# Patient Record
Sex: MALE | Race: WHITE | NOT HISPANIC OR LATINO | Employment: OTHER | URBAN - METROPOLITAN AREA
[De-identification: names, ages, dates, MRNs, and addresses within clinical notes are randomized per-mention and may not be internally consistent; named-entity substitution may affect disease eponyms.]

---

## 2017-01-06 ENCOUNTER — HOSPITAL ENCOUNTER (INPATIENT)
Facility: HOSPITAL | Age: 82
LOS: 4 days | Discharge: HOME | DRG: 280 | End: 2017-01-11
Attending: EMERGENCY MEDICINE | Admitting: ANESTHESIOLOGY
Payer: MEDICARE

## 2017-01-06 ENCOUNTER — APPOINTMENT (EMERGENCY)
Dept: RADIOLOGY | Facility: HOSPITAL | Age: 82
DRG: 280 | End: 2017-01-06
Payer: MEDICARE

## 2017-01-06 DIAGNOSIS — I25.10 CAD (CORONARY ARTERY DISEASE): ICD-10-CM

## 2017-01-06 DIAGNOSIS — Z86.718 HISTORY OF DVT (DEEP VEIN THROMBOSIS): Chronic | ICD-10-CM

## 2017-01-06 DIAGNOSIS — I50.9 ACUTE EXACERBATION OF CONGESTIVE HEART FAILURE (HCC): Primary | ICD-10-CM

## 2017-01-06 DIAGNOSIS — J96.01 ACUTE RESPIRATORY FAILURE WITH HYPOXIA (HCC): ICD-10-CM

## 2017-01-06 DIAGNOSIS — I51.7 CARDIOMEGALY: ICD-10-CM

## 2017-01-06 LAB
APTT PPP: 26 SECONDS (ref 24–36)
BASOPHILS # BLD AUTO: 0 THOUSANDS/ΜL (ref 0–0.1)
BASOPHILS NFR BLD AUTO: 0 % (ref 0–1)
EOSINOPHIL # BLD AUTO: 0.2 THOUSAND/ΜL (ref 0–0.61)
EOSINOPHIL NFR BLD AUTO: 3 % (ref 0–6)
ERYTHROCYTE [DISTWIDTH] IN BLOOD BY AUTOMATED COUNT: 17 % (ref 11.6–15.1)
HCT VFR BLD AUTO: 39.2 % (ref 42–52)
HGB BLD-MCNC: 12.9 G/DL (ref 14–18)
INR PPP: 0.97 (ref 0.86–1.16)
LYMPHOCYTES # BLD AUTO: 1.2 THOUSANDS/ΜL (ref 0.6–4.47)
LYMPHOCYTES NFR BLD AUTO: 21 % (ref 14–44)
MCH RBC QN AUTO: 27.8 PG (ref 27–31)
MCHC RBC AUTO-ENTMCNC: 32.8 G/DL (ref 31.4–37.4)
MCV RBC AUTO: 85 FL (ref 82–98)
MONOCYTES # BLD AUTO: 0.5 THOUSAND/ΜL (ref 0.17–1.22)
MONOCYTES NFR BLD AUTO: 10 % (ref 4–12)
NEUTROPHILS # BLD AUTO: 3.7 THOUSANDS/ΜL (ref 1.85–7.62)
NEUTS SEG NFR BLD AUTO: 66 % (ref 43–75)
NRBC BLD AUTO-RTO: 0 /100 WBCS
PLATELET # BLD AUTO: 175 THOUSANDS/UL (ref 130–400)
PMV BLD AUTO: 8.6 FL (ref 8.9–12.7)
PROTHROMBIN TIME: 10.2 SECONDS (ref 9.4–11.7)
RBC # BLD AUTO: 4.63 MILLION/UL (ref 4.7–6.1)
TROPONIN I SERPL-MCNC: 0.02 NG/ML
WBC # BLD AUTO: 5.7 THOUSAND/UL (ref 4.8–10.8)

## 2017-01-06 PROCEDURE — 83880 ASSAY OF NATRIURETIC PEPTIDE: CPT | Performed by: EMERGENCY MEDICINE

## 2017-01-06 PROCEDURE — 80053 COMPREHEN METABOLIC PANEL: CPT | Performed by: EMERGENCY MEDICINE

## 2017-01-06 PROCEDURE — 84484 ASSAY OF TROPONIN QUANT: CPT | Performed by: EMERGENCY MEDICINE

## 2017-01-06 PROCEDURE — 85730 THROMBOPLASTIN TIME PARTIAL: CPT | Performed by: EMERGENCY MEDICINE

## 2017-01-06 PROCEDURE — 71010 HB CHEST X-RAY 1 VIEW FRONTAL (PORTABLE): CPT

## 2017-01-06 PROCEDURE — 94660 CPAP INITIATION&MGMT: CPT

## 2017-01-06 PROCEDURE — 85610 PROTHROMBIN TIME: CPT | Performed by: EMERGENCY MEDICINE

## 2017-01-06 PROCEDURE — 93005 ELECTROCARDIOGRAM TRACING: CPT | Performed by: EMERGENCY MEDICINE

## 2017-01-06 PROCEDURE — 85025 COMPLETE CBC W/AUTO DIFF WBC: CPT | Performed by: EMERGENCY MEDICINE

## 2017-01-06 PROCEDURE — 36415 COLL VENOUS BLD VENIPUNCTURE: CPT | Performed by: EMERGENCY MEDICINE

## 2017-01-06 PROCEDURE — 96374 THER/PROPH/DIAG INJ IV PUSH: CPT

## 2017-01-06 PROCEDURE — 94760 N-INVAS EAR/PLS OXIMETRY 1: CPT

## 2017-01-06 RX ORDER — METOPROLOL SUCCINATE 50 MG/1
50 TABLET, EXTENDED RELEASE ORAL DAILY
COMMUNITY
End: 2018-04-17

## 2017-01-06 RX ORDER — NITROGLYCERIN 20 MG/100ML
5-200 INJECTION INTRAVENOUS
Status: DISCONTINUED | OUTPATIENT
Start: 2017-01-06 | End: 2017-01-07

## 2017-01-06 RX ORDER — NITROGLYCERIN 20 MG/100ML
5-200 INJECTION INTRAVENOUS
Status: DISCONTINUED | OUTPATIENT
Start: 2017-01-06 | End: 2017-01-06

## 2017-01-06 RX ORDER — NITROGLYCERIN 0.4 MG/1
0.4 TABLET SUBLINGUAL
COMMUNITY
End: 2019-04-22 | Stop reason: SDUPTHER

## 2017-01-06 RX ORDER — FUROSEMIDE 10 MG/ML
60 INJECTION INTRAMUSCULAR; INTRAVENOUS ONCE
Status: COMPLETED | OUTPATIENT
Start: 2017-01-06 | End: 2017-01-06

## 2017-01-06 RX ORDER — IPRATROPIUM BROMIDE AND ALBUTEROL SULFATE 2.5; .5 MG/3ML; MG/3ML
SOLUTION RESPIRATORY (INHALATION)
Status: COMPLETED
Start: 2017-01-06 | End: 2017-01-07

## 2017-01-06 RX ORDER — ASPIRIN 325 MG
325 TABLET ORAL ONCE
Status: COMPLETED | OUTPATIENT
Start: 2017-01-06 | End: 2017-01-06

## 2017-01-06 RX ORDER — ISOSORBIDE DINITRATE 30 MG/1
30 TABLET ORAL DAILY
COMMUNITY
End: 2018-03-12 | Stop reason: SDUPTHER

## 2017-01-06 RX ORDER — IPRATROPIUM BROMIDE AND ALBUTEROL SULFATE 2.5; .5 MG/3ML; MG/3ML
3 SOLUTION RESPIRATORY (INHALATION)
Status: DISCONTINUED | OUTPATIENT
Start: 2017-01-07 | End: 2017-01-07

## 2017-01-06 RX ADMIN — NITROGLYCERIN 1 INCH: 20 OINTMENT TOPICAL at 23:28

## 2017-01-06 RX ADMIN — FUROSEMIDE 60 MG: 10 INJECTION, SOLUTION INTRAMUSCULAR; INTRAVENOUS at 23:28

## 2017-01-06 RX ADMIN — ASPIRIN 325 MG: 325 TABLET ORAL at 23:28

## 2017-01-07 ENCOUNTER — APPOINTMENT (INPATIENT)
Dept: RADIOLOGY | Facility: HOSPITAL | Age: 82
DRG: 280 | End: 2017-01-07
Payer: MEDICARE

## 2017-01-07 PROBLEM — I51.7 CARDIOMEGALY: Status: ACTIVE | Noted: 2017-01-07

## 2017-01-07 PROBLEM — E78.5 HYPERLIPIDEMIA: Status: ACTIVE | Noted: 2017-01-07

## 2017-01-07 PROBLEM — I50.43 ACUTE ON CHRONIC COMBINED SYSTOLIC AND DIASTOLIC HEART FAILURE (HCC): Status: ACTIVE | Noted: 2017-01-07

## 2017-01-07 PROBLEM — I25.10 CAD (CORONARY ARTERY DISEASE): Chronic | Status: ACTIVE | Noted: 2017-01-07

## 2017-01-07 PROBLEM — I10 HYPERTENSION: Chronic | Status: ACTIVE | Noted: 2017-01-07

## 2017-01-07 PROBLEM — Z86.718 HISTORY OF DVT (DEEP VEIN THROMBOSIS): Status: ACTIVE | Noted: 2017-01-07

## 2017-01-07 PROBLEM — I51.7 CARDIOMEGALY: Status: RESOLVED | Noted: 2017-01-07 | Resolved: 2017-01-07

## 2017-01-07 PROBLEM — I20.8 STABLE ANGINA (HCC): Chronic | Status: ACTIVE | Noted: 2017-01-07

## 2017-01-07 PROBLEM — J96.01 ACUTE RESPIRATORY FAILURE WITH HYPOXIA (HCC): Status: ACTIVE | Noted: 2017-01-07

## 2017-01-07 PROBLEM — K27.9 PUD (PEPTIC ULCER DISEASE): Status: ACTIVE | Noted: 2017-01-07

## 2017-01-07 PROBLEM — Z86.718 HISTORY OF DVT (DEEP VEIN THROMBOSIS): Chronic | Status: ACTIVE | Noted: 2017-01-07

## 2017-01-07 PROBLEM — K27.9 PUD (PEPTIC ULCER DISEASE): Chronic | Status: ACTIVE | Noted: 2017-01-07

## 2017-01-07 PROBLEM — R06.89 ACUTE RESPIRATORY INSUFFICIENCY: Status: ACTIVE | Noted: 2017-01-07

## 2017-01-07 PROBLEM — I50.23 ACUTE ON CHRONIC SYSTOLIC HEART FAILURE (HCC): Status: ACTIVE | Noted: 2017-01-07

## 2017-01-07 PROBLEM — E78.5 HYPERLIPIDEMIA: Chronic | Status: ACTIVE | Noted: 2017-01-07

## 2017-01-07 PROBLEM — I25.10 CAD (CORONARY ARTERY DISEASE): Status: ACTIVE | Noted: 2017-01-07

## 2017-01-07 LAB
ALBUMIN SERPL BCP-MCNC: 3.3 G/DL (ref 3.5–5)
ALP SERPL-CCNC: 92 U/L (ref 46–116)
ALT SERPL W P-5'-P-CCNC: 22 U/L (ref 12–78)
ANION GAP SERPL CALCULATED.3IONS-SCNC: 10 MMOL/L (ref 4–13)
ANION GAP SERPL CALCULATED.3IONS-SCNC: 11 MMOL/L (ref 4–13)
ARTERIAL PATENCY WRIST A: NO
AST SERPL W P-5'-P-CCNC: 22 U/L (ref 5–45)
BASE EXCESS BLDA CALC-SCNC: -1.5 MMOL/L
BASOPHILS # BLD AUTO: 0 THOUSANDS/ΜL (ref 0–0.1)
BASOPHILS NFR BLD AUTO: 0 % (ref 0–1)
BILIRUB SERPL-MCNC: 0.5 MG/DL (ref 0.2–1)
BODY TEMPERATURE: 97.2 DEGREES FEHRENHEIT
BUN SERPL-MCNC: 21 MG/DL (ref 5–25)
BUN SERPL-MCNC: 23 MG/DL (ref 5–25)
CALCIUM SERPL-MCNC: 8.7 MG/DL (ref 8.3–10.1)
CALCIUM SERPL-MCNC: 8.8 MG/DL (ref 8.3–10.1)
CHLORIDE SERPL-SCNC: 102 MMOL/L (ref 100–108)
CHLORIDE SERPL-SCNC: 102 MMOL/L (ref 100–108)
CO2 SERPL-SCNC: 27 MMOL/L (ref 21–32)
CO2 SERPL-SCNC: 28 MMOL/L (ref 21–32)
CREAT SERPL-MCNC: 0.84 MG/DL (ref 0.6–1.3)
CREAT SERPL-MCNC: 0.92 MG/DL (ref 0.6–1.3)
EOSINOPHIL # BLD AUTO: 0 THOUSAND/ΜL (ref 0–0.61)
EOSINOPHIL NFR BLD AUTO: 1 % (ref 0–6)
ERYTHROCYTE [DISTWIDTH] IN BLOOD BY AUTOMATED COUNT: 16.6 % (ref 11.6–15.1)
GFR SERPL CREATININE-BSD FRML MDRD: >60 ML/MIN/1.73SQ M
GFR SERPL CREATININE-BSD FRML MDRD: >60 ML/MIN/1.73SQ M
GLUCOSE SERPL-MCNC: 118 MG/DL (ref 65–140)
GLUCOSE SERPL-MCNC: 131 MG/DL (ref 65–140)
GLUCOSE SERPL-MCNC: 131 MG/DL (ref 65–140)
HCO3 BLDA-SCNC: 23.1 MMOL/L (ref 22–28)
HCT VFR BLD AUTO: 38.4 % (ref 42–52)
HGB BLD-MCNC: 12.5 G/DL (ref 14–18)
IPAP: 12
LYMPHOCYTES # BLD AUTO: 0.9 THOUSANDS/ΜL (ref 0.6–4.47)
LYMPHOCYTES NFR BLD AUTO: 14 % (ref 14–44)
MAGNESIUM SERPL-MCNC: 2.1 MG/DL (ref 1.6–2.6)
MCH RBC QN AUTO: 27.5 PG (ref 27–31)
MCHC RBC AUTO-ENTMCNC: 32.5 G/DL (ref 31.4–37.4)
MCV RBC AUTO: 85 FL (ref 82–98)
MONOCYTES # BLD AUTO: 0.5 THOUSAND/ΜL (ref 0.17–1.22)
MONOCYTES NFR BLD AUTO: 8 % (ref 4–12)
NEUTROPHILS # BLD AUTO: 5.1 THOUSANDS/ΜL (ref 1.85–7.62)
NEUTS SEG NFR BLD AUTO: 77 % (ref 43–75)
NON VENT- BIPAP: NORMAL
NRBC BLD AUTO-RTO: 0 /100 WBCS
NT-PROBNP SERPL-MCNC: 1830 PG/ML
O2 CT BLDA-SCNC: 18.3 ML/DL (ref 16–23)
OXYHGB MFR BLDA: 95.1 % (ref 94–97)
PCO2 BLDA: 39 MM HG (ref 36–44)
PCO2 TEMP ADJ BLDA: 37.7 MM HG (ref 36–44)
PEEP MAX SETTING VENT: 6 CM[H2O]
PH BLD: 7.4 [PH] (ref 7.35–7.45)
PH BLDA: 7.39 [PH] (ref 7.35–7.45)
PHOSPHATE SERPL-MCNC: 4.8 MG/DL (ref 2.3–4.1)
PLATELET # BLD AUTO: 170 THOUSANDS/UL (ref 130–400)
PMV BLD AUTO: 8.1 FL (ref 8.9–12.7)
PO2 BLD: 86.5 MM HG (ref 75–129)
PO2 BLDA: 91 MM HG (ref 75–129)
POTASSIUM SERPL-SCNC: 4.2 MMOL/L (ref 3.5–5.3)
POTASSIUM SERPL-SCNC: 5.6 MMOL/L (ref 3.5–5.3)
PROT SERPL-MCNC: 7.7 G/DL (ref 6.4–8.2)
RBC # BLD AUTO: 4.53 MILLION/UL (ref 4.7–6.1)
SODIUM SERPL-SCNC: 140 MMOL/L (ref 136–145)
SODIUM SERPL-SCNC: 140 MMOL/L (ref 136–145)
SPECIMEN SOURCE: NORMAL
TROPONIN I SERPL-MCNC: 0.12 NG/ML
TROPONIN I SERPL-MCNC: 0.13 NG/ML
TROPONIN I SERPL-MCNC: 0.16 NG/ML
VENT BIPAP FIO2: 40 %
WBC # BLD AUTO: 6.6 THOUSAND/UL (ref 4.8–10.8)

## 2017-01-07 PROCEDURE — 84100 ASSAY OF PHOSPHORUS: CPT | Performed by: FAMILY MEDICINE

## 2017-01-07 PROCEDURE — 82805 BLOOD GASES W/O2 SATURATION: CPT | Performed by: EMERGENCY MEDICINE

## 2017-01-07 PROCEDURE — 94760 N-INVAS EAR/PLS OXIMETRY 1: CPT

## 2017-01-07 PROCEDURE — 99285 EMERGENCY DEPT VISIT HI MDM: CPT

## 2017-01-07 PROCEDURE — 71010 HB CHEST X-RAY 1 VIEW FRONTAL (PORTABLE): CPT

## 2017-01-07 PROCEDURE — 71275 CT ANGIOGRAPHY CHEST: CPT

## 2017-01-07 PROCEDURE — 94640 AIRWAY INHALATION TREATMENT: CPT

## 2017-01-07 PROCEDURE — 84484 ASSAY OF TROPONIN QUANT: CPT | Performed by: PHYSICIAN ASSISTANT

## 2017-01-07 PROCEDURE — 84484 ASSAY OF TROPONIN QUANT: CPT | Performed by: ANESTHESIOLOGY

## 2017-01-07 PROCEDURE — 80048 BASIC METABOLIC PNL TOTAL CA: CPT | Performed by: FAMILY MEDICINE

## 2017-01-07 PROCEDURE — 93005 ELECTROCARDIOGRAM TRACING: CPT | Performed by: PHYSICIAN ASSISTANT

## 2017-01-07 PROCEDURE — 82948 REAGENT STRIP/BLOOD GLUCOSE: CPT

## 2017-01-07 PROCEDURE — 87081 CULTURE SCREEN ONLY: CPT | Performed by: ANESTHESIOLOGY

## 2017-01-07 PROCEDURE — 83735 ASSAY OF MAGNESIUM: CPT | Performed by: FAMILY MEDICINE

## 2017-01-07 PROCEDURE — 36600 WITHDRAWAL OF ARTERIAL BLOOD: CPT

## 2017-01-07 PROCEDURE — 85025 COMPLETE CBC W/AUTO DIFF WBC: CPT | Performed by: FAMILY MEDICINE

## 2017-01-07 RX ORDER — CLOPIDOGREL BISULFATE 75 MG/1
75 TABLET ORAL DAILY
Status: DISCONTINUED | OUTPATIENT
Start: 2017-01-07 | End: 2017-01-11 | Stop reason: HOSPADM

## 2017-01-07 RX ORDER — ALPRAZOLAM 0.25 MG/1
0.25 TABLET ORAL
Status: DISCONTINUED | OUTPATIENT
Start: 2017-01-07 | End: 2017-01-08

## 2017-01-07 RX ORDER — IPRATROPIUM BROMIDE AND ALBUTEROL SULFATE 2.5; .5 MG/3ML; MG/3ML
3 SOLUTION RESPIRATORY (INHALATION)
Status: DISCONTINUED | OUTPATIENT
Start: 2017-01-07 | End: 2017-01-11 | Stop reason: HOSPADM

## 2017-01-07 RX ORDER — FUROSEMIDE 10 MG/ML
20 INJECTION INTRAMUSCULAR; INTRAVENOUS
Status: DISCONTINUED | OUTPATIENT
Start: 2017-01-08 | End: 2017-01-08

## 2017-01-07 RX ORDER — TIMOLOL MALEATE 5 MG/ML
1 SOLUTION/ DROPS OPHTHALMIC 2 TIMES DAILY
Status: DISCONTINUED | OUTPATIENT
Start: 2017-01-07 | End: 2017-01-11 | Stop reason: HOSPADM

## 2017-01-07 RX ORDER — FUROSEMIDE 10 MG/ML
20 INJECTION INTRAMUSCULAR; INTRAVENOUS ONCE
Status: COMPLETED | OUTPATIENT
Start: 2017-01-07 | End: 2017-01-07

## 2017-01-07 RX ORDER — ACETAMINOPHEN 325 MG/1
650 TABLET ORAL EVERY 6 HOURS PRN
Status: DISCONTINUED | OUTPATIENT
Start: 2017-01-07 | End: 2017-01-11 | Stop reason: HOSPADM

## 2017-01-07 RX ORDER — ATORVASTATIN CALCIUM 40 MG/1
40 TABLET, FILM COATED ORAL
Status: DISCONTINUED | OUTPATIENT
Start: 2017-01-07 | End: 2017-01-11 | Stop reason: HOSPADM

## 2017-01-07 RX ORDER — ONDANSETRON 2 MG/ML
4 INJECTION INTRAMUSCULAR; INTRAVENOUS EVERY 6 HOURS PRN
Status: DISCONTINUED | OUTPATIENT
Start: 2017-01-07 | End: 2017-01-11 | Stop reason: HOSPADM

## 2017-01-07 RX ORDER — SIMETHICONE 80 MG
80 TABLET,CHEWABLE ORAL EVERY 6 HOURS PRN
Status: DISCONTINUED | OUTPATIENT
Start: 2017-01-07 | End: 2017-01-11 | Stop reason: HOSPADM

## 2017-01-07 RX ORDER — SODIUM CHLORIDE, SODIUM LACTATE, POTASSIUM CHLORIDE, CALCIUM CHLORIDE 600; 310; 30; 20 MG/100ML; MG/100ML; MG/100ML; MG/100ML
30 INJECTION, SOLUTION INTRAVENOUS CONTINUOUS
Status: DISCONTINUED | OUTPATIENT
Start: 2017-01-07 | End: 2017-01-08

## 2017-01-07 RX ORDER — METOPROLOL SUCCINATE 50 MG/1
50 TABLET, EXTENDED RELEASE ORAL DAILY
Status: DISCONTINUED | OUTPATIENT
Start: 2017-01-07 | End: 2017-01-11 | Stop reason: HOSPADM

## 2017-01-07 RX ORDER — ISOSORBIDE DINITRATE 10 MG/1
30 TABLET ORAL DAILY
Status: DISCONTINUED | OUTPATIENT
Start: 2017-01-07 | End: 2017-01-11 | Stop reason: HOSPADM

## 2017-01-07 RX ORDER — MAGNESIUM SULFATE HEPTAHYDRATE 40 MG/ML
2 INJECTION, SOLUTION INTRAVENOUS ONCE
Status: COMPLETED | OUTPATIENT
Start: 2017-01-07 | End: 2017-01-07

## 2017-01-07 RX ORDER — FUROSEMIDE 10 MG/ML
40 INJECTION INTRAMUSCULAR; INTRAVENOUS ONCE
Status: DISCONTINUED | OUTPATIENT
Start: 2017-01-07 | End: 2017-01-07

## 2017-01-07 RX ORDER — ONDANSETRON 2 MG/ML
INJECTION INTRAMUSCULAR; INTRAVENOUS
Status: COMPLETED
Start: 2017-01-07 | End: 2017-01-07

## 2017-01-07 RX ORDER — TAMSULOSIN HYDROCHLORIDE 0.4 MG/1
0.4 CAPSULE ORAL
Status: DISCONTINUED | OUTPATIENT
Start: 2017-01-07 | End: 2017-01-11 | Stop reason: HOSPADM

## 2017-01-07 RX ORDER — ASPIRIN 81 MG/1
81 TABLET, CHEWABLE ORAL DAILY
Status: DISCONTINUED | OUTPATIENT
Start: 2017-01-07 | End: 2017-01-11 | Stop reason: HOSPADM

## 2017-01-07 RX ORDER — LISINOPRIL 2.5 MG/1
2.5 TABLET ORAL DAILY
Status: DISCONTINUED | OUTPATIENT
Start: 2017-01-07 | End: 2017-01-08

## 2017-01-07 RX ADMIN — CLOPIDOGREL BISULFATE 75 MG: 75 TABLET ORAL at 09:39

## 2017-01-07 RX ADMIN — MAGNESIUM SULFATE HEPTAHYDRATE 2 G: 40 INJECTION, SOLUTION INTRAVENOUS at 09:53

## 2017-01-07 RX ADMIN — IOHEXOL 85 ML: 350 INJECTION, SOLUTION INTRAVENOUS at 12:53

## 2017-01-07 RX ADMIN — IPRATROPIUM BROMIDE AND ALBUTEROL SULFATE 3 ML: .5; 3 SOLUTION RESPIRATORY (INHALATION) at 12:14

## 2017-01-07 RX ADMIN — ONDANSETRON 4 MG: 2 INJECTION INTRAMUSCULAR; INTRAVENOUS at 10:12

## 2017-01-07 RX ADMIN — Medication 800 MG: at 18:03

## 2017-01-07 RX ADMIN — TIMOLOL MALEATE 1 DROP: 5 SOLUTION/ DROPS OPHTHALMIC at 18:04

## 2017-01-07 RX ADMIN — ENOXAPARIN SODIUM 40 MG: 40 INJECTION SUBCUTANEOUS at 09:39

## 2017-01-07 RX ADMIN — FUROSEMIDE 20 MG: 10 INJECTION, SOLUTION INTRAMUSCULAR; INTRAVENOUS at 16:47

## 2017-01-07 RX ADMIN — ISOSORBIDE DINITRATE 30 MG: 10 TABLET ORAL at 09:40

## 2017-01-07 RX ADMIN — IPRATROPIUM BROMIDE AND ALBUTEROL SULFATE 3 ML: .5; 3 SOLUTION RESPIRATORY (INHALATION) at 01:28

## 2017-01-07 RX ADMIN — IPRATROPIUM BROMIDE AND ALBUTEROL SULFATE 3 ML: .5; 3 SOLUTION RESPIRATORY (INHALATION) at 19:42

## 2017-01-07 RX ADMIN — TIMOLOL MALEATE 1 DROP: 5 SOLUTION/ DROPS OPHTHALMIC at 09:30

## 2017-01-07 RX ADMIN — IPRATROPIUM BROMIDE AND ALBUTEROL SULFATE 3 ML: 2.5; .5 SOLUTION RESPIRATORY (INHALATION) at 01:28

## 2017-01-07 RX ADMIN — FUROSEMIDE 20 MG: 10 INJECTION, SOLUTION INTRAMUSCULAR; INTRAVENOUS at 04:49

## 2017-01-07 RX ADMIN — IPRATROPIUM BROMIDE AND ALBUTEROL SULFATE 3 ML: .5; 3 SOLUTION RESPIRATORY (INHALATION) at 04:56

## 2017-01-07 RX ADMIN — SIMETHICONE CHEW TAB 80 MG 80 MG: 80 TABLET ORAL at 20:32

## 2017-01-07 RX ADMIN — LISINOPRIL 2.5 MG: 2.5 TABLET ORAL at 09:39

## 2017-01-07 RX ADMIN — Medication 800 MG: at 09:38

## 2017-01-07 RX ADMIN — IPRATROPIUM BROMIDE AND ALBUTEROL SULFATE 3 ML: .5; 3 SOLUTION RESPIRATORY (INHALATION) at 07:40

## 2017-01-07 RX ADMIN — IPRATROPIUM BROMIDE AND ALBUTEROL SULFATE 3 ML: .5; 3 SOLUTION RESPIRATORY (INHALATION) at 15:37

## 2017-01-07 RX ADMIN — ASPIRIN 81 MG 81 MG: 81 TABLET ORAL at 09:39

## 2017-01-07 RX ADMIN — METOPROLOL SUCCINATE 50 MG: 50 TABLET, FILM COATED, EXTENDED RELEASE ORAL at 09:39

## 2017-01-07 RX ADMIN — ATORVASTATIN CALCIUM 40 MG: 40 TABLET, FILM COATED ORAL at 18:03

## 2017-01-07 RX ADMIN — SODIUM CHLORIDE, SODIUM LACTATE, POTASSIUM CHLORIDE, AND CALCIUM CHLORIDE 75 ML/HR: .6; .31; .03; .02 INJECTION, SOLUTION INTRAVENOUS at 13:00

## 2017-01-07 RX ADMIN — TAMSULOSIN HYDROCHLORIDE 0.4 MG: 0.4 CAPSULE ORAL at 18:04

## 2017-01-07 RX ADMIN — ACETAMINOPHEN 650 MG: 325 TABLET, FILM COATED ORAL at 20:32

## 2017-01-08 ENCOUNTER — APPOINTMENT (INPATIENT)
Dept: RADIOLOGY | Facility: HOSPITAL | Age: 82
DRG: 280 | End: 2017-01-08
Payer: MEDICARE

## 2017-01-08 LAB
ANION GAP SERPL CALCULATED.3IONS-SCNC: 6 MMOL/L (ref 4–13)
ANION GAP SERPL CALCULATED.3IONS-SCNC: 9 MMOL/L (ref 4–13)
BASOPHILS # BLD AUTO: 0 THOUSANDS/ΜL (ref 0–0.1)
BASOPHILS NFR BLD AUTO: 0 % (ref 0–1)
BUN SERPL-MCNC: 31 MG/DL (ref 5–25)
BUN SERPL-MCNC: 36 MG/DL (ref 5–25)
CALCIUM SERPL-MCNC: 8.1 MG/DL (ref 8.3–10.1)
CALCIUM SERPL-MCNC: 8.3 MG/DL (ref 8.3–10.1)
CHLORIDE SERPL-SCNC: 100 MMOL/L (ref 100–108)
CHLORIDE SERPL-SCNC: 101 MMOL/L (ref 100–108)
CO2 SERPL-SCNC: 30 MMOL/L (ref 21–32)
CO2 SERPL-SCNC: 32 MMOL/L (ref 21–32)
CREAT SERPL-MCNC: 1.26 MG/DL (ref 0.6–1.3)
CREAT SERPL-MCNC: 1.43 MG/DL (ref 0.6–1.3)
EOSINOPHIL # BLD AUTO: 0 THOUSAND/ΜL (ref 0–0.61)
EOSINOPHIL NFR BLD AUTO: 1 % (ref 0–6)
ERYTHROCYTE [DISTWIDTH] IN BLOOD BY AUTOMATED COUNT: 16.9 % (ref 11.6–15.1)
GFR SERPL CREATININE-BSD FRML MDRD: 46.9 ML/MIN/1.73SQ M
GFR SERPL CREATININE-BSD FRML MDRD: 54.3 ML/MIN/1.73SQ M
GLUCOSE SERPL-MCNC: 113 MG/DL (ref 65–140)
GLUCOSE SERPL-MCNC: 123 MG/DL (ref 65–140)
HCT VFR BLD AUTO: 32.7 % (ref 42–52)
HGB BLD-MCNC: 10.6 G/DL (ref 14–18)
LYMPHOCYTES # BLD AUTO: 1 THOUSANDS/ΜL (ref 0.6–4.47)
LYMPHOCYTES NFR BLD AUTO: 11 % (ref 14–44)
MCH RBC QN AUTO: 27.5 PG (ref 27–31)
MCHC RBC AUTO-ENTMCNC: 32.3 G/DL (ref 31.4–37.4)
MCV RBC AUTO: 85 FL (ref 82–98)
MONOCYTES # BLD AUTO: 0.8 THOUSAND/ΜL (ref 0.17–1.22)
MONOCYTES NFR BLD AUTO: 9 % (ref 4–12)
MRSA NOSE QL CULT: NORMAL
NEUTROPHILS # BLD AUTO: 7.3 THOUSANDS/ΜL (ref 1.85–7.62)
NEUTS SEG NFR BLD AUTO: 80 % (ref 43–75)
NRBC BLD AUTO-RTO: 0 /100 WBCS
PLATELET # BLD AUTO: 144 THOUSANDS/UL (ref 130–400)
PMV BLD AUTO: 8.6 FL (ref 8.9–12.7)
POTASSIUM SERPL-SCNC: 4.2 MMOL/L (ref 3.5–5.3)
POTASSIUM SERPL-SCNC: 4.5 MMOL/L (ref 3.5–5.3)
RBC # BLD AUTO: 3.85 MILLION/UL (ref 4.7–6.1)
SODIUM SERPL-SCNC: 138 MMOL/L (ref 136–145)
SODIUM SERPL-SCNC: 140 MMOL/L (ref 136–145)
WBC # BLD AUTO: 9.1 THOUSAND/UL (ref 4.8–10.8)

## 2017-01-08 PROCEDURE — 94760 N-INVAS EAR/PLS OXIMETRY 1: CPT

## 2017-01-08 PROCEDURE — 80048 BASIC METABOLIC PNL TOTAL CA: CPT | Performed by: PHYSICIAN ASSISTANT

## 2017-01-08 PROCEDURE — 85025 COMPLETE CBC W/AUTO DIFF WBC: CPT | Performed by: PHYSICIAN ASSISTANT

## 2017-01-08 PROCEDURE — 71010 HB CHEST X-RAY 1 VIEW FRONTAL (PORTABLE): CPT

## 2017-01-08 PROCEDURE — 94640 AIRWAY INHALATION TREATMENT: CPT

## 2017-01-08 PROCEDURE — 80048 BASIC METABOLIC PNL TOTAL CA: CPT | Performed by: ANESTHESIOLOGY

## 2017-01-08 RX ADMIN — TIMOLOL MALEATE 1 DROP: 5 SOLUTION/ DROPS OPHTHALMIC at 18:11

## 2017-01-08 RX ADMIN — Medication 800 MG: at 18:10

## 2017-01-08 RX ADMIN — IPRATROPIUM BROMIDE AND ALBUTEROL SULFATE 3 ML: .5; 3 SOLUTION RESPIRATORY (INHALATION) at 11:52

## 2017-01-08 RX ADMIN — IPRATROPIUM BROMIDE AND ALBUTEROL SULFATE 3 ML: .5; 3 SOLUTION RESPIRATORY (INHALATION) at 07:32

## 2017-01-08 RX ADMIN — IPRATROPIUM BROMIDE AND ALBUTEROL SULFATE 3 ML: .5; 3 SOLUTION RESPIRATORY (INHALATION) at 01:37

## 2017-01-08 RX ADMIN — ISOSORBIDE DINITRATE 30 MG: 10 TABLET ORAL at 09:12

## 2017-01-08 RX ADMIN — IPRATROPIUM BROMIDE AND ALBUTEROL SULFATE 3 ML: .5; 3 SOLUTION RESPIRATORY (INHALATION) at 17:27

## 2017-01-08 RX ADMIN — TIMOLOL MALEATE 1 DROP: 5 SOLUTION/ DROPS OPHTHALMIC at 09:00

## 2017-01-08 RX ADMIN — ATORVASTATIN CALCIUM 40 MG: 40 TABLET, FILM COATED ORAL at 18:09

## 2017-01-08 RX ADMIN — METOPROLOL TARTRATE 25 MG: 25 TABLET ORAL at 22:26

## 2017-01-08 RX ADMIN — Medication 800 MG: at 09:12

## 2017-01-08 RX ADMIN — ENOXAPARIN SODIUM 40 MG: 40 INJECTION SUBCUTANEOUS at 09:11

## 2017-01-08 RX ADMIN — FUROSEMIDE 20 MG: 10 INJECTION, SOLUTION INTRAMUSCULAR; INTRAVENOUS at 08:14

## 2017-01-08 RX ADMIN — METOPROLOL SUCCINATE 50 MG: 50 TABLET, FILM COATED, EXTENDED RELEASE ORAL at 09:12

## 2017-01-08 RX ADMIN — ASPIRIN 81 MG 81 MG: 81 TABLET ORAL at 09:11

## 2017-01-08 RX ADMIN — TAMSULOSIN HYDROCHLORIDE 0.4 MG: 0.4 CAPSULE ORAL at 18:10

## 2017-01-08 RX ADMIN — CLOPIDOGREL BISULFATE 75 MG: 75 TABLET ORAL at 09:11

## 2017-01-08 RX ADMIN — IPRATROPIUM BROMIDE AND ALBUTEROL SULFATE 3 ML: .5; 3 SOLUTION RESPIRATORY (INHALATION) at 20:59

## 2017-01-09 ENCOUNTER — APPOINTMENT (INPATIENT)
Dept: NON INVASIVE DIAGNOSTICS | Facility: HOSPITAL | Age: 82
DRG: 280 | End: 2017-01-09
Payer: MEDICARE

## 2017-01-09 LAB
ANION GAP SERPL CALCULATED.3IONS-SCNC: 6 MMOL/L (ref 4–13)
ATRIAL RATE: 90 BPM
BASOPHILS # BLD AUTO: 0 THOUSANDS/ΜL (ref 0–0.1)
BASOPHILS NFR BLD AUTO: 0 % (ref 0–1)
BUN SERPL-MCNC: 33 MG/DL (ref 5–25)
CALCIUM SERPL-MCNC: 8.2 MG/DL (ref 8.3–10.1)
CHLORIDE SERPL-SCNC: 100 MMOL/L (ref 100–108)
CO2 SERPL-SCNC: 32 MMOL/L (ref 21–32)
CREAT SERPL-MCNC: 0.98 MG/DL (ref 0.6–1.3)
EOSINOPHIL # BLD AUTO: 0.1 THOUSAND/ΜL (ref 0–0.61)
EOSINOPHIL NFR BLD AUTO: 2 % (ref 0–6)
ERYTHROCYTE [DISTWIDTH] IN BLOOD BY AUTOMATED COUNT: 17.1 % (ref 11.6–15.1)
GFR SERPL CREATININE-BSD FRML MDRD: >60 ML/MIN/1.73SQ M
GLUCOSE SERPL-MCNC: 98 MG/DL (ref 65–140)
HCT VFR BLD AUTO: 31.7 % (ref 42–52)
HGB BLD-MCNC: 10.1 G/DL (ref 14–18)
LYMPHOCYTES # BLD AUTO: 0.9 THOUSANDS/ΜL (ref 0.6–4.47)
LYMPHOCYTES NFR BLD AUTO: 17 % (ref 14–44)
MCH RBC QN AUTO: 27.1 PG (ref 27–31)
MCHC RBC AUTO-ENTMCNC: 32 G/DL (ref 31.4–37.4)
MCV RBC AUTO: 85 FL (ref 82–98)
MONOCYTES # BLD AUTO: 0.7 THOUSAND/ΜL (ref 0.17–1.22)
MONOCYTES NFR BLD AUTO: 13 % (ref 4–12)
NEUTROPHILS # BLD AUTO: 3.7 THOUSANDS/ΜL (ref 1.85–7.62)
NEUTS SEG NFR BLD AUTO: 67 % (ref 43–75)
NRBC BLD AUTO-RTO: 0 /100 WBCS
P AXIS: 25 DEGREES
PLATELET # BLD AUTO: 141 THOUSANDS/UL (ref 130–400)
PMV BLD AUTO: 7.7 FL (ref 8.9–12.7)
POTASSIUM SERPL-SCNC: 4.2 MMOL/L (ref 3.5–5.3)
PR INTERVAL: 178 MS
QRS AXIS: 37 DEGREES
QRSD INTERVAL: 74 MS
QT INTERVAL: 350 MS
QTC INTERVAL: 428 MS
RBC # BLD AUTO: 3.73 MILLION/UL (ref 4.7–6.1)
SODIUM SERPL-SCNC: 138 MMOL/L (ref 136–145)
T WAVE AXIS: -42 DEGREES
VENTRICULAR RATE: 90 BPM
WBC # BLD AUTO: 5.5 THOUSAND/UL (ref 4.8–10.8)

## 2017-01-09 PROCEDURE — 85025 COMPLETE CBC W/AUTO DIFF WBC: CPT | Performed by: STUDENT IN AN ORGANIZED HEALTH CARE EDUCATION/TRAINING PROGRAM

## 2017-01-09 PROCEDURE — G8978 MOBILITY CURRENT STATUS: HCPCS

## 2017-01-09 PROCEDURE — G8979 MOBILITY GOAL STATUS: HCPCS

## 2017-01-09 PROCEDURE — 80048 BASIC METABOLIC PNL TOTAL CA: CPT | Performed by: STUDENT IN AN ORGANIZED HEALTH CARE EDUCATION/TRAINING PROGRAM

## 2017-01-09 PROCEDURE — 97162 PT EVAL MOD COMPLEX 30 MIN: CPT

## 2017-01-09 PROCEDURE — 94760 N-INVAS EAR/PLS OXIMETRY 1: CPT

## 2017-01-09 PROCEDURE — 94640 AIRWAY INHALATION TREATMENT: CPT

## 2017-01-09 PROCEDURE — 97166 OT EVAL MOD COMPLEX 45 MIN: CPT

## 2017-01-09 PROCEDURE — 93306 TTE W/DOPPLER COMPLETE: CPT

## 2017-01-09 PROCEDURE — G8987 SELF CARE CURRENT STATUS: HCPCS

## 2017-01-09 PROCEDURE — G8988 SELF CARE GOAL STATUS: HCPCS

## 2017-01-09 RX ORDER — ALPRAZOLAM 0.25 MG/1
0.25 TABLET ORAL ONCE
Status: COMPLETED | OUTPATIENT
Start: 2017-01-09 | End: 2017-01-09

## 2017-01-09 RX ORDER — FAMOTIDINE 20 MG/1
20 TABLET, FILM COATED ORAL 2 TIMES DAILY
Status: DISCONTINUED | OUTPATIENT
Start: 2017-01-09 | End: 2017-01-11 | Stop reason: HOSPADM

## 2017-01-09 RX ORDER — FUROSEMIDE 10 MG/ML
20 INJECTION INTRAMUSCULAR; INTRAVENOUS ONCE
Status: COMPLETED | OUTPATIENT
Start: 2017-01-09 | End: 2017-01-09

## 2017-01-09 RX ORDER — TORSEMIDE 10 MG/1
10 TABLET ORAL DAILY
Status: DISCONTINUED | OUTPATIENT
Start: 2017-01-10 | End: 2017-01-11

## 2017-01-09 RX ADMIN — FAMOTIDINE 20 MG: 20 TABLET ORAL at 21:54

## 2017-01-09 RX ADMIN — IPRATROPIUM BROMIDE AND ALBUTEROL SULFATE 3 ML: .5; 3 SOLUTION RESPIRATORY (INHALATION) at 15:22

## 2017-01-09 RX ADMIN — IPRATROPIUM BROMIDE AND ALBUTEROL SULFATE 3 ML: .5; 3 SOLUTION RESPIRATORY (INHALATION) at 20:57

## 2017-01-09 RX ADMIN — FUROSEMIDE 20 MG: 10 INJECTION, SOLUTION INTRAMUSCULAR; INTRAVENOUS at 09:14

## 2017-01-09 RX ADMIN — ISOSORBIDE DINITRATE 30 MG: 10 TABLET ORAL at 09:21

## 2017-01-09 RX ADMIN — TAMSULOSIN HYDROCHLORIDE 0.4 MG: 0.4 CAPSULE ORAL at 16:56

## 2017-01-09 RX ADMIN — IPRATROPIUM BROMIDE AND ALBUTEROL SULFATE 3 ML: .5; 3 SOLUTION RESPIRATORY (INHALATION) at 07:40

## 2017-01-09 RX ADMIN — METOPROLOL SUCCINATE 50 MG: 50 TABLET, FILM COATED, EXTENDED RELEASE ORAL at 09:14

## 2017-01-09 RX ADMIN — ALPRAZOLAM 0.25 MG: 0.25 TABLET ORAL at 01:28

## 2017-01-09 RX ADMIN — TIMOLOL MALEATE 1 DROP: 5 SOLUTION/ DROPS OPHTHALMIC at 17:53

## 2017-01-09 RX ADMIN — TIMOLOL MALEATE 1 DROP: 5 SOLUTION/ DROPS OPHTHALMIC at 09:20

## 2017-01-09 RX ADMIN — ATORVASTATIN CALCIUM 40 MG: 40 TABLET, FILM COATED ORAL at 16:56

## 2017-01-09 RX ADMIN — Medication 800 MG: at 09:15

## 2017-01-09 RX ADMIN — Medication 800 MG: at 17:53

## 2017-01-09 RX ADMIN — ASPIRIN 81 MG 81 MG: 81 TABLET ORAL at 09:15

## 2017-01-09 RX ADMIN — IPRATROPIUM BROMIDE AND ALBUTEROL SULFATE 3 ML: .5; 3 SOLUTION RESPIRATORY (INHALATION) at 23:09

## 2017-01-09 RX ADMIN — IPRATROPIUM BROMIDE AND ALBUTEROL SULFATE 3 ML: .5; 3 SOLUTION RESPIRATORY (INHALATION) at 11:17

## 2017-01-09 RX ADMIN — METOPROLOL TARTRATE 25 MG: 25 TABLET ORAL at 21:54

## 2017-01-09 RX ADMIN — CLOPIDOGREL BISULFATE 75 MG: 75 TABLET ORAL at 09:15

## 2017-01-09 RX ADMIN — IPRATROPIUM BROMIDE AND ALBUTEROL SULFATE 3 ML: .5; 3 SOLUTION RESPIRATORY (INHALATION) at 04:40

## 2017-01-09 RX ADMIN — ENOXAPARIN SODIUM 40 MG: 40 INJECTION SUBCUTANEOUS at 09:15

## 2017-01-10 ENCOUNTER — APPOINTMENT (INPATIENT)
Dept: RADIOLOGY | Facility: HOSPITAL | Age: 82
DRG: 280 | End: 2017-01-10
Payer: MEDICARE

## 2017-01-10 LAB
ANION GAP SERPL CALCULATED.3IONS-SCNC: 7 MMOL/L (ref 4–13)
BASOPHILS # BLD AUTO: 0 THOUSANDS/ΜL (ref 0–0.1)
BASOPHILS NFR BLD AUTO: 0 % (ref 0–1)
BUN SERPL-MCNC: 31 MG/DL (ref 5–25)
CALCIUM SERPL-MCNC: 8.4 MG/DL (ref 8.3–10.1)
CHLORIDE SERPL-SCNC: 102 MMOL/L (ref 100–108)
CO2 SERPL-SCNC: 30 MMOL/L (ref 21–32)
CREAT SERPL-MCNC: 0.94 MG/DL (ref 0.6–1.3)
EOSINOPHIL # BLD AUTO: 0.1 THOUSAND/ΜL (ref 0–0.61)
EOSINOPHIL NFR BLD AUTO: 2 % (ref 0–6)
ERYTHROCYTE [DISTWIDTH] IN BLOOD BY AUTOMATED COUNT: 16.6 % (ref 11.6–15.1)
GFR SERPL CREATININE-BSD FRML MDRD: >60 ML/MIN/1.73SQ M
GLUCOSE SERPL-MCNC: 126 MG/DL (ref 65–140)
HCT VFR BLD AUTO: 32.7 % (ref 42–52)
HGB BLD-MCNC: 10.5 G/DL (ref 14–18)
LYMPHOCYTES # BLD AUTO: 0.8 THOUSANDS/ΜL (ref 0.6–4.47)
LYMPHOCYTES NFR BLD AUTO: 14 % (ref 14–44)
MAGNESIUM SERPL-MCNC: 2.3 MG/DL (ref 1.6–2.6)
MCH RBC QN AUTO: 27.5 PG (ref 27–31)
MCHC RBC AUTO-ENTMCNC: 32.1 G/DL (ref 31.4–37.4)
MCV RBC AUTO: 86 FL (ref 82–98)
MONOCYTES # BLD AUTO: 0.7 THOUSAND/ΜL (ref 0.17–1.22)
MONOCYTES NFR BLD AUTO: 12 % (ref 4–12)
NEUTROPHILS # BLD AUTO: 4.3 THOUSANDS/ΜL (ref 1.85–7.62)
NEUTS SEG NFR BLD AUTO: 72 % (ref 43–75)
NRBC BLD AUTO-RTO: 0 /100 WBCS
PHOSPHATE SERPL-MCNC: 4 MG/DL (ref 2.3–4.1)
PLATELET # BLD AUTO: 156 THOUSANDS/UL (ref 130–400)
PMV BLD AUTO: 8.7 FL (ref 8.9–12.7)
POTASSIUM SERPL-SCNC: 4.2 MMOL/L (ref 3.5–5.3)
RBC # BLD AUTO: 3.81 MILLION/UL (ref 4.7–6.1)
SODIUM SERPL-SCNC: 139 MMOL/L (ref 136–145)
WBC # BLD AUTO: 5.9 THOUSAND/UL (ref 4.8–10.8)

## 2017-01-10 PROCEDURE — 94640 AIRWAY INHALATION TREATMENT: CPT

## 2017-01-10 PROCEDURE — 80048 BASIC METABOLIC PNL TOTAL CA: CPT | Performed by: INTERNAL MEDICINE

## 2017-01-10 PROCEDURE — 97110 THERAPEUTIC EXERCISES: CPT

## 2017-01-10 PROCEDURE — 71020 HB CHEST X-RAY 2VW FRONTAL&LATL: CPT

## 2017-01-10 PROCEDURE — 94760 N-INVAS EAR/PLS OXIMETRY 1: CPT

## 2017-01-10 PROCEDURE — 83735 ASSAY OF MAGNESIUM: CPT | Performed by: INTERNAL MEDICINE

## 2017-01-10 PROCEDURE — 84100 ASSAY OF PHOSPHORUS: CPT | Performed by: INTERNAL MEDICINE

## 2017-01-10 PROCEDURE — 85025 COMPLETE CBC W/AUTO DIFF WBC: CPT | Performed by: INTERNAL MEDICINE

## 2017-01-10 RX ADMIN — ATORVASTATIN CALCIUM 40 MG: 40 TABLET, FILM COATED ORAL at 17:08

## 2017-01-10 RX ADMIN — Medication 800 MG: at 17:08

## 2017-01-10 RX ADMIN — IPRATROPIUM BROMIDE AND ALBUTEROL SULFATE 3 ML: .5; 3 SOLUTION RESPIRATORY (INHALATION) at 16:14

## 2017-01-10 RX ADMIN — IPRATROPIUM BROMIDE AND ALBUTEROL SULFATE 3 ML: .5; 3 SOLUTION RESPIRATORY (INHALATION) at 11:43

## 2017-01-10 RX ADMIN — CLOPIDOGREL BISULFATE 75 MG: 75 TABLET ORAL at 09:29

## 2017-01-10 RX ADMIN — TIMOLOL MALEATE 1 DROP: 5 SOLUTION/ DROPS OPHTHALMIC at 09:31

## 2017-01-10 RX ADMIN — IPRATROPIUM BROMIDE AND ALBUTEROL SULFATE 3 ML: .5; 3 SOLUTION RESPIRATORY (INHALATION) at 20:44

## 2017-01-10 RX ADMIN — ENOXAPARIN SODIUM 40 MG: 40 INJECTION SUBCUTANEOUS at 09:30

## 2017-01-10 RX ADMIN — TORSEMIDE 10 MG: 10 TABLET ORAL at 09:29

## 2017-01-10 RX ADMIN — IPRATROPIUM BROMIDE AND ALBUTEROL SULFATE 3 ML: .5; 3 SOLUTION RESPIRATORY (INHALATION) at 09:08

## 2017-01-10 RX ADMIN — ASPIRIN 81 MG 81 MG: 81 TABLET ORAL at 09:29

## 2017-01-10 RX ADMIN — ONDANSETRON 4 MG: 2 INJECTION INTRAMUSCULAR; INTRAVENOUS at 06:10

## 2017-01-10 RX ADMIN — Medication 800 MG: at 09:29

## 2017-01-10 RX ADMIN — METOPROLOL SUCCINATE 50 MG: 50 TABLET, FILM COATED, EXTENDED RELEASE ORAL at 09:29

## 2017-01-10 RX ADMIN — FAMOTIDINE 20 MG: 20 TABLET ORAL at 09:29

## 2017-01-10 RX ADMIN — TIMOLOL MALEATE 1 DROP: 5 SOLUTION/ DROPS OPHTHALMIC at 17:08

## 2017-01-10 RX ADMIN — FAMOTIDINE 20 MG: 20 TABLET ORAL at 17:08

## 2017-01-10 RX ADMIN — TAMSULOSIN HYDROCHLORIDE 0.4 MG: 0.4 CAPSULE ORAL at 17:08

## 2017-01-10 RX ADMIN — ISOSORBIDE DINITRATE 30 MG: 10 TABLET ORAL at 09:30

## 2017-01-10 RX ADMIN — METOPROLOL TARTRATE 25 MG: 25 TABLET ORAL at 21:51

## 2017-01-11 VITALS
RESPIRATION RATE: 18 BRPM | OXYGEN SATURATION: 92 % | DIASTOLIC BLOOD PRESSURE: 86 MMHG | WEIGHT: 143.3 LBS | BODY MASS INDEX: 24.46 KG/M2 | TEMPERATURE: 97.7 F | HEIGHT: 64 IN | HEART RATE: 97 BPM | SYSTOLIC BLOOD PRESSURE: 156 MMHG

## 2017-01-11 PROBLEM — I50.33 ACUTE ON CHRONIC DIASTOLIC HEART FAILURE (HCC): Status: ACTIVE | Noted: 2017-01-07

## 2017-01-11 LAB
ANION GAP SERPL CALCULATED.3IONS-SCNC: 7 MMOL/L (ref 4–13)
ATRIAL RATE: 77 BPM
BUN SERPL-MCNC: 32 MG/DL (ref 5–25)
CALCIUM SERPL-MCNC: 8.3 MG/DL (ref 8.3–10.1)
CHLORIDE SERPL-SCNC: 102 MMOL/L (ref 100–108)
CO2 SERPL-SCNC: 31 MMOL/L (ref 21–32)
CREAT SERPL-MCNC: 0.97 MG/DL (ref 0.6–1.3)
GFR SERPL CREATININE-BSD FRML MDRD: >60 ML/MIN/1.73SQ M
GLUCOSE SERPL-MCNC: 116 MG/DL (ref 65–140)
MAGNESIUM SERPL-MCNC: 2.3 MG/DL (ref 1.6–2.6)
P AXIS: 20 DEGREES
PHOSPHATE SERPL-MCNC: 3.8 MG/DL (ref 2.3–4.1)
POTASSIUM SERPL-SCNC: 4.1 MMOL/L (ref 3.5–5.3)
PR INTERVAL: 164 MS
QRS AXIS: 24 DEGREES
QRSD INTERVAL: 82 MS
QT INTERVAL: 422 MS
QTC INTERVAL: 477 MS
SODIUM SERPL-SCNC: 140 MMOL/L (ref 136–145)
T WAVE AXIS: -70 DEGREES
VENTRICULAR RATE: 77 BPM

## 2017-01-11 PROCEDURE — 94640 AIRWAY INHALATION TREATMENT: CPT

## 2017-01-11 PROCEDURE — 80048 BASIC METABOLIC PNL TOTAL CA: CPT | Performed by: INTERNAL MEDICINE

## 2017-01-11 PROCEDURE — 83735 ASSAY OF MAGNESIUM: CPT | Performed by: INTERNAL MEDICINE

## 2017-01-11 PROCEDURE — 94760 N-INVAS EAR/PLS OXIMETRY 1: CPT

## 2017-01-11 PROCEDURE — G0009 ADMIN PNEUMOCOCCAL VACCINE: HCPCS | Performed by: ANESTHESIOLOGY

## 2017-01-11 PROCEDURE — 90670 PCV13 VACCINE IM: CPT | Performed by: ANESTHESIOLOGY

## 2017-01-11 PROCEDURE — 84100 ASSAY OF PHOSPHORUS: CPT | Performed by: INTERNAL MEDICINE

## 2017-01-11 PROCEDURE — 97110 THERAPEUTIC EXERCISES: CPT

## 2017-01-11 RX ORDER — FAMOTIDINE 20 MG/1
20 TABLET, FILM COATED ORAL 2 TIMES DAILY
Qty: 60 TABLET | Refills: 0 | Status: SHIPPED | OUTPATIENT
Start: 2017-01-11 | End: 2018-04-17

## 2017-01-11 RX ORDER — POTASSIUM CHLORIDE 750 MG/1
20 TABLET, EXTENDED RELEASE ORAL DAILY
Qty: 20 TABLET | Refills: 0 | Status: SHIPPED | OUTPATIENT
Start: 2017-01-11 | End: 2017-11-04 | Stop reason: HOSPADM

## 2017-01-11 RX ORDER — TORSEMIDE 20 MG/1
20 TABLET ORAL DAILY
Qty: 30 TABLET | Refills: 0 | Status: SHIPPED | OUTPATIENT
Start: 2017-01-12 | End: 2018-04-17

## 2017-01-11 RX ORDER — TORSEMIDE 20 MG/1
20 TABLET ORAL DAILY
Status: DISCONTINUED | OUTPATIENT
Start: 2017-01-12 | End: 2017-01-11 | Stop reason: HOSPADM

## 2017-01-11 RX ADMIN — ASPIRIN 81 MG 81 MG: 81 TABLET ORAL at 08:55

## 2017-01-11 RX ADMIN — IPRATROPIUM BROMIDE AND ALBUTEROL SULFATE 3 ML: .5; 3 SOLUTION RESPIRATORY (INHALATION) at 00:14

## 2017-01-11 RX ADMIN — FAMOTIDINE 20 MG: 20 TABLET ORAL at 08:55

## 2017-01-11 RX ADMIN — TORSEMIDE 10 MG: 10 TABLET ORAL at 08:55

## 2017-01-11 RX ADMIN — IPRATROPIUM BROMIDE AND ALBUTEROL SULFATE 3 ML: .5; 3 SOLUTION RESPIRATORY (INHALATION) at 03:25

## 2017-01-11 RX ADMIN — ENOXAPARIN SODIUM 40 MG: 40 INJECTION SUBCUTANEOUS at 08:55

## 2017-01-11 RX ADMIN — CLOPIDOGREL BISULFATE 75 MG: 75 TABLET ORAL at 08:55

## 2017-01-11 RX ADMIN — IPRATROPIUM BROMIDE AND ALBUTEROL SULFATE 3 ML: .5; 3 SOLUTION RESPIRATORY (INHALATION) at 07:25

## 2017-01-11 RX ADMIN — METOPROLOL SUCCINATE 50 MG: 50 TABLET, FILM COATED, EXTENDED RELEASE ORAL at 08:55

## 2017-01-11 RX ADMIN — ISOSORBIDE DINITRATE 30 MG: 10 TABLET ORAL at 08:54

## 2017-01-11 RX ADMIN — PNEUMOCOCCAL 13-VALENT CONJUGATE VACCINE 0.5 ML: 2.2; 2.2; 2.2; 2.2; 2.2; 4.4; 2.2; 2.2; 2.2; 2.2; 2.2; 2.2; 2.2 INJECTION, SUSPENSION INTRAMUSCULAR at 09:20

## 2017-01-11 RX ADMIN — TIMOLOL MALEATE 1 DROP: 5 SOLUTION/ DROPS OPHTHALMIC at 08:56

## 2017-01-11 RX ADMIN — Medication 800 MG: at 08:55

## 2017-01-24 ENCOUNTER — ALLSCRIPTS OFFICE VISIT (OUTPATIENT)
Dept: OTHER | Facility: OTHER | Age: 82
End: 2017-01-24

## 2017-01-24 DIAGNOSIS — I50.32 CHRONIC DIASTOLIC HEART FAILURE (HCC): ICD-10-CM

## 2017-01-24 DIAGNOSIS — I10 ESSENTIAL (PRIMARY) HYPERTENSION: ICD-10-CM

## 2017-01-24 DIAGNOSIS — I25.10 ATHEROSCLEROTIC HEART DISEASE OF NATIVE CORONARY ARTERY WITHOUT ANGINA PECTORIS: ICD-10-CM

## 2017-01-27 ENCOUNTER — TRANSCRIBE ORDERS (OUTPATIENT)
Dept: ADMINISTRATIVE | Facility: HOSPITAL | Age: 82
End: 2017-01-27

## 2017-01-27 ENCOUNTER — APPOINTMENT (OUTPATIENT)
Dept: LAB | Facility: HOSPITAL | Age: 82
End: 2017-01-27
Attending: INTERNAL MEDICINE
Payer: MEDICARE

## 2017-01-27 ENCOUNTER — GENERIC CONVERSION - ENCOUNTER (OUTPATIENT)
Dept: OTHER | Facility: OTHER | Age: 82
End: 2017-01-27

## 2017-01-27 DIAGNOSIS — I10 ESSENTIAL HYPERTENSION, MALIGNANT: Primary | ICD-10-CM

## 2017-01-27 DIAGNOSIS — I25.10 UNSPECIFIED CARDIOVASCULAR DISEASE: ICD-10-CM

## 2017-01-27 DIAGNOSIS — I50.32 CHRONIC DIASTOLIC HEART FAILURE (HCC): ICD-10-CM

## 2017-01-27 DIAGNOSIS — I10 ESSENTIAL (PRIMARY) HYPERTENSION: ICD-10-CM

## 2017-01-27 LAB
ANION GAP SERPL CALCULATED.3IONS-SCNC: 7 MMOL/L (ref 4–13)
BUN SERPL-MCNC: 63 MG/DL (ref 5–25)
CALCIUM SERPL-MCNC: 9.2 MG/DL (ref 8.3–10.1)
CHLORIDE SERPL-SCNC: 95 MMOL/L (ref 100–108)
CO2 SERPL-SCNC: 32 MMOL/L (ref 21–32)
CREAT SERPL-MCNC: 1.74 MG/DL (ref 0.6–1.3)
GFR SERPL CREATININE-BSD FRML MDRD: 37.4 ML/MIN/1.73SQ M
GLUCOSE SERPL-MCNC: 137 MG/DL (ref 65–140)
POTASSIUM SERPL-SCNC: 4.7 MMOL/L (ref 3.5–5.3)
SODIUM SERPL-SCNC: 134 MMOL/L (ref 136–145)

## 2017-01-27 PROCEDURE — 36415 COLL VENOUS BLD VENIPUNCTURE: CPT

## 2017-01-27 PROCEDURE — 80048 BASIC METABOLIC PNL TOTAL CA: CPT

## 2017-02-03 ENCOUNTER — APPOINTMENT (OUTPATIENT)
Dept: LAB | Facility: HOSPITAL | Age: 82
End: 2017-02-03
Attending: INTERNAL MEDICINE
Payer: MEDICARE

## 2017-02-03 ENCOUNTER — TRANSCRIBE ORDERS (OUTPATIENT)
Dept: ADMINISTRATIVE | Facility: HOSPITAL | Age: 82
End: 2017-02-03

## 2017-02-03 DIAGNOSIS — R31.0 GROSS HEMATURIA: ICD-10-CM

## 2017-02-03 DIAGNOSIS — I25.10 UNSPECIFIED CARDIOVASCULAR DISEASE: ICD-10-CM

## 2017-02-03 DIAGNOSIS — I10 ESSENTIAL HYPERTENSION, MALIGNANT: ICD-10-CM

## 2017-02-03 DIAGNOSIS — R31.0 GROSS HEMATURIA: Primary | ICD-10-CM

## 2017-02-03 LAB
ANION GAP SERPL CALCULATED.3IONS-SCNC: 5 MMOL/L (ref 4–13)
BUN SERPL-MCNC: 42 MG/DL (ref 5–25)
CALCIUM SERPL-MCNC: 8.8 MG/DL (ref 8.3–10.1)
CHLORIDE SERPL-SCNC: 99 MMOL/L (ref 100–108)
CO2 SERPL-SCNC: 32 MMOL/L (ref 21–32)
CREAT SERPL-MCNC: 1.43 MG/DL (ref 0.6–1.3)
GFR SERPL CREATININE-BSD FRML MDRD: 46.9 ML/MIN/1.73SQ M
GLUCOSE SERPL-MCNC: 121 MG/DL (ref 65–140)
POTASSIUM SERPL-SCNC: 4.3 MMOL/L (ref 3.5–5.3)
PSA SERPL-MCNC: 3.2 NG/ML (ref 0–4)
SODIUM SERPL-SCNC: 136 MMOL/L (ref 136–145)

## 2017-02-03 PROCEDURE — 84153 ASSAY OF PSA TOTAL: CPT

## 2017-02-03 PROCEDURE — 36415 COLL VENOUS BLD VENIPUNCTURE: CPT

## 2017-02-03 PROCEDURE — 80048 BASIC METABOLIC PNL TOTAL CA: CPT

## 2017-02-05 ENCOUNTER — GENERIC CONVERSION - ENCOUNTER (OUTPATIENT)
Dept: OTHER | Facility: OTHER | Age: 82
End: 2017-02-05

## 2017-02-15 ENCOUNTER — ALLSCRIPTS OFFICE VISIT (OUTPATIENT)
Dept: OTHER | Facility: OTHER | Age: 82
End: 2017-02-15

## 2017-02-15 LAB
BILIRUB UR QL STRIP: NORMAL
CLARITY UR: NORMAL
COLOR UR: YELLOW
GLUCOSE (HISTORICAL): NORMAL
HGB UR QL STRIP.AUTO: NORMAL
KETONES UR STRIP-MCNC: NORMAL MG/DL
LEUKOCYTE ESTERASE UR QL STRIP: NORMAL
NITRITE UR QL STRIP: NORMAL
PH UR STRIP.AUTO: 5 [PH]
PROT UR STRIP-MCNC: NORMAL MG/DL
SP GR UR STRIP.AUTO: 1.01

## 2017-02-28 ENCOUNTER — TRANSCRIBE ORDERS (OUTPATIENT)
Dept: ADMINISTRATIVE | Facility: HOSPITAL | Age: 82
End: 2017-02-28

## 2017-02-28 ENCOUNTER — APPOINTMENT (OUTPATIENT)
Dept: LAB | Facility: HOSPITAL | Age: 82
End: 2017-02-28
Attending: FAMILY MEDICINE
Payer: MEDICARE

## 2017-02-28 DIAGNOSIS — I10 ESSENTIAL HYPERTENSION, MALIGNANT: Primary | ICD-10-CM

## 2017-02-28 DIAGNOSIS — I10 ESSENTIAL HYPERTENSION, MALIGNANT: ICD-10-CM

## 2017-02-28 LAB
ANION GAP SERPL CALCULATED.3IONS-SCNC: 7 MMOL/L (ref 4–13)
BUN SERPL-MCNC: 21 MG/DL (ref 5–25)
CALCIUM SERPL-MCNC: 8.7 MG/DL (ref 8.3–10.1)
CHLORIDE SERPL-SCNC: 105 MMOL/L (ref 100–108)
CO2 SERPL-SCNC: 29 MMOL/L (ref 21–32)
CREAT SERPL-MCNC: 0.94 MG/DL (ref 0.6–1.3)
GFR SERPL CREATININE-BSD FRML MDRD: >60 ML/MIN/1.73SQ M
GLUCOSE SERPL-MCNC: 107 MG/DL (ref 65–140)
POTASSIUM SERPL-SCNC: 4.2 MMOL/L (ref 3.5–5.3)
SODIUM SERPL-SCNC: 141 MMOL/L (ref 136–145)

## 2017-02-28 PROCEDURE — 80048 BASIC METABOLIC PNL TOTAL CA: CPT

## 2017-02-28 PROCEDURE — 36415 COLL VENOUS BLD VENIPUNCTURE: CPT

## 2017-04-24 ENCOUNTER — ALLSCRIPTS OFFICE VISIT (OUTPATIENT)
Dept: OTHER | Facility: OTHER | Age: 82
End: 2017-04-24

## 2017-11-02 ENCOUNTER — HOSPITAL ENCOUNTER (INPATIENT)
Facility: HOSPITAL | Age: 82
LOS: 1 days | Discharge: HOME | DRG: 352 | End: 2017-11-04
Attending: EMERGENCY MEDICINE | Admitting: SURGERY
Payer: MEDICARE

## 2017-11-02 ENCOUNTER — APPOINTMENT (EMERGENCY)
Dept: RADIOLOGY | Facility: HOSPITAL | Age: 82
DRG: 352 | End: 2017-11-02
Payer: MEDICARE

## 2017-11-02 ENCOUNTER — ALLSCRIPTS OFFICE VISIT (OUTPATIENT)
Dept: OTHER | Facility: OTHER | Age: 82
End: 2017-11-02

## 2017-11-02 DIAGNOSIS — K40.90 INGUINAL HERNIA: Primary | ICD-10-CM

## 2017-11-02 PROBLEM — K40.30 INCARCERATED RIGHT INGUINAL HERNIA: Status: ACTIVE | Noted: 2017-11-02

## 2017-11-02 LAB
ANION GAP SERPL CALCULATED.3IONS-SCNC: 7 MMOL/L (ref 4–13)
APTT PPP: 25 SECONDS (ref 23–35)
BACTERIA UR QL AUTO: ABNORMAL /HPF
BASOPHILS # BLD AUTO: 0 THOUSANDS/ΜL (ref 0–0.1)
BASOPHILS NFR BLD AUTO: 0 % (ref 0–1)
BILIRUB UR QL STRIP: NEGATIVE
BUN SERPL-MCNC: 23 MG/DL (ref 5–25)
CALCIUM SERPL-MCNC: 8.9 MG/DL (ref 8.3–10.1)
CHLORIDE SERPL-SCNC: 103 MMOL/L (ref 100–108)
CLARITY UR: CLEAR
CO2 SERPL-SCNC: 31 MMOL/L (ref 21–32)
COLOR UR: ABNORMAL
CREAT SERPL-MCNC: 1.07 MG/DL (ref 0.6–1.3)
EOSINOPHIL # BLD AUTO: 0 THOUSAND/ΜL (ref 0–0.61)
EOSINOPHIL NFR BLD AUTO: 0 % (ref 0–6)
ERYTHROCYTE [DISTWIDTH] IN BLOOD BY AUTOMATED COUNT: 18.8 % (ref 11.6–15.1)
GFR SERPL CREATININE-BSD FRML MDRD: 62 ML/MIN/1.73SQ M
GLUCOSE SERPL-MCNC: 130 MG/DL (ref 65–140)
GLUCOSE UR STRIP-MCNC: NEGATIVE MG/DL
HCT VFR BLD AUTO: 30.1 % (ref 42–52)
HGB BLD-MCNC: 9.2 G/DL (ref 14–18)
HGB UR QL STRIP.AUTO: NEGATIVE
INR PPP: 1.03 (ref 0.86–1.16)
KETONES UR STRIP-MCNC: NEGATIVE MG/DL
LEUKOCYTE ESTERASE UR QL STRIP: ABNORMAL
LYMPHOCYTES # BLD AUTO: 0.6 THOUSANDS/ΜL (ref 0.6–4.47)
LYMPHOCYTES NFR BLD AUTO: 15 % (ref 14–44)
MCH RBC QN AUTO: 23 PG (ref 27–31)
MCHC RBC AUTO-ENTMCNC: 30.6 G/DL (ref 31.4–37.4)
MCV RBC AUTO: 75 FL (ref 82–98)
MONOCYTES # BLD AUTO: 0.4 THOUSAND/ΜL (ref 0.17–1.22)
MONOCYTES NFR BLD AUTO: 9 % (ref 4–12)
NEUTROPHILS # BLD AUTO: 3.2 THOUSANDS/ΜL (ref 1.85–7.62)
NEUTS SEG NFR BLD AUTO: 76 % (ref 43–75)
NITRITE UR QL STRIP: NEGATIVE
NON-SQ EPI CELLS URNS QL MICRO: ABNORMAL /HPF
NRBC BLD AUTO-RTO: 0 /100 WBCS
PH UR STRIP.AUTO: 6 [PH] (ref 5–9)
PLATELET # BLD AUTO: 201 THOUSANDS/UL (ref 130–400)
PMV BLD AUTO: 7.2 FL (ref 8.9–12.7)
POTASSIUM SERPL-SCNC: 4.4 MMOL/L (ref 3.5–5.3)
PROT UR STRIP-MCNC: NEGATIVE MG/DL
PROTHROMBIN TIME: 10.8 SECONDS (ref 9.4–11.7)
RBC # BLD AUTO: 4.01 MILLION/UL (ref 4.7–6.1)
RBC #/AREA URNS AUTO: ABNORMAL /HPF
SODIUM SERPL-SCNC: 141 MMOL/L (ref 136–145)
SP GR UR STRIP.AUTO: 1.01 (ref 1–1.03)
UROBILINOGEN UR QL STRIP.AUTO: 0.2 E.U./DL
WBC # BLD AUTO: 4.2 THOUSAND/UL (ref 4.8–10.8)
WBC #/AREA URNS AUTO: ABNORMAL /HPF

## 2017-11-02 PROCEDURE — 74176 CT ABD & PELVIS W/O CONTRAST: CPT

## 2017-11-02 PROCEDURE — 96374 THER/PROPH/DIAG INJ IV PUSH: CPT

## 2017-11-02 PROCEDURE — 85730 THROMBOPLASTIN TIME PARTIAL: CPT | Performed by: EMERGENCY MEDICINE

## 2017-11-02 PROCEDURE — 81001 URINALYSIS AUTO W/SCOPE: CPT | Performed by: EMERGENCY MEDICINE

## 2017-11-02 PROCEDURE — 80048 BASIC METABOLIC PNL TOTAL CA: CPT | Performed by: EMERGENCY MEDICINE

## 2017-11-02 PROCEDURE — 85025 COMPLETE CBC W/AUTO DIFF WBC: CPT | Performed by: EMERGENCY MEDICINE

## 2017-11-02 PROCEDURE — 96361 HYDRATE IV INFUSION ADD-ON: CPT

## 2017-11-02 PROCEDURE — 93005 ELECTROCARDIOGRAM TRACING: CPT | Performed by: EMERGENCY MEDICINE

## 2017-11-02 PROCEDURE — 87081 CULTURE SCREEN ONLY: CPT | Performed by: SURGERY

## 2017-11-02 PROCEDURE — 99285 EMERGENCY DEPT VISIT HI MDM: CPT

## 2017-11-02 PROCEDURE — 85610 PROTHROMBIN TIME: CPT | Performed by: EMERGENCY MEDICINE

## 2017-11-02 PROCEDURE — 36415 COLL VENOUS BLD VENIPUNCTURE: CPT | Performed by: EMERGENCY MEDICINE

## 2017-11-02 RX ORDER — TAMSULOSIN HYDROCHLORIDE 0.4 MG/1
0.4 CAPSULE ORAL
Status: DISCONTINUED | OUTPATIENT
Start: 2017-11-03 | End: 2017-11-04 | Stop reason: HOSPADM

## 2017-11-02 RX ORDER — SODIUM CHLORIDE 9 MG/ML
125 INJECTION, SOLUTION INTRAVENOUS CONTINUOUS
Status: DISCONTINUED | OUTPATIENT
Start: 2017-11-02 | End: 2017-11-03

## 2017-11-02 RX ORDER — NITROGLYCERIN 0.4 MG/1
0.4 TABLET SUBLINGUAL
Status: DISCONTINUED | OUTPATIENT
Start: 2017-11-02 | End: 2017-11-04 | Stop reason: HOSPADM

## 2017-11-02 RX ORDER — ISOSORBIDE DINITRATE 10 MG/1
30 TABLET ORAL DAILY
Status: DISCONTINUED | OUTPATIENT
Start: 2017-11-03 | End: 2017-11-04 | Stop reason: HOSPADM

## 2017-11-02 RX ORDER — METOPROLOL SUCCINATE 50 MG/1
50 TABLET, EXTENDED RELEASE ORAL DAILY
Status: DISCONTINUED | OUTPATIENT
Start: 2017-11-03 | End: 2017-11-04 | Stop reason: HOSPADM

## 2017-11-02 RX ORDER — MORPHINE SULFATE 2 MG/ML
2 INJECTION, SOLUTION INTRAMUSCULAR; INTRAVENOUS ONCE
Status: COMPLETED | OUTPATIENT
Start: 2017-11-02 | End: 2017-11-02

## 2017-11-02 RX ORDER — FAMOTIDINE 20 MG/1
20 TABLET, FILM COATED ORAL 2 TIMES DAILY
Status: DISCONTINUED | OUTPATIENT
Start: 2017-11-02 | End: 2017-11-04 | Stop reason: HOSPADM

## 2017-11-02 RX ORDER — LISINOPRIL 2.5 MG/1
2.5 TABLET ORAL DAILY
Status: DISCONTINUED | OUTPATIENT
Start: 2017-11-03 | End: 2017-11-04 | Stop reason: HOSPADM

## 2017-11-02 RX ADMIN — SODIUM CHLORIDE 125 ML/HR: 0.9 INJECTION, SOLUTION INTRAVENOUS at 17:37

## 2017-11-02 RX ADMIN — METOPROLOL TARTRATE 25 MG: 25 TABLET ORAL at 22:00

## 2017-11-02 RX ADMIN — MORPHINE SULFATE 2 MG: 2 INJECTION, SOLUTION INTRAMUSCULAR; INTRAVENOUS at 18:04

## 2017-11-02 NOTE — CONSULTS
Consultation - General Surgery   Christine Soriano 80 y o  male MRN: [id number]  Unit/Bed#: ED 10 Encounter: [id number]    Assessment/Plan     Assessment:  History of incarcerated right inguinal hernia, now reduced after considerable manipulations and medication in the ED  Plan:  Admit, NPO except sips with p  o  meds, plan open right inguinal hernia repair tomorrow morning  History of Present Illness     HPI:  Christine Soriano is a 80 y o  male who presents with a known small right inguinal hernia  At approximately 1300 today, there hernia popped out and became hard and very tender to touch  Pain was well localized to the right groin radiated down to the testicle in the penis  Patient denies any nausea vomiting abdominal distension  He reports passing flatus  Reports no recent changes in his baseline health  Consult to surgery general  Consult performed by: Veronique Lee ordered by: Kimmy Chavez  Assessment/Recommendations: Patient arrived with incarcerated right inguinal hernia, but was able to be reduced pain medicine and gentle pressure in Trendelenburg position  Review of Systems   Constitutional: Negative  HENT: Negative  Eyes: Negative  Respiratory: Negative  Cardiovascular:        Patient has history of MIs and CABG and stents on aspirin and Plavix  Recently saw his cardiologist with no changes in his medication  No changes in EKG   Gastrointestinal: Negative  Endocrine: Negative  Genitourinary: Negative  Musculoskeletal: Negative  Skin: Negative  Allergic/Immunologic: Negative  Neurological: Negative  Hematological: Negative  Psychiatric/Behavioral: Negative          Historical Information   Past Medical History:   Diagnosis Date    BPH (benign prostatic hyperplasia)     CAD (coronary artery disease)     Cardiac disease     Cervical spine fracture (HCC)     DVT (deep venous thrombosis) (HCC)     Fracture of lumbar spine (HCC)     Glaucoma  Hyperlipidemia     Hypertension     Myocardial infarct     Prostate cancer ([facility] [address] )     PUD (peptic ulcer disease)      Past Surgical History:   Procedure Laterality Date    CHOLECYSTECTOMY      CORONARY ARTERY BYPASS GRAFT      CORONARY STENT PLACEMENT      EYE SURGERY      STOMACH SURGERY      [place] 2 gastrectomy    TRANSURETHRAL RESECTION OF PROSTATE      VENA CAVA FILTER PLACEMENT       Social History   History   Alcohol Use No     History   Drug Use No     History   Smoking Status    Never Smoker   Smokeless Tobacco    Never Used     Family History: non-contributory    Meds/Allergies   all current active meds have been reviewed, current meds:   Current Facility-Administered Medications   Medication Dose Route Frequency    sodium chloride 0 9 % infusion  125 mL/hr Intravenous Continuous    and PTA meds:   Prior to Admission Medications   Prescriptions Last Dose Informant Patient Reported? Taking? ALPRAZolam (XANAX) 0 25 mg tablet More than a month at Unknown time  Yes No   Sig: Take 0 25 mg by mouth daily at bedtime as needed for anxiety  Calcium Carbonate-Vitamin D (CALCIUM 600+D PO) 11/2/2017 at Unknown time  Yes Yes   Sig: Take by mouth daily   aspirin 81 MG tablet 11/2/2017 at Unknown time  Yes Yes   Sig: Take 81 mg by mouth daily  clopidogrel (PLAVIX) 75 mg tablet 11/1/2017 at Unknown time  Yes Yes   Sig: Take 75 mg by mouth daily  famotidine (PEPCID) 20 mg tablet 11/2/2017 at Unknown time  No Yes   Sig: Take 1 tablet by mouth 2 (two) times a day for 30 days   isosorbide dinitrate (ISORDIL) 30 mg tablet 11/2/2017 at Unknown time  Yes Yes   Sig: Take 30 mg by mouth daily   lisinopril (ZESTRIL) 2 5 mg tablet 11/2/2017 at Unknown time  Yes Yes   Sig: Take 2 5 mg by mouth daily     magnesium oxide (MAG-OX) 400 mg   No No   Sig: Take 1 tablet by mouth 2 (two) times a day for 30 days   metoprolol succinate (TOPROL-XL) 50 mg 24 hr tablet 11/2/2017 at Unknown time  Yes Yes   Sig: Take 50 mg by mouth daily   metoprolol tartrate (LOPRESSOR) 25 mg tablet 11/2/2017 at Unknown time  Yes Yes   Sig: Take 25 mg by mouth daily at bedtime     multivitamin (THERAGRAN) TABS 11/2/2017 at Unknown time  Yes Yes   Sig: Take 1 tablet by mouth daily  nitroglycerin (NITROSTAT) 0 4 mg SL tablet 11/2/2017 at Unknown time  Yes Yes   Sig: Place 0 4 mg under the tongue every 5 (five) minutes as needed for chest pain   potassium chloride (K-DUR,KLOR-CON) 10 mEq tablet   No No   Sig: Take 2 tablets by mouth daily for 10 days   rosuvastatin (CRESTOR) 10 MG tablet 11/2/2017 at Unknown time  Yes Yes   Sig: Take 10 mg by mouth daily  On Monday, Wednesday and Friday   tamsulosin (FLOMAX) 0 4 mg 11/2/2017 at Unknown time  Yes Yes   Sig: Take 0 4 mg by mouth daily with dinner  timolol (BETIMOL) 0 5 % ophthalmic solution 11/2/2017 at Unknown time  Yes Yes   Sig: Administer 1 drop to both eyes 2 (two) times a day  torsemide (DEMADEX) 20 mg tablet   No No   Sig: Take 1 tablet by mouth daily for 30 days   zinc gluconate 50 mg tablet 11/2/2017 at Unknown time  Yes Yes   Sig: Take 25 mg by mouth daily        Facility-Administered Medications: None     Allergies   Allergen Reactions    Statins Other (See Comments)     Muscle pain       Objective   First Vitals:   Blood Pressure: 134/96 (11/02/17 1644)  Pulse: 85 (11/02/17 1644)  Temperature: 98 °F (36 7 °C) (11/02/17 1644)  Respirations: 18 (11/02/17 1644)  Weight - Scale: 66 2 kg (146 lb) (11/02/17 1644)  SpO2: 98 % (11/02/17 1644)    Current Vitals:   Blood Pressure: 134/96 (11/02/17 1644)  Pulse: 84 (11/02/17 1815)  Temperature: 98 °F (36 7 °C) (11/02/17 1644)  Respirations: 16 (11/02/17 1815)  Weight - Scale: 66 2 kg (146 lb) (11/02/17 1644)  SpO2: 98 % (11/02/17 1644)    No intake or output data in the 24 hours ending 11/02/17 3273    Invasive Devices     Peripheral Intravenous Line            Peripheral IV 11/02/17 Right Arm less than 1 day                Physical Exam Constitutional: He is oriented to person, place, and time  He appears well-developed and well-nourished  No distress  HENT:   Head: Normocephalic and atraumatic  Eyes: Conjunctivae are normal  Pupils are equal, round, and reactive to light  Neck: Normal range of motion  Cardiovascular: Normal rate and regular rhythm  No murmur heard  Pulmonary/Chest: Effort normal and breath sounds normal  No respiratory distress  Abdominal: Soft  Bowel sounds are normal  He exhibits no distension  A small right inguinal hernia was mildly tender  Reduced with gentle pressure and Trendelenburg position  Very mild residual tenderness in the inguinal canal after reduction  Abdominal exam is benign   Musculoskeletal: Normal range of motion  Neurological: He is alert and oriented to person, place, and time  Skin: Skin is warm and dry  Psychiatric: He has a normal mood and affect  His behavior is normal        Lab Results:   I have personally reviewed pertinent lab results  , CBC:   Lab Results   Component Value Date    WBC 4 20 (L) 11/02/2017    HGB 9 2 (L) 11/02/2017    HCT 30 1 (L) 11/02/2017    MCV 75 (L) 11/02/2017     11/02/2017    MCH 23 0 (L) 11/02/2017    MCHC 30 6 (L) 11/02/2017    RDW 18 8 (H) 11/02/2017    MPV 7 2 (L) 11/02/2017    NRBC 0 11/02/2017   , CMP:   Lab Results   Component Value Date     11/02/2017    K 4 4 11/02/2017     11/02/2017    CO2 31 11/02/2017    ANIONGAP 7 11/02/2017    BUN 23 11/02/2017    CREATININE 1 07 11/02/2017    GLUCOSE 130 11/02/2017    CALCIUM 8 9 11/02/2017    EGFR 62 11/02/2017   , Coagulation:   Lab Results   Component Value Date    INR 1 03 11/02/2017     Imaging: I have personally reviewed pertinent reports  and I have personally reviewed pertinent films in PACS  EKG, Pathology, and Other Studies: I have personally reviewed pertinent reports     and I have personally reviewed pertinent films in PACS    Counseling / Coordination of Care  Total floor / unit time spent today 60 minutes  Greater than 50% of total time was spent with the patient and / or family counseling and / or coordination of care

## 2017-11-02 NOTE — ED NOTES
Dr Sukhdev Samuel at bedside, hernia reduced by Dr Sukhdev Samuel  Pt tolerated well     Pt to be admitted overnight     Ally Renee RN  11/02/17 6180

## 2017-11-02 NOTE — ED PROVIDER NOTES
History  Chief Complaint   Patient presents with    Groin Pain     has hx  of right inguinal hernia  today it became severely painful  and bulging     25-year-old male presents to the ED with known right inguinal hernia  Patient states he has had this for quite some time however since 1300 hours this afternoon it got quite large and very painful and his wife has not been able to reduce it  Patient states he was moving his bowels near day normally earlier today no fevers or chills  Right groin is consistent with inguinal hernia that appears to be non reducible right now due to pain  As the patient lying flat to see if it will relax on its own  History provided by:  Patient and spouse   used: No        Prior to Admission Medications   Prescriptions Last Dose Informant Patient Reported? Taking? ALPRAZolam (XANAX) 0 25 mg tablet More than a month at Unknown time  Yes No   Sig: Take 0 25 mg by mouth daily at bedtime as needed for anxiety  Calcium Carbonate-Vitamin D (CALCIUM 600+D PO) 11/2/2017 at Unknown time  Yes Yes   Sig: Take by mouth daily   aspirin 81 MG tablet 11/2/2017 at Unknown time  Yes Yes   Sig: Take 81 mg by mouth daily  clopidogrel (PLAVIX) 75 mg tablet 11/1/2017 at Unknown time  Yes Yes   Sig: Take 75 mg by mouth daily  famotidine (PEPCID) 20 mg tablet 11/2/2017 at Unknown time  No Yes   Sig: Take 1 tablet by mouth 2 (two) times a day for 30 days   isosorbide dinitrate (ISORDIL) 30 mg tablet 11/2/2017 at Unknown time  Yes Yes   Sig: Take 30 mg by mouth daily   lisinopril (ZESTRIL) 2 5 mg tablet 11/2/2017 at Unknown time  Yes Yes   Sig: Take 2 5 mg by mouth daily     magnesium oxide (MAG-OX) 400 mg   No No   Sig: Take 1 tablet by mouth 2 (two) times a day for 30 days   metoprolol succinate (TOPROL-XL) 50 mg 24 hr tablet 11/2/2017 at Unknown time  Yes Yes   Sig: Take 50 mg by mouth daily   metoprolol tartrate (LOPRESSOR) 25 mg tablet 11/2/2017 at Unknown time  Yes Yes Sig: Take 25 mg by mouth daily at bedtime     multivitamin (THERAGRAN) TABS 11/2/2017 at Unknown time  Yes Yes   Sig: Take 1 tablet by mouth daily  nitroglycerin (NITROSTAT) 0 4 mg SL tablet 11/2/2017 at Unknown time  Yes Yes   Sig: Place 0 4 mg under the tongue every 5 (five) minutes as needed for chest pain   potassium chloride (K-DUR,KLOR-CON) 10 mEq tablet   No No   Sig: Take 2 tablets by mouth daily for 10 days   rosuvastatin (CRESTOR) 10 MG tablet 11/2/2017 at Unknown time  Yes Yes   Sig: Take 10 mg by mouth daily  On Monday, Wednesday and Friday   tamsulosin (FLOMAX) 0 4 mg 11/2/2017 at Unknown time  Yes Yes   Sig: Take 0 4 mg by mouth daily with dinner  timolol (BETIMOL) 0 5 % ophthalmic solution 11/2/2017 at Unknown time  Yes Yes   Sig: Administer 1 drop to both eyes 2 (two) times a day  torsemide (DEMADEX) 20 mg tablet   No No   Sig: Take 1 tablet by mouth daily for 30 days   zinc gluconate 50 mg tablet 11/2/2017 at Unknown time  Yes Yes   Sig: Take 25 mg by mouth daily  Facility-Administered Medications: None       Past Medical History:   Diagnosis Date    BPH (benign prostatic hyperplasia)     CAD (coronary artery disease)     Cardiac disease     Cervical spine fracture (HCC)     DVT (deep venous thrombosis) (HCC)     Fracture of lumbar spine (HCC)     Glaucoma     Hyperlipidemia     Hypertension     Myocardial infarct     Prostate cancer ([facility] [address] )     PUD (peptic ulcer disease)        Past Surgical History:   Procedure Laterality Date    CHOLECYSTECTOMY      CORONARY ARTERY BYPASS GRAFT      CORONARY STENT PLACEMENT      EYE SURGERY      STOMACH SURGERY      [place] 2 gastrectomy    TRANSURETHRAL RESECTION OF PROSTATE      VENA CAVA FILTER PLACEMENT         Family History   Problem Relation Age of Onset    Coronary artery disease Brother      I have reviewed and agree with the history as documented      Social History   Substance Use Topics    Smoking status: Never Smoker  Smokeless tobacco: Never Used    Alcohol use No        Review of Systems   Constitutional: Negative for activity change, chills, diaphoresis and fever  HENT: Negative for congestion, ear pain, nosebleeds, sore throat, trouble swallowing and voice change  Eyes: Negative for pain, discharge and redness  Respiratory: Negative for apnea, cough, choking, shortness of breath, wheezing and stridor  Cardiovascular: Negative for chest pain and palpitations  Gastrointestinal: Positive for abdominal pain  Negative for abdominal distention, constipation, diarrhea, nausea and vomiting  Endocrine: Negative for polydipsia  Genitourinary: Negative for difficulty urinating, dysuria, flank pain, frequency, hematuria and urgency  Musculoskeletal: Negative for back pain, gait problem, joint swelling, myalgias, neck pain and neck stiffness  Skin: Negative for pallor and rash  Neurological: Negative for dizziness, tremors, syncope, speech difficulty, weakness, numbness and headaches  Hematological: Negative for adenopathy  Psychiatric/Behavioral: Negative for confusion, hallucinations, self-injury and suicidal ideas  The patient is not nervous/anxious  Physical Exam  ED Triage Vitals [11/02/17 1644]   Temperature Pulse Respirations Blood Pressure SpO2   98 °F (36 7 °C) 85 18 134/96 98 %      Temp src Heart Rate Source Patient Position - Orthostatic VS BP Location FiO2 (%)   -- -- -- -- --      Pain Score       8           Orthostatic Vital Signs  Vitals:    11/02/17 1644   BP: 134/96   Pulse: 85       Physical Exam   Constitutional: He is oriented to person, place, and time  Vital signs are normal  He appears well-developed and well-nourished  HENT:   Head: Normocephalic and atraumatic  Eyes: Conjunctivae and EOM are normal  Pupils are equal, round, and reactive to light  Neck: Normal range of motion  Neck supple     Cardiovascular: Normal rate, regular rhythm, normal heart sounds and intact distal pulses  Pulmonary/Chest: Effort normal and breath sounds normal    Abdominal: Soft  Bowel sounds are normal  There is tenderness  Patient has tenderness swelling in the right inguinal region  Musculoskeletal: Normal range of motion  Neurological: He is alert and oriented to person, place, and time  Skin: Skin is warm  Psychiatric: He has a normal mood and affect  Nursing note and vitals reviewed  ED Medications  Medications   sodium chloride 0 9 % infusion (125 mL/hr Intravenous New Bag 11/2/17 1737)   morphine injection 2 mg (2 mg Intravenous Given 11/2/17 1804)       Diagnostic Studies  Results Reviewed     Procedure Component Value Units Date/Time    Protime-INR [[id number]]  (Normal) Collected:  11/02/17 1737    Lab Status:  Final result Specimen:  Blood from Arm, Right Updated:  11/02/17 1808     Protime 10 8 seconds      INR 1 03    APTT [[id number]]  (Normal) Collected:  11/02/17 1737    Lab Status:  Final result Specimen:  Blood from Arm, Right Updated:  11/02/17 1808     PTT 25 seconds     Narrative: Therapeutic Heparin Range = 60-90 seconds    Basic metabolic panel [[id number]] Collected:  11/02/17 1737    Lab Status:  Final result Specimen:  Blood from Arm, Right Updated:  11/02/17 1802     Sodium 141 mmol/L      Potassium 4 4 mmol/L      Chloride 103 mmol/L      CO2 31 mmol/L      Anion Gap 7 mmol/L      BUN 23 mg/dL      Creatinine 1 07 mg/dL      Glucose 130 mg/dL      Calcium 8 9 mg/dL      eGFR 62 ml/min/1 73sq m     Narrative:         National Kidney Disease Education Program recommendations are as follows:  GFR calculation is accurate only with a steady state creatinine  Chronic Kidney disease less than 60 ml/min/1 73 sq  meters  Kidney failure less than 15 ml/min/1 73 sq  meters      CBC and differential [[id number]]  (Abnormal) Collected:  11/02/17 1737    Lab Status:  Final result Specimen:  Blood from Arm, Right Updated:  11/02/17 1753     WBC 4 20 (L) Thousand/uL RBC 4 01 (L) Million/uL      Hemoglobin 9 2 (L) g/dL      Hematocrit 30 1 (L) %      MCV 75 (L) fL      MCH 23 0 (L) pg      MCHC 30 6 (L) g/dL      RDW 18 8 (H) %      MPV 7 2 (L) fL      Platelets 554 Thousands/uL      nRBC 0 /100 WBCs      Neutrophils Relative 76 (H) %      Lymphocytes Relative 15 %      Monocytes Relative 9 %      Eosinophils Relative 0 %      Basophils Relative 0 %      Neutrophils Absolute 3 20 Thousands/µL      Lymphocytes Absolute 0 60 Thousands/µL      Monocytes Absolute 0 40 Thousand/µL      Eosinophils Absolute 0 00 Thousand/µL      Basophils Absolute 0 00 Thousands/µL     UA w Reflex to Microscopic [[id number]]     Lab Status:  No result Specimen:  Urine                  CT abdomen pelvis wo contrast   Final Result by Chasidy Min MD (11/02 1747)      1  Small right inguinal hernia containing a small amount of fluid and probably a portion of the cecum which appears tethered to this region, incarcerated hernia is not excluded  2   New small bilateral pleural effusions  3   Stable prostatomegaly  ##cfslh   I personally discussed this result with Esteban Ortiz on 11/2/2017 5:47 PM    ##         Workstation performed: [id number]                    Procedures  Procedures       Phone Contacts  ED Phone Contact    ED Course  ED Course                                MDM  Number of Diagnoses or Management Options  Inguinal hernia:   Diagnosis management comments: After 2 mg of morphine for discomfort Dr cabrera arrived to we went back in the room and were able to reduce the hernia at this point  Dr Bayron Caceres states he was still admit the patient but she surgery can wait until tomorrow morning      CritCare Time    Disposition  Final diagnoses:   Inguinal hernia     Time reflects when diagnosis was documented in both MDM as applicable and the Disposition within this note     Time User Action Codes Description Comment    11/2/2017  6:06 PM Mart Lat Add [K40 90] Inguinal hernia       ED Disposition     ED Disposition Condition Comment    Admit  Case was discussed with Dr Nury Egan and the patient's admission status was agreed to be Admission Status: observation status to the service of Dr Pietro Valencia   Follow-up Information    None       Patient's Medications   Discharge Prescriptions    No medications on file     No discharge procedures on file      ED Provider  Electronically Signed by           Anna Hackett DO  11/02/17 4962

## 2017-11-03 ENCOUNTER — ANESTHESIA EVENT (INPATIENT)
Dept: PERIOP | Facility: HOSPITAL | Age: 82
DRG: 352 | End: 2017-11-03
Payer: MEDICARE

## 2017-11-03 PROBLEM — K40.90 INGUINAL HERNIA: Status: ACTIVE | Noted: 2017-11-02

## 2017-11-03 LAB — GLUCOSE SERPL-MCNC: 123 MG/DL (ref 65–140)

## 2017-11-03 PROCEDURE — C1776 JOINT DEVICE (IMPLANTABLE): HCPCS | Performed by: SURGERY

## 2017-11-03 PROCEDURE — 82948 REAGENT STRIP/BLOOD GLUCOSE: CPT

## 2017-11-03 PROCEDURE — 0YU50JZ SUPPLEMENT RIGHT INGUINAL REGION WITH SYNTHETIC SUBSTITUTE, OPEN APPROACH: ICD-10-PCS | Performed by: SURGERY

## 2017-11-03 DEVICE — MESH PROLENE PMII 3 X 6 C1781: Type: IMPLANTABLE DEVICE | Site: INGUINAL | Status: FUNCTIONAL

## 2017-11-03 RX ORDER — MAGNESIUM HYDROXIDE 1200 MG/15ML
LIQUID ORAL AS NEEDED
Status: DISCONTINUED | OUTPATIENT
Start: 2017-11-03 | End: 2017-11-03 | Stop reason: HOSPADM

## 2017-11-03 RX ORDER — EPHEDRINE SULFATE 50 MG/ML
INJECTION, SOLUTION INTRAVENOUS AS NEEDED
Status: DISCONTINUED | OUTPATIENT
Start: 2017-11-03 | End: 2017-11-03 | Stop reason: SURG

## 2017-11-03 RX ORDER — CLOPIDOGREL BISULFATE 75 MG/1
75 TABLET ORAL DAILY
Status: DISCONTINUED | OUTPATIENT
Start: 2017-11-04 | End: 2017-11-04 | Stop reason: HOSPADM

## 2017-11-03 RX ORDER — OXYCODONE HYDROCHLORIDE AND ACETAMINOPHEN 5; 325 MG/1; MG/1
1 TABLET ORAL EVERY 4 HOURS PRN
Status: DISCONTINUED | OUTPATIENT
Start: 2017-11-03 | End: 2017-11-04 | Stop reason: HOSPADM

## 2017-11-03 RX ORDER — TIMOLOL MALEATE 5 MG/ML
1 SOLUTION/ DROPS OPHTHALMIC 2 TIMES DAILY
Status: DISCONTINUED | OUTPATIENT
Start: 2017-11-03 | End: 2017-11-04 | Stop reason: HOSPADM

## 2017-11-03 RX ORDER — ASPIRIN 81 MG/1
81 TABLET, CHEWABLE ORAL DAILY
Status: DISCONTINUED | OUTPATIENT
Start: 2017-11-04 | End: 2017-11-04 | Stop reason: HOSPADM

## 2017-11-03 RX ORDER — PROPOFOL 10 MG/ML
INJECTION, EMULSION INTRAVENOUS AS NEEDED
Status: DISCONTINUED | OUTPATIENT
Start: 2017-11-03 | End: 2017-11-03 | Stop reason: SURG

## 2017-11-03 RX ORDER — IBUPROFEN 600 MG/1
600 TABLET ORAL EVERY 6 HOURS PRN
Status: DISCONTINUED | OUTPATIENT
Start: 2017-11-03 | End: 2017-11-04 | Stop reason: HOSPADM

## 2017-11-03 RX ORDER — ONDANSETRON 2 MG/ML
4 INJECTION INTRAMUSCULAR; INTRAVENOUS ONCE
Status: DISCONTINUED | OUTPATIENT
Start: 2017-11-03 | End: 2017-11-03 | Stop reason: HOSPADM

## 2017-11-03 RX ORDER — FENTANYL CITRATE 50 UG/ML
INJECTION, SOLUTION INTRAMUSCULAR; INTRAVENOUS AS NEEDED
Status: DISCONTINUED | OUTPATIENT
Start: 2017-11-03 | End: 2017-11-03 | Stop reason: SURG

## 2017-11-03 RX ORDER — ZINC GLUCONATE 50 MG
25 TABLET ORAL DAILY
Status: DISCONTINUED | OUTPATIENT
Start: 2017-11-04 | End: 2017-11-04 | Stop reason: HOSPADM

## 2017-11-03 RX ORDER — ONDANSETRON 2 MG/ML
INJECTION INTRAMUSCULAR; INTRAVENOUS AS NEEDED
Status: DISCONTINUED | OUTPATIENT
Start: 2017-11-03 | End: 2017-11-03 | Stop reason: SURG

## 2017-11-03 RX ORDER — FENTANYL CITRATE/PF 50 MCG/ML
25 SYRINGE (ML) INJECTION AS NEEDED
Status: DISCONTINUED | OUTPATIENT
Start: 2017-11-03 | End: 2017-11-03

## 2017-11-03 RX ORDER — LIDOCAINE HYDROCHLORIDE 10 MG/ML
INJECTION, SOLUTION INFILTRATION; PERINEURAL AS NEEDED
Status: DISCONTINUED | OUTPATIENT
Start: 2017-11-03 | End: 2017-11-03 | Stop reason: SURG

## 2017-11-03 RX ORDER — BUPIVACAINE HYDROCHLORIDE AND EPINEPHRINE 5; 5 MG/ML; UG/ML
INJECTION, SOLUTION EPIDURAL; INTRACAUDAL; PERINEURAL AS NEEDED
Status: DISCONTINUED | OUTPATIENT
Start: 2017-11-03 | End: 2017-11-03 | Stop reason: HOSPADM

## 2017-11-03 RX ORDER — SODIUM CHLORIDE, SODIUM LACTATE, POTASSIUM CHLORIDE, CALCIUM CHLORIDE 600; 310; 30; 20 MG/100ML; MG/100ML; MG/100ML; MG/100ML
INJECTION, SOLUTION INTRAVENOUS CONTINUOUS PRN
Status: DISCONTINUED | OUTPATIENT
Start: 2017-11-03 | End: 2017-11-03 | Stop reason: SURG

## 2017-11-03 RX ORDER — SODIUM CHLORIDE, SODIUM LACTATE, POTASSIUM CHLORIDE, CALCIUM CHLORIDE 600; 310; 30; 20 MG/100ML; MG/100ML; MG/100ML; MG/100ML
125 INJECTION, SOLUTION INTRAVENOUS CONTINUOUS
Status: DISCONTINUED | OUTPATIENT
Start: 2017-11-03 | End: 2017-11-03

## 2017-11-03 RX ORDER — ALPRAZOLAM 0.25 MG/1
0.25 TABLET ORAL
Status: DISCONTINUED | OUTPATIENT
Start: 2017-11-03 | End: 2017-11-04 | Stop reason: HOSPADM

## 2017-11-03 RX ORDER — POTASSIUM CHLORIDE 20 MEQ/1
20 TABLET, EXTENDED RELEASE ORAL DAILY
Status: DISCONTINUED | OUTPATIENT
Start: 2017-11-04 | End: 2017-11-04 | Stop reason: HOSPADM

## 2017-11-03 RX ADMIN — FENTANYL CITRATE 25 MCG: 50 INJECTION INTRAMUSCULAR; INTRAVENOUS at 20:10

## 2017-11-03 RX ADMIN — ONDANSETRON 4 MG: 2 INJECTION INTRAMUSCULAR; INTRAVENOUS at 19:05

## 2017-11-03 RX ADMIN — FENTANYL CITRATE 25 MCG: 50 INJECTION, SOLUTION INTRAMUSCULAR; INTRAVENOUS at 18:34

## 2017-11-03 RX ADMIN — SODIUM CHLORIDE 125 ML/HR: 0.9 INJECTION, SOLUTION INTRAVENOUS at 11:17

## 2017-11-03 RX ADMIN — EPHEDRINE SULFATE 10 MG: 50 INJECTION, SOLUTION INTRAMUSCULAR; INTRAVENOUS; SUBCUTANEOUS at 18:50

## 2017-11-03 RX ADMIN — PROPOFOL 100 MG: 10 INJECTION, EMULSION INTRAVENOUS at 18:36

## 2017-11-03 RX ADMIN — FENTANYL CITRATE 25 MCG: 50 INJECTION, SOLUTION INTRAMUSCULAR; INTRAVENOUS at 18:39

## 2017-11-03 RX ADMIN — METOPROLOL TARTRATE 25 MG: 25 TABLET ORAL at 21:05

## 2017-11-03 RX ADMIN — CEFAZOLIN SODIUM 2000 MG: 2 SOLUTION INTRAVENOUS at 18:31

## 2017-11-03 RX ADMIN — OXYCODONE HYDROCHLORIDE AND ACETAMINOPHEN 1 TABLET: 5; 325 TABLET ORAL at 21:04

## 2017-11-03 RX ADMIN — SODIUM CHLORIDE, SODIUM LACTATE, POTASSIUM CHLORIDE, AND CALCIUM CHLORIDE: .6; .31; .03; .02 INJECTION, SOLUTION INTRAVENOUS at 18:31

## 2017-11-03 RX ADMIN — ISOSORBIDE DINITRATE 30 MG: 10 TABLET ORAL at 08:50

## 2017-11-03 RX ADMIN — FENTANYL CITRATE 50 MCG: 50 INJECTION, SOLUTION INTRAMUSCULAR; INTRAVENOUS at 18:30

## 2017-11-03 RX ADMIN — LISINOPRIL 2.5 MG: 5 TABLET ORAL at 08:50

## 2017-11-03 RX ADMIN — METOPROLOL SUCCINATE 50 MG: 50 TABLET, FILM COATED, EXTENDED RELEASE ORAL at 08:50

## 2017-11-03 RX ADMIN — FAMOTIDINE 20 MG: 20 TABLET, FILM COATED ORAL at 08:52

## 2017-11-03 RX ADMIN — Medication 400 MG: at 21:05

## 2017-11-03 RX ADMIN — IBUPROFEN 600 MG: 600 TABLET, FILM COATED ORAL at 22:05

## 2017-11-03 RX ADMIN — LIDOCAINE HYDROCHLORIDE 40 MG: 10 INJECTION, SOLUTION INFILTRATION; PERINEURAL at 18:36

## 2017-11-03 NOTE — ANESTHESIA POSTPROCEDURE EVALUATION
Post-Op Assessment Note      CV Status:  Stable    Mental Status:  Alert and awake    Hydration Status:  Stable    PONV Controlled:  None    Airway Patency:  Patent    Post Op Vitals Reviewed: Yes          Staff: Anesthesiologist           BP      Temp      Pulse     Resp      SpO2

## 2017-11-03 NOTE — ANESTHESIA PREPROCEDURE EVALUATION
Cardiac disease    Hyperlipidemia    Hypertension    Myocardial infarct    Cervical spine fracture (HCC)    Fracture of lumbar spine (HCC)    CAD (coronary artery disease)    PUD (peptic ulcer disease)    Prostate cancer (HCC)    BPH (benign prostatic hyperplasia)    Glaucoma    DVT (deep venous thrombosis) (HCC)      Cardiac disease    Hyperlipidemia    Hypertension    Myocardial infarct    Cervical spine fracture (HCC)    Fracture of lumbar spine (HCC)    CAD (coronary artery disease)    PUD (peptic ulcer disease)    Prostate cancer (HCC)    BPH (benign prostatic hyperplasia)    Glaucoma    [facility]          Review of Systems/Medical History  Patient summary reviewed  Chart reviewed  No history of anesthetic complications     Cardiovascular  Hyperlipidemia, Hypertension , Past MI , CAD, , Cardiac stents  Angina Stable, CHF , PVD,   Comment: LEFT VENTRICLE:  Systolic function was at the lower limits of normal by Teichholz  Ejection  fraction was estimated to be 50 %  There was mild hypokinesis of the septal wall  There was mild concentric hypertrophy  Doppler parameters were consistent with restrictive physiology, indicative of  decreased left ventricular diastolic compliance and/or increased left atrial  pressure  Doppler parameters were consistent with elevated mean left atrial  filling pressure      RIGHT VENTRICLE:  Systolic function was moderately reduced      MITRAL VALVE:  There was moderate regurgitation      AORTIC VALVE:  The valve was trileaflet  Leaflets exhibited moderate calcification and mildly  reduced cuspal separation  There was mild regurgitation      TRICUSPID VALVE:  There was moderate regurgitation  Pulmonary artery systolic pressure was mildly increased  ,  Pulmonary       GI/Hepatic    PUD,        Prostatic disorder, history of prostate cancer       Endo/Other     GYN       Hematology   Musculoskeletal  Back pain , lumbar pain,        Neurology   Psychology Physical Exam    Airway    Mallampati score: II  TM Distance: >3 FB  Neck ROM: full     Dental   No notable dental hx     Cardiovascular  Rhythm: regular, Rate: normal, Cardiovascular exam normal    Pulmonary  Pulmonary exam normal Breath sounds clear to auscultation,     Other Findings        Anesthesia Plan  ASA Score- 4 Emergent      Anesthesia Type- general with ASA Monitors  Additional Monitors:   Airway Plan: LMA  Induction- intravenous  Informed Consent- Anesthetic plan and risks discussed with patient

## 2017-11-03 NOTE — PROGRESS NOTES
Due to incarceration and difficult reduction of his right inguinal hernia, patient requires surgery this admission  Awaiting operating room availability which will be likely later this afternoon  Postoperatively, patient will need to stay overnight to monitor abdominal pain and perform serial exams

## 2017-11-03 NOTE — PLAN OF CARE
DISCHARGE PLANNING     Discharge to home or other facility with appropriate resources Progressing        INFECTION - ADULT     Absence or prevention of progression during hospitalization Progressing     Absence of fever/infection during neutropenic period Progressing        PAIN - ADULT     Verbalizes/displays adequate comfort level or baseline comfort level Progressing        SAFETY ADULT     Patient will remain free of falls Progressing     Maintain or return to baseline ADL function Progressing     Maintain or return mobility status to optimal level Progressing

## 2017-11-03 NOTE — CASE MANAGEMENT
Initial Clinical Review    Admission: Date/Time/Statement: 11/2/2017 @ 1819  Orders Placed This Encounter   Procedures    Place in Observation (expected length of stay for this patient is less than two midnights)     Standing Status:   Standing     Number of Occurrences:   1     Order Specific Question:   Admitting Physician     Answer:   Viviana Gutierrez     Order Specific Question:   Level of Care     Answer:   Med Surg [16]     ED: Date/Time/Mode of Arrival:   ED Arrival Information     Expected Arrival Acuity Means of Arrival Escorted By Service Admission Type    - 11/2/2017 16:40 Urgent Walk-In Spouse General Medicine Urgent    Arrival Complaint    testicle pain        Chief Complaint:   Chief Complaint   Patient presents with    Groin Pain     has hx  of right inguinal hernia  today it became severely painful  and bulging     History of Illness:   27-year-old male presents to the ED with known right inguinal hernia  Patient states he has had this for quite some time however since 1300 hours this afternoon it got quite large and very painful and his wife has not been able to reduce it  Patient states he was moving his bowels near day normally earlier today  Right groin is consistent with inguinal hernia that appears to be non reducible right now due to pain  As the patient lying flat to see if it will relax on its own      ED Vital Signs:   ED Triage Vitals   Temperature Pulse Respirations Blood Pressure SpO2   11/02/17 1644 11/02/17 1644 11/02/17 1644 11/02/17 1644 11/02/17 1644   98 °F (36 7 °C) 85 18 134/96 98 %      Temp Source Heart Rate Source Patient Position - Orthostatic VS BP Location FiO2 (%)   11/02/17 2027 11/02/17 1920 11/02/17 1920 11/02/17 1920 --   Tympanic Monitor Lying Right arm       Pain Score       11/02/17 1644       8        Wt Readings from Last 1 Encounters:   11/02/17 66 2 kg (145 lb 15 8 oz)     Abnormal Labs/Diagnostic Test Results:   WBC 4 20 (L) Thousand/uL   RBC 4 01 (L) Million/uL   Hemoglobin 9 2 (L) g/dL   Hematocrit 30 1 (L) %     CT abd/pel:    1  Small right inguinal hernia containing a small amount of fluid and probably a portion of the cecum which appears tethered to this region, incarcerated hernia is not excluded        2  New small bilateral pleural effusions        3   Stable prostatomegaly            ED Treatment:   Medication Administration from 11/02/2017 1639 to 11/02/2017 2033    Date/Time Order Dose Route Action Action by Comments   11/02/2017 1737 sodium chloride 0 9 % infusion 125 mL/hr Intravenous Given Blane Beckford RN    11/02/2017 1803 morphine injection 2 mg 2 mg Intravenous Given Cristal Platt RN         Past Medical/Surgical History:    Active Ambulatory Problems     Diagnosis Date Noted    [facility] 01/07/2017    CAD (coronary artery disease) 01/07/2017    History of DVT (deep vein thrombosis) 01/07/2017    PUD (peptic ulcer disease) 01/07/2017    Hypertension 01/07/2017    Hyperlipidemia 01/07/2017    Acute on chronic diastolic heart failure ([facility] ) 01/07/2017    Stable angina ([facility] ) 01/07/2017     Resolved Ambulatory Problems     Diagnosis Date Noted    Dizziness 02/21/2016    Acute bronchitis 02/21/2016    Cardiomegaly 01/07/2017     Past Medical History:   Diagnosis Date    BPH (benign prostatic hyperplasia)     CAD (coronary artery disease)     Cardiac disease     Cervical spine fracture (HCC)     DVT (deep venous thrombosis) (HCC)     Fracture of lumbar spine (HCC)     Glaucoma     Hyperlipidemia     Hypertension     Myocardial infarct     Prostate cancer ([facility] )     PUD (peptic ulcer disease)      Admitting Diagnosis: Inguinal hernia [K40 90]  Groin pain [R10 30]    Age/Sex: 80 y o  male    Assessment/Plan:     Admission Orders:  Scheduled Meds:   enoxaparin 40 mg Subcutaneous Daily   famotidine 20 mg Oral BID   isosorbide dinitrate 30 mg Oral Daily   lisinopril 2 5 mg Oral Daily metoprolol succinate 50 mg Oral Daily   metoprolol tartrate 25 mg Oral HS   tamsulosin 0 4 mg Oral Daily With Dinner     Continuous Infusions:   sodium chloride 125 mL/hr Last Rate: 125 mL/hr (11/02/17 1737)     PRN Meds: nitroglycerin    NPO  Up as tolerated  Noel sequential compression devices      Dr Gillette Speaker at bedside, hernia reduced by Dr Gillette Speaker  Pt tolerated well     11/2/2017  Consult General Surgery  Assessment:  History of incarcerated right inguinal hernia, now reduced after considerable manipulations and medication in the ED  Plan:  Admit, NPO except sips with p  o  meds, plan open right inguinal hernia repair tomorrow morning      ===========================================================================================    Continued Stay Review    Date: 11/3/2017    Vital Signs: /62   Pulse 81   Temp 97 7 °F (36 5 °C) (Tympanic)   Resp 20   Ht 5' 4" (1 626 m)   Wt 66 2 kg (145 lb 15 8 oz)   SpO2 93%   BMI 25 06 kg/m²     Medications:   Scheduled Meds:   enoxaparin 40 mg Subcutaneous Daily   famotidine 20 mg Oral BID   isosorbide dinitrate 30 mg Oral Daily   lisinopril 2 5 mg Oral Daily   metoprolol succinate 50 mg Oral Daily   metoprolol tartrate 25 mg Oral HS   tamsulosin 0 4 mg Oral Daily With Dinner     Continuous Infusions:   sodium chloride 125 mL/hr Last Rate: 125 mL/hr (11/02/17 1737)     PRN Meds: nitroglycerin    Abnormal Labs/Diagnostic Results:     Age/Sex: 80 y o  male     Assessment/Plan:     Discharge Plan: TBD - to OR in AM

## 2017-11-03 NOTE — PLAN OF CARE
Problem: DISCHARGE PLANNING - CARE MANAGEMENT  Goal: Discharge to post-acute care or home with appropriate resources  INTERVENTIONS:  - Conduct assessment to determine patient/family and health care team treatment goals, and need for post-acute services based on payer coverage, community resources, and patient preferences, and barriers to discharge  - Address psychosocial, clinical, and financial barriers to discharge as identified in assessment in conjunction with the patient/family and health care team  - Arrange appropriate level of post-acute services according to patient's   needs and preference and payer coverage in collaboration with the physician and health care team  - Communicate with and update the patient/family, physician, and health care team regarding progress on the discharge plan  - Arrange appropriate transportation to post-acute venues  Outcome: Progressing  Pt's plan is to return home after surgery  No needs expressed by pt at this time

## 2017-11-03 NOTE — PROGRESS NOTES
Pt felt faint, diaphoretic, BP 91/53, Pulse 82, O2 88% Room air  Call Doctor  Applied 2L Oxygen via Nasal Cannula, Started IV bolus 1l  Pt feeling better  /54, Pulse 80, o2 94% 2L Nasal cannula  Will continue to monitor

## 2017-11-03 NOTE — PROGRESS NOTES
Assessment  Assessed   1  CAD (coronary artery disease) (414 00) (I25 10)  2  Chronic stable angina (413 9) (I20 8)  3  Chronic diastolic heart failure, NYHA class 2 (428 32) (I50 32)  4  Anxiety (300 00) (F41 9)  5  Benign essential hypertension (401 1) (I10)  6  Dyslipidemia (272 4) (E78 5)    Plan  Anxiety, Benign essential hypertension, CAD (coronary artery disease), Chronic stable  angina, Dyslipidemia    · Follow-up visit in 6 months Evaluation and Treatment  Follow-up  Status: Hold For -  Scheduling  Requested for: [id number]  Ordered; For: Anxiety, Benign essential hypertension, CAD (coronary artery disease),   Chronic stable angina, Dyslipidemia; Ordered By: Adrianna Parker  Performed:     Due: 02KEX4476  Benign essential hypertension, Chronic stable angina    · EKG/ECG- POC; Status:Complete;   Done: 95PHL8950  Perform: In Office; PDP:[id number]; Last Updated By:Yeimy Gabriel; 11/2/2017   9:39:50 AM;Ordered; For:Benign essential hypertension, Chronic stable angina; Ordered   By:Екатерина Coronel;  Dyslipidemia    · Start: Pravastatin Sodium 20 MG Oral Tablet (Pravachol); One tablet at nigh ttime  Rx By: Adrianna Parker; Dispense: 30 Days ; #:30 Tablet; Refill: 5;For: Dyslipidemia;   JUANY = N; Sent To: [place] [address] #[phone]    Discussion/Summary  Cardiology Discussion Summary Free Text Note Form [facility]:   1  Coronary artery disease status post CABG and stents with multiple cardiac catheterization a year ago ago in [facility] by me and it was recommended medical therapy  Chronic stable angina  Patient occasionally gets breakthrough pain  He is on Imdur, statins and Ranexa and tolerating well  He had no more episodes since thenGeneralized anxiety  On p r n  and Dyslipidemia  Patient was not taking any statins  He had stopped it as he could not tolerate it  He is willing to try Pravachol 20 mg every other day  He will call us back if he continues to take it  Chronic diastolic heart failure [place] [employer] class 3  No more episodes of heart failure  He has stopped taking diuretics and he is doing well with that  Benign prostate hypertrophy status post surgery, wasn't finasteride which caused gynecomastia has stopped by urologywas advised not to shovel snow  Avoid going into cold cold weather  Discussed with patient's wife  Follow-up in 6 months  Goals and Barriers: The patient has the current Goals: Not get admitted to the hospital and stay active  The patent has the current Barriers: Has diffuse three-vessel coronary artery disease not amenable to percutaneous techniques on last cardiac catheterization  Patient's Capacity to Self-Care: Patient is able to Self-Care  Patient Education: Educational resources provided: About coronary artery disease  Medication SE Review and Pt Understands Tx: Possible side effects of new medications were reviewed with the patient/guardian today  Counseling Documentation With Imm: The patient, patient's family was counseled regarding diagnostic results,-- instructions for management,-- risk factor reductions,-- prognosis,-- patient and family education,-- impressions,-- risks and benefits of treatment options,-- importance of compliance with treatment  Chief Complaint  Chief Complaint Free Text Note Form: Remy Gomez is here today for a six month followup  He has complaints of chest pain when bending over, and an EKG was performed in the office today  JW   Chief Complaint Chronic Condition [facility]: Patient is here today for follow up of chronic conditions described in HPI        History of Present Illness  Cardiology HPI Free Text Note Form Jose Garsia: 70-year-old male with a past medical history significant for coronary artery disease status post CABG status post stent into his MCKENNA to LAD widely patent, SVG vein graft to 100% occluded and circumflex was severely diffusely disease with severe diffuse distal disease, chronic stable angina, status post MI anxiety, GERD, dyslipidemia who came for follow-up  was [facility] to [facility] with chronic diastolic heart failure and is now compensated  He denies any chest pain or any shortness of breath  Occasionally feel dizzy in the morning  Has been on Demadex 20 mg and potassium 20 mg daily  All medications reviewed with him   with his wife  No new complaints, no chest pain, no shortness of breath, no nausea, no vomiting, no other cardiac vessel complaint at this time and symptoms  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  Dr Sofia Hunt has stopped Demadex 10 mg as well as Losartanseen and evaluated  Above reviewed  He is feeling better  He has no exertional chest pain  No PND no orthopnea no shortness of breath  Pretty active  He does get pain when he bends over and it gets better immediately when he stands up  It last only on till he is bending and it is sharp  No fever no chills no nausea of the complete      Review of Systems  Cardiology Male ROS:     Cardiac: chest pain, but-- as noted in HPI,-- no rhythm problems,-- no fainting/blackouts,-- no heart murmur present,-- no signs of swelling-- and-- no palpitations present--   Chest pain been benzo  Skin: No complaints of nonhealing sores or skin rash  Genitourinary: prostate problems, but-- No complaints of recurrent urinary tract infections, frequent urination at night, difficult urination, blood in urine, kidney stones, loss of bladder control, no kidney or prostate problems, no erectile dysfunction  -- Developed gynecomastia has prostate problems  Psychological: anxiety-- and-- panic attacks, but-- no depression-- and-- no difficulty concentrating  General: No complaints of trouble sleeping, lack of energy, fatigue, appetite changes, weight changes, fever, frequent infections, or night sweats  Respiratory: No complaints of shortness of breath, cough with sputum, or wheezing     HEENT: No complaints of serious problems, hearing problems, nose problems, throat problems, or snoring  Gastrointestinal: No complaints of liver problems, nausea, vomiting, heartburn, constipation, bloody stools, diarrhea, problems swallowing, adbominal pain, or rectal bleeding  Hematologic: No complaints of bleeding disorders, anemia, blood clots, or excessive brusing  Neurological: No complaints of numbness, tingling, dizziness, weakness, seizures, headaches, syncope or fainting, AM fatigue, daytime sleepiness, no witnessed apnea episodes  Musculoskeletal: No complaints of arthritis, back pain, or painfull swelling  ROS Reviewed:   ROS reviewed  Active Problems  Problems   1  Anxiety (300 00) (F41 9)  2  Benign essential hypertension (401 1) (I10)  3  Benign prostatic hyperplasia (600 00) (N40 0)  4  Blood in the stool (578 1) (K92 1)  5  CAD (coronary artery disease) (414 00) (I25 10)  6  Chronic diastolic heart failure, NYHA class 2 (428 32) (I50 32)  7  Chronic stable angina (413 9) (I20 8)  8  Dyslipidemia (272 4) (E78 5)  9  Gross hematuria (599 71) (R31 0)  10  History of kidney problems (V13 09) (Z87 448)  11  Nephrolithiasis (592 0) (N20 0)  12  Sexual dysfunction (302 70) (R37)  13  Stomach problems (536 9) (K31 9)    Past Medical History  Problems   1  History of TIA (transient ischemic attack) (V12 54) (Z86 73)  2  History of Myocardial infarction involving other coronary artery (410 80) (I21 29)  Active Problems And Past Medical History Reviewed: The active problems and past medical history were reviewed and updated today  Surgical History  Problems   1  History of Cath Placement Of Stent 2  2  History of Cholecystectomy  3  History of Eye Surgery  4  History of Gastric Surgery  5  History of Interruption Inferior Vena Cava Ihsan Filter Placement  6  History of Transurethral Resection Of Prostate (TURP)  Surgical History Reviewed: The surgical history was reviewed and updated today  Family History  Sister   1   Family history of Other specified diabetes mellitus with unspecified complications  Brother   2  Family history of cardiac disorder (V17 49) (Z82 49)  Family History Reviewed: The family history was reviewed and updated today  Social History  Problems    · Denied: History of Alcohol use   ·    · Never a smoker  Social History Reviewed: The social history was reviewed and is unchanged  Current Meds  1  ALPRAZolam 0 25 MG Oral Tablet; Therapy: (Recorded:22Oct2015) to Recorded  2  Aspirin 81 MG TABS; Therapy: [phone]) to Recorded  3  Calcium 600+D 600-400 MG-UNIT Oral Tablet; Therapy: [phone]) to Recorded  4  Clopidogrel Bisulfate 75 MG Oral Tablet; TAKE 1 TABLET DAILY  Requested for:   12Jun2017; Last Rx:12Jun2017 Ordered  5  Daily Value Multivitamin TABS; Therapy: [phone]) to Recorded  6  Famotidine 20 MG Oral Tablet; TAKE 1 TABLET AT BEDTIME; Therapy: 85LPK4534 to (Evaluate:39Meh8708); Last Rx:24Jan2017 Ordered  7  Isosorbide Mononitrate ER 30 MG Oral Tablet Extended Release 24 Hour; TAKE 1   TABLET DAILY; Therapy: 01LGN7058 to ([phone])  Requested for: 00Lrb5013; Last   Rx:62Xzz0915 Ordered  8  Metoprolol Tartrate 50 MG Oral Tablet; Take 1 tablet 4 times daily  Requested for:   62Bkz5576; Last Rx:48Cqg0986 Ordered  9  Nitroglycerin 0 4 MG Sublingual Tablet Sublingual; PLACE 1 TABLET UNDER THE   TONGUE EVERY 5 MINUTES UP TO 3 DOSES AS NEEDED FOR CHEST PAIN;   Therapy: 49XEH7020 to Recorded  10  Tamsulosin HCl - 0 4 MG Oral Capsule; TAKE 1 CAPSULE Bedtime Recorded  11  Timolol Maleate 0 25 % Ophthalmic Solution; Therapy: [phone]) to Recorded  12  Vitamin D3 58768 UNIT Oral Capsule Recorded  13  Zinc 25 MG Oral Tablet; Therapy: [phone]) to Recorded  Medication List Reviewed: The medication list was reviewed and updated today  Allergies  Medication   1   No Known Drug Allergies    Vitals  Vital Signs    Recorded: 52LHH2290 09:38AM Heart Rate 63, Apical   Systolic 771, RUE, Sitting   Diastolic 60, RUE, Sitting   Height 5 ft 4 in   Weight 146 lb 9 6 oz   BMI Calculated 25 16   BSA Calculated 1 71   O2 Saturation 98, RA     Physical Exam    Constitutional - General appearance: No acute distress, well appearing and well nourished  -- Alert awake oriented little anxious  Eyes - Conjunctiva and Sclera examination: Conjunctiva pink, sclera anicteric  Neck - Normal, no JVD   Pulmonary - Respiratory effort: No signs of respiratory distress  -- Normal effort  -- Auscultation of lungs: Abnormal  -- Bilateral air entry clear to auscultation  Cardiovascular - Auscultation of heart: Abnormal  -- S1 and S2 regular with a 2/6 ejection systolic murmur  Positive S4 -- Pedal pulses: Abnormal  -- Pulses are decreased  -- Examination of extremities for edema and/or varicosities: Abnormal  -- Decreased pulses no edema  Abdomen - Soft  Musculoskeletal - Gait and station: Normal gait  Skin - Skin: Normal without rashes  Skin is warm and well perfused  Neurologic - Speech normal  No focal deficits  Psychiatric - Orientation to person, place, and time: Normal -- Mood and affect: Normal    Additional Findings - Patient isn't happy mood as he does not require any more and antianginal medicines  Results/Data  Diagnostic Studies Reviewed Cardio: I personally reviewed the recording/images in the office today  My interpretation follows  Lab Review: HIS EKG done on September 13, 2016 shows normal sinus and marked ST abnormality in inferior wall consistent with ischemia but not different from old EKG   Stress Test: Nuclear stress test done 5/14/2016 shows moderate-sized inferior lateral wall ischemia and apical infarction  Ejection fraction 45%  Which was consistent with his %, vein graft to RCA is occluded, as circumflex has diffuse disease and LIMA to LAD was widely patent     Catheterization: He has multiple cardiac catheterization performed  His last cardiac catheter patient performed by me shows EF around 50%, severe diffuse disease of LAD which is totally occluded,  ECG Report:   Comparison to prior ECGs:1  No interval change1   4/24 2017  Twelve-lead EKG shows normal sinus some heart rate 72 bpm  ST changes in inferior and lateral leads which is not changed from old EKG  Cannot rule out underlying ischemialead EKG shows normal sinus rhythm heart rate 63 beats per minute with deep T-wave inversions in inferior and lateral precordial leads  Not change from old EKG  1        1 Amended By: Gisella Spaulding; Nov 02 2017 10:03 AM EST    Future Appointments    Date/Time Provider Specialty Site   02/21/2018 01:15 PM ROSA Rodríguez   Urology [address]     Signatures   Electronically signed by : ROSA Canseco ; Nov 2 2017 10:03AM EST                       (Author)    Electronically signed by : ROSA Canseco ; Nov 2 2017 10:04AM EST                       (Author)

## 2017-11-03 NOTE — SOCIAL WORK
SW met with pt and wife with intentions to assess needs and discuss plans  After providing Observation Status Letter pt became upset  Pt refused to sign and asked SW to leave room  During brief conversation SW was able to determine pt lives with wife and is independent  Pt's plan is to return home after surgery  SW will be available if needed

## 2017-11-04 ENCOUNTER — ANESTHESIA (INPATIENT)
Dept: PERIOP | Facility: HOSPITAL | Age: 82
DRG: 352 | End: 2017-11-04
Payer: MEDICARE

## 2017-11-04 VITALS
OXYGEN SATURATION: 94 % | SYSTOLIC BLOOD PRESSURE: 108 MMHG | HEIGHT: 64 IN | RESPIRATION RATE: 18 BRPM | DIASTOLIC BLOOD PRESSURE: 56 MMHG | BODY MASS INDEX: 24.92 KG/M2 | HEART RATE: 90 BPM | TEMPERATURE: 98.1 F | WEIGHT: 145.99 LBS

## 2017-11-04 LAB — MRSA NOSE QL CULT: NORMAL

## 2017-11-04 RX ADMIN — OXYCODONE HYDROCHLORIDE AND ACETAMINOPHEN 1 TABLET: 5; 325 TABLET ORAL at 10:27

## 2017-11-04 RX ADMIN — OXYCODONE HYDROCHLORIDE AND ACETAMINOPHEN 1 TABLET: 5; 325 TABLET ORAL at 01:36

## 2017-11-04 RX ADMIN — OXYCODONE HYDROCHLORIDE AND ACETAMINOPHEN 1 TABLET: 5; 325 TABLET ORAL at 05:59

## 2017-11-04 RX ADMIN — CLOPIDOGREL BISULFATE 75 MG: 75 TABLET ORAL at 10:03

## 2017-11-04 RX ADMIN — ASPIRIN 81 MG 81 MG: 81 TABLET ORAL at 10:02

## 2017-11-04 RX ADMIN — Medication 400 MG: at 10:02

## 2017-11-04 RX ADMIN — ENOXAPARIN SODIUM 40 MG: 40 INJECTION SUBCUTANEOUS at 10:04

## 2017-11-04 RX ADMIN — FAMOTIDINE 20 MG: 20 TABLET, FILM COATED ORAL at 10:03

## 2017-11-04 NOTE — DISCHARGE SUMMARY
Discharge Summary - Eloina Francisco 80 y o  male MRN: [id number]    Unit/Bed#: [address] [place] [address] Encounter: [id number]    Admission Date: 11/2/2017     Admitting Diagnosis: Inguinal hernia [K40 90]  Groin pain [R10 30]    HPI:  Patient is a very pleasant 49-year-old male who presented on 2 November 2017 with an incarcerated right inguinal hernia  After medications and patient positioning, hernia was reduced in the emergency department and patient was admitted for right inguinal hernia repair  Mesh repair of his recurrent right inguinal hernia was performed on 3 November 2017 and patient had an unremarkable recovery  Patient was discharged on 4 November 2017    Procedures Performed:  Repair of recurrent right inguinal hernia with mesh    Hospital Course:  Unremarkable postoperative course and discharge    Significant Findings, Care, Treatment and Services Provided:  Recurrent, direct, right inguinal hernia    Complications:  None    Discharge Diagnosis:  Recurrent direct right inguinal hernia    Condition at Discharge: good     Discharge instructions/Information to patient and family:   See after visit summary for information provided to patient and family  Provisions for Follow-Up Care:  See after visit summary for information related to follow-up care and any pertinent home health orders  Disposition: Home    Planned Readmission: No    Discharge Statement   I spent 45 minutes discharging the patient  This time was spent on the day of discharge  I had direct contact with the patient on the day of discharge  Additional documentation is required if more than 30 minutes were spent on discharge  Discharge Medications:  See after visit summary for reconciled discharge medications provided to patient and family

## 2017-11-04 NOTE — OP NOTE
OPERATIVE REPORT  PATIENT NAME: Erick Hayden    :  1930  MRN: [id number]  Pt Location: [facility] OR ROOM 02    SURGERY DATE: 11/3/2017    Surgeon(s) and Role:     * Thierno Donald MD - Primary     * Jessica Vega PA-C - Assisting    Preop Diagnosis:  Inguinal hernia [K40 90]    Post-Op Diagnosis Codes:     * Inguinal hernia [K40 90]    Procedure(s) (LRB):  REPAIR HERNIA INGUINAL (Right)    Specimen(s):  * No specimens in log *    Estimated Blood Loss:   Minimal    Drains:       Anesthesia Type:   General    Operative Indications:    Recurrent Inguinal hernia [K40 90]    Operative Findings:  Recurrent direct inguinal hernia    Complications:   None    Procedure and Technique:  Repair of recurrent direct inguinal hernia with mesh    Patient was taken back to main operating room placed supine the operating table with all bony prominences padded  General anesthesia was induced, and the abdomen was prepped and draped in normal fashion  An incision was made in the previous scar  Soft tissue was divided with Bovie cautery  The external oblique aponeurosis was opened and the spermatic cord was dissected free from scar tissue from the previous surgery as well as inguinal canal floor  A small direct recurrent hernia was noted; this was reduced  Prolene mass was used to reinforce the floor of the inguinal canal via the Kendra technique  This was sewn in place with a running 2 0 Prolene suture  Tails of the mesh were wrapped around the spermatic cord fashioning a new deep ring  Wound was copiously irrigated  Spermatic cord was returned to its anatomical position in the inguinal canal   External oblique aponeurosis was closed with 2 0 Vicryl  Soft tissue was approximated 3 0 Vicryl as well as the dermis  Four 0 Monocryl was used as this stitch to approximate the skin   Histocryl was used as a dressing   I was present for the entire procedure and A qualified resident physician was not available    Patient Disposition:  PACU     SIGNATURE: Dhruv Saldana MD  DATE: November 4, 2017  TIME: 9:37 AM

## 2017-11-04 NOTE — PROGRESS NOTES
Patient tolerating p o , pain well controlled, ambulating, and desires to be discharged  Plan discharge today with follow-up as an outpatient in about 2 weeks

## 2017-11-04 NOTE — DISCHARGE INSTRUCTIONS

## 2017-11-04 NOTE — NURSING NOTE
Pt left the unit via wheelchair accompanied by wife and PCA  D/c paperwork and education instructions reviewed and given to patient  Prescription given to patient  Medicated for pain prior to d/c  No discomfort, no SOB, no c/o pain

## 2017-11-06 LAB
ATRIAL RATE: 76 BPM
P AXIS: 24 DEGREES
PR INTERVAL: 198 MS
QRS AXIS: 23 DEGREES
QRSD INTERVAL: 88 MS
QT INTERVAL: 388 MS
QTC INTERVAL: 436 MS
T WAVE AXIS: -28 DEGREES
VENTRICULAR RATE: 76 BPM

## 2017-11-06 NOTE — CASE MANAGEMENT
Initial Clinical Review     Admission: Date/Time/Statement:   Observation 11/2/2017 @ 1819  Inpatient admission 11/3/17 @ 1517        Orders Placed This Encounter   Procedures    Place in Observation (expected length of stay for this patient is less than two midnights)       Standing Status:   Standing       Number of Occurrences:   1       Order Specific Question:   Admitting Physician       Answer:   Jasmin Stinson [[id number]]       Order Specific Question:   Level of Care       Answer:   Med Surg [16]      11/03/17 1517 Release Myla Sullivan MD (auto-released) From Order: [id number]   11/03/17 1517 Complete Myla Sullivan MD    Order Details     Frequency Duration Priority Order Class   Once 1  occurrence Routine Hospital Performed   Order Questions     Question Answer Comment   Admitting Physician BEKAH Saavedra    Level of Care Med Surg    Estimated length of stay More than 2 Midnights    Certification I certify that inpatient services are medically necessary for this patient for a duration of greater than two midnights  See H&P and MD Progress Notes for additional information about the patient's course of treatment  ED: Date/Time/Mode of Arrival:             ED Arrival Information      Expected Arrival Acuity Means of Arrival Escorted By Service Admission Type     - 11/2/2017 16:40 Urgent Walk-In Spouse General Medicine Urgent     Arrival Complaint     testicle pain          Chief Complaint:        Chief Complaint   Patient presents with    Groin Pain       has hx  of right inguinal hernia  today it became severely painful  and bulging      History of Illness:   70-year-old male presents to the ED with known right inguinal hernia   Patient states he has had this for quite some time however since 1300 hours this afternoon it got quite large and very painful and his wife has not been able to reduce it   Patient states he was moving his bowels near day normally earlier today    Right groin is consistent with inguinal hernia that appears to be non reducible right now due to pain  As the patient lying flat to see if it will relax on its own      ED Vital Signs:            ED Triage Vitals   Temperature Pulse Respirations Blood Pressure SpO2   11/02/17 1644 11/02/17 1644 11/02/17 1644 11/02/17 1644 11/02/17 1644   98 °F (36 7 °C) 85 18 134/96 98 %       Temp Source Heart Rate Source Patient Position - Orthostatic VS BP Location FiO2 (%)   11/02/17 2027 11/02/17 1920 11/02/17 1920 11/02/17 1920 --   Tympanic Monitor Lying Right arm         Pain Score           11/02/17 1644           8                Wt Readings from Last 1 Encounters:   11/02/17 66 2 kg (145 lb 15 8 oz)      Abnormal Labs/Diagnostic Test Results:   WBC 4 20 (L) Thousand/uL   RBC 4 01 (L) Million/uL   Hemoglobin 9 2 (L) g/dL   Hematocrit 30 1 (L) %      CT abd/pel:    1   Small right inguinal hernia containing a small amount of fluid and probably a portion of the cecum which appears tethered to this region, incarcerated hernia is not excluded        2   New small bilateral pleural effusions        3   Stable prostatomegaly              ED Treatment:            Medication Administration from 11/02/2017 1639 to 11/02/2017 2033    Date/Time Order Dose Route Action Action by Comments   11/02/2017 1737 sodium chloride 0 9 % infusion 125 mL/hr Intravenous Given Aureliano Haromn RN     11/02/2017 1804 morphine injection 2 mg 2 mg Intravenous Given Juju Clemons RN            Past Medical/Surgical History:         Active Ambulatory Problems     Diagnosis Date Noted    Acute respiratory failure with hypoxia ([facility] [address] ) 01/07/2017    CAD (coronary artery disease) 01/07/2017    History of DVT (deep vein thrombosis) 01/07/2017    PUD (peptic ulcer disease) 01/07/2017    Hypertension 01/07/2017    Hyperlipidemia 01/07/2017    Acute on chronic diastolic heart failure ([facility] [address] ) 01/07/2017    Stable angina ([facility] [address] ) 01/07/2017           Resolved Ambulatory Problems Diagnosis Date Noted    Dizziness 02/21/2016    Acute bronchitis 02/21/2016    Cardiomegaly 01/07/2017           Past Medical History:   Diagnosis Date    BPH (benign prostatic hyperplasia)      CAD (coronary artery disease)      Cardiac disease      Cervical spine fracture (HCC)      DVT (deep venous thrombosis) (HCC)      Fracture of lumbar spine (HCC)      Glaucoma      Hyperlipidemia      Hypertension      Myocardial infarct      Prostate cancer (HCC)      PUD (peptic ulcer disease)        Admitting Diagnosis: Inguinal hernia [K40 90]  Groin pain [R10 30]     Age/Sex: 80 y o  male     Assessment/Plan:      Admission Orders:  Scheduled Meds:   enoxaparin 40 mg Subcutaneous Daily   famotidine 20 mg Oral BID   isosorbide dinitrate 30 mg Oral Daily   lisinopril 2 5 mg Oral Daily   metoprolol succinate 50 mg Oral Daily   metoprolol tartrate 25 mg Oral HS   tamsulosin 0 4 mg Oral Daily With Dinner      Continuous Infusions:   sodium chloride 125 mL/hr Last Rate: 125 mL/hr (11/02/17 8619)      PRN Meds: nitroglycerin     NPO  Up as tolerated  Noel sequential compression devices        Dr Gillette Speaker at bedside, hernia reduced by Dr Gillette Speaker  Pt tolerated well  11/2/2017  Consult General Surgery  Assessment:  History of incarcerated right inguinal hernia, now reduced after considerable manipulations and medication in the ED  Plan:  Admit, NPO except sips with p  o  meds, plan open right inguinal hernia repair tomorrow morning        Per surgeon note 11/3  Due to incarceration and difficult reduction of his right inguinal hernia, patient requires surgery this admission  Awaiting operating room availability which will be likely later this afternoon  Postoperatively, patient will need to stay overnight to monitor abdominal pain and perform serial exams

## 2017-11-07 NOTE — PHYSICIAN ADVISOR
Current patient class: Inpatient  The patient is currently on Hospital Day: 3      The patient was admitted to the hospital at [phone] on 11/3/17 for the following diagnosis:  Inguinal hernia [K40 90]  Groin pain [R10 30]       There is documentation in the medical record of an expected length of stay of at least 2 midnights  The patient is therefore expected to satisfy the 2 midnight benchmark and given the 2 midnight presumption is appropriate for INPATIENT ADMISSION  Given this expectation of a satisfying stay, CMS instructs us that the patient is most often appropriate for inpatient admission under part A provided medical necessity is documented in the chart  After review of the relevant documentation, labs, vital signs and test results, the patient is appropriate for INPATIENT ADMISSION  Admission to the hospital as an inpatient is a complex decision making process which requires the practitioner to consider the patients presenting complaint, history and physical examination and all relevant testing  With this in mind, in this case, the patient was deemed appropriate for INPATIENT ADMISSION  After review of the documentation and testing available at the time of the admission I concur with this clinical determination of medical necessity  Rationale is as follows: The patient is a 80 yrs old Male who presented to the ED at 11/2/2017  4:42 PM with a chief complaint of Groin Pain (has hx  of right inguinal hernia  today it became severely painful   and bulging)    The patients vitals on arrival were ED Triage Vitals   Temperature Pulse Respirations Blood Pressure SpO2   11/02/17 1644 11/02/17 1644 11/02/17 1644 11/02/17 1644 11/02/17 1644   98 °F (36 7 °C) 85 18 134/96 98 %      Temp Source Heart Rate Source Patient Position - Orthostatic VS BP Location FiO2 (%)   11/02/17 2027 11/02/17 1920 11/02/17 1920 11/02/17 1920 --   Tympanic Monitor Lying Right arm       Pain Score       11/02/17 1644       8 Past Medical History:   Diagnosis Date    BPH (benign prostatic hyperplasia)     CAD (coronary artery disease)     Cardiac disease     Cervical spine fracture (HCC)     DVT (deep venous thrombosis) (HCC)     Fracture of lumbar spine (HCC)     Glaucoma     Hyperlipidemia     Hypertension     Myocardial infarct     Prostate cancer ([facility] [address] )     PUD (peptic ulcer disease)      Past Surgical History:   Procedure Laterality Date    CHOLECYSTECTOMY      CORONARY ARTERY BYPASS GRAFT      CORONARY STENT PLACEMENT      EYE SURGERY      HERNIA REPAIR Right 11/3/2017    Procedure: REPAIR HERNIA INGUINAL;  Surgeon: Valerie Oneill MD;  Location: [address] [place] [address];  Service: General    STOMACH SURGERY      Billroth 2 gastrectomy    TRANSURETHRAL RESECTION OF PROSTATE      VENA CAVA FILTER PLACEMENT             Consults have been placed to:   IP CONSULT TO ACUTE CARE SURGERY    Vitals:    11/03/17 2015 11/03/17 2022 11/03/17 2046 11/04/17 0737   BP: 159/92 153/94 153/78 108/56   Pulse: 99 100 100 90   Resp: 18 18 20 18   Temp:  97 8 °F (36 6 °C) 98 2 °F (36 8 °C) 98 1 °F (36 7 °C)   TempSrc:   Oral Oral   SpO2: 94% 94% 90% 94%   Weight:       Height:           Most recent labs:    No results for input(s): WBC, HGB, HCT, PLT, K, NA, CALCIUM, BUN, CREATININE, LIPASE, AMYLASE, INR, TROPONINI, CKTOTAL, AST, ALT, ALKPHOS, BILITOT in the last 72 hours  Scheduled Meds:  Continuous Infusions:  No current facility-administered medications for this encounter  PRN Meds:      Surgical procedures (if appropriate):  Procedure(s):  REPAIR HERNIA INGUINAL

## 2017-11-13 ENCOUNTER — GENERIC CONVERSION - ENCOUNTER (OUTPATIENT)
Dept: OTHER | Facility: OTHER | Age: 82
End: 2017-11-13

## 2017-11-27 ENCOUNTER — GENERIC CONVERSION - ENCOUNTER (OUTPATIENT)
Dept: OTHER | Facility: OTHER | Age: 82
End: 2017-11-27

## 2018-01-10 NOTE — RESULT NOTES
Message   Change Demadex to 10 mg p o  daily  Repeat BMP in 2 weeks     Verified Results  (1) BASIC METABOLIC PROFILE [id number] 09:40AM [place] [facility] Order Number: [id number]     Test Name Result Flag Reference   GLUCOSE,RANDM 137 mg/dL     If the patient is fasting, the ADA then defines impaired fasting glucose as > 100 mg/dL and diabetes as > or equal to 123 mg/dL  SODIUM 134 mmol/L L 136-145   POTASSIUM 4 7 mmol/L  3 5-5 3   CHLORIDE 95 mmol/L L 100-108   CARBON DIOXIDE 32 mmol/L  21-32   ANION GAP (CALC) 7 mmol/L  4-13   BLOOD UREA NITROGEN 63 mg/dL H 5-25   CREATININE 1 74 mg/dL H 0 60-1 30   Standardized to IDMS reference method   CALCIUM 9 2 mg/dL  8 3-10 1   eGFR Non-African American 37 4 ml/min/1 73sq m     [facility] Energy Disease Education Program recommendations are as follows:  GFR calculation is accurate only with a steady state creatinine  Chronic Kidney disease less than 60 ml/min/1 73 sq  meters  Kidney failure less than 15 ml/min/1 73 sq  meters

## 2018-01-13 VITALS
BODY MASS INDEX: 25.03 KG/M2 | HEIGHT: 64 IN | SYSTOLIC BLOOD PRESSURE: 110 MMHG | HEART RATE: 63 BPM | DIASTOLIC BLOOD PRESSURE: 60 MMHG | OXYGEN SATURATION: 98 % | WEIGHT: 146.6 LBS

## 2018-01-13 VITALS
SYSTOLIC BLOOD PRESSURE: 98 MMHG | HEART RATE: 80 BPM | HEIGHT: 64 IN | DIASTOLIC BLOOD PRESSURE: 52 MMHG | WEIGHT: 148.25 LBS | BODY MASS INDEX: 25.31 KG/M2

## 2018-01-14 VITALS
DIASTOLIC BLOOD PRESSURE: 72 MMHG | BODY MASS INDEX: 25.61 KG/M2 | HEIGHT: 64 IN | WEIGHT: 150 LBS | HEART RATE: 72 BPM | SYSTOLIC BLOOD PRESSURE: 118 MMHG

## 2018-01-15 VITALS
HEIGHT: 64 IN | WEIGHT: 145.25 LBS | HEART RATE: 78 BPM | BODY MASS INDEX: 24.8 KG/M2 | OXYGEN SATURATION: 98 % | DIASTOLIC BLOOD PRESSURE: 74 MMHG | SYSTOLIC BLOOD PRESSURE: 128 MMHG

## 2018-01-15 NOTE — RESULT NOTES
Message   contine the same amount of RX     Verified Results  (1) BASIC METABOLIC PROFILE [id number] 08:03AM [place] Clear     Test Name Result Flag Reference   GLUCOSE,RANDM 121 mg/dL     If the patient is fasting, the ADA then defines impaired fasting glucose as > 100 mg/dL and diabetes as > or equal to 123 mg/dL  SODIUM 136 mmol/L  136-145   POTASSIUM 4 3 mmol/L  3 5-5 3   CHLORIDE 99 mmol/L L 100-108   CARBON DIOXIDE 32 mmol/L  21-32   ANION GAP (CALC) 5 mmol/L  4-13   BLOOD UREA NITROGEN 42 mg/dL H 5-25   CREATININE 1 43 mg/dL H 0 60-1 30   Standardized to IDMS reference method   CALCIUM 8 8 mg/dL  8 3-10 1   eGFR Non-African American 46 9 ml/min/1 73sq [facility] Energy Disease Education Program recommendations are as follows:  GFR calculation is accurate only with a steady state creatinine  Chronic Kidney disease less than 60 ml/min/1 73 sq  meters  Kidney failure less than 15 ml/min/1 73 sq  meters

## 2018-01-22 VITALS
SYSTOLIC BLOOD PRESSURE: 108 MMHG | TEMPERATURE: 96.3 F | RESPIRATION RATE: 16 BRPM | WEIGHT: 147.5 LBS | HEIGHT: 64 IN | DIASTOLIC BLOOD PRESSURE: 58 MMHG | BODY MASS INDEX: 25.18 KG/M2

## 2018-01-22 VITALS
WEIGHT: 144.5 LBS | BODY MASS INDEX: 24.67 KG/M2 | TEMPERATURE: 96.7 F | DIASTOLIC BLOOD PRESSURE: 52 MMHG | SYSTOLIC BLOOD PRESSURE: 112 MMHG | RESPIRATION RATE: 16 BRPM | HEIGHT: 64 IN

## 2018-02-15 DIAGNOSIS — N20.0 CALCULUS OF KIDNEY: ICD-10-CM

## 2018-02-15 DIAGNOSIS — N40.0 ENLARGED PROSTATE WITHOUT LOWER URINARY TRACT SYMPTOMS (LUTS): ICD-10-CM

## 2018-02-16 ENCOUNTER — APPOINTMENT (OUTPATIENT)
Dept: LAB | Facility: HOSPITAL | Age: 83
End: 2018-02-16
Payer: MEDICARE

## 2018-02-16 ENCOUNTER — TRANSCRIBE ORDERS (OUTPATIENT)
Dept: ADMINISTRATIVE | Facility: HOSPITAL | Age: 83
End: 2018-02-16

## 2018-02-16 DIAGNOSIS — N20.0 CALCULUS OF KIDNEY: ICD-10-CM

## 2018-02-16 DIAGNOSIS — N40.0 ENLARGED PROSTATE WITHOUT LOWER URINARY TRACT SYMPTOMS (LUTS): ICD-10-CM

## 2018-02-16 LAB — PSA SERPL-MCNC: 2.7 NG/ML (ref 0–4)

## 2018-02-16 PROCEDURE — 36415 COLL VENOUS BLD VENIPUNCTURE: CPT

## 2018-02-16 PROCEDURE — 84153 ASSAY OF PSA TOTAL: CPT

## 2018-02-16 NOTE — PROGRESS NOTES
2/21/2018    [facility]  8/2/1930  [id number]    Discussion and Plan    Patient continues to do well with no significant urinary complaints  He will return in 1 year with PSA prior to visit  Ultrasound postvoid check to be performed at that time  1  Benign prostatic hyperplasia without lower urinary tract symptoms    - PSA Total, Diagnostic; Future  - POCT Measure PVR; Future        Assessment      Patient Active Problem List   Diagnosis    Acute respiratory failure with hypoxia (HCC)    CAD (coronary artery disease)    History of DVT (deep vein thrombosis)    PUD (peptic ulcer disease)    Hypertension    Hyperlipidemia    Acute on chronic diastolic heart failure (HCC)    Stable angina ([address] )    Incarcerated right inguinal hernia    Inguinal hernia    Benign prostatic hyperplasia       History of Present Illness    Cherri Paige is a 80 y o  male seen today in regards to a history of repeat TURP on 9/27/2015  Patient is a former Dr Cliff Graves patient and had a TURP approximately 5 years ago  Denies any dysuria  Complains of nocturia 1-2  Feels empty when voids and stream is good  Admits to not drinking much fluids during the daytime due to a surgery on his stomach years ago  Discontinued finasteride secondary to breast tenderness  Currently on Flomax  PSA is now 2 7      Urinary Symptom Assessment        Past Medical History  Past Medical History:   Diagnosis Date    BPH (benign prostatic hyperplasia)     CAD (coronary artery disease)     Cardiac disease     Cervical spine fracture (HCC)     DVT (deep venous thrombosis) (HCC)     Fracture of lumbar spine (HCC)     Glaucoma     Hyperlipidemia     Hypertension     Myocardial infarct     Prostate cancer ([facility] [address] )     PUD (peptic ulcer disease)        Past Social History  Past Surgical History:   Procedure Laterality Date    CHOLECYSTECTOMY      CORONARY ARTERY BYPASS GRAFT      CORONARY STENT PLACEMENT      EYE SURGERY      HERNIA REPAIR Right 11/3/2017    Procedure: REPAIR HERNIA INGUINAL;  Surgeon: Rolf Ann MD;  Location: [address] [place] [address];  Service: General    STOMACH SURGERY      Billroth 2 gastrectomy    TRANSURETHRAL RESECTION OF PROSTATE      VENA CAVA FILTER PLACEMENT         Past Family History  Family History   Problem Relation Age of Onset    Coronary artery disease Brother        Past Social history  Social History     Social History    Marital status: /Civil Union     Spouse name: N/A    Number of children: N/A    Years of education: N/A     Occupational History    Not on file  Social History Main Topics    Smoking status: Never Smoker    Smokeless tobacco: Never Used    Alcohol use No    Drug use: No    Sexual activity: Not on file     Other Topics Concern    Not on file     Social History Narrative    No narrative on file       Current Medications  Current Outpatient Prescriptions   Medication Sig Dispense Refill    ALPRAZolam (XANAX) 0 25 mg tablet Take 0 25 mg by mouth daily at bedtime as needed for anxiety   aspirin 81 MG tablet Take 81 mg by mouth daily   Calcium Carbonate-Vitamin D (CALCIUM 600+D PO) Take by mouth daily      cholecalciferol (VITAMIN D3) 400 units tablet Take 400 Units by mouth daily      clopidogrel (PLAVIX) 75 mg tablet Take 75 mg by mouth daily   escitalopram (LEXAPRO) 10 mg tablet Take 10 mg by mouth daily      famotidine (PEPCID) 20 mg tablet Take 20 mg by mouth 2 (two) times a day      isosorbide dinitrate (ISORDIL) 30 mg tablet Take 30 mg by mouth daily      metoprolol succinate (TOPROL-XL) 50 mg 24 hr tablet Take 50 mg by mouth daily      metoprolol tartrate (LOPRESSOR) 25 mg tablet Take 25 mg by mouth daily at bedtime        multivitamin (THERAGRAN) TABS Take 1 tablet by mouth daily        nitroglycerin (NITROSTAT) 0 4 mg SL tablet Place 0 4 mg under the tongue every 5 (five) minutes as needed for chest pain      tamsulosin (FLOMAX) 0 4 mg Take 0 4 mg by mouth daily with dinner   timolol (BETIMOL) 0 5 % ophthalmic solution Administer 1 drop to both eyes 2 (two) times a day   zinc gluconate 50 mg tablet Take 25 mg by mouth daily   famotidine (PEPCID) 20 mg tablet Take 1 tablet by mouth 2 (two) times a day for 30 days 60 tablet 0    lisinopril (ZESTRIL) 2 5 mg tablet Take 2 5 mg by mouth daily   magnesium oxide (MAG-OX) 400 mg Take 1 tablet by mouth 2 (two) times a day for 30 days 60 tablet 0    rosuvastatin (CRESTOR) 10 MG tablet Take 10 mg by mouth daily  On Monday, Wednesday and Friday      torsemide (DEMADEX) 20 mg tablet Take 1 tablet by mouth daily for 30 days 30 tablet 0     No current facility-administered medications for this visit  Allergies  Allergies   Allergen Reactions    Oxycodone-Acetaminophen Dizziness and Shortness Of Breath     Other reaction(s): Faints  Reaction Date: [id number]; Category: Adverse Reaction;     Omeprazole Other (See Comments)     pash    Statins Other (See Comments)     Muscle pain       Past Medical History, Social History, Family History, medications and allergies were reviewed  Review of Systems  Review of Systems   Constitutional: Negative  HENT: Negative  Eyes: Negative  Respiratory: Negative  Cardiovascular: Negative  Gastrointestinal: Negative  Endocrine: Negative  Genitourinary: Negative for decreased urine volume, difficulty urinating, dysuria, hematuria, testicular pain and urgency  Musculoskeletal: Negative  Skin: Negative  Neurological: Negative  Hematological: Negative  Psychiatric/Behavioral: Negative  Vitals  Vitals:    02/21/18 1256   BP: 110/60   Pulse: 63   Weight: 67 4 kg (148 lb 9 6 oz)   Height: 5' 4" (1 626 m)         Physical Exam    Physical Exam   Constitutional: He is oriented to person, place, and time  He appears well-developed and well-nourished  HENT:   Head: Normocephalic and atraumatic     Eyes: Pupils are equal, round, and reactive to light  Neck: Normal range of motion  Cardiovascular: Normal rate, regular rhythm and normal heart sounds  Pulmonary/Chest: Effort normal and breath sounds normal  No accessory muscle usage  No respiratory distress  Abdominal: Soft  Normal appearance and bowel sounds are normal  There is no tenderness  Genitourinary: Rectum normal and penis normal  No penile tenderness  Genitourinary Comments: Greater than 50 g and irregular  No distinct nodules appreciated   Musculoskeletal: Normal range of motion  Neurological: He is alert and oriented to person, place, and time  Skin: Skin is warm, dry and intact  Psychiatric: He has a normal mood and affect  His speech is normal  Cognition and memory are normal    Nursing note and vitals reviewed        Results    Lab Results   Component Value Date/Time    PSA 2 7 02/16/2018 08:18 AM     Lab Results   Component Value Date    GLUCOSE 130 11/02/2017    CALCIUM 8 9 11/02/2017     11/02/2017    K 4 4 11/02/2017    CO2 31 11/02/2017     11/02/2017    BUN 23 11/02/2017    CREATININE 1 07 11/02/2017     Lab Results   Component Value Date    WBC 4 20 (L) 11/02/2017    HGB 9 2 (L) 11/02/2017    HCT 30 1 (L) 11/02/2017    MCV 75 (L) 11/02/2017     11/02/2017       No results found for this or any previous visit (from the past 1 hour(s)) ]

## 2018-02-21 ENCOUNTER — OFFICE VISIT (OUTPATIENT)
Dept: UROLOGY | Facility: CLINIC | Age: 83
End: 2018-02-21
Payer: MEDICARE

## 2018-02-21 VITALS
BODY MASS INDEX: 25.37 KG/M2 | HEART RATE: 63 BPM | DIASTOLIC BLOOD PRESSURE: 60 MMHG | SYSTOLIC BLOOD PRESSURE: 110 MMHG | HEIGHT: 64 IN | WEIGHT: 148.6 LBS

## 2018-02-21 DIAGNOSIS — N40.0 BENIGN PROSTATIC HYPERPLASIA WITHOUT LOWER URINARY TRACT SYMPTOMS: Primary | ICD-10-CM

## 2018-02-21 PROCEDURE — 99214 OFFICE O/P EST MOD 30 MIN: CPT | Performed by: UROLOGY

## 2018-02-21 RX ORDER — FAMOTIDINE 20 MG/1
20 TABLET, FILM COATED ORAL 2 TIMES DAILY
COMMUNITY
End: 2018-04-17

## 2018-02-21 RX ORDER — OMEGA-3S/DHA/EPA/FISH OIL/D3 300MG-1000
400 CAPSULE ORAL
COMMUNITY

## 2018-02-21 RX ORDER — ESCITALOPRAM OXALATE 10 MG/1
5 TABLET ORAL DAILY
Status: ON HOLD | COMMUNITY
End: 2021-11-03 | Stop reason: SDUPTHER

## 2018-03-12 DIAGNOSIS — I20.8 CHRONIC STABLE ANGINA (HCC): Primary | ICD-10-CM

## 2018-03-14 RX ORDER — ISOSORBIDE DINITRATE 30 MG/1
30 TABLET ORAL DAILY
Qty: 30 TABLET | Refills: 5 | Status: SHIPPED | OUTPATIENT
Start: 2018-03-14 | End: 2018-04-17

## 2018-03-15 DIAGNOSIS — I20.8 CHRONIC STABLE ANGINA (HCC): Primary | ICD-10-CM

## 2018-03-15 RX ORDER — ISOSORBIDE MONONITRATE 30 MG/1
30 TABLET, EXTENDED RELEASE ORAL DAILY
Qty: 30 TABLET | Refills: 5 | Status: SHIPPED | OUTPATIENT
Start: 2018-03-15 | End: 2018-09-10 | Stop reason: SDUPTHER

## 2018-04-13 ENCOUNTER — APPOINTMENT (OUTPATIENT)
Dept: LAB | Facility: HOSPITAL | Age: 83
End: 2018-04-13
Attending: FAMILY MEDICINE
Payer: MEDICARE

## 2018-04-13 ENCOUNTER — TRANSCRIBE ORDERS (OUTPATIENT)
Dept: ADMINISTRATIVE | Facility: HOSPITAL | Age: 83
End: 2018-04-13

## 2018-04-13 DIAGNOSIS — D50.9 IRON DEFICIENCY ANEMIA, UNSPECIFIED IRON DEFICIENCY ANEMIA TYPE: Primary | ICD-10-CM

## 2018-04-13 DIAGNOSIS — E55.9 VITAMIN D DEFICIENCY DISEASE: ICD-10-CM

## 2018-04-13 DIAGNOSIS — D50.9 IRON DEFICIENCY ANEMIA, UNSPECIFIED IRON DEFICIENCY ANEMIA TYPE: ICD-10-CM

## 2018-04-13 LAB
25(OH)D3 SERPL-MCNC: 43.5 NG/ML (ref 30–100)
ALBUMIN SERPL BCP-MCNC: 3.3 G/DL (ref 3.5–5)
ALP SERPL-CCNC: 96 U/L (ref 46–116)
ALT SERPL W P-5'-P-CCNC: 15 U/L (ref 12–78)
ANION GAP SERPL CALCULATED.3IONS-SCNC: 7 MMOL/L (ref 4–13)
AST SERPL W P-5'-P-CCNC: 6 U/L (ref 5–45)
BASOPHILS # BLD AUTO: 0 THOUSANDS/ΜL (ref 0–0.1)
BASOPHILS NFR BLD AUTO: 0 % (ref 0–1)
BILIRUB SERPL-MCNC: 0.3 MG/DL (ref 0.2–1)
BUN SERPL-MCNC: 23 MG/DL (ref 5–25)
CALCIUM SERPL-MCNC: 8.9 MG/DL
CHLORIDE SERPL-SCNC: 102 MMOL/L (ref 100–108)
CO2 SERPL-SCNC: 28 MMOL/L (ref 21–32)
CREAT SERPL-MCNC: 1.03 MG/DL (ref 0.6–1.3)
EOSINOPHIL # BLD AUTO: 0 THOUSAND/ΜL (ref 0–0.61)
EOSINOPHIL NFR BLD AUTO: 1 % (ref 0–6)
ERYTHROCYTE [DISTWIDTH] IN BLOOD BY AUTOMATED COUNT: 16.7 % (ref 11.6–15.1)
GFR SERPL CREATININE-BSD FRML MDRD: 65 ML/MIN/1.73SQ M
GLUCOSE P FAST SERPL-MCNC: 106 MG/DL (ref 65–99)
HCT VFR BLD AUTO: 30.1 % (ref 42–52)
HGB BLD-MCNC: 9.3 G/DL (ref 14–18)
IRON SERPL-MCNC: 23 UG/DL (ref 65–175)
LYMPHOCYTES # BLD AUTO: 1 THOUSANDS/ΜL (ref 0.6–4.47)
LYMPHOCYTES NFR BLD AUTO: 21 % (ref 14–44)
MCH RBC QN AUTO: 23.9 PG (ref 27–31)
MCHC RBC AUTO-ENTMCNC: 30.9 G/DL (ref 31.4–37.4)
MCV RBC AUTO: 77 FL (ref 82–98)
MONOCYTES # BLD AUTO: 0.4 THOUSAND/ΜL (ref 0.17–1.22)
MONOCYTES NFR BLD AUTO: 10 % (ref 4–12)
NEUTROPHILS # BLD AUTO: 3 THOUSANDS/ΜL (ref 1.85–7.62)
NEUTS SEG NFR BLD AUTO: 67 % (ref 43–75)
NRBC BLD AUTO-RTO: 0 /100 WBCS
PLATELET # BLD AUTO: 199 THOUSANDS/UL (ref 130–400)
PMV BLD AUTO: 8.2 FL (ref 8.9–12.7)
POTASSIUM SERPL-SCNC: 4.3 MMOL/L (ref 3.5–5.3)
PROT SERPL-MCNC: 7 G/DL (ref 6.4–8.2)
RBC # BLD AUTO: 3.9 MILLION/UL (ref 4.7–6.1)
SODIUM SERPL-SCNC: 137 MMOL/L (ref 136–145)
WBC # BLD AUTO: 4.5 THOUSAND/UL (ref 4.8–10.8)

## 2018-04-13 PROCEDURE — 82306 VITAMIN D 25 HYDROXY: CPT

## 2018-04-13 PROCEDURE — 36415 COLL VENOUS BLD VENIPUNCTURE: CPT

## 2018-04-13 PROCEDURE — 85025 COMPLETE CBC W/AUTO DIFF WBC: CPT

## 2018-04-13 PROCEDURE — 80053 COMPREHEN METABOLIC PANEL: CPT

## 2018-04-13 PROCEDURE — 83540 ASSAY OF IRON: CPT

## 2018-04-15 NOTE — PROGRESS NOTES
Progress Note - Cardiology Office  Brook Lee 80 y o  male MRN: [id number]  : 1930  Encounter: [id number]      Assessment:     1  Coronary artery disease of native artery of native heart with stable angina pectoris ([address] )    2  Essential hypertension    3  PUD (peptic ulcer disease)    4  Mixed hyperlipidemia    5  Anxiety    6  Stable angina (HCC)    7  Anemia, unspecified type        Discussion Summary and Plan:  1  Coronary artery disease status post CABG and stents with multiple cardiac catheterization a year ago ago in [facility] by me and it was recommended medical therapy  Patient has symptoms of chronic stable angina which is not changed  2  Chronic stable angina  Patient occasionally gets breakthrough pain  He is on Imdur, statins, he is tolerating well  He had no more episodes since then  3  Generalized anxiety  On p r n  and patient was reassured  Continue same medications  4  Dyslipidemia  Patient was not taking any statins  He had stopped it as he could not tolerate it  He does not want to try any other medications  I did talk to him about PS K inhibitors  5  Chronic diastolic heart failure [place] Heart Association class 2  No more episodes of heart failure  He has stopped taking diuretics and he is doing well with that  Continue close monitoring  6  Benign prostate hypertrophy status post surgery, wasn't finasteride which caused gynecomastia has stopped by urologywas advised not to shovel snow  Avoid going into cold cold weather  Discussed with patient's wife  Follow-up in 6 months  Counseling :  A description of the counseling  Spoke to patient about chronic stable angina, coronary artery disease, dyslipidemia, diastolic heart failure at length  Diet advised  Patient's ability to self care: Yes  Medication side effect reviewed with patient in detail and all their questions answered to their satisfaction      HPI :     Brook Lee is a 80y o  year old male who came for follow up  Patient has a past medical history significant for coronary artery disease status post CABG status post stent into his MCKENNA to LAD widely patent, SVG vein graft to 100% occluded and circumflex was severely diffusely disease with severe diffuse distal disease, chronic stable angina, status post MI anxiety, GERD, dyslipidemia who came for follow-up  was admitted to [facility] with chronic diastolic heart failure and is now compensated  He denies any chest pain or any shortness of breath  Occasionally feel dizzy in the morning  Has been on Demadex 20 mg and potassium 20 mg daily  All medications reviewed with him     /02/2017seen and evaluated  Above reviewed  He is feeling better  He has no exertional chest pain  No PND no orthopnea no shortness of breath  Pretty active  He does get pain when he bends over and it gets better immediately when he stands up  It last only on till he is bending and it is sharp  No fever no chills no nausea of the complete   04/17/2018  Above reviewed  Overall patient is doing well  There is no symptoms with normal activities  He does have history of chronic stable angina which is not changed  He is pretty active  He does get pain when he bends over but gets better when he stands up  No fever no chills no nausea no vomiting no leg swelling  Recent blood test shows he is anemic hemoglobin has dropped  He is working with his primary care doctor to improve that  No nausea no vomiting no other significant complaint  By mistake he was taking metoprolol short-acting which will be switched to long-acting 1  Review of Systems   Constitutional: Negative for activity change, chills, diaphoresis, fever and unexpected weight change  HENT: Negative for congestion  Eyes: Negative for discharge and redness  Respiratory: Positive for shortness of breath  Negative for cough, chest tightness and wheezing  Cardiovascular: Negative    Negative for chest pain, palpitations and leg swelling  Gastrointestinal: Negative for abdominal pain, diarrhea and nausea  Endocrine: Negative  Genitourinary: Negative for decreased urine volume and urgency  Musculoskeletal: Positive for gait problem  Negative for arthralgias and back pain  Skin: Negative for rash and wound  Allergic/Immunologic: Negative  Neurological: Negative for dizziness, seizures, syncope, weakness, light-headedness and headaches  Hematological: Negative  Psychiatric/Behavioral: Negative for agitation and confusion  The patient is nervous/anxious  Historical Information   Past Medical History:   Diagnosis Date    BPH (benign prostatic hyperplasia)     CAD (coronary artery disease)     Cardiac disease     Cervical spine fracture (HCC)     DVT (deep venous thrombosis) (HCC)     Fracture of lumbar spine (HCC)     Glaucoma     Hyperlipidemia     Hypertension     [facility] infarct [facility] [facility]     Prostate cancer ([facility] [facility] )     PUD (peptic ulcer disease)      Past Surgical History:   Procedure Laterality Date    CHOLECYSTECTOMY      CORONARY ARTERY BYPASS GRAFT      CORONARY STENT PLACEMENT      EYE SURGERY      HERNIA REPAIR Right 11/3/2017    Procedure: REPAIR HERNIA INGUINAL;  Surgeon: Jorge L Vyas MD;  Location: [facility];  Service: General    STOMACH SURGERY      Bill[place]th 2 gastrectomy    TRANSURETHRAL RESECTION OF PROSTATE      VENA CAVA FILTER PLACEMENT       History   Alcohol Use No     History   Drug Use No     History   Smoking Status    Never Smoker   Smokeless Tobacco    Never Used     Family History:   Family History   Problem Relation Age of Onset    Coronary artery disease Brother        Meds/Allergies     Allergies   Allergen Reactions    Oxycodone-Acetaminophen Dizziness and Shortness Of Breath     Other reaction(s): Faints  Reaction Date: [id number]; Category:  Adverse Reaction;     Omeprazole Other (See Comments)     pash    Statins Other (See Comments) Muscle pain       Current Outpatient Prescriptions:     ALPRAZolam (XANAX) 0 25 mg tablet, Take 0 25 mg by mouth daily at bedtime as needed for anxiety  , Disp: , Rfl:     aspirin 81 MG tablet, Take 81 mg by mouth daily  , Disp: , Rfl:     Calcium Carbonate-Vitamin D (CALCIUM 600+D PO), Take by mouth daily, Disp: , Rfl:     cholecalciferol (VITAMIN D3) 400 units tablet, Take 400 Units by mouth daily, Disp: , Rfl:     clopidogrel (PLAVIX) 75 mg tablet, Take 75 mg by mouth daily  , Disp: , Rfl:     escitalopram (LEXAPRO) 10 mg tablet, Take 10 mg by mouth daily, Disp: , Rfl:     isosorbide mononitrate (IMDUR) 30 mg 24 hr tablet, Take 1 tablet (30 mg total) by mouth daily, Disp: 30 tablet, Rfl: 5    lisinopril (ZESTRIL) 2 5 mg tablet, Take 2 5 mg by mouth daily  , Disp: , Rfl:     magnesium oxide (MAG-OX) 400 mg, Take 1 tablet by mouth 2 (two) times a day for 30 days, Disp: 60 tablet, Rfl: 0    multivitamin (THERAGRAN) TABS, Take 1 tablet by mouth daily  , Disp: , Rfl:     nitroglycerin (NITROSTAT) 0 4 mg SL tablet, Place 0 4 mg under the tongue every 5 (five) minutes as needed for chest pain, Disp: , Rfl:     tamsulosin (FLOMAX) 0 4 mg, Take 0 4 mg by mouth daily with dinner , Disp: , Rfl:     zinc gluconate 50 mg tablet, Take 25 mg by mouth daily  , Disp: , Rfl:     metoprolol succinate (TOPROL-XL) 25 mg 24 hr tablet, Take 1 tablet (25 mg total) by mouth daily, Disp: 30 tablet, Rfl: 5    Vitals: Blood pressure 108/70, pulse 61, height 5' 4" (1 626 m), weight 68 4 kg (150 lb 12 8 oz), SpO2 96 %  Body mass index is 25 88 kg/m²    Vitals:    04/17/18 0952   Weight: 68 4 kg (150 lb 12 8 oz)     BP Readings from Last 3 Encounters:   04/17/18 108/70   02/21/18 110/60   11/27/17 112/52         Physical Exam    Neurologic:  Alert & oriented x 3, no new focal deficits, Not in any acute distress,  Constitutional:  Well developed, well nourished, non-toxic appearance   Eyes:  Pupil equal and reacting to light, conjunctiva normal, No JVP, No LNP   HENT:  Atraumatic, oropharynx moist, Neck- normal range of motion, no tenderness,  Neck supple   Respiratory:  Bilateral air entry, mostly clear to auscultation  Cardiovascular: S1-S2 regular with a 2/6 ejection systolic murmur and S4 is present  GI:  Soft, nondistended, normal bowel sounds, nontender, no hepatosplenomegaly appreciated  Musculoskeletal:  No edema, no tenderness, no deformities  Skin:  Well hydrated, no rash   Lymphatic:  No lymphadenopathy noted   Extremities:  No edema and distal pulses are present    Diagnostic Studies Review Cardio:  Lab Review: HIS EKG done on September 13, 2016 shows normal sinus and marked ST abnormality in inferior wall consistent with ischemia but not different from old EKG     Stress Test: Nuclear stress test done 5/14/2016 shows moderate-sized inferior lateral wall ischemia and apical infarction  Ejection fraction 45%  Which was consistent with his %, vein graft to RCA is occluded, as circumflex has diffuse disease and LIMA to LAD was widely patent  Catheterization: He has multiple cardiac catheterization performed  His last cardiac catheter patient performed by me shows EF around 50%, severe diffuse disease of LAD which is totally occluded,  ECG Report:   Comparison to prior ECGs:1  No interval change1   4/24 2017  Twelve-lead EKG shows normal sinus some heart rate 72 bpm  ST changes in inferior and lateral leads which is not changed from old EKG  Cannot rule out underlying ischemialead EKG shows normal sinus rhythm heart rate 63 beats per minute with deep T-wave inversions in inferior and lateral precordial leads  Not change from old EKG  1      Cardiac testing:   Results for orders placed during the hospital encounter of 01/06/17   Echo complete with contrast if indicated    [address]  [address] [facility], [place] [address]  [phone]    Transthoracic Echocardiogram  2D, M-mode, Doppler, and Color Doppler    Study date:  2017    Patient: Kacy Hendrix  MR number: [id number]  Account number: [de-identified]  : 02-Aug-1930  Age: 80 years  Gender: Male  Status: Routine  Location: Bedside  Height: 64 in  Weight: 147 6 lb  BP: 118/ 60 mmHg    Indications: acute hypoxic    Diagnoses: I42 9 - Cardiomyopathy, unspecified    Sonographer:  YURI Castillo  Primary Physician:  Steve Paul DO  Referring Physician:  Austin Hamilton MD  Interpreting Physician:  Lupillo King DO  acute hypoxic    SUMMARY    LEFT VENTRICLE:  Systolic function was at the lower limits of normal by Teichholz  Ejection  fraction was estimated to be 50 %  There was mild hypokinesis of the septal wall  There was mild concentric hypertrophy  Doppler parameters were consistent with restrictive physiology, indicative of  decreased left ventricular diastolic compliance and/or increased left atrial  pressure  Doppler parameters were consistent with elevated mean left atrial  filling pressure  RIGHT VENTRICLE:  Systolic function was moderately reduced  MITRAL VALVE:  There was moderate regurgitation  AORTIC VALVE:  The valve was trileaflet  Leaflets exhibited moderate calcification and mildly  reduced cuspal separation  There was mild regurgitation  TRICUSPID VALVE:  There was moderate regurgitation  Pulmonary artery systolic pressure was mildly increased  HISTORY: PRIOR HISTORY: CAD, CABG,DVT,CHF,HTN, H/O PROSTATE CANCER    PROCEDURE: The procedure was performed at the bedside  This was a routine  study  The transthoracic approach was used  The study included complete 2D  imaging, M-mode, complete spectral Doppler, and color Doppler  The heart rate  was 84 bpm, at the start of the study  LEFT VENTRICLE: Size was normal  Systolic function was at the lower limits of  normal by Teichholz  Ejection fraction was estimated to be 50 %  There was mild  hypokinesis of the septal wall   There was mild concentric hypertrophy  DOPPLER:  Doppler parameters were consistent with restrictive physiology, indicative of  decreased left ventricular diastolic compliance and/or increased left atrial  pressure  Doppler parameters were consistent with elevated mean left atrial  filling pressure  RIGHT VENTRICLE: The size was normal  Systolic function was moderately reduced  LEFT ATRIUM: The atrium was mildly dilated  RIGHT ATRIUM: Size was normal     MITRAL VALVE: Valve structure was normal  There was normal leaflet separation  No echocardiographic evidence for prolapse  DOPPLER: The transmitral velocity  was within the normal range  There was no evidence for stenosis  There was  moderate regurgitation  AORTIC VALVE: The valve was trileaflet  Leaflets exhibited moderate  calcification and mildly reduced cuspal separation  DOPPLER: Transaortic  velocity was within the normal range  There was no evidence for stenosis  There  was mild regurgitation  TRICUSPID VALVE: The valve structure was normal  There was normal leaflet  separation  DOPPLER: The transtricuspid velocity was within the normal range  There was moderate regurgitation  Pulmonary artery systolic pressure was mildly  increased  Estimated peak PA pressure was 42 mmHg  PULMONIC VALVE: Leaflets exhibited normal thickness, no calcification, and  normal cuspal separation  DOPPLER: The transpulmonic velocity was within the  normal range  There was mild regurgitation  PERICARDIUM: There was no thickening  There was no pericardial effusion  AORTA: The root exhibited normal size      PULMONARY ARTERY: The size was normal  The morphology appeared normal     SYSTEM MEASUREMENT TABLES    2D mode  AoR Diam 2D: 3 3 cm  LA Diam (2D): 3 9 cm  LA/Ao (2D): 1 18  FS (2D Teich): 25 4 %  IVSd (2D): 1 12 cm  LVDEV: 65 5 cm³  LVEDV MOD BP: 143 cm³  LVESV: 32 2 cm³  LVIDd(2D): 3 89 cm  LVISd (2D): 2 9 cm  LVOT Area 2D: 3 14 cm squared  LVPWd (2D): 1 08 cm  SV [facility]): 33 3 cm³    Apical four chamber  LVEF A4C: 49 %    Apical two chamber  LA Area: 21 8 cm squared  LA Volume: 67 cm³  LVEF A2C: 55 %    Unspecified Scan Mode  JUDY Cont Eq (Peak Trino): 1 77 cm squared  LVOT (VTI): 20 6 cm  LVOT Diam : 2 cm  LVOT Vmax: 993 mm/s  LVOT Vmax; Mean: 993 mm/s  Peak Grad ; Mean: 4 mm[Hg]  SV (LVOT): 65 cm³  VTI;Mean: 2 mm[Hg]  JUDY Cont Eq (VTI): 2 25 cm squared  MV Peak A Trino: 1090 mm/s  MV Peak E Trino  Mean: 1620 mm/s  MVA (PHT): 4 cm squared  PHT: 55 ms  Max P mm[Hg]  V Max: 2910 mm/s  Vmax: 2360 mm/s  TAPSE: 1 3 cm    [facility] Accredited [facility]chocardiography [facility]    Prepared and electronically signed by    Bharath Escobedo DO  Signed 2017 16:48:59         Lab Review   Lab Results   Component Value Date    WBC 4 50 (L) 2018    HGB 9 3 (L) 2018    HCT 30 1 (L) 2018    MCV 77 (L) 2018    RDW 16 7 (H) 2018     2018     BMP:  Lab Results   Component Value Date     2018    K 4 3 2018     2018    CO2 28 2018    ANIONGAP 7 2018    BUN 23 2018    CREATININE 1 03 2018    GLUCOSE 130 2017    GLUF 106 (H) 2018    CALCIUM 8 9 2018    EGFR 65 2018    MG 2 3 2017     LFT:  Lab Results   Component Value Date    AST 6 2018    ALT 15 2018    ALKPHOS 96 2018    PROT 7 0 2018    BILITOT 0 30 2018     Lab Results   Component Value Date    BNP 69 2015      No results found for: TSH  Lab Results   Component Value Date    HGBA1C 6 3 (H) 2015     Lipid Profile:   Lab Results   Component Value Date    CHOL 159 05/10/2016    HDL 36 (L) 05/10/2016    LDLCALC 92 05/10/2016    TRIG 153 (H) 05/10/2016     Lab Results   Component Value Date    CHOL 159 05/10/2016    CHOL 192 10/29/2015         Dr Lima Martinez MD [facility]      "This note has been constructed using a voice recognition system  Therefore there may be syntax, spelling, and/or grammatical errors   Please call if you have any questions  "

## 2018-04-17 ENCOUNTER — OFFICE VISIT (OUTPATIENT)
Dept: CARDIOLOGY CLINIC | Facility: CLINIC | Age: 83
End: 2018-04-17
Payer: MEDICARE

## 2018-04-17 VITALS
BODY MASS INDEX: 25.74 KG/M2 | SYSTOLIC BLOOD PRESSURE: 108 MMHG | HEIGHT: 64 IN | WEIGHT: 150.8 LBS | OXYGEN SATURATION: 96 % | HEART RATE: 61 BPM | DIASTOLIC BLOOD PRESSURE: 70 MMHG

## 2018-04-17 DIAGNOSIS — I10 ESSENTIAL HYPERTENSION: Chronic | ICD-10-CM

## 2018-04-17 DIAGNOSIS — F41.9 ANXIETY: ICD-10-CM

## 2018-04-17 DIAGNOSIS — I20.8 STABLE ANGINA (HCC): Chronic | ICD-10-CM

## 2018-04-17 DIAGNOSIS — I25.118 CORONARY ARTERY DISEASE OF NATIVE ARTERY OF NATIVE HEART WITH STABLE ANGINA PECTORIS (HCC): Chronic | ICD-10-CM

## 2018-04-17 DIAGNOSIS — E78.2 MIXED HYPERLIPIDEMIA: Chronic | ICD-10-CM

## 2018-04-17 DIAGNOSIS — K27.9 PUD (PEPTIC ULCER DISEASE): Chronic | ICD-10-CM

## 2018-04-17 DIAGNOSIS — D64.9 ANEMIA, UNSPECIFIED TYPE: ICD-10-CM

## 2018-04-17 PROCEDURE — 99214 OFFICE O/P EST MOD 30 MIN: CPT | Performed by: INTERNAL MEDICINE

## 2018-04-17 RX ORDER — METOPROLOL SUCCINATE 25 MG/1
25 TABLET, EXTENDED RELEASE ORAL DAILY
Qty: 30 TABLET | Refills: 5 | Status: SHIPPED | OUTPATIENT
Start: 2018-04-17 | End: 2018-10-15 | Stop reason: SDUPTHER

## 2018-04-26 ENCOUNTER — TELEPHONE (OUTPATIENT)
Dept: CARDIOLOGY CLINIC | Facility: CLINIC | Age: 83
End: 2018-04-26

## 2018-04-27 NOTE — TELEPHONE ENCOUNTER
CHUCK - Spoke with pt; will take asa and plavix; Dr Brooks Sims having him draw blood for his low iron; pt has been taking iron tabs

## 2018-05-25 ENCOUNTER — APPOINTMENT (OUTPATIENT)
Dept: LAB | Facility: HOSPITAL | Age: 83
End: 2018-05-25
Attending: FAMILY MEDICINE
Payer: MEDICARE

## 2018-05-25 ENCOUNTER — TRANSCRIBE ORDERS (OUTPATIENT)
Dept: ADMINISTRATIVE | Facility: HOSPITAL | Age: 83
End: 2018-05-25

## 2018-05-25 DIAGNOSIS — D50.9 IRON DEFICIENCY ANEMIA, UNSPECIFIED IRON DEFICIENCY ANEMIA TYPE: ICD-10-CM

## 2018-05-25 DIAGNOSIS — N18.30 CHRONIC KIDNEY DISEASE, STAGE III (MODERATE) (HCC): ICD-10-CM

## 2018-05-25 DIAGNOSIS — D50.9 IRON DEFICIENCY ANEMIA, UNSPECIFIED IRON DEFICIENCY ANEMIA TYPE: Primary | ICD-10-CM

## 2018-05-25 LAB
ANION GAP SERPL CALCULATED.3IONS-SCNC: 7 MMOL/L (ref 4–13)
BASOPHILS # BLD AUTO: 0.01 THOUSANDS/ΜL (ref 0–0.1)
BASOPHILS NFR BLD AUTO: 0 % (ref 0–1)
BUN SERPL-MCNC: 17 MG/DL (ref 5–25)
CALCIUM SERPL-MCNC: 8.5 MG/DL (ref 8.3–10.1)
CHLORIDE SERPL-SCNC: 104 MMOL/L (ref 100–108)
CO2 SERPL-SCNC: 30 MMOL/L (ref 21–32)
CREAT SERPL-MCNC: 0.99 MG/DL (ref 0.6–1.3)
EOSINOPHIL # BLD AUTO: 0.05 THOUSAND/ΜL (ref 0–0.61)
EOSINOPHIL NFR BLD AUTO: 1 % (ref 0–6)
ERYTHROCYTE [DISTWIDTH] IN BLOOD BY AUTOMATED COUNT: 17.6 % (ref 11.6–15.1)
GFR SERPL CREATININE-BSD FRML MDRD: 68 ML/MIN/1.73SQ M
GLUCOSE P FAST SERPL-MCNC: 99 MG/DL (ref 65–99)
HCT VFR BLD AUTO: 35.6 % (ref 36.5–49.3)
HGB BLD-MCNC: 10.3 G/DL (ref 12–17)
IMM GRANULOCYTES # BLD AUTO: 0.01 THOUSAND/UL (ref 0–0.2)
IMM GRANULOCYTES NFR BLD AUTO: 0 % (ref 0–2)
IRON SERPL-MCNC: 28 UG/DL (ref 65–175)
LYMPHOCYTES # BLD AUTO: 1.08 THOUSANDS/ΜL (ref 0.6–4.47)
LYMPHOCYTES NFR BLD AUTO: 27 % (ref 14–44)
MCH RBC QN AUTO: 25.6 PG (ref 26.8–34.3)
MCHC RBC AUTO-ENTMCNC: 28.9 G/DL (ref 31.4–37.4)
MCV RBC AUTO: 88 FL (ref 82–98)
MONOCYTES # BLD AUTO: 0.46 THOUSAND/ΜL (ref 0.17–1.22)
MONOCYTES NFR BLD AUTO: 12 % (ref 4–12)
NEUTROPHILS # BLD AUTO: 2.34 THOUSANDS/ΜL (ref 1.85–7.62)
NEUTS SEG NFR BLD AUTO: 60 % (ref 43–75)
NRBC BLD AUTO-RTO: 0 /100 WBCS
PLATELET # BLD AUTO: 159 THOUSANDS/UL (ref 149–390)
PMV BLD AUTO: 10 FL (ref 8.9–12.7)
POTASSIUM SERPL-SCNC: 4.1 MMOL/L (ref 3.5–5.3)
RBC # BLD AUTO: 4.03 MILLION/UL (ref 3.88–5.62)
SODIUM SERPL-SCNC: 141 MMOL/L (ref 136–145)
WBC # BLD AUTO: 3.95 THOUSAND/UL (ref 4.31–10.16)

## 2018-05-25 PROCEDURE — 80048 BASIC METABOLIC PNL TOTAL CA: CPT

## 2018-05-25 PROCEDURE — 85025 COMPLETE CBC W/AUTO DIFF WBC: CPT | Performed by: FAMILY MEDICINE

## 2018-05-25 PROCEDURE — 83540 ASSAY OF IRON: CPT

## 2018-05-25 PROCEDURE — 36415 COLL VENOUS BLD VENIPUNCTURE: CPT | Performed by: FAMILY MEDICINE

## 2018-06-14 ENCOUNTER — TELEPHONE (OUTPATIENT)
Dept: CARDIOLOGY CLINIC | Facility: CLINIC | Age: 83
End: 2018-06-14

## 2018-06-15 DIAGNOSIS — Z98.61 CAD S/P PERCUTANEOUS CORONARY ANGIOPLASTY: Primary | ICD-10-CM

## 2018-06-15 DIAGNOSIS — I25.10 CAD S/P PERCUTANEOUS CORONARY ANGIOPLASTY: Primary | ICD-10-CM

## 2018-06-15 RX ORDER — CLOPIDOGREL BISULFATE 75 MG/1
75 TABLET ORAL DAILY
Qty: 30 TABLET | Refills: 5 | Status: SHIPPED | OUTPATIENT
Start: 2018-06-15 | End: 2018-12-11 | Stop reason: SDUPTHER

## 2018-06-15 RX ORDER — CLOPIDOGREL BISULFATE 75 MG/1
75 TABLET ORAL DAILY
Qty: 30 TABLET | Refills: 6 | Status: CANCELLED | OUTPATIENT
Start: 2018-06-15

## 2018-06-21 DIAGNOSIS — N40.0 BENIGN PROSTATIC HYPERPLASIA, UNSPECIFIED WHETHER LOWER URINARY TRACT SYMPTOMS PRESENT: Primary | ICD-10-CM

## 2018-06-21 RX ORDER — TAMSULOSIN HYDROCHLORIDE 0.4 MG/1
0.4 CAPSULE ORAL
Qty: 90 CAPSULE | Refills: 3 | Status: SHIPPED | OUTPATIENT
Start: 2018-06-21 | End: 2019-07-02 | Stop reason: SDUPTHER

## 2018-06-25 ENCOUNTER — APPOINTMENT (OUTPATIENT)
Dept: LAB | Facility: HOSPITAL | Age: 83
End: 2018-06-25
Attending: FAMILY MEDICINE
Payer: MEDICARE

## 2018-06-25 ENCOUNTER — TRANSCRIBE ORDERS (OUTPATIENT)
Dept: ADMINISTRATIVE | Facility: HOSPITAL | Age: 83
End: 2018-06-25

## 2018-06-25 DIAGNOSIS — D50.9 IRON DEFICIENCY ANEMIA, UNSPECIFIED IRON DEFICIENCY ANEMIA TYPE: ICD-10-CM

## 2018-06-25 DIAGNOSIS — D50.9 IRON DEFICIENCY ANEMIA, UNSPECIFIED IRON DEFICIENCY ANEMIA TYPE: Primary | ICD-10-CM

## 2018-06-25 LAB
FERRITIN SERPL-MCNC: 19 NG/ML (ref 8–388)
IRON SERPL-MCNC: 27 UG/DL (ref 65–175)

## 2018-06-25 PROCEDURE — 83540 ASSAY OF IRON: CPT

## 2018-06-25 PROCEDURE — 82728 ASSAY OF FERRITIN: CPT

## 2018-06-25 PROCEDURE — 36415 COLL VENOUS BLD VENIPUNCTURE: CPT

## 2018-06-26 ENCOUNTER — TELEPHONE (OUTPATIENT)
Dept: CARDIOLOGY CLINIC | Facility: CLINIC | Age: 83
End: 2018-06-26

## 2018-06-26 NOTE — TELEPHONE ENCOUNTER
Spoke to patient  He states that he has been depressed  I advised him to speak to his primary  He stated that he has an appointment with his primary tomorrow

## 2018-06-26 NOTE — TELEPHONE ENCOUNTER
He is on aspirin and Plavix  That can make summary lose blood and patient can have low iron  There is no other medication which has direct interaction  He should check with his primary care doctor also

## 2018-06-29 ENCOUNTER — TELEPHONE (OUTPATIENT)
Dept: UROLOGY | Facility: AMBULATORY SURGERY CENTER | Age: 83
End: 2018-06-29

## 2018-06-29 ENCOUNTER — APPOINTMENT (EMERGENCY)
Dept: CT IMAGING | Facility: HOSPITAL | Age: 83
DRG: 669 | End: 2018-06-29
Payer: MEDICARE

## 2018-06-29 ENCOUNTER — HOSPITAL ENCOUNTER (INPATIENT)
Facility: HOSPITAL | Age: 83
LOS: 2 days | Discharge: HOME | DRG: 669 | End: 2018-07-01
Attending: EMERGENCY MEDICINE | Admitting: INTERNAL MEDICINE
Payer: MEDICARE

## 2018-06-29 DIAGNOSIS — H04.129 DRY EYE: ICD-10-CM

## 2018-06-29 DIAGNOSIS — R31.9 HEMATURIA: Primary | ICD-10-CM

## 2018-06-29 DIAGNOSIS — N32.89 BLADDER MASS: ICD-10-CM

## 2018-06-29 DIAGNOSIS — R33.8 BENIGN PROSTATIC HYPERPLASIA WITH URINARY RETENTION: ICD-10-CM

## 2018-06-29 DIAGNOSIS — N40.1 BENIGN PROSTATIC HYPERPLASIA WITH URINARY RETENTION: ICD-10-CM

## 2018-06-29 DIAGNOSIS — I25.10 CAD (CORONARY ARTERY DISEASE): ICD-10-CM

## 2018-06-29 PROBLEM — I50.32 CHRONIC DIASTOLIC HEART FAILURE (HCC): Status: ACTIVE | Noted: 2017-01-07

## 2018-06-29 LAB
ALBUMIN SERPL BCP-MCNC: 3.2 G/DL (ref 3.5–5)
ALP SERPL-CCNC: 79 U/L (ref 46–116)
ALT SERPL W P-5'-P-CCNC: 17 U/L (ref 12–78)
ANION GAP SERPL CALCULATED.3IONS-SCNC: 5 MMOL/L (ref 4–13)
APTT PPP: 29 SECONDS (ref 24–36)
AST SERPL W P-5'-P-CCNC: 9 U/L (ref 5–45)
BACTERIA UR QL AUTO: ABNORMAL /HPF
BASOPHILS # BLD AUTO: 0.01 THOUSANDS/ΜL (ref 0–0.1)
BASOPHILS NFR BLD AUTO: 0 % (ref 0–1)
BILIRUB SERPL-MCNC: 0.2 MG/DL (ref 0.2–1)
BILIRUB UR QL STRIP: NEGATIVE
BUN SERPL-MCNC: 23 MG/DL (ref 5–25)
CALCIUM SERPL-MCNC: 8.5 MG/DL (ref 8.3–10.1)
CHLORIDE SERPL-SCNC: 103 MMOL/L (ref 100–108)
CLARITY UR: ABNORMAL
CO2 SERPL-SCNC: 30 MMOL/L (ref 21–32)
COLOR UR: ABNORMAL
CREAT SERPL-MCNC: 1.03 MG/DL (ref 0.6–1.3)
EOSINOPHIL # BLD AUTO: 0.03 THOUSAND/ΜL (ref 0–0.61)
EOSINOPHIL NFR BLD AUTO: 1 % (ref 0–6)
ERYTHROCYTE [DISTWIDTH] IN BLOOD BY AUTOMATED COUNT: 15.9 % (ref 11.6–15.1)
GFR SERPL CREATININE-BSD FRML MDRD: 65 ML/MIN/1.73SQ M
GLUCOSE SERPL-MCNC: 100 MG/DL (ref 65–140)
GLUCOSE UR STRIP-MCNC: NEGATIVE MG/DL
HCT VFR BLD AUTO: 33.3 % (ref 36.5–49.3)
HGB BLD-MCNC: 10.2 G/DL (ref 12–17)
HGB UR QL STRIP.AUTO: ABNORMAL
INR PPP: 1.03 (ref 0.86–1.17)
KETONES UR STRIP-MCNC: NEGATIVE MG/DL
LEUKOCYTE ESTERASE UR QL STRIP: ABNORMAL
LIPASE SERPL-CCNC: 208 U/L (ref 73–393)
LYMPHOCYTES # BLD AUTO: 0.71 THOUSANDS/ΜL (ref 0.6–4.47)
LYMPHOCYTES NFR BLD AUTO: 14 % (ref 14–44)
MCH RBC QN AUTO: 27.1 PG (ref 26.8–34.3)
MCHC RBC AUTO-ENTMCNC: 30.6 G/DL (ref 31.4–37.4)
MCV RBC AUTO: 88 FL (ref 82–98)
MONOCYTES # BLD AUTO: 0.42 THOUSAND/ΜL (ref 0.17–1.22)
MONOCYTES NFR BLD AUTO: 8 % (ref 4–12)
NEUTROPHILS # BLD AUTO: 3.91 THOUSANDS/ΜL (ref 1.85–7.62)
NEUTS SEG NFR BLD AUTO: 77 % (ref 43–75)
NITRITE UR QL STRIP: POSITIVE
NON-SQ EPI CELLS URNS QL MICRO: ABNORMAL /HPF
PH UR STRIP.AUTO: 6.5 [PH] (ref 4.5–8)
PLATELET # BLD AUTO: 200 THOUSANDS/UL (ref 149–390)
PMV BLD AUTO: 9.9 FL (ref 8.9–12.7)
POTASSIUM SERPL-SCNC: 4.3 MMOL/L (ref 3.5–5.3)
PROT SERPL-MCNC: 6.7 G/DL (ref 6.4–8.2)
PROT UR STRIP-MCNC: >=300 MG/DL
PROTHROMBIN TIME: 13.2 SECONDS (ref 11.8–14.2)
RBC # BLD AUTO: 3.77 MILLION/UL (ref 3.88–5.62)
RBC #/AREA URNS AUTO: ABNORMAL /HPF
SODIUM SERPL-SCNC: 138 MMOL/L (ref 136–145)
SP GR UR STRIP.AUTO: 1.02 (ref 1–1.03)
UROBILINOGEN UR QL STRIP.AUTO: 0.2 E.U./DL
WBC # BLD AUTO: 5.08 THOUSAND/UL (ref 4.31–10.16)
WBC #/AREA URNS AUTO: ABNORMAL /HPF

## 2018-06-29 PROCEDURE — 81001 URINALYSIS AUTO W/SCOPE: CPT | Performed by: EMERGENCY MEDICINE

## 2018-06-29 PROCEDURE — 51700 IRRIGATION OF BLADDER: CPT | Performed by: PHYSICIAN ASSISTANT

## 2018-06-29 PROCEDURE — 85730 THROMBOPLASTIN TIME PARTIAL: CPT | Performed by: EMERGENCY MEDICINE

## 2018-06-29 PROCEDURE — 85025 COMPLETE CBC W/AUTO DIFF WBC: CPT | Performed by: EMERGENCY MEDICINE

## 2018-06-29 PROCEDURE — 99284 EMERGENCY DEPT VISIT MOD MDM: CPT

## 2018-06-29 PROCEDURE — 99222 1ST HOSP IP/OBS MODERATE 55: CPT | Performed by: PHYSICIAN ASSISTANT

## 2018-06-29 PROCEDURE — 87086 URINE CULTURE/COLONY COUNT: CPT | Performed by: EMERGENCY MEDICINE

## 2018-06-29 PROCEDURE — 96360 HYDRATION IV INFUSION INIT: CPT

## 2018-06-29 PROCEDURE — 99223 1ST HOSP IP/OBS HIGH 75: CPT | Performed by: INTERNAL MEDICINE

## 2018-06-29 PROCEDURE — 36415 COLL VENOUS BLD VENIPUNCTURE: CPT | Performed by: EMERGENCY MEDICINE

## 2018-06-29 PROCEDURE — 83690 ASSAY OF LIPASE: CPT | Performed by: EMERGENCY MEDICINE

## 2018-06-29 PROCEDURE — 80053 COMPREHEN METABOLIC PANEL: CPT | Performed by: EMERGENCY MEDICINE

## 2018-06-29 PROCEDURE — 74177 CT ABD & PELVIS W/CONTRAST: CPT

## 2018-06-29 PROCEDURE — 85610 PROTHROMBIN TIME: CPT | Performed by: EMERGENCY MEDICINE

## 2018-06-29 RX ORDER — ISOSORBIDE MONONITRATE 30 MG/1
30 TABLET, EXTENDED RELEASE ORAL DAILY
Status: DISCONTINUED | OUTPATIENT
Start: 2018-06-30 | End: 2018-07-01 | Stop reason: HOSPADM

## 2018-06-29 RX ORDER — FOLIC ACID/MULTIVIT,IRON,MINER .4-18-35
1 TABLET,CHEWABLE ORAL EVERY MORNING
COMMUNITY

## 2018-06-29 RX ORDER — CALCIUM CARBONATE 500(1250)
1 TABLET ORAL
Status: DISCONTINUED | OUTPATIENT
Start: 2018-06-30 | End: 2018-07-01 | Stop reason: HOSPADM

## 2018-06-29 RX ORDER — ACETAMINOPHEN 325 MG/1
650 TABLET ORAL EVERY 6 HOURS PRN
Status: DISCONTINUED | OUTPATIENT
Start: 2018-06-29 | End: 2018-07-01 | Stop reason: HOSPADM

## 2018-06-29 RX ORDER — NITROGLYCERIN 0.4 MG/1
0.4 TABLET SUBLINGUAL
Status: DISCONTINUED | OUTPATIENT
Start: 2018-06-29 | End: 2018-07-01 | Stop reason: HOSPADM

## 2018-06-29 RX ORDER — ONDANSETRON 2 MG/ML
4 INJECTION INTRAMUSCULAR; INTRAVENOUS EVERY 6 HOURS PRN
Status: DISCONTINUED | OUTPATIENT
Start: 2018-06-29 | End: 2018-07-01 | Stop reason: HOSPADM

## 2018-06-29 RX ORDER — CALCIUM CARBONATE 200(500)MG
1000 TABLET,CHEWABLE ORAL DAILY PRN
Status: DISCONTINUED | OUTPATIENT
Start: 2018-06-29 | End: 2018-07-01 | Stop reason: HOSPADM

## 2018-06-29 RX ORDER — SENNOSIDES 8.6 MG
1 TABLET ORAL DAILY
Status: DISCONTINUED | OUTPATIENT
Start: 2018-06-30 | End: 2018-07-01 | Stop reason: HOSPADM

## 2018-06-29 RX ORDER — ALPRAZOLAM 0.25 MG/1
0.25 TABLET ORAL
Status: DISCONTINUED | OUTPATIENT
Start: 2018-06-29 | End: 2018-07-01 | Stop reason: HOSPADM

## 2018-06-29 RX ORDER — ZINC GLUCONATE 50 MG
25 TABLET ORAL DAILY
Status: DISCONTINUED | OUTPATIENT
Start: 2018-06-30 | End: 2018-07-01 | Stop reason: HOSPADM

## 2018-06-29 RX ORDER — TAMSULOSIN HYDROCHLORIDE 0.4 MG/1
0.4 CAPSULE ORAL
Status: DISCONTINUED | OUTPATIENT
Start: 2018-06-29 | End: 2018-07-01 | Stop reason: HOSPADM

## 2018-06-29 RX ORDER — METOPROLOL SUCCINATE 25 MG/1
25 TABLET, EXTENDED RELEASE ORAL DAILY
Status: DISCONTINUED | OUTPATIENT
Start: 2018-06-30 | End: 2018-07-01 | Stop reason: HOSPADM

## 2018-06-29 RX ORDER — OMEGA-3S/DHA/EPA/FISH OIL/D3 300MG-1000
400 CAPSULE ORAL DAILY
Status: DISCONTINUED | OUTPATIENT
Start: 2018-06-30 | End: 2018-07-01 | Stop reason: HOSPADM

## 2018-06-29 RX ORDER — LIDOCAINE HYDROCHLORIDE 20 MG/ML
JELLY TOPICAL ONCE
Status: COMPLETED | OUTPATIENT
Start: 2018-06-29 | End: 2018-06-29

## 2018-06-29 RX ORDER — ESCITALOPRAM OXALATE 10 MG/1
10 TABLET ORAL DAILY
Status: DISCONTINUED | OUTPATIENT
Start: 2018-06-30 | End: 2018-07-01 | Stop reason: HOSPADM

## 2018-06-29 RX ORDER — MINERAL OIL AND PETROLATUM 150; 830 MG/G; MG/G
OINTMENT OPHTHALMIC
Status: DISCONTINUED | OUTPATIENT
Start: 2018-06-29 | End: 2018-07-01 | Stop reason: HOSPADM

## 2018-06-29 RX ADMIN — ALPRAZOLAM 0.25 MG: 0.25 TABLET ORAL at 21:22

## 2018-06-29 RX ADMIN — SODIUM CHLORIDE 1000 ML: 0.9 INJECTION, SOLUTION INTRAVENOUS at 14:40

## 2018-06-29 RX ADMIN — LIDOCAINE HYDROCHLORIDE: 20 JELLY TOPICAL at 17:57

## 2018-06-29 RX ADMIN — ACETAMINOPHEN 650 MG: 325 TABLET, FILM COATED ORAL at 21:47

## 2018-06-29 RX ADMIN — TAMSULOSIN HYDROCHLORIDE 0.4 MG: 0.4 CAPSULE ORAL at 19:48

## 2018-06-29 RX ADMIN — FLUORESCEIN SODIUM AND PROPARACAINE HYDROCHLORIDE 1 DROP: 2.5; 5 SOLUTION/ DROPS OPHTHALMIC at 14:40

## 2018-06-29 RX ADMIN — CEFTRIAXONE 1000 MG: 1 INJECTION, POWDER, FOR SOLUTION INTRAMUSCULAR; INTRAVENOUS at 19:42

## 2018-06-29 RX ADMIN — IOHEXOL 100 ML: 350 INJECTION, SOLUTION INTRAVENOUS at 15:24

## 2018-06-29 NOTE — ASSESSMENT & PLAN NOTE
· Acute onset today  · Bladder mass noted, concern for new bladder carcinoma  · Maintain Ochoa catheter   ·  hold aspirin and Plavix  · Trend H&H Q 8--  Patient may require blood transfusion to keep his hemoglobin greater than 8 in setting of coronary artery disease  · Per Urology, hold on CBI it is an isolated event and patient may be able to clear on his own  · Plan  cystoscopy in the morning

## 2018-06-29 NOTE — CONSULTS
Consultation -General Surgery  Sue Rueda 80 y o  male MRN: [id number]  Unit/Bed#: ED 16 Encounter: [id number]        Inpatient consult to Urology  Consult performed by: Denis Head ordered by: Jamil Pollack          ASSESSMENT/PLAN:    49-year-old male presenting with hematuria  - [address] O[address] placed in ED  -hand irrigated with no clot return  - CBI running with [facility] color return  -possible cystoscopy and evacuation of clot in a m   -NPO after midnight  - hold Plavix and aspirin    BPH  -home med of Flomax    Problem List     Acute respiratory failure with hypoxia (HCC)    CAD (coronary artery disease) (Chronic)    History of DVT (deep vein thrombosis) (Chronic)    PUD (peptic ulcer disease) (Chronic)    Hypertension (Chronic)    Hyperlipidemia (Chronic)    Acute on chronic diastolic heart failure (HCC)    Stable angina (HCC) (Chronic)    Incarcerated right inguinal hernia    Inguinal hernia    Overview Signed 11/3/2017 12:23 AM by Sena Short MD     Added automatically from request for surgery [id number]         Benign prostatic hyperplasia    Anxiety    Anemia    Hematuria    Overview Signed 6/29/2018  5:22 PM by Flavio García MD     Added automatically from request for surgery [id number]         Bladder mass    Overview Signed 6/29/2018  5:22 PM by Flavio García MD     Added automatically from request for surgery [id number]               Reason for Consult / Principal Problem:  Hematuria    HPI: Sue Rueda is a 80y o  year old male with past medical history of BPH, prostate cancer, MI, hypertension, DVT who presents with hematuria  Pt states he noted a very large clot while urinating this afternoon  He then began urinating Street blood  He denies ever having hematuria before  Hemoglobin upon admission was 10 2  Patient denies any lightheadedness, dizziness, weakness  Vitals are stable  Patient states he had a TURP 5 years ago and then a repeat TURP 3 years ago  Patient does state he follows up with Dr Juliocesar Peres every year for repeat PSA which has been stable  Patient denies any weight loss, night sweats  Review of Systems   Constitutional: Negative for appetite change, fever and unexpected weight change  Respiratory: Negative for shortness of breath and wheezing  Gastrointestinal: Negative for abdominal distention, abdominal pain, blood in stool, constipation, diarrhea, nausea and vomiting  Genitourinary: Positive for hematuria  Negative for dysuria  Neurological: Negative for dizziness and weakness  Psychiatric/Behavioral: Negative for behavioral problems, confusion and decreased concentration  Historical Information   Past Medical History:   Diagnosis Date    BPH (benign prostatic hyperplasia)     CAD (coronary artery disease)     Cardiac disease     Cervical spine fracture (HCC)     DVT (deep venous thrombosis) (HCC)     Fracture of lumbar spine (HCC)     Glaucoma     Hyperlipidemia     Hypertension     [facility] [address] [facility]     [facility]     PUD (peptic ulcer disease)      Past Surgical History:   Procedure Laterality Date    CHOLECYSTECTOMY      CORONARY ARTERY BYPASS GRAFT      CORONARY STENT PLACEMENT      EYE SURGERY      HERNIA REPAIR Right 11/3/2017    Procedure: REPAIR HERNIA INGUINAL;  Surgeon: Landry Thomas MD;  Location: [facility];  Service: General    STOMACH SURGERY      [place] 2 gastrectomy    TRANSURETHRAL RESECTION OF PROSTATE      VENA CAVA FILTER PLACEMENT       Social History   History   Alcohol Use No     History   Drug Use No     History   Smoking Status    Never Smoker   Smokeless Tobacco    Never Used     Family History   Problem Relation Age of Onset    Coronary artery disease Brother        Meds/Allergies       (Not in a hospital admission)  No current facility-administered medications for this encounter          Allergies   Allergen Reactions    Oxycodone-Acetaminophen Dizziness and Shortness Of Breath     Other reaction(s): Faints  Reaction Date: [id number]; Category: Adverse Reaction;     Omeprazole Other (See Comments)     pash    Statins Other (See Comments)     Muscle pain       Objective     Blood pressure 125/58, pulse 87, temperature 98 5 °F (36 9 °C), temperature source Oral, resp  rate 18, weight 68 4 kg (150 lb 12 7 oz), SpO2 96 %      Intake/Output Summary (Last 24 hours) at 06/29/18 1808  Last data filed at 06/29/18 1559   Gross per 24 hour   Intake             1000 ml   Output                0 ml   Net             1000 ml       PHYSICAL EXAM  General appearance: alert and oriented, in no acute distress  Skin: Skin color, texture, turgor normal  No rashes or lesions  Heart: regular rate and rhythm, S1, S2 normal, no murmur, click, rub or gallop  Lungs: clear to auscultation bilaterally  Abdomen: soft, non-tender; bowel sounds normal; no masses,  no organomegaly  : Blood in [place] bag without clots present  Neurological: aaox3, speech normal    Lab Results:   Admission on 06/29/2018   Component Date Value    Color, UA 06/29/2018 Red     Clarity, UA 06/29/2018 Turbid     Specific Gravity, UA 06/29/2018 1 025     pH, UA 06/29/2018 6 5     Leukocytes, UA 06/29/2018 Moderate*    Nitrite, UA 06/29/2018 Positive*    Protein, UA 06/29/2018 >=300*    Glucose, UA 06/29/2018 Negative     Ketones, UA 06/29/2018 Negative     Urobilinogen, UA 06/29/2018 0 2     Bilirubin, UA 06/29/2018 Negative     Blood, UA 06/29/2018 Large*    RBC, UA 06/29/2018 Innumerable*    WBC, UA 06/29/2018 Field obscured, unable to enumerate*    Epithelial Cells 06/29/2018 Field obscured, unable to enumerate*    Bacteria, UA 06/29/2018 Field obscured, unable to enumerate*    WBC 06/29/2018 5 08     RBC 06/29/2018 3 77*    Hemoglobin 06/29/2018 10 2*    Hematocrit 06/29/2018 33 3*    MCV 06/29/2018 88     MCH 06/29/2018 27 1     MCHC 06/29/2018 30 6*    RDW 06/29/2018 15 9*    MPV 06/29/2018 9 9  Platelets 48/00/0957 200     Neutrophils Relative 06/29/2018 77*    Lymphocytes Relative 06/29/2018 14     Monocytes Relative 06/29/2018 8     Eosinophils Relative 06/29/2018 1     Basophils Relative 06/29/2018 0     Neutrophils Absolute 06/29/2018 3 91     Lymphocytes Absolute 06/29/2018 0 71     Monocytes Absolute 06/29/2018 0 42     Eosinophils Absolute 06/29/2018 0 03     Basophils Absolute 06/29/2018 0 01     Protime 06/29/2018 13 2     INR 06/29/2018 1 03     PTT 06/29/2018 29     Sodium 06/29/2018 138     Potassium 06/29/2018 4 3     Chloride 06/29/2018 103     CO2 06/29/2018 30     Anion Gap 06/29/2018 5     BUN 06/29/2018 23     Creatinine 06/29/2018 1 03     Glucose 06/29/2018 100     Calcium 06/29/2018 8 5     AST 06/29/2018 9     ALT 06/29/2018 17     Alkaline Phosphatase 06/29/2018 79     Total Protein 06/29/2018 6 7     Albumin 06/29/2018 3 2*    Total Bilirubin 06/29/2018 0 20     eGFR 06/29/2018 65     Lipase 06/29/2018 208      Imaging Studies: I have personally reviewed pertinent reports  CT abd pelv w/ contrast 6/29/18:   CT ABDOMEN AND PELVIS WITH IV CONTRAST     INDICATION:   RLQ pain      COMPARISON: 11/2/2017, 9/24/2015, and 7/5/2011     TECHNIQUE:  CT examination of the abdomen and pelvis was performed  Axial, sagittal, and coronal 2D reformatted images were created from the source data and submitted for interpretation      Radiation dose length product (DLP) for this visit:  655 mGy-cm     This examination, like all CT scans performed in the [facility], was performed utilizing techniques to minimize radiation dose exposure, including the use of iterative   reconstruction and automated exposure control      IV Contrast:  100 mL of iohexol (OMNIPAQUE)  Enteric Contrast:  Enteric contrast was not administered      FINDINGS:     ABDOMEN     LOWER CHEST:  No clinically significant abnormality identified in the visualized lower chest      LIVER/BILIARY TREE:  There is a stable small cyst      GALLBLADDER:  Gallbladder is surgically absent      SPLEEN:  There are multiple calcified granulomata      PANCREAS:  There is an exophytic pancreatic tail cyst measuring 1 4 x 1 5 cm  On the prior CT from 2015 this measured 8 x 9 mm      ADRENAL GLANDS:  Unremarkable      KIDNEYS/URETERS:  Unremarkable  No hydronephrosis      STOMACH AND BOWEL:  There is colonic diverticulosis without evidence of acute diverticulitis      APPENDIX:  No findings to suggest appendicitis      ABDOMINOPELVIC CAVITY:  No ascites or free intraperitoneal air  No lymphadenopathy      VESSELS:  Unremarkable for patient's age      PELVIS     REPRODUCTIVE ORGANS:  The prostate is enlarged      URINARY BLADDER:  There is lobulated high density in the dependent portion of the bladder  This measures 2 1 x 4 9 cm      ABDOMINAL WALL/INGUINAL REGIONS:  Status post right inguinal hernia repair      OSSEOUS STRUCTURES:  No acute fracture or destructive osseous lesion      IMPRESSION:     1  No acute abnormality in the abdomen or pelvis      2   Large lobulated bladder mass, not present previously  This may represent neoplasm and further evaluation with cystoscopy is recommended      3  Diverticulosis coli      4   Pancreas cyst, mildly enlarged from 2015  For simple cyst(s) between 1 5 to 2 0 cm, recommend followup every 2 years for 2 time  If no change after 4 years, then no more followups  Recommend next followup in 2 years   Preferred imaging modality:   abdomen MRI and MRCP with and without IV contrast, or triple phase abdomen CT with IV contrast, or abdomen MRI and MRCP without IV contrast  Considerations related to the patient's age and/or comorbidities may be used to alter these recommendations        The recommendations regarding pancreatic findings assumes that patient does not have family history of pancreatic cancer nor have any symptoms potentially attributable to pancreatic cystic lesions (hyperamylasemia, recent-onset diabetes, severe   epigastric pain, weight loss, steatorrhea, or jaundice ) If these conditions are not true, then management should be deferred to judgement of specialists such as gastroenterologists or oncologic surgeons  Recommendations are based on recent consensus   statements on management of pancreatic cystic lesions from [address] [place] [address] [employer], [employer] of [employer]iology, the journal Pancreatology, and our own institutional consensus  Counseling / Coordination of Care  Total time spent today  20 minutes  Greater than 50% of total time was spent with the patient and / or family counseling and / or coordination of care       Guanako Weems PA-C

## 2018-06-29 NOTE — ASSESSMENT & PLAN NOTE
·  History of coronary artery bypass and  Stenting  ·  holding aspirin and Plavix in setting of hematuria   · Continue other home medications

## 2018-06-29 NOTE — TELEPHONE ENCOUNTER
Patient managed by Dr Amanda Denis called the office today to report he is significantly bleeding from his penis  Advised patient instead of an office visit, he needs to report to ER  Patient verbalized understanding and is going to [facility]

## 2018-06-29 NOTE — PROGRESS NOTES
Received a call from Dr hendricks regarding gross hematuria    Patient is an 25-year-old male with a history of BPH status post 2 prior TURPs, 1 performed urgently for bleeding in 2015  CT scan was reviewed and does reveal what appears to be a modest clot burden within the bladder    Plan is for Ochoa catheter placement with continuous bladder irrigation  [address] [facility] for admission due to the patient's age and comorbidities  Will plan for possible cystoscopy and clot evacuation tomorrow morning, after medical optimization overnight    Please keep in the patient NPO    Full consult to follow shortly

## 2018-06-29 NOTE — ED PROVIDER NOTES
History  Chief Complaint   Patient presents with    Blood in Urine     PT reports "I was cutting the grass and came into use the bathroom and I am bleeding from my penis real bad  There was a long cord hanging out of it too"       History provided by:  Patient   used: No    Blood in Urine   Pertinent negatives include no dysuria  31-year-old male presented for evaluation of gross hematuria developing this afternoon  He had 1 episode at home, 1 episode here  Denies any difficulty urinating  He does feel some bladder pressure  Denies any history of hematuria in the past other than after prostate surgery about 3 years ago  He does report a history of MI, DVT  Not currently anticoagulated but does take antiplatelet agents  States he felt a little bit lightheaded after urinating  No chest pain, shortness of breath, nausea, vomiting  On exam here is well appearing overall  Heart lung sounds normal   He has some right lower quadrant tenderness without rebound or guarding  States the pain is moderate, came on today without radiation  Plan labs, urinalysis, CT the abdomen and will re-evaluate  Will hydrate with fluids too dilute urine  Patient has been able to urinate freely but may need catheterization if he develops retention  Prior to Admission Medications   Prescriptions Last Dose Informant Patient Reported? Taking? ALPRAZolam (XANAX) 0 25 mg tablet 6/28/2018 at Unknown time Self Yes Yes   Sig: Take 0 25 mg by mouth daily at bedtime as needed for anxiety  Calcium Carbonate-Vitamin D (CALCIUM 600+D PO) 6/28/2018 at Unknown time Self Yes Yes   Sig: Take by mouth daily   aspirin 81 MG tablet 6/29/2018 at Unknown time Self Yes Yes   Sig: Take 81 mg by mouth daily     cholecalciferol (VITAMIN D3) 400 units tablet 6/28/2018 at Unknown time Self Yes Yes   Sig: Take 400 Units by mouth daily   clopidogrel (PLAVIX) 75 mg tablet 6/28/2018 at Unknown time  No Yes   Sig: Take 1 tablet (75 mg total) by mouth daily   escitalopram (LEXAPRO) 10 mg tablet 6/29/2018 at Unknown time Self Yes Yes   Sig: Take 10 mg by mouth daily   isosorbide mononitrate (IMDUR) 30 mg 24 hr tablet 6/29/2018 at Unknown time Self No Yes   Sig: Take 1 tablet (30 mg total) by mouth daily   magnesium oxide (MAG-OX) 400 mg  Self No No   Sig: Take 1 tablet by mouth 2 (two) times a day for 30 days   metoprolol succinate (TOPROL-XL) 25 mg 24 hr tablet 6/28/2018 at Unknown time  No Yes   Sig: Take 1 tablet (25 mg total) by mouth daily   multivitamin-iron-minerals-folic acid (CENTRUM) chewable tablet   Yes Yes   Sig: Chew 1 tablet daily   nitroglycerin (NITROSTAT) 0 4 mg SL tablet More than a month at Unknown time Self Yes No   Sig: Place 0 4 mg under the tongue every 5 (five) minutes as needed for chest pain   tamsulosin (FLOMAX) 0 4 mg 6/28/2018 at Unknown time  No Yes   Sig: Take 1 capsule (0 4 mg total) by mouth daily with dinner   zinc gluconate 50 mg tablet 6/29/2018 at Unknown time Self Yes Yes   Sig: Take 25 mg by mouth daily        Facility-Administered Medications: None       Past Medical History:   Diagnosis Date    BPH (benign prostatic hyperplasia)     CAD (coronary artery disease)     Cardiac disease     Cervical spine fracture (HCC)     DVT (deep venous thrombosis) (HCC)     Fracture of lumbar spine (HCC)     Glaucoma     Hyperlipidemia     Hypertension     Myocardial infarct (HCC)     Prostate cancer ([facility] [address] )     PUD (peptic ulcer disease)        Past Surgical History:   Procedure Laterality Date    CHOLECYSTECTOMY      CORONARY ARTERY BYPASS GRAFT      CORONARY STENT PLACEMENT      EYE SURGERY      HERNIA REPAIR Right 11/3/2017    Procedure: REPAIR HERNIA INGUINAL;  Surgeon: Landry Thomas MD;  Location: [facility];  Service: General    STOMACH SURGERY      [place] 2 gastrectomy    TRANSURETHRAL RESECTION OF PROSTATE      VENA CAVA FILTER PLACEMENT         Family History   Problem Relation Age of Onset  Coronary artery disease Brother      I have reviewed and agree with the history as documented  Social History   Substance Use Topics    Smoking status: Never Smoker    Smokeless tobacco: Never Used    Alcohol use No        Review of Systems   Constitutional: Negative for activity change, appetite change and fatigue  Respiratory: Negative for chest tightness and shortness of breath  Genitourinary: Positive for hematuria  Negative for difficulty urinating and dysuria  Musculoskeletal: Negative for back pain and neck pain  Skin: Negative for color change and pallor  Neurological: Positive for light-headedness  Negative for dizziness and weakness  All other systems reviewed and are negative  Physical Exam  Physical Exam   Constitutional: He is oriented to person, place, and time  He appears well-developed and well-nourished  No distress  HENT:   Head: Normocephalic  Mouth/Throat: Oropharynx is clear and moist    Neck: Normal range of motion  Neck supple  Cardiovascular: Normal rate, regular rhythm and intact distal pulses  Pulmonary/Chest: Effort normal and breath sounds normal    Abdominal: Soft  He exhibits no distension  There is no rebound and no guarding  Moderate tenderness in the right lower quadrant  Musculoskeletal: Normal range of motion  He exhibits no edema  Neurological: He is alert and oriented to person, place, and time  Skin: Skin is warm and dry  Psychiatric: He has a normal mood and affect  His behavior is normal    Nursing note and vitals reviewed        Vital Signs  ED Triage Vitals [06/29/18 1337]   Temperature Pulse Respirations Blood Pressure SpO2   98 5 °F (36 9 °C) 83 20 116/59 97 %      Temp Source Heart Rate Source Patient Position - Orthostatic VS BP Location FiO2 (%)   Oral Monitor Lying Right arm --      Pain Score       --           Vitals:    06/29/18 1337 06/29/18 1424 06/29/18 1634   BP: 116/59 106/56 120/56   Pulse: 83 75 84   Patient Position - Orthostatic VS: Lying Lying Lying       Visual Acuity      ED Medications  Medications   sodium chloride 0 9 % bolus 1,000 mL (0 mL Intravenous Stopped 6/29/18 1559)   Fluorescein-Proparacaine (FLUCAINE) 0 25-0 5 % ophthalmic solution 1 drop (1 drop Right Eye Given by Other 6/29/18 1440)   iohexol (OMNIPAQUE) 350 MG/ML injection (MULTI-DOSE) 100 mL (100 mL Intravenous Given 6/29/18 1524)       Diagnostic Studies  Results Reviewed     Procedure Component Value Units Date/Time    Comprehensive metabolic panel [[id number]]  (Abnormal) Collected:  06/29/18 1441    Lab Status:  Final result Specimen:  Blood from Arm, Left Updated:  06/29/18 1509     Sodium 138 mmol/L      Potassium 4 3 mmol/L      Chloride 103 mmol/L      CO2 30 mmol/L      Anion Gap 5 mmol/L      BUN 23 mg/dL      Creatinine 1 03 mg/dL      Glucose 100 mg/dL      Calcium 8 5 mg/dL      AST 9 U/L      ALT 17 U/L      Alkaline Phosphatase 79 U/L      Total Protein 6 7 g/dL      Albumin 3 2 (L) g/dL      Total Bilirubin 0 20 mg/dL      eGFR 65 ml/min/1 73sq m     Narrative:         National Kidney Disease Education Program recommendations are as follows:  GFR calculation is accurate only with a steady state creatinine  Chronic Kidney disease less than 60 ml/min/1 73 sq  meters  Kidney failure less than 15 ml/min/1 73 sq  meters      Lipase [[id number]]  (Normal) Collected:  06/29/18 1441    Lab Status:  Final result Specimen:  Blood from Arm, Left Updated:  06/29/18 1509     Lipase 208 u/L     Protime-INR [[id number]]  (Normal) Collected:  06/29/18 1440    Lab Status:  Final result Specimen:  Blood from Arm, Left Updated:  06/29/18 1504     Protime 13 2 seconds      INR 1 03    APTT [[id number]]  (Normal) Collected:  06/29/18 1440    Lab Status:  Final result Specimen:  Blood from Arm, Left Updated:  06/29/18 1504     PTT 29 seconds     CBC and differential [[id number]]  (Abnormal) Collected:  06/29/18 1440    Lab Status:  Final result Specimen:  Blood from Arm, Left Updated:  06/29/18 1452     WBC 5 08 Thousand/uL      RBC 3 77 (L) Million/uL      Hemoglobin 10 2 (L) g/dL      Hematocrit 33 3 (L) %      MCV 88 fL      MCH 27 1 pg      MCHC 30 6 (L) g/dL      RDW 15 9 (H) %      MPV 9 9 fL      Platelets 007 Thousands/uL      Neutrophils Relative 77 (H) %      Lymphocytes Relative 14 %      Monocytes Relative 8 %      Eosinophils Relative 1 %      Basophils Relative 0 %      Neutrophils Absolute 3 91 Thousands/µL      Lymphocytes Absolute 0 71 Thousands/µL      Monocytes Absolute 0 42 Thousand/µL      Eosinophils Absolute 0 03 Thousand/µL      Basophils Absolute 0 01 Thousands/µL     Urine Microscopic [[id number]]  (Abnormal) Collected:  06/29/18 1357    Lab Status:  Final result Specimen:  Urine from Urine, Clean Catch Updated:  06/29/18 1423     RBC, UA Innumerable (A) /hpf      WBC, UA       Field obscured, unable to enumerate (A)     /hpf     Epithelial Cells       Field obscured, unable to enumerate (A)     /hpf     Bacteria, UA       Field obscured, unable to enumerate (A)     /hpf    Urine culture [[id number]] Collected:  06/29/18 1357    Lab Status: In process Specimen:  Urine from Urine, Clean Catch Updated:  06/29/18 1423    UA w Reflex to Microscopic w Reflex to Culture [[id number]]  (Abnormal) Collected:  06/29/18 1357    Lab Status:  Final result Specimen:  Urine from Urine, Clean Catch Updated:  06/29/18 1417     Color, UA Red     Clarity, UA Turbid     Specific Gravity, UA 1 025     pH, UA 6 5     Leukocytes, UA Moderate (A)     Nitrite, UA Positive (A)     Protein, UA >=300 (A) mg/dl      Glucose, UA Negative mg/dl      Ketones, UA Negative mg/dl      Urobilinogen, UA 0 2 E U /dl      Bilirubin, UA Negative     Blood, UA Large (A)                 CT abdomen pelvis with contrast   Final Result by Delfino Carias MD (06/29 1612)      1  No acute abnormality in the abdomen or pelvis  2   Large lobulated bladder mass, not present previously    This may represent neoplasm and further evaluation with cystoscopy is recommended  3   Diverticulosis coli  4   Pancreas cyst, mildly enlarged from 2015  For simple cyst(s) between 1 5 to 2 0 cm, recommend followup every 2 years for 2 time  If no change after 4 years, then no more followups  Recommend next followup in 2 years  Preferred imaging modality:    abdomen MRI and MRCP with and without IV contrast, or triple phase abdomen CT with IV contrast, or abdomen MRI and MRCP without IV contrast  Considerations related to the patient's age and/or comorbidities may be used to alter these recommendations  The recommendations regarding pancreatic findings assumes that patient does not have family history of pancreatic cancer nor have any symptoms potentially attributable to pancreatic cystic lesions (hyperamylasemia, recent-onset diabetes, severe    epigastric pain, weight loss, steatorrhea, or jaundice ) If these conditions are not true, then management should be deferred to judgement of specialists such as gastroenterologists or oncologic surgeons  Recommendations are based on recent consensus    statements on management of pancreatic cystic lesions from [address] [place] [address] [employer]astro[employer] Association, [employer], the journal Pancreatology, and our own institutional consensus  I personally discussed this study with Otis Carlisle on 6/29/2018 at [address] [place] [address]                Workstation performed: [id number]                    Procedures  Procedures       Phone Contacts  ED Phone Contact    ED Course  ED Course as of Jun 29 1715   Fri Jun 29, 2018   1622 Radiologist noted layering of blood verses solid the the tumor at base of the bladder extending from the prostate  Will plan discussion with Urology  Patient is still able to urinate freely but it is grossly bloody                                  MDM  Number of Diagnoses or Management Options  Bladder mass:   Hematuria:   Diagnosis management comments: 63-year-old male presented for acute gross hematuria developing today  Atraumatic  History of prostate cancer  Able to urinate and has done so multiple times in the ED but remains grossly bloody  Possible bladder mass on CT with extension from the prostate  Discussed with Urology  Plan admission for likely cystoscopy in AM        Amount and/or Complexity of Data Reviewed  Clinical lab tests: ordered and reviewed  Tests in the radiology section of CPT®: ordered and reviewed  Discuss the patient with other providers: yes    Patient Progress  Patient progress: improved    CritCare Time    Disposition  Final diagnoses:   Hematuria   Bladder mass     Time reflects when diagnosis was documented in both MDM as applicable and the Disposition within this note     Time User Action Codes Description Comment    6/29/2018  5:01 PM Rex BARRERA Add [R31 9] Hematuria     6/29/2018  5:08 PM Jaki Doshi Add [N32 89] Bladder mass       ED Disposition     ED Disposition Condition Comment    Admit  Case was discussed with Dr Cheryl Dandy and the patient's admission status was agreed to be Admission Status: inpatient status to the service of Dr Cheryl Dandy   Follow-up Information    None         Patient's Medications   Discharge Prescriptions    No medications on file     No discharge procedures on file      ED Provider  Electronically Signed by           Sharifa Hobbs MD  06/29/18 7011

## 2018-06-29 NOTE — ASSESSMENT & PLAN NOTE
·  Appears euvolemic at this time   · Continue home medications  · Monitor daily weights and I&Os   · Monitor for volume status

## 2018-06-29 NOTE — ASSESSMENT & PLAN NOTE
·  Patient reports prior history of TURP x2   · Denies history of prostate cancer    No history of prostatectomy  · Follow up with Dr Sintia Keith as an outpatient

## 2018-06-29 NOTE — ASSESSMENT & PLAN NOTE
· Large lobulated bladder mass, not present previously  This may represent neoplasm and further evaluation with cystoscopy is recommended    ·  plan for cystoscopy

## 2018-06-29 NOTE — H&P
H&P- Erick Co 8/2/1930, 80 y o  male MRN: [id number]    Unit/Bed#: -01 Encounter: [id number]    Primary Care Provider: Xiao Shirley DO   Date and time admitted to hospital: 6/29/2018  1:33 PM    * Hematuria   Assessment & Plan    · Acute onset today  · Bladder mass noted, concern for new bladder carcinoma  · Maintain Ochoa catheter   ·  hold aspirin and Plavix  · Trend H&H Q 8--  Patient may require blood transfusion to keep his hemoglobin greater than 8 in setting of coronary artery disease  · Per Urology, start CBI  · Plan  cystoscopy in the morning        Bladder mass   Assessment & Plan    · Large lobulated bladder mass, not present previously  This may represent neoplasm and further evaluation with cystoscopy is recommended  ·  plan for cystoscopy        Benign prostatic hyperplasia   Assessment & Plan    ·  Patient reports prior history of TURP x2   · Denies history of prostate cancer    No history of prostatectomy  · Follow up with Dr Adolfo Velasquez as an outpatient        Chronic diastolic heart failure ([facility] [address] )   Assessment & Plan    ·  Appears euvolemic at this time   · Continue home medications  · Monitor daily weights and I&Os   · Monitor for volume status        CAD (coronary artery disease)   Assessment & Plan    ·  History of coronary artery bypass and  Stenting  ·  holding aspirin and Plavix in setting of hematuria   · Continue other home medications        Hypertension   Assessment & Plan    ·  Blood pressure is acceptable, continue home meds        Hyperlipidemia   Assessment & Plan    ·  Continue statin          VTE Prophylaxis: Pharmacologic VTE Prophylaxis contraindicated due to Acute hematuria  / sequential compression device   Code Status:  Level 1 full code  POLST: There is no POLST form on file for this patient (pre-hospital)  Discussion with family:  Wife and children at bedside    Anticipated Length of Stay:  Patient will be admitted on an Inpatient basis with an anticipated length of stay of   Greater than 2 midnights  Justification for Hospital Stay:  Urology evaluation, cystoscopy    Total Time for Visit, including Counseling / Coordination of Care: 30 minutes  Greater than 50% of this total time spent on direct patient counseling and coordination of care  Chief Complaint:   Hematuria    History of Present Illness:    Krzysztof Sheridan is a 80 y o  male  With multiple medical conditions including history of BPH status post 2 TURPs presents the ED for acute onset hematuria  Patient reports he was in usual state of health this morning  He reports he mowed the lawn  When he came into the bathroom, he had a large amount of sergio blood in his urine  He also reports having 1 large clot  Patient called his primary urologist who recommended ED evaluation  Patient reports that he is also experiencing some right lower quadrant abdominal pain  Denies any other symptoms such as nausea, vomiting  Patient does have intermittent episodes of chest pain secondary coronary artery disease  Denies any shortness of breath  No fevers no chills  Nothing is a patient since better or worse  He currently has Ochoa catheter that is still draining dark red urine  Patient is prior history of hematuria in 2015  For which she required emergent TURP  No prior history of prostate cancer  Of note, patient was recently diagnosed with anemia  He is currently working with his primary care doctor to gets with the hematologist possible Venofer injections  Review of Systems:    Review of Systems   Constitutional: Negative  HENT: Negative  Eyes: Negative  Respiratory: Negative  Cardiovascular: Negative  Gastrointestinal: Positive for abdominal pain  Genitourinary: Positive for hematuria  Musculoskeletal: Negative  Skin: Negative  Neurological: Negative  Hematological: Negative  Psychiatric/Behavioral: Negative          Past Medical and Surgical History:     Past Medical History: Diagnosis Date    BPH (benign prostatic hyperplasia)     CAD (coronary artery disease)     Cardiac disease     Cervical spine fracture (HCC)     DVT (deep venous thrombosis) (HCC)     Fracture of lumbar spine (HCC)     Glaucoma     Hyperlipidemia     Hypertension     Myocardial infarct (HCC)     Prostate cancer ([facility] [address] )     PUD (peptic ulcer disease)        Past Surgical History:   Procedure Laterality Date    CHOLECYSTECTOMY      CORONARY ARTERY BYPASS GRAFT      CORONARY STENT PLACEMENT      EYE SURGERY      HERNIA REPAIR Right 11/3/2017    Procedure: REPAIR HERNIA INGUINAL;  Surgeon: Myla Sullivan MD;  [facility];  Service: General    STOMACH SURGERY      Bill[place]th 2 gastrectomy    TRANSURETHRAL RESECTION OF PROSTATE      VENA CAVA FILTER PLACEMENT         Meds/Allergies:    Prior to Admission medications    Medication Sig Start Date End Date Taking? Authorizing Provider   ALPRAZolam Kandee Galindo) 0 25 mg tablet Take 0 25 mg by mouth daily at bedtime as needed for anxiety  Yes Historical Provider, MD   aspirin 81 MG tablet Take 81 mg by mouth daily     Yes Historical Provider, MD   Calcium Carbonate-Vitamin D (CALCIUM 600+D PO) Take by mouth daily   Yes Historical Provider, MD   cholecalciferol (VITAMIN D3) 400 units tablet Take 400 Units by mouth daily   Yes Historical Provider, MD   clopidogrel (PLAVIX) 75 mg tablet Take 1 tablet (75 mg total) by mouth daily 6/15/18  Yes Miguel Fu MD   escitalopram (LEXAPRO) 10 mg tablet Take 10 mg by mouth daily   Yes Historical Provider, MD   isosorbide mononitrate (IMDUR) 30 mg 24 hr tablet Take 1 tablet (30 mg total) by mouth daily 3/15/18  Yes Kaley Aparicio MD   metoprolol succinate (TOPROL-XL) 25 mg 24 hr tablet Take 1 tablet (25 mg total) by mouth daily 4/17/18  Yes Christine Jeffers MD   multivitamin-iron-minerals-folic acid (CENTRUM) chewable tablet Chew 1 tablet daily   Yes Historical Provider, MD   tamsulosin (FLOMAX) 0 4 mg Take 1 capsule (0 4 mg total) by mouth daily with dinner 6/21/18  Yes MATT Cuellar   zinc gluconate 50 mg tablet Take 25 mg by mouth daily  Yes Historical Provider, MD   magnesium oxide (MAG-OX) 400 mg Take 1 tablet by mouth 2 (two) times a day for 30 days 1/11/17 4/17/18  Anni Green MD   nitroglycerin (NITROSTAT) 0 4 mg SL tablet Place 0 4 mg under the tongue every 5 (five) minutes as needed for chest pain    Historical Provider, MD   lisinopril (ZESTRIL) 2 5 mg tablet Take 2 5 mg by mouth daily  6/29/18  Historical Provider, MD   multivitamin (THERAGRAN) TABS Take 1 tablet by mouth daily  6/29/18  Historical Provider, MD     I have reviewed home medications with patient personally  Allergies: Allergies   Allergen Reactions    Oxycodone-Acetaminophen Dizziness and Shortness Of Breath     Other reaction(s): Faints  Reaction Date: [id number]; Category: Adverse Reaction;     Omeprazole Other (See Comments)     pash    Statins Other (See Comments)     Muscle pain       Social History:     Marital Status: /Civil Union   Occupation: retired  Patient Pre-hospital Living Situation: home with wife  Patient Pre-hospital Level of Mobility: independent  Patient Pre-hospital Diet Restrictions: none  Substance Use History:   History   Alcohol Use No     History   Smoking Status    Never Smoker   Smokeless Tobacco    Never Used     History   Drug Use No       Family History:    non-contributory    Physical Exam:     Vitals:   Blood Pressure: 125/58 (06/29/18 1732)  Pulse: 87 (06/29/18 1732)  Temperature: 98 5 °F (36 9 °C) (06/29/18 1337)  Temp Source: Oral (06/29/18 1337)  Respirations: 18 (06/29/18 1732)  Weight - Scale: 68 4 kg (150 lb 12 7 oz) (06/29/18 1337)  SpO2: 96 % (06/29/18 1732)    Physical Exam   Constitutional: He is oriented to person, place, and time  He appears well-developed and well-nourished  No distress  HENT:   Head: Normocephalic and atraumatic     Mouth/Throat: No oropharyngeal exudate  Eyes: Conjunctivae and EOM are normal  Pupils are equal, round, and reactive to light  No scleral icterus  Neck: No JVD present  Cardiovascular: Normal rate and regular rhythm  Exam reveals no gallop and no friction rub  No murmur heard  Pulmonary/Chest: Breath sounds normal  No respiratory distress  He has no wheezes  He has no rales  Abdominal: He exhibits no distension  There is tenderness (RLQ)  There is no rebound and no guarding  Genitourinary:   Genitourinary Comments: + lake draining bright red urine     Musculoskeletal: He exhibits no edema or tenderness  Neurological: He is alert and oriented to person, place, and time  He displays normal reflexes  No cranial nerve deficit  He exhibits normal muscle tone  Skin: Skin is warm and dry  No rash noted  He is not diaphoretic  No erythema  Psychiatric: He has a normal mood and affect  His behavior is normal      Additional Data:     Lab Results: I have personally reviewed pertinent reports  Results from last 7 days  Lab Units 06/29/18  1440   WBC Thousand/uL 5 08   HEMOGLOBIN g/dL 10 2*   HEMATOCRIT % 33 3*   PLATELETS Thousands/uL 200   NEUTROS PCT % 77*   LYMPHS PCT % 14   MONOS PCT % 8   EOS PCT % 1       Results from last 7 days  Lab Units 06/29/18  1441   SODIUM mmol/L 138   POTASSIUM mmol/L 4 3   CHLORIDE mmol/L 103   CO2 mmol/L 30   BUN mg/dL 23   CREATININE mg/dL 1 03   CALCIUM mg/dL 8 5   TOTAL PROTEIN g/dL 6 7   BILIRUBIN TOTAL mg/dL 0 20   ALK PHOS U/L 79   ALT U/L 17   AST U/L 9   GLUCOSE RANDOM mg/dL 100       Results from last 7 days  Lab Units 06/29/18  1440   INR  1 03               Imaging: I have personally reviewed pertinent reports  CT abdomen pelvis with contrast   Final Result by Perry Maciel MD (06/29 1612)      1  No acute abnormality in the abdomen or pelvis  2   Large lobulated bladder mass, not present previously    This may represent neoplasm and further evaluation with cystoscopy is recommended  3   Diverticulosis coli  4   Pancreas cyst, mildly enlarged from 2015  For simple cyst(s) between 1 5 to 2 0 cm, recommend followup every 2 years for 2 time  If no change after 4 years, then no more followups  Recommend next followup in 2 years  Preferred imaging modality:    abdomen MRI and MRCP with and without IV contrast, or triple phase abdomen CT with IV contrast, or abdomen MRI and MRCP without IV contrast  Considerations related to the patient's age and/or comorbidities may be used to alter these recommendations  The recommendations regarding pancreatic findings assumes that patient does not have family history of pancreatic cancer nor have any symptoms potentially attributable to pancreatic cystic lesions (hyperamylasemia, recent-onset diabetes, severe    epigastric pain, weight loss, steatorrhea, or jaundice ) If these conditions are not true, then management should be deferred to judgement of specialists such as gastroenterologists or oncologic surgeons  Recommendations are based on recent consensus    statements on management of pancreatic cystic lesions from [address] [place] [address] [employer], [employer] of [employer], the journal Pancreatology, and our own institutional consensus  I personally discussed this study with Zenon De Leon on 6/29/2018 at 4:12 PM                Workstation performed: [id number]             EKG, Pathology, and Other Studies Reviewed on Admission:   · EKG:  unavailab    Allscripts / Epic Records Reviewed: Yes     ** Please Note: This note has been constructed using a voice recognition system   **

## 2018-06-30 ENCOUNTER — ANESTHESIA (INPATIENT)
Dept: PERIOP | Facility: HOSPITAL | Age: 83
DRG: 669 | End: 2018-06-30
Payer: MEDICARE

## 2018-06-30 ENCOUNTER — TELEPHONE (OUTPATIENT)
Dept: UROLOGY | Facility: CLINIC | Age: 83
End: 2018-06-30

## 2018-06-30 ENCOUNTER — APPOINTMENT (INPATIENT)
Dept: RADIOLOGY | Facility: HOSPITAL | Age: 83
DRG: 669 | End: 2018-06-30
Payer: MEDICARE

## 2018-06-30 ENCOUNTER — ANESTHESIA EVENT (INPATIENT)
Dept: PERIOP | Facility: HOSPITAL | Age: 83
DRG: 669 | End: 2018-06-30
Payer: MEDICARE

## 2018-06-30 PROBLEM — D63.8 ANEMIA OF CHRONIC DISEASE: Status: ACTIVE | Noted: 2018-06-30

## 2018-06-30 PROBLEM — F32.A DEPRESSION: Status: ACTIVE | Noted: 2018-06-30

## 2018-06-30 LAB
ANION GAP SERPL CALCULATED.3IONS-SCNC: 7 MMOL/L (ref 4–13)
BACTERIA UR CULT: ABNORMAL
BUN SERPL-MCNC: 19 MG/DL (ref 5–25)
CALCIUM SERPL-MCNC: 8 MG/DL (ref 8.3–10.1)
CHLORIDE SERPL-SCNC: 104 MMOL/L (ref 100–108)
CO2 SERPL-SCNC: 29 MMOL/L (ref 21–32)
CREAT SERPL-MCNC: 0.87 MG/DL (ref 0.6–1.3)
ERYTHROCYTE [DISTWIDTH] IN BLOOD BY AUTOMATED COUNT: 16 % (ref 11.6–15.1)
GFR SERPL CREATININE-BSD FRML MDRD: 78 ML/MIN/1.73SQ M
GLUCOSE SERPL-MCNC: 102 MG/DL (ref 65–140)
GLUCOSE SERPL-MCNC: 82 MG/DL (ref 65–140)
HCT VFR BLD AUTO: 31.9 % (ref 36.5–49.3)
HCT VFR BLD AUTO: 33.5 % (ref 36.5–49.3)
HCT VFR BLD AUTO: 36.8 % (ref 36.5–49.3)
HGB BLD-MCNC: 10.1 G/DL (ref 12–17)
HGB BLD-MCNC: 11.1 G/DL (ref 12–17)
HGB BLD-MCNC: 9.6 G/DL (ref 12–17)
MCH RBC QN AUTO: 26.7 PG (ref 26.8–34.3)
MCHC RBC AUTO-ENTMCNC: 30.1 G/DL (ref 31.4–37.4)
MCV RBC AUTO: 89 FL (ref 82–98)
PLATELET # BLD AUTO: 174 THOUSANDS/UL (ref 149–390)
PMV BLD AUTO: 10 FL (ref 8.9–12.7)
POTASSIUM SERPL-SCNC: 4.2 MMOL/L (ref 3.5–5.3)
RBC # BLD AUTO: 3.78 MILLION/UL (ref 3.88–5.62)
SODIUM SERPL-SCNC: 140 MMOL/L (ref 136–145)
WBC # BLD AUTO: 5.78 THOUSAND/UL (ref 4.31–10.16)

## 2018-06-30 PROCEDURE — 99233 SBSQ HOSP IP/OBS HIGH 50: CPT | Performed by: UROLOGY

## 2018-06-30 PROCEDURE — 93005 ELECTROCARDIOGRAM TRACING: CPT

## 2018-06-30 PROCEDURE — 85018 HEMOGLOBIN: CPT | Performed by: PHYSICIAN ASSISTANT

## 2018-06-30 PROCEDURE — 0T5C8ZZ DESTRUCTION OF BLADDER NECK, VIA NATURAL OR ARTIFICIAL OPENING ENDOSCOPIC: ICD-10-PCS | Performed by: UROLOGY

## 2018-06-30 PROCEDURE — 85018 HEMOGLOBIN: CPT | Performed by: INTERNAL MEDICINE

## 2018-06-30 PROCEDURE — 85014 HEMATOCRIT: CPT | Performed by: PHYSICIAN ASSISTANT

## 2018-06-30 PROCEDURE — 82948 REAGENT STRIP/BLOOD GLUCOSE: CPT

## 2018-06-30 PROCEDURE — 71045 X-RAY EXAM CHEST 1 VIEW: CPT

## 2018-06-30 PROCEDURE — 52224 CYSTOSCOPY AND TREATMENT: CPT | Performed by: UROLOGY

## 2018-06-30 PROCEDURE — 85027 COMPLETE CBC AUTOMATED: CPT | Performed by: PHYSICIAN ASSISTANT

## 2018-06-30 PROCEDURE — 99232 SBSQ HOSP IP/OBS MODERATE 35: CPT | Performed by: INTERNAL MEDICINE

## 2018-06-30 PROCEDURE — 80048 BASIC METABOLIC PNL TOTAL CA: CPT | Performed by: PHYSICIAN ASSISTANT

## 2018-06-30 PROCEDURE — 85014 HEMATOCRIT: CPT | Performed by: INTERNAL MEDICINE

## 2018-06-30 PROCEDURE — 0TCB8ZZ EXTIRPATION OF MATTER FROM BLADDER, VIA NATURAL OR ARTIFICIAL OPENING ENDOSCOPIC: ICD-10-PCS | Performed by: UROLOGY

## 2018-06-30 RX ORDER — FENTANYL CITRATE 50 UG/ML
INJECTION, SOLUTION INTRAMUSCULAR; INTRAVENOUS AS NEEDED
Status: DISCONTINUED | OUTPATIENT
Start: 2018-06-30 | End: 2018-06-30 | Stop reason: SURG

## 2018-06-30 RX ORDER — METOPROLOL TARTRATE 5 MG/5ML
5 INJECTION INTRAVENOUS EVERY 6 HOURS PRN
Status: DISCONTINUED | OUTPATIENT
Start: 2018-06-30 | End: 2018-07-01 | Stop reason: HOSPADM

## 2018-06-30 RX ORDER — SODIUM CHLORIDE 9 MG/ML
INJECTION, SOLUTION INTRAVENOUS CONTINUOUS PRN
Status: DISCONTINUED | OUTPATIENT
Start: 2018-06-30 | End: 2018-06-30 | Stop reason: SURG

## 2018-06-30 RX ORDER — ONDANSETRON 2 MG/ML
4 INJECTION INTRAMUSCULAR; INTRAVENOUS ONCE AS NEEDED
Status: DISCONTINUED | OUTPATIENT
Start: 2018-06-30 | End: 2018-06-30 | Stop reason: HOSPADM

## 2018-06-30 RX ORDER — MAGNESIUM HYDROXIDE 1200 MG/15ML
LIQUID ORAL AS NEEDED
Status: DISCONTINUED | OUTPATIENT
Start: 2018-06-30 | End: 2018-06-30 | Stop reason: HOSPADM

## 2018-06-30 RX ORDER — PROPOFOL 10 MG/ML
INJECTION, EMULSION INTRAVENOUS AS NEEDED
Status: DISCONTINUED | OUTPATIENT
Start: 2018-06-30 | End: 2018-06-30 | Stop reason: SURG

## 2018-06-30 RX ORDER — SODIUM CHLORIDE 9 MG/ML
50 INJECTION, SOLUTION INTRAVENOUS CONTINUOUS
Status: DISCONTINUED | OUTPATIENT
Start: 2018-06-30 | End: 2018-07-01 | Stop reason: HOSPADM

## 2018-06-30 RX ORDER — ONDANSETRON 2 MG/ML
INJECTION INTRAMUSCULAR; INTRAVENOUS AS NEEDED
Status: DISCONTINUED | OUTPATIENT
Start: 2018-06-30 | End: 2018-06-30 | Stop reason: SURG

## 2018-06-30 RX ORDER — LIDOCAINE HYDROCHLORIDE 10 MG/ML
INJECTION, SOLUTION INFILTRATION; PERINEURAL AS NEEDED
Status: DISCONTINUED | OUTPATIENT
Start: 2018-06-30 | End: 2018-06-30 | Stop reason: SURG

## 2018-06-30 RX ORDER — FENTANYL CITRATE/PF 50 MCG/ML
25 SYRINGE (ML) INJECTION
Status: DISCONTINUED | OUTPATIENT
Start: 2018-06-30 | End: 2018-06-30 | Stop reason: HOSPADM

## 2018-06-30 RX ADMIN — PROPOFOL 150 MG: 10 INJECTION, EMULSION INTRAVENOUS at 10:47

## 2018-06-30 RX ADMIN — METOPROLOL TARTRATE 5 MG: 5 INJECTION, SOLUTION INTRAVENOUS at 09:36

## 2018-06-30 RX ADMIN — TAMSULOSIN HYDROCHLORIDE 0.4 MG: 0.4 CAPSULE ORAL at 17:02

## 2018-06-30 RX ADMIN — FENTANYL CITRATE 25 MCG: 50 INJECTION INTRAMUSCULAR; INTRAVENOUS at 11:33

## 2018-06-30 RX ADMIN — FLUORESCEIN SODIUM AND PROPARACAINE HYDROCHLORIDE 1 DROP: 2.5; 5 SOLUTION/ DROPS OPHTHALMIC at 17:33

## 2018-06-30 RX ADMIN — MINERAL OIL AND WHITE PETROLATUM: 150; 830 OINTMENT OPHTHALMIC at 00:22

## 2018-06-30 RX ADMIN — ONDANSETRON 4 MG: 2 INJECTION INTRAMUSCULAR; INTRAVENOUS at 10:52

## 2018-06-30 RX ADMIN — CEFTRIAXONE 1000 MG: 1 INJECTION, POWDER, FOR SOLUTION INTRAMUSCULAR; INTRAVENOUS at 17:45

## 2018-06-30 RX ADMIN — DEXAMETHASONE SODIUM PHOSPHATE 4 MG: 10 INJECTION INTRAMUSCULAR; INTRAVENOUS at 10:52

## 2018-06-30 RX ADMIN — FENTANYL CITRATE 25 MCG: 50 INJECTION INTRAMUSCULAR; INTRAVENOUS at 10:54

## 2018-06-30 RX ADMIN — SODIUM CHLORIDE 50 ML/HR: 0.9 INJECTION, SOLUTION INTRAVENOUS at 13:55

## 2018-06-30 RX ADMIN — MINERAL OIL AND WHITE PETROLATUM: 150; 830 OINTMENT OPHTHALMIC at 21:39

## 2018-06-30 RX ADMIN — SODIUM CHLORIDE: 0.9 INJECTION, SOLUTION INTRAVENOUS at 10:38

## 2018-06-30 RX ADMIN — Medication 400 MG: at 17:02

## 2018-06-30 RX ADMIN — LIDOCAINE HYDROCHLORIDE 50 MG: 10 INJECTION, SOLUTION INFILTRATION; PERINEURAL at 10:47

## 2018-06-30 NOTE — PLAN OF CARE
DISCHARGE PLANNING     Discharge to home or other facility with appropriate resources Progressing        GENITOURINARY - ADULT     Maintains or returns to baseline urinary function Progressing     Absence of urinary retention Progressing     Urinary catheter remains patent Progressing        INFECTION - ADULT     Absence or prevention of progression during hospitalization Progressing        Knowledge Deficit     Patient/family/caregiver demonstrates understanding of disease process, treatment plan, medications, and discharge instructions Progressing        PAIN - ADULT     Verbalizes/displays adequate comfort level or baseline comfort level Progressing        SAFETY ADULT     Patient will remain free of falls Progressing     Maintain or return to baseline ADL function Progressing     Maintain or return mobility status to optimal level Progressing

## 2018-06-30 NOTE — ANESTHESIA POSTPROCEDURE EVALUATION
Post-Op Assessment Note      CV Status:  Stable    Mental Status:  Alert and awake    Hydration Status:  Euvolemic    PONV Controlled:  Controlled    Airway Patency:  Patent    Post Op Vitals Reviewed: Yes          Staff: AnesthesiologistRAMON           BP   172/76   Temp   98 1   Pulse  86   Resp   20   SpO2   100

## 2018-06-30 NOTE — PROGRESS NOTES
Notified of request for preop clearance  Check EKG and CXR  Prior history of CAD s/p stenting and CABG noted  His dual antiplatelet therapy with ASA/Plavix is currently held  He is s/p prior IVC filter for h/o DVT  Will obtain cardiology evaluation for clearance as well  If EKG/CXR are unremarkable for acute changes, he can be cleared from a medical standpoint of [place] w/ cardiology as well - he poses a low to average risk of mt-post operative complications regarding cystoscopy w/ possible clot evacuation  Blood transfusion consent has been obtained if blood loss anemia occurs requiring transfusion

## 2018-06-30 NOTE — ASSESSMENT & PLAN NOTE
- continues bladder irrigation initiated yesterday - underwent cystoscopy today status post large blood clot evacuation with subsequent fulguration of bladder neck bleeding site   - monitor H/H for blood loss anemia  - continue to hold Plavix/ASA   - PRN pain control   - initial CT imaging revealed possibility of a bladder mass which may possibly be large clot found on cystoscopy by urology today - will await further urologic recommendations

## 2018-06-30 NOTE — ASSESSMENT & PLAN NOTE
- status post prior stenting and CABG   - ASA/Plavix held secondary to gross hematuria  - continue Imdur/Toprol-XL - not on statin due to intolerance

## 2018-06-30 NOTE — TELEPHONE ENCOUNTER
[address] [facility] patient of Dr Conley Grew  Cysto/fulguration performed Saturday    Discharging with lake    Please schedule nurse visit for Lake removal and PVR this coming week    Please schedule postop visit with AP in 6-8 weeks

## 2018-06-30 NOTE — ANESTHESIA PREPROCEDURE EVALUATION
Review of Systems/Medical History  Patient summary reviewed  Chart reviewed  No history of anesthetic complications     Cardiovascular  EKG reviewed, Exercise tolerance (METS): <4,  Hyperlipidemia, Hypertension on > 1 medication, Past MI , CAD , History of CABG, Cardiac stents  Angina , CHF compensated CHF, DVT   Pulmonary  Negative pulmonary ROS        GI/Hepatic    PUD,        Prostatic disorder, benign prostatic hyperplasia       Endo/Other  Negative endo/other ROS      GYN  Negative gynecology ROS          Hematology  Anemia ,     Musculoskeletal  Negative musculoskeletal ROS        Neurology  Negative neurology ROS      Psychology   Anxiety,              Physical Exam    Airway    Mallampati score: III  TM Distance: >3 FB  Neck ROM: full     Dental   No notable dental hx     Cardiovascular      Pulmonary      Other Findings        Anesthesia Plan  ASA Score- 4 Emergent    Anesthesia Type- general with ASA Monitors  Additional Monitors:   Airway Plan: LMA  Plan Factors- Patient instructed to abstain from smoking on day of procedure  Patient did not smoke on day of surgery  Induction- intravenous  Postoperative Plan- Plan for postoperative opioid use  Planned trial extubation    Informed Consent- Anesthetic plan and risks discussed with patient  I personally reviewed this patient with the CRNA  Discussed and agreed on the Anesthesia Plan with the CRNA  Helen Anguiano

## 2018-06-30 NOTE — PROGRESS NOTES
Hemoglobin drop from 10 2 to 9 6  Will repeat in the morning  June from [address] aware  Will continue to monitor

## 2018-06-30 NOTE — PROGRESS NOTES
UROLOGY PROGRESS NOTE   Patient Identifiers: Shantel Gomez (MRN [id number])  Date of Service: 6/30/2018        Assessment:   1  Recurrent gross hematuria  2  BPH    Patient's hematuria has not improved despite Ochoa catheterization and initiation of bladder irrigation  He has required a high volume of irrigation overnight though clinically he is doing well with no pain  I recommended proceeding to the operating room for cystoscopy, clot evacuation, fulguration of bleeding points, possible redo TURP or TURBT if clinically indicated  We discussed the procedure in detail and the normal postoperative course was presented  The patient understood the main risks to be bleeding, infection, bladder perforation, and the need for a catheter or stent  The patient asked questions and all of them were answered  Plan:   - to OR this morning for cystoscopy, clot evacuation, fulguration of bleeding points, possible redo TURP or TURBT if clinically indicated  - discussed the patient that he is at an increased risk of perioperative complications including acute coronary events due to his history of coronary artery disease and recent discontinuation of his aspirin and Plavix secondary to his ongoing hematuria  -informed consent has been obtained  -discussed patient will be likely discharged with a Ochoa catheter once his hematuria has resolved, likely tomorrow          Subjective:     24 HR EVENTS:   Required 9 bags of irrigation overnight  Patient has  no complaints        Objective:     VITALS:    Vitals:    06/30/18 0757   BP: 162/70   Pulse: 91   Resp: 20   Temp: (!) 97 4 °F (36 3 °C)   SpO2: 96%       INS & OUTS:  [unfilled]    LABS:  Lab Results   Component Value Date    HGB 10 1 (L) 06/30/2018    HCT 33 5 (L) 06/30/2018    WBC 5 78 06/30/2018     06/30/2018   ]    Lab Results   Component Value Date     06/30/2018    K 4 2 06/30/2018     06/30/2018    CO2 29 06/30/2018    BUN 19 06/30/2018    CREATININE 0 87 06/30/2018    CALCIUM 8 0 (L) 06/30/2018    GLUCOSE 82 06/30/2018   ]    INPATIENT MEDS:    Current Facility-Administered Medications:     acetaminophen (TYLENOL) tablet 650 mg, 650 mg, Oral, Q6H PRN, Karin Bolton PA-C, 650 mg at 06/29/18 2147    ALPRAZolam (XANAX) tablet 0 25 mg, 0 25 mg, Oral, HS PRN, Karin Bolton PA-C, 0 25 mg at 06/29/18 2122    artificial tear (LUBRIFRESH P M ) ophthalmic ointment, , Right Eye, HS, Ritesh Rivera PA-C    calcium carbonate (OYSTER SHELL,OSCAL) 500 mg tablet 1 tablet, 1 tablet, Oral, Daily With Breakfast, Karin Bolton PA-C    calcium carbonate (TUMS) chewable tablet 1,000 mg, 1,000 mg, Oral, Daily PRN, Karin Bolton PA-C    cefTRIAXone (ROCEPHIN) 1,000 mg in dextrose 5 % 50 mL IVPB, 1,000 mg, Intravenous, Q24H, Karin Bolton PA-C, Last Rate: 100 mL/hr at 06/29/18 1942, 1,000 mg at 06/29/18 1942    cholecalciferol (VITAMIN D3) tablet 400 Units, 400 Units, Oral, Daily, Karin Bolton PA-C    escitalopram (LEXAPRO) tablet 10 mg, 10 mg, Oral, Daily, Karin Bolton PA-C, Stopped at 06/30/18 1708    isosorbide mononitrate (IMDUR) 24 hr tablet 30 mg, 30 mg, Oral, Daily, Karin Bolton PA-C    magnesium oxide (MAG-OX) tablet 400 mg, 400 mg, Oral, BID, Karin Bolton PA-C    metoprolol (LOPRESSOR) injection 5 mg, 5 mg, Intravenous, Q6H PRN, Geovanna Ochoa MD, 5 mg at 06/30/18 0031    metoprolol succinate (TOPROL-XL) 24 hr tablet 25 mg, 25 mg, Oral, Daily, Karin Bolton PA-C    multivitamin-minerals (CENTRUM) tablet 1 tablet, 1 tablet, Oral, Daily, Karin Bolton PA-C    nitroglycerin (NITROSTAT) SL tablet 0 4 mg, 0 4 mg, Sublingual, Q5 Min PRN, Karin Bolton PA-C    ondansetron (ZOFRAN) injection 4 mg, 4 mg, Intravenous, Q6H PRN, Karin Bolton PA-C    senna (SENOKOT) tablet 8 6 mg, 1 tablet, Oral, Daily, Karin Bolton PA-C    tamsulosin (FLOMAX) capsule 0 4 mg, 0 4 mg, Oral, Daily With Dinner, Sera Bolton PA-C, 0 4 mg at 06/29/18 1948    zinc gluconate tablet 25 mg, 25 mg, Oral, Daily, Karin Bolton PA-C      Physical Exam:   /70 (BP Location: Left arm)   Pulse 91   Temp (!) 97 4 °F (36 3 °C) (Oral)   Resp 20   Wt 68 4 kg (150 lb 12 7 oz)   SpO2 96%   BMI 25 88 kg/m²   GEN: alert and oriented x 3    RESP: breathing comfortably with no accessory muscle use    ABD: soft, appropriately tender to palpation, non-distended   EXT: no significant peripheral edema   DRAINS: none  WILKINS: draining cherry red clear urine  no clots moderate CBI

## 2018-06-30 NOTE — OP NOTE
Operative Note     PATIENT:  Edd Tracy (MRN [id number])    DATE OF PROCEDURE:   6/30/2018    PRE-OP DIAGNOSES:   1) BPH with obstruction  2) gross hematuria     POST-OP DIAGNOSES AND OPERATIVE FINDINGS:   1) BPH with obstruction  2) gross hematuria    PROCEDURES:  1) cystoscopy with evacuation of clots   2) fulguration of bleeding points within prostate    SURGEON:   Jair Bradshaw MD    No qualified teaching residents available to assist    ANESTHESIA TYPE:  General anesthesia    ESTIMATED BLOOD LOSS:   Minimal    COMPLICATIONS:   None    ANTIBIOTICS:  Rocephin    INTRAOPERATIVE THROMBOEMBOLISM PROPHYLAXIS:  Pneumatic compression stockings    Findings:  A large organized clot was present within the bladder  After evacuating this, a solitary focal bleeding point was noted at the bladder neck  This was fulgurated using a Bugbee catheter and hemostasis was excellent  PROCEDURE SUMMARY:    The patient was brought to the operating room and anesthesia obtained  The patient was then placed in the lithotomy position and prepped and draped using standard sterile technique  All pressure points were carefully padded  A surgical time-out occurred, antibiotics were administered, and thromboembolism prophylaxis was given  A 27 F saline resectoscope was inserted into the urethra after dilation of the urethral meatus to 28 F using standard urethral sounds  Cystoscopy revealed significant BPH changes although a nice wide open bladder neck and prostatic fossa was visualized  There was no active bleeding on the urothelium of the prostate  A large organized clot was present within the base of the bladder  This was evacuated under vision through the resectoscope using a series of Ellik evacuators  Cystoscopy was repeated  A solitary focal bleeder was noted at the bladder neck  These were managed with targeted Bugbee electrocautery    Care was taken to avoid any injury to the ureteral orifice which were both intact at the end of the procedure  The bladder was then observed with multiple cycles of filling and emptying and hemostasis was truly excellent  A Ochoa catheter was placed and connected to gravity  The patient was awoken transported to PACU in stable condition  DISPOSITION:   PACU - hemodynamically stable  SPECIMENS:  None    PLAN:  Patient will return to the medical mariee  I recommend continued observation for 1 night  His hematuria remains resolved by tomorrow he can be discharged home with a Ochoa catheter in place    I requested follow-up in our office for Ochoa removal this coming week

## 2018-06-30 NOTE — PERIOPERATIVE NURSING NOTE
Lake irrigated with sterile water per Dr Daly Thomas  One large clot noted  Patient tolerated this well  Lake reconnected to lake bag  Dr Daly Thomas aware and CBI will be started  Patient aware

## 2018-06-30 NOTE — PROGRESS NOTES
[facility] [facility] Hospitalist Service - Internal Medicine Progress Note       PATIENT INFORMATION      Patient: Brook Lee 80 y o  male   MRN: [id number]  PCP: Raymon Fitzgerald DO  Unit/Bed#: [facility] Encounter: [id number]  Date Of Visit: 06/30/18       ASSESSMENTS & PLAN     Hematuria   Assessment & Plan    - continues bladder irrigation initiated yesterday - underwent cystoscopy today status post large blood clot evacuation with subsequent fulguration of bladder neck bleeding site   - monitor H/H for blood loss anemia  - continue to hold Plavix/ASA   - PRN pain control   - initial CT imaging revealed possibility of a bladder mass which may possibly be large clot found on cystoscopy by urology today - will await further urologic recommendations      Benign prostatic hyperplasia   Assessment & Plan    - status post prior TURP  - continue Flomax   - appreciate urology input      CAD (coronary artery disease)   Assessment & Plan    - status post prior stenting and CABG   - ASA/Plavix held secondary to gross hematuria - resume eventually when OK with urology  - continue Imdur/Toprol-XL - not on statin due to intolerance       Hypertension   Assessment & Plan    - low-sodium diet encouraged  - continue Imdur/Toprol-XL      History of DVT (deep vein thrombosis)   Assessment & Plan    - status post previous IVC filter      Anemia of chronic disease   Assessment & Plan    - H/H stable - continue to monitor in light of hematuria      Depression   Assessment & Plan    - stable/asymptomatic  - continue Lexapro      Chronic diastolic heart failure (HCC)   Assessment & Plan    ·  Appears euvolemic at this time   · Continue home medications  · Monitor daily weights and I&Os   · Monitor for volume status      Hyperlipidemia   Assessment & Plan    ·   (error ) - not on statin due to intolerance        VTE Prophylaxis:  SCDs      SUBJECTIVE     Seen/examined earlier this morning with wife at bedside    He underwent cystoscopy today with a large clot evacuation and fulguration of a bleeding site on the bladder neck  He tolerated the procedure quite well in denies significant pain on my encounter  He remains with a continues bladder irrigation device in place draining bright red blood  He remains in pleasant/positive spirits  No new complaints otherwise  OBJECTIVE     Vitals:   Temp (24hrs), Av 1 °F (36 7 °C), Min:97 4 °F (36 3 °C), Max:98 7 °F (37 1 °C)    HR:  [71-91] 87  Resp:  [11-20] 20  BP: (111-172)/(54-81) 111/54  SpO2:  [91 %-100 %] 92 %  Body mass index is 25 75 kg/m²  Input and Output Summary (last 24 hours): Intake/Output Summary (Last 24 hours) at 18 1628  Last data filed at 18 1611   Gross per 24 hour   Intake              300 ml   Output             3080 ml   Net            -2780 ml       Physical Exam:     GENERAL:  Well-developed/nourished - no immediate distress  HEAD:  Normocephalic - atraumatic  EYES: PERRL - EOMI   MOUTH:  Mucosa moist  NECK:  Supple - full range of motion  CARDIAC:  Regular rate/rhythm - S1/S2 positive  PULMONARY:  Clear breath sounds bilaterally - nonlabored respirations  ABDOMEN:  Soft - nontender/nondistended - active bowel sounds  MUSCULOSKELETAL:  Motor strength/range of motion intact  GENITOURINARY:  CBI apparatus in place draining bright red urine  NEUROLOGIC:  Alert/oriented x 3  SKIN:  Chronic wrinkles/blemishes   PSYCHIATRIC:  Mood/affect pleasant      ADDITIONAL DATA       Labs & Recent Cultures:       Results from last 7 days  Lab Units 18  0528  18  1440   WBC Thousand/uL 5 78  --  5 08   HEMOGLOBIN g/dL 10 1*  < > 10 2*   HEMATOCRIT % 33 5*  < > 33 3*   PLATELETS Thousands/uL 174  --  200   NEUTROS PCT %  --   --  77*   LYMPHS PCT %  --   --  14   MONOS PCT %  --   --  8   EOS PCT %  --   --  1   < > = values in this interval not displayed      Results from last 7 days  Lab Units 18  0528 18  1441   SODIUM mmol/L 140 138 POTASSIUM mmol/L 4 2 4 3   CHLORIDE mmol/L 104 103   CO2 mmol/L 29 30   BUN mg/dL 19 23   CREATININE mg/dL 0 87 1 03   CALCIUM mg/dL 8 0* 8 5   TOTAL PROTEIN g/dL  --  6 7   BILIRUBIN TOTAL mg/dL  --  0 20   ALK PHOS U/L  --  79   ALT U/L  --  17   AST U/L  --  9   GLUCOSE RANDOM mg/dL 82 100       Results from last 7 days  Lab Units 06/29/18  1440   INR  1 03           Results from last 7 days  Lab Units 06/29/18  1357   URINE CULTURE  4390-6002 cfu/ml Gram Negative Jose*         Last 24 Hours Medication List:     Current Facility-Administered Medications:  acetaminophen 650 mg Oral Q6H PRN Karin Bolton PA-C    ALPRAZolam 0 25 mg Oral HS PRN Karin Bolton PA-C    artificial tear  Right Eye HS Boo Fry PA-C    calcium carbonate 1 tablet Oral Daily With Breakfast Karin Bolton PA-C    calcium carbonate 1,000 mg Oral Daily PRN Karin Bolton PA-C    cefTRIAXone 1,000 mg Intravenous Q24H Karin Bolton PA-C Last Rate: 1,000 mg (06/29/18 1942)   cholecalciferol 400 Units Oral Daily Karin Bolton PA-C    escitalopram 10 mg Oral Daily Karin Bolton PA-C    isosorbide mononitrate 30 mg Oral Daily Karin Bolton PA-C    magnesium oxide 400 mg Oral BID Karin Bolton PA-C    metoprolol 5 mg Intravenous Q6H PRN Izabela Garner MD    metoprolol succinate 25 mg Oral Daily Karin Bolton PA-C    multivitamin-minerals 1 tablet Oral Daily Karin Bolton PA-C    nitroglycerin 0 4 mg Sublingual Q5 Min PRN Karin Bolton PA-C    ondansetron 4 mg Intravenous Q6H PRN Karin Bolton PA-C    senna 1 tablet Oral Daily Karin Bolton PA-C    sodium chloride 50 mL/hr Intravenous Continuous Duyen Pal CRNA Last Rate: 50 mL/hr (06/30/18 1355)   tamsulosin 0 4 mg Oral Daily With Dinner Karin Bolton PA-C    zinc gluconate 25 mg Oral Daily Karin Bolton PA-C           Time Spent for Care: 34 minutes    More than 50% of total time spent on counseling and coordination of care as described above  Current Length of Stay: 1 day(s)      Code Status: Level 1 - Full Code         ** Please Note: This note is constructed using a voice recognition dictation system   **

## 2018-07-01 VITALS
WEIGHT: 150 LBS | SYSTOLIC BLOOD PRESSURE: 115 MMHG | DIASTOLIC BLOOD PRESSURE: 54 MMHG | RESPIRATION RATE: 17 BRPM | TEMPERATURE: 97.8 F | HEIGHT: 64 IN | BODY MASS INDEX: 25.61 KG/M2 | HEART RATE: 87 BPM | OXYGEN SATURATION: 96 %

## 2018-07-01 PROBLEM — H04.129 DRY EYE: Status: ACTIVE | Noted: 2018-07-01

## 2018-07-01 LAB
ATRIAL RATE: 79 BPM
BASOPHILS # BLD AUTO: 0.01 THOUSANDS/ΜL (ref 0–0.1)
BASOPHILS NFR BLD AUTO: 0 % (ref 0–1)
EOSINOPHIL # BLD AUTO: 0 THOUSAND/ΜL (ref 0–0.61)
EOSINOPHIL NFR BLD AUTO: 0 % (ref 0–6)
ERYTHROCYTE [DISTWIDTH] IN BLOOD BY AUTOMATED COUNT: 15.5 % (ref 11.6–15.1)
HCT VFR BLD AUTO: 35.2 % (ref 36.5–49.3)
HGB BLD-MCNC: 10.8 G/DL (ref 12–17)
LYMPHOCYTES # BLD AUTO: 0.83 THOUSANDS/ΜL (ref 0.6–4.47)
LYMPHOCYTES NFR BLD AUTO: 8 % (ref 14–44)
MCH RBC QN AUTO: 26.7 PG (ref 26.8–34.3)
MCHC RBC AUTO-ENTMCNC: 30.7 G/DL (ref 31.4–37.4)
MCV RBC AUTO: 87 FL (ref 82–98)
MONOCYTES # BLD AUTO: 0.85 THOUSAND/ΜL (ref 0.17–1.22)
MONOCYTES NFR BLD AUTO: 8 % (ref 4–12)
NEUTROPHILS # BLD AUTO: 8.89 THOUSANDS/ΜL (ref 1.85–7.62)
NEUTS SEG NFR BLD AUTO: 84 % (ref 43–75)
P AXIS: 21 DEGREES
PLATELET # BLD AUTO: 188 THOUSANDS/UL (ref 149–390)
PMV BLD AUTO: 10.4 FL (ref 8.9–12.7)
PR INTERVAL: 190 MS
QRS AXIS: 35 DEGREES
QRSD INTERVAL: 90 MS
QT INTERVAL: 384 MS
QTC INTERVAL: 440 MS
RBC # BLD AUTO: 4.04 MILLION/UL (ref 3.88–5.62)
T WAVE AXIS: -44 DEGREES
VENTRICULAR RATE: 79 BPM
WBC # BLD AUTO: 10.58 THOUSAND/UL (ref 4.31–10.16)

## 2018-07-01 PROCEDURE — 85025 COMPLETE CBC W/AUTO DIFF WBC: CPT | Performed by: INTERNAL MEDICINE

## 2018-07-01 PROCEDURE — 99239 HOSP IP/OBS DSCHRG MGMT >30: CPT | Performed by: INTERNAL MEDICINE

## 2018-07-01 PROCEDURE — 93010 ELECTROCARDIOGRAM REPORT: CPT | Performed by: INTERNAL MEDICINE

## 2018-07-01 PROCEDURE — 99232 SBSQ HOSP IP/OBS MODERATE 35: CPT | Performed by: UROLOGY

## 2018-07-01 RX ORDER — MINERAL OIL AND PETROLATUM 150; 830 MG/G; MG/G
OINTMENT OPHTHALMIC
Qty: 3.5 G | Refills: 0 | Status: SHIPPED | OUTPATIENT
Start: 2018-07-01 | End: 2018-08-14

## 2018-07-01 RX ORDER — CEPHALEXIN 500 MG/1
500 CAPSULE ORAL EVERY 12 HOURS SCHEDULED
Qty: 10 CAPSULE | Refills: 0 | Status: SHIPPED | OUTPATIENT
Start: 2018-07-01 | End: 2018-07-06

## 2018-07-01 RX ORDER — DUTASTERIDE 0.5 MG/1
0.5 CAPSULE, LIQUID FILLED ORAL DAILY
Qty: 30 CAPSULE | Refills: 6 | Status: SHIPPED | OUTPATIENT
Start: 2018-07-01 | End: 2018-08-22 | Stop reason: SDUPTHER

## 2018-07-01 RX ADMIN — Medication 400 MG: at 09:00

## 2018-07-01 RX ADMIN — METOPROLOL SUCCINATE 25 MG: 25 TABLET, EXTENDED RELEASE ORAL at 08:59

## 2018-07-01 RX ADMIN — ESCITALOPRAM OXALATE 10 MG: 10 TABLET ORAL at 09:00

## 2018-07-01 RX ADMIN — SENNOSIDES 8.6 MG: 8.6 TABLET, FILM COATED ORAL at 08:59

## 2018-07-01 RX ADMIN — CHOLECALCIFEROL TAB 10 MCG (400 UNIT) 400 UNITS: 10 TAB at 08:59

## 2018-07-01 RX ADMIN — Medication 1 TABLET: at 08:30

## 2018-07-01 RX ADMIN — ISOSORBIDE MONONITRATE 30 MG: 30 TABLET, EXTENDED RELEASE ORAL at 09:00

## 2018-07-01 RX ADMIN — Medication 25 MG: at 09:01

## 2018-07-01 RX ADMIN — Medication 1 TABLET: at 08:59

## 2018-07-01 NOTE — DISCHARGE SUMMARY
Discharge Summary - [facility]ist Service - Internal Medicine      Patient Information: Kimberly Allen 80 y o  male MRN: [id number]  Unit/Bed#: -01 Encounter: [id number]    Discharging Physician / Practitioner: Justin Frost MD  PCP: Loc Rosen DO  Admission Date:   Admission Orders     Ordered        06/29/18 1708  Inpatient Admission (expected length of stay for this patient is greater than two midnights)  Once             Discharge Date: 07/01/18      Reason for Admission:  Blood in urine      Discharge Diagnoses:     Principal Problem:    Gross hematuria    Chronic Problems:    Benign prostatic hyperplasia    CAD (coronary artery disease)    Essential hypertension    Chronic diastolic CHF    History of DVT (deep vein thrombosis)    Anemia of chronic disease    Depression      Consultations During Hospital Stay:  · Urology       Hospital Course:     Hematuria   Assessment & Plan     - continues bladder irrigation initiated on admission - underwent cystoscopy yesterday status post large blood clot evacuation with subsequent fulguration of bladder neck bleeding site   - H/H remained stable afterwards - Plavix/ASA held during hospital course can resume in three days per urology (on 7/4)   - initial CT imaging revealed possibility of a bladder mass which on cystoscopy was found to be a large clot by urology (as mentioned above)  - urinary catheter will remain in place on discharge until seen by urology in the outpatient setting for eventual removal       Benign prostatic hyperplasia   Assessment & Plan     - status post prior TURP  - continue Flomax - urology added Avodart to regimen       CAD (coronary artery disease)   Assessment & Plan     - status post prior stenting and CABG   - ASA/Plavix held secondary to gross hematuria - per urology, can resume dual anti-platelet therapy on Wednesday, July 4th   - continue Imdur/Toprol-XL - not on statin due to intolerance        Hypertension Assessment & Plan     - low-sodium diet encouraged  - continue Imdur/Toprol-XL       History of DVT (deep vein thrombosis)   Assessment & Plan     - status post previous IVC filter       Anemia of chronic disease   Assessment & Plan     - H/H stable - continue to monitor in light of hematuria       Depression   Assessment & Plan     - stable/asymptomatic  - continue Lexapro       Chronic diastolic heart failure (HCC)   Assessment & Plan     - last echocardiogram in January 2017 revealed an EF of 50% with mild TN/AR/pulmonary HTN, moderate MR/TR, and mild LV septal hypokinesis/concentric hypertrophy   - continue Toprol-XL with Imdur - not on a chronic diuretic at home       Hyperlipidemia   Assessment & Plan     - not on statin due to intolerance           Condition at Discharge: good       Discharge Day Visit / Exam:     Vitals: Blood Pressure: 115/54 (07/01/18 1100)  Pulse: 87 (07/01/18 1100)  Temperature: 97 8 °F (36 6 °C) (07/01/18 1100)  Temp Source: Oral (07/01/18 1100)  Respirations: 17 (07/01/18 1100)  Height: 5' 4" (162 6 cm) (06/30/18 1009)  Weight - Scale: 68 kg (150 lb) (06/30/18 1009)  SpO2: 96 % (07/01/18 1100)      Physical exam - I had a face-to-face encounter with the patient on day of discharge  Discussion with Patient and/or Family:  The patient has been advised to return to the ER immediately if any symptoms recur or worsen  Discharge instructions/Information to Patient and/or Family:   See after visit summary for information provided to patient and/or family  Provisions for Follow-Up Care:  See after visit summary for information related to follow-up care and any pertinent home health orders  Disposition:   Home      Discharge Medications:  See after visit summary for reconciled discharge medications provided to patient and/or family  Discharge Statement:  I spent 36 minutes discharging the patient  This time was spent on the day of discharge   I had direct contact with the patient on the day of discharge  Greater than 50% of the total time was spent examining patient, answering all patient questions, arranging and discussing plan of care with patient as well as directly providing post-discharge instructions  Additional time then spent on discharge activities     ** Please Note: This note is constructed using a voice recognition dictation system   **

## 2018-07-01 NOTE — CASE MANAGEMENT
Initial Clinical Review    Admission: Date/Time/Statement: 6/29/18 @ 1708     Orders Placed This Encounter   Procedures    Inpatient Admission (expected length of stay for this patient is greater than two midnights)     Standing Status:   Standing     Number of Occurrences:   1     Order Specific Question:   Admitting Physician     Answer:   Donna Fry     Order Specific Question:   Level of Care     Answer:   Med Surg [16]     Order Specific Question:   Estimated length of stay     Answer:   More than 2 Midnights     Order Specific Question:   Certification     Answer:   I certify that inpatient services are medically necessary for this patient for a duration of greater than two midnights  See H&P and MD Progress Notes for additional information about the patient's course of treatment  ED: Date/Time/Mode of Arrival:   ED Arrival Information     Expected Arrival Acuity Means of Arrival Escorted By Service Admission Type    - 6/29/2018 13:29 Urgent Wheelchair Family Member General Medicine Urgent    Arrival Complaint     bleeding           Chief Complaint:   Chief Complaint   Patient presents with    Blood in Urine     PT reports "I was cutting the grass and came into use the bathroom and I am bleeding from my penis real bad  There was a long cord hanging out of it too"       History of Illness:  80 y o  male  With multiple medical conditions including history of BPH status post 2 TURPs presents the ED for acute onset hematuria  Patient reports he was in usual state of health this morning  He reports he mowed the lawn  When he came into the bathroom, he had a large amount of sergio blood in his urine  He also reports having 1 large clot  Patient called his primary urologist who recommended ED evaluation  Patient reports that he is also experiencing some right lower quadrant abdominal pain  Patient does have intermittent episodes of chest pain secondary coronary artery disease     He currently has Ochoa catheter that is still draining dark red urine  Patient is prior history of hematuria in 2015  For which she required emergent TURP  No prior history of prostate cancer        Of note, patient was recently diagnosed with anemia  He is currently working with his primary care doctor to gets with the hematologist possible Venofer injections    ED Vital Signs:   ED Triage Vitals   Temperature Pulse Respirations Blood Pressure SpO2   06/29/18 1337 06/29/18 1337 06/29/18 1337 06/29/18 1337 06/29/18 1337   98 5 °F (36 9 °C) 83 20 116/59 97 %      Temp Source Heart Rate Source Patient Position - Orthostatic VS BP Location FiO2 (%)   06/29/18 1337 06/29/18 1337 06/29/18 1337 06/29/18 1337 --   Oral Monitor Lying Right arm       Pain Score       06/29/18 2147       3        Wt Readings from Last 1 Encounters:   06/30/18 68 kg (150 lb)       Vital Signs (abnormal): none    Abnormal Labs/Diagnostic Test Results: Albumin 3  2    hgb 10 2, hct 33 3  UA moderate leukocytes  + nitrite   >=300 protein  Ct abdomen - No acute abnormality in the abdomen or pelvis  2   Large lobulated bladder mass, not present previously   This may represent neoplasm and further evaluation with cystoscopy is recommended  3   Diverticulosis coli    4   Pancreas cyst, mildly enlarged from 2015 6/20/2018  0121  hgb 9 6, hct 31 9    Procedure 6/30/2-18-  cystoscopy with evacuation of clots   2) fulguration of bleeding points within prostate    ED Treatment:   Medication Administration from 06/29/2018 1329 to 06/29/2018 1812       Date/Time Order Dose Route Action Comments     06/29/2018 1559 sodium chloride 0 9 % bolus 1,000 mL 0 mL Intravenous Stopped      06/29/2018 1440 sodium chloride 0 9 % bolus 1,000 mL 1,000 mL Intravenous New Bag      06/29/2018 1440 Fluorescein-Proparacaine (FLUCAINE) 0 25-0 5 % ophthalmic solution 1 drop 1 drop Right Eye Given by Other Dr Michael Hardwick     06/29/2018 1524 iohexol (OMNIPAQUE) 350 MG/ML injection (MULTI-DOSE) 100 mL 100 mL Intravenous Given      06/29/2018 2089 lidocaine (URO-JET) 2 % topical gel   Topical Given           Past Medical/Surgical History:    Active Ambulatory Problems     Diagnosis Date Noted    Acute respiratory failure with hypoxia ([facility] ) 01/07/2017    CAD (coronary artery disease) 01/07/2017    History of DVT (deep vein thrombosis) 01/07/2017    PUD (peptic ulcer disease) 01/07/2017    Hypertension 01/07/2017    Hyperlipidemia 01/07/2017    Chronic diastolic heart failure (HCC) 01/07/2017    Stable angina ([facility] ) 01/07/2017    Incarcerated right inguinal hernia 11/02/2017    Inguinal hernia 11/02/2017    Benign prostatic hyperplasia 10/22/2015    Anxiety 04/17/2018    Anemia 04/17/2018    Bladder mass 06/29/2018     Resolved Ambulatory Problems     Diagnosis Date Noted    Dizziness 02/21/2016    Acute bronchitis 02/21/2016    Cardiomegaly 01/07/2017     Past Medical History:   Diagnosis Date    BPH (benign prostatic hyperplasia)     CAD (coronary artery disease)     Cardiac disease     Cervical spine fracture (HCC)     DVT (deep venous thrombosis) (HCC)     Fracture of lumbar spine (HCC)     Glaucoma     Hyperlipidemia     Hypertension     Myocardial infarct (HCC)     [email or web address] )     PUD (peptic ulcer disease)        Admitting Diagnosis: Blood in urine [R31 9]  Hematuria [R31 9]  Bladder mass [N32 89]    Age/Sex: 80 y o  male    Assessment/Plan:   Hematuria   Assessment & Plan     · Acute onset today  · Bladder mass noted, concern for new bladder carcinoma  · Maintain Ochoa catheter   ·  hold aspirin and Plavix  · Trend H&H Q 8--  Patient may require blood transfusion to keep his hemoglobin greater than 8 in setting of coronary artery disease  · Per Urology, start CBI  · Plan  cystoscopy in the morning          Bladder mass   Assessment & Plan     · Large lobulated bladder mass, not present previously   This may represent neoplasm and further evaluation with cystoscopy is recommended  ·  plan for cystoscopy          Benign prostatic hyperplasia   Assessment & Plan     ·  Patient reports prior history of TURP x2   · Denies history of prostate cancer  No history of prostatectomy  · Follow up with Dr Harper President as an outpatient          Chronic diastolic heart failure ([facility] [address] )   Assessment & Plan     ·  Appears euvolemic at this time   · Continue home medications  · Monitor daily weights and I&Os   · Monitor for volume status          CAD (coronary artery disease)   Assessment & Plan     ·  History of coronary artery bypass and  Stenting  ·  holding aspirin and Plavix in setting of hematuria   · Continue other home medications          Hypertension   Assessment & Plan     ·  Blood pressure is acceptable, continue home meds          Hyperlipidemia   Assessment & Plan     ·  Continue statin         Admission Orders: 6//29/2018  1708 INPATIENT   Scheduled Meds: rocephin 1 gm iv daily   Artificial tears  flucaine opthalmic  Po medication: vitamin D 3  Lexapro  Imdur  Mag oxide  Metoprolol  MVI  Senna  Flomax  Zinc gluconate  Continuous Infusions: ivf @ 50 cc/h  PRN Meds: tylenol  Used x 1  Xanax used  X 1  Consult cardiology  NPO  scds  Urinary catheter   CBI    Per urology - 58-year-old male presenting with hematuria  - [address] Ochoa placed in ED  -hand irrigated with no clot return  - CBI running with [facility] color return  -possible cystoscopy and evacuation of clot in a m   -NPO after midnight  - hold Plavix and aspirin     BPH  -home med of Flomax

## 2018-07-01 NOTE — PROGRESS NOTES
UROLOGY PROGRESS NOTE   Patient Identifiers: Reina Harding (MRN [id number])  Date of Service: 7/1/2018        Assessment:     Gross hematuria secondary to BPH  Resolved following clot evacuation fulguration    Plan:   -clamp CBI  -please observe for good catheter drainage over the course of this morning  -the catheter continues to drain well with minimal hematuria, patient can be discharged home later this afternoon with the Ochoa catheter in place  -I have requested follow-up in our office for Ochoa removal in a few days  -I have prescribed dutasteride as an outpatient medication to prevent further bleeding from the prostate  Patient had painful gynecomastia from finasteride in the past     Thank you for allowing us to participate in the patient's care  Subjective:     24 HR EVENTS:   no significant events  Minimal hematuria overnight    Patient has  no complaints        Objective:     VITALS:    Vitals:    07/01/18 0733   BP: 142/68   Pulse: 102   Resp: 18   Temp: 97 5 °F (36 4 °C)   SpO2: 95%       INS & OUTS:  [unfilled]    LABS:  Lab Results   Component Value Date    HGB 10 8 (L) 07/01/2018    HCT 35 2 (L) 07/01/2018    WBC 10 58 (H) 07/01/2018     07/01/2018   ]    Lab Results   Component Value Date     06/30/2018    K 4 2 06/30/2018     06/30/2018    CO2 29 06/30/2018    BUN 19 06/30/2018    CREATININE 0 87 06/30/2018    CALCIUM 8 0 (L) 06/30/2018    GLUCOSE 82 06/30/2018   ]    INPATIENT MEDS:    Current Facility-Administered Medications:     acetaminophen (TYLENOL) tablet 650 mg, 650 mg, Oral, Q6H PRN, Karin Bolton PA-C, 650 mg at 06/29/18 2147    ALPRAZolam (XANAX) tablet 0 25 mg, 0 25 mg, Oral, HS PRN, Karin Bolton PA-C, 0 25 mg at 06/29/18 2122    artificial tear (LUBRIFRESH P M ) ophthalmic ointment, , Right Eye, HS, Odilon Kenny PA-C    calcium carbonate (OYSTER SHELL,OSCAL) 500 mg tablet 1 tablet, 1 tablet, Oral, Daily With Breakfast, Kurt LAMA LILLI Bolton    calcium carbonate (TUMS) chewable tablet 1,000 mg, 1,000 mg, Oral, Daily PRN, Karin Bolton PA-C    cefTRIAXone (ROCEPHIN) 1,000 mg in dextrose 5 % 50 mL IVPB, 1,000 mg, Intravenous, Q24H, Karin Bolton PA-C, Last Rate: 100 mL/hr at 06/30/18 1745, 1,000 mg at 06/30/18 1745    cholecalciferol (VITAMIN D3) tablet 400 Units, 400 Units, Oral, Daily, Karin Bolton PA-C    escitalopram (LEXAPRO) tablet 10 mg, 10 mg, Oral, Daily, Karin Bolton PA-C, Stopped at 06/30/18 3587    isosorbide mononitrate (IMDUR) 24 hr tablet 30 mg, 30 mg, Oral, Daily, Karin Bolton PA-C    magnesium oxide (MAG-OX) tablet 400 mg, 400 mg, Oral, BID, Karin Bolton PA-C, 400 mg at 06/30/18 1702    metoprolol (LOPRESSOR) injection 5 mg, 5 mg, Intravenous, Q6H PRN, Jigar Hurtado MD, 5 mg at 06/30/18 4291    metoprolol succinate (TOPROL-XL) 24 hr tablet 25 mg, 25 mg, Oral, Daily, Karin Bolton PA-C    multivitamin-minerals (CENTRUM) tablet 1 tablet, 1 tablet, Oral, Daily, Karin Bolton PA-C    nitroglycerin (NITROSTAT) SL tablet 0 4 mg, 0 4 mg, Sublingual, Q5 Min PRN, Karin Bolton PA-C    ondansetron (ZOFRAN) injection 4 mg, 4 mg, Intravenous, Q6H PRN, Karin Bolton PA-C    senna (SENOKOT) tablet 8 6 mg, 1 tablet, Oral, Daily, Karin Bolton PA-C    sodium chloride 0 9 % infusion, 50 mL/hr, Intravenous, Continuous, Dionicio Martinez CRNA, Last Rate: 50 mL/hr at 06/30/18 1355, 50 mL/hr at 06/30/18 1355    tamsulosin (FLOMAX) capsule 0 4 mg, 0 4 mg, Oral, Daily With Dinner, Karin Bolton PA-C, 0 4 mg at 06/30/18 1702    zinc gluconate tablet 25 mg, 25 mg, Oral, Daily, Karin Bolton PA-C      Physical Exam:   /68 (BP Location: Left arm)   Pulse 102   Temp 97 5 °F (36 4 °C) (Axillary)   Resp 18   Ht 5' 4" (1 626 m)   Wt 68 kg (150 lb)   SpO2 95%   BMI 25 75 kg/m²   GEN: alert and oriented x 3    RESP: breathing comfortably with no accessory muscle use    ABD: soft, appropriately tender to palpation, non-distended   EXT: no significant peripheral edema   DRAINS: none  WILKINS: draining pink clear urine  no clots

## 2018-07-01 NOTE — DISCHARGE INSTRUCTIONS
Ochoa Catheter Placement and Care   WHAT YOU NEED TO KNOW:   A Ochoa catheter is a sterile tube that is inserted into your bladder to drain urine  It is also called an indwelling urinary catheter  The tip of the catheter has a small balloon filled with solution that holds the catheter inside your bladder  DISCHARGE INSTRUCTIONS:   Seek care immediately if:   · Your catheter comes out  · You suddenly have material that looks like sand in the tubing or drainage bag  · No urine is draining into the bag and you have checked the system  · You have pain in your hip, back, pelvis, or lower abdomen  · You are confused or cannot think clearly  Contact your healthcare provider if:   · You have a fever  · You have bladder spasms for more than 1 day after the catheter is placed  · You see blood in the tubing or drainage bag  · You have a rash or itching where the catheter tube is secured to your skin  · Urine leaks from or around the catheter, tubing, or drainage bag  · The closed drainage system has accidently come open or apart  · You see a layer of crystals inside the tubing  · You have questions or concerns about your condition or care  Care for your Ochoa catheter:   · Clean your genital area 2 times every day  Clean your catheter and the area around where it was inserted  Use soap and water  Clean your anal opening and catheter area after every bowel movement  · Secure the catheter tube  so you do not pull or move the catheter  This helps prevent pain and bladder spasms  Healthcare providers will show you how to use medical tape or a strap to secure the catheter tube to your body  · Keep a closed drainage system  Your Ochoa catheter should always be attached to the drainage bag to form a closed system  Do not disconnect any part of the closed system unless you need to change the bag  Care for your drainage bag:   · Ask if a leg bag is right for you    A leg bag can be worn under your clothes  Ask your healthcare provider for more information about a leg bag  · Keep the drainage bag below the level of your waist   This helps stop urine from moving back up the tubing and into your bladder  Do not loop or kink the tubing  This can cause urine to back up and collect in your bladder  Do not let the drainage bag touch or lie on the floor  · Empty the drainage bag when needed  The weight of a full drainage bag can be painful  Empty the drainage bag every 3 to 6 hours or when it is ? full  · Clean and change the drainage bag as directed  Ask your healthcare provider how often you should change the drainage bag and what cleaning solution to use  Wear disposable gloves when you change the bag  Do not allow the end of the catheter or tubing to touch anything  Clean the ends with an alcohol pad before you reconnect them  What to do if problems develop:   · No urine is draining into the bag:      ¨ Check for kinks in the tubing and straighten them out  ¨ Check the tape or strap used to secure the catheter tube to your skin  Make sure it is not blocking the tube  ¨ Make sure you are not sitting or lying on the tubing  ¨ Make sure the urine bag is hanging below the level of your waist     · Urine leaks from or around the catheter, tubing, or drainage bag:  Check if the closed drainage system has accidently come open or apart  Clean the catheter and tubing ends with a new alcohol pad and reconnect them  Prevent an infection:   · Wash your hands often  Wash before and after you touch your catheter, tubing, or drainage bag  Use soap and water  Wear clean disposable gloves when you care for your catheter or disconnect the drainage bag  Wash your hands before you prepare or eat food  · Drink liquids as directed  Ask your healthcare provider how much liquid to drink each day and which liquids are best for you   Liquids will help flush your kidneys and bladder to help prevent infection  Follow up with your healthcare provider as directed:  Write down your questions so you remember to ask them during your visits  © 2017 [address] [employer] Information is for End User's use only and may not be sold, redistributed or otherwise used for commercial purposes  All illustrations and images included in CareNotes® are the copyrighted property of [employer]  or Serafin Del Castillo  The above information is an  only  It is not intended as medical advice for individual conditions or treatments  Talk to your doctor, nurse or pharmacist before following any medical regimen to see if it is safe and effective for you

## 2018-07-01 NOTE — NURSING NOTE
Patient and patient's wife educated for approximately 45 minutes on lake bag change and care  Discharge instructions reviewed with patient and patient's wife  Questions encouraged and answered

## 2018-07-02 ENCOUNTER — TELEPHONE (OUTPATIENT)
Dept: CARDIOLOGY CLINIC | Facility: CLINIC | Age: 83
End: 2018-07-02

## 2018-07-02 NOTE — TELEPHONE ENCOUNTER
Daniele Sood called and wanted to make you aware that he was in the hospital with a bleed prostate  So they have stopped his Plavix and ASA  He will restart it on the 6th of this month after they remove his catheter

## 2018-07-02 NOTE — PHYSICIAN ADVISOR
Current patient class: Inpatient  The patient is currently on Hospital Day: 3 at [facility]      The patient was admitted to the hospital at [phone] on 6/29/18 for the following diagnosis:  Blood in urine [R31 9]  Hematuria [R31 9]  Bladder mass [N32 89]       There is documentation in the medical record of an expected length of stay of at least 2 midnights  The patient is therefore expected to satisfy the 2 midnight benchmark and given the 2 midnight presumption is appropriate for INPATIENT ADMISSION  Given this expectation of a satisfying stay, CMS instructs us that the patient is most often appropriate for inpatient admission under part A provided medical necessity is documented in the chart  After review of the relevant documentation, labs, vital signs and test results, the patient is appropriate for INPATIENT ADMISSION  Admission to the hospital as an inpatient is a complex decision making process which requires the practitioner to consider the patients presenting complaint, history and physical examination and all relevant testing  With this in mind, in this case, the patient was deemed appropriate for INPATIENT ADMISSION  After review of the documentation and testing available at the time of the admission I concur with this clinical determination of medical necessity  Rationale is as follows: The patient is a 80 yrs old Male who presented to the ED at 6/29/2018  1:33 PM with a chief complaint of Blood in Urine (PT reports "I was cutting the grass and came into use the bathroom and I am bleeding from my penis real bad  There was a long cord hanging out of it too")  The patient is a 79 y/o male who presented with gross hematuria who required CBI, cystoscopy, urology consult and serial labs  Cont CBI and gross hematuria noted to continue on day #2  He required >2 midnight stay for treatment of acute illness  Inpatient LOC is appropriate      The patients vitals on arrival were ED Triage Vitals   Temperature Pulse Respirations Blood Pressure SpO2   06/29/18 1337 06/29/18 1337 06/29/18 1337 06/29/18 1337 06/29/18 1337   98 5 °F (36 9 °C) 83 20 116/59 97 %      Temp Source Heart Rate Source Patient Position - Orthostatic VS BP Location FiO2 (%)   06/29/18 1337 06/29/18 1337 06/29/18 1337 06/29/18 1337 --   Oral Monitor Lying Right arm       Pain Score       06/29/18 2147       3           Past Medical History:   Diagnosis Date    BPH (benign prostatic hyperplasia)     CAD (coronary artery disease)     Cardiac disease     Cervical spine fracture (HCC)     DVT (deep venous thrombosis) (HCC)     Fracture of lumbar spine (HCC)     Glaucoma     Hyperlipidemia     Hypertension     [facility]ocardial infarct ([facility] [address] )     Prostate cancer ([facility] [address] )     PUD (peptic ulcer disease)      Past Surgical History:   Procedure Laterality Date    CHOLECYSTECTOMY      CORONARY ARTERY BYPASS GRAFT      CORONARY STENT PLACEMENT      EYE SURGERY      HERNIA REPAIR Right 11/3/2017    Procedure: REPAIR HERNIA INGUINAL;  Surgeon: Valerie Oneill MD;  Location: [address] [place] [address];  Service: General    STOMACH SURGERY      [place] 2 gastrectomy    TRANSURETHRAL RESECTION OF PROSTATE      VENA CAVA FILTER PLACEMENT             Consults have been placed to:   IP CONSULT TO UROLOGY    Vitals:    07/01/18 0100 07/01/18 0500 07/01/18 0733 07/01/18 1100   BP: 140/62 146/76 142/68 115/54   BP Location: Left arm Right arm Left arm Left arm   Pulse: 102 95 102 87   Resp: 16 16 18 17   Temp: 97 8 °F (36 6 °C) 97 9 °F (36 6 °C) 97 5 °F (36 4 °C) 97 8 °F (36 6 °C)   TempSrc: Oral Oral Axillary Oral   SpO2: 96% 96% 95% 96%   Weight:       Height:           Most recent labs:    Recent Labs      06/29/18   1440   06/29/18   1441   06/30/18   0528   07/01/18   0508   WBC  5 08   --    --    --   5 78   --   10 58*   HGB  10 2*   --    --    < >  10 1*   < >  10 8*   HCT  33 3*   --    --    < >  33 5*   < >  35 2*   PLT 200   --    --    --   174   --   188   K   --    < >  4 3   --   4 2   --    --    NA   --    < >  138   --   140   --    --    CALCIUM   --    < >  8 5   --   8 0*   --    --    BUN   --    < >  23   --   19   --    --    CREATININE   --    < >  1 03   --   0 87   --    --    LIPASE   --    --   208   --    --    --    --    INR  1 03   --    --    --    --    --    --    AST   --    --   9   --    --    --    --    ALT   --    --   17   --    --    --    --    ALKPHOS   --    --   79   --    --    --    --    BILITOT   --    --   0 20   --    --    --    --     < > = values in this interval not displayed  Scheduled Meds:  Continuous Infusions:  No current facility-administered medications for this encounter  PRN Meds:      Surgical procedures (if appropriate):  Procedure(s):  CYSTOSCOPY EVACUATION OF CLOTS, fulgeration

## 2018-07-02 NOTE — TELEPHONE ENCOUNTER
Called and spoke to patient, scheduled for void trial/nurse visits on Friday 7/6/18 8:30 am lake removal/ 2:30 pm PVR Ahsan

## 2018-07-06 ENCOUNTER — CLINICAL SUPPORT (OUTPATIENT)
Dept: UROLOGY | Facility: CLINIC | Age: 83
End: 2018-07-06
Payer: MEDICARE

## 2018-07-06 VITALS
HEART RATE: 84 BPM | SYSTOLIC BLOOD PRESSURE: 136 MMHG | WEIGHT: 147 LBS | HEIGHT: 64 IN | BODY MASS INDEX: 25.1 KG/M2 | DIASTOLIC BLOOD PRESSURE: 72 MMHG

## 2018-07-06 DIAGNOSIS — R31.0 GROSS HEMATURIA: Primary | ICD-10-CM

## 2018-07-06 LAB — POST-VOID RESIDUAL VOLUME, ML POC: 57 ML

## 2018-07-06 PROCEDURE — 51798 US URINE CAPACITY MEASURE: CPT

## 2018-07-06 RX ORDER — IRON,CARBONYL/ASCORBIC ACID 65MG-125MG
TABLET, DELAYED RELEASE (ENTERIC COATED) ORAL
COMMUNITY
Start: 2018-05-25 | End: 2018-08-09

## 2018-07-06 RX ORDER — FAMOTIDINE 20 MG/1
20 TABLET, FILM COATED ORAL DAILY
COMMUNITY
End: 2022-07-05

## 2018-07-06 NOTE — PROGRESS NOTES
7/6/2018    Kaushik Dow  8/2/1930  [id number]    Diagnosis  Chief Complaint     void trial          Patient presents for void trial managed by Dr Maris Hannah  S/p cystoscopy with evacuation of clots /fulguration of bleeding points on 6/30/18 with Dr Nicholas Holliday on call  Plan  Follow up with [facility] in 6 weeks  Procedure Ochoa removal/voiding trial    24 FR Ochoa catheter removed after deflation of an intact balloon  Patient tolerated well  Encouraged patient to hydrate well and return this afternoon for post void residual  He knows he may return early if uncomfortable and unable to urinate  Patient agrees to this plan  Patient returned this afternoon  Patient states able to void without difficulty  Patient voided 75 ml while in office  Bladder ultrasound performed and PVR measured 57 ml      Recent Results (from the past 1 hour(s))   POCT Measure PVR    Collection Time: 07/06/18  2:04 PM   Result Value Ref Range    POST-VOID RESIDUAL VOLUME, ML POC 57 mL              Vitals:    07/06/18 0817   BP: 136/72   Pulse: 84   Weight: 66 7 kg (147 lb)   Height: 5' 4" (1 626 m)           Robert Garnica RN

## 2018-07-06 NOTE — PATIENT INSTRUCTIONS
Patient to follow up in 6 weeks with advanced practitioner for post op  Instructed to avoid heavy lifting for 4 to 6 weeks post operatively  Increase water intake and avoid constipation

## 2018-08-09 ENCOUNTER — TELEPHONE (OUTPATIENT)
Dept: CARDIOLOGY CLINIC | Facility: CLINIC | Age: 83
End: 2018-08-09

## 2018-08-09 ENCOUNTER — HOSPITAL ENCOUNTER (OUTPATIENT)
Facility: HOSPITAL | Age: 83
Setting detail: OBSERVATION
Discharge: HOME | End: 2018-08-10
Attending: EMERGENCY MEDICINE | Admitting: INTERNAL MEDICINE
Payer: MEDICARE

## 2018-08-09 ENCOUNTER — APPOINTMENT (EMERGENCY)
Dept: RADIOLOGY | Facility: HOSPITAL | Age: 83
End: 2018-08-09
Payer: MEDICARE

## 2018-08-09 DIAGNOSIS — R07.9 CHEST PAIN: Primary | ICD-10-CM

## 2018-08-09 DIAGNOSIS — R55 NEAR SYNCOPE: ICD-10-CM

## 2018-08-09 LAB
ALBUMIN SERPL BCP-MCNC: 3.2 G/DL (ref 3.5–5)
ALP SERPL-CCNC: 92 U/L (ref 46–116)
ALT SERPL W P-5'-P-CCNC: 18 U/L (ref 12–78)
ANION GAP SERPL CALCULATED.3IONS-SCNC: 6 MMOL/L (ref 4–13)
APTT PPP: 26 SECONDS (ref 24–33)
AST SERPL W P-5'-P-CCNC: 7 U/L (ref 5–45)
BASOPHILS # BLD AUTO: 0.02 THOUSANDS/ΜL (ref 0–0.1)
BASOPHILS NFR BLD AUTO: 0 % (ref 0–1)
BILIRUB SERPL-MCNC: 0.3 MG/DL (ref 0.2–1)
BUN SERPL-MCNC: 18 MG/DL (ref 5–25)
CALCIUM SERPL-MCNC: 8.6 MG/DL (ref 8.3–10.1)
CHLORIDE SERPL-SCNC: 100 MMOL/L (ref 100–108)
CO2 SERPL-SCNC: 31 MMOL/L (ref 21–32)
CREAT SERPL-MCNC: 0.97 MG/DL (ref 0.6–1.3)
EOSINOPHIL # BLD AUTO: 0.04 THOUSAND/ΜL (ref 0–0.61)
EOSINOPHIL NFR BLD AUTO: 1 % (ref 0–6)
ERYTHROCYTE [DISTWIDTH] IN BLOOD BY AUTOMATED COUNT: 14.7 % (ref 11.6–15.1)
GFR SERPL CREATININE-BSD FRML MDRD: 69 ML/MIN/1.73SQ M
GLUCOSE SERPL-MCNC: 150 MG/DL (ref 65–140)
HCT VFR BLD AUTO: 35.1 % (ref 36.5–49.3)
HGB BLD-MCNC: 10.5 G/DL (ref 12–17)
IMM GRANULOCYTES # BLD AUTO: 0.01 THOUSAND/UL (ref 0–0.2)
IMM GRANULOCYTES NFR BLD AUTO: 0 % (ref 0–2)
INR PPP: 0.98 (ref 0.86–1.16)
LYMPHOCYTES # BLD AUTO: 0.84 THOUSANDS/ΜL (ref 0.6–4.47)
LYMPHOCYTES NFR BLD AUTO: 17 % (ref 14–44)
MCH RBC QN AUTO: 25.9 PG (ref 26.8–34.3)
MCHC RBC AUTO-ENTMCNC: 29.9 G/DL (ref 31.4–37.4)
MCV RBC AUTO: 87 FL (ref 82–98)
MONOCYTES # BLD AUTO: 0.53 THOUSAND/ΜL (ref 0.17–1.22)
MONOCYTES NFR BLD AUTO: 11 % (ref 4–12)
NEUTROPHILS # BLD AUTO: 3.58 THOUSANDS/ΜL (ref 1.85–7.62)
NEUTS SEG NFR BLD AUTO: 71 % (ref 43–75)
NRBC BLD AUTO-RTO: 0 /100 WBCS
PLATELET # BLD AUTO: 180 THOUSANDS/UL (ref 149–390)
PMV BLD AUTO: 10.1 FL (ref 8.9–12.7)
POTASSIUM SERPL-SCNC: 4.3 MMOL/L (ref 3.5–5.3)
PROT SERPL-MCNC: 7 G/DL (ref 6.4–8.2)
PROTHROMBIN TIME: 10.3 SECONDS (ref 9.4–11.7)
RBC # BLD AUTO: 4.05 MILLION/UL (ref 3.88–5.62)
SODIUM SERPL-SCNC: 137 MMOL/L (ref 136–145)
TROPONIN I SERPL-MCNC: <0.02 NG/ML
WBC # BLD AUTO: 5.02 THOUSAND/UL (ref 4.31–10.16)

## 2018-08-09 PROCEDURE — 71045 X-RAY EXAM CHEST 1 VIEW: CPT

## 2018-08-09 PROCEDURE — 96360 HYDRATION IV INFUSION INIT: CPT

## 2018-08-09 PROCEDURE — 99218 PR INITIAL OBSERVATION CARE/DAY 30 MINUTES: CPT | Performed by: INTERNAL MEDICINE

## 2018-08-09 PROCEDURE — 99285 EMERGENCY DEPT VISIT HI MDM: CPT

## 2018-08-09 PROCEDURE — 87081 CULTURE SCREEN ONLY: CPT | Performed by: NURSE PRACTITIONER

## 2018-08-09 PROCEDURE — 85025 COMPLETE CBC W/AUTO DIFF WBC: CPT | Performed by: EMERGENCY MEDICINE

## 2018-08-09 PROCEDURE — 99215 OFFICE O/P EST HI 40 MIN: CPT | Performed by: INTERNAL MEDICINE

## 2018-08-09 PROCEDURE — 85610 PROTHROMBIN TIME: CPT | Performed by: EMERGENCY MEDICINE

## 2018-08-09 PROCEDURE — 80053 COMPREHEN METABOLIC PANEL: CPT | Performed by: EMERGENCY MEDICINE

## 2018-08-09 PROCEDURE — 36415 COLL VENOUS BLD VENIPUNCTURE: CPT | Performed by: EMERGENCY MEDICINE

## 2018-08-09 PROCEDURE — 85730 THROMBOPLASTIN TIME PARTIAL: CPT | Performed by: EMERGENCY MEDICINE

## 2018-08-09 PROCEDURE — 93005 ELECTROCARDIOGRAM TRACING: CPT

## 2018-08-09 PROCEDURE — 84484 ASSAY OF TROPONIN QUANT: CPT | Performed by: NURSE PRACTITIONER

## 2018-08-09 PROCEDURE — 84484 ASSAY OF TROPONIN QUANT: CPT | Performed by: EMERGENCY MEDICINE

## 2018-08-09 RX ORDER — ESCITALOPRAM OXALATE 10 MG/1
10 TABLET ORAL DAILY
Status: DISCONTINUED | OUTPATIENT
Start: 2018-08-10 | End: 2018-08-10 | Stop reason: HOSPADM

## 2018-08-09 RX ORDER — B-COMPLEX WITH VITAMIN C
1 TABLET ORAL DAILY
Status: DISCONTINUED | OUTPATIENT
Start: 2018-08-09 | End: 2018-08-10 | Stop reason: HOSPADM

## 2018-08-09 RX ORDER — SODIUM CHLORIDE 9 MG/ML
125 INJECTION, SOLUTION INTRAVENOUS CONTINUOUS
Status: DISCONTINUED | OUTPATIENT
Start: 2018-08-09 | End: 2018-08-09

## 2018-08-09 RX ORDER — ALPRAZOLAM 0.25 MG/1
0.25 TABLET ORAL
Status: DISCONTINUED | OUTPATIENT
Start: 2018-08-09 | End: 2018-08-10 | Stop reason: HOSPADM

## 2018-08-09 RX ORDER — CLOPIDOGREL BISULFATE 75 MG/1
75 TABLET ORAL DAILY
Status: DISCONTINUED | OUTPATIENT
Start: 2018-08-09 | End: 2018-08-10 | Stop reason: HOSPADM

## 2018-08-09 RX ORDER — METOPROLOL SUCCINATE 25 MG/1
25 TABLET, EXTENDED RELEASE ORAL DAILY
Status: DISCONTINUED | OUTPATIENT
Start: 2018-08-09 | End: 2018-08-10 | Stop reason: HOSPADM

## 2018-08-09 RX ORDER — ZINC GLUCONATE 50 MG
25 TABLET ORAL DAILY
Status: DISCONTINUED | OUTPATIENT
Start: 2018-08-09 | End: 2018-08-10 | Stop reason: HOSPADM

## 2018-08-09 RX ORDER — NITROGLYCERIN 0.4 MG/1
0.4 TABLET SUBLINGUAL
Status: DISCONTINUED | OUTPATIENT
Start: 2018-08-09 | End: 2018-08-10 | Stop reason: HOSPADM

## 2018-08-09 RX ORDER — TAMSULOSIN HYDROCHLORIDE 0.4 MG/1
0.4 CAPSULE ORAL
Status: DISCONTINUED | OUTPATIENT
Start: 2018-08-09 | End: 2018-08-10 | Stop reason: HOSPADM

## 2018-08-09 RX ORDER — POLYETHYLENE GLYCOL 3350 17 G/17G
17 POWDER, FOR SOLUTION ORAL DAILY PRN
Status: DISCONTINUED | OUTPATIENT
Start: 2018-08-09 | End: 2018-08-10 | Stop reason: HOSPADM

## 2018-08-09 RX ORDER — ASPIRIN 81 MG/1
81 TABLET, CHEWABLE ORAL DAILY
Status: DISCONTINUED | OUTPATIENT
Start: 2018-08-10 | End: 2018-08-10 | Stop reason: HOSPADM

## 2018-08-09 RX ORDER — ISOSORBIDE MONONITRATE 30 MG/1
30 TABLET, EXTENDED RELEASE ORAL DAILY
Status: DISCONTINUED | OUTPATIENT
Start: 2018-08-10 | End: 2018-08-10 | Stop reason: HOSPADM

## 2018-08-09 RX ORDER — MINERAL OIL AND PETROLATUM 150; 830 MG/G; MG/G
OINTMENT OPHTHALMIC
Status: DISCONTINUED | OUTPATIENT
Start: 2018-08-09 | End: 2018-08-10 | Stop reason: HOSPADM

## 2018-08-09 RX ORDER — ONDANSETRON 2 MG/ML
4 INJECTION INTRAMUSCULAR; INTRAVENOUS EVERY 6 HOURS PRN
Status: DISCONTINUED | OUTPATIENT
Start: 2018-08-09 | End: 2018-08-10 | Stop reason: HOSPADM

## 2018-08-09 RX ORDER — ASPIRIN 81 MG/1
162 TABLET, CHEWABLE ORAL ONCE
Status: COMPLETED | OUTPATIENT
Start: 2018-08-09 | End: 2018-08-09

## 2018-08-09 RX ORDER — OMEGA-3S/DHA/EPA/FISH OIL/D3 300MG-1000
400 CAPSULE ORAL DAILY
Status: DISCONTINUED | OUTPATIENT
Start: 2018-08-09 | End: 2018-08-10 | Stop reason: HOSPADM

## 2018-08-09 RX ORDER — FAMOTIDINE 20 MG/1
20 TABLET, FILM COATED ORAL 2 TIMES DAILY
Status: DISCONTINUED | OUTPATIENT
Start: 2018-08-10 | End: 2018-08-10 | Stop reason: HOSPADM

## 2018-08-09 RX ORDER — FINASTERIDE 5 MG/1
5 TABLET, FILM COATED ORAL DAILY
Status: DISCONTINUED | OUTPATIENT
Start: 2018-08-09 | End: 2018-08-10 | Stop reason: HOSPADM

## 2018-08-09 RX ORDER — MULTIVITAMIN WITH IRON
1 TABLET ORAL EVERY MORNING
COMMUNITY

## 2018-08-09 RX ADMIN — CLOPIDOGREL 75 MG: 75 TABLET, FILM COATED ORAL at 15:45

## 2018-08-09 RX ADMIN — TAMSULOSIN HYDROCHLORIDE 0.4 MG: 0.4 CAPSULE ORAL at 15:45

## 2018-08-09 RX ADMIN — ASPIRIN 81 MG 162 MG: 81 TABLET ORAL at 11:33

## 2018-08-09 RX ADMIN — FINASTERIDE 5 MG: 5 TABLET, FILM COATED ORAL at 15:46

## 2018-08-09 RX ADMIN — CALCIUM CARBONATE 500 MG (1,250 MG)-VITAMIN D3 200 UNIT TABLET 1 TABLET: at 15:46

## 2018-08-09 RX ADMIN — CHOLECALCIFEROL TAB 10 MCG (400 UNIT) 400 UNITS: 10 TAB at 15:46

## 2018-08-09 RX ADMIN — METOPROLOL SUCCINATE 25 MG: 25 TABLET, FILM COATED, EXTENDED RELEASE ORAL at 15:53

## 2018-08-09 RX ADMIN — Medication 1 TABLET: at 15:45

## 2018-08-09 RX ADMIN — Medication 25 MG: at 17:05

## 2018-08-09 RX ADMIN — SODIUM CHLORIDE 125 ML/HR: 0.9 INJECTION, SOLUTION INTRAVENOUS at 11:34

## 2018-08-09 NOTE — ASSESSMENT & PLAN NOTE
2D echo on 1/9/2017 showed EF 50 percent, moderate MVR  Patient not showing any signs of fluid overload  Chest x-ray negative    · Continue beta-blocker  · Will monitor VS, IO's

## 2018-08-09 NOTE — TELEPHONE ENCOUNTER
Pt has chest pain L>R, increasing more over night, but it comes and goes  Scheduled in office 8/14/18 - refusing to be sent to [place] - Dr Franklin Torres advised pt to go to ED at [place] - discussed with pt and pt will go to ED

## 2018-08-09 NOTE — ASSESSMENT & PLAN NOTE
As evidence by intermittent chest pain x1 week associated with SOB  Troponin negative, EKG with no ST elevation/changes  Pt has extensive cardiac history, Mx stents/CABG, Angina in the past   He sees Dr Humphrey Watkins in past   Patient given 2 baby ASA in the ED  Patient reported feeling dizzy in the ED, described it as near-syncope  This lasted for few seconds  · Trend troponin  · Telemetry/monitor  · Cardiology consult  · Continue ASA, Imdur, Plavix, Beta-blocker  · NTG p r n

## 2018-08-09 NOTE — ED PROVIDER NOTES
History  Chief Complaint   Patient presents with    Chest Pain     States he started with chest pain intermittently yesterday that became worse during the night  States SOB  Dr Hager Pulse aware patient here     Patient is a 51-year-old male that presents with complaint of several days of chest pain, the pain poorly radiates from the left side to the right side and he also has complaints of intermittent the being substernal   Patient has a long history of stable angina  Patient states this pain seems to be different for him  Patient states he has been fairly constant for the last 2 days he has had some shortness of breath last night which has since resolved  Patient denies any cough or cold symptoms, there has been no fevers or chills  Patient has nitroglycerin at home but he did not take any for this pain  Patient takes a baby aspirin daily  Patient denies any abdominal pain, no nausea vomiting or diarrhea  Patient called his cardiologist and was recommended come to the emergency room for evaluation  Prior to Admission Medications   Prescriptions Last Dose Informant Patient Reported? Taking? ALPRAZolam (XANAX) 0 25 mg tablet 8/8/2018 at Unknown time Self Yes Yes   Sig: Take 0 25 mg by mouth daily at bedtime as needed for anxiety  Calcium Carbonate-Vitamin D (CALCIUM 600+D PO) 8/8/2018 at Unknown time Self Yes Yes   Sig: Take by mouth daily   Magnesium 250 MG TABS  Self Yes Yes   Sig: Take 1 tablet by mouth daily   artificial tear (LUBRIFRESH P M ) 83-15 % ophthalmic ointment 8/8/2018 at Unknown time Self No Yes   Sig: Administer to the right eye daily at bedtime   aspirin 81 MG tablet 8/9/2018 at Unknown time Self Yes Yes   Sig: Take 81 mg by mouth daily     cholecalciferol (VITAMIN D3) 400 units tablet 8/8/2018 at Unknown time Self Yes Yes   Sig: Take 400 Units by mouth daily   clopidogrel (PLAVIX) 75 mg tablet 8/8/2018 at Unknown time Self No Yes   Sig: Take 1 tablet (75 mg total) by mouth daily   dutasteride (AVODART) 0 5 mg capsule 8/8/2018 at Unknown time Self No Yes   Sig: Take 1 capsule (0 5 mg total) by mouth daily   escitalopram (LEXAPRO) 10 mg tablet 8/9/2018 at Unknown time Self Yes Yes   Sig: Take 10 mg by mouth daily   famotidine (PEPCID) 20 mg tablet 8/9/2018 at Unknown time Self Yes Yes   Sig: Take 20 mg by mouth 2 (two) times a day   isosorbide mononitrate (IMDUR) 30 mg 24 hr tablet 8/9/2018 at Unknown time Self No Yes   Sig: Take 1 tablet (30 mg total) by mouth daily   metoprolol succinate (TOPROL-XL) 25 mg 24 hr tablet 8/8/2018 at Unknown time Self No Yes   Sig: Take 1 tablet (25 mg total) by mouth daily   multivitamin-iron-minerals-folic acid (CENTRUM) chewable tablet 8/9/2018 at Unknown time Self Yes Yes   Sig: Chew 1 tablet daily   nitroglycerin (NITROSTAT) 0 4 mg SL tablet Unknown at Unknown time Self Yes No   Sig: Place 0 4 mg under the tongue every 5 (five) minutes as needed for chest pain   tamsulosin (FLOMAX) 0 4 mg 8/8/2018 at Unknown time Self No Yes   Sig: Take 1 capsule (0 4 mg total) by mouth daily with dinner   zinc gluconate 50 mg tablet 8/8/2018 at Unknown time Self Yes Yes   Sig: Take 25 mg by mouth daily        Facility-Administered Medications: None       Past Medical History:   Diagnosis Date    BPH (benign prostatic hyperplasia)     CAD (coronary artery disease)     Cardiac disease     Cervical spine fracture (HCC)     DVT (deep venous thrombosis) (HCC)     Fracture of lumbar spine (HCC)     Glaucoma     Hyperlipidemia     Hypertension     Myocardial infarct (HCC)     Prostate cancer ([facility] [address] )     PUD (peptic ulcer disease)        Past Surgical History:   Procedure Laterality Date    CHOLECYSTECTOMY      CORONARY ARTERY BYPASS GRAFT      CORONARY STENT PLACEMENT      EYE SURGERY      HERNIA REPAIR Right 11/3/2017    Procedure: REPAIR HERNIA INGUINAL;  Surgeon: Jayde Rojas MD;  Location: [facility];  Service: General    KS CYSTOURETHROSCOPY Estella Graves & EVAC CLOTS N/A 6/30/2018    Procedure: CYSTOSCOPY EVACUATION OF CLOTS, fulgeration;  Surgeon: Chang Smith MD;  [facility];  Service: Urology    STOMACH SURGERY      Billroth 2 gastrectomy    TRANSURETHRAL RESECTION OF PROSTATE      VENA CAVA FILTER PLACEMENT         Family History   Problem Relation Age of Onset    Coronary artery disease Brother      I have reviewed and agree with the history as documented  Social History   Substance Use Topics    Smoking status: Never Smoker    Smokeless tobacco: Never Used    Alcohol use No        Review of Systems   Constitutional: Negative for chills and fever  HENT: Negative for facial swelling and trouble swallowing  Eyes: Negative for photophobia, pain and visual disturbance  Respiratory: Positive for shortness of breath  Negative for cough  Cardiovascular: Positive for chest pain  Negative for palpitations and leg swelling  Gastrointestinal: Negative for abdominal pain, nausea and vomiting  Genitourinary: Negative for dysuria and flank pain  Musculoskeletal: Negative for back pain and neck pain  Skin: Negative  Neurological: Negative for weakness and numbness  Hematological: Negative  Psychiatric/Behavioral: Negative  Physical Exam  Physical Exam   Constitutional: He is oriented to person, place, and time  He appears well-developed and well-nourished  No distress  HENT:   Head: Normocephalic and atraumatic  Eyes: EOM are normal  Pupils are equal, round, and reactive to light  Neck: Normal range of motion  Cardiovascular: Normal rate and regular rhythm  Pulmonary/Chest: Effort normal and breath sounds normal  No respiratory distress  He has no wheezes  Abdominal: Soft  Bowel sounds are normal  He exhibits no distension  There is no tenderness  Musculoskeletal: Normal range of motion  Neurological: He is alert and oriented to person, place, and time  No cranial nerve deficit     Skin: Skin is warm and dry  Psychiatric: He has a normal mood and affect  Nursing note and vitals reviewed  Vital Signs  ED Triage Vitals [08/09/18 1108]   Temperature Pulse Respirations Blood Pressure SpO2   (!) 96 8 °F (36 °C) 72 18 122/62 98 %      Temp Source Heart Rate Source Patient Position - Orthostatic VS BP Location FiO2 (%)   Tympanic Monitor Lying Left arm --      Pain Score       3           Vitals:    08/09/18 1108 08/09/18 1130   BP: 122/62 104/54   Pulse: 72 68   Patient Position - Orthostatic VS: Lying        Visual Acuity      ED Medications  Medications   sodium chloride 0 9 % infusion (125 mL/hr Intravenous New Bag 8/9/18 1134)   aspirin chewable tablet 162 mg (162 mg Oral Given 8/9/18 1133)       Diagnostic Studies  Results Reviewed     Procedure Component Value Units Date/Time    Troponin I [[id number]]  (Normal) Collected:  08/09/18 1128    Lab Status:  Final result Specimen:  Blood from Arm, Right Updated:  08/09/18 1154     Troponin I <0 02 ng/mL     Comprehensive metabolic panel [[id number]]  (Abnormal) Collected:  08/09/18 1127    Lab Status:  Final result Specimen:  Blood from Arm, Right Updated:  08/09/18 1148     Sodium 137 mmol/L      Potassium 4 3 mmol/L      Chloride 100 mmol/L      CO2 31 mmol/L      Anion Gap 6 mmol/L      BUN 18 mg/dL      Creatinine 0 97 mg/dL      Glucose 150 (H) mg/dL      Calcium 8 6 mg/dL      AST 7 U/L      ALT 18 U/L      Alkaline Phosphatase 92 U/L      Total Protein 7 0 g/dL      Albumin 3 2 (L) g/dL      Total Bilirubin 0 30 mg/dL      eGFR 69 ml/min/1 73sq m     Narrative:         National Kidney Disease Education Program recommendations are as follows:  GFR calculation is accurate only with a steady state creatinine  Chronic Kidney disease less than 60 ml/min/1 73 sq  meters  Kidney failure less than 15 ml/min/1 73 sq  meters      Protime-INR [[id number]]  (Normal) Collected:  08/09/18 1127    Lab Status:  Final result Specimen:  Blood from Arm, Right Updated: 08/09/18 1145     Protime 10 3 seconds      INR 0 98    APTT [[id number]]  (Normal) Collected:  08/09/18 1127    Lab Status:  Final result Specimen:  Blood from Arm, Right Updated:  08/09/18 1145     PTT 26 seconds     CBC and differential [[id number]]  (Abnormal) Collected:  08/09/18 1128    Lab Status:  Final result Specimen:  Blood from Arm, Right Updated:  08/09/18 1133     WBC 5 02 Thousand/uL      RBC 4 05 Million/uL      Hemoglobin 10 5 (L) g/dL      Hematocrit 35 1 (L) %      MCV 87 fL      MCH 25 9 (L) pg      MCHC 29 9 (L) g/dL      RDW 14 7 %      MPV 10 1 fL      Platelets 345 Thousands/uL      nRBC 0 /100 WBCs      Neutrophils Relative 71 %      Immat GRANS % 0 %      Lymphocytes Relative 17 %      Monocytes Relative 11 %      Eosinophils Relative 1 %      Basophils Relative 0 %      Neutrophils Absolute 3 58 Thousands/µL      Immature Grans Absolute 0 01 Thousand/uL      Lymphocytes Absolute 0 84 Thousands/µL      Monocytes Absolute 0 53 Thousand/µL      Eosinophils Absolute 0 04 Thousand/µL      Basophils Absolute 0 02 Thousands/µL                  XR chest 1 view portable   Final Result by Dalton Nieves MD (08/09 1209)      No acute cardiopulmonary disease              Workstation performed: [id number]                    Procedures  ECG 12 Lead Documentation  Date/Time: 8/9/2018 11:12 AM  Performed by: Benito Franklin  Authorized by: Benito Franklin     Indications / Diagnosis:  Chest pain  ECG reviewed by me, the ED Provider: yes    Patient location:  ED  Previous ECG:     Previous ECG:  Compared to current    Comparison ECG info:  New PVCs    Similarity:  Changes noted    Comparison to cardiac monitor: Yes    Interpretation:     Interpretation: abnormal    Rate:     ECG rate:  76    ECG rate assessment: normal    Rhythm:     Rhythm: sinus rhythm    Ectopy:     Ectopy: none    QRS:     QRS axis:  Normal  Conduction:     Conduction: normal    ST segments:     ST segments:  Non-specific    Depression:  V5 and V6  T waves:     T waves: inverted    Q waves:     Q waves:  V4, V5 and V6           Phone Contacts  ED Phone Contact    ED Course         HEART Risk Score      Most Recent Value   History  0 Filed at: 08/09/2018 1242   ECG  1 Filed at: 08/09/2018 1242   Age  2 Filed at: 08/09/2018 1242   Risk Factors  2 Filed at: 08/09/2018 1242   Troponin  0 Filed at: 08/09/2018 1242   Heart Score Risk Calculator   History  0 Filed at: 08/09/2018 1242   ECG  1 Filed at: 08/09/2018 1242   Age  2 Filed at: 08/09/2018 1242   Risk Factors  2 Filed at: 08/09/2018 1242   Troponin  0 Filed at: 08/09/2018 1242   HEART Score  5 Filed at: 08/09/2018 1242   HEART Score  5 Filed at: 08/09/2018 1242                            [facility]  Number of Diagnoses or Management Options  Chest pain:   Near syncope:   Diagnosis management comments: The patient's 1st set of enzymes are negative, EKG is does not show any acute changes  I spoke to his cardiologist who wanted to keep in the hospital for observation  The patient did have a what sounds like maybe a near syncopal like episode while he was being evaluated his blood pressure did changes only lasted less than a minute  CritCare Time    Disposition  Final diagnoses:   Chest pain   Near syncope     Time reflects when diagnosis was documented in both MDM as applicable and the Disposition within this note     Time User Action Codes Description Comment    8/9/2018 12:43 PM Andrew Betts Add [R07 9] Chest pain     8/9/2018 12:43 PM Andrew Betts Add [R55] Near syncope       ED Disposition     ED Disposition Condition Comment    Admit  Case was discussed with Dr Arianna New and the patient's admission status was agreed to be Admission Status: observation status to the service of Dr Arianna New   Follow-up Information    None         Patient's Medications   Discharge Prescriptions    No medications on file     No discharge procedures on file      ED Provider  Electronically Signed by Hugo Ayoub MD  08/09/18 1926

## 2018-08-09 NOTE — H&P
H&P- Candis Smith 8/2/1930, 80 y o  male MRN: [id number]    Unit/Bed#: [address] [place] [address] Encounter: [id number]    Primary Care Provider: Grace Willard DO   Date and time admitted to hospital: 8/9/2018 11:06 AM        * Chest pain   Assessment & Plan    As evidence by intermittent chest pain x1 week associated with SOB  Troponin negative, EKG with no ST elevation/changes  Pt has extensive cardiac history, Mx stents/CABG, Angina in the past   He sees Dr Yunier Webber in past   Patient given 2 baby ASA in the ED  Patient reported feeling dizzy in the ED, described it as near-syncope  This lasted for few seconds  · Trend troponin  · Telemetry/monitor  · Cardiology consult  · Continue ASA, Imdur, Plavix, Beta-blocker  · NTG p r n  Chronic diastolic heart failure ([facility] [address] )   Assessment & Plan    2D echo on 1/9/2017 showed EF 50 percent, moderate MVR  Patient not showing any signs of fluid overload  Chest x-ray negative  · Continue beta-blocker  · Will monitor VS, IO's        CAD (coronary artery disease)   Assessment & Plan    Patient with extensive cardiac history, CABG x6, MI  · See stable angina plan        Hypertension   Assessment & Plan    Patient on Metoprolol  Blood pressure on the low side but will continue to monitor  · Continue        Hyperlipidemia   Assessment & Plan    Patient not on any statins due to intolerance, severe muscle pain  · Lipid profile in a m  PUD (peptic ulcer disease)   Assessment & Plan    Patient on Famotidine  · Continue        Benign prostatic hyperplasia   Assessment & Plan    Patient on Dutasteride and Tamsulosin  · Continue Tamsulosin  · Dutasteride not formulary Finasteide use as alternative        Anxiety   Assessment & Plan    Patient takes Xanax p r n    · Continue        History of DVT (deep vein thrombosis)   Assessment & Plan    History of left DVT in the past with IVC filter                  VTE Prophylaxis: No due to his long history of hematuria  / sequential compression device   Code Status: Level 1 - Full Code    Anticipated Length of Stay:  Patient will be admitted on an Observation basis with an anticipated length of stay of  < 2 midnights  Justification for Hospital Stay:  Cardiac workup    Total Time for Visit, including Counseling / Coordination of Care: 45 minutes  Greater than 50% of this total time spent on direct patient counseling and coordination of care  Chief Complaint:   Chest Pain (States he started with chest pain intermittently yesterday that became worse during the night  States SOB  Dr Franklin Torres aware patient here)      History of Present Illness:    Terence Phalen is a 80 y o  male with a PMH of CABG, multiple stents, CAD, hematuria, left leg DVT with IVC filter, BPH, HTN, hyperlipidemia, prostate CA, PUD who presents with CP x1 week  Patient has been having intermittent chest pain associated with shortness of breath x1 week described it as squeezing/tightness started on his right side to left side than to the center of his chest   He did not sleep last night because of this  Chest pain came back this morning while resting  He did not take anything to relieve the discomfort  He has NTG at home but did not want to take the risk, called Dr Chayo Menendez office and adviced to come here  Patient reported stomach virus last week with an episode of diarrhea but is resolved now  In the ED, he was given ASA and patient reported he felt a little dizzy and was in near-syncope for few seconds  On exam pt was chest pain-free  Review of Systems:    Review of Systems   Constitutional: Negative for activity change, appetite change, chills and fever  HENT: Negative for congestion, ear pain, sinus pain, sore throat and trouble swallowing  Respiratory: Positive for chest tightness  Negative for apnea, cough, shortness of breath and wheezing  Cardiovascular: Positive for chest pain  Negative for palpitations     Gastrointestinal: Negative for abdominal distention, abdominal pain, constipation, diarrhea, rectal pain and vomiting  Genitourinary: Negative for decreased urine volume, difficulty urinating, frequency and hematuria  Musculoskeletal: Negative for neck pain  Skin: Negative for pallor and rash  Neurological: Positive for light-headedness  Negative for dizziness, syncope, speech difficulty, weakness, numbness and headaches  Positive near-syncope   Psychiatric/Behavioral: Negative for agitation and confusion  The patient is not nervous/anxious  Past Medical and Surgical History:     Past Medical History:   Diagnosis Date    BPH (benign prostatic hyperplasia)     CAD (coronary artery disease)     Cardiac disease     Cervical spine fracture (HCC)     DVT (deep venous thrombosis) (HCC)     Fracture of lumbar spine (HCC)     Glaucoma     Hyperlipidemia     Hypertension     Myocardial infarct ([facility] [address] )     PUD (peptic ulcer disease)        Past Surgical History:   Procedure Laterality Date    CHOLECYSTECTOMY      CORONARY ARTERY BYPASS GRAFT      CORONARY STENT PLACEMENT      EYE SURGERY      HERNIA REPAIR Right 11/3/2017    Procedure: REPAIR HERNIA INGUINAL;  Surgeon: Satya Bales MD;  Location: [address] [place] [address];  Service: General    ME CYSTOURETHROSCOPY W/IRRIG & EVAC CLOTS N/A 6/30/2018    Procedure: CYSTOSCOPY EVACUATION OF CLOTS, fulgeration;  Surgeon: Frank Christianson MD;  [facility];  Service: Urology    STOMACH SURGERY      Billroth 2 gastrectomy    TRANSURETHRAL RESECTION OF PROSTATE      VENA CAVA FILTER PLACEMENT         Meds/Allergies:    Prior to Admission medications    Medication Sig Start Date End Date Taking? Authorizing Provider   ALPRAZolam Eliseo Laguna) 0 25 mg tablet Take 0 25 mg by mouth daily at bedtime as needed for anxiety     Yes Historical Provider, MD   artificial tear (LUBRIFRESH P M ) 83-15 % ophthalmic ointment Administer to the right eye daily at bedtime 7/1/18  Yes Rosette Haro MD   aspirin 81 MG tablet Take 81 mg by mouth daily  Yes Historical Provider, MD   Calcium Carbonate-Vitamin D (CALCIUM 600+D PO) Take by mouth daily   Yes Historical Provider, MD   cholecalciferol (VITAMIN D3) 400 units tablet Take 400 Units by mouth daily   Yes Historical Provider, MD   clopidogrel (PLAVIX) 75 mg tablet Take 1 tablet (75 mg total) by mouth daily 6/15/18  Yes Agustín Mcintosh MD   dutasteride (AVODART) 0 5 mg capsule Take 1 capsule (0 5 mg total) by mouth daily 7/1/18  Yes Chang Smith MD   escitalopram (LEXAPRO) 10 mg tablet Take 10 mg by mouth daily   Yes Historical Provider, MD   famotidine (PEPCID) 20 mg tablet Take 20 mg by mouth 2 (two) times a day   Yes Historical Provider, MD   isosorbide mononitrate (IMDUR) 30 mg 24 hr tablet Take 1 tablet (30 mg total) by mouth daily 3/15/18  Yes Juan Judge MD   Magnesium 250 MG TABS Take 1 tablet by mouth daily   Yes Historical Provider, MD   metoprolol succinate (TOPROL-XL) 25 mg 24 hr tablet Take 1 tablet (25 mg total) by mouth daily 4/17/18  Yes Gilles So MD   multivitamin-iron-minerals-folic acid (CENTRUM) chewable tablet Chew 1 tablet daily   Yes Historical Provider, MD   tamsulosin (FLOMAX) 0 4 mg Take 1 capsule (0 4 mg total) by mouth daily with dinner 6/21/18  Yes MATT De Jesus   zinc gluconate 50 mg tablet Take 25 mg by mouth daily  Yes Historical Provider, MD   nitroglycerin (NITROSTAT) 0 4 mg SL tablet Place 0 4 mg under the tongue every 5 (five) minutes as needed for chest pain    Historical Provider, MD   magnesium oxide (MAG-OX) 400 mg Take 1 tablet by mouth 2 (two) times a day for 30 days 1/11/17 8/9/18  Rachell Claude, MD   VITRON-C  MG TABS  5/25/18 8/9/18  Historical Provider, MD       Allergies: Allergies   Allergen Reactions    Oxycodone-Acetaminophen Dizziness and Shortness Of Breath     Other reaction(s): Faints  Reaction Date: [id number]; Category:  Adverse Reaction;     Omeprazole Other (See Comments)     pash    Statins Other (See Comments)     Muscle pain       Social History:     Marital Status: /Civil Union   Substance Use History:   History   Alcohol Use No     History   Smoking Status    Never Smoker   Smokeless Tobacco    Never Used     History   Drug Use No       Family History:    Family History   Problem Relation Age of Onset    Coronary artery disease Brother        Physical Exam:     Vitals:   Blood Pressure: 118/56 (08/09/18 1553)  Pulse: 65 (08/09/18 1337)  Temperature: 97 7 °F (36 5 °C) (08/09/18 1337)  Temp Source: Oral (08/09/18 1337)  Respirations: 18 (08/09/18 1337)  Height: 5' 3" (160 cm) (08/09/18 1525)  Weight - Scale: 67 6 kg (149 lb 0 5 oz) (08/09/18 1525)  SpO2: 95 % (08/09/18 1337)    Physical Exam   Constitutional: He is oriented to person, place, and time  He appears well-developed and well-nourished  No distress  HENT:   Head: Normocephalic and atraumatic  Neck: Normal range of motion  Neck supple  Pulmonary/Chest: Effort normal and breath sounds normal  No respiratory distress  He has no wheezes  He has no rales  Abdominal: Soft  Bowel sounds are normal    Musculoskeletal: Normal range of motion  He exhibits no edema or deformity  Neurological: He is alert and oriented to person, place, and time  Skin: Skin is warm and dry  No rash noted  No erythema  No pallor  Psychiatric: He has a normal mood and affect  His behavior is normal  Judgment and thought content normal          Additional Data:     Lab Results: I have personally reviewed pertinent reports          Results from last 7 days  Lab Units 08/09/18  1128   WBC Thousand/uL 5 02   HEMOGLOBIN g/dL 10 5*   HEMATOCRIT % 35 1*   PLATELETS Thousands/uL 180   NEUTROS PCT % 71   LYMPHS PCT % 17   MONOS PCT % 11   EOS PCT % 1       Results from last 7 days  Lab Units 08/09/18  1127   SODIUM mmol/L 137   POTASSIUM mmol/L 4 3   CHLORIDE mmol/L 100   CO2 mmol/L 31   BUN mg/dL 18   CREATININE mg/dL 0 97 CALCIUM mg/dL 8 6   TOTAL PROTEIN g/dL 7 0   BILIRUBIN TOTAL mg/dL 0 30   ALK PHOS U/L 92   ALT U/L 18   AST U/L 7   GLUCOSE RANDOM mg/dL 150*       Results from last 7 days  Lab Units 08/09/18  1127   INR  0 98       Imaging: I have personally reviewed pertinent reports  XR chest 1 view portable   Final Result by Margaux Portillo MD (08/09 1209)      No acute cardiopulmonary disease  Workstation performed: [id number]             XR chest 1 view portable   Final Result      No acute cardiopulmonary disease  Workstation performed: [id number]             EKG, Pathology, and Other Studies Reviewed on Admission:   · EKG: RSR rate of 76 with occassional PVC's  T wave abnormality noted but    Allscripts / Epic Records Reviewed: Yes     ** Please Note: This note has been constructed using a voice recognition system   **

## 2018-08-09 NOTE — ASSESSMENT & PLAN NOTE
Patient on Dutasteride and Tamsulosin    · Continue Tamsulosin  · Dutasteride not formulary Finasteide use as alternative

## 2018-08-09 NOTE — CONSULTS
Consultation - Cardiology   [facility]     Mann Dykes 80 y o  male MRN: [id number]  : 1930  Unit/Bed#: [address] [place] [address] Encounter: [id number]      Assessment & Plan   1  Chest pain, rule out acute coronary syndrome  2  History of coronary artery disease with chronic stable angina  Need to rule out acute coronary syndrome  So far troponins are negative  3  Essential hypertension  4  History of peptic ulcer disease  5  Mixed hyperlipidemia  6  Severe anxiety  7  History of anemia  8  History of chronic diastolic heart failure currently compensated    Summary of Recommendations:        Monitor on tele  Input output  Continue aspirin, Plavix  Continue beta-blocker and Imdur  Will use Ranexa if he continues to have symptoms  These medication not use at a lower dose due to patient's low blood pressure  Previous echo reviewed  Lexiscan stress test the morning  For now continue aggressive medical Rx  Continue Rx for anxiety  Physician Requesting Consult: Laureen Fabian MD    Reason for Consult / Principal Problem:  Chest pain    Inpatient consult to Cardiology  Consult performed by: Evelyn Esposito ordered by: Gela Dallas          HPI: Mann Dykes is a 80y o  year old male who presents with chest pain  Patient has past medical history significant for coronary artery disease status post coronary bypass surgery, status post stent in 2 his LIMA to LAD, which was widely patent on previous catheterization, SVG vein graft to % occluded and SV vein graft to circumflex severely disease with severe diffuse distal disease, chronic stable angina and anxiety, GERD, who was having this episodes of chest pain and S it reflects  He got worried and came to the hospital   He was previously admitted with diastolic heart failure so far he has been doing well  He still have some chest pain but it is much better now    No fever no chills no nausea no vomiting no other significant complaint  He gets very easily anxious  Review of Systems   Cardiovascular: Positive for chest pain  Psychiatric/Behavioral: The patient is nervous/anxious  All other systems reviewed and are negative        Historical Information   Past Medical History:   Diagnosis Date    BPH (benign prostatic hyperplasia)     CAD (coronary artery disease)     Cardiac disease     Cervical spine fracture (HCC)     DVT (deep venous thrombosis) (HCC)     Fracture of lumbar spine (HCC)     Glaucoma     Hyperlipidemia     Hypertension     Myocardial infarct (HCC)     PUD (peptic ulcer disease)      Past Surgical History:   Procedure Laterality Date    CHOLECYSTECTOMY      CORONARY ARTERY BYPASS GRAFT      CORONARY STENT PLACEMENT      EYE SURGERY      HERNIA REPAIR Right 11/3/2017    Procedure: REPAIR HERNIA INGUINAL;  Surgeon: Christy Arias MD;  Location: [address] [place] [address];  Service: General    VT CYSTOURETHROSCOPY W/IRRIG & EVAC CLOTS N/A 6/30/2018    Procedure: CYSTOSCOPY EVACUATION OF CLOTS, fulgeration;  Surgeon: Jan Ingram MD;  [facility];  Service: Urology    STOMACH SURGERY      Billroth 2 gastrectomy    TRANSURETHRAL RESECTION OF PROSTATE      VENA CAVA FILTER PLACEMENT       History   Alcohol Use No     History   Drug Use No     History   Smoking Status    Never Smoker   Smokeless Tobacco    Never Used     Family History:   Family History   Problem Relation Age of Onset    Coronary artery disease Brother        Meds/Allergies    PTA meds:    Prescriptions Prior to Admission   Medication    ALPRAZolam (XANAX) 0 25 mg tablet    artificial tear (LUBRIFRESH P M ) 83-15 % ophthalmic ointment    aspirin 81 MG tablet    Calcium Carbonate-Vitamin D (CALCIUM 600+D PO)    cholecalciferol (VITAMIN D3) 400 units tablet    clopidogrel (PLAVIX) 75 mg tablet    dutasteride (AVODART) 0 5 mg capsule    escitalopram (LEXAPRO) 10 mg tablet    famotidine (PEPCID) 20 mg tablet    isosorbide mononitrate (IMDUR) 30 mg 24 hr tablet    Magnesium 250 MG TABS    metoprolol succinate (TOPROL-XL) 25 mg 24 hr tablet    multivitamin-iron-minerals-folic acid (CENTRUM) chewable tablet    tamsulosin (FLOMAX) 0 4 mg    zinc gluconate 50 mg tablet    nitroglycerin (NITROSTAT) 0 4 mg SL tablet      Allergies   Allergen Reactions    Oxycodone-Acetaminophen Dizziness and Shortness Of Breath     Other reaction(s): Faints  Reaction Date: [id number]; Category:  Adverse Reaction;     Omeprazole Other (See Comments)     pash    Statins Other (See Comments)     Muscle pain       Current Facility-Administered Medications:     ALPRAZolam (XANAX) tablet 0 25 mg, 0 25 mg, Oral, HS PRN, MATT Cagle    artificial tear (LUBRIFRESH P M ) ophthalmic ointment, , Right Eye, HS, MATT Cagle    [START ON 8/10/2018] aspirin chewable tablet 81 mg, 81 mg, Oral, Daily, MATT Cagle    calcium carbonate-vitamin D (OSCAL-D) 500 mg-200 units per tablet 1 tablet, 1 tablet, Oral, Daily, MATT Cagle, 1 tablet at 08/09/18 1546    cholecalciferol (VITAMIN D3) tablet 400 Units, 400 Units, Oral, Daily, MATT Cagle, 400 Units at 08/09/18 1546    clopidogrel (PLAVIX) tablet 75 mg, 75 mg, Oral, Daily, MATT Cagle, 75 mg at 08/09/18 1545    [START ON 8/10/2018] escitalopram (LEXAPRO) tablet 10 mg, 10 mg, Oral, Daily, MATT Cagle    [START ON 8/10/2018] famotidine (PEPCID) tablet 20 mg, 20 mg, Oral, BID, MATT Cagle    finasteride (PROSCAR) tablet 5 mg, 5 mg, Oral, Daily, MATT Cagle, 5 mg at 08/09/18 1546    [START ON 8/10/2018] isosorbide mononitrate (IMDUR) 24 hr tablet 30 mg, 30 mg, Oral, Daily, MATT Cagle    [START ON 8/10/2018] magnesium oxide (MAG-OX) tablet 400 mg, 400 mg, Oral, Daily, MATT Cagle    metoprolol succinate (TOPROL-XL) 24 hr tablet 25 mg, 25 mg, Oral, Daily, Samantha S Santoro, CRNP, 25 mg at 08/09/18 1553    multivitamin-minerals (CENTRUM) tablet 1 tablet, 1 tablet, Oral, Daily, Samantha S Santoro, CRNP, 1 tablet at 08/09/18 1545    nitroglycerin (NITROSTAT) SL tablet 0 4 mg, 0 4 mg, Sublingual, Q5 Min PRN, Samantha S Santoro, CRNP    ondansetron (ZOFRAN) injection 4 mg, 4 mg, Intravenous, Q6H PRN, Samantha S Santoro, CRNP    polyethylene glycol (MIRALAX) packet 17 g, 17 g, Oral, Daily PRN, Samantha S Santoro, CRNP    tamsulosin (FLOMAX) capsule 0 4 mg, 0 4 mg, Oral, Daily With Dinner, [employer] [facility], CRNP, 0 4 mg at 08/09/18 1545    zinc gluconate tablet 25 mg, 25 mg, Oral, Daily, Samantha S Santoro, CRNP        Objective:   Vitals: Blood pressure 118/56, pulse 65, temperature 97 7 °F (36 5 °C), temperature source Oral, resp  rate 18, height 5' 3" (1 6 m), weight 67 6 kg (149 lb 0 5 oz), SpO2 95 %  Body mass index is 26 4 kg/m²  BP Readings from Last 3 Encounters:   08/09/18 118/56   07/06/18 136/72   07/01/18 115/54     Orthostatic Blood Pressures      Most Recent Value   Blood Pressure  118/56 filed at 08/09/2018 1553   Patient Position - Orthostatic VS  Sitting filed at 08/09/2018 1337        No intake or output data in the 24 hours ending 08/09/18 1700    Invasive Devices     Peripheral Intravenous Line            Peripheral IV 08/09/18 Right Antecubital less than 1 day          Drain            Urethral Catheter Latex; Three way 24 Fr  40 days                  Physical Exam:   Physical Exam    Neurologic:  Alert & oriented x 3, no new focal deficits, Not in any acute distress, very anxious  Constitutional:  Well developed, well nourished, non-toxic appearance   Eyes:  Pupil equal and reacting to light, conjunctiva normal   HENT:  Atraumatic, oropharynx moist, Neck- normal range of motion, no tenderness, supple   Respiratory:  Bilateral air entry, mostly clear to auscultation  Cardiovascular: S1-S2 regular with a 2/6 ejection systolic murmur  GI:  Soft, nondistended, normal bowel sounds, nontender, no hepatosplenomegaly appreciated  Musculoskeletal:  No edema, no tenderness, no deformities     Skin:  Well hydrated, no rash   Lymphatic:  No lymphadenopathy noted   Extremities:  No edema and distal pulses are present    Labs:   Troponins:   Results from last 7 days  Lab Units 18  1543 18  1128   TROPONIN I ng/mL <0 02 <0 02       CBC with diff:   Results from last 7 days  Lab Units 18  1128   WBC Thousand/uL 5 02   HEMOGLOBIN g/dL 10 5*   HEMATOCRIT % 35 1*   MCV fL 87   PLATELETS Thousands/uL 180   MCH pg 25 9*   MCHC g/dL 29 9*   RDW % 14 7   MPV fL 10 1   NRBC AUTO /100 WBCs 0       CMP:   Results from last 7 days  Lab Units 18  1127   SODIUM mmol/L 137   POTASSIUM mmol/L 4 3   CHLORIDE mmol/L 100   CO2 mmol/L 31   ANION GAP mmol/L 6   BUN mg/dL 18   CREATININE mg/dL 0 97   GLUCOSE RANDOM mg/dL 150*   CALCIUM mg/dL 8 6   AST U/L 7   ALT U/L 18   ALK PHOS U/L 92   TOTAL PROTEIN g/dL 7 0   BILIRUBIN TOTAL mg/dL 0 30   EGFR ml/min/1 73sq m 69       Coags:   Results from last 7 days  Lab Units 18  1127   PTT seconds 26   INR  0 98         Imaging & Testing   Cardiac testing:   Results for orders placed during the hospital encounter of 17   Echo complete with contrast if indicated    [place] [id number]  [phone]    Transthoracic Echocardiogram  2D, M-mode, Doppler, and Color Doppler    Study date:  2017    Patient: Olu Roe  MR number: [id number]  Account number: [de-identified]  : 02-Aug-1930  Age: 80 years  Gender: Male  Status: Routine  Location: Bedside  Height: 64 in  Weight: 147 6 lb  BP: 118/ 60 mmHg    Indications: acute hypoxic    Diagnoses: I42 9 - Cardiomyopathy, unspecified    Sonographer:  YURI Espinosa  Primary Physician:  Shreya Weinberg DO  Referring Physician:  Ajay Dangelo MD  Interpreting Physician:  Elyssa Perkins DO  acute hypoxic    SUMMARY    LEFT VENTRICLE:  Systolic function was at the lower limits of normal by Teichholz  Ejection  fraction was estimated to be 50 %  There was mild hypokinesis of the septal wall  There was mild concentric hypertrophy  Doppler parameters were consistent with restrictive physiology, indicative of  decreased left ventricular diastolic compliance and/or increased left atrial  pressure  Doppler parameters were consistent with elevated mean left atrial  filling pressure  RIGHT VENTRICLE:  Systolic function was moderately reduced  MITRAL VALVE:  There was moderate regurgitation  AORTIC VALVE:  The valve was trileaflet  Leaflets exhibited moderate calcification and mildly  reduced cuspal separation  There was mild regurgitation  TRICUSPID VALVE:  There was moderate regurgitation  Pulmonary artery systolic pressure was mildly increased  HISTORY: PRIOR HISTORY: CAD, CABG,DVT,CHF,HTN, H/O PROSTATE CANCER    PROCEDURE: The procedure was performed at the bedside  This was a routine  study  The transthoracic approach was used  The study included complete 2D  imaging, M-mode, complete spectral Doppler, and color Doppler  The heart rate  was 84 bpm, at the start of the study  LEFT VENTRICLE: Size was normal  Systolic function was at the lower limits of  normal by Teichholz  Ejection fraction was estimated to be 50 %  There was mild  hypokinesis of the septal wall  There was mild concentric hypertrophy  DOPPLER:  Doppler parameters were consistent with restrictive physiology, indicative of  decreased left ventricular diastolic compliance and/or increased left atrial  pressure  Doppler parameters were consistent with elevated mean left atrial  filling pressure  RIGHT VENTRICLE: The size was normal  Systolic function was moderately reduced  LEFT ATRIUM: The atrium was mildly dilated      RIGHT ATRIUM: Size was normal     MITRAL VALVE: Valve structure was normal  There was normal leaflet separation  No echocardiographic evidence for prolapse  DOPPLER: The transmitral velocity  was within the normal range  There was no evidence for stenosis  There was  moderate regurgitation  AORTIC VALVE: The valve was trileaflet  Leaflets exhibited moderate  calcification and mildly reduced cuspal separation  DOPPLER: Transaortic  velocity was within the normal range  There was no evidence for stenosis  There  was mild regurgitation  TRICUSPID VALVE: The valve structure was normal  There was normal leaflet  separation  DOPPLER: The transtricuspid velocity was within the normal range  There was moderate regurgitation  Pulmonary artery systolic pressure was mildly  increased  Estimated peak PA pressure was 42 mmHg  PULMONIC VALVE: Leaflets exhibited normal thickness, no calcification, and  normal cuspal separation  DOPPLER: The transpulmonic velocity was within the  normal range  There was mild regurgitation  PERICARDIUM: There was no thickening  There was no pericardial effusion  AORTA: The root exhibited normal size  PULMONARY ARTERY: The size was normal  The morphology appeared normal     SYSTEM MEASUREMENT TABLES    2D mode  AoR Diam 2D: 3 3 cm  LA Diam (2D): 3 9 cm  LA/Ao (2D): 1 18  FS (2D Teich): 25 4 %  IVSd (2D): 1 12 cm  LVDEV: 65 5 cm³  LVEDV MOD BP: 143 cm³  LVESV: 32 2 cm³  LVIDd(2D): 3 89 cm  LVISd (2D): 2 9 cm  LVOT Area 2D: 3 14 cm squared  LVPWd (2D): 1 08 cm  SV (Teich): 33 3 cm³    Apical four chamber  LVEF A4C: 49 %    Apical two chamber  LA Area: 21 8 cm squared  LA Volume: 67 cm³  LVEF A2C: 55 %    Unspecified Scan Mode  JUDY Cont Eq (Peak Trino): 1 77 cm squared  LVOT (VTI): 20 6 cm  LVOT Diam : 2 cm  LVOT Vmax: 993 mm/s  LVOT Vmax; Mean: 993 mm/s  Peak Grad ; Mean: 4 mm[Hg]  SV (LVOT): 65 cm³  VTI;Mean: 2 mm[Hg]  JUDY Cont Eq (VTI): 2 25 cm squared  MV Peak A Trino: 1090 mm/s  MV Peak E Trino   Mean: 1620 mm/s  MVA (PHT): 4 cm squared  PHT: 55 ms  Max P mm[Hg]  V Max: 2910 mm/s  Vmax: 2360 mm/s  TAPSE: 1 3 cm    [employer] Accredited Echocardiography [facility]    Prepared and electronically signed by    Mor Saldana DO  Signed 2017 16:48:59         Imaging: I have personally reviewed pertinent reports  Xr Chest 1 View Portable    Result Date: 2018  Narrative: CHEST INDICATION:   Chest pain  COMPARISON:  2018 EXAM PERFORMED/VIEWS:  XR CHEST PORTABLE FINDINGS: Cardiomediastinal silhouette appears unremarkable  The lungs are clear  No pneumothorax or pleural effusion  Osseous structures appear within normal limits for patient age  Impression: No acute cardiopulmonary disease  Workstation performed: [id number]     EKG/ Monitor: Personally reviewed  Normal sinus rhythm with ST changes in multiple leads  Not change from old [de-identified] G  Rare PVCs  Heart rate is around 80 beats per minute  Dr Jasiel Varner MD [facility]      "This note has been constructed using a voice recognition system  Therefore there may be syntax, spelling, and/or grammatical errors   Please call if you have any questions  "

## 2018-08-10 ENCOUNTER — APPOINTMENT (OUTPATIENT)
Dept: NON INVASIVE DIAGNOSTICS | Facility: HOSPITAL | Age: 83
End: 2018-08-10
Payer: MEDICARE

## 2018-08-10 ENCOUNTER — APPOINTMENT (OUTPATIENT)
Dept: RADIOLOGY | Facility: HOSPITAL | Age: 83
End: 2018-08-10
Payer: MEDICARE

## 2018-08-10 VITALS
OXYGEN SATURATION: 98 % | DIASTOLIC BLOOD PRESSURE: 54 MMHG | HEIGHT: 63 IN | HEART RATE: 72 BPM | RESPIRATION RATE: 18 BRPM | TEMPERATURE: 97.9 F | WEIGHT: 148.15 LBS | SYSTOLIC BLOOD PRESSURE: 100 MMHG | BODY MASS INDEX: 26.25 KG/M2

## 2018-08-10 LAB
ANION GAP SERPL CALCULATED.3IONS-SCNC: 4 MMOL/L (ref 4–13)
ATRIAL RATE: 76 BPM
ATRIAL RATE: 98 BPM
BUN SERPL-MCNC: 20 MG/DL (ref 5–25)
CALCIUM SERPL-MCNC: 8.4 MG/DL (ref 8.3–10.1)
CHEST PAIN STATEMENT: NORMAL
CHLORIDE SERPL-SCNC: 105 MMOL/L (ref 100–108)
CHOLEST SERPL-MCNC: 182 MG/DL (ref 50–200)
CO2 SERPL-SCNC: 31 MMOL/L (ref 21–32)
CREAT SERPL-MCNC: 0.85 MG/DL (ref 0.6–1.3)
ERYTHROCYTE [DISTWIDTH] IN BLOOD BY AUTOMATED COUNT: 14.8 % (ref 11.6–15.1)
GFR SERPL CREATININE-BSD FRML MDRD: 78 ML/MIN/1.73SQ M
GLUCOSE P FAST SERPL-MCNC: 98 MG/DL (ref 65–99)
GLUCOSE SERPL-MCNC: 98 MG/DL (ref 65–140)
HCT VFR BLD AUTO: 33.9 % (ref 36.5–49.3)
HDLC SERPL-MCNC: 31 MG/DL (ref 40–60)
HGB BLD-MCNC: 10 G/DL (ref 12–17)
LDLC SERPL CALC-MCNC: 130 MG/DL (ref 0–100)
MAX DIASTOLIC BP: 72 MMHG
MAX HEART RATE: 137 BPM
MAX PREDICTED HEART RATE: 132 BPM
MAX. SYSTOLIC BP: 140 MMHG
MCH RBC QN AUTO: 25.6 PG (ref 26.8–34.3)
MCHC RBC AUTO-ENTMCNC: 29.5 G/DL (ref 31.4–37.4)
MCV RBC AUTO: 87 FL (ref 82–98)
NONHDLC SERPL-MCNC: 151 MG/DL
P AXIS: 13 DEGREES
P AXIS: 55 DEGREES
PLATELET # BLD AUTO: 166 THOUSANDS/UL (ref 149–390)
PMV BLD AUTO: 10 FL (ref 8.9–12.7)
POTASSIUM SERPL-SCNC: 4.3 MMOL/L (ref 3.5–5.3)
PR INTERVAL: 160 MS
PR INTERVAL: 190 MS
PROTOCOL NAME: NORMAL
QRS AXIS: 35 DEGREES
QRS AXIS: 57 DEGREES
QRSD INTERVAL: 86 MS
QRSD INTERVAL: 96 MS
QT INTERVAL: 336 MS
QT INTERVAL: 378 MS
QTC INTERVAL: 425 MS
QTC INTERVAL: 428 MS
RBC # BLD AUTO: 3.9 MILLION/UL (ref 3.88–5.62)
SODIUM SERPL-SCNC: 140 MMOL/L (ref 136–145)
T WAVE AXIS: 236 DEGREES
T WAVE AXIS: 246 DEGREES
TARGET HR FORMULA: NORMAL
TEST INDICATION: NORMAL
TIME IN EXERCISE PHASE: NORMAL
TRIGL SERPL-MCNC: 107 MG/DL
VENTRICULAR RATE: 76 BPM
VENTRICULAR RATE: 98 BPM
WBC # BLD AUTO: 4.71 THOUSAND/UL (ref 4.31–10.16)

## 2018-08-10 PROCEDURE — 93017 CV STRESS TEST TRACING ONLY: CPT

## 2018-08-10 PROCEDURE — 85027 COMPLETE CBC AUTOMATED: CPT | Performed by: NURSE PRACTITIONER

## 2018-08-10 PROCEDURE — 99213 OFFICE O/P EST LOW 20 MIN: CPT | Performed by: INTERNAL MEDICINE

## 2018-08-10 PROCEDURE — 99217 PR OBSERVATION CARE DISCHARGE MANAGEMENT: CPT | Performed by: NURSE PRACTITIONER

## 2018-08-10 PROCEDURE — 93005 ELECTROCARDIOGRAM TRACING: CPT

## 2018-08-10 PROCEDURE — A9502 TC99M TETROFOSMIN: HCPCS

## 2018-08-10 PROCEDURE — 93010 ELECTROCARDIOGRAM REPORT: CPT | Performed by: INTERNAL MEDICINE

## 2018-08-10 PROCEDURE — 78452 HT MUSCLE IMAGE SPECT MULT: CPT

## 2018-08-10 PROCEDURE — 80061 LIPID PANEL: CPT | Performed by: NURSE PRACTITIONER

## 2018-08-10 PROCEDURE — 80048 BASIC METABOLIC PNL TOTAL CA: CPT | Performed by: NURSE PRACTITIONER

## 2018-08-10 RX ADMIN — CHOLECALCIFEROL TAB 10 MCG (400 UNIT) 400 UNITS: 10 TAB at 09:22

## 2018-08-10 RX ADMIN — ISOSORBIDE MONONITRATE 30 MG: 30 TABLET, EXTENDED RELEASE ORAL at 11:15

## 2018-08-10 RX ADMIN — CALCIUM CARBONATE 500 MG (1,250 MG)-VITAMIN D3 200 UNIT TABLET 1 TABLET: at 09:22

## 2018-08-10 RX ADMIN — FINASTERIDE 5 MG: 5 TABLET, FILM COATED ORAL at 09:23

## 2018-08-10 RX ADMIN — Medication 400 MG: at 09:22

## 2018-08-10 RX ADMIN — FAMOTIDINE 20 MG: 20 TABLET ORAL at 09:22

## 2018-08-10 RX ADMIN — Medication 1 TABLET: at 09:22

## 2018-08-10 RX ADMIN — Medication 25 MG: at 09:23

## 2018-08-10 RX ADMIN — ESCITALOPRAM 10 MG: 10 TABLET, FILM COATED ORAL at 09:22

## 2018-08-10 RX ADMIN — TAMSULOSIN HYDROCHLORIDE 0.4 MG: 0.4 CAPSULE ORAL at 16:28

## 2018-08-10 RX ADMIN — ASPIRIN 81 MG 81 MG: 81 TABLET ORAL at 09:22

## 2018-08-10 RX ADMIN — METOPROLOL SUCCINATE 25 MG: 25 TABLET, FILM COATED, EXTENDED RELEASE ORAL at 11:15

## 2018-08-10 RX ADMIN — CLOPIDOGREL 75 MG: 75 TABLET, FILM COATED ORAL at 09:22

## 2018-08-10 NOTE — NURSING NOTE
Zeina removed-pt  tolerated well  AVS reviewed with pt  and spouse and verbally expressed understanding  Pt  Left floor via foot with all his personal belongings, accompanied by wife

## 2018-08-10 NOTE — ASSESSMENT & PLAN NOTE
Patient on Metoprolol  Blood pressure on the low side but stable     · Continue Metoprolol as previously directed

## 2018-08-10 NOTE — PHYSICIAN ADVISOR
Current patient class: Observation  The patient is currently on Hospital Day: 2 at [address] [place] [address]        The patient was admitted to the hospital  on N/A at N/A for the following diagnosis:  Chest pain [R07 9]  Near syncope [R55]     After review of the relevant documentation, labs, vital signs and test results, the patient is most appropriate for OBSERVATION STATUS  The patient was admitted to the hospital without an expected length of stay of at least 2 midnights  Given that the patient is subject to the 2 midnight benchmark as a CMS patient, they are appropriate for observation status at this time  Should the patient remain hospitalized for a second midnight the status should be reevaluated for medical necessity  80year old male admitted to the hospital for chest pain  He had a complete work up including a stress test and cardiology consultation  He was discharged prior to 2 midnights and therefore, he is appropriate for OBSERVATION status  Rationale is as follows: The patient is a 80 yrs old   Male who presented to the ED at 8/9/2018 11:06 AM with a chief complaint of Chest Pain (States he started with chest pain intermittently yesterday that became worse during the night  States SOB   Dr Raymundo Salmon aware patient here)    The patients vitals on arrival were ED Triage Vitals [08/09/18 1108]   Temperature Pulse Respirations Blood Pressure SpO2   (!) 96 8 °F (36 °C) 72 18 122/62 98 %      Temp Source Heart Rate Source Patient Position - Orthostatic VS BP Location FiO2 (%)   Tympanic Monitor Lying Left arm --      Pain Score       3           Past Medical History:   Diagnosis Date    BPH (benign prostatic hyperplasia)     CAD (coronary artery disease)     Cardiac disease     Cervical spine fracture (HCC)     DVT (deep venous thrombosis) (HCC)     Fracture of lumbar spine (HCC)     Glaucoma     Hyperlipidemia     Hypertension     Myocardial infarct (HCC)     PUD (peptic ulcer disease)      Past Surgical History:   Procedure Laterality Date    CHOLECYSTECTOMY      CORONARY ARTERY BYPASS GRAFT      CORONARY STENT PLACEMENT      EYE SURGERY      HERNIA REPAIR Right 11/3/2017    Procedure: REPAIR HERNIA INGUINAL;  Surgeon: Juanita Deleon MD;  Location: [address] [place] [address];  Service: General    ME CYSTOURETHROSCOPY W/IRRIG & EVAC CLOTS N/A 6/30/2018    Procedure: CYSTOSCOPY EVACUATION OF CLOTS, fulgeration;  Surgeon: Cristian Fischer MD;  [facility];  Service: Urology    STOMACH SURGERY      Billroth 2 gastrectomy    TRANSURETHRAL RESECTION OF PROSTATE      VENA CAVA FILTER PLACEMENT             Consults have been placed to:   IP CONSULT TO CARDIOLOGY    Vitals:    08/09/18 2100 08/10/18 0600 08/10/18 0912 08/10/18 1300   BP: 133/61  152/67 100/54   BP Location: Left arm  Left arm Left arm   Pulse: 72  76 72   Resp: 18 18 18   Temp: 97 8 °F (36 6 °C)  97 8 °F (36 6 °C) 97 9 °F (36 6 °C)   TempSrc: Oral  Oral Oral   SpO2:   97% 98%   Weight:  67 2 kg (148 lb 2 4 oz)     Height:           Most recent labs:    Recent Labs      08/09/18   1127   08/09/18   1911  08/10/18   0638   WBC   --    < >   --   4 71   HGB   --    < >   --   10 0*   HCT   --    < >   --   33 9*   PLT   --    < >   --   166   K  4 3   --    --   4 3   NA  137   --    --   140   CALCIUM  8 6   --    --   8 4   BUN  18   --    --   20   CREATININE  0 97   --    --   0 85   INR  0 98   --    --    --    TROPONINI   --    < >  <0 02   --    AST  7   --    --    --    ALT  18   --    --    --    ALKPHOS  92   --    --    --    BILITOT  0 30   --    --    --     < > = values in this interval not displayed         Scheduled Meds:  Current Facility-Administered Medications:  ALPRAZolam 0 25 mg Oral HS PRN MATT Mcmanus   artificial tear  Right Eye HS MATT Cagle   aspirin 81 mg Oral Daily MATT Cagle   calcium carbonate-vitamin D 1 tablet Oral Daily Samantha Díaz CRNP   cholecalciferol 400 Units Oral Daily Samantha Anuja Abt, CRNP   clopidogrel 75 mg Oral Daily [facility] Santoro, CRNP   escitalopram 10 mg Oral Daily [facility] Santoro, CRNP   famotidine 20 mg Oral BID Samantha S Santoro, CRNP   finasteride 5 mg Oral Daily [facility] Santoro, CRNP   isosorbide mononitrate 30 mg Oral Daily [facility] Santoro, CRNP   magnesium oxide 400 mg Oral Daily [facility] Santoro, CRNP   metoprolol succinate 25 mg Oral Daily Samantha Anuja Abt, CRNP   multivitamin-minerals 1 tablet Oral Daily [facility] Ghassan, CRNP   nitroglycerin 0 4 mg Sublingual Q5 Min PRN [facility] Santoro, CRNP   ondansetron 4 mg Intravenous Q6H PRN [facility], CRNP   polyethylene glycol 17 g Oral Daily PRN [facility] Abt, CRNP   regadenoson 0 4 mg Intravenous Once Nataliya Nelson, CRNP   tamsulosin 0 4 mg Oral Daily With [employer], CRNP   zinc gluconate 25 mg Oral Daily [facility] Santoro, CRNP     Continuous Infusions:   PRN Meds:   ALPRAZolam    nitroglycerin    ondansetron    polyethylene glycol

## 2018-08-10 NOTE — PLAN OF CARE
CARDIOVASCULAR - ADULT     Maintains optimal cardiac output and hemodynamic stability Progressing     Absence of cardiac dysrhythmias or at baseline rhythm Progressing        DISCHARGE PLANNING - CARE MANAGEMENT     Discharge to post-acute care or home with appropriate resources Progressing        Potential for Falls     Patient will remain free of falls Progressing

## 2018-08-10 NOTE — ASSESSMENT & PLAN NOTE
Intermittent CP x1 week associated with SOB  Had an episode of feeling dizzy in the ED described it as near-syncope which lasted for few seconds  His Troponin's where negative, EKG with no ST elevation/changes  This could be likely ACS  Due to his extensive cardiac history, Mx stents/CABG, Angina in the past he was kept overnight for observation and Cardiology was consulted  Lexiscan stress test was done and no change from his previous report  He continued to receive ASA, Imdur, Plavix, Beta-blocker and NTG p r n  (but never had to use it)  He remained chest pain-free during this admission with no alarming telemetry events reported

## 2018-08-10 NOTE — ASSESSMENT & PLAN NOTE
2D echo on 1/9/2017 showed EF 50 percent, moderate MVR  Patient not showing any signs of fluid overload  Chest x-ray negative      · Continue Metoprolol as previously directed

## 2018-08-10 NOTE — CASE MANAGEMENT
Initial Clinical Review    Admission: Date/Time/Statement:PUSHPA [email or web address]    Orders Placed This Encounter   Procedures    Place in Observation (expected length of stay for this patient is less than two midnights)     Standing Status:   Standing     Number of Occurrences:   1     Order Specific Question:   Admitting Physician     Answer:   Claudette Whyte     Order Specific Question:   Level of Care     Answer:   Med Surg [16]         ED: Date/Time/Mode of Arrival:   ED Arrival Information     Expected Arrival Acuity Means of Arrival Escorted By Service Admission Type    - 8/9/2018 11:03 Emergent [facility]3] Emergency    Arrival Complaint    chest pain          Chief Complaint:   Chief Complaint   Patient presents with    Chest Pain     States he started with chest pain intermittently yesterday that became worse during the night  States SOB  Dr Vannessa Woods aware patient here       History of Illness: Paresh Zabala is a 80 y o  male with a PMH of CABG, multiple stents, CAD, hematuria, left leg DVT with IVC filter, BPH, HTN, hyperlipidemia, prostate CA, PUD who presents with CP x1 week  Patient has been having intermittent chest pain associated with shortness of breath x1 week described it as squeezing/tightness started on his right side to left side than to the center of his chest   He did not sleep last night because of this  Chest pain came back this morning while resting  He did not take anything to relieve the discomfort  He has NTG at home but did not want to take the risk, called Dr Chase Connor office and adviced to come here  Patient reported stomach virus last week with an episode of diarrhea but is resolved now  In the ED, he was given ASA and patient reported he felt a little dizzy and was in near-syncope for few seconds       ED Vital Signs:   ED Triage Vitals [08/09/18 1108]   Temperature Pulse Respirations Blood Pressure SpO2   (!) 96 8 °F (36 °C) 72 18 122/62 98 %      Temp Source Heart Rate Source Patient Position - Orthostatic VS BP Location FiO2 (%)   Tympanic Monitor Lying Left arm --      Pain Score       3        Wt Readings from Last 1 Encounters:   08/10/18 67 2 kg (148 lb 2 4 oz)       Vital Signs (abnormal):  08/09/18 1108   96 8 °F (36 °C)  72  18  122/62  98 %  None (Room air)  Lying       Abnormal Labs/Diagnostic Test Results:   HEMOGLOBIN g/dL 10 5*   HEMATOCRIT % 35 1*     GLUCOSE RANDOM mg/dL 150*     TROPONIN <0 02,<0 02,<0 02    CXR:   No acute cardiopulmonary disease          · EKG: RSR rate of 76 with occassional PVC's  T wave abnormality noted     ED Treatment:   Medication Administration from 08/09/2018 1103 to 08/09/2018 1327       Date/Time Order Dose Route Action Action by Comments     08/09/2018 1134 sodium chloride 0 9 % infusion 125 mL/hr Intravenous [address] Chio Coulter RN      08/09/2018 1133 aspirin chewable tablet 162 mg 162 mg Oral Given Chio Coulter RN           Past Medical/Surgical History:    Active Ambulatory Problems     Diagnosis Date Noted    Acute respiratory failure with hypoxia ([facility] [address] ) 01/07/2017    CAD (coronary artery disease) 01/07/2017    History of DVT (deep vein thrombosis) 01/07/2017    PUD (peptic ulcer disease) 01/07/2017    Hypertension 01/07/2017    Hyperlipidemia 01/07/2017    Chronic diastolic heart failure ([facility] [address] ) 01/07/2017    Chest pain 01/07/2017    Incarcerated right inguinal hernia 11/02/2017    Inguinal hernia 11/02/2017    Benign prostatic hyperplasia 10/22/2015    Anxiety 04/17/2018    Anemia 04/17/2018    Hematuria 06/29/2018    Bladder mass 06/29/2018    Anemia of chronic disease 06/30/2018    Depression 06/30/2018    Dry eye 07/01/2018     Resolved Ambulatory Problems     Diagnosis Date Noted    Dizziness 02/21/2016    Acute bronchitis 02/21/2016    Cardiomegaly 01/07/2017     Past Medical History:   Diagnosis Date    BPH (benign prostatic hyperplasia)     CAD (coronary artery disease)     Cardiac disease     Cervical spine fracture (HCC)     DVT (deep venous thrombosis) (HCC)     Fracture of lumbar spine (HCC)     Glaucoma     Hyperlipidemia     Hypertension     Myocardial infarct (HCC)     PUD (peptic ulcer disease)        Admitting Diagnosis: Chest pain [R07 9]  Near syncope [R55]    Age/Sex: 80 y o  male    Assessment/Plan:   * Chest pain   Assessment & Plan     As evidence by intermittent chest pain x1 week associated with SOB  Troponin negative, EKG with no ST elevation/changes  Pt has extensive cardiac history, Mx stents/CABG, Angina in the past   He sees Dr Pinky Land in past   Patient given 2 baby ASA in the ED  Patient reported feeling dizzy in the ED, described it as near-syncope  This lasted for few seconds  · Trend troponin  · Telemetry/monitor  · Cardiology consult  · Continue ASA, Imdur, Plavix, Beta-blocker  · NTG p r n               Chronic diastolic heart failure ([facility] [address] )   Assessment & Plan     2D echo on 1/9/2017 showed EF 50 percent, moderate MVR  Patient not showing any signs of fluid overload  Chest x-ray negative  · Continue beta-blocker  · Will monitor VS, IO's          CAD (coronary artery disease)   Assessment & Plan     Patient with extensive cardiac history, CABG x6, MI  · See stable angina plan          Hypertension   Assessment & Plan     Patient on Metoprolol  Blood pressure on the low side but will continue to monitor  · Continue          Hyperlipidemia   Assessment & Plan     Patient not on any statins due to intolerance, severe muscle pain  · Lipid profile in a m           PUD (peptic ulcer disease)   Assessment & Plan     Patient on Famotidine  · Continue          Benign prostatic hyperplasia   Assessment & Plan     Patient on Dutasteride and Tamsulosin  · Continue Tamsulosin  · Dutasteride not formulary Finasteide use as alternative          Anxiety   Assessment & Plan     Patient takes Xanax p r n    · Continue          History of DVT (deep vein thrombosis)   Assessment & Plan     History of left DVT in the past with IVC filter                         VTE Prophylaxis: No due to his long history of hematuria  / sequential compression device   Code Status: Level 1 - Full Code     Anticipated Length of Stay:  Patient will be admitted on an Observation basis with an anticipated length of stay of  < 2 midnights  Justification for Hospital Stay:  Cardiac workup         1  Chest pain rule out ACS-patient will be monitored on telemetry  Continue aspirin and Plavix  Cardiology evaluation  Patient will be scheduled for nuclear stress test in a         CARDIOLOGY CONSULT:  Assessment & Plan   1  Chest pain, rule out acute coronary syndrome  2  History of coronary artery disease with chronic stable angina  Need to rule out acute coronary syndrome  So far troponins are negative  3  Essential hypertension  4  History of peptic ulcer disease  5  Mixed hyperlipidemia  6  Severe anxiety  7  History of anemia  8  History of chronic diastolic heart failure currently compensated     Summary of Recommendations:         Monitor on tele  Input output  Continue aspirin, Plavix  Continue beta-blocker and Imdur  Will use Ranexa if he continues to have symptoms  These medication not use at a lower dose due to patient's low blood pressure  Previous echo reviewed  Lexiscan stress test the morning  For now continue aggressive medical Rx  Continue Rx for anxiety      PROGRESS NOTE 8/10:  Assessment/Plan:  1  Chest pain:  Nuclear stress test stable when compared to previous  Troponins were negative    2   Coronary artery disease with chronic stable angina:  Plavix, beta-blocker and Imdur  Patient has been pain-free since admission to the hospital   Will reserve Ranexa at this time      3    History peptic ulcer disease with previous gastric resection:  From patient's description, appears he has a very small gastric area and almost experiences symptoms similar to gastric bypass patients if they overload there out his  Would encourage patient to take frequent small meals  Discussed option of holding aspirin at this time to see if it improves his symptoms      4   Essential hypertension, patient's blood pressure has been been stable, received a blood pressure of 100/54 post stress test, but appears to be a mass number as other blood pressures have been 800-938 systolically      5  Mixed lipidemia     6  Anxiety disorder:  Consider following up with primary care physician for prescription medication     7  History of anemia     8  History of chronic diastolic heart failure:  Currently stable      Admission Orders:  TELE  STRESS TEST  CARDIAC STRESS DIET  SEQ COMP DEVICE  DAILY WEIGHTS  OOB  CONSULT CARDIOLOGY    Scheduled Meds:   Current Facility-Administered Medications:  ALPRAZolam 0 25 mg Oral HS PRN Samantha Ida Smart, CRNP   artificial tear  Right Eye HS Samantha S Yvan, CRNP   aspirin 81 mg Oral Daily Samantha S Santoro, CRNP   calcium carbonate-vitamin D 1 tablet Oral Daily Samantha Conroe Smart, CRNP   cholecalciferol 400 Units Oral Daily Samantha [place] Smart, CRNP   clopidogrel 75 mg Oral Daily Samantha S Ghassan, CRNP   escitalopram 10 mg Oral Daily Samantha S Ghassan, CRNP   famotidine 20 mg Oral BID Samantha S Santoro, CRNP   finasteride 5 mg Oral Daily Samantha S Santoro, CRNP   isosorbide mononitrate 30 mg Oral Daily Samantha S Santoro, CRNP   magnesium oxide 400 mg Oral Daily Samantha S Santoro, CRNP   metoprolol succinate 25 mg Oral Daily Samantha S Ghassan, CRNP   multivitamin-minerals 1 tablet Oral Daily Samantha S Ghassan, CRNP   nitroglycerin 0 4 mg Sublingual Q5 Min PRN Samantha S Santoro, CRNP   ondansetron 4 mg Intravenous Q6H PRN Samantha S Santoro, CRNP   polyethylene glycol 17 g Oral Daily PRN Samantha S Santoro, CRNP   tamsulosin 0 4 mg Oral Daily With [employer], CRNP   zinc gluconate 25 mg Oral Daily Samantha S Ghassan, 10 [email or web address] - D/C'D    Continuous Infusions:    PRN Meds:   ALPRAZolam    nitroglycerin    ondansetron    polyethylene glycol

## 2018-08-10 NOTE — DISCHARGE SUMMARY
Discharge- Stefanie Díaz 8/2/1930, 80 y o  male MRN: [id number]    Unit/Bed#: [address] [place] [address] Encounter: [id number]    Primary Care Provider: Lincoln Vargas DO   Date and time admitted to hospital: 8/9/2018 11:06 AM        * Chest pain   Assessment & Plan    Intermittent CP x1 week associated with SOB  Had an episode of feeling dizzy in the ED described it as near-syncope which lasted for few seconds  His Troponin's where negative, EKG with no ST elevation/changes  This could be likely ACS  Due to his extensive cardiac history, Mx stents/CABG, Angina in the past he was kept overnight for observation and Cardiology was consulted  Lexiscan stress test was done and no change from his previous report  He continued to receive ASA, Imdur, Plavix, Beta-blocker and NTG p r n  (but never had to use it)  He remained chest pain-free during this admission with no alarming telemetry events reported  Chronic diastolic heart failure ([facility] [address] )   Assessment & Plan    2D echo on 1/9/2017 showed EF 50 percent, moderate MVR  Patient not showing any signs of fluid overload  Chest x-ray negative  · Continue Metoprolol as previously directed          CAD (coronary artery disease)   Assessment & Plan    Patient with extensive cardiac history, CABG, MX stents, chronic stable angina, MI came with unrelieved CP at home  His Lexiscan stress test was stable compared to his previous  He stayed chest pain free while in the hospital         Hypertension   Assessment & Plan    Patient on Metoprolol  Blood pressure on the low side but stable  · Continue Metoprolol as previously directed        Hyperlipidemia   Assessment & Plan    HDL 31    Patient not on any statins due to intolerance, severe muscle pain  Instructed on diet modification and exercise  PUD (peptic ulcer disease)   Assessment & Plan    Patient on Famotidine at home    · Continue as previously directed        Benign prostatic hyperplasia Assessment & Plan    Patient on Dutasteride and Tamsulosin at home  · Continue Tamsulosin/Dutasteride as previously directed          Anxiety   Assessment & Plan    Patient takes Xanax p r n  No anxiety reported  · Continue as previously directed        History of DVT (deep vein thrombosis)   Assessment & Plan    History of left DVT in the past with IVC filter              Discharging Physician / Practitioner: Nina Barajas  PCP: Lady Kasi DO  Admission Date: 8/9/2018  Discharge Date: 08/10/18    Reason for Admission: Chest Pain (States he started with chest pain intermittently yesterday that became worse during the night  States SOB  Dr Delfino Guzman aware patient here)        Resolved Problems  Date Reviewed: 8/10/2018    None          Consultations During Hospital Stay:  Carlos Dunlap TO CARDIOLOGY    Procedures Performed:     · Nuclear stress test    Significant Findings / Test Results:     · None    Xr Chest 1 View Portable      Result Date: 8/9/2018  Narrative: CHEST INDICATION:   Chest pain  COMPARISON:  6/30/2018 EXAM PERFORMED/VIEWS:  XR CHEST PORTABLE FINDINGS: Cardiomediastinal silhouette appears unremarkable  The lungs are clear  No pneumothorax or pleural effusion  Osseous structures appear within normal limits for patient age  Impression: No acute cardiopulmonary disease  Workstation performed: [id number]         Incidental Findings:   · None    Test Results Pending at Discharge (will require follow up): · None     Outpatient Tests Requested:  · None    Complications:  None    Reason for Admission:  Chest pain    Hospital Course:     Thomas Garza is a 80 y o  male patient with a PMH ofof CABG, multiple stents, CAD, hematuria, left leg DVT with IVC filter, BPH, HTN, hyperlipidemia, prostate CA, PUD  who originally presented to the hospital on 8/9/2018 due to CP x1 week  Mr Margoth Vail has a history of chronic stable angina    His EKG was unremarkable, 3 sets of troponins were negative, chest x-ray was negative  He was admitted to telemetry for observation  Cardiology was consulted  Nuclear stress study was done with no significant change from his previous  He continued to receive ASA and his daily medication regimen  He had no events on the telemetry and remained chest pain-free upon discharge  He was advised to follow up with Dr Pk Bella and to continue all his medications as previously directed  Please see above list of diagnoses and related plan for additional information  Condition at Discharge: good     Discharge Day Visit / Exam:     Subjective:  Patient denies any chest pain, shortness of breath, dizziness, lightheadedness  He feels better and is looking forward to go home  Vitals: Blood Pressure: 100/54 (08/10/18 1300)  Pulse: 72 (08/10/18 1300)  Temperature: 97 9 °F (36 6 °C) (08/10/18 1300)  Temp Source: Oral (08/10/18 1300)  Respirations: 18 (08/10/18 1300)  Height: 5' 3" (160 cm) (08/09/18 1525)  Weight - Scale: 67 2 kg (148 lb 2 4 oz) (08/10/18 0600)  SpO2: 98 % (08/10/18 1300)     Exam:   Physical Exam   Constitutional: He is oriented to person, place, and time  He appears well-developed and well-nourished  No distress  HENT:   Head: Atraumatic  Neck: Normal range of motion  Neck supple  Cardiovascular: Normal rate and regular rhythm  Murmur heard  S1-S2 noted   Pulmonary/Chest: Effort normal and breath sounds normal  He has no rales  Abdominal: Soft  Bowel sounds are normal    Musculoskeletal: Normal range of motion  He exhibits no edema  Neurological: He is alert and oriented to person, place, and time  Skin: Skin is warm and dry  Psychiatric: He has a normal mood and affect  His behavior is normal  Judgment and thought content normal            Discharge instructions/Information to patient and family:   See after visit summary for information provided to patient and family        Provisions for Follow-Up Care:  See after visit summary for information related to follow-up care and any pertinent home health orders  Disposition:     Home    Planned Readmission: None     Discharge Statement:  I spent 30 minutes discharging the patient  This time was spent on the day of discharge  I had direct contact with the patient on the day of discharge  Greater than 50% of the total time was spent examining patient, answering all patient questions, arranging and discussing plan of care with patient as well as directly providing post-discharge instructions  Additional time then spent on discharge activities  Discharge Medications:  See after visit summary for reconciled discharge medications provided to patient and family        ** Please Note: This note has been constructed using a voice recognition system **

## 2018-08-10 NOTE — DISCHARGE INSTRUCTIONS
Please call Dr Sky Donald office to see him without fail  Acute Coronary Syndrome   WHAT YOU NEED TO KNOW:   What is acute coronary syndrome (ACS)? ACS is sudden decreased blood flow to your heart  This causes a lack of oxygen to your heart and can lead to unstable angina or a heart attack  What causes ACS? ACS is caused by narrowing of the blood vessels that carry blood and oxygen to the heart muscle  Unstable angina occurs when part of the artery is blocked, or a clot gets stuck and then breaks free  A heart attack occurs when the narrowed artery becomes totally blocked, usually by a blood clot or plaque  What increases my risk for ACS? · Plaque or platelet buildup in your arteries    · Personal or family history of coronary artery disease    · Medical conditions, such as kidney disease, high blood pressure, or diabetes    · Being a woman over 36years old or man over 35years old    · Obesity, smoking, or lack of exercise  What are the signs and symptoms of ACS? · Chest pain or discomfort, including squeezing, crushing, pressure, tightness, or heaviness    · Pain or discomfort in your arms, shoulders, neck, back, or jaw    · Heartburn, nausea, or vomiting    · Abdominal pain    · Shortness of breath    · Sweating, weakness, or fainting  How is ACS diagnosed? · An EKG  records the electrical activity of your heart  It is used to check for abnormal heart rhythm  You may need more than one EKG  · Blood tests  may show signs of damage to your heart muscle  These tests will be done more than once  · An echocardiogram (echo)  is a type of ultrasound that shows the size and shape of your heart  An echo also looks at how your heart moves when it is beating  An echo can show fluid around your heart or problems with your heart valves  · A cardiac catheterization  is a procedure to check for blockage in your arteries  A tube is threaded to your heart through a blood vessel in your leg or arm   Contrast liquid will be injected through the tube to help healthcare providers see the pictures better  Tell the healthcare provider if you have ever had an allergic reaction to contrast liquid  · A stress test  helps healthcare providers see the changes that take place in your heart while it is under stress  Healthcare providers may place stress on your heart with exercise or medicine  Which medicines are used to treat ACS? · ACE inhibitors and beta-blockers  keep your blood vessels open and help your heart pump strongly and regularly  · Cholesterol medicine  helps lower the amount of plaque buildup in your arteries  · Blood pressure medicine  helps decrease the strain on your heart  · Pain medicine  helps decrease your pain and slows your heart rate  · Blood thinners    help prevent blood clots  The following are general safety guidelines to follow while you are taking a blood thinner:    ¨ Watch for bleeding and bruising while you take blood thinners  Watch for bleeding from your gums or nose  Watch for blood in your urine and bowel movements  Use a soft washcloth on your skin, and a soft toothbrush to brush your teeth  This can keep your skin and gums from bleeding  If you shave, use an electric shaver  Do not play contact sports  ¨ Tell your dentist and other healthcare providers that you take anticoagulants  Wear a bracelet or necklace that says you take this medicine  ¨ Do not start or stop any medicines unless your healthcare provider tells you to  Many medicines cannot be used with blood thinners  ¨ Tell your healthcare provider right away if you forget to take the medicine, or if you take too much  ·   · Antiplatelets , such as aspirin, help prevent blood clots  Take your antiplatelet medicine exactly as directed  These medicines make it more likely for you to bleed or bruise  If you are told to take aspirin, do not take acetaminophen or ibuprofen instead       · Thrombolytics  help break apart and dissolve clots  · Nitroglycerin  opens the arteries to your heart so the heart gets more oxygen  It is given as a pill, IV, or topical patch or paste  What are some other treatments for ACS? · An angioplasty  is a procedure to open an artery blocked by plaque  A tube with a balloon on the end is threaded into the blocked artery  The balloon is filled with liquid, which presses the plaque against the artery wall  This opens the artery so blood can flow through it  · Coronary intravascular stent placement  is usually done during an angioplasty  A stent is a hollow tube made of wire mesh that is put into a coronary artery  The stent supports the artery and keeps it open so blood can flow through it  · Coronary artery bypass graft surgery (CABG)  is open-heart surgery  A graft is used from another artery in your body to replace the blocked one  · Cardiac rehab  is a program that teaches you how to live a more heart-healthy lifestyle, including nutrition and exercise  How can I help prevent ACS? · Eat a variety of healthy, low-fat foods  Healthy foods include fruits, vegetables, whole-grain breads, low-fat dairy products, beans, lean meats, and fish  Ask for more information about a heart healthy diet  · Ask about activity  Your healthcare provider will tell you which activities to limit or avoid  Ask when you can drive, return to work, and have sex  Ask about the best exercise plan for you  · Maintain a healthy weight  Ask your healthcare provider how much you should weigh  Ask him to help you create a weight loss plan if you are overweight  · Do not smoke  Nicotine and other chemicals in cigarettes and cigars can cause heart and lung damage  Ask your healthcare provider for information if you currently smoke and need help to quit  E-cigarettes or smokeless tobacco still contain nicotine  Talk to your healthcare provider before you use these products       · Get the flu and pneumonia vaccines  All adults should get the influenza (flu) vaccine  Get it every year as soon as it becomes available  The pneumococcal vaccine is given to adults aged 72 years or older  The vaccine is given every 5 years to prevent pneumococcal disease, such as pneumonia  Call 911 for any of the following symptoms of a heart attack:   · Squeezing, pressure, fullness, or pain in your chest that lasts longer than a few minutes or returns     · Discomfort or pain in your back, neck, jaw, stomach, or arm    · Shortness of breath or breathing problems    · A sudden cold sweat, lightheadedness, dizziness, or nausea, especially with chest pain or trouble breathing  When should I contact my healthcare provider? · You have questions or concerns about your condition or care  CARE AGREEMENT:   You have the right to help plan your care  Learn about your health condition and how it may be treated  Discuss treatment options with your caregivers to decide what care you want to receive  You always have the right to refuse treatment  The above information is an  only  It is not intended as medical advice for individual conditions or treatments  Talk to your doctor, nurse or pharmacist before following any medical regimen to see if it is safe and effective for you  © 2017 [facility] Information is for End User's use only and may not be sold, redistributed or otherwise used for commercial purposes  All illustrations and images included in CareNotes® are the copyrighted property of A D A M [employer]  or Serafin Del Castillo

## 2018-08-10 NOTE — PROGRESS NOTES
Progress Note - Cardiology   Milana Lozano 80 y o  male MRN: [id number]  Unit/Bed#: [address] [place] [address] Encounter: [id number]    Assessment/Plan:  1  Chest pain:  Nuclear stress test stable when compared to previous  Troponins were negative    2   Coronary artery disease with chronic stable angina:  Plavix, beta-blocker and Imdur  Patient has been pain-free since admission to the hospital   Will reserve Ranexa at this time  3   History peptic ulcer disease with previous gastric resection:  From patient's description, appears he has a very small gastric area and almost experiences symptoms similar to gastric bypass patients if they overload there out his  Would encourage patient to take frequent small meals  Discussed option of holding aspirin at this time to see if it improves his symptoms  4   Essential hypertension, patient's blood pressure has been been stable, received a blood pressure of 100/54 post stress test, but appears to be a mass number as other blood pressures have been 784-408 systolically  5   Mixed lipidemia    6  Anxiety disorder:  Consider following up with primary care physician for prescription medication    7  History of anemia    8  History of chronic diastolic heart failure:  Currently stable  Subjective/Objective   No further complaints of chest pain  Patient states discomfort usually will happen at night when he takes his Plavix with a glass of water  He notes a history of gastric resection secondary to GI bleed  He notes if he eats too much, he feels very full and has a lot of pressure in his chest   Patient did a Alex protocol nuclear stress test   He exercised for a total of 3 minutes and 33 seconds  He achieved a workload mets of 5 9 and a maximal heart rate of 137 beats per minute or 104%  Perfusion demonstrated a moderate-sized, fixed myocardial perfusion defect at the apical wall  His calculated ejection fraction was 43%    This does not appear to be changed from previous studies  Testing was reviewed at length with patient  Patient verbalizes understanding  Vitals: /54 (BP Location: Left arm)   Pulse 72   Temp 97 9 °F (36 6 °C) (Oral)   Resp 18   Ht 5' 3" (1 6 m)   Wt 67 2 kg (148 lb 2 4 oz)   SpO2 98%   BMI 26 24 kg/m²   Vitals:    08/09/18 1525 08/10/18 0600   Weight: 67 6 kg (149 lb 0 5 oz) 67 2 kg (148 lb 2 4 oz)     Orthostatic Blood Pressures      Most Recent Value   Blood Pressure  100/54 filed at 08/10/2018 1300   Patient Position - Orthostatic VS  Sitting filed at 08/10/2018 1300          No intake or output data in the 24 hours ending 08/10/18 1526    Invasive Devices     Peripheral Intravenous Line            Peripheral IV 08/09/18 Right Antecubital 1 day                Review of Systems:  Patient denies headache, dizziness, blurred vision  He denies any further chest pain, pressure, or palpitations  He denies any nausea, vomiting or diarrhea  Feels his activity is stable  Physical Exam:   Constitutional:  Patient is alert and oriented x3, in no acute distress  Verbalizes well without any difficulty  Neck:  Neck is supple, no JVD  HEET: normocephalic, atraumatic,  Respiratory:  Clear anteriorly, slightly decreased at the bases  No wheezing, rhonchi or use of accessory muscles  Cardiovascular:  S1-S2 regular rate rhythm, is a 2/6 systolic ejection murmur  GI:  Positive bowel sounds x4, soft and nontender  Extremities: no edema or calf tenderness  Skin:  Appears to be intact  Lab Results:   I have personally reviewed pertinent lab results      CBC with diff:   Results from last 7 days  Lab Units 08/10/18  0638   WBC Thousand/uL 4 71   RBC Million/uL 3 90   HEMOGLOBIN g/dL 10 0*   HEMATOCRIT % 33 9*   MCV fL 87   MCH pg 25 6*   MCHC g/dL 29 5*   RDW % 14 8   MPV fL 10 0   PLATELETS Thousands/uL 166     CMP:   Results from last 7 days  Lab Units 08/10/18  0638 08/09/18  1127   SODIUM mmol/L 140 137   POTASSIUM mmol/L 4 3 4 3 CHLORIDE mmol/L 105 100   CO2 mmol/L 31 31   ANION GAP mmol/L 4 6   BUN mg/dL 20 18   CREATININE mg/dL 0 85 0 97   GLUCOSE RANDOM mg/dL 98 150*   CALCIUM mg/dL 8 4 8 6   AST U/L  --  7   ALT U/L  --  18   ALK PHOS U/L  --  92   TOTAL PROTEIN g/dL  --  7 0   BILIRUBIN TOTAL mg/dL  --  0 30   EGFR ml/min/1 73sq m 78 69     Troponin:   0  Lab Value Date/Time   TROPONINI <0 02 08/09/2018 1911   TROPONINI <0 02 08/09/2018 1543   TROPONINI <0 02 08/09/2018 1128   TROPONINI 0 13 (H) 01/07/2017 1316   TROPONINI 0 12 (H) 01/07/2017 1025   TROPONINI 0 16 (H) 01/07/2017 0537   TROPONINI 0 02 01/06/2017 2320   TROPONINI 0 02 02/22/2016 1212   TROPONINI 0 02 02/22/2016 0444   TROPONINI 0 02 02/21/2016 2219   TROPONINI <0 02 02/21/2016 1753   TROPONINI <0 02 09/25/2015 1430   TROPONINI 0 02 09/25/2015 0520     BNP:   Results from last 7 days  Lab Units 08/10/18  0638   SODIUM mmol/L 140   POTASSIUM mmol/L 4 3   CHLORIDE mmol/L 105   CO2 mmol/L 31   ANION GAP mmol/L 4   BUN mg/dL 20   CREATININE mg/dL 0 85   GLUCOSE RANDOM mg/dL 98   CALCIUM mg/dL 8 4   EGFR ml/min/1 73sq m 78     Coags:   Results from last 7 days  Lab Units 08/09/18  1127   PTT seconds 26   INR  0 98     TSH:     Magnesium:     Lipid Profile:   Results from last 7 days  Lab Units 08/10/18  0638   HDL mg/dL 31*   CHOLESTEROL mg/dL 182   LDL CALC mg/dL 130*   TRIGLYCERIDES mg/dL 107     Imaging: I have personally reviewed pertinent reports      EKG:  Sinus rhythm  VTE Mechanical Prophylaxis: sequential compression device

## 2018-08-10 NOTE — ASSESSMENT & PLAN NOTE
HDL 31    Patient not on any statins due to intolerance, severe muscle pain  Instructed on diet modification and exercise

## 2018-08-10 NOTE — ASSESSMENT & PLAN NOTE
Patient with extensive cardiac history, CABG, MX stents, chronic stable angina, MI came with unrelieved CP at home  His Lexiscan stress test was stable compared to his previous    He stayed chest pain free while in the hospital

## 2018-08-10 NOTE — PLAN OF CARE
CARDIOVASCULAR - ADULT     Maintains optimal cardiac output and hemodynamic stability Adequate for Discharge     Absence of cardiac dysrhythmias or at baseline rhythm Adequate for Discharge        DISCHARGE PLANNING - CARE MANAGEMENT     Discharge to post-acute care or home with appropriate resources Adequate for Discharge        Potential for Falls     Patient will remain free of falls Adequate for Discharge

## 2018-08-10 NOTE — SOCIAL WORK
Met with pt  Resides with wife Pinky [place]ol[place] [facility])  Home is one floor with 3 steps to enter  Has no dme  No hhc  Has been independent and driving  Continues to were several hours per week  Explained role of cm, no d/c needs  CM reviewed d/c planning process including the following: identifying help at home, patient preference for d/c planning needs, and availability of the treatment team to discuss questions or concerns patient and/or family may have regarding understanding of medications and recognizing signs and symptoms once discharged  CM also encouraged patient to follow up with all recommended appointments after discharge  Patient advised of importance for patient and family to participate in managing patient's medical well being

## 2018-08-10 NOTE — ASSESSMENT & PLAN NOTE
Patient on Dutasteride and Tamsulosin at home    · Continue Tamsulosin/Dutasteride as previously directed

## 2018-08-11 LAB — MRSA NOSE QL CULT: NORMAL

## 2018-08-12 PROBLEM — I20.8 CHRONIC STABLE ANGINA (HCC): Status: ACTIVE | Noted: 2018-08-12

## 2018-08-12 PROBLEM — I25.10 3-VESSEL CAD: Status: ACTIVE | Noted: 2018-08-12

## 2018-08-12 NOTE — PROGRESS NOTES
Progress Note - Cardiology Office  Airam Ghotra 80 y o  male MRN: [id number]  : 1930  Encounter: [id number]      Assessment:     1  Chronic diastolic heart failure ([facility] )    2  Coronary artery disease involving native coronary artery of native heart with angina pectoris ([facility] )    3  Essential hypertension    4  PUD (peptic ulcer disease)    5  Mixed hyperlipidemia    6  Stable angina pectoris ([facility] )    7  Anemia of chronic disease    8  Anxiety    9  3-vessel CAD s/p  Hx of CABG    10  Chronic stable angina [facility])        Discussion Summary and Plan:  1  Coronary artery disease status post CABG and stents with multiple cardiac catheterization a year ago ago in [facility] by me and it was recommended medical therapy  Patient has symptoms of chronic stable angina which is not changed  Repeat stress test done recently during hospital admission is not change  2  Chronic stable angina  Patient was recently admitted  He is on Ranexa  Tolerating well  Actually chest pain got better once he stopped aspirin  He is on Plavix for now  3  Generalized anxiety  On p r n  and patient was reassured  Continue same medications  4  Dyslipidemia  Patient was not taking any statins  He had stopped it as he could not tolerate it  He does not want to try any other medications  I did talk to him about PS K inhibitors  Patient is currently feeling well  He is reluctant to try any other medications  5  Chronic diastolic heart failure [place] Heart Association class 2  No more episodes of heart failure  He has stopped taking diuretics and he is doing well with that  He does have ischemic cardiomyopathy is EF is around 40-45%  6  Benign prostate hypertrophy status post surgery  7  Ischemic cardiomyopathy EF around 40-45%  Currently compensated  8   History of peptic ulcer disease  Patient was previously on Protonix  He got better with stopping aspirin    Advised to consider using enteric-coated aspirin  Counseling :  A description of the counseling  Spoke to patient about chronic stable angina, coronary artery disease, dyslipidemia, diastolic heart failure at length  Diet advised  Patient's ability to self care: Yes  Medication side effect reviewed with patient in detail and all their questions answered to their satisfaction  HPI :     Pineda Noble is a 80y o  year old male who came for follow up  Patient has a past medical history significant for coronary artery disease status post CABG status post stent into his MCKENNA to LAD widely patent, SVG vein graft to 100% occluded and circumflex was severely diffusely disease with severe diffuse distal disease, chronic stable angina, status post MI anxiety, GERD, dyslipidemia who came for follow-up  was admitted to [facility] with chronic diastolic heart failure and is now compensated  He denies any chest pain or any shortness of breath  Occasionally feel dizzy in the morning  Has been on Demadex 20 mg and potassium 20 mg daily  All medications reviewed with him     /02/2017seen and evaluated  Above reviewed  He is feeling better  He has no exertional chest pain  No PND no orthopnea no shortness of breath  Pretty active  He does get pain when he bends over and it gets better immediately when he stands up  It last only on till he is bending and it is sharp  No fever no chills no nausea of the complete   04/17/2018  Above reviewed  Overall patient is doing well  There is no symptoms with normal activities  He does have history of chronic stable angina which is not changed  He is pretty active  He does get pain when he bends over but gets better when he stands up  No fever no chills no nausea no vomiting no leg swelling  Recent blood test shows he is anemic hemoglobin has dropped  He is working with his primary care doctor to improve that  No nausea no vomiting no other significant complaint    By mistake he was taking metoprolol short-acting which will be switched to long-acting  08/14/2018  Patient was recently admitted to [facility] with atypical chest pain  He was very anxious  He underwent exercise nuclear stress test   He has evidence of old infarct  His EF is around 45%  He is chest pain-free now  He was taking aspirin which was giving him a burning feeling in stomach area  He stopped taking it  He is feeling much better  He is on Plavix now  No chest pain no shortness of breath no nausea no vomiting no PND no orthopnea no other significant complaint  Review of Systems   Constitutional: Negative for activity change, chills, diaphoresis, fever and unexpected weight change  HENT: Negative for congestion  Eyes: Negative for discharge and redness  Respiratory: Negative for cough, chest tightness and wheezing  Cardiovascular: Negative  Negative for chest pain, palpitations and leg swelling  Gastrointestinal: Negative for abdominal pain, diarrhea and nausea  Endocrine: Negative  Genitourinary: Negative for decreased urine volume and urgency  Musculoskeletal: Positive for gait problem  Negative for arthralgias and back pain  Skin: Negative for rash and wound  Allergic/Immunologic: Negative  Neurological: Negative for dizziness, seizures, syncope, weakness, light-headedness and headaches  Hematological: Negative  Psychiatric/Behavioral: Negative for agitation and confusion  The patient is nervous/anxious          Historical Information   Past Medical History:   Diagnosis Date    BPH (benign prostatic hyperplasia)     CAD (coronary artery disease)     Cardiac disease     Cervical spine fracture (HCC)     DVT (deep venous thrombosis) (HCC)     Fracture of lumbar spine (HCC)     Glaucoma     Hyperlipidemia     Hypertension     Myocardial infarct (HCC)     PUD (peptic ulcer disease)      Past Surgical History:   Procedure Laterality Date    CHOLECYSTECTOMY      CORONARY ARTERY BYPASS GRAFT      CORONARY STENT PLACEMENT      EYE SURGERY      HERNIA REPAIR Right 11/3/2017    Procedure: REPAIR HERNIA INGUINAL;  Surgeon: Thierno Donald MD;  Location: [facility];  Service: General    MT CYSTOURETHROSCOPY W/IRRIG & EVAC CLOTS N/A 6/30/2018    Procedure: CYSTOSCOPY EVACUATION OF CLOTS, fulgeration;  Surgeon: Krys Avitia MD;  [facility];  Service: Urology    STOMACH SURGERY      Billroth 2 gastrectomy    TRANSURETHRAL RESECTION OF PROSTATE      VENA CAVA FILTER PLACEMENT       History   Alcohol Use No     History   Drug Use No     History   Smoking Status    Never Smoker   Smokeless Tobacco    Never Used     Family History:   Family History   Problem Relation Age of Onset    Coronary artery disease Brother        Meds/Allergies     Allergies   Allergen Reactions    Oxycodone-Acetaminophen Dizziness and Shortness Of Breath     Other reaction(s): Faints  Reaction Date: [id number]; Category: Adverse Reaction;     Omeprazole Other (See Comments)     pash    Statins Other (See Comments)     Muscle pain       Current Outpatient Prescriptions:     ALPRAZolam (XANAX) 0 25 mg tablet, Take 0 25 mg by mouth daily at bedtime as needed for anxiety  , Disp: , Rfl:     Calcium Carbonate-Vitamin D (CALCIUM 600+D PO), Take by mouth daily, Disp: , Rfl:     cholecalciferol (VITAMIN D3) 400 units tablet, Take 400 Units by mouth daily, Disp: , Rfl:     clopidogrel (PLAVIX) 75 mg tablet, Take 1 tablet (75 mg total) by mouth daily, Disp: 30 tablet, Rfl: 5    dutasteride (AVODART) 0 5 mg capsule, Take 1 capsule (0 5 mg total) by mouth daily, Disp: 30 capsule, Rfl: 6    escitalopram (LEXAPRO) 10 mg tablet, Take 10 mg by mouth daily, Disp: , Rfl:     famotidine (PEPCID) 20 mg tablet, Take 20 mg by mouth daily  , Disp: , Rfl:     isosorbide mononitrate (IMDUR) 30 mg 24 hr tablet, Take 1 tablet (30 mg total) by mouth daily, Disp: 30 tablet, Rfl: 5    Magnesium 250 MG TABS, Take 1 tablet by mouth daily, Disp: , Rfl:     metoprolol succinate (TOPROL-XL) 25 mg 24 hr tablet, Take 1 tablet (25 mg total) by mouth daily, Disp: 30 tablet, Rfl: 5    multivitamin-iron-minerals-folic acid (CENTRUM) chewable tablet, Chew 1 tablet daily, Disp: , Rfl:     nitroglycerin (NITROSTAT) 0 4 mg SL tablet, Place 0 4 mg under the tongue every 5 (five) minutes as needed for chest pain, Disp: , Rfl:     tamsulosin (FLOMAX) 0 4 mg, Take 1 capsule (0 4 mg total) by mouth daily with dinner, Disp: 90 capsule, Rfl: 3    zinc gluconate 50 mg tablet, Take 25 mg by mouth daily  , Disp: , Rfl:     aspirin 81 MG tablet, Take 81 mg by mouth daily  , Disp: , Rfl:     Vitals: Blood pressure 122/58, pulse 84, height 5' 3" (1 6 m), weight 68 5 kg (151 lb), SpO2 97 %  Body mass index is 26 75 kg/m²  Vitals:    08/14/18 1337   Weight: 68 5 kg (151 lb)     BP Readings from Last 3 Encounters:   08/14/18 122/58   08/10/18 100/54   07/06/18 136/72         Physical Exam   Constitutional: He is oriented to person, place, and time  He appears well-developed  No distress  HENT:   Head: Normocephalic and atraumatic  Eyes: Pupils are equal, round, and reactive to light  Neck: Normal range of motion  Neck supple  No JVD present  No thyromegaly present  Cardiovascular: Normal rate  S1-S2 regular with a 2/6 ejection systolic murmur  Pulmonary/Chest: No respiratory distress  He has no wheezes  He has no rales  Bilateral air entry clear to auscultation   Abdominal: Soft  Bowel sounds are normal  He exhibits no distension  There is no tenderness  There is no rebound  Musculoskeletal: Normal range of motion  He exhibits no edema or tenderness  Neurological: He is alert and oriented to person, place, and time  Skin: Skin is warm and dry  He is not diaphoretic  Psychiatric: He has a normal mood and affect   His behavior is normal  Judgment normal          Diagnostic Studies Review Cardio:  Lab Review: HIS EKG done on 2016 shows normal sinus and marked ST abnormality in inferior wall consistent with ischemia but not different from old EKG     Stress Test: Nuclear stress test done 2016 shows moderate-sized inferior lateral wall ischemia and apical infarction  Ejection fraction 45%  Which was consistent with his %, vein graft to RCA is occluded, as circumflex has diffuse disease and LIMA to LAD was widely patent  Catheterization: He has multiple cardiac catheterization performed  His last cardiac catheter patient performed by me shows EF around 50%, severe diffuse disease of LAD which is totally occluded,  ECG Report:   Comparison to prior ECGs:1  No interval change1   2017  Twelve-lead EKG shows normal sinus some heart rate 72 bpm  ST changes in inferior and lateral leads which is not changed from old EKG  Cannot rule out underlying ischemialead EKG shows normal sinus rhythm heart rate 63 beats per minute with deep T-wave inversions in inferior and lateral precordial leads  Not change from old EKG  1      Cardiac testing:   Results for orders placed during the hospital encounter of 17   Echo complete with contrast if indicated    Narrative [address]  [address] [facility] [place] [address]  [phone]    Transthoracic Echocardiogram  2D, M-mode, Doppler, and Color Doppler    Study date:  2017    Patient: Kenya Contreras  MR number: [id number]  Account number: [de-identified]  : 02-Aug-1930  Age: 80 years  Gender: Male  Status: Routine  Location: Bedside  Height: 64 in  Weight: 147 6 lb  BP: 118/ 60 mmHg    Indications: acute hypoxic    Diagnoses: I42 9 - Cardiomyopathy, unspecified    Sonographer:  YURI Jeffries  Primary Physician:  Raymon Fitzgerald DO  Referring Physician:  Teddy Ng MD  Interpreting Physician:  Marline Larios DO  acute hypoxic    SUMMARY    LEFT VENTRICLE:  Systolic function was at the lower limits of normal by Jelly  Ejection  fraction was estimated to be 50 %  There was mild hypokinesis of the septal wall  There was mild concentric hypertrophy  Doppler parameters were consistent with restrictive physiology, indicative of  decreased left ventricular diastolic compliance and/or increased left atrial  pressure  Doppler parameters were consistent with elevated mean left atrial  filling pressure  RIGHT VENTRICLE:  Systolic function was moderately reduced  MITRAL VALVE:  There was moderate regurgitation  AORTIC VALVE:  The valve was trileaflet  Leaflets exhibited moderate calcification and mildly  reduced cuspal separation  There was mild regurgitation  TRICUSPID VALVE:  There was moderate regurgitation  Pulmonary artery systolic pressure was mildly increased  HISTORY: PRIOR HISTORY: CAD, CABG,DVT,CHF,HTN, H/O PROSTATE CANCER    PROCEDURE: The procedure was performed at the bedside  This was a routine  study  The transthoracic approach was used  The study included complete 2D  imaging, M-mode, complete spectral Doppler, and color Doppler  The heart rate  was 84 bpm, at the start of the study  LEFT VENTRICLE: Size was normal  Systolic function was at the lower limits of  normal by Teichholz  Ejection fraction was estimated to be 50 %  There was mild  hypokinesis of the septal wall  There was mild concentric hypertrophy  DOPPLER:  Doppler parameters were consistent with restrictive physiology, indicative of  decreased left ventricular diastolic compliance and/or increased left atrial  pressure  Doppler parameters were consistent with elevated mean left atrial  filling pressure  RIGHT VENTRICLE: The size was normal  Systolic function was moderately reduced  LEFT ATRIUM: The atrium was mildly dilated  RIGHT ATRIUM: Size was normal     MITRAL VALVE: Valve structure was normal  There was normal leaflet separation  No echocardiographic evidence for prolapse   DOPPLER: The transmitral velocity  was within the normal range  There was no evidence for stenosis  There was  moderate regurgitation  AORTIC VALVE: The valve was trileaflet  Leaflets exhibited moderate  calcification and mildly reduced cuspal separation  DOPPLER: Transaortic  velocity was within the normal range  There was no evidence for stenosis  There  was mild regurgitation  TRICUSPID VALVE: The valve structure was normal  There was normal leaflet  separation  DOPPLER: The transtricuspid velocity was within the normal range  There was moderate regurgitation  Pulmonary artery systolic pressure was mildly  increased  Estimated peak PA pressure was 42 mmHg  PULMONIC VALVE: Leaflets exhibited normal thickness, no calcification, and  normal cuspal separation  DOPPLER: The transpulmonic velocity was within the  normal range  There was mild regurgitation  PERICARDIUM: There was no thickening  There was no pericardial effusion  AORTA: The root exhibited normal size  PULMONARY ARTERY: The size was normal  The morphology appeared normal     SYSTEM MEASUREMENT TABLES    2D mode  AoR Diam 2D: 3 3 cm  LA Diam (2D): 3 9 cm  LA/Ao (2D): 1 18  FS (2D Teich): 25 4 %  IVSd (2D): 1 12 cm  LVDEV: 65 5 cm³  LVEDV MOD BP: 143 cm³  LVESV: 32 2 cm³  LVIDd(2D): 3 89 cm  LVISd (2D): 2 9 cm  LVOT Area 2D: 3 14 cm squared  LVPWd (2D): 1 08 cm  SV (Teich): 33 3 cm³    Apical four chamber  LVEF A4C: 49 %    Apical two chamber  LA Area: 21 8 cm squared  LA Volume: 67 cm³  LVEF A2C: 55 %    Unspecified Scan Mode  JUDY Cont Eq (Peak Trino): 1 77 cm squared  LVOT (VTI): 20 6 cm  LVOT Diam : 2 cm  LVOT Vmax: 993 mm/s  LVOT Vmax; Mean: 993 mm/s  Peak Grad ; Mean: 4 mm[Hg]  SV (LVOT): 65 cm³  VTI;Mean: 2 mm[Hg]  JUDY Cont Eq (VTI): 2 25 cm squared  MV Peak A Trino: 1090 mm/s  MV Peak E Trino   Mean: 1620 mm/s  MVA (PHT): 4 cm squared  PHT: 55 ms  Max P mm[Hg]  V Max: 2910 mm/s  Vmax: 2360 mm/s  TAPSE: 1 3 cm    [facility]    Prepared and electronically signed by    Marline Larios DO  Signed 09-Jan-2017 16:48:59         Lab Review   Lab Results   Component Value Date    WBC 4 71 08/10/2018    HGB 10 0 (L) 08/10/2018    HCT 33 9 (L) 08/10/2018    MCV 87 08/10/2018    RDW 14 8 08/10/2018     08/10/2018     BMP:  Lab Results   Component Value Date     08/10/2018    K 4 3 08/10/2018     08/10/2018    CO2 31 08/10/2018    ANIONGAP 4 08/10/2018    BUN 20 08/10/2018    CREATININE 0 85 08/10/2018    GLUCOSE 98 08/10/2018    GLUF 98 08/10/2018    CALCIUM 8 4 08/10/2018    EGFR 78 08/10/2018    MG 2 3 01/11/2017     LFT:  Lab Results   Component Value Date    AST 7 08/09/2018    ALT 18 08/09/2018    ALKPHOS 92 08/09/2018    PROT 7 0 08/09/2018    BILITOT 0 30 08/09/2018     Lab Results   Component Value Date    BNP 69 09/25/2015      No results found for: TSH  Lab Results   Component Value Date    HGBA1C 6 3 (H) 09/25/2015     Lipid Profile:   Lab Results   Component Value Date    CHOL 182 08/10/2018    HDL 31 (L) 08/10/2018    LDLCALC 130 (H) 08/10/2018    TRIG 107 08/10/2018     Lab Results   Component Value Date    CHOL 182 08/10/2018    CHOL 159 05/10/2016         Dr Teddy Ng MD [facility]      "This note has been constructed using a voice recognition system  Therefore there may be syntax, spelling, and/or grammatical errors   Please call if you have any questions  "

## 2018-08-14 ENCOUNTER — OFFICE VISIT (OUTPATIENT)
Dept: CARDIOLOGY CLINIC | Facility: CLINIC | Age: 83
End: 2018-08-14
Payer: MEDICARE

## 2018-08-14 VITALS
WEIGHT: 151 LBS | HEART RATE: 84 BPM | OXYGEN SATURATION: 97 % | HEIGHT: 63 IN | SYSTOLIC BLOOD PRESSURE: 122 MMHG | DIASTOLIC BLOOD PRESSURE: 58 MMHG | BODY MASS INDEX: 26.75 KG/M2

## 2018-08-14 DIAGNOSIS — I20.8 CHRONIC STABLE ANGINA (HCC): ICD-10-CM

## 2018-08-14 DIAGNOSIS — I25.10 3-VESSEL CAD: ICD-10-CM

## 2018-08-14 DIAGNOSIS — I20.8 STABLE ANGINA PECTORIS (HCC): ICD-10-CM

## 2018-08-14 DIAGNOSIS — I50.32 CHRONIC DIASTOLIC HEART FAILURE (HCC): ICD-10-CM

## 2018-08-14 DIAGNOSIS — D63.8 ANEMIA OF CHRONIC DISEASE: ICD-10-CM

## 2018-08-14 DIAGNOSIS — K27.9 PUD (PEPTIC ULCER DISEASE): Chronic | ICD-10-CM

## 2018-08-14 DIAGNOSIS — F41.9 ANXIETY: ICD-10-CM

## 2018-08-14 DIAGNOSIS — I25.119 CORONARY ARTERY DISEASE INVOLVING NATIVE CORONARY ARTERY OF NATIVE HEART WITH ANGINA PECTORIS (HCC): Chronic | ICD-10-CM

## 2018-08-14 DIAGNOSIS — I10 ESSENTIAL HYPERTENSION: Chronic | ICD-10-CM

## 2018-08-14 DIAGNOSIS — E78.2 MIXED HYPERLIPIDEMIA: Chronic | ICD-10-CM

## 2018-08-14 PROCEDURE — 99214 OFFICE O/P EST MOD 30 MIN: CPT | Performed by: INTERNAL MEDICINE

## 2018-08-15 ENCOUNTER — TELEPHONE (OUTPATIENT)
Dept: UROLOGY | Facility: CLINIC | Age: 83
End: 2018-08-15

## 2018-08-15 NOTE — TELEPHONE ENCOUNTER
Yes, OK to resume all activities  He should stay well hydrated and contact us with any recurrence of hematuria

## 2018-08-15 NOTE — TELEPHONE ENCOUNTER
[facility], located within [facility] patient, managed by Dr Josiephine Kocher,  S/p cystoscopy with evacuation of clots/fulguration on 6/30/18 with Dr Kenya Angeles (on call)  Patient passed void trial post operatively  He is scheduled for post op follow up on 8/22/18  He is calling to see if he can resume using his ride on lawn mower, approximately 1/2 to 1 acres to [facility]  He has no further hematuria, urinating well with some interruption in stream     Advised likely ok to resume however I will confirm with provider and call him back

## 2018-08-15 NOTE — TELEPHONE ENCOUNTER
Called and informed patient ok to resume all activities, maintain good hydration and call with any recurrence of hematuria

## 2018-08-21 LAB
ATRIAL RATE: 0 BPM
QRS AXIS: 0 DEGREES
QRSD INTERVAL: 0 MS
QT INTERVAL: 0 MS
QTC INTERVAL: 0 MS
T WAVE AXIS: 0 DEGREES
VENTRICULAR RATE: 0 BPM

## 2018-08-21 PROCEDURE — 93010 ELECTROCARDIOGRAM REPORT: CPT | Performed by: INTERNAL MEDICINE

## 2018-08-21 NOTE — PROGRESS NOTES
8/22/2018    Jesica Arriaga  8/2/1930  [id number]      Assessment  -Nephrolithiasis   -Gross hematuria s/p cystoscopy with evacuation of clots/fulguration (6/30/18)  -BPH with lower urinary tract symptoms    Discussion/Plan  Heather Soto is a 80 y o  male being managed by Dr Jazmine Duarte        -Patient continues to do well after recent cystoscopy and evacuation of clots/fulguration  He remains asymptomatic  He will continue to take Flomax and Dutasteride daily  Refill for 90 day supply of Dutasteride sent to patient's [facility] pharmacy   -Discussed follow-up in 1 year, however patient has an upcoming appointment with Dr Jazmine Duarte on 2/28/19 which he wishes to keep  Patient instructed to call with any issues  -All questions answered, patient and wife agrees with plan      History of Present Illness  80 y o  male with a history of BPH with LUTS, nephrolithiasis, and gross hematuria s/p cystoscopy with evacuation of clots/fulturation (6/30/18) presents today for follow up  Intraoperatively, patient noted to have focal bleeding point at bladder neck  Ochoa catheter was removed in office on 7/6/18 without any difficulty  Patient states that he continues to void well without any difficulties since Ochoa catheter removal   He denies any episodes of gross hematuria or dysuria  He states having a strong urinary stream and feels he empties bladder to completion  Patient notes having occasionally 1 episode of nocturia per night but does not find bothersome  He had previously been on finasteride but discontinued secondary to breast tenderness  He was restarted on Dutasteride at last hospital admission, and denies any side effects  The patient has history of TURP which was performed approximately 5 years ago by Dr Abraham Razo  Last PSA from 02/16/2018 was stable at 2 7  Review of Systems  Review of Systems   Constitutional: Negative  HENT: Negative  Respiratory: Negative  Cardiovascular: Negative      Gastrointestinal: Negative  Genitourinary: Negative for decreased urine volume, difficulty urinating, dysuria, flank pain, frequency, hematuria and urgency  Musculoskeletal: Negative  Skin: Negative  Neurological: Negative  Psychiatric/Behavioral: Negative  Past Medical History  Past Medical History:   Diagnosis Date    BPH (benign prostatic hyperplasia)     CAD (coronary artery disease)     Cardiac disease     Cervical spine fracture (HCC)     DVT (deep venous thrombosis) (HCC)     Fracture of lumbar spine (HCC)     Glaucoma     Hyperlipidemia     Hypertension     Myocardial infarct (HCC)     PUD (peptic ulcer disease)        Past Social History  Past Surgical History:   Procedure Laterality Date    CHOLECYSTECTOMY      CORONARY ARTERY BYPASS GRAFT      CORONARY STENT PLACEMENT      EYE SURGERY      HERNIA REPAIR Right 11/3/2017    Procedure: REPAIR HERNIA INGUINAL;  Surgeon: Johanna Huff MD;  Location: [facility];  Service: General    CT CYSTOURETHROSCOPY W/IRRIG & EVAC CLOTS N/A 6/30/2018    Procedure: CYSTOSCOPY EVACUATION OF CLOTS, fulgeration;  Surgeon: Jocelin Figueredo MD;  Location:  [facility] OR;  Service: Urology    STOMACH SURGERY      Billroth 2 gastrectomy    TRANSURETHRAL RESECTION OF PROSTATE      VENA CAVA FILTER PLACEMENT         Past Family History  Family History   Problem Relation Age of Onset    Coronary artery disease Brother        Past Social history  Social History     Social History    Marital status: /Civil Union     Spouse name: N/A    Number of children: N/A    Years of education: N/A     Occupational History    Not on file       Social History Main Topics    Smoking status: Never Smoker    Smokeless tobacco: Never Used    Alcohol use No    Drug use: No    Sexual activity: Not on file     Other Topics Concern    Not on file     Social History Narrative    No narrative on file       Current Medications  Current Outpatient Prescriptions Medication Sig Dispense Refill    ALPRAZolam (XANAX) 0 25 mg tablet Take 0 25 mg by mouth daily at bedtime as needed for anxiety   Calcium Carbonate-Vitamin D (CALCIUM 600+D PO) Take by mouth daily      cholecalciferol (VITAMIN D3) 400 units tablet Take 400 Units by mouth daily      clopidogrel (PLAVIX) 75 mg tablet Take 1 tablet (75 mg total) by mouth daily 30 tablet 5    dutasteride (AVODART) 0 5 mg capsule Take 1 capsule (0 5 mg total) by mouth daily 30 capsule 6    escitalopram (LEXAPRO) 10 mg tablet Take 10 mg by mouth daily      famotidine (PEPCID) 20 mg tablet Take 20 mg by mouth daily        isosorbide mononitrate (IMDUR) 30 mg 24 hr tablet Take 1 tablet (30 mg total) by mouth daily 30 tablet 5    Magnesium 250 MG TABS Take 1 tablet by mouth daily      metoprolol succinate (TOPROL-XL) 25 mg 24 hr tablet Take 1 tablet (25 mg total) by mouth daily 30 tablet 5    multivitamin-iron-minerals-folic acid (CENTRUM) chewable tablet Chew 1 tablet daily      nitroglycerin (NITROSTAT) 0 4 mg SL tablet Place 0 4 mg under the tongue every 5 (five) minutes as needed for chest pain      tamsulosin (FLOMAX) 0 4 mg Take 1 capsule (0 4 mg total) by mouth daily with dinner 90 capsule 3    zinc gluconate 50 mg tablet Take 25 mg by mouth daily  No current facility-administered medications for this visit  Allergies  Allergies   Allergen Reactions    Oxycodone-Acetaminophen Dizziness and Shortness Of Breath     Other reaction(s): Faints  Reaction Date: [id number]; Category: Adverse Reaction;     Omeprazole Other (See Comments)     pash    Statins Other (See Comments)     Muscle pain       Past Medical History, Social History, Family History, medications and allergies were reviewed  Vitals  There were no vitals filed for this visit  Physical Exam  Physical Exam   Constitutional: He is oriented to person, place, and time  He appears well-developed and well-nourished     HENT:   Head: Normocephalic  Eyes: Pupils are equal, round, and reactive to light  Neck: Normal range of motion  Cardiovascular: Normal rate and regular rhythm  Pulmonary/Chest: Effort normal    Abdominal: Soft  Normal appearance  There is no CVA tenderness  Musculoskeletal: Normal range of motion  Neurological: He is alert and oriented to person, place, and time  He has normal reflexes  Skin: Skin is warm and dry  Psychiatric: He has a normal mood and affect  His behavior is normal  Judgment and thought content normal        Results    I have personally reviewed all pertinent lab results and reviewed with patient  Lab Results   Component Value Date    PSA 2 7 02/16/2018    PSA 3 2 02/03/2017     Lab Results   Component Value Date    GLUCOSE 98 08/10/2018    CALCIUM 8 4 08/10/2018     08/10/2018    K 4 3 08/10/2018    CO2 31 08/10/2018     08/10/2018    BUN 20 08/10/2018    CREATININE 0 85 08/10/2018     Lab Results   Component Value Date    WBC 4 71 08/10/2018    HGB 10 0 (L) 08/10/2018    HCT 33 9 (L) 08/10/2018    MCV 87 08/10/2018     08/10/2018     No results found for this or any previous visit (from the past 1 hour(s))

## 2018-08-22 ENCOUNTER — OFFICE VISIT (OUTPATIENT)
Dept: UROLOGY | Facility: CLINIC | Age: 83
End: 2018-08-22
Payer: MEDICARE

## 2018-08-22 VITALS
SYSTOLIC BLOOD PRESSURE: 100 MMHG | BODY MASS INDEX: 26.72 KG/M2 | HEIGHT: 63 IN | WEIGHT: 150.8 LBS | HEART RATE: 68 BPM | DIASTOLIC BLOOD PRESSURE: 58 MMHG

## 2018-08-22 DIAGNOSIS — N20.0 NEPHROLITHIASIS: ICD-10-CM

## 2018-08-22 DIAGNOSIS — N40.1 BENIGN PROSTATIC HYPERPLASIA WITH NOCTURIA: ICD-10-CM

## 2018-08-22 DIAGNOSIS — R31.0 GROSS HEMATURIA: Primary | ICD-10-CM

## 2018-08-22 DIAGNOSIS — R33.8 BENIGN PROSTATIC HYPERPLASIA WITH URINARY RETENTION: ICD-10-CM

## 2018-08-22 DIAGNOSIS — N40.1 BENIGN PROSTATIC HYPERPLASIA WITH URINARY RETENTION: ICD-10-CM

## 2018-08-22 DIAGNOSIS — R35.1 BENIGN PROSTATIC HYPERPLASIA WITH NOCTURIA: ICD-10-CM

## 2018-08-22 PROCEDURE — 99213 OFFICE O/P EST LOW 20 MIN: CPT | Performed by: NURSE PRACTITIONER

## 2018-08-22 RX ORDER — DUTASTERIDE 0.5 MG/1
0.5 CAPSULE, LIQUID FILLED ORAL DAILY
Qty: 90 CAPSULE | Refills: 3 | Status: SHIPPED | OUTPATIENT
Start: 2018-08-22 | End: 2019-08-23 | Stop reason: SDUPTHER

## 2018-08-22 RX ORDER — ALPRAZOLAM 0.5 MG/1
0.5 TABLET ORAL
COMMUNITY
Start: 2018-07-19

## 2018-08-27 ENCOUNTER — APPOINTMENT (OUTPATIENT)
Dept: LAB | Facility: HOSPITAL | Age: 83
End: 2018-08-27
Attending: FAMILY MEDICINE
Payer: MEDICARE

## 2018-08-27 ENCOUNTER — TRANSCRIBE ORDERS (OUTPATIENT)
Dept: ADMINISTRATIVE | Facility: HOSPITAL | Age: 83
End: 2018-08-27

## 2018-08-27 DIAGNOSIS — D50.9 IRON DEFICIENCY ANEMIA, UNSPECIFIED IRON DEFICIENCY ANEMIA TYPE: Primary | ICD-10-CM

## 2018-08-27 DIAGNOSIS — D50.9 IRON DEFICIENCY ANEMIA, UNSPECIFIED IRON DEFICIENCY ANEMIA TYPE: ICD-10-CM

## 2018-08-27 LAB
FERRITIN SERPL-MCNC: 18 NG/ML (ref 8–388)
HCT VFR BLD AUTO: 37.8 % (ref 36.5–49.3)
HGB BLD-MCNC: 11.2 G/DL (ref 12–17)

## 2018-08-27 PROCEDURE — 83540 ASSAY OF IRON: CPT

## 2018-08-27 PROCEDURE — 85014 HEMATOCRIT: CPT

## 2018-08-27 PROCEDURE — 82728 ASSAY OF FERRITIN: CPT

## 2018-08-27 PROCEDURE — 85018 HEMOGLOBIN: CPT

## 2018-08-27 PROCEDURE — 36415 COLL VENOUS BLD VENIPUNCTURE: CPT

## 2018-08-28 LAB — IRON SERPL-MCNC: 33 UG/DL (ref 65–175)

## 2018-09-10 DIAGNOSIS — I20.8 CHRONIC STABLE ANGINA (HCC): ICD-10-CM

## 2018-09-10 RX ORDER — ISOSORBIDE MONONITRATE 30 MG/1
30 TABLET, EXTENDED RELEASE ORAL DAILY
Qty: 90 TABLET | Refills: 3 | Status: SHIPPED | OUTPATIENT
Start: 2018-09-10 | End: 2019-08-27 | Stop reason: SDUPTHER

## 2018-10-05 ENCOUNTER — APPOINTMENT (OUTPATIENT)
Dept: LAB | Facility: HOSPITAL | Age: 83
End: 2018-10-05
Attending: FAMILY MEDICINE
Payer: MEDICARE

## 2018-10-05 ENCOUNTER — TRANSCRIBE ORDERS (OUTPATIENT)
Dept: ADMINISTRATIVE | Facility: HOSPITAL | Age: 83
End: 2018-10-05

## 2018-10-05 DIAGNOSIS — D50.9 IRON DEFICIENCY ANEMIA, UNSPECIFIED IRON DEFICIENCY ANEMIA TYPE: Primary | ICD-10-CM

## 2018-10-05 DIAGNOSIS — D50.9 IRON DEFICIENCY ANEMIA, UNSPECIFIED IRON DEFICIENCY ANEMIA TYPE: ICD-10-CM

## 2018-10-05 LAB
FERRITIN SERPL-MCNC: 35 NG/ML (ref 8–388)
HCT VFR BLD AUTO: 42.4 % (ref 36.5–49.3)
HGB BLD-MCNC: 13 G/DL (ref 12–17)
IRON SERPL-MCNC: 70 UG/DL (ref 65–175)

## 2018-10-05 PROCEDURE — 82728 ASSAY OF FERRITIN: CPT

## 2018-10-05 PROCEDURE — 83540 ASSAY OF IRON: CPT

## 2018-10-05 PROCEDURE — 36415 COLL VENOUS BLD VENIPUNCTURE: CPT

## 2018-10-05 PROCEDURE — 85018 HEMOGLOBIN: CPT

## 2018-10-05 PROCEDURE — 85014 HEMATOCRIT: CPT

## 2018-10-12 PROBLEM — I25.10 CAD (CORONARY ARTERY DISEASE): Chronic | Status: RESOLVED | Noted: 2017-01-07 | Resolved: 2018-10-12

## 2018-10-12 NOTE — PROGRESS NOTES
Progress Note - Cardiology Office  Taylor Liz 80 y o  male MRN: [id number]  : 1930  Encounter: [id number]      Assessment:     1  Coronary artery disease involving native coronary artery of native heart with angina pectoris ([address] )    2  Chronic diastolic heart failure (HCC)    3  3-vessel CAD    4  Chronic stable angina ([address] )    5  Stable angina pectoris ([address] )    6  Mixed hyperlipidemia    7  Anemia of chronic disease    8  Essential hypertension    9  PUD (peptic ulcer disease)        Discussion Summary and Plan:  1  Coronary artery disease status post CABG and stents with multiple cardiac catheterization a year ago ago in [facility] by me and it was recommended medical therapy  Patient has symptoms of chronic stable angina which is not changed  No change in symptoms of angina  Patient is still very active and is tolerating medications very well  2  Chronic stable angina  Patient was recently admitted  He is on Ranexa  Tolerating well  Continue Plavix  He could not tolerate aspirin due to GERD  3  Generalized anxiety  On p r n  and patient was reassured  Continue same medications  4  Dyslipidemia  Patient has history of severe three-vessel coronary artery disease  He cannot tolerate any statins  We did talk about PS K inhibitors  For now he does not want to take any medications  He is doing diet  5  Chronic diastolic heart failure New [place] Heart Association class 2  No more episodes of heart failure  He has stopped taking diuretics and he is doing well with that  He does have ischemic cardiomyopathy is EF is around 40-45%  Can use p r n  diuretics    6  Benign prostate hypertrophy status post surgery  No significant issues at this time  7  Ischemic cardiomyopathy EF around 40-45%  Currently compensated  8   History of peptic ulcer disease  Patient was previously on Protonix  He got better with stopping aspirin    Advised to consider using enteric-coated aspirin  Counseling :  A description of the counseling  Spoke to patient about chronic stable angina, coronary artery disease, dyslipidemia, diastolic heart failure at length  Diet advised  Patient's ability to self care: Yes  Medication side effect reviewed with patient in detail and all their questions answered to their satisfaction  HPI :     Jarvis Chaves is a 80y o  year old male who came for follow up  Patient has a past medical history significant for coronary artery disease status post CABG status post stent into his MCKENNA to LAD widely patent, SVG vein graft to 100% occluded and circumflex was severely diffusely disease with severe diffuse distal disease, chronic stable angina, status post MI anxiety, GERD, dyslipidemia who came for follow-up  was admitted to [facility] with chronic diastolic heart failure and is now compensated  He denies any chest pain or any shortness of breath  Occasionally feel dizzy in the morning  Has been on Demadex 20 mg and potassium 20 mg daily  All medications reviewed with him      04/17/2018  Above reviewed  Overall patient is doing well  There is no symptoms with normal activities  He does have history of chronic stable angina which is not changed  He is pretty active  He does get pain when he bends over but gets better when he stands up  No fever no chills no nausea no vomiting no leg swelling  Recent blood test shows he is anemic hemoglobin has dropped  He is working with his primary care doctor to improve that  No nausea no vomiting no other significant complaint  By mistake he was taking metoprolol short-acting which will be switched to long-acting  08/14/2018  Patient was recently admitted to [facility] with atypical chest pain  He was very anxious  He underwent exercise nuclear stress test   He has evidence of old infarct  His EF is around 45%  He is chest pain-free now    He was taking aspirin which was giving him a burning feeling in stomach area  He stopped taking it  He is feeling much better  He is on Plavix now  No chest pain no shortness of breath no nausea no vomiting no PND no orthopnea no other significant complaint  10/15/2018  Above reviewed  Patient came for follow-up  He is doing much better  He has not taken nitro for the last 2 years  He was recently admitted with atypical chest pain and underwent nuclear stress test which shows he had old infarct ejection fraction around 45%  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  No fever no chills no nausea no vomiting no PND no orthopnea  He could not tolerate aspirin due to stomach burning and he is only on Plavix which has helped  No leg swelling no nausea no vomiting no other issues at this time  Review of Systems   Constitutional: Negative for activity change, chills, diaphoresis, fever and unexpected weight change  HENT: Negative for congestion  Eyes: Negative for discharge and redness  Respiratory: Negative for cough, chest tightness, shortness of breath and wheezing  Cardiovascular: Negative  Negative for chest pain, palpitations and leg swelling  Gastrointestinal: Negative for abdominal pain, diarrhea and nausea  History of GERD    Endocrine: Negative  Genitourinary: Negative for decreased urine volume and urgency  Musculoskeletal: Positive for back pain  Negative for arthralgias and gait problem  Skin: Negative for rash and wound  Allergic/Immunologic: Negative  Neurological: Negative for dizziness, seizures, syncope, weakness, light-headedness and headaches  Hematological: Negative  Psychiatric/Behavioral: Negative for agitation and confusion  The patient is nervous/anxious          Historical Information   Past Medical History:   Diagnosis Date    BPH (benign prostatic hyperplasia)     CAD (coronary artery disease)     Cardiac disease     Cervical spine fracture (HCC)     [facility]     Fracture of lumbar spine [facility]     Glaucoma     Hyperlipidemia     Hypertension     [facility]     PUD (peptic ulcer disease)      Past Surgical History:   Procedure Laterality Date    CHOLECYSTECTOMY      CORONARY ARTERY BYPASS GRAFT      CORONARY STENT PLACEMENT      EYE SURGERY      HERNIA REPAIR Right 11/3/2017    Procedure: REPAIR HERNIA INGUINAL;  Surgeon: Harjinder Oh MD;  Location: [address] [place] [address];  Service: General    CO CYSTOURETHROSCOPY W/IRRIG & EVAC CLOTS N/A 6/30/2018    Procedure: CYSTOSCOPY EVACUATION OF CLOTS, fulgeration;  Surgeon: Deborah Smiley MD;  [facility];  Service: Urology    STOMACH SURGERY      Billroth 2 gastrectomy    TRANSURETHRAL RESECTION OF PROSTATE      VENA CAVA FILTER PLACEMENT       History   Alcohol Use No     History   Drug Use No     History   Smoking Status    Never Smoker   Smokeless Tobacco    Never Used     Family History:   Family History   Problem Relation Age of Onset    Coronary artery disease Brother        Meds/Allergies     Allergies   Allergen Reactions    Oxycodone-Acetaminophen Dizziness and Shortness Of Breath     Other reaction(s): Faints  Reaction Date: [id number]; Category: Adverse Reaction;     Omeprazole Other (See Comments)     pash    Statins Other (See Comments)     Muscle pain       Current Outpatient Prescriptions:     ALPRAZolam (XANAX) 0 25 mg tablet, Take 0 25 mg by mouth daily at bedtime as needed for anxiety  , Disp: , Rfl:     aspirin (ECOTRIN LOW STRENGTH) 81 mg EC tablet, Take 81 mg by mouth daily, Disp: , Rfl:     Calcium Carbonate-Vitamin D (CALCIUM 600+D PO), Take by mouth daily, Disp: , Rfl:     cholecalciferol (VITAMIN D3) 400 units tablet, Take 400 Units by mouth daily, Disp: , Rfl:     clopidogrel (PLAVIX) 75 mg tablet, Take 1 tablet (75 mg total) by mouth daily, Disp: 30 tablet, Rfl: 5    escitalopram (LEXAPRO) 10 mg tablet, Take 10 mg by mouth daily, Disp: , Rfl:     famotidine (PEPCID) 20 mg tablet, Take 20 mg by mouth daily  , Disp: , Rfl:     isosorbide mononitrate (IMDUR) 30 mg 24 hr tablet, Take 1 tablet (30 mg total) by mouth daily, Disp: 90 tablet, Rfl: 3    Magnesium 250 MG TABS, Take 1 tablet by mouth daily, Disp: , Rfl:     metoprolol succinate (TOPROL-XL) 25 mg 24 hr tablet, Take 1 tablet (25 mg total) by mouth daily, Disp: 30 tablet, Rfl: 5    multivitamin-iron-minerals-folic acid (CENTRUM) chewable tablet, Chew 1 tablet daily, Disp: , Rfl:     nitroglycerin (NITROSTAT) 0 4 mg SL tablet, Place 0 4 mg under the tongue every 5 (five) minutes as needed for chest pain, Disp: , Rfl:     tamsulosin (FLOMAX) 0 4 mg, Take 1 capsule (0 4 mg total) by mouth daily with dinner, Disp: 90 capsule, Rfl: 3    zinc gluconate 50 mg tablet, Take 25 mg by mouth daily  , Disp: , Rfl:     ALPRAZolam (XANAX) 0 5 mg tablet, , Disp: , Rfl:     dutasteride (AVODART) 0 5 mg capsule, Take 1 capsule (0 5 mg total) by mouth daily (Patient not taking: Reported on 10/15/2018 ), Disp: 90 capsule, Rfl: 3    Vitals: Blood pressure 138/70, pulse 69, weight 69 1 kg (152 lb 6 4 oz), SpO2 97 %  Body mass index is 27 kg/m²  Vitals:    10/15/18 1348   Weight: 69 1 kg (152 lb 6 4 oz)     BP Readings from Last 3 Encounters:   10/15/18 138/70   08/22/18 100/58   08/14/18 122/58         Physical Exam   Constitutional: He is oriented to person, place, and time  He appears well-developed  No distress  HENT:   Head: Normocephalic and atraumatic  Eyes: Pupils are equal, round, and reactive to light  Neck: Normal range of motion  Neck supple  No JVD present  No thyromegaly present  Cardiovascular: Normal rate and regular rhythm  Murmur heard  S1-S2 regular with a 2/6 ejection systolic murmur  S4 present   Pulmonary/Chest: No respiratory distress  He has no wheezes  He has no rales  Bilateral air entry mostly clear to auscultation  Abdominal: Soft   Bowel sounds are normal  He exhibits no distension  There is no tenderness  There is no rebound  Musculoskeletal: Normal range of motion  He exhibits no edema or tenderness  Neurological: He is alert and oriented to person, place, and time  Skin: Skin is warm and dry  He is not diaphoretic  Psychiatric: He has a normal mood and affect  His behavior is normal  Judgment normal          Diagnostic Studies Review Cardio:  Lab Review: HIS EKG done on 2016 shows normal sinus and marked ST abnormality in inferior wall consistent with ischemia but not different from old EKG     Stress Test: Nuclear stress test done 2016 shows moderate-sized inferior lateral wall ischemia and apical infarction  Ejection fraction 45%  Which was consistent with his %, vein graft to RCA is occluded, as circumflex has diffuse disease and LIMA to LAD was widely patent  Catheterization: He has multiple cardiac catheterization performed  His last cardiac catheter patient performed by me shows EF around 50%, severe diffuse disease of LAD which is totally occluded,  ECG Report:   Comparison to prior ECGs:1  No interval change1   2017  Twelve-lead EKG shows normal sinus some heart rate 72 bpm  ST changes in inferior and lateral leads which is not changed from old EKG  Cannot rule out underlying ischemialead EKG shows normal sinus rhythm heart rate 63 beats per minute with deep T-wave inversions in inferior and lateral precordial leads  Not change from old EKG  1      Cardiac testing:   Results for orders placed during the hospital encounter of 17   Echo complete with contrast if indicated    Narrative [address]  [address] [facility]   [phone]    Transthoracic Echocardiogram  2D, M-mode, Doppler, and Color Doppler    Study date:  2017    Patient: Greg Garcia  MR number: [id number]  Account number: [de-identified]  : 02-Aug-1930  Age: 80 years  Gender: Male  Status: Routine  Location: Bedside  Height: 64 in  Weight: 147 6 lb  BP: 118/ 60 mmHg    Indications: acute hypoxic    Diagnoses: I42 9 - Cardiomyopathy, unspecified    Sonographer:  YURI Chambers  Primary Physician:  Ramírez Cope DO  Referring Physician:  Adelso Clark MD  Interpreting Physician:  Cathy Sykes DO  acute hypoxic    SUMMARY    LEFT VENTRICLE:  Systolic function was at the lower limits of normal by Teichholz  Ejection  fraction was estimated to be 50 %  There was mild hypokinesis of the septal wall  There was mild concentric hypertrophy  Doppler parameters were consistent with restrictive physiology, indicative of  decreased left ventricular diastolic compliance and/or increased left atrial  pressure  Doppler parameters were consistent with elevated mean left atrial  filling pressure  RIGHT VENTRICLE:  Systolic function was moderately reduced  MITRAL VALVE:  There was moderate regurgitation  AORTIC VALVE:  The valve was trileaflet  Leaflets exhibited moderate calcification and mildly  reduced cuspal separation  There was mild regurgitation  TRICUSPID VALVE:  There was moderate regurgitation  Pulmonary artery systolic pressure was mildly increased  HISTORY: PRIOR HISTORY: CAD, CABG,DVT,CHF,HTN, H/O PROSTATE CANCER    PROCEDURE: The procedure was performed at the bedside  This was a routine  study  The transthoracic approach was used  The study included complete 2D  imaging, M-mode, complete spectral Doppler, and color Doppler  The heart rate  was 84 bpm, at the start of the study  LEFT VENTRICLE: Size was normal  Systolic function was at the lower limits of  normal by Teichholz  Ejection fraction was estimated to be 50 %  There was mild  hypokinesis of the septal wall  There was mild concentric hypertrophy   DOPPLER:  Doppler parameters were consistent with restrictive physiology, indicative of  decreased left ventricular diastolic compliance and/or increased left atrial  pressure  Doppler parameters were consistent with elevated mean left atrial  filling pressure  RIGHT VENTRICLE: The size was normal  Systolic function was moderately reduced  LEFT ATRIUM: The atrium was mildly dilated  RIGHT ATRIUM: Size was normal     MITRAL VALVE: Valve structure was normal  There was normal leaflet separation  No echocardiographic evidence for prolapse  DOPPLER: The transmitral velocity  was within the normal range  There was no evidence for stenosis  There was  moderate regurgitation  AORTIC VALVE: The valve was trileaflet  Leaflets exhibited moderate  calcification and mildly reduced cuspal separation  DOPPLER: Transaortic  velocity was within the normal range  There was no evidence for stenosis  There  was mild regurgitation  TRICUSPID VALVE: The valve structure was normal  There was normal leaflet  separation  DOPPLER: The transtricuspid velocity was within the normal range  There was moderate regurgitation  Pulmonary artery systolic pressure was mildly  increased  Estimated peak PA pressure was 42 mmHg  PULMONIC VALVE: Leaflets exhibited normal thickness, no calcification, and  normal cuspal separation  DOPPLER: The transpulmonic velocity was within the  normal range  There was mild regurgitation  PERICARDIUM: There was no thickening  There was no pericardial effusion  AORTA: The root exhibited normal size      PULMONARY ARTERY: The size was normal  The morphology appeared normal     SYSTEM MEASUREMENT TABLES    2D mode  AoR Diam 2D: 3 3 cm  LA Diam (2D): 3 9 cm  LA/Ao (2D): 1 18  FS (2D Teich): 25 4 %  IVSd (2D): 1 12 cm  LVDEV: 65 5 cm³  LVEDV MOD BP: 143 cm³  LVESV: 32 2 cm³  LVIDd(2D): 3 89 cm  LVISd (2D): 2 9 cm  LVOT Area 2D: 3 14 cm squared  LVPWd (2D): 1 08 cm  SV (Teich): 33 3 cm³    Apical four chamber  LVEF A4C: 49 %    Apical two chamber  LA Area: 21 8 cm squared  LA Volume: 67 cm³  LVEF A2C: 55 %    Unspecified Scan Mode  JUDY Cont Eq (Peak Trino): 1 77 cm squared  LVOT (VTI): 20 6 cm  LVOT Diam : 2 cm  LVOT Vmax: 993 mm/s  LVOT Vmax; Mean: 993 mm/s  Peak Grad ; Mean: 4 mm[Hg]  SV (LVOT): 65 cm³  VTI;Mean: 2 mm[Hg]  JUDY Cont Eq (VTI): 2 25 cm squared  MV Peak A Trino: 1090 mm/s  MV Peak E Trino  Mean: 1620 mm/s  MVA (PHT): 4 cm squared  PHT: 55 ms  Max P mm[Hg]  V Max: 2910 mm/s  Vmax: 2360 mm/s  TAPSE: 1 3 cm    [facility] Accredited [facility]    Prepared and electronically signed by    Elvie Gil DO  Signed 2017 16:48:59         Lab Review   Lab Results   Component Value Date    WBC 4 71 08/10/2018    HGB 13 0 10/05/2018    HCT 42 4 10/05/2018    MCV 87 08/10/2018    RDW 14 8 08/10/2018     08/10/2018     BMP:  Lab Results   Component Value Date     08/10/2018    K 4 3 08/10/2018     08/10/2018    CO2 31 08/10/2018    ANIONGAP 10 2 2015    BUN 20 08/10/2018    CREATININE 0 85 08/10/2018    GLUCOSE 78 2015    GLUF 98 08/10/2018    CALCIUM 8 4 08/10/2018    EGFR 78 08/10/2018    MG 2 3 2017     LFT:  Lab Results   Component Value Date    AST 7 2018    ALT 18 2018    ALKPHOS 92 2018    PROT 8 0 2015    BILITOT 0 64 2015     Lab Results   Component Value Date    BNP 69 2015      No results found for: TSH  Lab Results   Component Value Date    HGBA1C 6 3 (H) 2015     Lipid Profile:   Lab Results   Component Value Date    CHOL 192 10/29/2015    HDL 31 (L) 08/10/2018    LDLCALC 130 (H) 08/10/2018    TRIG 107 08/10/2018     Lab Results   Component Value Date    CHOL 192 10/29/2015    CHOL 168 2015         Dr Preethi Pires MD [facility]      "This note has been constructed using a voice recognition system  Therefore there may be syntax, spelling, and/or grammatical errors   Please call if you have any questions  "

## 2018-10-15 ENCOUNTER — OFFICE VISIT (OUTPATIENT)
Dept: CARDIOLOGY CLINIC | Facility: CLINIC | Age: 83
End: 2018-10-15
Payer: MEDICARE

## 2018-10-15 VITALS
BODY MASS INDEX: 27 KG/M2 | DIASTOLIC BLOOD PRESSURE: 70 MMHG | OXYGEN SATURATION: 97 % | SYSTOLIC BLOOD PRESSURE: 138 MMHG | WEIGHT: 152.4 LBS | HEART RATE: 69 BPM

## 2018-10-15 DIAGNOSIS — I25.118 CORONARY ARTERY DISEASE OF NATIVE ARTERY OF NATIVE HEART WITH STABLE ANGINA PECTORIS (HCC): Chronic | ICD-10-CM

## 2018-10-15 DIAGNOSIS — K27.9 PUD (PEPTIC ULCER DISEASE): Chronic | ICD-10-CM

## 2018-10-15 DIAGNOSIS — I20.8 CHRONIC STABLE ANGINA (HCC): ICD-10-CM

## 2018-10-15 DIAGNOSIS — I20.8 STABLE ANGINA PECTORIS (HCC): ICD-10-CM

## 2018-10-15 DIAGNOSIS — I50.32 CHRONIC DIASTOLIC HEART FAILURE (HCC): ICD-10-CM

## 2018-10-15 DIAGNOSIS — D63.8 ANEMIA OF CHRONIC DISEASE: ICD-10-CM

## 2018-10-15 DIAGNOSIS — E78.2 MIXED HYPERLIPIDEMIA: Chronic | ICD-10-CM

## 2018-10-15 DIAGNOSIS — I25.119 CORONARY ARTERY DISEASE INVOLVING NATIVE CORONARY ARTERY OF NATIVE HEART WITH ANGINA PECTORIS (HCC): Chronic | ICD-10-CM

## 2018-10-15 DIAGNOSIS — I10 ESSENTIAL HYPERTENSION: Chronic | ICD-10-CM

## 2018-10-15 DIAGNOSIS — I25.10 3-VESSEL CAD: ICD-10-CM

## 2018-10-15 PROCEDURE — 99214 OFFICE O/P EST MOD 30 MIN: CPT | Performed by: INTERNAL MEDICINE

## 2018-10-15 RX ORDER — ASPIRIN 81 MG/1
81 TABLET ORAL DAILY
COMMUNITY
End: 2018-10-15

## 2018-10-15 RX ORDER — METOPROLOL SUCCINATE 25 MG/1
25 TABLET, EXTENDED RELEASE ORAL DAILY
Qty: 90 TABLET | Refills: 3 | Status: SHIPPED | OUTPATIENT
Start: 2018-10-15 | End: 2019-10-09 | Stop reason: SDUPTHER

## 2018-12-11 DIAGNOSIS — Z98.61 CAD S/P PERCUTANEOUS CORONARY ANGIOPLASTY: ICD-10-CM

## 2018-12-11 DIAGNOSIS — I25.10 CAD S/P PERCUTANEOUS CORONARY ANGIOPLASTY: ICD-10-CM

## 2018-12-11 RX ORDER — CLOPIDOGREL BISULFATE 75 MG/1
75 TABLET ORAL DAILY
Qty: 90 TABLET | Refills: 3 | Status: SHIPPED | OUTPATIENT
Start: 2018-12-11 | End: 2019-12-12 | Stop reason: SDUPTHER

## 2018-12-11 NOTE — TELEPHONE ENCOUNTER
Patient is requesting Dr Roxanna Potts to send refill to [address] [place] [address] for Clopidogrel 75 mg

## 2019-02-18 ENCOUNTER — TRANSCRIBE ORDERS (OUTPATIENT)
Dept: ADMINISTRATIVE | Facility: HOSPITAL | Age: 84
End: 2019-02-18

## 2019-02-18 ENCOUNTER — APPOINTMENT (OUTPATIENT)
Dept: LAB | Facility: HOSPITAL | Age: 84
End: 2019-02-18
Payer: MEDICARE

## 2019-02-18 DIAGNOSIS — E78.00 PURE HYPERCHOLESTEROLEMIA: Primary | ICD-10-CM

## 2019-02-18 DIAGNOSIS — N18.2 CHRONIC KIDNEY DISEASE, STAGE II (MILD): ICD-10-CM

## 2019-02-18 DIAGNOSIS — N40.0 BENIGN PROSTATIC HYPERPLASIA WITHOUT LOWER URINARY TRACT SYMPTOMS: ICD-10-CM

## 2019-02-18 DIAGNOSIS — E78.00 PURE HYPERCHOLESTEROLEMIA: ICD-10-CM

## 2019-02-18 LAB
ANION GAP SERPL CALCULATED.3IONS-SCNC: 8 MMOL/L (ref 4–13)
BUN SERPL-MCNC: 20 MG/DL (ref 5–25)
CALCIUM SERPL-MCNC: 9 MG/DL (ref 8.3–10.1)
CHLORIDE SERPL-SCNC: 102 MMOL/L (ref 100–108)
CHOLEST SERPL-MCNC: 198 MG/DL (ref 50–200)
CO2 SERPL-SCNC: 31 MMOL/L (ref 21–32)
CREAT SERPL-MCNC: 1.01 MG/DL (ref 0.6–1.3)
GFR SERPL CREATININE-BSD FRML MDRD: 66 ML/MIN/1.73SQ M
GLUCOSE P FAST SERPL-MCNC: 100 MG/DL (ref 65–99)
HDLC SERPL-MCNC: 32 MG/DL (ref 40–60)
LDLC SERPL CALC-MCNC: 139 MG/DL (ref 0–100)
NONHDLC SERPL-MCNC: 166 MG/DL
POTASSIUM SERPL-SCNC: 4.5 MMOL/L (ref 3.5–5.3)
PSA SERPL-MCNC: 1.7 NG/ML (ref 0–4)
SODIUM SERPL-SCNC: 141 MMOL/L (ref 136–145)
TRIGL SERPL-MCNC: 137 MG/DL

## 2019-02-18 PROCEDURE — 36415 COLL VENOUS BLD VENIPUNCTURE: CPT

## 2019-02-18 PROCEDURE — 84153 ASSAY OF PSA TOTAL: CPT

## 2019-02-18 PROCEDURE — 80048 BASIC METABOLIC PNL TOTAL CA: CPT

## 2019-02-18 PROCEDURE — 80061 LIPID PANEL: CPT

## 2019-02-27 NOTE — PROGRESS NOTES
2/28/2019    Martín November 8/2/1930  [id number]    Discussion and Plan    Patient continues to do well with no urinary complaints status post TURP  PSA is stable and I discussed at this time prostate cancer screening is not necessarily indicated given his advanced age  He therefore will be maintained on Avodart and return on an as-needed basis  1  Benign prostatic hyperplasia with nocturia  - POCT Measure PVR    Assessment      Patient Active Problem List   Diagnosis    Acute respiratory failure with hypoxia (HCC)    History of DVT (deep vein thrombosis)    PUD (peptic ulcer disease)    Hypertension    Hyperlipidemia    Chronic diastolic heart failure ([address] )    Chest pain    Incarcerated right inguinal hernia    Inguinal hernia    Benign prostatic hyperplasia    Anxiety    Anemia    Hematuria    Bladder mass    Anemia of chronic disease    Depression    Dry eye    3-vessel CAD    Chronic stable angina (HCC)       History of Present Illness    Maryan Egan is a 80 y o  male seen today in regards to a history of BPH with LUTS, nephrolithiasis, and gross hematuria s/p cystoscopy with evacuation of clots/fulturation (6/30/18) presents today for follow up  Intraoperatively, patient noted to have focal bleeding point at bladder neck  Ochoa catheter was removed in office on 7/6/18 without any difficulty  Patient states that he continues to void well without any difficulties since Ochoa catheter removal   He denies any episodes of gross hematuria or dysuria  He states having a strong urinary stream and feels he empties bladder to completion  Patient notes having occasionally 1 episode of nocturia per night but does not find bothersome  He had previously been on finasteride but discontinued secondary to breast tenderness    He was restarted on Dutasteride at last hospital admission, and denies any side effects        The patient has history of TURP which was performed approximately 5 years ago by   Alan  Last PSA from 02/16/2018 was stable at 2 7  Repeat his since reduced to 1 7  He denies any interval urinary complaints      Urinary Symptom Assessment        Past Medical History  Past Medical History:   Diagnosis Date    BPH (benign prostatic hyperplasia)     CAD (coronary artery disease)     Cardiac disease     Cervical spine fracture (HCC)     DVT (deep venous thrombosis) (HCC)     Fracture of lumbar spine (HCC)     Glaucoma     Hyperlipidemia     Hypertension     Myocardial infarct (HCC)     PUD (peptic ulcer disease)        Past Social History  Past Surgical History:   Procedure Laterality Date    CHOLECYSTECTOMY      CORONARY ARTERY BYPASS GRAFT      CORONARY STENT PLACEMENT      EYE SURGERY      HERNIA REPAIR Right 11/3/2017    Procedure: REPAIR HERNIA INGUINAL;  Surgeon: Nicholas Campbell MD;  Location: [facility];  Service: General    SD CYSTOURETHROSCOPY W/IRRIG & EVAC CLOTS N/A 6/30/2018    Procedure: CYSTOSCOPY EVACUATION OF CLOTS, fulgeration;  Surgeon: Melany Figueredo MD;  Location:  Main OR;  Service: Urology    STOMACH SURGERY      [place] 2 gastrectomy    TRANSURETHRAL RESECTION OF PROSTATE      VENA CAVA FILTER PLACEMENT         Past Family History  Family History   Problem Relation Age of Onset    Coronary artery disease Brother        Past Social history  Social History     Socioeconomic History    Marital status: /Civil Union     Spouse name: Not on file    Number of children: Not on file    Years of education: Not on file    Highest education level: Not on file   Occupational History    Not on file   Social Needs    Financial resource strain: Not on file    Food insecurity:     Worry: Not on file     Inability: Not on file    Transportation needs:     Medical: Not on file     Non-medical: Not on file   Tobacco Use    Smoking status: Never Smoker    Smokeless tobacco: Never Used   Substance and Sexual Activity    Alcohol use: No    Drug use: No    Sexual activity: Not on file   Lifestyle    Physical activity:     Days per week: Not on file     Minutes per session: Not on file    Stress: Not on file   Relationships    Social connections:     Talks on phone: Not on file     Gets together: Not on file     Attends [employer] service: Not on file     Active member of club or organization: Not on file     Attends meetings of clubs or organizations: Not on file     Relationship status: Not on file    Intimate partner violence:     Fear of current or ex partner: Not on file     Emotionally abused: Not on file     Physically abused: Not on file     Forced sexual activity: Not on file   Other Topics Concern    Not on file   Social History Narrative    Not on file       Current Medications  Current Outpatient Medications   Medication Sig Dispense Refill    ALPRAZolam (XANAX) 0 25 mg tablet Take 0 25 mg by mouth daily at bedtime as needed for anxiety        ALPRAZolam (XANAX) 0 5 mg tablet       Calcium Carbonate-Vitamin D (CALCIUM 600+D PO) Take by mouth daily      cholecalciferol (VITAMIN D3) 400 units tablet Take 400 Units by mouth daily      clopidogrel (PLAVIX) 75 mg tablet Take 1 tablet (75 mg total) by mouth daily 90 tablet 3    dutasteride (AVODART) 0 5 mg capsule Take 1 capsule (0 5 mg total) by mouth daily 90 capsule 3    escitalopram (LEXAPRO) 10 mg tablet Take 10 mg by mouth daily      famotidine (PEPCID) 20 mg tablet Take 20 mg by mouth daily        isosorbide mononitrate (IMDUR) 30 mg 24 hr tablet Take 1 tablet (30 mg total) by mouth daily 90 tablet 3    Magnesium 250 MG TABS Take 1 tablet by mouth daily      metoprolol succinate (TOPROL-XL) 25 mg 24 hr tablet Take 1 tablet (25 mg total) by mouth daily 90 tablet 3    multivitamin-iron-minerals-folic acid (CENTRUM) chewable tablet Chew 1 tablet daily      nitroglycerin (NITROSTAT) 0 4 mg SL tablet Place 0 4 mg under the tongue every 5 (five) minutes as needed for chest pain      tamsulosin (FLOMAX) 0 4 mg Take 1 capsule (0 4 mg total) by mouth daily with dinner 90 capsule 3    zinc gluconate 50 mg tablet Take 25 mg by mouth daily  No current facility-administered medications for this visit  Allergies  Allergies   Allergen Reactions    Oxycodone-Acetaminophen Dizziness and Shortness Of Breath     Other reaction(s): Faints  Reaction Date: [id number]; Category: Adverse Reaction;     Omeprazole Other (See Comments)     pash    Statins Other (See Comments)     Muscle pain       Past Medical History, Social History, Family History, medications and allergies were reviewed  Review of Systems  Review of Systems   Constitutional: Negative  HENT: Negative  Eyes: Negative  Respiratory: Negative  Cardiovascular: Negative  Gastrointestinal: Negative  Endocrine: Negative  Genitourinary: Negative for decreased urine volume, difficulty urinating, hematuria and urgency  Musculoskeletal: Negative  Skin: Negative  Neurological: Negative  Hematological: Negative  Psychiatric/Behavioral: Negative  Vitals  Vitals:    02/28/19 1229   BP: 118/72   BP Location: Left arm   Patient Position: Sitting   Cuff Size: Adult   Pulse: 62   Weight: 69 4 kg (153 lb)   Height: 5' 3" (1 6 m)         Physical Exam    Physical Exam   Constitutional: He is oriented to person, place, and time  He appears well-developed and well-nourished  HENT:   Head: Normocephalic and atraumatic  Eyes: Pupils are equal, round, and reactive to light  Neck: Normal range of motion  Cardiovascular: Normal rate, regular rhythm and normal heart sounds  Pulmonary/Chest: Effort normal and breath sounds normal  No accessory muscle usage  No respiratory distress  Abdominal: Soft  Normal appearance and bowel sounds are normal  There is no tenderness  Musculoskeletal: Normal range of motion  Neurological: He is alert and oriented to person, place, and time     Skin: Skin is warm, dry and intact  Psychiatric: He has a normal mood and affect  His speech is normal  Cognition and memory are normal    Nursing note and vitals reviewed  Results    Below listed labs, pathology results, and radiology images were personally reviewed:    Lab Results   Component Value Date/Time    PSA 1 7 02/18/2019 09:08 AM     Lab Results   Component Value Date    GLUCOSE 78 12/07/2015    CALCIUM 9 0 02/18/2019     12/07/2015    K 4 5 02/18/2019    CO2 31 02/18/2019     02/18/2019    BUN 20 02/18/2019    CREATININE 1 01 02/18/2019     Lab Results   Component Value Date    WBC 4 71 08/10/2018    HGB 13 0 10/05/2018    HCT 42 4 10/05/2018    MCV 87 08/10/2018     08/10/2018       Recent Results (from the past 1 hour(s))   POCT Measure PVR    Collection Time: 02/28/19 12:38 PM   Result Value Ref Range    POST-VOID RESIDUAL VOLUME, ML POC 12 mL   ]      Post Void Residual Measurement:  After voiding to completion the patient was brought to the exam room and positioned supine    Residual urine volume was measured with an ultrasound at:    12 ml

## 2019-02-28 ENCOUNTER — OFFICE VISIT (OUTPATIENT)
Dept: UROLOGY | Facility: CLINIC | Age: 84
End: 2019-02-28
Payer: MEDICARE

## 2019-02-28 VITALS
BODY MASS INDEX: 27.11 KG/M2 | HEART RATE: 62 BPM | HEIGHT: 63 IN | WEIGHT: 153 LBS | DIASTOLIC BLOOD PRESSURE: 72 MMHG | SYSTOLIC BLOOD PRESSURE: 118 MMHG

## 2019-02-28 DIAGNOSIS — R35.1 BENIGN PROSTATIC HYPERPLASIA WITH NOCTURIA: Primary | ICD-10-CM

## 2019-02-28 DIAGNOSIS — N40.1 BENIGN PROSTATIC HYPERPLASIA WITH NOCTURIA: Primary | ICD-10-CM

## 2019-02-28 LAB — POST-VOID RESIDUAL VOLUME, ML POC: 12 ML

## 2019-02-28 PROCEDURE — 51798 US URINE CAPACITY MEASURE: CPT | Performed by: UROLOGY

## 2019-02-28 PROCEDURE — 99213 OFFICE O/P EST LOW 20 MIN: CPT | Performed by: UROLOGY

## 2019-04-22 ENCOUNTER — OFFICE VISIT (OUTPATIENT)
Dept: CARDIOLOGY CLINIC | Facility: CLINIC | Age: 84
End: 2019-04-22
Payer: MEDICARE

## 2019-04-22 VITALS
WEIGHT: 156 LBS | BODY MASS INDEX: 27.64 KG/M2 | HEART RATE: 67 BPM | OXYGEN SATURATION: 97 % | DIASTOLIC BLOOD PRESSURE: 60 MMHG | SYSTOLIC BLOOD PRESSURE: 112 MMHG | HEIGHT: 63 IN

## 2019-04-22 DIAGNOSIS — Z86.718 HISTORY OF DVT (DEEP VEIN THROMBOSIS): Chronic | ICD-10-CM

## 2019-04-22 DIAGNOSIS — I20.8 STABLE ANGINA PECTORIS (HCC): ICD-10-CM

## 2019-04-22 DIAGNOSIS — I10 ESSENTIAL HYPERTENSION: Chronic | ICD-10-CM

## 2019-04-22 DIAGNOSIS — I50.32 CHRONIC DIASTOLIC HEART FAILURE (HCC): ICD-10-CM

## 2019-04-22 DIAGNOSIS — N32.89 BLADDER MASS: ICD-10-CM

## 2019-04-22 DIAGNOSIS — I25.10 3-VESSEL CAD: ICD-10-CM

## 2019-04-22 DIAGNOSIS — F41.9 ANXIETY: ICD-10-CM

## 2019-04-22 DIAGNOSIS — I20.8 CHRONIC STABLE ANGINA (HCC): ICD-10-CM

## 2019-04-22 DIAGNOSIS — E78.2 MIXED HYPERLIPIDEMIA: Chronic | ICD-10-CM

## 2019-04-22 PROCEDURE — 93000 ELECTROCARDIOGRAM COMPLETE: CPT | Performed by: INTERNAL MEDICINE

## 2019-04-22 PROCEDURE — 99214 OFFICE O/P EST MOD 30 MIN: CPT | Performed by: INTERNAL MEDICINE

## 2019-04-22 RX ORDER — NITROGLYCERIN 0.4 MG/1
0.4 TABLET SUBLINGUAL
Qty: 30 TABLET | Refills: 1 | Status: SHIPPED | OUTPATIENT
Start: 2019-04-22 | End: 2020-12-15 | Stop reason: SDUPTHER

## 2019-04-22 RX ORDER — EZETIMIBE 10 MG/1
10 TABLET ORAL DAILY
Qty: 30 TABLET | Refills: 3 | Status: SHIPPED | OUTPATIENT
Start: 2019-04-22 | End: 2021-11-03 | Stop reason: HOSPADM

## 2019-07-02 DIAGNOSIS — N40.0 BENIGN PROSTATIC HYPERPLASIA, UNSPECIFIED WHETHER LOWER URINARY TRACT SYMPTOMS PRESENT: ICD-10-CM

## 2019-07-02 RX ORDER — TAMSULOSIN HYDROCHLORIDE 0.4 MG/1
0.4 CAPSULE ORAL
Qty: 90 CAPSULE | Refills: 2 | Status: SHIPPED | OUTPATIENT
Start: 2019-07-02 | End: 2020-03-28 | Stop reason: SDUPTHER

## 2019-07-02 NOTE — TELEPHONE ENCOUNTER
Patient left a message on the Medication Refill voice mail line requesting a new prescription for Tamsulosin 0 4mg, 90 day supply to [facility]  The patient was last seen on 8/22/19 by Renetta Carballo in the [place] location; continuation of the medication was authorized at that time  Patient is due in February 2020 for his next office    Request for same, 90 day supply with 2 refills was queued and forwarded to [email or web address] for approval

## 2019-08-22 DIAGNOSIS — N40.1 BENIGN PROSTATIC HYPERPLASIA WITH URINARY RETENTION: ICD-10-CM

## 2019-08-22 DIAGNOSIS — R33.8 BENIGN PROSTATIC HYPERPLASIA WITH URINARY RETENTION: ICD-10-CM

## 2019-08-22 NOTE — TELEPHONE ENCOUNTER
Patient left message on the Medication Refill voice mail line requesting a new prescription for Dutasteride 0 5 mg / po Qhs #90 to [facility]

## 2019-08-23 RX ORDER — DUTASTERIDE 0.5 MG/1
0.5 CAPSULE, LIQUID FILLED ORAL DAILY
Qty: 90 CAPSULE | Refills: 3 | Status: SHIPPED | OUTPATIENT
Start: 2019-08-23 | End: 2020-05-26 | Stop reason: SDUPTHER

## 2019-08-23 NOTE — TELEPHONE ENCOUNTER
The patient was last seen on 2/28/19 by Dr Hussain Antonio in the [place] location; continuation of the medication was authorized at that time    Request for same, 90 day supply with 2 refills was queued and forwarded to the Advanced Practitioner covering the [facility] location for approval

## 2019-08-27 DIAGNOSIS — I20.8 CHRONIC STABLE ANGINA (HCC): ICD-10-CM

## 2019-08-27 RX ORDER — ISOSORBIDE MONONITRATE 30 MG/1
30 TABLET, EXTENDED RELEASE ORAL DAILY
Qty: 90 TABLET | Refills: 3 | Status: SHIPPED | OUTPATIENT
Start: 2019-08-27 | End: 2020-08-11 | Stop reason: SDUPTHER

## 2019-08-27 NOTE — TELEPHONE ENCOUNTER
Requesting 90 day refill of Isosorbide 30 mg sent to [facility] in [facility]

## 2019-09-05 ENCOUNTER — TELEPHONE (OUTPATIENT)
Dept: UROLOGY | Facility: MEDICAL CENTER | Age: 84
End: 2019-09-05

## 2019-09-05 NOTE — TELEPHONE ENCOUNTER
Patient of Dr Salas Hardwick seen in the [place] office  Harleen from patient pcp Dr Houston [place] office called requesting last visits office notes and patient las PSA results  Please fax to [phone]  Please advise

## 2019-09-27 ENCOUNTER — TRANSCRIBE ORDERS (OUTPATIENT)
Dept: ADMINISTRATIVE | Facility: HOSPITAL | Age: 84
End: 2019-09-27

## 2019-09-27 ENCOUNTER — APPOINTMENT (OUTPATIENT)
Dept: LAB | Facility: HOSPITAL | Age: 84
End: 2019-09-27
Attending: FAMILY MEDICINE
Payer: MEDICARE

## 2019-09-27 DIAGNOSIS — M18.2 POST-TRAUMATIC OSTEOARTHRITIS OF BOTH FIRST CARPOMETACARPAL JOINTS: Primary | ICD-10-CM

## 2019-09-27 LAB
ALBUMIN SERPL BCP-MCNC: 3.5 G/DL (ref 3.5–5)
ALP SERPL-CCNC: 80 U/L (ref 46–116)
ALT SERPL W P-5'-P-CCNC: 17 U/L (ref 12–78)
ANION GAP SERPL CALCULATED.3IONS-SCNC: 3 MMOL/L (ref 4–13)
AST SERPL W P-5'-P-CCNC: 6 U/L (ref 5–45)
BILIRUB SERPL-MCNC: 0.5 MG/DL (ref 0.2–1)
BUN SERPL-MCNC: 18 MG/DL (ref 5–25)
CALCIUM SERPL-MCNC: 8.8 MG/DL (ref 8.3–10.1)
CHLORIDE SERPL-SCNC: 102 MMOL/L (ref 100–108)
CO2 SERPL-SCNC: 32 MMOL/L (ref 21–32)
CREAT SERPL-MCNC: 0.86 MG/DL (ref 0.6–1.3)
GFR SERPL CREATININE-BSD FRML MDRD: 77 ML/MIN/1.73SQ M
GLUCOSE P FAST SERPL-MCNC: 105 MG/DL (ref 65–99)
POTASSIUM SERPL-SCNC: 4.2 MMOL/L (ref 3.5–5.3)
PROT SERPL-MCNC: 7.2 G/DL (ref 6.4–8.2)
SODIUM SERPL-SCNC: 137 MMOL/L (ref 136–145)

## 2019-09-27 PROCEDURE — 36415 COLL VENOUS BLD VENIPUNCTURE: CPT | Performed by: FAMILY MEDICINE

## 2019-09-27 PROCEDURE — 80053 COMPREHEN METABOLIC PANEL: CPT | Performed by: FAMILY MEDICINE

## 2019-10-09 DIAGNOSIS — I25.118 CORONARY ARTERY DISEASE OF NATIVE ARTERY OF NATIVE HEART WITH STABLE ANGINA PECTORIS (HCC): Chronic | ICD-10-CM

## 2019-10-09 RX ORDER — METOPROLOL SUCCINATE 25 MG/1
25 TABLET, EXTENDED RELEASE ORAL DAILY
Qty: 90 TABLET | Refills: 3 | Status: SHIPPED | OUTPATIENT
Start: 2019-10-09 | End: 2019-10-31 | Stop reason: SDUPTHER

## 2019-10-09 NOTE — TELEPHONE ENCOUNTER
Need refill of: metoprolol succinate (TOPROL-XL) 25 mg 24 hr tablet  90-day supply  Going to [email or web address]

## 2019-10-27 NOTE — PROGRESS NOTES
Progress Note - Cardiology Office  Katherin Paniagua 80 y o  male MRN: [id number]  : 1930  Encounter: [id number]      Assessment:     1  3-vessel CAD    2  Chronic diastolic heart failure ([facility] [address] )    3  Chronic stable angina ([facility] [address] )    4  Essential hypertension    5  Mixed hyperlipidemia    6  Anemia of chronic disease    7  Coronary artery disease of native artery of native heart with stable angina pectoris [facility])        Discussion Summary and Plan:  1  Coronary artery disease status post CABG and stents with multiple cardiac catheterization a year ago ago in [facility] by me and it was recommended medical therapy  Patient has diffuse 3 vessel disease with poor targets  He continues to have chronic stable angina which is not changed  He did not uses nitro for more than a year  Will continue Imdur and beta-blocker  Unfortunately he is not tolerating any statins  His metoprolol increased to 50 mg daily    2  Chronic stable angina  As mentioned above history of chronic stable angina  He is on Ranexa tolerating well continue Plavix  Could not tolerate aspirin due to GERD  3  Generalized anxiety  Patient history of generalized anxiety  Gets very anxious  4  Dyslipidemia  Patient has history of severe three-vessel disease  He could not tolerate any statin  He was started on Zetia  We also given prescription of PSK inhibitors  He would not take these medications due to cost   He does not even want to take low-dose statin  He understand that he is high risk for heart attack stroke and death  5  Chronic diastolic heart failure New [place] Heart Association class 2  No more admission to hospital with heart failure  He has slowly stop taking his diuretics needed to use only p r n  As per him  EF around 40-45%  He is regulating his diuretic based on his needs  He is not on Ace inhibitor due to low blood pressure  6  Benign prostate hypertrophy status post surgery  Currently stable      7  Ischemic cardiomyopathy EF around 40-45%  Currently compensated  Continue metoprolol XL  Dose increased to 50 mg daily    8  History of peptic ulcer disease  Patient was previously on Protonix  He got better with stopping aspirin  Advised to consider using enteric-coated aspirin  Counseling :  A description of the counseling  Spoke to patient about chronic stable angina, coronary artery disease, dyslipidemia, diastolic heart failure at length  Diet advised  Patient's ability to self care: Yes  Medication side effect reviewed with patient in detail and all their questions answered to their satisfaction  HPI :     Michael Marin is a 80y o  year old male who came for follow up  Patient has a past medical history significant for coronary artery disease status post CABG status post stent into his MCKENNA to LAD widely patent, SVG vein graft to 100% occluded and circumflex was severely diffusely disease with severe diffuse distal disease, chronic stable angina, status post MI anxiety, GERD, dyslipidemia who came for follow-up  was admitted to [facility] with chronic diastolic heart failure and is now compensated  He denies any chest pain or any shortness of breath  Occasionally feel dizzy in the morning  Has been on Demadex 20 mg and potassium 20 mg daily  All medications reviewed with him       04/22/2019  Above reviewed  Patient came for follow-up  He is doing much better  He has not taken any nitro since last visit  He had a nuclear stress test done which shows year old infarct with ejection fraction around 45%  He still get occasional episodes of chest pain but gets better if he slows down  He also get chest heaviness if he eats a heavy me  He could not take aspirin due to stomach burning he is only on Plavix which has helped  He could not tolerate statins and he has not taken any statin for long period of time    He was given P SK inhibitor however he could not afford that prescription and does not want take it  He is willing to try other medications  No nausea no vomiting no PND no orthopnea no other issues  10/31/2019  Above reviewed  Patient came for follow-up  He is doing better  His labs from September 2019 reviewed  Cholesterol has improved though LDL is still elevated  He has past medical history significant three-vessel coronary artery disease status post cardiac catheterization, ischemic cardiomyopathy EF around 45%, history of GI bleed with intolerant to aspirin on Plavix, intolerant to statins on Zetia, refuses to take PSK inhibitors has no other significant complaint  He still occasionally get episodes of chest pain with exertion  As per wife he has been active lot and decision  His building a shed and helping them  No leg swelling no recent admission patent had take nitro no other significant issues at this time  He has stopped taking his Zetia and Ranexa  He said he could not tolerate Zetia and Ranexa was costly  Review of Systems   Constitutional: Negative for activity change, chills, diaphoresis, fever and unexpected weight change  HENT: Negative for congestion  Eyes: Negative for discharge and redness  Respiratory: Positive for chest tightness  Negative for cough, shortness of breath and wheezing  With activities   Cardiovascular: Positive for chest pain  Negative for palpitations and leg swelling  Some chest pain when he become anxious   Gastrointestinal: Negative for abdominal pain, diarrhea and nausea  Endocrine: Negative  Genitourinary: Negative for decreased urine volume and urgency  Musculoskeletal: Positive for arthralgias, back pain and gait problem  Skin: Negative for rash and wound  Allergic/Immunologic: Negative  Neurological: Negative for dizziness, seizures, syncope, weakness, light-headedness and headaches  Hematological: Negative  Psychiatric/Behavioral: Negative for agitation and confusion   The patient is nervous/anxious  Historical Information   Past Medical History:   Diagnosis Date    BPH (benign prostatic hyperplasia)     CAD (coronary artery disease)     Cardiac disease     Cervical spine fracture (HCC)     DVT (deep venous thrombosis) (HCC)     Fracture of lumbar spine (HCC)     Glaucoma     Hyperlipidemia     Hypertension     Myocardial infarct (HCC)     PUD (peptic ulcer disease)      Past Surgical History:   Procedure Laterality Date    CHOLECYSTECTOMY      CORONARY ARTERY BYPASS GRAFT      CORONARY STENT PLACEMENT      EYE SURGERY      HERNIA REPAIR Right 11/3/2017    Procedure: REPAIR HERNIA INGUINAL;  Surgeon: Evans Garner MD;  Location: [facility];  Service: General    WI CYSTOURETHROSCOPY W/IRRIG & EVAC CLOTS N/A 6/30/2018    Procedure: CYSTOSCOPY EVACUATION OF CLOTS, fulgeration;  Surgeon: Boyd Abraham MD;  Location: [facility];  Service: Urology    STOMACH SURGERY      Billroth 2 gastrectomy    TRANSURETHRAL RESECTION OF PROSTATE      VENA CAVA FILTER PLACEMENT       Social History     Substance and Sexual Activity   Alcohol Use No     Social History     Substance and Sexual Activity   Drug Use No     Social History     Tobacco Use   Smoking Status Never Smoker   Smokeless Tobacco Never Used     Family History:   Family History   Problem Relation Age of Onset    Coronary artery disease Brother        Meds/Allergies     Allergies   Allergen Reactions    Oxycodone-Acetaminophen Dizziness and Shortness Of Breath     Other reaction(s): Faints  Reaction Date: [id number]; Category:  Adverse Reaction;     Omeprazole Other (See Comments)     pash    Statins Other (See Comments)     Muscle pain       Current Outpatient Medications:     ALPRAZolam (XANAX) 0 5 mg tablet, , Disp: , Rfl:     Calcium Carbonate-Vitamin D (CALCIUM 600+D PO), Take by mouth daily, Disp: , Rfl:     cholecalciferol (VITAMIN D3) 400 units tablet, Take 400 Units by mouth daily, Disp: , Rfl:    clopidogrel (PLAVIX) 75 mg tablet, Take 1 tablet (75 mg total) by mouth daily, Disp: 90 tablet, Rfl: 3    dutasteride (AVODART) 0 5 mg capsule, Take 1 capsule (0 5 mg total) by mouth daily, Disp: 90 capsule, Rfl: 3    escitalopram (LEXAPRO) 10 mg tablet, Take 10 mg by mouth daily, Disp: , Rfl:     isosorbide mononitrate (IMDUR) 30 mg 24 hr tablet, Take 1 tablet (30 mg total) by mouth daily, Disp: 90 tablet, Rfl: 3    Magnesium 250 MG TABS, Take 1 tablet by mouth daily, Disp: , Rfl:     metoprolol succinate (TOPROL-XL) 25 mg 24 hr tablet, Take 1 tablet (25 mg total) by mouth 2 (two) times a day, Disp: 90 tablet, Rfl: 3    multivitamin-iron-minerals-folic acid (CENTRUM) chewable tablet, Chew 1 tablet daily, Disp: , Rfl:     nitroglycerin (NITROSTAT) 0 4 mg SL tablet, Place 1 tablet (0 4 mg total) under the tongue every 5 (five) minutes as needed for chest pain, Disp: 30 tablet, Rfl: 1    tamsulosin (FLOMAX) 0 4 mg, Take 1 capsule (0 4 mg total) by mouth daily with dinner, Disp: 90 capsule, Rfl: 2    zinc gluconate 50 mg tablet, Take 25 mg by mouth daily  , Disp: , Rfl:     ezetimibe (ZETIA) 10 mg tablet, Take 1 tablet (10 mg total) by mouth daily, Disp: 30 tablet, Rfl: 3    famotidine (PEPCID) 20 mg tablet, Take 20 mg by mouth daily  , Disp: , Rfl:     Vitals: Blood pressure 120/60, pulse 69, height 5' 3" (1 6 m), weight 69 9 kg (154 lb), SpO2 95 %  Body mass index is 27 28 kg/m²  Vitals:    10/31/19 1439   Weight: 69 9 kg (154 lb)     BP Readings from Last 3 Encounters:   10/31/19 120/60   04/22/19 112/60   02/28/19 118/72         Physical Exam   Constitutional: He is oriented to person, place, and time  He appears well-developed and well-nourished  No distress  HENT:   Head: Normocephalic and atraumatic  Eyes: Pupils are equal, round, and reactive to light  Neck: Neck supple  No JVD present  No tracheal deviation present  No thyromegaly present     Cardiovascular: Normal rate, regular rhythm, S1 normal and S2 normal  Exam reveals no gallop, no S3, no S4, no distant heart sounds and no friction rub  Murmur heard  Systolic (ejection) murmur is present with a grade of 2/6  S1-S2 regular with 3/6 systolic murmur S4 present   Pulmonary/Chest: Effort normal  No respiratory distress  He has no wheezes  He has no rales  He exhibits no tenderness  Bilateral air entry clear to auscultation   Abdominal: Soft  Bowel sounds are normal  He exhibits no distension  There is no tenderness  Musculoskeletal: He exhibits no edema or deformity  No edema   Neurological: He is alert and oriented to person, place, and time  Skin: Skin is warm and dry  No rash noted  He is not diaphoretic  No pallor  Psychiatric: He has a normal mood and affect  His behavior is normal  Judgment normal          Diagnostic Studies Review Cardio:  Lab Review: HIS EKG done on September 13, 2016 shows normal sinus and marked ST abnormality in inferior wall consistent with ischemia but not different from old EKG     Stress Test: Nuclear stress test done 5/14/2016 shows moderate-sized inferior lateral wall ischemia and apical infarction  Ejection fraction 45%  Which was consistent with his %, vein graft to RCA is occluded, as circumflex has diffuse disease and LIMA to LAD was widely patent  Catheterization: He has multiple cardiac catheterization performed  His last cardiac catheter patient performed by me shows EF around 50%, severe diffuse disease of LAD which is totally occluded,  ECG Report:   Comparison to prior ECGs:1  No interval change1   4/24 2017  Twelve-lead EKG shows normal sinus some heart rate 72 bpm  ST changes in inferior and lateral leads which is not changed from old EKG  Cannot rule out underlying ischemialead EKG shows normal sinus rhythm heart rate 63 beats per minute with deep T-wave inversions in inferior and lateral precordial leads   Not change from old EKG    Twelve lead EKG done 04/22/2019 shows normal sinus rhythm heart rate 71 beats per minute  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  ST abnormal it in precordial leads not change from old EKG  Twelve lead EKG done 10/31/2019 shows normal sinus rhythm evidence of old inferior infarct  ST abnormality cannot rule out ischemia  Cardiac testing:   Results for orders placed during the hospital encounter of 17   Echo complete with contrast if indicated    Narrative [address]  [address] [facility]  [place] [address]  [phone]    Transthoracic Echocardiogram  2D, M-mode, Doppler, and Color Doppler    Study date:  2017    Patient: Shannan Cuello  MR number: [id number]  Account number: [de-identified]  : 02-Aug-1930  Age: 80 years  Gender: Male  Status: Routine  Location: Bedside  Height: 64 in  Weight: 147 6 lb  BP: 118/ 60 mmHg    Indications: acute hypoxic    Diagnoses: I42 9 - Cardiomyopathy, unspecified    Sonographer:  YURI Carmona  Primary Physician:  Dolores Holloway DO  Referring Physician:  Jaswinder Green MD  Interpreting Physician:  Susan Rae DO  acute hypoxic    SUMMARY    LEFT VENTRICLE:  Systolic function was at the lower limits of normal by Teichholz  Ejection  fraction was estimated to be 50 %  There was mild hypokinesis of the septal wall  There was mild concentric hypertrophy  Doppler parameters were consistent with restrictive physiology, indicative of  decreased left ventricular diastolic compliance and/or increased left atrial  pressure  Doppler parameters were consistent with elevated mean left atrial  filling pressure  RIGHT VENTRICLE:  Systolic function was moderately reduced  MITRAL VALVE:  There was moderate regurgitation  AORTIC VALVE:  The valve was trileaflet  Leaflets exhibited moderate calcification and mildly  reduced cuspal separation  There was mild regurgitation  TRICUSPID VALVE:  There was moderate regurgitation    Pulmonary artery systolic pressure was mildly increased  HISTORY: PRIOR HISTORY: CAD, CABG,DVT,CHF,HTN, H/O PROSTATE CANCER    PROCEDURE: The procedure was performed at the bedside  This was a routine  study  The transthoracic approach was used  The study included complete 2D  imaging, M-mode, complete spectral Doppler, and color Doppler  The heart rate  was 84 bpm, at the start of the study  LEFT VENTRICLE: Size was normal  Systolic function was at the lower limits of  normal by Teichholz  Ejection fraction was estimated to be 50 %  There was mild  hypokinesis of the septal wall  There was mild concentric hypertrophy  DOPPLER:  Doppler parameters were consistent with restrictive physiology, indicative of  decreased left ventricular diastolic compliance and/or increased left atrial  pressure  Doppler parameters were consistent with elevated mean left atrial  filling pressure  RIGHT VENTRICLE: The size was normal  Systolic function was moderately reduced  LEFT ATRIUM: The atrium was mildly dilated  RIGHT ATRIUM: Size was normal     MITRAL VALVE: Valve structure was normal  There was normal leaflet separation  No echocardiographic evidence for prolapse  DOPPLER: The transmitral velocity  was within the normal range  There was no evidence for stenosis  There was  moderate regurgitation  AORTIC VALVE: The valve was trileaflet  Leaflets exhibited moderate  calcification and mildly reduced cuspal separation  DOPPLER: Transaortic  velocity was within the normal range  There was no evidence for stenosis  There  was mild regurgitation  TRICUSPID VALVE: The valve structure was normal  There was normal leaflet  separation  DOPPLER: The transtricuspid velocity was within the normal range  There was moderate regurgitation  Pulmonary artery systolic pressure was mildly  increased  Estimated peak PA pressure was 42 mmHg  PULMONIC VALVE: Leaflets exhibited normal thickness, no calcification, and  normal cuspal separation   DOPPLER: The transpulmonic velocity was within the  normal range  There was mild regurgitation  PERICARDIUM: There was no thickening  There was no pericardial effusion  AORTA: The root exhibited normal size  PULMONARY ARTERY: The size was normal  The morphology appeared normal     SYSTEM MEASUREMENT TABLES    2D mode  AoR Diam 2D: 3 3 cm  LA Diam (2D): 3 9 cm  LA/Ao (2D): 1 18  FS (2D Teich): 25 4 %  IVSd (2D): 1 12 cm  LVDEV: 65 5 cm³  LVEDV MOD BP: 143 cm³  LVESV: 32 2 cm³  LVIDd(2D): 3 89 cm  LVISd (2D): 2 9 cm  LVOT Area 2D: 3 14 cm squared  LVPWd (2D): 1 08 cm  SV (Teich): 33 3 cm³    Apical four chamber  LVEF A4C: 49 %    Apical two chamber  LA Area: 21 8 cm squared  LA Volume: 67 cm³  LVEF A2C: 55 %    Unspecified Scan Mode  JUDY Cont Eq (Peak Trino): 1 77 cm squared  LVOT (VTI): 20 6 cm  LVOT Diam : 2 cm  LVOT Vmax: 993 mm/s  LVOT Vmax; Mean: 993 mm/s  Peak Grad ; Mean: 4 mm[Hg]  SV (LVOT): 65 cm³  VTI;Mean: 2 mm[Hg]  JUDY Cont Eq (VTI): 2 25 cm squared  MV Peak A Trino: 1090 mm/s  MV Peak E Trino   Mean: 1620 mm/s  MVA (PHT): 4 cm squared  PHT: 55 ms  Max P mm[Hg]  V Max: 2910 mm/s  Vmax: 2360 mm/s  TAPSE: 1 3 cm    [employer] Accredited Echocardiography Laboratory    Prepared and electronically signed by    Chandler Goss DO  Signed 2017 16:48:59         Lab Review   Lab Results   Component Value Date    WBC 4 71 08/10/2018    HGB 13 0 10/05/2018    HCT 42 4 10/05/2018    MCV 87 08/10/2018    RDW 14 8 08/10/2018     08/10/2018     BMP:  Lab Results   Component Value Date     2015    K 4 2 2019     2019    CO2 32 2019    ANIONGAP 10 2 2015    BUN 18 2019    CREATININE 0 86 2019    GLUCOSE 78 2015    GLUF 105 (H) 2019    CALCIUM 8 8 2019    EGFR 77 2019    MG 2 3 2017     LFT:  Lab Results   Component Value Date    AST 6 2019    ALT 17 2019    ALKPHOS 80 2019    PROT 8 0 2015 BILITOT 0 64 09/24/2015     Lab Results   Component Value Date    BNP 69 09/25/2015      No results found for: TSH  Lab Results   Component Value Date    HGBA1C 6 3 (H) 09/25/2015     Lipid Profile:   Lab Results   Component Value Date    CHOL 192 10/29/2015    HDL 32 (L) 02/18/2019    LDLCALC 139 (H) 02/18/2019    TRIG 137 02/18/2019     Lab Results   Component Value Date    CHOL 192 10/29/2015    CHOL 168 09/25/2015       Dr Woody Dye MD [facility]      "This note has been constructed using a voice recognition system  Therefore there may be syntax, spelling, and/or grammatical errors   Please call if you have any questions  "

## 2019-10-31 ENCOUNTER — OFFICE VISIT (OUTPATIENT)
Dept: CARDIOLOGY CLINIC | Facility: CLINIC | Age: 84
End: 2019-10-31
Payer: MEDICARE

## 2019-10-31 VITALS
HEIGHT: 63 IN | OXYGEN SATURATION: 95 % | DIASTOLIC BLOOD PRESSURE: 60 MMHG | SYSTOLIC BLOOD PRESSURE: 120 MMHG | WEIGHT: 154 LBS | HEART RATE: 69 BPM | BODY MASS INDEX: 27.29 KG/M2

## 2019-10-31 DIAGNOSIS — E78.2 MIXED HYPERLIPIDEMIA: Chronic | ICD-10-CM

## 2019-10-31 DIAGNOSIS — D63.8 ANEMIA OF CHRONIC DISEASE: ICD-10-CM

## 2019-10-31 DIAGNOSIS — I50.32 CHRONIC DIASTOLIC HEART FAILURE (HCC): ICD-10-CM

## 2019-10-31 DIAGNOSIS — I10 ESSENTIAL HYPERTENSION: Chronic | ICD-10-CM

## 2019-10-31 DIAGNOSIS — I20.8 CHRONIC STABLE ANGINA (HCC): ICD-10-CM

## 2019-10-31 DIAGNOSIS — I25.118 CORONARY ARTERY DISEASE OF NATIVE ARTERY OF NATIVE HEART WITH STABLE ANGINA PECTORIS (HCC): Chronic | ICD-10-CM

## 2019-10-31 DIAGNOSIS — I25.10 3-VESSEL CAD: ICD-10-CM

## 2019-10-31 PROCEDURE — 93000 ELECTROCARDIOGRAM COMPLETE: CPT | Performed by: INTERNAL MEDICINE

## 2019-10-31 PROCEDURE — 99214 OFFICE O/P EST MOD 30 MIN: CPT | Performed by: INTERNAL MEDICINE

## 2019-10-31 RX ORDER — METOPROLOL SUCCINATE 25 MG/1
25 TABLET, EXTENDED RELEASE ORAL 2 TIMES DAILY
Qty: 90 TABLET | Refills: 3 | Status: SHIPPED | OUTPATIENT
Start: 2019-10-31 | End: 2020-06-24 | Stop reason: SDUPTHER

## 2019-11-29 ENCOUNTER — APPOINTMENT (EMERGENCY)
Dept: RADIOLOGY | Facility: HOSPITAL | Age: 84
End: 2019-11-29
Payer: MEDICARE

## 2019-11-29 ENCOUNTER — HOSPITAL ENCOUNTER (OUTPATIENT)
Facility: HOSPITAL | Age: 84
Setting detail: OBSERVATION
Discharge: HOME | End: 2019-11-30
Attending: EMERGENCY MEDICINE | Admitting: EMERGENCY MEDICINE
Payer: MEDICARE

## 2019-11-29 DIAGNOSIS — R07.9 CHEST PAIN: Primary | ICD-10-CM

## 2019-11-29 PROBLEM — J96.01 ACUTE RESPIRATORY FAILURE WITH HYPOXIA (HCC): Status: RESOLVED | Noted: 2017-01-07 | Resolved: 2019-11-29

## 2019-11-29 PROBLEM — D64.9 ANEMIA: Status: RESOLVED | Noted: 2018-04-17 | Resolved: 2019-11-29

## 2019-11-29 PROBLEM — K40.90 INGUINAL HERNIA: Status: RESOLVED | Noted: 2017-11-02 | Resolved: 2019-11-29

## 2019-11-29 PROBLEM — R31.9 HEMATURIA: Status: RESOLVED | Noted: 2018-06-29 | Resolved: 2019-11-29

## 2019-11-29 LAB
ALBUMIN SERPL BCP-MCNC: 3.4 G/DL (ref 3.5–5)
ALP SERPL-CCNC: 90 U/L (ref 46–116)
ALT SERPL W P-5'-P-CCNC: 17 U/L (ref 12–78)
ANION GAP SERPL CALCULATED.3IONS-SCNC: 4 MMOL/L (ref 4–13)
APTT PPP: 26 SECONDS (ref 25–32)
AST SERPL W P-5'-P-CCNC: 8 U/L (ref 5–45)
BASOPHILS # BLD AUTO: 0.02 THOUSANDS/ΜL (ref 0–0.1)
BASOPHILS NFR BLD AUTO: 1 % (ref 0–1)
BILIRUB SERPL-MCNC: 0.4 MG/DL (ref 0.2–1)
BUN SERPL-MCNC: 24 MG/DL (ref 5–25)
CALCIUM SERPL-MCNC: 8.6 MG/DL (ref 8.3–10.1)
CHLORIDE SERPL-SCNC: 102 MMOL/L (ref 100–108)
CO2 SERPL-SCNC: 32 MMOL/L (ref 21–32)
CREAT SERPL-MCNC: 1.08 MG/DL (ref 0.6–1.3)
EOSINOPHIL # BLD AUTO: 0.11 THOUSAND/ΜL (ref 0–0.61)
EOSINOPHIL NFR BLD AUTO: 3 % (ref 0–6)
ERYTHROCYTE [DISTWIDTH] IN BLOOD BY AUTOMATED COUNT: 12.5 % (ref 11.6–15.1)
GFR SERPL CREATININE-BSD FRML MDRD: 61 ML/MIN/1.73SQ M
GLUCOSE SERPL-MCNC: 221 MG/DL (ref 65–140)
HCT VFR BLD AUTO: 41.3 % (ref 36.5–49.3)
HGB BLD-MCNC: 13.7 G/DL (ref 12–17)
IMM GRANULOCYTES # BLD AUTO: 0.01 THOUSAND/UL (ref 0–0.2)
IMM GRANULOCYTES NFR BLD AUTO: 0 % (ref 0–2)
INR PPP: 0.96 (ref 0.91–1.09)
LYMPHOCYTES # BLD AUTO: 1.2 THOUSANDS/ΜL (ref 0.6–4.47)
LYMPHOCYTES NFR BLD AUTO: 28 % (ref 14–44)
MCH RBC QN AUTO: 31.4 PG (ref 26.8–34.3)
MCHC RBC AUTO-ENTMCNC: 33.2 G/DL (ref 31.4–37.4)
MCV RBC AUTO: 95 FL (ref 82–98)
MONOCYTES # BLD AUTO: 0.35 THOUSAND/ΜL (ref 0.17–1.22)
MONOCYTES NFR BLD AUTO: 8 % (ref 4–12)
NEUTROPHILS # BLD AUTO: 2.59 THOUSANDS/ΜL (ref 1.85–7.62)
NEUTS SEG NFR BLD AUTO: 60 % (ref 43–75)
NRBC BLD AUTO-RTO: 0 /100 WBCS
PLATELET # BLD AUTO: 127 THOUSANDS/UL (ref 149–390)
PMV BLD AUTO: 10.4 FL (ref 8.9–12.7)
POTASSIUM SERPL-SCNC: 4 MMOL/L (ref 3.5–5.3)
PROT SERPL-MCNC: 7.1 G/DL (ref 6.4–8.2)
PROTHROMBIN TIME: 10.3 SECONDS (ref 9.8–12)
RBC # BLD AUTO: 4.36 MILLION/UL (ref 3.88–5.62)
SODIUM SERPL-SCNC: 138 MMOL/L (ref 136–145)
TROPONIN I SERPL-MCNC: <0.02 NG/ML
TROPONIN I SERPL-MCNC: <0.02 NG/ML
WBC # BLD AUTO: 4.28 THOUSAND/UL (ref 4.31–10.16)

## 2019-11-29 PROCEDURE — 71045 X-RAY EXAM CHEST 1 VIEW: CPT

## 2019-11-29 PROCEDURE — 84484 ASSAY OF TROPONIN QUANT: CPT | Performed by: NURSE PRACTITIONER

## 2019-11-29 PROCEDURE — 93005 ELECTROCARDIOGRAM TRACING: CPT

## 2019-11-29 PROCEDURE — 85025 COMPLETE CBC W/AUTO DIFF WBC: CPT

## 2019-11-29 PROCEDURE — 85610 PROTHROMBIN TIME: CPT

## 2019-11-29 PROCEDURE — 1124F ACP DISCUSS-NO DSCNMKR DOCD: CPT | Performed by: INTERNAL MEDICINE

## 2019-11-29 PROCEDURE — 80053 COMPREHEN METABOLIC PANEL: CPT

## 2019-11-29 PROCEDURE — 36415 COLL VENOUS BLD VENIPUNCTURE: CPT

## 2019-11-29 PROCEDURE — 99220 PR INITIAL OBSERVATION CARE/DAY 70 MINUTES: CPT | Performed by: NURSE PRACTITIONER

## 2019-11-29 PROCEDURE — 99285 EMERGENCY DEPT VISIT HI MDM: CPT

## 2019-11-29 PROCEDURE — 87081 CULTURE SCREEN ONLY: CPT | Performed by: NURSE PRACTITIONER

## 2019-11-29 PROCEDURE — 85730 THROMBOPLASTIN TIME PARTIAL: CPT

## 2019-11-29 PROCEDURE — 84484 ASSAY OF TROPONIN QUANT: CPT

## 2019-11-29 RX ORDER — ESCITALOPRAM OXALATE 10 MG/1
10 TABLET ORAL DAILY
Status: DISCONTINUED | OUTPATIENT
Start: 2019-11-30 | End: 2019-11-30 | Stop reason: HOSPADM

## 2019-11-29 RX ORDER — CALCIUM CARBONATE 500(1250)
1 TABLET ORAL
Status: DISCONTINUED | OUTPATIENT
Start: 2019-11-30 | End: 2019-11-30 | Stop reason: HOSPADM

## 2019-11-29 RX ORDER — ZINC GLUCONATE 50 MG
25 TABLET ORAL DAILY
Status: DISCONTINUED | OUTPATIENT
Start: 2019-11-30 | End: 2019-11-30 | Stop reason: HOSPADM

## 2019-11-29 RX ORDER — ALPRAZOLAM 0.5 MG/1
0.5 TABLET ORAL 2 TIMES DAILY PRN
Status: DISCONTINUED | OUTPATIENT
Start: 2019-11-29 | End: 2019-11-30 | Stop reason: HOSPADM

## 2019-11-29 RX ORDER — METOPROLOL SUCCINATE 25 MG/1
25 TABLET, EXTENDED RELEASE ORAL 2 TIMES DAILY
Status: DISCONTINUED | OUTPATIENT
Start: 2019-11-30 | End: 2019-11-30 | Stop reason: HOSPADM

## 2019-11-29 RX ORDER — TAMSULOSIN HYDROCHLORIDE 0.4 MG/1
0.4 CAPSULE ORAL
Status: DISCONTINUED | OUTPATIENT
Start: 2019-11-30 | End: 2019-11-30 | Stop reason: HOSPADM

## 2019-11-29 RX ORDER — CLOPIDOGREL BISULFATE 75 MG/1
75 TABLET ORAL DAILY
Status: DISCONTINUED | OUTPATIENT
Start: 2019-11-30 | End: 2019-11-30 | Stop reason: HOSPADM

## 2019-11-29 RX ORDER — FINASTERIDE 5 MG/1
5 TABLET, FILM COATED ORAL DAILY
Status: DISCONTINUED | OUTPATIENT
Start: 2019-11-30 | End: 2019-11-30 | Stop reason: HOSPADM

## 2019-11-29 RX ORDER — NITROGLYCERIN 0.4 MG/1
0.4 TABLET SUBLINGUAL
Status: DISCONTINUED | OUTPATIENT
Start: 2019-11-29 | End: 2019-11-30 | Stop reason: HOSPADM

## 2019-11-29 RX ORDER — ASPIRIN 81 MG/1
81 TABLET, CHEWABLE ORAL DAILY
Status: DISCONTINUED | OUTPATIENT
Start: 2019-11-30 | End: 2019-11-30 | Stop reason: HOSPADM

## 2019-11-29 RX ORDER — OMEGA-3S/DHA/EPA/FISH OIL/D3 300MG-1000
400 CAPSULE ORAL DAILY
Status: DISCONTINUED | OUTPATIENT
Start: 2019-11-30 | End: 2019-11-30 | Stop reason: HOSPADM

## 2019-11-29 RX ORDER — EZETIMIBE 10 MG/1
10 TABLET ORAL DAILY
Status: DISCONTINUED | OUTPATIENT
Start: 2019-11-30 | End: 2019-11-30 | Stop reason: HOSPADM

## 2019-11-29 RX ORDER — ISOSORBIDE MONONITRATE 30 MG/1
30 TABLET, EXTENDED RELEASE ORAL DAILY
Status: DISCONTINUED | OUTPATIENT
Start: 2019-11-30 | End: 2019-11-30 | Stop reason: HOSPADM

## 2019-11-29 RX ORDER — ONDANSETRON 2 MG/ML
4 INJECTION INTRAMUSCULAR; INTRAVENOUS EVERY 6 HOURS PRN
Status: DISCONTINUED | OUTPATIENT
Start: 2019-11-29 | End: 2019-11-30 | Stop reason: HOSPADM

## 2019-11-29 RX ORDER — FAMOTIDINE 20 MG/1
20 TABLET, FILM COATED ORAL DAILY
Status: DISCONTINUED | OUTPATIENT
Start: 2019-11-30 | End: 2019-11-30 | Stop reason: HOSPADM

## 2019-11-30 VITALS
TEMPERATURE: 97.5 F | HEIGHT: 64 IN | DIASTOLIC BLOOD PRESSURE: 61 MMHG | SYSTOLIC BLOOD PRESSURE: 122 MMHG | OXYGEN SATURATION: 95 % | BODY MASS INDEX: 26.16 KG/M2 | HEART RATE: 67 BPM | RESPIRATION RATE: 20 BRPM | WEIGHT: 153.22 LBS

## 2019-11-30 LAB
ANION GAP SERPL CALCULATED.3IONS-SCNC: 7 MMOL/L (ref 4–13)
BUN SERPL-MCNC: 22 MG/DL (ref 5–25)
CALCIUM SERPL-MCNC: 8.2 MG/DL (ref 8.3–10.1)
CHLORIDE SERPL-SCNC: 104 MMOL/L (ref 100–108)
CHOLEST SERPL-MCNC: 191 MG/DL (ref 50–200)
CO2 SERPL-SCNC: 29 MMOL/L (ref 21–32)
CREAT SERPL-MCNC: 0.88 MG/DL (ref 0.6–1.3)
ERYTHROCYTE [DISTWIDTH] IN BLOOD BY AUTOMATED COUNT: 12.5 % (ref 11.6–15.1)
GFR SERPL CREATININE-BSD FRML MDRD: 76 ML/MIN/1.73SQ M
GLUCOSE P FAST SERPL-MCNC: 86 MG/DL (ref 65–99)
GLUCOSE SERPL-MCNC: 86 MG/DL (ref 65–140)
HCT VFR BLD AUTO: 37.9 % (ref 36.5–49.3)
HDLC SERPL-MCNC: 33 MG/DL
HGB BLD-MCNC: 12.8 G/DL (ref 12–17)
LDLC SERPL CALC-MCNC: 138 MG/DL (ref 0–100)
MCH RBC QN AUTO: 31.3 PG (ref 26.8–34.3)
MCHC RBC AUTO-ENTMCNC: 33.8 G/DL (ref 31.4–37.4)
MCV RBC AUTO: 93 FL (ref 82–98)
NONHDLC SERPL-MCNC: 158 MG/DL
PLATELET # BLD AUTO: 127 THOUSANDS/UL (ref 149–390)
PMV BLD AUTO: 10.6 FL (ref 8.9–12.7)
POTASSIUM SERPL-SCNC: 4 MMOL/L (ref 3.5–5.3)
RBC # BLD AUTO: 4.09 MILLION/UL (ref 3.88–5.62)
SODIUM SERPL-SCNC: 140 MMOL/L (ref 136–145)
TRIGL SERPL-MCNC: 98 MG/DL
TROPONIN I SERPL-MCNC: 0.02 NG/ML
WBC # BLD AUTO: 4.11 THOUSAND/UL (ref 4.31–10.16)

## 2019-11-30 PROCEDURE — 80048 BASIC METABOLIC PNL TOTAL CA: CPT | Performed by: NURSE PRACTITIONER

## 2019-11-30 PROCEDURE — 84484 ASSAY OF TROPONIN QUANT: CPT | Performed by: NURSE PRACTITIONER

## 2019-11-30 PROCEDURE — 99217 PR OBSERVATION CARE DISCHARGE MANAGEMENT: CPT | Performed by: INTERNAL MEDICINE

## 2019-11-30 PROCEDURE — 80061 LIPID PANEL: CPT | Performed by: NURSE PRACTITIONER

## 2019-11-30 PROCEDURE — 85027 COMPLETE CBC AUTOMATED: CPT | Performed by: NURSE PRACTITIONER

## 2019-11-30 PROCEDURE — 99214 OFFICE O/P EST MOD 30 MIN: CPT | Performed by: INTERNAL MEDICINE

## 2019-11-30 RX ORDER — RANOLAZINE 500 MG/1
500 TABLET, EXTENDED RELEASE ORAL EVERY 12 HOURS SCHEDULED
Qty: 60 TABLET | Refills: 0 | Status: SHIPPED | OUTPATIENT
Start: 2019-11-30 | End: 2019-12-09 | Stop reason: SDUPTHER

## 2019-11-30 RX ORDER — ROSUVASTATIN CALCIUM 5 MG/1
5 TABLET, COATED ORAL DAILY
Qty: 30 TABLET | Refills: 0 | Status: SHIPPED | OUTPATIENT
Start: 2019-11-30 | End: 2019-12-09 | Stop reason: SDUPTHER

## 2019-11-30 RX ORDER — RANOLAZINE 500 MG/1
500 TABLET, EXTENDED RELEASE ORAL EVERY 12 HOURS SCHEDULED
Status: DISCONTINUED | OUTPATIENT
Start: 2019-11-30 | End: 2019-11-30 | Stop reason: HOSPADM

## 2019-11-30 RX ORDER — ROSUVASTATIN CALCIUM 5 MG/1
5 TABLET, COATED ORAL
Status: DISCONTINUED | OUTPATIENT
Start: 2019-12-02 | End: 2019-11-30 | Stop reason: HOSPADM

## 2019-11-30 RX ADMIN — RANOLAZINE 500 MG: 500 TABLET, FILM COATED, EXTENDED RELEASE ORAL at 12:19

## 2019-11-30 RX ADMIN — MAGNESIUM OXIDE TAB 400 MG (240 MG ELEMENTAL MG) 400 MG: 400 (240 MG) TAB at 08:51

## 2019-11-30 RX ADMIN — ISOSORBIDE MONONITRATE 30 MG: 30 TABLET, EXTENDED RELEASE ORAL at 08:52

## 2019-11-30 RX ADMIN — METOPROLOL SUCCINATE 25 MG: 25 TABLET, EXTENDED RELEASE ORAL at 08:52

## 2019-11-30 RX ADMIN — CLOPIDOGREL BISULFATE 75 MG: 75 TABLET ORAL at 08:51

## 2019-11-30 RX ADMIN — CALCIUM 1 TABLET: 500 TABLET ORAL at 08:51

## 2019-11-30 RX ADMIN — ALPRAZOLAM 0.5 MG: 0.5 TABLET ORAL at 00:08

## 2019-11-30 RX ADMIN — EZETIMIBE 10 MG: 10 TABLET ORAL at 08:51

## 2019-11-30 RX ADMIN — Medication 1 TABLET: at 08:51

## 2019-11-30 RX ADMIN — CHOLECALCIFEROL (VITAMIN D3) 10 MCG (400 UNIT) TABLET 400 UNITS: at 08:52

## 2019-11-30 RX ADMIN — Medication 25 MG: at 08:52

## 2019-11-30 RX ADMIN — FINASTERIDE 5 MG: 5 TABLET, FILM COATED ORAL at 08:51

## 2019-11-30 RX ADMIN — FAMOTIDINE 20 MG: 20 TABLET ORAL at 08:51

## 2019-11-30 RX ADMIN — ENOXAPARIN SODIUM 40 MG: 40 INJECTION SUBCUTANEOUS at 08:56

## 2019-11-30 RX ADMIN — ESCITALOPRAM OXALATE 10 MG: 10 TABLET ORAL at 08:51

## 2019-11-30 RX ADMIN — ASPIRIN 81 MG 81 MG: 81 TABLET ORAL at 08:52

## 2019-11-30 NOTE — PLAN OF CARE
Problem: CARDIOVASCULAR - ADULT  Goal: Maintains optimal cardiac output and hemodynamic stability  Description  INTERVENTIONS:  - Monitor I/O, vital signs and rhythm  - Monitor for S/S and trends of decreased cardiac output  - Administer and titrate ordered vasoactive medications to optimize hemodynamic stability  - Assess quality of pulses, skin color and temperature  - Assess for signs of decreased coronary artery perfusion  - Instruct patient to report change in severity of symptoms  Outcome: Progressing     Problem: PAIN - ADULT  Goal: Verbalizes/displays adequate comfort level or baseline comfort level  Description  Interventions:  - Encourage patient to monitor pain and request assistance  - Assess pain using appropriate pain scale  - Administer analgesics based on type and severity of pain and evaluate response  - Implement non-pharmacological measures as appropriate and evaluate response  - Consider cultural and social influences on pain and pain management  - Notify physician/advanced practitioner if interventions unsuccessful or patient reports new pain  Outcome: Progressing

## 2019-11-30 NOTE — ASSESSMENT & PLAN NOTE
Continue pepcid  Patient is not on aspirin due to history of GI bleeding    Patient also had previous gastric resection

## 2019-11-30 NOTE — ASSESSMENT & PLAN NOTE
Patient with extensive cardiac history - CABG, multiple stents  Plan per above  Continue plavix, metoprolol   Patient also has history of stable angina  Patient started on Ranexa

## 2019-11-30 NOTE — ED PROVIDER NOTES
History  Chief Complaint   Patient presents with    Chest Pain     States extensive cardiac hx, started with CP about 1 hour ago , took NTG x2 and 3 chewable baby aspirins  HPI    A 80 year male that presents with chest pain  Patient has extensive cardiac history star chest pain 1 hour ago  Patient states he was having some food got up and felt tightness in his chest central that went to his right arm  States he sometimes has chest pain but it resolved this time it was not resolving  He took 2 nitros with mild relief also took a full-dose aspirin  Patient states he has had getting better now  EKG shows some T-wave inversions inferior lateral leads  No ST elevations  79 yo male with chest pain will do a cardiac workup    Prior to Admission Medications   Prescriptions Last Dose Informant Patient Reported? Taking?    ALPRAZolam (XANAX) 0 5 mg tablet 11/28/2019 at 2200 Self Yes No   Calcium Carbonate-Vitamin D (CALCIUM 600+D PO) 11/28/2019 at 0900 Self Yes No   Sig: Take by mouth daily   Magnesium 250 MG TABS 11/29/2019 at 0900 Self Yes No   Sig: Take 1 tablet by mouth daily   cholecalciferol (VITAMIN D3) 400 units tablet 11/28/2019 at 0900 Self Yes No   Sig: Take 400 Units by mouth daily   clopidogrel (PLAVIX) 75 mg tablet 11/28/2019 at 0900 Self No No   Sig: Take 1 tablet (75 mg total) by mouth daily   dutasteride (AVODART) 0 5 mg capsule 11/29/2019 at 0900 Self No No   Sig: Take 1 capsule (0 5 mg total) by mouth daily   escitalopram (LEXAPRO) 10 mg tablet 11/29/2019 at 0900 Self Yes No   Sig: Take 10 mg by mouth daily   ezetimibe (ZETIA) 10 mg tablet 11/29/2019 at 1800 Self No No   Sig: Take 1 tablet (10 mg total) by mouth daily   famotidine (PEPCID) 20 mg tablet 11/29/2019 at 0900 Self Yes No   Sig: Take 20 mg by mouth daily     isosorbide mononitrate (IMDUR) 30 mg 24 hr tablet 11/29/2019 at 0900 Self No No   Sig: Take 1 tablet (30 mg total) by mouth daily   metoprolol succinate (TOPROL-XL) 25 mg 24 hr tablet 11/29/2019 at 0900  No No   Sig: Take 1 tablet (25 mg total) by mouth 2 (two) times a day   multivitamin-iron-minerals-folic acid (CENTRUM) chewable tablet 11/29/2019 at 0900 Self Yes No   Sig: Chew 1 tablet daily   nitroglycerin (NITROSTAT) 0 4 mg SL tablet 11/29/2019 at 2030 Self No No   Sig: Place 1 tablet (0 4 mg total) under the tongue every 5 (five) minutes as needed for chest pain   tamsulosin (FLOMAX) 0 4 mg 11/29/2019 at 1800 Self No No   Sig: Take 1 capsule (0 4 mg total) by mouth daily with dinner   zinc gluconate 50 mg tablet 11/29/2019 at 0900 Self Yes No   Sig: Take 25 mg by mouth daily  Facility-Administered Medications: None       Past Medical History:   Diagnosis Date    BPH (benign prostatic hyperplasia)     CAD (coronary artery disease)     Cardiac disease     Cervical spine fracture (HCC)     DVT (deep venous thrombosis) (HCC)     Fracture of lumbar spine (HCC)     Glaucoma     Hyperlipidemia     Hypertension     Myocardial infarct ([address] )     PUD (peptic ulcer disease)        Past Surgical History:   Procedure Laterality Date    CHOLECYSTECTOMY      CORONARY ARTERY BYPASS GRAFT      CORONARY STENT PLACEMENT      EYE SURGERY      HERNIA REPAIR Right 11/3/2017    Procedure: REPAIR HERNIA INGUINAL;  Surgeon: Wendell Opitz, MD;  Location: [facility];  Service: General    VA CYSTOURETHROSCOPY W/IRRIG & EVAC CLOTS N/A 6/30/2018    Procedure: CYSTOSCOPY EVACUATION OF CLOTS, fulgeration;  Surgeon: Sergey Zuleta MD;  Location[facility];  Service: Urology    STOMACH SURGERY      Bill[place]th 2 gastrectomy    TRANSURETHRAL RESECTION OF PROSTATE      VENA CAVA FILTER PLACEMENT         Family History   Problem Relation Age of Onset    Coronary artery disease Brother      I have reviewed and agree with the history as documented      Social History     Tobacco Use    Smoking status: Never Smoker    Smokeless tobacco: Never Used   Substance Use Topics    Alcohol use: Never Frequency: Never     Binge frequency: Never    Drug use: No        Review of Systems   Constitutional: Negative  Negative for diaphoresis and fever  HENT: Negative  Respiratory: Negative  Negative for cough, shortness of breath and wheezing  Cardiovascular: Positive for chest pain  Negative for palpitations and leg swelling  Gastrointestinal: Negative for abdominal distention, abdominal pain, nausea and vomiting  Genitourinary: Negative  Musculoskeletal: Negative  Skin: Negative  Neurological: Negative  Psychiatric/Behavioral: Negative  All other systems reviewed and are negative  Physical Exam  Physical Exam   Constitutional: He is oriented to person, place, and time  He appears well-developed and well-nourished  No distress  HENT:   Head: Normocephalic and atraumatic  Nose: Nose normal    Mouth/Throat: Oropharynx is clear and moist    Eyes: Pupils are equal, round, and reactive to light  Conjunctivae and EOM are normal    Neck: Normal range of motion  Neck supple  Cardiovascular: Normal rate, regular rhythm and normal heart sounds  No murmur heard  Pulmonary/Chest: Effort normal and breath sounds normal  No respiratory distress  He has no wheezes  He has no rales  Abdominal: Soft  Bowel sounds are normal  He exhibits no distension  There is no tenderness  There is no rebound and no guarding  Musculoskeletal: Normal range of motion  He exhibits no edema, tenderness or deformity  Neurological: He is alert and oriented to person, place, and time  No cranial nerve deficit  Skin: Skin is warm and dry  No rash noted  He is not diaphoretic  No pallor  Psychiatric: He has a normal mood and affect  Vitals reviewed        Vital Signs  ED Triage Vitals [11/29/19 2114]   Temperature Pulse Respirations Blood Pressure SpO2   98 3 °F (36 8 °C) 99 20 156/73 97 %      Temp Source Heart Rate Source Patient Position - Orthostatic VS BP Location FiO2 (%)   Tympanic Monitor Lying Left arm --      Pain Score       7           Vitals:    11/29/19 2215 11/29/19 2252 11/29/19 2356 11/30/19 0325   BP: 136/61 (!) 185/86 143/68 145/67   Pulse: 86 95 78 73   Patient Position - Orthostatic VS:  Lying Lying Lying         Visual Acuity      ED Medications  Medications   calcium carbonate (OYSTER SHELL,OSCAL) 500 mg tablet 1 tablet (has no administration in time range)   cholecalciferol (VITAMIN D3) tablet 400 Units (has no administration in time range)   clopidogrel (PLAVIX) tablet 75 mg (has no administration in time range)   finasteride (PROSCAR) tablet 5 mg (has no administration in time range)   escitalopram (LEXAPRO) tablet 10 mg (has no administration in time range)   ezetimibe (ZETIA) tablet 10 mg (has no administration in time range)   famotidine (PEPCID) tablet 20 mg (has no administration in time range)   isosorbide mononitrate (IMDUR) 24 hr tablet 30 mg (has no administration in time range)   magnesium oxide (MAG-OX) tablet 400 mg (has no administration in time range)   metoprolol succinate (TOPROL-XL) 24 hr tablet 25 mg (has no administration in time range)   multivitamin-minerals (CENTRUM) tablet 1 tablet (has no administration in time range)   tamsulosin (FLOMAX) capsule 0 4 mg (has no administration in time range)   zinc gluconate tablet 25 mg (has no administration in time range)   ALPRAZolam (XANAX) tablet 0 5 mg (0 5 mg Oral Given 11/30/19 0008)   ondansetron (ZOFRAN) injection 4 mg (has no administration in time range)   aspirin chewable tablet 81 mg (has no administration in time range)   nitroglycerin (NITROSTAT) SL tablet 0 4 mg (has no administration in time range)   enoxaparin (LOVENOX) subcutaneous injection 40 mg (has no administration in time range)       Diagnostic Studies  Results Reviewed     Procedure Component Value Units Date/Time    Troponin I [[id number]]  (Normal) Collected:  11/29/19 2124    Lab Status:  Final result Specimen:  Blood from Arm, Left Updated: 11/29/19 2150     Troponin I <0 02 ng/mL     Comprehensive metabolic panel [[id number]]  (Abnormal) Collected:  11/29/19 2124    Lab Status:  Final result Specimen:  Blood from Arm, Left Updated:  11/29/19 2147     Sodium 138 mmol/L      Potassium 4 0 mmol/L      Chloride 102 mmol/L      CO2 32 mmol/L      ANION GAP 4 mmol/L      BUN 24 mg/dL      Creatinine 1 08 mg/dL      Glucose 221 mg/dL      Calcium 8 6 mg/dL      AST 8 U/L      ALT 17 U/L      Alkaline Phosphatase 90 U/L      Total Protein 7 1 g/dL      Albumin 3 4 g/dL      Total Bilirubin 0 40 mg/dL      eGFR 61 ml/min/1 73sq m     Narrative:       [place] guidelines for Chronic Kidney Disease (CKD):     Stage 1 with normal or high GFR (GFR > 90 mL/min/1 73 square meters)    Stage 2 Mild CKD (GFR = 60-89 mL/min/1 73 square meters)    Stage 3A Moderate CKD (GFR = 45-59 mL/min/1 73 square meters)    Stage 3B Moderate CKD (GFR = 30-44 mL/min/1 73 square meters)    Stage 4 Severe CKD (GFR = 15-29 mL/min/1 73 square meters)    Stage 5 End Stage CKD (GFR <15 mL/min/1 73 square meters)  Note: GFR calculation is accurate only with a steady state creatinine    Protime-INR [[id number]]  (Normal) Collected:  11/29/19 2124    Lab Status:  Final result Specimen:  Blood from Arm, Left Updated:  11/29/19 2145     Protime 10 3 seconds      INR 0 96    APTT [[id number]]  (Normal) Collected:  11/29/19 2124    Lab Status:  Final result Specimen:  Blood from Arm, Left Updated:  11/29/19 2145     PTT 26 seconds     CBC and differential [[id number]]  (Abnormal) Collected:  11/29/19 2124    Lab Status:  Final result Specimen:  Blood from Arm, Left Updated:  11/29/19 2134     WBC 4 28 Thousand/uL      RBC 4 36 Million/uL      Hemoglobin 13 7 g/dL      Hematocrit 41 3 %      MCV 95 fL      MCH 31 4 pg      MCHC 33 2 g/dL      RDW 12 5 %      MPV 10 4 fL      Platelets 701 Thousands/uL      nRBC 0 /100 WBCs      Neutrophils Relative 60 %      Immat GRANS % 0 %      Lymphocytes Relative 28 %      Monocytes Relative 8 %      Eosinophils Relative 3 %      Basophils Relative 1 %      Neutrophils Absolute 2 59 Thousands/µL      Immature Grans Absolute 0 01 Thousand/uL      Lymphocytes Absolute 1 20 Thousands/µL      Monocytes Absolute 0 35 Thousand/µL      Eosinophils Absolute 0 11 Thousand/µL      Basophils Absolute 0 02 Thousands/µL     Narrative: This is an appended report  These results have been appended to a previously verified report  XR chest 1 view portable    (Results Pending)              Procedures  Procedures       ED Course  ED Course as of Nov 30 0638 Fri Nov 29, 2019 2144 Procedure Note: EKG  Date/Time: 11/29/19 9:44 PM   Performed by: Katarzyna Bowie by: Gaurav Salvador   Indications / Diagnosis: CP  ECG reviewed by me, the ED Provider: yes   The EKG demonstrates:  Rhythm: normal sinus  Intervals: normal intervals  Axis: normal axis  QRS/Blocks:normal QRS  ST Changes: No acute ST Changes, no STD/ZONIA   T wave inversions in inferior leads and lateral leads         2147 No back pain            HEART Risk Score      Most Recent Value   History  1 Filed at: 11/29/2019 2210   ECG  1 Filed at: 11/29/2019 2210   Age  2 Filed at: 11/29/2019 2210   Risk Factors  2 Filed at: 11/29/2019 2210   Troponin  0 Filed at: 11/29/2019 2210   Heart Score Risk Calculator   History  1 Filed at: 11/29/2019 2210   ECG  1 Filed at: 11/29/2019 2210   Age  2 Filed at: 11/29/2019 2210   Risk Factors  2 Filed at: 11/29/2019 2210   Troponin  0 Filed at: 11/29/2019 2210   HEART Score  6 Filed at: 11/29/2019 2210   HEART Score  6 Filed at: 11/29/2019 2210                    EULA Risk Score      Most Recent Value   Age >= 72  1 Filed at: 11/29/2019 2245   Known CAD (stenosis >= 50%)  1 Filed at: 11/29/2019 2245   Recent (<=24 hrs) Service Angina  0 Filed at: 11/29/2019 2245   ST Deviation >= 0 5 mm  0 Filed at: 11/29/2019 2245   3+ CAD Risk Factors (FHx, HTN, HLP, DM, Smoker)  1 Filed at: 11/29/2019 2245   Aspirin Use Past 7 Days  1 Filed at: 11/29/2019 2245   Elevated Cardiac Markers  0 Filed at: 11/29/2019 2245   EULA Risk Score (Calculated)  4 Filed at: 11/29/2019 2245              MDM    Disposition  Final diagnoses:   Chest pain     Time reflects when diagnosis was documented in both MDM as applicable and the Disposition within this note     Time User Action Codes Description Comment    11/29/2019 10:09 PM Letitia Yoo Add [R07 9] Chest pain       ED Disposition     ED Disposition Condition Date/Time Comment    Admit Stable Fri Nov 29, 2019 10:09 PM Case was discussed with medicine and the patient's admission status was agreed to be Admission Status: observation status to the service of Dr Kimber Brandt   Follow-up Information    None         Current Discharge Medication List      CONTINUE these medications which have NOT CHANGED    Details   ALPRAZolam (XANAX) 0 5 mg tablet       Calcium Carbonate-Vitamin D (CALCIUM 600+D PO) Take by mouth daily      cholecalciferol (VITAMIN D3) 400 units tablet Take 400 Units by mouth daily      clopidogrel (PLAVIX) 75 mg tablet Take 1 tablet (75 mg total) by mouth daily  Qty: 90 tablet, Refills: 3    Associated Diagnoses: CAD S/P percutaneous coronary angioplasty      dutasteride (AVODART) 0 5 mg capsule Take 1 capsule (0 5 mg total) by mouth daily  Qty: 90 capsule, Refills: 3    Comments: Please encourage the patient to call our office for his next appointment ([phone], option #1)  He is due to be seen in February, 2020  Thank you!   Associated Diagnoses: Benign prostatic hyperplasia with urinary retention      escitalopram (LEXAPRO) 10 mg tablet Take 10 mg by mouth daily      ezetimibe (ZETIA) 10 mg tablet Take 1 tablet (10 mg total) by mouth daily  Qty: 30 tablet, Refills: 3    Associated Diagnoses: Mixed hyperlipidemia      famotidine (PEPCID) 20 mg tablet Take 20 mg by mouth daily        isosorbide mononitrate (IMDUR) 30 mg 24 hr tablet Take 1 tablet (30 mg total) by mouth daily  Qty: 90 tablet, Refills: 3    Associated Diagnoses: Chronic stable angina (HCC)      Magnesium 250 MG TABS Take 1 tablet by mouth daily      metoprolol succinate (TOPROL-XL) 25 mg 24 hr tablet Take 1 tablet (25 mg total) by mouth 2 (two) times a day  Qty: 90 tablet, Refills: 3    Associated Diagnoses: Coronary artery disease of native artery of native heart with stable angina pectoris (HCC)      multivitamin-iron-minerals-folic acid (CENTRUM) chewable tablet Chew 1 tablet daily      nitroglycerin (NITROSTAT) 0 4 mg SL tablet Place 1 tablet (0 4 mg total) under the tongue every 5 (five) minutes as needed for chest pain  Qty: 30 tablet, Refills: 1    Associated Diagnoses: 3-vessel CAD; Chronic stable angina (HCC)      tamsulosin (FLOMAX) 0 4 mg Take 1 capsule (0 4 mg total) by mouth daily with dinner  Qty: 90 capsule, Refills: 2    Associated Diagnoses: Benign prostatic hyperplasia, unspecified whether lower urinary tract symptoms present      zinc gluconate 50 mg tablet Take 25 mg by mouth daily  No discharge procedures on file      ED Provider  Electronically Signed by           Arjun Castro MD  11/30/19 7747

## 2019-11-30 NOTE — ASSESSMENT & PLAN NOTE
Patient with extensive cardiac history - CABG, multiple stents  Plan per above  Continue plavix, metoprolol

## 2019-11-30 NOTE — ASSESSMENT & PLAN NOTE
Wt Readings from Last 3 Encounters:   11/30/19 69 5 kg (153 lb 3 5 oz)   10/31/19 69 9 kg (154 lb)   04/22/19 70 8 kg (156 lb)   Last echo 1/17 with an EF of 50% and moderate MVR  No signs of volume overload  Continue current medication regimen

## 2019-11-30 NOTE — UTILIZATION REVIEW
Initial Clinical Review    Admission: Date/Time/Statement:   Orders Placed This Encounter   Procedures    Place in Observation (expected length of stay for this patient is less than two midnights)     Standing Status:   Standing     Number of Occurrences:   1     Order Specific Question:   Admitting Physician     Answer:   Mayrann Cortes [[id number]]     Order Specific Question:   Level of Care     Answer:   Med Surg [[id number]]     ED Arrival Information     Expected Arrival Acuity Means of Arrival Escorted By Service Admission Type    - 11/29/2019 21:09 Emergent Walk-In Spouse Hospitalist Emergency    Arrival Complaint    Chest Pain        Chief Complaint   Patient presents with    Chest Pain     States extensive cardiac hx, started with CP about 1 hour ago , took NTG x2 and 3 chewable baby aspirins  Assessment/Plan: 80 y o  male with a PMH of coronary artery disease post CABG and multiple interventions, hypertension, hyperlipidemia, BPH who presents with chest pain  Patient reports that after dinner he developed chest pain across his entire chest and radiated to right arm  It was associated with some shortness of breath and lightheadedness  He took aspirin and nitro with no relief  He had a stress in 2018  EKG revealed no acute ischemic changes  Troponin unremarkable  Chest pain resolved in the ER  He has not taken nitro in three years  EULA score is 4  Patient is admitted for further management  Chest pain  Assessment & Plan  Patient presented to the ED with complaints of chest pain across his entire chest associated with some shortness of breath and lightheadedness after eating this afternoon  He took two aspirin and nitro with no relief  Last stress test August of 2018  EKG with no acute ischemic changes  Troponin negative  Patient chest pain free at the time of eval in the ER  This was the first time in three years patient used nitro    EULA score 4     · Admit to Medicine  · Serial EKGs and troponin  · Continue aspirin, plavix, statin, beta blocker  · Nuclear stress in AM  · Lipid panel and hemoglobin A1c in the a m   3-vessel CAD  Assessment & Plan  Patient with extensive cardiac history - CABG, multiple stents  Plan per above  Continue plavix, metoprolol    Depression  Assessment & Plan  Continue lexapro   Anxiety  Assessment & Plan  Continue xanax PRN  Benign prostatic hyperplasia  Assessment & Plan  Continue dustasteride and flomax   Chronic diastolic heart failure (HCC)  Assessment & Plan      Wt Readings from Last 3 Encounters:   11/29/19 69 6 kg (153 lb 7 oz)   10/31/19 69 9 kg (154 lb)   04/22/19 70 8 kg (156 lb)   Last echo 1/17 with an EF of 50% and moderate MVR  No signs of volume overload  Continue current medication regimen   Hyperlipidemia  Assessment & Plan  Continue zetia, statin intolerant  Hypertension  Assessment & Plan  Continue metoprolol  PUD (peptic ulcer disease)  Assessment & Plan  Continue pepcid   History of DVT (deep vein thrombosis)  Assessment & Plan  History of DVT, IVC filter in place  VTE Prophylaxis: Enoxaparin (Lovenox)  / sequential compression device   Code Status: Level 1 - Full Code  Anticipated Length of Stay:  Patient will be admitted on an Observation basis with an anticipated length of stay of less than 2 midnights     Justification for Hospital Stay: chest pain rule out ACS    ED Triage Vitals [11/29/19 2114]   Temperature Pulse Respirations Blood Pressure SpO2   98 3 °F (36 8 °C) 99 20 156/73 97 %      Temp Source Heart Rate Source Patient Position - Orthostatic VS BP Location FiO2 (%)   Tympanic Monitor Lying Left arm --      Pain Score       7        Wt Readings from Last 1 Encounters:   11/30/19 69 5 kg (153 lb 3 5 oz)     Additional Vital Signs:   Pertinent Labs/Diagnostic Test Results:   Results from last 7 days   Lab Units 11/30/19  0516 11/29/19 2124   WBC Thousand/uL 4 11* 4 28*   HEMOGLOBIN g/dL 12 8 13 7   HEMATOCRIT % 37 9 41 3   PLATELETS Thousands/uL 127* 127*   NEUTROS ABS Thousands/µL  --  2 59     Results from last 7 days   Lab Units 11/30/19  0516 11/29/19  2124   SODIUM mmol/L 140 138   POTASSIUM mmol/L 4 0 4 0   CHLORIDE mmol/L 104 102   CO2 mmol/L 29 32   ANION GAP mmol/L 7 4   BUN mg/dL 22 24   CREATININE mg/dL 0 88 1 08   EGFR ml/min/1 73sq m 76 61   CALCIUM mg/dL 8 2* 8 6     Results from last 7 days   Lab Units 11/29/19  2124   AST U/L 8   ALT U/L 17   ALK PHOS U/L 90   TOTAL PROTEIN g/dL 7 1   ALBUMIN g/dL 3 4*   TOTAL BILIRUBIN mg/dL 0 40     Results from last 7 days   Lab Units 11/30/19  0516 11/29/19  2124   GLUCOSE RANDOM mg/dL 86 221*     Results from last 7 days   Lab Units 11/30/19  0221 11/29/19  2317 11/29/19 2124   TROPONIN I ng/mL 0 02 <0 02 <0 02     Results from last 7 days   Lab Units 11/29/19  2124   PROTIME seconds 10 3   INR  0 96   PTT seconds 26     ED Treatment:   Medication Administration from 11/29/2019 2109 to 11/29/2019 2241     None        Past Medical History:   Diagnosis Date    BPH (benign prostatic hyperplasia)     CAD (coronary artery disease)     Cardiac disease     Cervical spine fracture (HCC)     DVT (deep venous thrombosis) (HCC)     [facility] [facility] [address] [address]     Glaucoma     Hyperlipidemia     Hypertension     Myocardial infarct ([facility] )     PUD (peptic ulcer disease)      Present on Admission:   Chest pain   Chronic diastolic heart failure ([facility] )   3-vessel CAD   Hypertension   Hyperlipidemia   PUD (peptic ulcer disease)   Benign prostatic hyperplasia   Anxiety   Depression      Admitting Diagnosis: Chest pain [R07 9]  Age/Sex: 80 y o  male  Admission Orders:  Tele mon  Observation  Stress test  Scheduled Medications:    Medications:  aspirin 81 mg Oral Daily   calcium carbonate 1 tablet Oral Daily With Breakfast   cholecalciferol 400 Units Oral Daily   clopidogrel 75 mg Oral Daily   enoxaparin 40 mg Subcutaneous Daily   escitalopram 10 mg Oral Daily   ezetimibe 10 mg Oral Daily famotidine 20 mg Oral Daily   finasteride 5 mg Oral Daily   isosorbide mononitrate 30 mg Oral Daily   magnesium oxide 400 mg Oral Daily   metoprolol succinate 25 mg Oral BID   multivitamin-minerals 1 tablet Oral Daily   ranolazine 500 mg Oral Q12H RITCHIE   tamsulosin 0 4 mg Oral Daily With Dinner   zinc gluconate 25 mg Oral Daily     Continuous IV Infusions:     PRN Meds:    ALPRAZolam 0 5 mg Oral BID PRN   nitroglycerin 0 4 mg Sublingual Q5 Min PRN   ondansetron 4 mg Intravenous Q6H PRN       IP CONSULT TO CARDIOLOGY    Network Utilization Review Department  [email or web address]  ATTENTION: Please call with any questions or concerns to [phone] and carefully listen to the prompts so that you are directed to the right person  All voicemails are confidential   Elvin Carvalho all requests for admission clinical reviews, approved or denied determinations and any other requests to dedicated fax number below belonging to the campus where the patient is receiving treatment    FACILITY NAME UR FAX NUMBER   ADMISSION DENIALS (Administrative/Medical Necessity) [facility]/N[facility]/Pediatrics) [phone]   [facility] [address] [place] [address] [address] [facility] [phone]   [address] [place] [address] [address] [facility] [phone]   Surjit Sood [address] [place] [address] [phone]

## 2019-11-30 NOTE — ASSESSMENT & PLAN NOTE
Continue zetia  Patient reported intolerance to statins  Cardiology had a prolonged discussion with the patient and patient was agreeable to take Crestor 5 milligram p o   Daily

## 2019-11-30 NOTE — ASSESSMENT & PLAN NOTE
Wt Readings from Last 3 Encounters:   11/29/19 69 6 kg (153 lb 7 oz)   10/31/19 69 9 kg (154 lb)   04/22/19 70 8 kg (156 lb)   Last echo 1/17 with an EF of 50% and moderate MVR  No signs of volume overload  Continue current medication regimen

## 2019-11-30 NOTE — DISCHARGE SUMMARY
Discharge- Dorie Velarder 8/2/1930, 80 y o  male MRN: [id number]    Unit/Bed#: [address] [place] [address] Encounter: [id number]    Primary Care Provider: Hever Little DO   Date and time admitted to hospital: 11/29/2019  9:13 PM        * Chest pain  Assessment & Plan  · Patient presented to the ED with complaints of chest pain across his entire chest associated with some shortness of breath and lightheadedness after eating this afternoon  He took two aspirin and nitro with no relief  Last stress test August of 2018  EKG with no acute ischemic changes  · Patient's serial troponins were negative  · Patient has history of stable angina  · Cardiology consult was placed on consult appreciated  · Continue Imdur  · Patient started on Ranexa 500 milligram p o  B i d     3-vessel CAD  Assessment & Plan  Patient with extensive cardiac history - CABG, multiple stents  Plan per above  Continue plavix, metoprolol   Patient also has history of stable angina  Patient started on Ranexa    Chronic diastolic heart failure ([address] )  Assessment & Plan  Wt Readings from Last 3 Encounters:   11/30/19 69 5 kg (153 lb 3 5 oz)   10/31/19 69 9 kg (154 lb)   04/22/19 70 8 kg (156 lb)   Last echo 1/17 with an EF of 50% and moderate MVR  No signs of volume overload  Continue current medication regimen    Hyperlipidemia  Assessment & Plan  Continue zetia  Patient reported intolerance to statins  Cardiology had a prolonged discussion with the patient and patient was agreeable to take Crestor 5 milligram p o  Daily    Hypertension  Assessment & Plan  Continue metoprolol    History of DVT (deep vein thrombosis)  Assessment & Plan  History of DVT, IVC filter in place    Benign prostatic hyperplasia  Assessment & Plan  Continue dustasteride and flomax     PUD (peptic ulcer disease)  Assessment & Plan  Continue pepcid  Patient is not on aspirin due to history of GI bleeding    Patient also had previous gastric resection    Anxiety  Assessment & Plan  Continue xanax PRN          Discharging Physician / Practitioner: Lisa Hannon MD  PCP: Dolores Holloway DO  Admission Date: 11/29/2019  Discharge Date: 11/30/19    Reason for Admission: Chest Pain (States extensive cardiac hx, started with CP about 1 hour ago , took NTG x2 and 3 chewable baby aspirins  )        Resolved Problems  Date Reviewed: 11/30/2019    None          Consultations During Hospital Stay:  [address]    Significant Findings / Test Results:     ·   Results from last 7 days   Lab Units 11/30/19  0516 11/29/19 2124   WBC Thousand/uL 4 11* 4 28*   HEMOGLOBIN g/dL 12 8 13 7   PLATELETS Thousands/uL 127* 127*     Results from last 7 days   Lab Units 11/30/19  0516 11/29/19 2124   SODIUM mmol/L 140 138   POTASSIUM mmol/L 4 0 4 0   CHLORIDE mmol/L 104 102   CO2 mmol/L 29 32   BUN mg/dL 22 24   CREATININE mg/dL 0 88 1 08   CALCIUM mg/dL 8 2* 8 6   TOTAL BILIRUBIN mg/dL  --  0 40   ALK PHOS U/L  --  90   ALT U/L  --  17   AST U/L  --  8     Results from last 7 days   Lab Units 11/29/19 2124   INR  0 96     Results from last 7 days   Lab Units 11/30/19  0221 11/29/19  2317 11/29/19 2124   TROPONIN I ng/mL 0 02 <0 02 <0 02     Lab Results   Component Value Date/Time    HGBA1C 6 3 (H) 09/25/2015 05:20 AM             Blood Culture: No results found for: BLOODCX  Urine Culture:   Lab Results   Component Value Date    URINECX 9034-2161 cfu/ml Gram Negative Jose (A) 06/29/2018     Sputum Culture: No components found for: SPUTUMCX  Wound Culture: No results found for: WOUNDCULT     XR chest 1 view portable   Final Result by Myrna Angeles MD (11/30 9605)      No active pulmonary disease              Workstation performed: [id number]              Outpatient Tests Requested:  · Follow-up with Cardiology in 2 weeks    Complications:  None    Reason for Admission:   Chief Complaint   Patient presents with    Chest Pain     States extensive cardiac hx, started with CP about 1 hour ago , took NTG x2 and 3 chewable baby aspirins  Hospital Course:     Per HPI: Hilary Meneses is a 80 y o  male patient with a PMH of CAD status post CABG and PCI, stable angina, hypertension, hyperlipidemia, BPH, PUD status post partial gastric resection who originally presented to the hospital on 11/29/2019 due to chest tightness after eating dinner yesterday which eventually got relieved after taking full-dose aspirin and nitroglycerin  Patient was admitted hospital to rule out ACS  Patient had serial cardiac enzymes which were negative  Patient was seen by Cardiology  Patient remained stable without any further symptoms  Patient was added on Ranexa for his angina  Patient will be discharged home with close outpatient cardiology follow-up  Hospital Course: Please see above list of diagnoses and related plan for additional information  Condition at Discharge: stable       Discharge Day Visit / Exam:     Subjective:  Patient denies any further chest pain, shortness of breath, dizziness  Vitals: Blood Pressure: 122/61 (11/30/19 1210)  Pulse: 67 (11/30/19 1210)  Temperature: 97 5 °F (36 4 °C) (11/30/19 1210)  Temp Source: Tympanic (11/30/19 1210)  Respirations: 20 (11/30/19 1210)  Height: 5' 4" (162 6 cm) (11/29/19 2252)  Weight - Scale: 69 5 kg (153 lb 3 5 oz) (11/30/19 0600)  SpO2: 95 % (11/30/19 1210)  Exam:   Physical Exam   Constitutional: No distress  HENT:   Head: Normocephalic and atraumatic  Eyes: Pupils are equal, round, and reactive to light  Conjunctivae are normal    Neck: Normal range of motion  Neck supple  Cardiovascular: Normal rate and regular rhythm  Murmur heard  Pulmonary/Chest: Effort normal  No respiratory distress  He has no wheezes  He has no rhonchi  He has no rales  He exhibits no tenderness  Abdominal: Soft  Bowel sounds are normal  He exhibits no distension  There is no tenderness  There is no rebound and no guarding  Musculoskeletal: He exhibits no edema  Neurological: He is alert  No cranial nerve deficit  Skin: Skin is warm and dry  No rash noted  Discharge instructions/Information to patient and family:   See after visit summary for information provided to patient and family  Provisions for Follow-Up Care:  See after visit summary for information related to follow-up care and any pertinent home health orders  Disposition:     Home    Planned Readmission: No     Discharge Statement:  I spent 25 minutes discharging the patient  This time was spent on the day of discharge  I had direct contact with the patient on the day of discharge  Greater than 50% of the total time was spent examining patient, answering all patient questions, arranging and discussing plan of care with patient as well as directly providing post-discharge instructions  Additional time then spent on discharge activities  Discharge Medications:  See after visit summary for reconciled discharge medications provided to patient and family        ** Please Note: This note has been constructed using a voice recognition system **

## 2019-11-30 NOTE — ASSESSMENT & PLAN NOTE
· Patient presented to the ED with complaints of chest pain across his entire chest associated with some shortness of breath and lightheadedness after eating this afternoon  He took two aspirin and nitro with no relief  Last stress test August of 2018  EKG with no acute ischemic changes  · Patient's serial troponins were negative    · Patient has history of stable angina  · Cardiology consult was placed on consult appreciated  · Continue Imdur  · Patient started on Ranexa 500 milligram p o  B i d

## 2019-11-30 NOTE — H&P
H&P- Memo Norwood 8/2/1930, 80 y o  male MRN: [id number]    Unit/Bed#: [address] [place] [address] Encounter: [id number]    Primary Care Provider: Hao Meneses DO   Date and time admitted to hospital: 11/29/2019  9:13 PM    * Chest pain  Assessment & Plan  Patient presented to the ED with complaints of chest pain across his entire chest associated with some shortness of breath and lightheadedness after eating this afternoon  He took two aspirin and nitro with no relief  Last stress test August of 2018  EKG with no acute ischemic changes  Troponin negative  Patient chest pain free at the time of eval in the ER  This was the first time in three years patient used nitro    EULA score 4     · Admit to Medicine  · Serial EKGs and troponin  · Continue aspirin, plavix, statin, beta blocker  · Nuclear stress in AM  · Lipid panel and hemoglobin A1c in the a m     3-vessel CAD  Assessment & Plan  Patient with extensive cardiac history - CABG, multiple stents  Plan per above  Continue plavix, metoprolol     Depression  Assessment & Plan  Continue lexapro    Anxiety  Assessment & Plan  Continue xanax PRN    Benign prostatic hyperplasia  Assessment & Plan  Continue dustasteride and flomax     Chronic diastolic heart failure (HCC)  Assessment & Plan  Wt Readings from Last 3 Encounters:   11/29/19 69 6 kg (153 lb 7 oz)   10/31/19 69 9 kg (154 lb)   04/22/19 70 8 kg (156 lb)   Last echo 1/17 with an EF of 50% and moderate MVR  No signs of volume overload  Continue current medication regimen    Hyperlipidemia  Assessment & Plan  Continue zetia, statin intolerant    Hypertension  Assessment & Plan  Continue metoprolol    PUD (peptic ulcer disease)  Assessment & Plan  Continue pepcid    History of DVT (deep vein thrombosis)  Assessment & Plan  History of DVT, IVC filter in place    VTE Prophylaxis: Enoxaparin (Lovenox)  / sequential compression device   Code Status: Level 1 - Full Code    Anticipated Length of Stay:  Patient will be admitted on an Observation basis with an anticipated length of stay of less than 2 midnights  Justification for Hospital Stay: chest pain rule out ACS    Total Time for Visit, including Counseling / Coordination of Care: 20 minutes  Greater than 50% of this total time spent on direct patient counseling and coordination of care  Chief Complaint:   Chest Pain (States extensive cardiac hx, started with CP about 1 hour ago , took NTG x2 and 3 chewable baby aspirins  )      History of Present Illness:    Jo Ann Schwab is a 80 y o  male with a PMH of coronary artery disease post CABG and multiple interventions, hypertension, hyperlipidemia, BPH who presents with chest pain  Patient reports that after dinner he developed chest pain across his entire chest and radiated to right arm  It was associated with some shortness of breath and lightheadedness  He took aspirin and nitro with no relief  He had a stress in 2018  EKG revealed no acute ischemic changes  Troponin unremarkable  Chest pain resolved in the ER  He has not taken nitro in three years  EULA score is 4  Patient is admitted for further management  Review of Systems:    Review of Systems   Constitutional: Negative  HENT: Negative  Eyes: Negative  Respiratory: Positive for chest tightness and shortness of breath  Cardiovascular: Positive for chest pain  Gastrointestinal: Negative  Endocrine: Negative  Genitourinary: Negative  Musculoskeletal: Negative  Skin: Negative  Allergic/Immunologic: Negative  Neurological: Positive for light-headedness  Hematological: Negative  Psychiatric/Behavioral: Negative          Past Medical and Surgical History:     Past Medical History:   Diagnosis Date    BPH (benign prostatic hyperplasia)     CAD (coronary artery disease)     Cardiac disease     Cervical spine fracture (HCC)     DVT (deep venous thrombosis) (HCC)     Fracture of lumbar spine (HCC)     Glaucoma     Hyperlipidemia     Hypertension     Myocardial infarct ([facility] [address] )     PUD (peptic ulcer disease)        Past Surgical History:   Procedure Laterality Date    CHOLECYSTECTOMY      CORONARY ARTERY BYPASS GRAFT      CORONARY STENT PLACEMENT      EYE SURGERY      HERNIA REPAIR Right 11/3/2017    Procedure: REPAIR HERNIA INGUINAL;  Surgeon: Kamila Ly MD;  Location: [address] [place] [address];  Service: General    CT CYSTOURETHROSCOPY W/IRRIG & EVAC CLOTS N/A 6/30/2018    Procedure: CYSTOSCOPY EVACUATION OF CLOTS, fulgeration;  Surgeon: Kaden Kelly MD;  [facility];  Service: Urology    STOMACH SURGERY      [place] 2 gastrectomy    TRANSURETHRAL RESECTION OF PROSTATE      VENA CAVA FILTER PLACEMENT         Meds/Allergies:    Prior to Admission medications    Medication Sig Start Date End Date Taking?  Authorizing Provider   ALPRAZolam Marquise Abbott) 0 5 mg tablet  7/19/18   Historical Provider, MD   Calcium Carbonate-Vitamin D (CALCIUM 600+D PO) Take by mouth daily    Historical Provider, MD   cholecalciferol (VITAMIN D3) 400 units tablet Take 400 Units by mouth daily    Historical Provider, MD   clopidogrel (PLAVIX) 75 mg tablet Take 1 tablet (75 mg total) by mouth daily 12/11/18   Yovani Vela MD   dutasteride (AVODART) 0 5 mg capsule Take 1 capsule (0 5 mg total) by mouth daily 8/23/19   Merline Games, CRNP   escitalopram (LEXAPRO) 10 mg tablet Take 10 mg by mouth daily    Historical Provider, MD   ezetimibe (ZETIA) 10 mg tablet Take 1 tablet (10 mg total) by mouth daily 4/22/19   Yovani Vela MD   famotidine (PEPCID) 20 mg tablet Take 20 mg by mouth daily      Historical Provider, MD   isosorbide mononitrate (IMDUR) 30 mg 24 hr tablet Take 1 tablet (30 mg total) by mouth daily 8/27/19   Yovani Vela MD   Magnesium 250 MG TABS Take 1 tablet by mouth daily    Historical Provider, MD   metoprolol succinate (TOPROL-XL) 25 mg 24 hr tablet Take 1 tablet (25 mg total) by mouth 2 (two) times a day 10/31/19   Yeison Escobedo MD   multivitamin-iron-minerals-folic acid (CENTRUM) chewable tablet Chew 1 tablet daily    Historical Provider, MD   nitroglycerin (NITROSTAT) 0 4 mg SL tablet Place 1 tablet (0 4 mg total) under the tongue every 5 (five) minutes as needed for chest pain 4/22/19   Yeison Escobedo MD   tamsulosin [facility]) 0 4 mg Take 1 capsule (0 4 mg total) by mouth daily with dinner 7/2/19   MATT Yarbrough   zinc gluconate 50 mg tablet Take 25 mg by mouth daily  Historical Provider, MD       Allergies: Allergies   Allergen Reactions    Oxycodone-Acetaminophen Dizziness and Shortness Of Breath     Other reaction(s): Faints  Reaction Date: [id number]; Category: Adverse Reaction;     Omeprazole Other (See Comments)     pash    Statins Other (See Comments)     Muscle pain       Social History:     Marital Status: /Civil Union   Substance Use History:   Social History     Substance and Sexual Activity   Alcohol Use No     Social History     Tobacco Use   Smoking Status Never Smoker   Smokeless Tobacco Never Used     Social History     Substance and Sexual Activity   Drug Use No       Family History:    Family History   Problem Relation Age of Onset    Coronary artery disease Brother        Physical Exam:     Vitals:   Blood Pressure: (!) 185/86 (11/29/19 2252)  Pulse: 95 (11/29/19 2252)  Temperature: 98 3 °F (36 8 °C) (11/29/19 2252)  Temp Source: Oral (11/29/19 2252)  Respirations: 18 (11/29/19 2252)  Height: 5' 4" (162 6 cm) (11/29/19 2252)  Weight - Scale: 69 5 kg (153 lb 3 5 oz) (11/29/19 2252)  SpO2: 95 % (11/29/19 2252)    Physical Exam   Constitutional: He is oriented to person, place, and time  He appears well-developed and well-nourished  Nasal cannula in place  HENT:   Head: Normocephalic and atraumatic  Eyes: EOM are normal    Neck: Normal range of motion  Cardiovascular: Normal rate and regular rhythm  Murmur heard    Pulmonary/Chest: Effort normal and breath sounds normal  No respiratory distress  He has no wheezes  Abdominal: Soft  Bowel sounds are normal  He exhibits no distension  There is no tenderness  Musculoskeletal: Normal range of motion  He exhibits no edema  Neurological: He is alert and oriented to person, place, and time  Skin: Skin is warm and dry  Psychiatric: He has a normal mood and affect  His behavior is normal    Nursing note and vitals reviewed  Additional Data:     Lab Results: I have personally reviewed pertinent reports  Results from last 7 days   Lab Units 11/29/19 2124   WBC Thousand/uL 4 28*   HEMOGLOBIN g/dL 13 7   HEMATOCRIT % 41 3   PLATELETS Thousands/uL 127*   NEUTROS PCT % 60     Results from last 7 days   Lab Units 11/29/19 2124   SODIUM mmol/L 138   POTASSIUM mmol/L 4 0   CHLORIDE mmol/L 102   CO2 mmol/L 32   BUN mg/dL 24   CREATININE mg/dL 1 08   CALCIUM mg/dL 8 6   TOTAL BILIRUBIN mg/dL 0 40   ALK PHOS U/L 90   ALT U/L 17   AST U/L 8     Results from last 7 days   Lab Units 11/29/19 2124   INR  0 96     Results from last 7 days   Lab Units 11/29/19 2124   TROPONIN I ng/mL <0 02               Imaging: I have personally reviewed pertinent reports  XR chest 1 view portable    (Results Pending)       XR chest 1 view portable    (Results Pending)       EKG, Pathology, and Other Studies Reviewed on Admission:   · EKG: nsr with inferior q waves, diffuse t wave inversions     Allscripts / Epic Records Reviewed: Yes     ** Please Note: This note has been constructed using a voice recognition system   **

## 2019-11-30 NOTE — ASSESSMENT & PLAN NOTE
Patient presented to the ED with complaints of chest pain across his entire chest associated with some shortness of breath and lightheadedness after eating this afternoon  He took two aspirin and nitro with no relief  Last stress test August of 2018  EKG with no acute ischemic changes  Troponin negative  Patient chest pain free at the time of eval in the ER  This was the first time in three years patient used nitro  EULA score 4     · Admit to Medicine  · Serial EKGs and troponin  · Continue aspirin, plavix, statin, beta blocker  · Nuclear stress in AM  · Lipid panel and hemoglobin A1c in the a m

## 2019-12-02 LAB
ATRIAL RATE: 86 BPM
ATRIAL RATE: 98 BPM
MRSA NOSE QL CULT: NORMAL
P AXIS: 101 DEGREES
P AXIS: 58 DEGREES
PR INTERVAL: 198 MS
PR INTERVAL: 212 MS
QRS AXIS: 17 DEGREES
QRS AXIS: 39 DEGREES
QRSD INTERVAL: 90 MS
QRSD INTERVAL: 92 MS
QT INTERVAL: 352 MS
QT INTERVAL: 386 MS
QTC INTERVAL: 449 MS
QTC INTERVAL: 461 MS
T WAVE AXIS: -37 DEGREES
T WAVE AXIS: -62 DEGREES
VENTRICULAR RATE: 86 BPM
VENTRICULAR RATE: 98 BPM

## 2019-12-02 PROCEDURE — 93010 ELECTROCARDIOGRAM REPORT: CPT | Performed by: INTERNAL MEDICINE

## 2019-12-09 ENCOUNTER — OFFICE VISIT (OUTPATIENT)
Dept: CARDIOLOGY CLINIC | Facility: CLINIC | Age: 84
End: 2019-12-09
Payer: MEDICARE

## 2019-12-09 VITALS
HEIGHT: 64 IN | DIASTOLIC BLOOD PRESSURE: 60 MMHG | WEIGHT: 154 LBS | SYSTOLIC BLOOD PRESSURE: 118 MMHG | HEART RATE: 73 BPM | BODY MASS INDEX: 26.29 KG/M2 | OXYGEN SATURATION: 97 %

## 2019-12-09 DIAGNOSIS — K27.9 PUD (PEPTIC ULCER DISEASE): Chronic | ICD-10-CM

## 2019-12-09 DIAGNOSIS — F41.9 ANXIETY: ICD-10-CM

## 2019-12-09 DIAGNOSIS — I50.32 CHRONIC DIASTOLIC HEART FAILURE (HCC): ICD-10-CM

## 2019-12-09 DIAGNOSIS — I10 ESSENTIAL HYPERTENSION: Chronic | ICD-10-CM

## 2019-12-09 DIAGNOSIS — D63.8 ANEMIA OF CHRONIC DISEASE: ICD-10-CM

## 2019-12-09 DIAGNOSIS — E78.2 MIXED HYPERLIPIDEMIA: Chronic | ICD-10-CM

## 2019-12-09 DIAGNOSIS — I25.10 3-VESSEL CAD: ICD-10-CM

## 2019-12-09 DIAGNOSIS — R07.9 CHEST PAIN: ICD-10-CM

## 2019-12-09 DIAGNOSIS — I20.8 CHRONIC STABLE ANGINA (HCC): ICD-10-CM

## 2019-12-09 PROCEDURE — 99214 OFFICE O/P EST MOD 30 MIN: CPT | Performed by: INTERNAL MEDICINE

## 2019-12-09 RX ORDER — RANOLAZINE 500 MG/1
500 TABLET, EXTENDED RELEASE ORAL EVERY 12 HOURS SCHEDULED
Qty: 60 TABLET | Refills: 5 | Status: SHIPPED | OUTPATIENT
Start: 2019-12-09 | End: 2020-06-24 | Stop reason: SDUPTHER

## 2019-12-09 RX ORDER — ROSUVASTATIN CALCIUM 5 MG/1
5 TABLET, COATED ORAL DAILY
Qty: 30 TABLET | Refills: 5 | Status: SHIPPED | OUTPATIENT
Start: 2019-12-09 | End: 2019-12-27 | Stop reason: SDUPTHER

## 2019-12-09 NOTE — PROGRESS NOTES
Progress Note - Cardiology Office  Keyla Blount 80 y o  male MRN: [id number]  : 1930  Encounter: [id number]      Assessment:     1  3-vessel CAD    2  Chronic stable angina ([facility] [address] )    3  Chronic diastolic heart failure ([facility] [address] )    4  Mixed hyperlipidemia    5  Essential hypertension    6  PUD (peptic ulcer disease)    7  Anemia of chronic disease    8  Anxiety    9  Chest pain        Discussion Summary and Plan:  1  Coronary artery disease status post CABG and stents with multiple cardiac catheterization a year ago ago in [facility] by me and it was recommended medical therapy  Patient has diffuse 3 vessel disease with poor targets  Patient has recent admitted with chest pain and chronic stable angina  Will continue beta-blocker Imdur and Ranexa was added  He is now willing to take Crestor 5 mg he tolerated very well  Continue increased dose of metoprolol and Crestor 5 mg       2  Chronic stable angina  As mentioned above history of chronic stable angina  He is on Ranexa tolerating well continue Plavix  Could not tolerate aspirin due to GERD  Symptoms of angina discussed with patient again  3  Generalized anxiety  Patient history of generalized anxiety  Gets very anxious  4  Dyslipidemia  Patient has history of severe three-vessel disease  He was tried on multiple statins he did not take it now he is willing to take statins  He was started on Zetia  We also given prescription of PSK inhibitors,    Which she did not take  Currently he is taking Crestor 5 mg daily  He has stopped taking Zetia he could not tolerate that  5  Chronic diastolic heart failure [place] Heart Association class 2  No more admission to hospital with heart failure  He has slowly stop taking his diuretics needed to use only p r n  As per him  EF around 40-45%  He is regulating his diuretic based on his needs  He is not on Ace inhibitor due to low blood pressure  No evidence of volume overload        6  Benign prostate hypertrophy status post surgery  Currently stable  7   Ischemic cardiomyopathy EF around 40-45%  Currently compensated  Continue metoprolol XL  Dose increased to 50 mg daily   Heart rate is acceptable    8  History of peptic ulcer disease  Patient was previously on Protonix  He got better with stopping aspirin  Advised to consider using enteric-coated aspirin  spoke to patient  Notes from [facility] Hospital reviewed  Patient was recently admitted with chest pain troponins were negative  All his questions answered to his satisfaction his wife was present during discussions    Counseling :  A description of the counseling  Spoke to patient about chronic stable angina, coronary artery disease, dyslipidemia, diastolic heart failure at length  Diet advised  Patient's ability to self care: Yes  Medication side effect reviewed with patient in detail and all their questions answered to their satisfaction  HPI :     Doris Bruner is a 80y o  year old male who came for follow up  Patient has a past medical history significant for coronary artery disease status post CABG status post stent into his MCKENNA to LAD widely patent, SVG vein graft to 100% occluded and circumflex was severely diffusely disease with severe diffuse distal disease, chronic stable angina, status post MI anxiety, GERD, dyslipidemia who came for follow-up  was admitted to [facility] with chronic diastolic heart failure and is now compensated  He denies any chest pain or any shortness of breath  Occasionally feel dizzy in the morning  Has been on Demadex 20 mg and potassium 20 mg daily  All medications reviewed with him       04/22/2019  Above reviewed  Patient came for follow-up  He is doing much better  He has not taken any nitro since last visit  He had a nuclear stress test done which shows year old infarct with ejection fraction around 45%    He still get occasional episodes of chest pain but gets better if he slows down  He also get chest heaviness if he eats a heavy me  He could not take aspirin due to stomach burning he is only on Plavix which has helped  He could not tolerate statins and he has not taken any statin for long period of time  He was given P SK inhibitor however he could not afford that prescription and does not want take it  He is willing to try other medications  No nausea no vomiting no PND no orthopnea no other issues  12/09/2019  Above reviewed  Patient came for follow-up he is doing well recently he was admitted to [facility] with atypical chest pain  He does have history of chronic stable angina  He was started on Ranexa and due to high cholesterol started on Crestor  He did well on it he is tolerating it very well  He has past medical history significant for coronary artery disease status post CABG status post cardiac catheterization, ischemic cardiomyopathy EF 45%, history of GI bleed, history of intolerant to aspirin on Plavix, on statin now which was started in the hospital by us, on Zetia and a Ranexa  The both these pills have helped him he is tolerating the pills better  No more chest pain no other significant complaint  No other issues at this time  He has stopped taking his Zetia but he is taking his Crestor  Review of Systems   Constitutional: Negative for activity change, chills, diaphoresis, fever and unexpected weight change  HENT: Negative for congestion  Eyes: Negative for discharge and redness  Respiratory: Negative for cough, chest tightness, shortness of breath and wheezing  Cardiovascular: Positive for chest pain  Negative for palpitations and leg swelling  Occasional episodes of chest pain as before but much less  Has history of chronic stable angina   Gastrointestinal: Negative for abdominal pain, diarrhea and nausea  Endocrine: Negative  Genitourinary: Negative for decreased urine volume and urgency  Musculoskeletal: Positive for arthralgias and back pain  Negative for gait problem  Skin: Negative for rash and wound  Allergic/Immunologic: Negative  Neurological: Negative for dizziness, seizures, syncope, weakness, light-headedness and headaches  Hematological: Negative  Psychiatric/Behavioral: Negative for agitation and confusion  The patient is nervous/anxious          Historical Information   Past Medical History:   Diagnosis Date    BPH (benign prostatic hyperplasia)     CAD (coronary artery disease)     Cardiac disease     Cervical spine fracture (HCC)     DVT (deep venous thrombosis) (HCC)     Fracture of lumbar spine (HCC)     Glaucoma     Hyperlipidemia     Hypertension     Myocardial infarct (HCC)     PUD (peptic ulcer disease)      Past Surgical History:   Procedure Laterality Date    CHOLECYSTECTOMY      CORONARY ARTERY BYPASS GRAFT      CORONARY STENT PLACEMENT      EYE SURGERY      HERNIA REPAIR Right 11/3/2017    Procedure: REPAIR HERNIA INGUINAL;  Surgeon: Vijaya Whitney MD;  Location: [facility];  Service: General    [place] CYSTOURETHROSCOPY W/IRRIG & EVAC CLOTS N/A 6/30/2018    Procedure: CYSTOSCOPY EVACUATION OF CLOTS, fulgeration;  Surgeon: Laurel Rueda MD;  Location:  Main OR;  Service: Urology    STOMACH SURGERY      Billroth 2 gastrectomy    TRANSURETHRAL RESECTION OF PROSTATE      VENA CAVA FILTER PLACEMENT       Social History     Substance and Sexual Activity   Alcohol Use Never    Frequency: Never    Binge frequency: Never     Social History     Substance and Sexual Activity   Drug Use No     Social History     Tobacco Use   Smoking Status Never Smoker   Smokeless Tobacco Never Used     Family History:   Family History   Problem Relation Age of Onset    Coronary artery disease Brother        Meds/Allergies     Allergies   Allergen Reactions    Oxycodone-Acetaminophen Dizziness and Shortness Of Breath     Other reaction(s): Faints  Reaction Date: [id number]; Category: Adverse Reaction;     Omeprazole Other (See Comments)     pash    Statins Other (See Comments)     Muscle pain       Current Outpatient Medications:     ALPRAZolam (XANAX) 0 5 mg tablet, , Disp: , Rfl:     Calcium Carbonate-Vitamin D (CALCIUM 600+D PO), Take by mouth daily, Disp: , Rfl:     cholecalciferol (VITAMIN D3) 400 units tablet, Take 400 Units by mouth daily, Disp: , Rfl:     clopidogrel (PLAVIX) 75 mg tablet, Take 1 tablet (75 mg total) by mouth daily, Disp: 90 tablet, Rfl: 3    dutasteride (AVODART) 0 5 mg capsule, Take 1 capsule (0 5 mg total) by mouth daily, Disp: 90 capsule, Rfl: 3    escitalopram (LEXAPRO) 10 mg tablet, Take 10 mg by mouth daily, Disp: , Rfl:     famotidine (PEPCID) 20 mg tablet, Take 20 mg by mouth daily  , Disp: , Rfl:     isosorbide mononitrate (IMDUR) 30 mg 24 hr tablet, Take 1 tablet (30 mg total) by mouth daily, Disp: 90 tablet, Rfl: 3    Magnesium 250 MG TABS, Take 1 tablet by mouth daily, Disp: , Rfl:     metoprolol succinate (TOPROL-XL) 25 mg 24 hr tablet, Take 1 tablet (25 mg total) by mouth 2 (two) times a day, Disp: 90 tablet, Rfl: 3    multivitamin-iron-minerals-folic acid (CENTRUM) chewable tablet, Chew 1 tablet daily, Disp: , Rfl:     ranolazine (RANEXA) 500 mg 12 hr tablet, Take 1 tablet (500 mg total) by mouth every 12 (twelve) hours, Disp: 60 tablet, Rfl: 5    rosuvastatin (CRESTOR) 5 mg tablet, Take 1 tablet (5 mg total) by mouth daily, Disp: 30 tablet, Rfl: 5    tamsulosin (FLOMAX) 0 4 mg, Take 1 capsule (0 4 mg total) by mouth daily with dinner, Disp: 90 capsule, Rfl: 2    zinc gluconate 50 mg tablet, Take 25 mg by mouth daily  , Disp: , Rfl:     ezetimibe (ZETIA) 10 mg tablet, Take 1 tablet (10 mg total) by mouth daily (Patient not taking: Reported on 12/9/2019), Disp: 30 tablet, Rfl: 3    nitroglycerin (NITROSTAT) 0 4 mg SL tablet, Place 1 tablet (0 4 mg total) under the tongue every 5 (five) minutes as needed for chest pain (Patient not taking: Reported on 12/9/2019), Disp: 30 tablet, Rfl: 1    Vitals: Blood pressure 118/60, pulse 73, height 5' 4" (1 626 m), weight 69 9 kg (154 lb), SpO2 97 %  Body mass index is 26 43 kg/m²  Vitals:    12/09/19 1556   Weight: 69 9 kg (154 lb)     BP Readings from Last 3 Encounters:   12/09/19 118/60   11/30/19 122/61   10/31/19 120/60         Physical Exam   Constitutional: He is oriented to person, place, and time  He appears well-developed and well-nourished  No distress  HENT:   Head: Normocephalic and atraumatic  Eyes: Pupils are equal, round, and reactive to light  Neck: Neck supple  No JVD present  No tracheal deviation present  No thyromegaly present  Cardiovascular: Normal rate, regular rhythm, S1 normal and S2 normal  Exam reveals no gallop, no S3, no S4, no distant heart sounds and no friction rub  Murmur heard  Systolic (ejection) murmur is present with a grade of 2/6  Pulmonary/Chest: Effort normal and breath sounds normal  No respiratory distress  He has no wheezes  He has no rales  He exhibits no tenderness  Abdominal: Soft  Bowel sounds are normal  He exhibits no distension  There is no tenderness  Musculoskeletal: He exhibits no edema or deformity  Neurological: He is alert and oriented to person, place, and time  Skin: Skin is warm and dry  No rash noted  He is not diaphoretic  No pallor  Psychiatric: He has a normal mood and affect  His behavior is normal  Judgment normal          Diagnostic Studies Review Cardio:  Lab Review: HIS EKG done on September 13, 2016 shows normal sinus and marked ST abnormality in inferior wall consistent with ischemia but not different from old EKG     Stress Test: Nuclear stress test done 5/14/2016 shows moderate-sized inferior lateral wall ischemia and apical infarction  Ejection fraction 45%   Which was consistent with his %, vein graft to RCA is occluded, as circumflex has diffuse disease and LIMA to LAD was widely patent  Catheterization: He has multiple cardiac catheterization performed  His last cardiac catheter patient performed by me shows EF around 50%, severe diffuse disease of LAD which is totally occluded,  ECG Report:   Comparison to prior ECGs:1  No interval change1   2017  Twelve-lead EKG shows normal sinus some heart rate 72 bpm  ST changes in inferior and lateral leads which is not changed from old EKG  Cannot rule out underlying ischemialead EKG shows normal sinus rhythm heart rate 63 beats per minute with deep T-wave inversions in inferior and lateral precordial leads  Not change from old EKG    Twelve lead EKG done 2019 shows normal sinus rhythm heart rate 71 beats per minute  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  ST abnormal it in precordial leads not change from old EKG  Twelve lead EKG done 10/31/2019 shows normal sinus rhythm evidence of old inferior infarct  ST abnormality cannot rule out ischemia  Cardiac testing:   Results for orders placed during the hospital encounter of 17   Echo complete with contrast if indicated    [place] [id number] [place] [id number]  [phone]    Transthoracic Echocardiogram  2D, M-mode, Doppler, and Color Doppler    Study date:  2017    Patient: Shira Del Valle  MR number: [id number]  Account number: [de-identified]  : 02-Aug-1930  Age: 80 years  Gender: Male  Status: Routine  Location: Bedside  Height: 64 in  Weight: 147 6 lb  BP: 118/ 60 mmHg    Indications: acute hypoxic    Diagnoses: I42 9 - Cardiomyopathy, unspecified    Sonographer:  YURI Arreola  Primary Physician:  Baldomero Soria DO  Referring Physician:  Yovani Vela MD  Interpreting Physician:  Thomas Sher DO  acute hypoxic    SUMMARY    LEFT VENTRICLE:  Systolic function was at the lower limits of normal by Teichholz  Ejection  fraction was estimated to be 50 %    There was mild hypokinesis of the septal wall   There was mild concentric hypertrophy  Doppler parameters were consistent with restrictive physiology, indicative of  decreased left ventricular diastolic compliance and/or increased left atrial  pressure  Doppler parameters were consistent with elevated mean left atrial  filling pressure  RIGHT VENTRICLE:  Systolic function was moderately reduced  MITRAL VALVE:  There was moderate regurgitation  AORTIC VALVE:  The valve was trileaflet  Leaflets exhibited moderate calcification and mildly  reduced cuspal separation  There was mild regurgitation  TRICUSPID VALVE:  There was moderate regurgitation  Pulmonary artery systolic pressure was mildly increased  HISTORY: PRIOR HISTORY: CAD, CABG,DVT,CHF,HTN, H/O PROSTATE CANCER    PROCEDURE: The procedure was performed at the bedside  This was a routine  study  The transthoracic approach was used  The study included complete 2D  imaging, M-mode, complete spectral Doppler, and color Doppler  The heart rate  was 84 bpm, at the start of the study  LEFT VENTRICLE: Size was normal  Systolic function was at the lower limits of  normal by Teichholz  Ejection fraction was estimated to be 50 %  There was mild  hypokinesis of the septal wall  There was mild concentric hypertrophy  DOPPLER:  Doppler parameters were consistent with restrictive physiology, indicative of  decreased left ventricular diastolic compliance and/or increased left atrial  pressure  Doppler parameters were consistent with elevated mean left atrial  filling pressure  RIGHT VENTRICLE: The size was normal  Systolic function was moderately reduced  LEFT ATRIUM: The atrium was mildly dilated  RIGHT ATRIUM: Size was normal     MITRAL VALVE: Valve structure was normal  There was normal leaflet separation  No echocardiographic evidence for prolapse  DOPPLER: The transmitral velocity  was within the normal range  There was no evidence for stenosis   There was  moderate regurgitation  AORTIC VALVE: The valve was trileaflet  Leaflets exhibited moderate  calcification and mildly reduced cuspal separation  DOPPLER: Transaortic  velocity was within the normal range  There was no evidence for stenosis  There  was mild regurgitation  TRICUSPID VALVE: The valve structure was normal  There was normal leaflet  separation  DOPPLER: The transtricuspid velocity was within the normal range  There was moderate regurgitation  Pulmonary artery systolic pressure was mildly  increased  Estimated peak PA pressure was 42 mmHg  PULMONIC VALVE: Leaflets exhibited normal thickness, no calcification, and  normal cuspal separation  DOPPLER: The transpulmonic velocity was within the  normal range  There was mild regurgitation  PERICARDIUM: There was no thickening  There was no pericardial effusion  AORTA: The root exhibited normal size  PULMONARY ARTERY: The size was normal  The morphology appeared normal     SYSTEM MEASUREMENT TABLES    2D mode  AoR Diam 2D: 3 3 cm  LA Diam (2D): 3 9 cm  LA/Ao (2D): 1 18  FS (2D Teich): 25 4 %  IVSd (2D): 1 12 cm  LVDEV: 65 5 cm³  LVEDV MOD BP: 143 cm³  LVESV: 32 2 cm³  LVIDd(2D): 3 89 cm  LVISd (2D): 2 9 cm  LVOT Area 2D: 3 14 cm squared  LVPWd (2D): 1 08 cm  SV (Teich): 33 3 cm³    Apical four chamber  LVEF A4C: 49 %    Apical two chamber  LA Area: 21 8 cm squared  LA Volume: 67 cm³  LVEF A2C: 55 %    Unspecified Scan Mode  JUDY Cont Eq (Peak Trino): 1 77 cm squared  LVOT (VTI): 20 6 cm  LVOT Diam : 2 cm  LVOT Vmax: 993 mm/s  LVOT Vmax; Mean: 993 mm/s  Peak Grad ; Mean: 4 mm[Hg]  SV (LVOT): 65 cm³  VTI;Mean: 2 mm[Hg]  JUDY Cont Eq (VTI): 2 25 cm squared  MV Peak A Trino: 1090 mm/s  MV Peak E Trino   Mean: 1620 mm/s  MVA (PHT): 4 cm squared  PHT: 55 ms  Max P mm[Hg]  V Max: 2910 mm/s  Vmax: 2360 mm/s  TAPSE: 1 3 cm    [facility]    Prepared and electronically signed by    DO Carlo Hawthorne 09-Jan-2017 16:48:59         Lab Review   Lab Results   Component Value Date    WBC 4 11 (L) 11/30/2019    HGB 12 8 11/30/2019    HCT 37 9 11/30/2019    MCV 93 11/30/2019    RDW 12 5 11/30/2019     (L) 11/30/2019     BMP:  Lab Results   Component Value Date     12/07/2015    K 4 0 11/30/2019     11/30/2019    CO2 29 11/30/2019    ANIONGAP 10 2 12/07/2015    BUN 22 11/30/2019    CREATININE 0 88 11/30/2019    GLUCOSE 78 12/07/2015    GLUF 86 11/30/2019    CALCIUM 8 2 (L) 11/30/2019    EGFR 76 11/30/2019    MG 2 3 01/11/2017     LFT:  Lab Results   Component Value Date    AST 8 11/29/2019    ALT 17 11/29/2019    ALKPHOS 90 11/29/2019    PROT 8 0 09/24/2015    BILITOT 0 64 09/24/2015     Lab Results   Component Value Date    BNP 69 09/25/2015      No results found for: TSH  Lab Results   Component Value Date    HGBA1C 6 3 (H) 09/25/2015     Lipid Profile:   Lab Results   Component Value Date    CHOL 192 10/29/2015    HDL 33 (L) 11/30/2019    LDLCALC 138 (H) 11/30/2019    TRIG 98 11/30/2019     Lab Results   Component Value Date    CHOL 192 10/29/2015    CHOL 168 09/25/2015       Dr Dane Arriaga MD [facility] [place]      "This note has been constructed using a voice recognition system  Therefore there may be syntax, spelling, and/or grammatical errors   Please call if you have any questions  "

## 2019-12-12 DIAGNOSIS — I25.10 CAD S/P PERCUTANEOUS CORONARY ANGIOPLASTY: ICD-10-CM

## 2019-12-12 DIAGNOSIS — Z98.61 CAD S/P PERCUTANEOUS CORONARY ANGIOPLASTY: ICD-10-CM

## 2019-12-12 RX ORDER — CLOPIDOGREL BISULFATE 75 MG/1
75 TABLET ORAL DAILY
Qty: 90 TABLET | Refills: 3 | Status: SHIPPED | OUTPATIENT
Start: 2019-12-12 | End: 2020-12-09 | Stop reason: SDUPTHER

## 2019-12-27 DIAGNOSIS — R07.9 CHEST PAIN: ICD-10-CM

## 2019-12-27 RX ORDER — ROSUVASTATIN CALCIUM 5 MG/1
5 TABLET, COATED ORAL DAILY
Qty: 90 TABLET | Refills: 3 | Status: SHIPPED | OUTPATIENT
Start: 2019-12-27 | End: 2021-01-13

## 2020-01-08 ENCOUNTER — TELEPHONE (OUTPATIENT)
Dept: CARDIOLOGY CLINIC | Facility: CLINIC | Age: 85
End: 2020-01-08

## 2020-01-09 ENCOUNTER — TRANSCRIBE ORDERS (OUTPATIENT)
Dept: ADMINISTRATIVE | Facility: HOSPITAL | Age: 85
End: 2020-01-09

## 2020-01-09 ENCOUNTER — TELEPHONE (OUTPATIENT)
Dept: CARDIOLOGY CLINIC | Facility: CLINIC | Age: 85
End: 2020-01-09

## 2020-01-09 ENCOUNTER — LAB (OUTPATIENT)
Dept: LAB | Facility: HOSPITAL | Age: 85
End: 2020-01-09
Attending: INTERNAL MEDICINE
Payer: MEDICARE

## 2020-01-09 DIAGNOSIS — I25.10 3-VESSEL CAD: ICD-10-CM

## 2020-01-09 DIAGNOSIS — E78.2 MIXED HYPERLIPIDEMIA: Chronic | ICD-10-CM

## 2020-01-09 DIAGNOSIS — I50.32 CHRONIC DIASTOLIC HEART FAILURE (HCC): ICD-10-CM

## 2020-01-09 DIAGNOSIS — I20.8 CHRONIC STABLE ANGINA (HCC): ICD-10-CM

## 2020-01-09 LAB
ALBUMIN SERPL BCP-MCNC: 3.6 G/DL (ref 3.5–5)
ALP SERPL-CCNC: 85 U/L (ref 46–116)
ALT SERPL W P-5'-P-CCNC: 31 U/L (ref 12–78)
ANION GAP SERPL CALCULATED.3IONS-SCNC: 5 MMOL/L (ref 4–13)
AST SERPL W P-5'-P-CCNC: 6 U/L (ref 5–45)
BILIRUB SERPL-MCNC: 0.6 MG/DL (ref 0.2–1)
BUN SERPL-MCNC: 20 MG/DL (ref 5–25)
CALCIUM SERPL-MCNC: 8.9 MG/DL (ref 8.3–10.1)
CHLORIDE SERPL-SCNC: 101 MMOL/L (ref 100–108)
CHOLEST SERPL-MCNC: 159 MG/DL (ref 50–200)
CO2 SERPL-SCNC: 33 MMOL/L (ref 21–32)
CREAT SERPL-MCNC: 1.1 MG/DL (ref 0.6–1.3)
GFR SERPL CREATININE-BSD FRML MDRD: 59 ML/MIN/1.73SQ M
GLUCOSE P FAST SERPL-MCNC: 110 MG/DL (ref 65–99)
HDLC SERPL-MCNC: 39 MG/DL
LDLC SERPL CALC-MCNC: 95 MG/DL (ref 0–100)
NONHDLC SERPL-MCNC: 120 MG/DL
POTASSIUM SERPL-SCNC: 4.2 MMOL/L (ref 3.5–5.3)
PROT SERPL-MCNC: 7.5 G/DL (ref 6.4–8.2)
SODIUM SERPL-SCNC: 139 MMOL/L (ref 136–145)
TRIGL SERPL-MCNC: 126 MG/DL

## 2020-01-09 PROCEDURE — 80053 COMPREHEN METABOLIC PANEL: CPT

## 2020-01-09 PROCEDURE — 80061 LIPID PANEL: CPT

## 2020-01-09 PROCEDURE — 36415 COLL VENOUS BLD VENIPUNCTURE: CPT

## 2020-01-09 NOTE — TELEPHONE ENCOUNTER
----- Message from Joseph Manzano MD sent at 1/9/2020 10:18 AM EST -----  Patient labs are acceptable  Continue same medications  Patient is advised to follow up  appointment regularly  Please call patient about the  about results

## 2020-01-22 ENCOUNTER — TELEPHONE (OUTPATIENT)
Dept: CARDIOLOGY CLINIC | Facility: CLINIC | Age: 85
End: 2020-01-22

## 2020-01-22 NOTE — TELEPHONE ENCOUNTER
Dr Alva Bird,   Patient called with complaints of rosuvastatin  He is currently taking 5mg of this medication  He complains of hip pain like the dickens  Patient is going to start taking this medication every other day on his own  And wants any recommendations that you may have in regards to that  Patient made aware that you are not available and will be called once a response is given

## 2020-03-28 DIAGNOSIS — N40.0 BENIGN PROSTATIC HYPERPLASIA, UNSPECIFIED WHETHER LOWER URINARY TRACT SYMPTOMS PRESENT: ICD-10-CM

## 2020-03-28 NOTE — TELEPHONE ENCOUNTER
The patient was last seen on 2/28/19 by Dr Guilherme Mccullough in the [place] location  He is overdue for an office visit  However,  due to the COVID-19 pandemic the prescription is being extended   Request for same, 90 day supply with NO refills was queued and forwarded to the Advanced Practitioner covering the [place] location for approval

## 2020-03-28 NOTE — TELEPHONE ENCOUNTER
Patient left a message on the Medication Refill voice mail line requesting a new prescription for Tamsulosin 0 4mg, 90 day supply to [facility] in [place]

## 2020-03-30 RX ORDER — TAMSULOSIN HYDROCHLORIDE 0.4 MG/1
0.4 CAPSULE ORAL
Qty: 90 CAPSULE | Refills: 0 | Status: SHIPPED | OUTPATIENT
Start: 2020-03-30 | End: 2020-05-26 | Stop reason: SDUPTHER

## 2020-05-14 ENCOUNTER — TELEPHONE (OUTPATIENT)
Dept: UROLOGY | Facility: CLINIC | Age: 85
End: 2020-05-14

## 2020-05-26 ENCOUNTER — TELEMEDICINE (OUTPATIENT)
Dept: UROLOGY | Facility: CLINIC | Age: 85
End: 2020-05-26
Payer: MEDICARE

## 2020-05-26 DIAGNOSIS — N40.1 BENIGN PROSTATIC HYPERPLASIA WITH URINARY RETENTION: ICD-10-CM

## 2020-05-26 DIAGNOSIS — N40.0 BENIGN PROSTATIC HYPERPLASIA, UNSPECIFIED WHETHER LOWER URINARY TRACT SYMPTOMS PRESENT: ICD-10-CM

## 2020-05-26 DIAGNOSIS — R33.8 BENIGN PROSTATIC HYPERPLASIA WITH URINARY RETENTION: ICD-10-CM

## 2020-05-26 PROCEDURE — 99442 PR PHYS/QHP TELEPHONE EVALUATION 11-20 MIN: CPT | Performed by: PHYSICIAN ASSISTANT

## 2020-05-26 RX ORDER — TAMSULOSIN HYDROCHLORIDE 0.4 MG/1
0.4 CAPSULE ORAL
Qty: 90 CAPSULE | Refills: 3 | Status: SHIPPED | OUTPATIENT
Start: 2020-05-26 | End: 2021-06-18 | Stop reason: SDUPTHER

## 2020-05-26 RX ORDER — DUTASTERIDE 0.5 MG/1
0.5 CAPSULE, LIQUID FILLED ORAL DAILY
Qty: 90 CAPSULE | Refills: 3 | Status: SHIPPED | OUTPATIENT
Start: 2020-05-26 | End: 2021-08-10 | Stop reason: SDUPTHER

## 2020-06-24 ENCOUNTER — OFFICE VISIT (OUTPATIENT)
Dept: CARDIOLOGY CLINIC | Facility: CLINIC | Age: 85
End: 2020-06-24
Payer: MEDICARE

## 2020-06-24 VITALS
SYSTOLIC BLOOD PRESSURE: 144 MMHG | TEMPERATURE: 98.4 F | WEIGHT: 151.4 LBS | HEIGHT: 64 IN | HEART RATE: 76 BPM | DIASTOLIC BLOOD PRESSURE: 70 MMHG | OXYGEN SATURATION: 96 % | BODY MASS INDEX: 25.85 KG/M2

## 2020-06-24 DIAGNOSIS — I25.118 CORONARY ARTERY DISEASE OF NATIVE ARTERY OF NATIVE HEART WITH STABLE ANGINA PECTORIS (HCC): Chronic | ICD-10-CM

## 2020-06-24 DIAGNOSIS — Z86.718 HISTORY OF DVT (DEEP VEIN THROMBOSIS): Chronic | ICD-10-CM

## 2020-06-24 DIAGNOSIS — I25.10 3-VESSEL CAD: ICD-10-CM

## 2020-06-24 DIAGNOSIS — R07.9 CHEST PAIN: ICD-10-CM

## 2020-06-24 DIAGNOSIS — R01.1 CARDIAC MURMUR: ICD-10-CM

## 2020-06-24 DIAGNOSIS — F41.9 ANXIETY: ICD-10-CM

## 2020-06-24 DIAGNOSIS — I10 ESSENTIAL HYPERTENSION: Chronic | ICD-10-CM

## 2020-06-24 DIAGNOSIS — I20.8 CHRONIC STABLE ANGINA (HCC): ICD-10-CM

## 2020-06-24 DIAGNOSIS — I50.32 CHRONIC DIASTOLIC HEART FAILURE (HCC): ICD-10-CM

## 2020-06-24 DIAGNOSIS — E78.2 MIXED HYPERLIPIDEMIA: Chronic | ICD-10-CM

## 2020-06-24 PROCEDURE — 99214 OFFICE O/P EST MOD 30 MIN: CPT | Performed by: INTERNAL MEDICINE

## 2020-06-24 PROCEDURE — 93000 ELECTROCARDIOGRAM COMPLETE: CPT | Performed by: INTERNAL MEDICINE

## 2020-06-24 RX ORDER — METOPROLOL SUCCINATE 25 MG/1
25 TABLET, EXTENDED RELEASE ORAL 2 TIMES DAILY
Qty: 180 TABLET | Refills: 3 | Status: SHIPPED | OUTPATIENT
Start: 2020-06-24 | End: 2020-12-15 | Stop reason: SDUPTHER

## 2020-06-24 RX ORDER — RANOLAZINE 500 MG/1
500 TABLET, EXTENDED RELEASE ORAL EVERY 12 HOURS SCHEDULED
Qty: 60 EACH | Refills: 5 | Status: SHIPPED | OUTPATIENT
Start: 2020-06-24 | End: 2020-12-15

## 2020-07-08 ENCOUNTER — HOSPITAL ENCOUNTER (OUTPATIENT)
Dept: NON INVASIVE DIAGNOSTICS | Facility: HOSPITAL | Age: 85
Discharge: HOME | End: 2020-07-08
Attending: INTERNAL MEDICINE
Payer: MEDICARE

## 2020-07-08 DIAGNOSIS — I25.10 3-VESSEL CAD: ICD-10-CM

## 2020-07-08 DIAGNOSIS — I50.32 CHRONIC DIASTOLIC HEART FAILURE (HCC): ICD-10-CM

## 2020-07-08 DIAGNOSIS — Z86.718 HISTORY OF DVT (DEEP VEIN THROMBOSIS): Chronic | ICD-10-CM

## 2020-07-08 DIAGNOSIS — F41.9 ANXIETY: ICD-10-CM

## 2020-07-08 DIAGNOSIS — R01.1 CARDIAC MURMUR: ICD-10-CM

## 2020-07-08 DIAGNOSIS — I10 ESSENTIAL HYPERTENSION: Chronic | ICD-10-CM

## 2020-07-08 DIAGNOSIS — I20.8 CHRONIC STABLE ANGINA (HCC): ICD-10-CM

## 2020-07-08 DIAGNOSIS — E78.2 MIXED HYPERLIPIDEMIA: Chronic | ICD-10-CM

## 2020-07-08 PROCEDURE — 93306 TTE W/DOPPLER COMPLETE: CPT

## 2020-07-10 ENCOUNTER — TELEPHONE (OUTPATIENT)
Dept: CARDIOLOGY CLINIC | Facility: CLINIC | Age: 85
End: 2020-07-10

## 2020-07-10 PROCEDURE — 93306 TTE W/DOPPLER COMPLETE: CPT | Performed by: INTERNAL MEDICINE

## 2020-07-10 NOTE — TELEPHONE ENCOUNTER
----- Message from Rogers Esparza MD sent at 7/10/2020  2:44 PM EDT -----  Patient's echo shows normal LV systolic function  No significant valvular disease  Patient can keep an appointment  Please call patient about echo report

## 2020-08-11 DIAGNOSIS — I20.8 CHRONIC STABLE ANGINA (HCC): ICD-10-CM

## 2020-08-11 RX ORDER — ISOSORBIDE MONONITRATE 30 MG/1
30 TABLET, EXTENDED RELEASE ORAL DAILY
Qty: 90 TABLET | Refills: 3 | Status: SHIPPED | OUTPATIENT
Start: 2020-08-11 | End: 2021-08-17 | Stop reason: SDUPTHER

## 2020-08-28 ENCOUNTER — TELEPHONE (OUTPATIENT)
Dept: CARDIOLOGY CLINIC | Facility: CLINIC | Age: 85
End: 2020-08-28

## 2020-08-28 NOTE — TELEPHONE ENCOUNTER
Reports low blood pressure,   Ranging from 97, 88, 100  C/o lightheadedness all the time  Reports feeling this way from breakfast to dinner  Takes isosorbide 30mg, ranolazine 500mg, metoprolol 25mg and escitalopram in the morning  Can you please advise

## 2020-09-01 ENCOUNTER — APPOINTMENT (OUTPATIENT)
Dept: LAB | Facility: HOSPITAL | Age: 85
End: 2020-09-01
Attending: FAMILY MEDICINE
Payer: MEDICARE

## 2020-09-01 ENCOUNTER — TRANSCRIBE ORDERS (OUTPATIENT)
Dept: ADMINISTRATIVE | Facility: HOSPITAL | Age: 85
End: 2020-09-01

## 2020-09-01 DIAGNOSIS — R73.01 IMPAIRED FASTING GLUCOSE: Primary | ICD-10-CM

## 2020-09-01 LAB
ALBUMIN SERPL BCP-MCNC: 3.5 G/DL (ref 3.5–5)
ALP SERPL-CCNC: 74 U/L (ref 46–116)
ALT SERPL W P-5'-P-CCNC: 18 U/L (ref 12–78)
ANION GAP SERPL CALCULATED.3IONS-SCNC: 2 MMOL/L (ref 4–13)
AST SERPL W P-5'-P-CCNC: 5 U/L (ref 5–45)
BILIRUB SERPL-MCNC: 0.4 MG/DL (ref 0.2–1)
BUN SERPL-MCNC: 24 MG/DL (ref 5–25)
CALCIUM SERPL-MCNC: 8.7 MG/DL (ref 8.3–10.1)
CHLORIDE SERPL-SCNC: 98 MMOL/L (ref 100–108)
CO2 SERPL-SCNC: 32 MMOL/L (ref 21–32)
CREAT SERPL-MCNC: 1.23 MG/DL (ref 0.6–1.3)
EST. AVERAGE GLUCOSE BLD GHB EST-MCNC: 128 MG/DL
GFR SERPL CREATININE-BSD FRML MDRD: 51 ML/MIN/1.73SQ M
GLUCOSE P FAST SERPL-MCNC: 115 MG/DL (ref 65–99)
HBA1C MFR BLD: 6.1 %
POTASSIUM SERPL-SCNC: 4.2 MMOL/L (ref 3.5–5.3)
PROT SERPL-MCNC: 7.5 G/DL (ref 6.4–8.2)
SODIUM SERPL-SCNC: 132 MMOL/L (ref 136–145)

## 2020-09-01 PROCEDURE — 83036 HEMOGLOBIN GLYCOSYLATED A1C: CPT | Performed by: FAMILY MEDICINE

## 2020-09-01 PROCEDURE — 80053 COMPREHEN METABOLIC PANEL: CPT | Performed by: FAMILY MEDICINE

## 2020-09-01 PROCEDURE — 36415 COLL VENOUS BLD VENIPUNCTURE: CPT | Performed by: FAMILY MEDICINE

## 2020-10-30 ENCOUNTER — APPOINTMENT (OUTPATIENT)
Dept: LAB | Facility: HOSPITAL | Age: 85
End: 2020-10-30
Attending: FAMILY MEDICINE
Payer: MEDICARE

## 2020-10-30 ENCOUNTER — TRANSCRIBE ORDERS (OUTPATIENT)
Dept: ADMINISTRATIVE | Facility: HOSPITAL | Age: 85
End: 2020-10-30

## 2020-10-30 DIAGNOSIS — E87.1 HYPOSMOLALITY SYNDROME: Primary | ICD-10-CM

## 2020-10-30 DIAGNOSIS — E87.1 HYPOSMOLALITY SYNDROME: ICD-10-CM

## 2020-10-30 LAB
ANION GAP SERPL CALCULATED.3IONS-SCNC: 7 MMOL/L (ref 4–13)
BUN SERPL-MCNC: 18 MG/DL (ref 5–25)
CALCIUM SERPL-MCNC: 9 MG/DL (ref 8.3–10.1)
CHLORIDE SERPL-SCNC: 101 MMOL/L (ref 100–108)
CO2 SERPL-SCNC: 31 MMOL/L (ref 21–32)
CREAT SERPL-MCNC: 1.21 MG/DL (ref 0.6–1.3)
GFR SERPL CREATININE-BSD FRML MDRD: 52 ML/MIN/1.73SQ M
GLUCOSE P FAST SERPL-MCNC: 129 MG/DL (ref 65–99)
POTASSIUM SERPL-SCNC: 4.4 MMOL/L (ref 3.5–5.3)
SODIUM SERPL-SCNC: 139 MMOL/L (ref 136–145)

## 2020-10-30 PROCEDURE — 36415 COLL VENOUS BLD VENIPUNCTURE: CPT

## 2020-10-30 PROCEDURE — 80048 BASIC METABOLIC PNL TOTAL CA: CPT

## 2020-12-09 DIAGNOSIS — Z98.61 CAD S/P PERCUTANEOUS CORONARY ANGIOPLASTY: ICD-10-CM

## 2020-12-09 DIAGNOSIS — I25.10 CAD S/P PERCUTANEOUS CORONARY ANGIOPLASTY: ICD-10-CM

## 2020-12-09 RX ORDER — CLOPIDOGREL BISULFATE 75 MG/1
75 TABLET ORAL DAILY
Qty: 90 TABLET | Refills: 3 | Status: SHIPPED | OUTPATIENT
Start: 2020-12-09 | End: 2021-12-14 | Stop reason: SDUPTHER

## 2020-12-15 ENCOUNTER — OFFICE VISIT (OUTPATIENT)
Dept: CARDIOLOGY CLINIC | Facility: CLINIC | Age: 85
End: 2020-12-15
Payer: MEDICARE

## 2020-12-15 VITALS
DIASTOLIC BLOOD PRESSURE: 64 MMHG | TEMPERATURE: 97.6 F | SYSTOLIC BLOOD PRESSURE: 120 MMHG | WEIGHT: 152 LBS | OXYGEN SATURATION: 96 % | HEIGHT: 64 IN | HEART RATE: 71 BPM | BODY MASS INDEX: 25.95 KG/M2

## 2020-12-15 DIAGNOSIS — I10 ESSENTIAL HYPERTENSION: Chronic | ICD-10-CM

## 2020-12-15 DIAGNOSIS — F41.9 ANXIETY: ICD-10-CM

## 2020-12-15 DIAGNOSIS — E78.2 MIXED HYPERLIPIDEMIA: Chronic | ICD-10-CM

## 2020-12-15 DIAGNOSIS — I50.32 CHRONIC DIASTOLIC HEART FAILURE (HCC): ICD-10-CM

## 2020-12-15 DIAGNOSIS — I25.118 CORONARY ARTERY DISEASE OF NATIVE ARTERY OF NATIVE HEART WITH STABLE ANGINA PECTORIS (HCC): Chronic | ICD-10-CM

## 2020-12-15 DIAGNOSIS — I20.8 CHRONIC STABLE ANGINA (HCC): ICD-10-CM

## 2020-12-15 DIAGNOSIS — I25.10 3-VESSEL CAD: ICD-10-CM

## 2020-12-15 DIAGNOSIS — R01.1 CARDIAC MURMUR: ICD-10-CM

## 2020-12-15 PROCEDURE — 99214 OFFICE O/P EST MOD 30 MIN: CPT | Performed by: INTERNAL MEDICINE

## 2020-12-15 PROCEDURE — 93000 ELECTROCARDIOGRAM COMPLETE: CPT | Performed by: INTERNAL MEDICINE

## 2020-12-15 RX ORDER — RANOLAZINE 500 MG/1
500 TABLET, EXTENDED RELEASE ORAL 2 TIMES DAILY
Qty: 60 TABLET | Refills: 6 | Status: SHIPPED | OUTPATIENT
Start: 2020-12-15 | End: 2021-01-04 | Stop reason: SDUPTHER

## 2020-12-15 RX ORDER — NITROGLYCERIN 0.4 MG/1
0.4 TABLET SUBLINGUAL
Qty: 30 TABLET | Refills: 1 | Status: SHIPPED | OUTPATIENT
Start: 2020-12-15 | End: 2022-03-24 | Stop reason: SDUPTHER

## 2020-12-15 RX ORDER — METOPROLOL SUCCINATE 25 MG/1
25 TABLET, EXTENDED RELEASE ORAL 3 TIMES DAILY
Qty: 270 TABLET | Refills: 3 | Status: SHIPPED | OUTPATIENT
Start: 2020-12-15 | End: 2021-02-26 | Stop reason: HOSPADM

## 2021-01-04 DIAGNOSIS — I20.8 CHRONIC STABLE ANGINA (HCC): ICD-10-CM

## 2021-01-04 RX ORDER — RANOLAZINE 500 MG/1
500 TABLET, EXTENDED RELEASE ORAL 2 TIMES DAILY
Qty: 180 TABLET | Refills: 3 | Status: SHIPPED | OUTPATIENT
Start: 2021-01-04 | End: 2021-08-09 | Stop reason: SDUPTHER

## 2021-01-12 DIAGNOSIS — R07.9 CHEST PAIN: ICD-10-CM

## 2021-01-13 RX ORDER — ROSUVASTATIN CALCIUM 5 MG/1
TABLET, COATED ORAL
Qty: 90 TABLET | Refills: 3 | Status: SHIPPED | OUTPATIENT
Start: 2021-01-13 | End: 2022-01-12 | Stop reason: SDUPTHER

## 2021-02-25 ENCOUNTER — APPOINTMENT (EMERGENCY)
Dept: RADIOLOGY | Facility: HOSPITAL | Age: 86
End: 2021-02-25
Payer: MEDICARE

## 2021-02-25 ENCOUNTER — HOSPITAL ENCOUNTER (OUTPATIENT)
Facility: HOSPITAL | Age: 86
Setting detail: OBSERVATION
Discharge: HOME | End: 2021-02-26
Attending: EMERGENCY MEDICINE | Admitting: FAMILY MEDICINE
Payer: MEDICARE

## 2021-02-25 DIAGNOSIS — R07.9 CHEST PAIN: Primary | ICD-10-CM

## 2021-02-25 DIAGNOSIS — I10 HYPERTENSION: Chronic | ICD-10-CM

## 2021-02-25 DIAGNOSIS — I25.10 CAD (CORONARY ARTERY DISEASE): ICD-10-CM

## 2021-02-25 PROBLEM — D69.6 THROMBOCYTOPENIA (HCC): Status: ACTIVE | Noted: 2021-02-25

## 2021-02-25 LAB
ALBUMIN SERPL BCP-MCNC: 3.4 G/DL (ref 3.5–5)
ALP SERPL-CCNC: 81 U/L (ref 46–116)
ALT SERPL W P-5'-P-CCNC: 21 U/L (ref 12–78)
ANION GAP SERPL CALCULATED.3IONS-SCNC: 7 MMOL/L (ref 4–13)
APTT PPP: 29 SECONDS (ref 23–37)
AST SERPL W P-5'-P-CCNC: 8 U/L (ref 5–45)
BASOPHILS # BLD AUTO: 0.01 THOUSANDS/ΜL (ref 0–0.1)
BASOPHILS NFR BLD AUTO: 0 % (ref 0–1)
BILIRUB SERPL-MCNC: 0.4 MG/DL (ref 0.2–1)
BUN SERPL-MCNC: 27 MG/DL (ref 5–25)
CALCIUM ALBUM COR SERPL-MCNC: 8.8 MG/DL (ref 8.3–10.1)
CALCIUM SERPL-MCNC: 8.3 MG/DL (ref 8.3–10.1)
CHLORIDE SERPL-SCNC: 101 MMOL/L (ref 100–108)
CO2 SERPL-SCNC: 30 MMOL/L (ref 21–32)
CREAT SERPL-MCNC: 1.23 MG/DL (ref 0.6–1.3)
EOSINOPHIL # BLD AUTO: 0.12 THOUSAND/ΜL (ref 0–0.61)
EOSINOPHIL NFR BLD AUTO: 2 % (ref 0–6)
ERYTHROCYTE [DISTWIDTH] IN BLOOD BY AUTOMATED COUNT: 12.6 % (ref 11.6–15.1)
GFR SERPL CREATININE-BSD FRML MDRD: 51 ML/MIN/1.73SQ M
GLUCOSE SERPL-MCNC: 149 MG/DL (ref 65–140)
HCT VFR BLD AUTO: 38.6 % (ref 36.5–49.3)
HGB BLD-MCNC: 12.2 G/DL (ref 12–17)
IMM GRANULOCYTES # BLD AUTO: 0.01 THOUSAND/UL (ref 0–0.2)
IMM GRANULOCYTES NFR BLD AUTO: 0 % (ref 0–2)
INR PPP: 1.04 (ref 0.84–1.19)
LYMPHOCYTES # BLD AUTO: 1.39 THOUSANDS/ΜL (ref 0.6–4.47)
LYMPHOCYTES NFR BLD AUTO: 28 % (ref 14–44)
MCH RBC QN AUTO: 30.8 PG (ref 26.8–34.3)
MCHC RBC AUTO-ENTMCNC: 31.6 G/DL (ref 31.4–37.4)
MCV RBC AUTO: 98 FL (ref 82–98)
MONOCYTES # BLD AUTO: 0.48 THOUSAND/ΜL (ref 0.17–1.22)
MONOCYTES NFR BLD AUTO: 10 % (ref 4–12)
NEUTROPHILS # BLD AUTO: 2.92 THOUSANDS/ΜL (ref 1.85–7.62)
NEUTS SEG NFR BLD AUTO: 60 % (ref 43–75)
NRBC BLD AUTO-RTO: 0 /100 WBCS
NT-PROBNP SERPL-MCNC: 784 PG/ML
PLATELET # BLD AUTO: 133 THOUSANDS/UL (ref 149–390)
PMV BLD AUTO: 10.6 FL (ref 8.9–12.7)
POTASSIUM SERPL-SCNC: 4.2 MMOL/L (ref 3.5–5.3)
PROT SERPL-MCNC: 7.2 G/DL (ref 6.4–8.2)
PROTHROMBIN TIME: 13.5 SECONDS (ref 11.6–14.5)
RBC # BLD AUTO: 3.96 MILLION/UL (ref 3.88–5.62)
SODIUM SERPL-SCNC: 138 MMOL/L (ref 136–145)
TROPONIN I SERPL-MCNC: <0.02 NG/ML
WBC # BLD AUTO: 4.93 THOUSAND/UL (ref 4.31–10.16)

## 2021-02-25 PROCEDURE — 71045 X-RAY EXAM CHEST 1 VIEW: CPT

## 2021-02-25 PROCEDURE — 99285 EMERGENCY DEPT VISIT HI MDM: CPT

## 2021-02-25 PROCEDURE — 84484 ASSAY OF TROPONIN QUANT: CPT | Performed by: EMERGENCY MEDICINE

## 2021-02-25 PROCEDURE — 1124F ACP DISCUSS-NO DSCNMKR DOCD: CPT | Performed by: EMERGENCY MEDICINE

## 2021-02-25 PROCEDURE — 85610 PROTHROMBIN TIME: CPT | Performed by: EMERGENCY MEDICINE

## 2021-02-25 PROCEDURE — 99220 PR INITIAL OBSERVATION CARE/DAY 70 MINUTES: CPT | Performed by: NURSE PRACTITIONER

## 2021-02-25 PROCEDURE — 93005 ELECTROCARDIOGRAM TRACING: CPT

## 2021-02-25 PROCEDURE — 99285 EMERGENCY DEPT VISIT HI MDM: CPT | Performed by: EMERGENCY MEDICINE

## 2021-02-25 PROCEDURE — 84484 ASSAY OF TROPONIN QUANT: CPT | Performed by: NURSE PRACTITIONER

## 2021-02-25 PROCEDURE — 83880 ASSAY OF NATRIURETIC PEPTIDE: CPT | Performed by: EMERGENCY MEDICINE

## 2021-02-25 PROCEDURE — 85025 COMPLETE CBC W/AUTO DIFF WBC: CPT | Performed by: EMERGENCY MEDICINE

## 2021-02-25 PROCEDURE — 36415 COLL VENOUS BLD VENIPUNCTURE: CPT | Performed by: EMERGENCY MEDICINE

## 2021-02-25 PROCEDURE — 85730 THROMBOPLASTIN TIME PARTIAL: CPT | Performed by: EMERGENCY MEDICINE

## 2021-02-25 PROCEDURE — 80053 COMPREHEN METABOLIC PANEL: CPT | Performed by: EMERGENCY MEDICINE

## 2021-02-25 RX ORDER — ONDANSETRON 2 MG/ML
4 INJECTION INTRAMUSCULAR; INTRAVENOUS EVERY 6 HOURS PRN
Status: DISCONTINUED | OUTPATIENT
Start: 2021-02-25 | End: 2021-02-26 | Stop reason: HOSPADM

## 2021-02-25 RX ORDER — EZETIMIBE 10 MG/1
10 TABLET ORAL
Status: DISCONTINUED | OUTPATIENT
Start: 2021-02-25 | End: 2021-02-26 | Stop reason: HOSPADM

## 2021-02-25 RX ORDER — FAMOTIDINE 20 MG/1
20 TABLET, FILM COATED ORAL DAILY
Status: DISCONTINUED | OUTPATIENT
Start: 2021-02-26 | End: 2021-02-26 | Stop reason: HOSPADM

## 2021-02-25 RX ORDER — RANOLAZINE 500 MG/1
500 TABLET, EXTENDED RELEASE ORAL 2 TIMES DAILY
Status: DISCONTINUED | OUTPATIENT
Start: 2021-02-25 | End: 2021-02-26 | Stop reason: HOSPADM

## 2021-02-25 RX ORDER — PRAVASTATIN SODIUM 40 MG
40 TABLET ORAL
Status: DISCONTINUED | OUTPATIENT
Start: 2021-02-25 | End: 2021-02-26 | Stop reason: HOSPADM

## 2021-02-25 RX ORDER — ALPRAZOLAM 0.5 MG/1
0.5 TABLET ORAL
Status: DISCONTINUED | OUTPATIENT
Start: 2021-02-25 | End: 2021-02-26 | Stop reason: HOSPADM

## 2021-02-25 RX ORDER — ACETAMINOPHEN 325 MG/1
650 TABLET ORAL EVERY 6 HOURS PRN
Status: DISCONTINUED | OUTPATIENT
Start: 2021-02-25 | End: 2021-02-26 | Stop reason: HOSPADM

## 2021-02-25 RX ORDER — ZINC SULFATE 50(220)MG
220 CAPSULE ORAL DAILY
Status: DISCONTINUED | OUTPATIENT
Start: 2021-02-26 | End: 2021-02-26 | Stop reason: HOSPADM

## 2021-02-25 RX ORDER — OMEGA-3S/DHA/EPA/FISH OIL/D3 300MG-1000
400 CAPSULE ORAL DAILY
Status: DISCONTINUED | OUTPATIENT
Start: 2021-02-26 | End: 2021-02-26 | Stop reason: HOSPADM

## 2021-02-25 RX ORDER — B-COMPLEX WITH VITAMIN C
1 TABLET ORAL DAILY
Status: DISCONTINUED | OUTPATIENT
Start: 2021-02-26 | End: 2021-02-26 | Stop reason: HOSPADM

## 2021-02-25 RX ORDER — ASPIRIN 325 MG
325 TABLET ORAL ONCE
Status: COMPLETED | OUTPATIENT
Start: 2021-02-25 | End: 2021-02-25

## 2021-02-25 RX ORDER — ISOSORBIDE MONONITRATE 30 MG/1
30 TABLET, EXTENDED RELEASE ORAL
Status: DISCONTINUED | OUTPATIENT
Start: 2021-02-25 | End: 2021-02-26 | Stop reason: HOSPADM

## 2021-02-25 RX ORDER — METOPROLOL SUCCINATE 25 MG/1
25 TABLET, EXTENDED RELEASE ORAL 3 TIMES DAILY
Status: DISCONTINUED | OUTPATIENT
Start: 2021-02-25 | End: 2021-02-26 | Stop reason: HOSPADM

## 2021-02-25 RX ORDER — NITROGLYCERIN 0.4 MG/1
0.4 TABLET SUBLINGUAL
Status: DISCONTINUED | OUTPATIENT
Start: 2021-02-25 | End: 2021-02-26 | Stop reason: HOSPADM

## 2021-02-25 RX ORDER — FINASTERIDE 5 MG/1
5 TABLET, FILM COATED ORAL
Status: DISCONTINUED | OUTPATIENT
Start: 2021-02-25 | End: 2021-02-26 | Stop reason: HOSPADM

## 2021-02-25 RX ORDER — TAMSULOSIN HYDROCHLORIDE 0.4 MG/1
0.4 CAPSULE ORAL
Status: DISCONTINUED | OUTPATIENT
Start: 2021-02-25 | End: 2021-02-26 | Stop reason: HOSPADM

## 2021-02-25 RX ORDER — CLOPIDOGREL BISULFATE 75 MG/1
75 TABLET ORAL
Status: DISCONTINUED | OUTPATIENT
Start: 2021-02-25 | End: 2021-02-26 | Stop reason: HOSPADM

## 2021-02-25 RX ORDER — ESCITALOPRAM OXALATE 10 MG/1
10 TABLET ORAL DAILY
Status: DISCONTINUED | OUTPATIENT
Start: 2021-02-26 | End: 2021-02-26 | Stop reason: HOSPADM

## 2021-02-25 RX ORDER — NITROGLYCERIN 0.4 MG/1
0.4 TABLET SUBLINGUAL ONCE
Status: COMPLETED | OUTPATIENT
Start: 2021-02-25 | End: 2021-02-25

## 2021-02-25 RX ADMIN — ALPRAZOLAM 0.5 MG: 0.5 TABLET ORAL at 22:58

## 2021-02-25 RX ADMIN — ISOSORBIDE MONONITRATE 30 MG: 30 TABLET, EXTENDED RELEASE ORAL at 22:55

## 2021-02-25 RX ADMIN — PRAVASTATIN SODIUM 40 MG: 40 TABLET ORAL at 22:55

## 2021-02-25 RX ADMIN — CLOPIDOGREL BISULFATE 75 MG: 75 TABLET ORAL at 22:55

## 2021-02-25 RX ADMIN — FINASTERIDE 5 MG: 5 TABLET, FILM COATED ORAL at 22:55

## 2021-02-25 RX ADMIN — TAMSULOSIN HYDROCHLORIDE 0.4 MG: 0.4 CAPSULE ORAL at 22:55

## 2021-02-25 RX ADMIN — METOPROLOL SUCCINATE 25 MG: 25 TABLET, EXTENDED RELEASE ORAL at 22:52

## 2021-02-25 RX ADMIN — NITROGLYCERIN 0.4 MG: 0.4 TABLET SUBLINGUAL at 17:33

## 2021-02-25 RX ADMIN — EZETIMIBE 10 MG: 10 TABLET ORAL at 22:55

## 2021-02-25 RX ADMIN — RANOLAZINE 500 MG: 500 TABLET, FILM COATED, EXTENDED RELEASE ORAL at 22:52

## 2021-02-25 RX ADMIN — ASPIRIN 325 MG: 325 TABLET ORAL at 17:35

## 2021-02-25 NOTE — ED PROVIDER NOTES
History  Chief Complaint   Patient presents with    Chest Pain     States started at 4 pm with chest pain, took 2 BASA and NTG x2 , last NTG at 1635  States NTG helped  EKG in progress     Patient has a significant cardiac history including open heart surgery, CABG, PTCAs  He states he had a busy day today and thinks he might have overdone it  After some exertion while he was essentially at rest around 1600 hours he developed pain in the left chest   Patient states the pain is unusual in different than he has experienced in the past   He can localize it with 1 finger  Does not have shortness of breath nausea or diaphoresis  Patient took nitroglycerin prior to arrival with improvement in the pain  He arrives awake alert, still having some pain in the area  Prior to Admission Medications   Prescriptions Last Dose Informant Patient Reported? Taking?    ALPRAZolam (XANAX) 0 5 mg tablet  Self Yes No   Sig: Take 0 5 mg by mouth daily at bedtime as needed    Calcium Carbonate-Vitamin D (CALCIUM 600+D PO)  Self Yes No   Sig: Take by mouth daily   Magnesium 250 MG TABS  Self Yes No   Sig: Take 1 tablet by mouth daily   cholecalciferol (VITAMIN D3) 400 units tablet  Self Yes No   Sig: Take 400 Units by mouth daily   clopidogrel (PLAVIX) 75 mg tablet   No No   Sig: Take 1 tablet (75 mg total) by mouth daily   dutasteride (AVODART) 0 5 mg capsule  Self No No   Sig: Take 1 capsule (0 5 mg total) by mouth daily   escitalopram (LEXAPRO) 10 mg tablet  Self Yes No   Sig: Take 10 mg by mouth daily   ezetimibe (ZETIA) 10 mg tablet  Self No No   Sig: Take 1 tablet (10 mg total) by mouth daily   famotidine (PEPCID) 20 mg tablet  Self Yes No   Sig: Take 20 mg by mouth daily     isosorbide mononitrate (IMDUR) 30 mg 24 hr tablet   No No   Sig: Take 1 tablet (30 mg total) by mouth daily   metoprolol succinate (TOPROL-XL) 25 mg 24 hr tablet   No No   Sig: Take 1 tablet (25 mg total) by mouth 3 (three) times a day multivitamin-iron-minerals-folic acid (CENTRUM) chewable tablet  Self Yes No   Sig: Chew 1 tablet daily   nitroglycerin (NITROSTAT) 0 4 mg SL tablet   No No   Sig: Place 1 tablet (0 4 mg total) under the tongue every 5 (five) minutes as needed for chest pain   ranolazine (RANEXA) 500 mg 12 hr tablet   No No   Sig: Take 1 tablet (500 mg total) by mouth 2 (two) times a day   rosuvastatin (CRESTOR) 5 mg tablet   No No   Sig: TAKE ONE TABLET BY MOUTH EVERY DAY   tamsulosin (Flomax) 0 4 mg  Self No No   Sig: Take 1 capsule (0 4 mg total) by mouth daily with dinner   zinc gluconate 50 mg tablet  Self Yes No   Sig: Take 25 mg by mouth daily  Facility-Administered Medications: None       Past Medical History:   Diagnosis Date    BPH (benign prostatic hyperplasia)     CAD (coronary artery disease)     Cardiac disease     Cervical spine fracture (HCC)     DVT (deep venous thrombosis) (HCC)     Fracture of lumbar spine (HCC)     Glaucoma     Hyperlipidemia     Hypertension     Myocardial infarct (HCC)     PUD (peptic ulcer disease)        Past Surgical History:   Procedure Laterality Date    CHOLECYSTECTOMY      CORONARY ARTERY BYPASS GRAFT      CORONARY ARTERY BYPASS GRAFT      CORONARY STENT PLACEMENT      EYE SURGERY      HERNIA REPAIR Right 11/3/2017    Procedure: REPAIR HERNIA INGUINAL;  Surgeon: Michael Rivera MD;  Location: [facility];  Service: General    DC CYSTOURETHROSCOPY W/IRRIG & EVAC CLOTS N/A 6/30/2018    Procedure: CYSTOSCOPY EVACUATION OF CLOTS, fulgeration;  Surgeon: Beck Diaz MD;  Location:  Main OR;  Service: Urology    STOMACH SURGERY      Billroth 2 gastrectomy    TRANSURETHRAL RESECTION OF PROSTATE      VENA CAVA FILTER PLACEMENT         Family History   Problem Relation Age of Onset    Coronary artery disease Brother      I have reviewed and agree with the history as documented      E-Cigarette/Vaping    E-Cigarette Use Never User      E-Cigarette/Vaping Substances     Social History     Tobacco Use    Smoking status: Never Smoker    Smokeless tobacco: Never Used   Substance Use Topics    Alcohol use: Never     Frequency: Never     Binge frequency: Never    Drug use: No       Review of Systems   Constitutional: Negative for chills and fever  HENT: Negative for congestion and sore throat  Eyes: Negative for visual disturbance  Respiratory: Negative for cough and shortness of breath  Cardiovascular: Positive for chest pain  Negative for palpitations and leg swelling  Gastrointestinal: Negative for abdominal pain and vomiting  Genitourinary: Negative for dysuria  Musculoskeletal: Negative for back pain  Skin: Negative for rash  Neurological: Negative for weakness and headaches  Hematological: Does not bruise/bleed easily  Psychiatric/Behavioral: Negative for confusion  All other systems reviewed and are negative  Physical Exam  Physical Exam  Vitals signs and nursing note reviewed  Constitutional:       Appearance: He is well-developed  HENT:      Head: Normocephalic  Eyes:      Pupils: Pupils are equal, round, and reactive to light  Neck:      Musculoskeletal: Normal range of motion  Cardiovascular:      Rate and Rhythm: Normal rate and regular rhythm  Heart sounds: Normal heart sounds  Pulmonary:      Effort: Pulmonary effort is normal       Breath sounds: Normal breath sounds  Abdominal:      Palpations: Abdomen is soft  Tenderness: There is no abdominal tenderness  Musculoskeletal: Normal range of motion  Right lower leg: He exhibits no tenderness  No edema  Left lower leg: He exhibits no tenderness  No edema  Skin:     General: Skin is warm and dry  Capillary Refill: Capillary refill takes less than 2 seconds  Neurological:      General: No focal deficit present  Mental Status: He is alert and oriented to person, place, and time     Psychiatric:         Mood and Affect: Mood normal          Behavior: Behavior normal          Vital Signs  ED Triage Vitals [02/25/21 1720]   Temperature Pulse Respirations Blood Pressure SpO2   98 °F (36 7 °C) 93 20 168/75 98 %      Temp Source Heart Rate Source Patient Position - Orthostatic VS BP Location FiO2 (%)   Temporal Monitor Lying Left arm --      Pain Score       6           Vitals:    02/25/21 1730 02/25/21 1800 02/25/21 1830 02/25/21 1900   BP: 168/75 121/68 136/61 131/63   Pulse: 88 84 86 72   Patient Position - Orthostatic VS:    Lying         Visual Acuity      ED Medications  Medications   aspirin tablet 325 mg (325 mg Oral Given 2/25/21 1735)   nitroglycerin (NITROSTAT) SL tablet 0 4 mg (0 4 mg Sublingual Given 2/25/21 1733)       Diagnostic Studies  Results Reviewed     Procedure Component Value Units Date/Time    Troponin I repeat in 3hrs [[id number]]     Lab Status: No result Specimen: Blood     NT-BNP PRO [[id number]]  (Abnormal) Collected: 02/25/21 1731    Lab Status: Final result Specimen: Blood from Arm, Left Updated: 02/25/21 1759     NT-proBNP 784 pg/mL     Protime-INR [[id number]]  (Normal) Collected: 02/25/21 1731    Lab Status: Final result Specimen: Blood from Arm, Left Updated: 02/25/21 1756     Protime 13 5 seconds      INR 1 04    APTT [[id number]]  (Normal) Collected: 02/25/21 1731    Lab Status: Final result Specimen: Blood from Arm, Left Updated: 02/25/21 1756     PTT 29 seconds     Troponin I [[id number]]  (Normal) Collected: 02/25/21 1731    Lab Status: Final result Specimen: Blood from Arm, Left Updated: 02/25/21 1756     Troponin I <0 02 ng/mL     Comprehensive metabolic panel [[id number]]  (Abnormal) Collected: 02/25/21 1731    Lab Status: Final result Specimen: Blood from Arm, Left Updated: 02/25/21 1754     Sodium 138 mmol/L      Potassium 4 2 mmol/L      Chloride 101 mmol/L      CO2 30 mmol/L      ANION GAP 7 mmol/L      BUN 27 mg/dL      Creatinine 1 23 mg/dL      Glucose 149 mg/dL      Calcium 8 3 mg/dL Corrected Calcium 8 8 mg/dL      AST 8 U/L      ALT 21 U/L      Alkaline Phosphatase 81 U/L      Total Protein 7 2 g/dL      Albumin 3 4 g/dL      Total Bilirubin 0 40 mg/dL      eGFR 51 ml/min/1 73sq m     Narrative:      [place] guidelines for Chronic Kidney Disease (CKD):     Stage 1 with normal or high GFR (GFR > 90 mL/min/1 73 square meters)    Stage 2 Mild CKD (GFR = 60-89 mL/min/1 73 square meters)    Stage 3A Moderate CKD (GFR = 45-59 mL/min/1 73 square meters)    Stage 3B Moderate CKD (GFR = 30-44 mL/min/1 73 square meters)    Stage 4 Severe CKD (GFR = 15-29 mL/min/1 73 square meters)    Stage 5 End Stage CKD (GFR <15 mL/min/1 73 square meters)  Note: GFR calculation is accurate only with a steady state creatinine    CBC and differential [[id number]]  (Abnormal) Collected: 02/25/21 1731    Lab Status: Final result Specimen: Blood from Arm, Left Updated: 02/25/21 1740     WBC 4 93 Thousand/uL      RBC 3 96 Million/uL      Hemoglobin 12 2 g/dL      Hematocrit 38 6 %      MCV 98 fL      MCH 30 8 pg      MCHC 31 6 g/dL      RDW 12 6 %      MPV 10 6 fL      Platelets 492 Thousands/uL      nRBC 0 /100 WBCs      Neutrophils Relative 60 %      Immat GRANS % 0 %      Lymphocytes Relative 28 %      Monocytes Relative 10 %      Eosinophils Relative 2 %      Basophils Relative 0 %      Neutrophils Absolute 2 92 Thousands/µL      Immature Grans Absolute 0 01 Thousand/uL      Lymphocytes Absolute 1 39 Thousands/µL      Monocytes Absolute 0 48 Thousand/µL      Eosinophils Absolute 0 12 Thousand/µL      Basophils Absolute 0 01 Thousands/µL                  XR chest 1 view portable    (Results Pending)              Procedures  ECG 12 Lead Documentation Only    Date/Time: 2/25/2021 5:17 PM  Performed by: Lorena Fiore MD  Authorized by: Lorena Fiore MD     Indications / Diagnosis:  Chest pain  ECG reviewed by me, the ED Provider: yes    Patient location:  ED  Interpretation: Interpretation: abnormal    Rate:     ECG rate:  95    ECG rate assessment: normal    Rhythm:     Rhythm: sinus rhythm    Ectopy:     Ectopy: none    QRS:     QRS axis:  Normal    QRS intervals:  Normal  Conduction:     Conduction: abnormal    ST segments:     ST segments:  Normal  T waves:     T waves: inverted      Inverted:  I, II, III, aVF, V6, V5 and V4  Q waves:     Q waves:  III             ED Course             HEART Risk Score      Most Recent Value   Heart Score Risk Calculator   History  1 Filed at: 02/25/2021 1903   ECG  1 Filed at: 02/25/2021 1903   Age  2 Filed at: 02/25/2021 1903   Risk Factors  2 Filed at: 02/25/2021 1903   Troponin  0 Filed at: 02/25/2021 1903   HEART Score  6 Filed at: 02/25/2021 1903                      SBIRT 22yo+      Most Recent Value   SBIRT (25 yo +)   In order to provide better care to our patients, we are screening all of our patients for alcohol and drug use  Would it be okay to ask you these screening questions? Yes Filed at: 02/25/2021 1737   Initial Alcohol Screen: US AUDIT-C    1  How often do you have a drink containing alcohol?  0 Filed at: 02/25/2021 1737   2  How many drinks containing alcohol do you have on a typical day you are drinking? 0 Filed at: 02/25/2021 1737   3a  Male UNDER 65: How often do you have five or more drinks on one occasion? 0 Filed at: 02/25/2021 1737   3b  FEMALE Any Age, or MALE 65+: How often do you have 4 or more drinks on one occassion? 0 Filed at: 02/25/2021 1737   Audit-C Score  0 Filed at: 02/25/2021 1737   STIVEN: How many times in the past year have you    Used an illegal drug or used a prescription medication for non-medical reasons? Never Filed at: 02/25/2021 1737                    MDM  Number of Diagnoses or Management Options  Diagnosis management comments: Patient has a high risk cardiac patient  Have discussed this case with Cardiology    Will admit to observation      Disposition  Final diagnoses:   Chest pain     Time reflects when diagnosis was documented in both MDM as applicable and the Disposition within this note     Time User Action Codes Description Comment    2/25/2021  7:45 PM Valerie Chun Add [R07 9] Chest pain       ED Disposition     ED Disposition Condition Date/Time Comment    Admit Stable Thu Feb 25, 2021  7:45 PM Case was discussed with hospitalist and the patient's admission status was agreed to be Admission Status: observation status to the service of Dr Justin Reddy   Follow-up Information    None         Patient's Medications   Discharge Prescriptions    No medications on file     No discharge procedures on file      PDMP Review     None          ED Provider  Electronically Signed by           Bren Malhotra MD  02/25/21 2552

## 2021-02-26 VITALS
BODY MASS INDEX: 24.43 KG/M2 | TEMPERATURE: 97.4 F | DIASTOLIC BLOOD PRESSURE: 62 MMHG | HEART RATE: 73 BPM | OXYGEN SATURATION: 97 % | WEIGHT: 146.61 LBS | HEIGHT: 65 IN | SYSTOLIC BLOOD PRESSURE: 135 MMHG | RESPIRATION RATE: 18 BRPM

## 2021-02-26 LAB
ANION GAP SERPL CALCULATED.3IONS-SCNC: 6 MMOL/L (ref 4–13)
BUN SERPL-MCNC: 23 MG/DL (ref 5–25)
CALCIUM SERPL-MCNC: 8.1 MG/DL (ref 8.3–10.1)
CHLORIDE SERPL-SCNC: 105 MMOL/L (ref 100–108)
CHOLEST SERPL-MCNC: 125 MG/DL (ref 50–200)
CO2 SERPL-SCNC: 29 MMOL/L (ref 21–32)
CREAT SERPL-MCNC: 0.93 MG/DL (ref 0.6–1.3)
ERYTHROCYTE [DISTWIDTH] IN BLOOD BY AUTOMATED COUNT: 12.4 % (ref 11.6–15.1)
EST. AVERAGE GLUCOSE BLD GHB EST-MCNC: 126 MG/DL
GFR SERPL CREATININE-BSD FRML MDRD: 72 ML/MIN/1.73SQ M
GLUCOSE P FAST SERPL-MCNC: 101 MG/DL (ref 65–99)
GLUCOSE SERPL-MCNC: 101 MG/DL (ref 65–140)
HBA1C MFR BLD: 6 %
HCT VFR BLD AUTO: 36.1 % (ref 36.5–49.3)
HDLC SERPL-MCNC: 34 MG/DL
HGB BLD-MCNC: 11.5 G/DL (ref 12–17)
LDLC SERPL CALC-MCNC: 68 MG/DL (ref 0–100)
MAGNESIUM SERPL-MCNC: 2.1 MG/DL (ref 1.6–2.6)
MCH RBC QN AUTO: 30.7 PG (ref 26.8–34.3)
MCHC RBC AUTO-ENTMCNC: 31.9 G/DL (ref 31.4–37.4)
MCV RBC AUTO: 97 FL (ref 82–98)
PLATELET # BLD AUTO: 116 THOUSANDS/UL (ref 149–390)
PMV BLD AUTO: 10.1 FL (ref 8.9–12.7)
POTASSIUM SERPL-SCNC: 3.9 MMOL/L (ref 3.5–5.3)
RBC # BLD AUTO: 3.74 MILLION/UL (ref 3.88–5.62)
SODIUM SERPL-SCNC: 140 MMOL/L (ref 136–145)
T4 FREE SERPL-MCNC: 1.04 NG/DL (ref 0.76–1.46)
TRIGL SERPL-MCNC: 113 MG/DL
TROPONIN I SERPL-MCNC: 0.02 NG/ML
TSH SERPL DL<=0.05 MIU/L-ACNC: 1.42 UIU/ML (ref 0.36–3.74)
WBC # BLD AUTO: 3.72 THOUSAND/UL (ref 4.31–10.16)

## 2021-02-26 PROCEDURE — 83735 ASSAY OF MAGNESIUM: CPT | Performed by: NURSE PRACTITIONER

## 2021-02-26 PROCEDURE — 99214 OFFICE O/P EST MOD 30 MIN: CPT | Performed by: INTERNAL MEDICINE

## 2021-02-26 PROCEDURE — 99217 PR OBSERVATION CARE DISCHARGE MANAGEMENT: CPT | Performed by: FAMILY MEDICINE

## 2021-02-26 PROCEDURE — 80061 LIPID PANEL: CPT | Performed by: NURSE PRACTITIONER

## 2021-02-26 PROCEDURE — 80048 BASIC METABOLIC PNL TOTAL CA: CPT | Performed by: NURSE PRACTITIONER

## 2021-02-26 PROCEDURE — 84484 ASSAY OF TROPONIN QUANT: CPT | Performed by: NURSE PRACTITIONER

## 2021-02-26 PROCEDURE — 83036 HEMOGLOBIN GLYCOSYLATED A1C: CPT | Performed by: NURSE PRACTITIONER

## 2021-02-26 PROCEDURE — 84443 ASSAY THYROID STIM HORMONE: CPT | Performed by: NURSE PRACTITIONER

## 2021-02-26 PROCEDURE — 85027 COMPLETE CBC AUTOMATED: CPT | Performed by: NURSE PRACTITIONER

## 2021-02-26 PROCEDURE — 84439 ASSAY OF FREE THYROXINE: CPT | Performed by: NURSE PRACTITIONER

## 2021-02-26 RX ORDER — METOPROLOL SUCCINATE 25 MG/1
25 TABLET, EXTENDED RELEASE ORAL 3 TIMES DAILY
Refills: 0 | Status: ON HOLD
Start: 2021-02-26 | End: 2021-11-03 | Stop reason: SDUPTHER

## 2021-02-26 RX ADMIN — RANOLAZINE 500 MG: 500 TABLET, FILM COATED, EXTENDED RELEASE ORAL at 09:47

## 2021-02-26 RX ADMIN — ZINC SULFATE 220 MG (50 MG) CAPSULE 220 MG: CAPSULE at 09:45

## 2021-02-26 RX ADMIN — METOPROLOL SUCCINATE 25 MG: 25 TABLET, EXTENDED RELEASE ORAL at 09:46

## 2021-02-26 RX ADMIN — MAGNESIUM OXIDE TAB 400 MG (241.3 MG ELEMENTAL MG) 400 MG: 400 (241.3 MG) TAB at 09:47

## 2021-02-26 RX ADMIN — CHOLECALCIFEROL (VITAMIN D3) 10 MCG (400 UNIT) TABLET 400 UNITS: at 09:46

## 2021-02-26 RX ADMIN — FAMOTIDINE 20 MG: 20 TABLET ORAL at 09:47

## 2021-02-26 RX ADMIN — ENOXAPARIN SODIUM 40 MG: 40 INJECTION SUBCUTANEOUS at 09:47

## 2021-02-26 RX ADMIN — ESCITALOPRAM OXALATE 10 MG: 10 TABLET ORAL at 09:47

## 2021-02-26 RX ADMIN — CALCIUM CARBONATE 500 MG (1,250 MG)-VITAMIN D3 200 UNIT TABLET 1 TABLET: at 09:48

## 2021-02-26 RX ADMIN — Medication 1 TABLET: at 09:45

## 2021-02-26 NOTE — ASSESSMENT & PLAN NOTE
Noted history  Status post partial gastrectomy  Continue Pepcid  Stopped aspirin about a year ago due to PUD

## 2021-02-26 NOTE — NURSING NOTE
Patient given instructions to follow up with doctors  Patient acknowledged instruction and stated will be following doctors orders

## 2021-02-26 NOTE — ED NOTES
Pt comfortably resting in room  Wife at bedside  No complaints at current time        Luciana Bruner RN  02/25/21 [id number]

## 2021-02-26 NOTE — ASSESSMENT & PLAN NOTE
Patient presents with chest pain in one spot of left chest initially and then shifted a little to the center,but now completed gone after taking Nitrostat x 3 and ASA  Patient reports chest pain was different compared to when he had MI, no associated nausea/SOB/diaphoresis/dizziness  Patient thinks it may be due to one of the dressings in salad he ate today  Patient chewed 2 baby aspirin and took 2 Nitro at home  Given full dose ASA and one more nitrostat in ED  Patient has extensive cardiac history  Status post CABG x 6 and stents with multiple cardiac catheterization in the past   EULA score 3  EKG in ED showed NSR, T-wave inversion in inferior leads, lateral leads,and V4  I,avL,V4 TWI appears new compared to prior EKG in 12/2019 available in epic  Initial troponin negative  Will trend troponin  Telemetry  Check lipid panel, A1c, TSH  ED physician discussed case with Cardiology on-call, recommended to admit for observation  Continue home cardiac medications  Consult Cardiology

## 2021-02-26 NOTE — ASSESSMENT & PLAN NOTE
Status post IVC filter placement  Not on [facility], [place], operated by [facility] currently

## 2021-02-26 NOTE — PLAN OF CARE
Problem: Potential for Falls  Goal: Patient will remain free of falls  Description: INTERVENTIONS:  - Assess patient frequently for physical needs  -  Identify cognitive and physical deficits and behaviors that affect risk of falls    -  [place] fall precautions as indicated by assessment   - Educate patient/family on patient safety including physical limitations  - Instruct patient to call for assistance with activity based on assessment  - Modify environment to reduce risk of injury  - Consider OT/PT consult to assist with strengthening/mobility  2/26/2021 1557 by Sulma Clemente RN  Outcome: Adequate for Discharge  2/26/2021 1556 by Sulma Clemente RN  Outcome: Adequate for Discharge     Problem: CARDIOVASCULAR - ADULT  Goal: Maintains optimal cardiac output and hemodynamic stability  Description: INTERVENTIONS:  - Monitor I/O, vital signs and rhythm  - Monitor for S/S and trends of decreased cardiac output  - Administer and titrate ordered vasoactive medications to optimize hemodynamic stability  - Assess quality of pulses, skin color and temperature  - Assess for signs of decreased coronary artery perfusion  - Instruct patient to report change in severity of symptoms  2/26/2021 1557 by Sulma Clemente RN  Outcome: Adequate for Discharge  2/26/2021 1556 by Sulma Clemente RN  Outcome: Adequate for Discharge  Goal: Absence of cardiac dysrhythmias or at baseline rhythm  Description: INTERVENTIONS:  - Continuous cardiac monitoring, vital signs, obtain 12 lead EKG if ordered  - Administer antiarrhythmic and heart rate control medications as ordered  - Monitor electrolytes and administer replacement therapy as ordered  2/26/2021 1557 by Sulma Clemente RN  Outcome: Adequate for Discharge  2/26/2021 1556 by Sulma Clemente RN  Outcome: Adequate for Discharge     Problem: PAIN - ADULT  Goal: Verbalizes/displays adequate comfort level or baseline comfort level  Description: Interventions:  - Encourage patient to monitor pain and request assistance  - Assess pain using appropriate pain scale  - Administer analgesics based on type and severity of pain and evaluate response  - Implement non-pharmacological measures as appropriate and evaluate response  - Consider cultural and social influences on pain and pain management  - Notify physician/advanced practitioner if interventions unsuccessful or patient reports new pain  2/26/2021 1557 by Ángela Burr RN  Outcome: Adequate for Discharge  2/26/2021 1556 by Ángela Burr RN  Outcome: Adequate for Discharge     Problem: INFECTION - ADULT  Goal: Absence or prevention of progression during hospitalization  Description: INTERVENTIONS:  - Assess and monitor for signs and symptoms of infection  - Monitor lab/diagnostic results  - Monitor all insertion sites, i e  indwelling lines, tubes, and drains  - Administer medications as ordered  - Instruct and encourage patient and family to use good hand hygiene technique  - Identify and instruct in appropriate isolation precautions for identified infection/condition  2/26/2021 1557 by Ángela Burr RN  Outcome: Adequate for Discharge  2/26/2021 1556 by Ángela Burr RN  Outcome: Adequate for Discharge  Goal: Absence of fever/infection during neutropenic period  Description: INTERVENTIONS:  - Monitor WBC    2/26/2021 1557 by Ángela Burr RN  Outcome: Adequate for Discharge  2/26/2021 1556 by Ángela Burr RN  Outcome: Adequate for Discharge     Problem: SAFETY ADULT  Goal: Patient will remain free of falls  Description: INTERVENTIONS:  - Assess patient frequently for physical needs  -  Identify cognitive and physical deficits and behaviors that affect risk of falls    -  [place] fall precautions as indicated by assessment   - Educate patient/family on patient safety including physical limitations  - Instruct patient to call for assistance with activity based on assessment  - Modify environment to reduce risk of injury  - Consider OT/PT consult to assist with strengthening/mobility  2/26/2021 1557 by Caleb Corado RN  Outcome: Adequate for Discharge  2/26/2021 1556 by Caleb Corado RN  Outcome: Adequate for Discharge  Goal: Maintain or return to baseline ADL function  Description: INTERVENTIONS:  -  Assess patient's ability to carry out ADLs; assess patient's baseline for ADL function and identify physical deficits which impact ability to perform ADLs (bathing, care of mouth/teeth, toileting, grooming, dressing, etc )  - Assess/evaluate cause of self-care deficits   - Assess range of motion  - Assess patient's mobility; develop plan if impaired  - Assess patient's need for assistive devices and provide as appropriate  - Encourage maximum independence but intervene and supervise when necessary  - Involve family in performance of ADLs  - Assess for home care needs following discharge   - Consider OT consult to assist with ADL evaluation and planning for discharge  - Provide patient education as appropriate  2/26/2021 1557 by Caleb Corado RN  Outcome: Adequate for Discharge  2/26/2021 1556 by Caleb Corado RN  Outcome: Adequate for Discharge     Problem: DISCHARGE PLANNING  Goal: Discharge to home or other facility with appropriate resources  Description: INTERVENTIONS:  - Identify barriers to discharge w/patient and caregiver  - Arrange for needed discharge resources and transportation as appropriate  - Identify discharge learning needs (meds, wound care, etc )    2/26/2021 1557 by Caleb Corado RN  Outcome: Adequate for Discharge  2/26/2021 1556 by Caleb Corado RN  Outcome: Adequate for Discharge     Problem: Knowledge Deficit  Goal: Patient/family/caregiver demonstrates understanding of disease process, treatment plan, medications, and discharge instructions  Description: Complete learning assessment and assess knowledge base    Interventions:  - Provide teaching at level of understanding  - Provide teaching via preferred learning methods  2/26/2021 1557 by Caleb Corado RN  Outcome: Adequate for Discharge  2/26/2021 1556 by Jose Angel Mann RN  Outcome: Adequate for Discharge     Problem: CARDIOVASCULAR - ADULT  Goal: Maintains optimal cardiac output and hemodynamic stability  Description: INTERVENTIONS:  - Monitor I/O, vital signs and rhythm  - Monitor for S/S and trends of decreased cardiac output  - Administer and titrate ordered vasoactive medications to optimize hemodynamic stability  - Assess quality of pulses, skin color and temperature  - Assess for signs of decreased coronary artery perfusion  - Instruct patient to report change in severity of symptoms  2/26/2021 1557 by Jose Angel Mann RN  Outcome: Adequate for Discharge  2/26/2021 1556 by Jose Angel Mann RN  Outcome: Adequate for Discharge     Problem: INFECTION - ADULT  Goal: Absence or prevention of progression during hospitalization  Description: INTERVENTIONS:  - Assess and monitor for signs and symptoms of infection  - Monitor lab/diagnostic results  - Monitor all insertion sites, i e  indwelling lines, tubes, and drains  - Administer medications as ordered  - Instruct and encourage patient and family to use good hand hygiene technique  - Identify and instruct in appropriate isolation precautions for identified infection/condition  2/26/2021 1557 by Jose Angel Mann RN  Outcome: Adequate for Discharge  2/26/2021 1556 by Jose Angel Mann RN  Outcome: Adequate for Discharge

## 2021-02-26 NOTE — UTILIZATION REVIEW
Initial Clinical Review    Admission: Date/Time/Statement:   Admission Orders (From admission, onward)     Ordered        02/25/21 1946  Place in Observation  Once                   Orders Placed This Encounter   Procedures    Place in Observation     Standing Status:   Standing     Number of Occurrences:   1     Order Specific Question:   Level of Care     Answer:   Med Surg [16]     ED Arrival Information     Expected Arrival Acuity Means of Arrival Escorted By Service Admission Type    - 2/25/2021 17:12 Emergent Walk-In Family Member Hospitalist Emergency    Arrival Complaint    Chest pain        Chief Complaint   Patient presents with    Chest Pain     States started at 4 pm with chest pain, took 2 BASA and NTG x2 , last NTG at 1635  States NTG helped  EKG in progress     Assessment/Plan: 80 y o  male with PMH of CAD, CHF, DVT, BPH, anemia, anxiety depression, hyperlipidemia, hypertension, PUD who presents with chest pain that started a few hours after eating salad this afternoon  He states he also had busy day today,he went shopping for 2 hours this morning  Patient states chest pain was located in one spot in left side of chest initially, then moved to center a little,felt different comparing to chest pain he had before when he had MI  Chest pain not associated with nausea, SOB, dizziness, diaphoresis at home  Patient reports taking 2 baby aspirin and 2 Nitrostat at home  Reports chest pain is completely gone right now  Patient thinks chest pain today might be secondary to the dressing in his salad today  He had similar reaction with this dressing in the past   He denies headache, dizziness, SOB, nausea, vomiting, diarrhea, constipation, fever or chills  Reports he wanted to go home in the ER but was told he needed to stay    Chest pain  Assessment & Plan  Patient presents with chest pain in one spot of left chest initially and then shifted a little to the center,but now completed gone after taking Nitrostat x 3 and ASA  Patient reports chest pain was different compared to when he had MI, no associated nausea/SOB/diaphoresis/dizziness  Patient thinks it may be due to one of the dressings in salad he ate today  Patient chewed 2 baby aspirin and took 2 Nitro at home  Given full dose ASA and one more nitrostat in ED  Patient has extensive cardiac history  Status post CABG x 6 and stents with multiple cardiac catheterization in the past   EULA score 3  EKG in ED showed NSR, T-wave inversion in inferior leads, lateral leads,and V4  I,avL,V4 TWI appears new compared to prior EKG in 12/2019 available in epic  Initial troponin negative  Will trend troponin  Telemetry  Check lipid panel, A1c, TSH  ED physician discussed case with Cardiology on-call, recommended to admit for observation  Continue home cardiac medications  Consult Cardiology       CAD (coronary artery disease)  Assessment & Plan  Status post CABG x6 and stents with multiple cardiac catheterization in the past   Patient has diffuse 3 vessel disease with poor targets on latest cardiac catheterization about one year ago per outpatient Cardiology note in 12/2020  Patient was recommended medical therapy  Patient has history of chronic stable angina due to above  Patient is prescribed metoprolol succinate 25mg p o  TID, Ranexa, Imdur, Plavix,statin at home  Patient only taking metoprolol 25mg p o  B i d  at home  Will increase to t i d   Will continue home medications  Consult Cardiology      Chronic diastolic heart failure [facility])  Assessment & Plan      Wt Readings from Last 3 Encounters:   02/25/21 69 5 kg (153 lb 3 5 oz)   12/15/20 68 9 kg (152 lb)   06/24/20 68 7 kg (151 lb 6 4 oz)         2D echo in 7/2020 showed normal EF  No signs of fluid overload  ProBNP 784  No edema on lower extremity  Chest x-ray appears unremarkable to me, pending final read  Patient is not on diuretic and ACEI at home     Continue metoprolol  Daily weight and I&Os        PUD (peptic ulcer disease)  Assessment & Plan  Noted history  Status post partial gastrectomy  Continue Pepcid  Stopped aspirin about a year ago due to PUD      Thrombocytopenia (HCC)  Assessment & Plan  Platelet 326  Chronic thrombocytopenia  Stable  Monitor      Anemia of chronic disease  Assessment & Plan  Noted history  Continue MVI  H&H stable today     Anxiety  Assessment & Plan  Continue Lexapro and Xanax p r n      Benign prostatic hyperplasia  Assessment & Plan  Continue Avodart and Flomax     Hyperlipidemia  Assessment & Plan  Continue statin      Hypertension  Assessment & Plan  Continue metoprolol  BP stable  Monitor     History of DVT (deep vein thrombosis)  Assessment & Plan  Status post IVC filter placement  [facility]  Anticipated Length of Stay:  Patient will be admitted on an Observation basis with an anticipated length of stay of  < 2 midnights     Justification for Hospital Stay:  Chest pain  ED Triage Vitals [02/25/21 1720]   Temperature Pulse Respirations Blood Pressure SpO2   98 °F (36 7 °C) 93 20 168/75 98 %      Temp Source Heart Rate Source Patient Position - Orthostatic VS BP Location FiO2 (%)   Temporal Monitor Lying Left arm --      Pain Score       6          Wt Readings from Last 1 Encounters:   02/25/21 66 5 kg (146 lb 9 7 oz)     Additional Vital Signs:   Pertinent Labs/Diagnostic Test Results:       Results from last 7 days   Lab Units 02/26/21  0552 02/25/21  1731   WBC Thousand/uL 3 72* 4 93   HEMOGLOBIN g/dL 11 5* 12 2   HEMATOCRIT % 36 1* 38 6   PLATELETS Thousands/uL 116* 133*   NEUTROS ABS Thousands/µL  --  2 92         Results from last 7 days   Lab Units 02/26/21  0552 02/25/21  1731   SODIUM mmol/L 140 138   POTASSIUM mmol/L 3 9 4 2   CHLORIDE mmol/L 105 101   CO2 mmol/L 29 30   ANION GAP mmol/L 6 7   BUN mg/dL 23 27*   CREATININE mg/dL 0 93 1 23   EGFR ml/min/1 73sq m 72 51   CALCIUM mg/dL 8 1* 8 3   MAGNESIUM mg/dL 2 1  --      Results from last 7 days   Lab Units 02/25/21  1731   AST U/L 8   ALT U/L 21   ALK PHOS U/L 81   TOTAL PROTEIN g/dL 7 2   ALBUMIN g/dL 3 4*   TOTAL BILIRUBIN mg/dL 0 40         Results from last 7 days   Lab Units 02/26/21  0552 02/25/21  1731   GLUCOSE RANDOM mg/dL 101 149*             No results found for: BETA-HYDROXYBUTYRATE                   Results from last 7 days   Lab Units 02/26/21  0552 02/25/21  2232 02/25/21 2032 02/25/21  1731   TROPONIN I ng/mL 0 02 <0 02 <0 02 <0 02         Results from last 7 days   Lab Units 02/25/21  1731   PROTIME seconds 13 5   INR  1 04   PTT seconds 29     Results from last 7 days   Lab Units 02/26/21  0552   TSH 3RD GENERATON uIU/mL 1 419                     Results from last 7 days   Lab Units 02/25/21  1731   NT-PRO BNP pg/mL 784*                                                                       ED Treatment:   Medication Administration from 02/25/2021 1712 to 02/25/2021 2100       Date/Time Order Dose Route Action Action by Comments     02/25/2021 1735 aspirin tablet 325 mg 325 mg Oral Given Livier Holden RN barcode ripped     02/25/2021 1733 nitroglycerin (NITROSTAT) SL tablet 0 4 mg 0 4 mg Sublingual Given Livier Holden RN         Past Medical History:   Diagnosis Date    BPH (benign prostatic hyperplasia)     CAD (coronary artery disease)     Cardiac disease     [facility] fracture [facility] )     DVT (deep venous thrombosis) (HCC)     Fracture of lumbar spine ([facility] )     Glaucoma     Hyperlipidemia     Hypertension     Myocardial infarct ([facility] )     PUD (peptic ulcer disease)      Present on Admission:   Hypertension   Hyperlipidemia   PUD (peptic ulcer disease)   Chronic diastolic heart failure (HCC)   Chest pain   Benign prostatic hyperplasia   Anxiety   Anemia of chronic disease      Admitting Diagnosis: Chest pain [R07 9]  Age/Sex: 80 y o  male  Admission Orders:  Scheduled Medications:  calcium carbonate-vitamin D, 1 tablet, Oral, Daily  cholecalciferol, 400 Units, Oral, Daily  clopidogrel, 75 mg, Oral, After Dinner  enoxaparin, 40 mg, Subcutaneous, Daily  escitalopram, 10 mg, Oral, Daily  ezetimibe, 10 mg, Oral, HS  famotidine, 20 mg, Oral, Daily  finasteride, 5 mg, Oral, After Dinner  isosorbide mononitrate, 30 mg, Oral, HS  magnesium oxide, 400 mg, Oral, Daily  metoprolol succinate, 25 mg, Oral, TID  multivitamin-minerals, 1 tablet, Oral, Daily  pravastatin, 40 mg, Oral, Daily With Dinner  ranolazine, 500 mg, Oral, BID  tamsulosin, 0 4 mg, Oral, Daily With Dinner  zinc sulfate, 220 mg, Oral, Daily      Continuous IV Infusions:     PRN Meds:  acetaminophen, 650 mg, Oral, Q6H PRN  ALPRAZolam, 0 5 mg, Oral, HS PRN  nitroglycerin, 0 4 mg, Sublingual, Q5 Min PRN  ondansetron, 4 mg, Intravenous, Q6H PRN        IP CONSULT TO CARDIOLOGY    Network Utilization Review Department  ATTENTION: Please call with any questions or concerns to [phone] and carefully listen to the prompts so that you are directed to the right person  All voicemails are confidential   Elvin Carvalho all requests for admission clinical reviews, approved or denied determinations and any other requests to dedicated fax number below belonging to the campus where the patient is receiving treatment  List of dedicated fax numbers for the Facilities:  [address] DENIALS (Administrative/Medical Necessity) [phone]   [address] (Maternity/NICU/Pediatrics) [phone]   [address] Kenzie Limon 2146 (  [place] "Zabrina" 103) [address] [address]ier 1481 [phone]   [address] [place] [address]   [address] [place] [address] [phone]

## 2021-02-26 NOTE — ASSESSMENT & PLAN NOTE
Status post CABG x6 and stents with multiple cardiac catheterization in the past   Patient has diffuse 3 vessel disease with poor targets on latest cardiac catheterization about one year ago per outpatient Cardiology note in 12/2020  Patient was recommended medical therapy  Patient has history of chronic stable angina due to above  Patient is prescribed metoprolol succinate 25mg p o  TID, Ranexa, Imdur, Plavix,statin at home  Patient only taking metoprolol 25mg p o  B i d  at home  Will increase to t i d   Will continue home medications  Consult Cardiology

## 2021-02-26 NOTE — ASSESSMENT & PLAN NOTE
Wt Readings from Last 3 Encounters:   02/25/21 69 5 kg (153 lb 3 5 oz)   12/15/20 68 9 kg (152 lb)   06/24/20 68 7 kg (151 lb 6 4 oz)       2D echo in 7/2020 showed normal EF  No signs of fluid overload  ProBNP 784  No edema on lower extremity  Chest x-ray appears unremarkable to me, pending final read  Patient is not on diuretic and ACEI at home     Continue metoprolol  Daily weight and I&Os

## 2021-02-26 NOTE — PLAN OF CARE
Problem: Potential for Falls  Goal: Patient will remain free of falls  Description: INTERVENTIONS:  - Assess patient frequently for physical needs  -  Identify cognitive and physical deficits and behaviors that affect risk of falls    -  [place] fall precautions as indicated by assessment   - Educate patient/family on patient safety including physical limitations  - Instruct patient to call for assistance with activity based on assessment  - Modify environment to reduce risk of injury  - Consider OT/PT consult to assist with strengthening/mobility  2/25/2021 2241 by Melissa Alonzo RN  Outcome: Progressing  2/25/2021 2238 by Melissa Alonzo RN  Outcome: Progressing     Problem: CARDIOVASCULAR - ADULT  Goal: Maintains optimal cardiac output and hemodynamic stability  Description: INTERVENTIONS:  - Monitor I/O, vital signs and rhythm  - Monitor for S/S and trends of decreased cardiac output  - Administer and titrate ordered vasoactive medications to optimize hemodynamic stability  - Assess quality of pulses, skin color and temperature  - Assess for signs of decreased coronary artery perfusion  - Instruct patient to report change in severity of symptoms  2/25/2021 2241 by Melissa Alonzo RN  Outcome: Progressing  2/25/2021 2238 by Melissa Alonzo RN  Outcome: Progressing  Goal: Absence of cardiac dysrhythmias or at baseline rhythm  Description: INTERVENTIONS:  - Continuous cardiac monitoring, vital signs, obtain 12 lead EKG if ordered  - Administer antiarrhythmic and heart rate control medications as ordered  - Monitor electrolytes and administer replacement therapy as ordered  2/25/2021 2241 by Melissa Alonzo RN  Outcome: Progressing  2/25/2021 2238 by Melissa Alonzo RN  Outcome: Progressing     Problem: PAIN - ADULT  Goal: Verbalizes/displays adequate comfort level or baseline comfort level  Description: Interventions:  - Encourage patient to monitor pain and request assistance  - Assess pain using appropriate pain scale  - Administer analgesics based on type and severity of pain and evaluate response  - Implement non-pharmacological measures as appropriate and evaluate response  - Consider cultural and social influences on pain and pain management  - Notify physician/advanced practitioner if interventions unsuccessful or patient reports new pain  Outcome: Progressing     Problem: INFECTION - ADULT  Goal: Absence or prevention of progression during hospitalization  Description: INTERVENTIONS:  - Assess and monitor for signs and symptoms of infection  - Monitor lab/diagnostic results  - Monitor all insertion sites, i e  indwelling lines, tubes, and drains  - Administer medications as ordered  - Instruct and encourage patient and family to use good hand hygiene technique  - Identify and instruct in appropriate isolation precautions for identified infection/condition  Outcome: Progressing  Goal: Absence of fever/infection during neutropenic period  Description: INTERVENTIONS:  - Monitor WBC    Outcome: Progressing     Problem: SAFETY ADULT  Goal: Patient will remain free of falls  Description: INTERVENTIONS:  - Assess patient frequently for physical needs  -  Identify cognitive and physical deficits and behaviors that affect risk of falls    -  [place] fall precautions as indicated by assessment   - Educate patient/family on patient safety including physical limitations  - Instruct patient to call for assistance with activity based on assessment  - Modify environment to reduce risk of injury  - Consider OT/PT consult to assist with strengthening/mobility  2/25/2021 2241 by Anatoly Proctor, RN  Outcome: Progressing  2/25/2021 2238 by Anatoly Proctor, RN  Outcome: Progressing  Goal: Maintain or return to baseline ADL function  Description: INTERVENTIONS:  -  Assess patient's ability to carry out ADLs; assess patient's baseline for ADL function and identify physical deficits which impact ability to perform ADLs (bathing, care of mouth/teeth, toileting, grooming, dressing, etc )  - Assess/evaluate cause of self-care deficits   - Assess range of motion  - Assess patient's mobility; develop plan if impaired  - Assess patient's need for assistive devices and provide as appropriate  - Encourage maximum independence but intervene and supervise when necessary  - Involve family in performance of ADLs  - Assess for home care needs following discharge   - Consider OT consult to assist with ADL evaluation and planning for discharge  - Provide patient education as appropriate  Outcome: Progressing     Problem: DISCHARGE PLANNING  Goal: Discharge to home or other facility with appropriate resources  Description: INTERVENTIONS:  - Identify barriers to discharge w/patient and caregiver  - Arrange for needed discharge resources and transportation as appropriate  - Identify discharge learning needs (meds, wound care, etc )    Outcome: Progressing     Problem: Knowledge Deficit  Goal: Patient/family/caregiver demonstrates understanding of disease process, treatment plan, medications, and discharge instructions  Description: Complete learning assessment and assess knowledge base    Interventions:  - Provide teaching at level of understanding  - Provide teaching via preferred learning methods  Outcome: Progressing

## 2021-02-26 NOTE — DISCHARGE SUMMARY
Discharge Summary - [facility]    Patient Information: Ange Azevedo 80 y o  male MRN: [id number]  Unit/Bed#: [address] [place] [address] Encounter: [id number]    Discharging Physician / Practitioner: Jono Montano DO  PCP: Torrey Mejia DO  Admission Date: 2/25/2021  Discharge Date: 02/26/21    Reason for Admission: Chest Pain (States started at 4 pm with chest pain, took 2 BASA and NTG x2 , last NTG at 1635  States NTG helped  EKG in progress)      Discharge Diagnoses:     Principal Problem:    Chest pain  Active Problems:    History of DVT (deep vein thrombosis)    PUD (peptic ulcer disease)    Hypertension    Hyperlipidemia    Chronic diastolic heart failure (HCC)    Benign prostatic hyperplasia    Anxiety    Anemia of chronic disease    CAD (coronary artery disease)    Thrombocytopenia (HCC)  Resolved Problems:    * No resolved hospital problems  *        * Chest pain  Assessment & Plan  Patient presented with chest pain in one spot of left chest initially and then shifted a little to the center which resolved after taking Nitrostat x 3 and ASA  Reported chest pain was different compared to when he had MI, no associated nausea/SOB/diaphoresis/dizziness  Patient chewed 2 baby aspirin and took 2 Nitro at home  Given full dose ASA and one more nitrostat in ED  Patient has extensive cardiac history  Status post CABG x 6 and stents with multiple cardiac catheterization in the past   EULA score 3  EKG in ED showed NSR, T-wave inversion in inferior leads, lateral leads,and V4  I,avL,V4 TWI appears new compared to prior EKG in 12/2019 available in [facility]  Troponins negative  lipid panel - LDL 68, A1c 6, TSH within normal limits  Continue home cardiac medications  Thrombocytopenia (HCC)  Assessment & Plan  Platelet 056 --> 413  Chronic thrombocytopenia    Stable    CAD (coronary artery disease)  Assessment & Plan  Status post CABG x6 and stents with multiple cardiac catheterization in the past   Patient has diffuse 3 vessel disease with poor targets on latest cardiac catheterization about one year ago per outpatient Cardiology note in 12/2020  Patient was recommended medical therapy  Patient has history of chronic stable angina due to above  Continue Toprol-XL 25mg p o  TID, Ranexa, Imdur, Plavix, statin at home  continue home medications per cardiology and follow-up with primary cardiologist after discharge  Anemia of chronic disease  Assessment & Plan  H&H stable today    Anxiety  Assessment & Plan  Continue Lexapro and Xanax p r n    Benign prostatic hyperplasia  Assessment & Plan  Continue Avodart and Flomax  Chronic diastolic heart failure (HCC)  Assessment & Plan  Wt Readings from Last 3 Encounters:   02/26/21 66 5 kg (146 lb 9 7 oz)   12/15/20 68 9 kg (152 lb)   06/24/20 68 7 kg (151 lb 6 4 oz)     2D echo in 7/2020 showed normal EF  No signs of fluid overload  Chest x-ray negative for acute pathology  Patient is not on diuretic and ACEI at home  Continue metoprolol      Hyperlipidemia  Assessment & Plan  Continue statin, zetia    Hypertension  Assessment & Plan  Continue Toprol-XL t i d  Per Cardiology    PUD (peptic ulcer disease)  Assessment & Plan  Noted history  Status post partial gastrectomy  Continue Pepcid  Stopped aspirin about a year ago due to PUD  History of DVT (deep vein thrombosis)  Assessment & Plan  Status post IVC filter placement  Not on [facility] currently      Consultations During Hospital Stay:  IP CONSULT TO CARDIOLOGY    Procedures Performed:     · none    Significant Findings:     · Refer to hospital course and above listed diagnosis related plan for details    Imaging while in hospital:    Xr Chest 1 View Portable    Result Date: 2/26/2021  Narrative: CHEST INDICATION:   Chest pain  COMPARISON:  11/29/2019 EXAM PERFORMED/VIEWS:  XR CHEST PORTABLE  AP semierect Images:  1 FINDINGS:  There are median sternotomy wires indicating prior cardiac surgery   Cardiomediastinal silhouette appears unremarkable  The lungs are clear  No pneumothorax or pleural effusion  Osseous structures appear within normal limits for patient age  Impression: No acute cardiopulmonary disease  Workstation performed: [id number]       Incidental Findings:   · none    Test Results Pending at Discharge (will require follow up):   · As per After Visit Summary     Outpatient Tests Requested:  · none    Complications:  Refer to hospital course and above listed diagnosis related plan, if any    Hospital Course:     Dawit Browne is a 80 y o  male patient who originally presented to the hospital on 2/25/2021 due to chest pain that started a few hours after eating salad with [place] dressing  Denies any shortness of breath, diaphoresis  He took 2 baby aspirin and 2 Nitrostat at home with resolution of symptoms  Patient was admitted and seen by Cardiology and cleared by Cardiology prior to discharge  Discharge plan discussed with patient and his wife over the phone    Please see above list of diagnoses and related plan for additional information  Condition at Discharge: stable     Discharge Day Visit / Exam:     Subjective:  Denies any chest pain, shortness of breath  Feels well and wants to go home    Vitals: Blood Pressure: 135/62 (02/26/21 0731)  Pulse: 73 (02/26/21 0731)  Temperature: (!) 97 4 °F (36 3 °C) (02/26/21 0731)  Temp Source: Oral (02/26/21 0900)  Respirations: 18 (02/26/21 0731)  Height: 5' 5" (165 1 cm) (02/26/21 0731)  Weight - Scale: 66 5 kg (146 lb 9 7 oz) (02/26/21 0731)  SpO2: 97 % (02/26/21 0731)  Exam:   Physical Exam  Constitutional:       General: He is not in acute distress  Appearance: He is not diaphoretic  HENT:      Head: Normocephalic and atraumatic  Eyes:      General:         Right eye: No discharge  Left eye: No discharge  Cardiovascular:      Rate and Rhythm: Normal rate and regular rhythm  Heart sounds: Murmur present     Pulmonary:      Effort: Pulmonary effort is normal  No respiratory distress  Breath sounds: Normal breath sounds  No wheezing or rales  Abdominal:      General: Bowel sounds are normal  There is no distension  Palpations: Abdomen is soft  Tenderness: There is no abdominal tenderness  Musculoskeletal:      Right lower leg: No edema  Left lower leg: No edema  Neurological:      Mental Status: He is alert and oriented to person, place, and time  Discharge instructions/Information to patient and family:(Discharge Medications and Follow up):   See after visit summary for information provided to patient and family  Provisions for Follow-Up Care:  See after visit summary for information related to follow-up care and any pertinent home health orders  Disposition: Home    Planned Readmission:  No     Discharge Statement:  I spent 25 minutes discharging the patient  This time was spent on the day of discharge  I had direct contact with the patient on the day of discharge  Greater than 50% of the total time was spent examining patient, answering all patient questions, arranging and discussing plan of care with patient as well as directly providing post-discharge instructions  Additional time then spent on discharge activities  Discharge Medications:  See after visit summary for reconciled discharge medications provided to patient and family  ** Please Note: "This note has been constructed using a voice recognition system  Therefore there may be syntax, spelling, and/or grammatical errors   Please call if you have any questions  "**

## 2021-02-26 NOTE — PLAN OF CARE
Problem: Potential for Falls  Goal: Patient will remain free of falls  Description: INTERVENTIONS:  - Assess patient frequently for physical needs  -  Identify cognitive and physical deficits and behaviors that affect risk of falls    -  [place] fall precautions as indicated by assessment   - Educate patient/family on patient safety including physical limitations  - Instruct patient to call for assistance with activity based on assessment  - Modify environment to reduce risk of injury  - Consider OT/PT consult to assist with strengthening/mobility  Outcome: Progressing     Problem: CARDIOVASCULAR - ADULT  Goal: Maintains optimal cardiac output and hemodynamic stability  Description: INTERVENTIONS:  - Monitor I/O, vital signs and rhythm  - Monitor for S/S and trends of decreased cardiac output  - Administer and titrate ordered vasoactive medications to optimize hemodynamic stability  - Assess quality of pulses, skin color and temperature  - Assess for signs of decreased coronary artery perfusion  - Instruct patient to report change in severity of symptoms  Outcome: Progressing  Goal: Absence of cardiac dysrhythmias or at baseline rhythm  Description: INTERVENTIONS:  - Continuous cardiac monitoring, vital signs, obtain 12 lead EKG if ordered  - Administer antiarrhythmic and heart rate control medications as ordered  - Monitor electrolytes and administer replacement therapy as ordered  Outcome: Progressing

## 2021-02-26 NOTE — H&P
H&P- Nataliya Clifton 8/2/1930, 80 y o  male MRN: [id number]    Unit/Bed#: [address] [place] [address] Encounter: [id number]    Primary Care Provider: Carlos Pedraza, DO   Date and time admitted to hospital: 2/25/2021  5:13 PM        * Chest pain  Assessment & Plan  Patient presents with chest pain in one spot of left chest initially and then shifted a little to the center,but now completed gone after taking Nitrostat x 3 and ASA  Patient reports chest pain was different compared to when he had MI, no associated nausea/SOB/diaphoresis/dizziness  Patient thinks it may be due to one of the dressings in salad he ate today  Patient chewed 2 baby aspirin and took 2 Nitro at home  Given full dose ASA and one more nitrostat in ED  Patient has extensive cardiac history  Status post CABG x 6 and stents with multiple cardiac catheterization in the past   EULA score 3  EKG in ED showed NSR, T-wave inversion in inferior leads, lateral leads,and V4  I,avL,V4 TWI appears new compared to prior EKG in 12/2019 available in epic  Initial troponin negative  Will trend troponin  Telemetry  Check lipid panel, A1c, TSH  ED physician discussed case with Cardiology on-call, recommended to admit for observation  Continue home cardiac medications  Consult Cardiology  CAD (coronary artery disease)  Assessment & Plan  Status post CABG x6 and stents with multiple cardiac catheterization in the past   Patient has diffuse 3 vessel disease with poor targets on latest cardiac catheterization about one year ago per outpatient Cardiology note in 12/2020  Patient was recommended medical therapy  Patient has history of chronic stable angina due to above  Patient is prescribed metoprolol succinate 25mg p o  TID, Ranexa, Imdur, Plavix,statin at home  Patient only taking metoprolol 25mg p o  B i d  at home  Will increase to t i d   Will continue home medications  Consult Cardiology      Chronic diastolic heart failure ([facility] [address] )  Assessment & Plan  Wt Readings from Last 3 Encounters:   02/25/21 69 5 kg (153 lb 3 5 oz)   12/15/20 68 9 kg (152 lb)   06/24/20 68 7 kg (151 lb 6 4 oz)       2D echo in 7/2020 showed normal EF  No signs of fluid overload  ProBNP 784  No edema on lower extremity  Chest x-ray appears unremarkable to me, pending final read  Patient is not on diuretic and ACEI at home  Continue metoprolol  Daily weight and I&Os      PUD (peptic ulcer disease)  Assessment & Plan  Noted history  Status post partial gastrectomy  Continue Pepcid  Stopped aspirin about a year ago due to PUD  Thrombocytopenia (HCC)  Assessment & Plan  Platelet 733  Chronic thrombocytopenia  Stable  Monitor     Anemia of chronic disease  Assessment & Plan  Noted history  Continue MVI  H&H stable today    Anxiety  Assessment & Plan  Continue Lexapro and Xanax p r n  Benign prostatic hyperplasia  Assessment & Plan  Continue Avodart and Flomax    Hyperlipidemia  Assessment & Plan  Continue statin  Hypertension  Assessment & Plan  Continue metoprolol  BP stable  Monitor    History of DVT (deep vein thrombosis)  Assessment & Plan  Status post IVC filter placement  Not on [facility] [place] currently  VTE Prophylaxis: Enoxaparin (Lovenox)  / reason for no mechanical VTE prophylaxis on Lovenox   Code Status:  Full code  POLST: POLST form is not discussed and not completed at this time  Anticipated Length of Stay:  Patient will be admitted on an Observation basis with an anticipated length of stay of  < 2 midnights  Justification for Hospital Stay:  Chest pain    Total Time for Visit, including Counseling / Coordination of Care: 45 minutes  Greater than 50% of this total time spent on direct patient counseling and coordination of care      Chief Complaint:   Chest pain     History of Present Illness:    Tonya Claros is a 80 y o  male with PMH of CAD, CHF, DVT, BPH, anemia, anxiety depression, hyperlipidemia, hypertension, PUD who presents with chest pain that started a few hours after eating salad this afternoon  He states he also had busy day today,he went shopping for 2 hours this morning  Patient states chest pain was located in one spot in left side of chest initially, then moved to center a little,felt different comparing to chest pain he had before when he had MI  Chest pain not associated with nausea, SOB, dizziness, diaphoresis at home  Patient reports taking 2 baby aspirin and 2 Nitrostat at home  Reports chest pain is completely gone right now  Patient thinks chest pain today might be secondary to the dressing in his salad today  He had similar reaction with this dressing in the past   He denies headache, dizziness, SOB, nausea, vomiting, diarrhea, constipation, fever or chills  Reports he wanted to go home in the ER but was told he needed to stay  Review of Systems:    Review of Systems   Cardiovascular: Positive for chest pain  All other systems reviewed and are negative        Past Medical and Surgical History:     Past Medical History:   Diagnosis Date    BPH (benign prostatic hyperplasia)     CAD (coronary artery disease)     Cardiac disease     Cervical spine fracture (HCC)     DVT (deep venous thrombosis) (HCC)     Fracture of lumbar spine (HCC)     Glaucoma     Hyperlipidemia     Hypertension     Myocardial infarct ([facility] [address] )     PUD (peptic ulcer disease)        Past Surgical History:   Procedure Laterality Date    CHOLECYSTECTOMY      CORONARY ARTERY BYPASS GRAFT      CORONARY ARTERY BYPASS GRAFT      CORONARY STENT PLACEMENT      EYE SURGERY      HERNIA REPAIR Right 11/3/2017    Procedure: REPAIR HERNIA INGUINAL;  Surgeon: Monica Esparza MD;  Location: [address] [place] [address];  Service: General    W[place] CYSTOURETHROSCOPY W/IRRIG & EVAC CLOTS N/A 6/30/2018    Procedure: CYSTOSCOPY EVACUATION OF CLOTS, fulgeration;  Surgeon: Perry Mora MD;  [facility]: [facility];  Service: Urology    STOMACH SURGERY      [place] 2 gastrectomy    TRANSURETHRAL RESECTION OF PROSTATE      VENA CAVA FILTER PLACEMENT         Meds/Allergies:    Prior to Admission medications    Medication Sig Start Date End Date Taking?  Authorizing Provider   ALPRAZolam Wilber Tolu) 0 5 mg tablet Take 0 5 mg by mouth daily at bedtime as needed  7/19/18  Yes Historical Provider, MD   Calcium Carbonate-Vitamin D (CALCIUM 600+D PO) Take by mouth daily   Yes Historical Provider, MD   cholecalciferol (VITAMIN D3) 400 units tablet Take 400 Units by mouth daily   Yes Historical Provider, MD   clopidogrel (PLAVIX) 75 mg tablet Take 1 tablet (75 mg total) by mouth daily 12/9/20  Yes Eliana Beckham MD   dutasteride (AVODART) 0 5 mg capsule Take 1 capsule (0 5 mg total) by mouth daily  Patient taking differently: Take 0 5 mg by mouth daily  5/26/20  Yes Carrie Rivas PA-C   escitalopram (LEXAPRO) 10 mg tablet Take 10 mg by mouth daily   Yes Historical Provider, MD   ezetimibe (ZETIA) 10 mg tablet Take 1 tablet (10 mg total) by mouth daily 4/22/19  Yes Eliana Beckham MD   famotidine (PEPCID) 20 mg tablet Take 20 mg by mouth daily     Yes Historical Provider, MD   isosorbide mononitrate (IMDUR) 30 mg 24 hr tablet Take 1 tablet (30 mg total) by mouth daily 8/11/20  Yes Eliana Beckham MD   Magnesium 250 MG TABS Take 1 tablet by mouth daily   Yes Historical Provider, MD   metoprolol succinate (TOPROL-XL) 25 mg 24 hr tablet Take 1 tablet (25 mg total) by mouth 3 (three) times a day  Patient taking differently: Take 25 mg by mouth 2 (two) times a day  12/15/20  Yes Eliana Beckham MD   multivitamin-iron-minerals-folic acid (CENTRUM) chewable tablet Chew 1 tablet daily   Yes Historical Provider, MD   nitroglycerin (NITROSTAT) 0 4 mg SL tablet Place 1 tablet (0 4 mg total) under the tongue every 5 (five) minutes as needed for chest pain 12/15/20  Yes Eliana Beckham MD   ranolazine (RANEXA) 500 mg 12 hr tablet Take 1 tablet (500 mg total) by mouth 2 (two) times a day 1/4/21  Yes Eliana Beckham MD rosuvastatin (CRESTOR) 5 mg tablet TAKE ONE TABLET BY MOUTH EVERY DAY 1/13/21  Yes Zita Garcia MD   tamsulosin (Flomax) 0 4 mg Take 1 capsule (0 4 mg total) by mouth daily with dinner 5/26/20  Yes Jeanette Nix PA-C   zinc gluconate 50 mg tablet Take 25 mg by mouth daily  Yes Historical Provider, MD     I have reviewed home medications with patient personally  Allergies: Allergies   Allergen Reactions    Oxycodone-Acetaminophen Dizziness and Shortness Of Breath     Other reaction(s): Faints  Reaction Date: [id number]; Category: Adverse Reaction;     Omeprazole Other (See Comments)     pash    Statins Other (See Comments)     Muscle pain       Social History:     Marital Status: /Civil Union   Occupation:  Retired  Patient Pre-hospital Living Situation:  Lives with wife  Patient Pre-hospital Level of Mobility:  Independent  Patient Pre-hospital Diet Restrictions:  Low-sodium diet  Substance Use History:   Social History     Substance and Sexual Activity   Alcohol Use Never    Frequency: Never    Binge frequency: Never     Social History     Tobacco Use   Smoking Status Never Smoker   Smokeless Tobacco Never Used     Social History     Substance and Sexual Activity   Drug Use No       Family History:    Family History   Problem Relation Age of Onset    Coronary artery disease Brother        Physical Exam:     Vitals:   Blood Pressure: 153/80 (02/25/21 2114)  Pulse: 78 (02/25/21 2208)  Temperature: 98 6 °F (37 °C) (02/25/21 2208)  Temp Source: Oral (02/25/21 2114)  Respirations: 18 (02/25/21 2114)  Height: 5' 5" (165 1 cm) (02/25/21 2114)  Weight - Scale: 66 5 kg (146 lb 9 7 oz) (02/25/21 2114)  SpO2: 97 % (02/25/21 2208)    Physical Exam  Vitals signs and nursing note reviewed  Constitutional:       Appearance: He is well-developed  HENT:      Head: Normocephalic and atraumatic  Neck:      Musculoskeletal: Neck supple  Thyroid: No thyromegaly  Vascular: No JVD  Trachea: No tracheal deviation  Cardiovascular:      Rate and Rhythm: Normal rate and regular rhythm  Heart sounds: No murmur  Pulmonary:      Effort: Pulmonary effort is normal  No respiratory distress  Breath sounds: No wheezing or rales  Comments: Diminished breath sound bilateral, on room air, respirations easy  Abdominal:      General: Bowel sounds are normal  There is no distension  Palpations: Abdomen is soft  Tenderness: There is no abdominal tenderness  There is no guarding  Musculoskeletal:         General: No swelling or deformity  Right lower leg: No edema  Left lower leg: No edema  Skin:     General: Skin is warm and dry  Neurological:      General: No focal deficit present  Mental Status: He is alert and oriented to person, place, and time  Psychiatric:         Mood and Affect: Mood normal          Judgment: Judgment normal          Additional Data:     Lab Results: I have personally reviewed pertinent reports  Results from last 7 days   Lab Units 02/25/21  1731   WBC Thousand/uL 4 93   HEMOGLOBIN g/dL 12 2   HEMATOCRIT % 38 6   PLATELETS Thousands/uL 133*   NEUTROS PCT % 60   LYMPHS PCT % 28   MONOS PCT % 10   EOS PCT % 2     Results from last 7 days   Lab Units 02/25/21  1731   POTASSIUM mmol/L 4 2   CHLORIDE mmol/L 101   CO2 mmol/L 30   BUN mg/dL 27*   CREATININE mg/dL 1 23   CALCIUM mg/dL 8 3   ALK PHOS U/L 81   ALT U/L 21   AST U/L 8     Results from last 7 days   Lab Units 02/25/21  1731   INR  1 04       Imaging: I have personally reviewed pertinent reports  No results found  EKG, Pathology, and Other Studies Reviewed on Admission:   · EKG:  Normal sinus rhythm    Allscripts Records Reviewed: Yes     ** Please Note: Dragon 360 Dictation voice to text software may have been used in the creation of this document   **

## 2021-02-26 NOTE — CONSULTS
Consultation - Cardiology   Dorie Wilson 80 y o  male MRN: [id number]  Unit/Bed#: 23693 [facility] [id number] Encounter: [id number]    Assessment/Plan     Assessment:  1  Atypical chest pain  2  History of coronary artery disease with CABG and multivessel stenting   3  GERD/peptic ulcer disease  4  Chronic angina  5  Hypertension  6  Anxiety       Plan:  Patient has been placed in observation on hospitalist's service  1  Patient has been chest pain-free and troponins x4 are negative since admission  2  Continue patient's home medications as he was taking pre-hospital   3  Discharge unclear by primary team  4  Consider evaluation in the outpatient setting with Gastroenterology  History of Present Illness   Physician Requesting Consult: Jimmye Boast, DO  Reason for Consult / Principal Problem:  Reproducible chest pain  HPI: Dorie Wilson is a 80y o  year old male who presented to the emergency room with complaints of midsternal chest discomfort  Patient pointed to the left side of his lower sternum and stated that he had some discomfort right at that spot  He states that happened around 4:00 p m  Yesterday while he was at rest   He notes that is different than his normal angina  Patient stated that he had been out to eat at a [employer] and had Western Danika dressing on his salad  He notes after eating the Western Danika dressing he began to feel this discomfort  He then noted a little while after it felt as if it moved to the other side of his sternum  He did take sublingual nitroglycerin which he noted did provide some relief  He denies recurrence of this discomfort since admission to the hospital     Troponins x4 are negative and 12 lead EKG demonstrates sinus rhythm with first-degree AV heart block  Patient does have ST depression and T-wave inversion seen in lateral leads which has been noted in previous EKGs  Patient has been compliant with all his medications at home and other labs are stable      Inpatient consult to Cardiology  Consult performed by: MATT Broussard  Consult ordered by: MATT Tyson          Review of Systems   Constitutional: Negative  Negative for activity change, diaphoresis, fatigue and fever  HENT: Negative  Negative for congestion, facial swelling, postnasal drip, sneezing and tinnitus  Eyes: Negative  Negative for photophobia and visual disturbance  Respiratory: Positive for chest tightness  Negative for shortness of breath  Cardiovascular: Positive for chest pain  Negative for palpitations and leg swelling  Gastrointestinal: Negative  Negative for abdominal distention, constipation, diarrhea, nausea and vomiting  Endocrine: Negative  Negative for polydipsia, polyphagia and polyuria  Genitourinary: Negative  Negative for difficulty urinating  Musculoskeletal: Negative  Skin: Negative  Neurological: Negative  Hematological: Negative  Psychiatric/Behavioral: Negative          Historical Information   Past Medical History:   Diagnosis Date    BPH (benign prostatic hyperplasia)     CAD (coronary artery disease)     Cardiac disease     Cervical spine fracture (HCC)     DVT (deep venous thrombosis) (HCC)     Fracture of lumbar spine (HCC)     Glaucoma     Hyperlipidemia     Hypertension     Myocardial infarct ([address] )     PUD (peptic ulcer disease)      Past Surgical History:   Procedure Laterality Date    CHOLECYSTECTOMY      CORONARY ARTERY BYPASS GRAFT      CORONARY ARTERY BYPASS GRAFT      CORONARY STENT PLACEMENT      EYE SURGERY      HERNIA REPAIR Right 11/3/2017    Procedure: REPAIR HERNIA INGUINAL;  Surgeon: Kita Mauricio MD;  Location: [address] [place] [address];  Service: General    WA CYSTOURETHROSCOPY W/IRRIG & EVAC CLOTS N/A 6/30/2018    Procedure: CYSTOSCOPY EVACUATION OF CLOTS, fulgeration;  Surgeon: Geoff Funes MD;  [facility];  Service: Urology    STOMACH SURGERY      [place] 2 gastrectomy    TRANSURETHRAL RESECTION OF PROSTATE      VENA CAVA FILTER PLACEMENT       Social History     Substance and Sexual Activity   Alcohol Use Never    Frequency: Never    Binge frequency: Never     Social History     Substance and Sexual Activity   Drug Use No     E-Cigarette/Vaping    E-Cigarette Use Never User      E-Cigarette/Vaping Substances     Social History     Tobacco Use   Smoking Status Never Smoker   Smokeless Tobacco Never Used     Family History:   Family History   Problem Relation Age of Onset    Coronary artery disease Brother        Meds/Allergies   all current active meds have been reviewed, current meds:   Current Facility-Administered Medications   Medication Dose Route Frequency    acetaminophen (TYLENOL) tablet 650 mg  650 mg Oral Q6H PRN    ALPRAZolam (XANAX) tablet 0 5 mg  0 5 mg Oral HS PRN    calcium carbonate-vitamin D (OSCAL-D) 500 mg-200 units per tablet 1 tablet  1 tablet Oral Daily    cholecalciferol (VITAMIN D3) tablet 400 Units  400 Units Oral Daily    clopidogrel (PLAVIX) tablet 75 mg  75 mg Oral After Dinner    enoxaparin (LOVENOX) subcutaneous injection 40 mg  40 mg Subcutaneous Daily    escitalopram (LEXAPRO) tablet 10 mg  10 mg Oral Daily    ezetimibe (ZETIA) tablet 10 mg  10 mg Oral HS    famotidine (PEPCID) tablet 20 mg  20 mg Oral Daily    finasteride (PROSCAR) tablet 5 mg  5 mg Oral After Dinner    isosorbide mononitrate (IMDUR) 24 hr tablet 30 mg  30 mg Oral HS    magnesium oxide (MAG-OX) tablet 400 mg  400 mg Oral Daily    metoprolol succinate (TOPROL-XL) 24 hr tablet 25 mg  25 mg Oral TID    multivitamin-minerals (CENTRUM) tablet 1 tablet  1 tablet Oral Daily    nitroglycerin (NITROSTAT) SL tablet 0 4 mg  0 4 mg Sublingual Q5 Min PRN    ondansetron (ZOFRAN) injection 4 mg  4 mg Intravenous Q6H PRN    pravastatin (PRAVACHOL) tablet 40 mg  40 mg Oral Daily With Dinner    ranolazine (RANEXA) 12 hr tablet 500 mg  500 mg Oral BID    tamsulosin (FLOMAX) capsule 0 4 mg  0 4 mg Oral Daily With Dinner    zinc sulfate (ZINCATE) capsule 220 mg  220 mg Oral Daily    and PTA meds:   Prior to Admission Medications   Prescriptions Last Dose Informant Patient Reported? Taking?    ALPRAZolam (XANAX) 0 5 mg tablet 2/24/2021 at Unknown time Self Yes Yes   Sig: Take 0 5 mg by mouth daily at bedtime as needed    Calcium Carbonate-Vitamin D (CALCIUM 600+D PO) 2/25/2021 at 1200 Self Yes Yes   Sig: Take by mouth daily   Magnesium 250 MG TABS 2/24/2021 at 1200 Self Yes Yes   Sig: Take 1 tablet by mouth daily   cholecalciferol (VITAMIN D3) 400 units tablet 2/25/2021 at 1200 Self Yes Yes   Sig: Take 400 Units by mouth daily   clopidogrel (PLAVIX) 75 mg tablet 2/24/2021 at Unknown time  No Yes   Sig: Take 1 tablet (75 mg total) by mouth daily   dutasteride (AVODART) 0 5 mg capsule 2/24/2021 at Unknown time Self No Yes   Sig: Take 1 capsule (0 5 mg total) by mouth daily   Patient taking differently: Take 0 5 mg by mouth daily    escitalopram (LEXAPRO) 10 mg tablet 2/25/2021 at 0600 Self Yes Yes   Sig: Take 10 mg by mouth daily   ezetimibe (ZETIA) 10 mg tablet 2/24/2021 at Unknown time Self No Yes   Sig: Take 1 tablet (10 mg total) by mouth daily   famotidine (PEPCID) 20 mg tablet 2/25/2021 at 0600 Self Yes Yes   Sig: Take 20 mg by mouth daily     isosorbide mononitrate (IMDUR) 30 mg 24 hr tablet 2/24/2021 at Unknown time  No Yes   Sig: Take 1 tablet (30 mg total) by mouth daily   metoprolol succinate (TOPROL-XL) 25 mg 24 hr tablet 2/25/2021 at 0600  No Yes   Sig: Take 1 tablet (25 mg total) by mouth 3 (three) times a day   Patient taking differently: Take 25 mg by mouth 2 (two) times a day    multivitamin-iron-minerals-folic acid (CENTRUM) chewable tablet 2/25/2021 at Unknown time Self Yes Yes   Sig: Chew 1 tablet daily   nitroglycerin (NITROSTAT) 0 4 mg SL tablet 2/25/2021 at Unknown time  No Yes   Sig: Place 1 tablet (0 4 mg total) under the tongue every 5 (five) minutes as needed for chest pain   ranolazine (RANEXA) 500 mg 12 hr tablet 2/25/2021 at 0600  No Yes   Sig: Take 1 tablet (500 mg total) by mouth 2 (two) times a day   rosuvastatin (CRESTOR) 5 mg tablet 2/24/2021 at Unknown time  No Yes   Sig: TAKE ONE TABLET BY MOUTH EVERY DAY   tamsulosin (Flomax) 0 4 mg 2/24/2021 at Unknown time Self No Yes   Sig: Take 1 capsule (0 4 mg total) by mouth daily with dinner   zinc gluconate 50 mg tablet 2/25/2021 at Unknown time Self Yes Yes   Sig: Take 25 mg by mouth daily  Facility-Administered Medications: None     Allergies   Allergen Reactions    Oxycodone-Acetaminophen Dizziness and Shortness Of Breath     Other reaction(s): Faints  Reaction Date: [id number]; Category: Adverse Reaction;     Omeprazole Other (See Comments)     pash    Statins Other (See Comments)     Muscle pain       Objective   Vitals: Blood pressure 135/62, pulse 73, temperature (!) 97 4 °F (36 3 °C), temperature source Oral, resp  rate 18, height 5' 5" (1 651 m), weight 66 5 kg (146 lb 9 7 oz), SpO2 97 %  Orthostatic Blood Pressures      Most Recent Value   Blood Pressure  135/62 filed at 02/26/2021 1436   Patient Position - Orthostatic VS  Sitting filed at 02/26/2021 0731            Intake/Output Summary (Last 24 hours) at 2/26/2021 1121  Last data filed at 2/26/2021 1001  Gross per 24 hour   Intake 220 ml   Output --   Net 220 ml       Invasive Devices     Peripheral Intravenous Line            Peripheral IV 02/25/21 Right; Lower Arm less than 1 day                Physical Exam  Vitals signs and nursing note reviewed  Constitutional:       Appearance: Normal appearance  He is normal weight  HENT:      Head: Normocephalic and atraumatic  Right Ear: External ear normal       Left Ear: External ear normal       Nose: Nose normal    Eyes:      General: No scleral icterus  Right eye: No discharge  Left eye: No discharge  Conjunctiva/sclera: Conjunctivae normal       Pupils: Pupils are equal, round, and reactive to light  Neck:      Musculoskeletal: Normal range of motion and neck supple  Cardiovascular:      Rate and Rhythm: Normal rate  Pulses: Normal pulses  Heart sounds: Murmur present  Systolic murmur present with a grade of 2/6  Pulmonary:      Effort: Pulmonary effort is normal  No respiratory distress  Breath sounds: Normal breath sounds  No wheezing, rhonchi or rales  Abdominal:      General: Bowel sounds are normal  There is no distension  Palpations: Abdomen is soft  Musculoskeletal:      Right lower leg: No edema  Left lower leg: No edema  Skin:     General: Skin is warm and dry  Capillary Refill: Capillary refill takes less than 2 seconds  Neurological:      General: No focal deficit present  Mental Status: He is alert and oriented to person, place, and time  Mental status is at baseline  Psychiatric:         Mood and Affect: Mood normal          Behavior: Behavior normal          Thought Content: Thought content normal          Judgment: Judgment normal          Lab Results:   I have personally reviewed pertinent lab results      CBC with diff:   Results from last 7 days   Lab Units 02/26/21  0552   WBC Thousand/uL 3 72*   RBC Million/uL 3 74*   HEMOGLOBIN g/dL 11 5*   HEMATOCRIT % 36 1*   MCV fL 97   MCH pg 30 7   MCHC g/dL 31 9   RDW % 12 4   MPV fL 10 1   PLATELETS Thousands/uL 116*     CMP:   Results from last 7 days   Lab Units 02/26/21  0552 02/25/21  1731   SODIUM mmol/L 140 138   POTASSIUM mmol/L 3 9 4 2   CHLORIDE mmol/L 105 101   CO2 mmol/L 29 30   BUN mg/dL 23 27*   CREATININE mg/dL 0 93 1 23   CALCIUM mg/dL 8 1* 8 3   AST U/L  --  8   ALT U/L  --  21   ALK PHOS U/L  --  81   EGFR ml/min/1 73sq m 72 51     Troponin:   0   Lab Value Date/Time    TROPONINI 0 02 02/26/2021 0552    TROPONINI <0 02 02/25/2021 2232    TROPONINI <0 02 02/25/2021 2032    TROPONINI <0 02 02/25/2021 1731    TROPONINI 0 02 11/30/2019 0221    TROPONINI <0 02 11/29/2019 2317    TROPONINI <0 02 2019 2124    TROPONINI <0 02 2018 1911    TROPONINI <0 02 2018 1543    TROPONINI <0 02 2018 1128    TROPONINI 0 13 (H) 2017 1316    TROPONINI 0 12 (H) 2017 1025    TROPONINI 0 16 (H) 2017 0537    TROPONINI 0 02 2017 2320    TROPONINI 0 02 2016 1212    TROPONINI 0 02 2016 0444    TROPONINI 0 02 2016 2219    TROPONINI <0 02 2016 1753    TROPONINI <0 02 2015 1430    TROPONINI 0 02 2015 0520     BNP:   Results from last 7 days   Lab Units 21  0552   POTASSIUM mmol/L 3 9   CHLORIDE mmol/L 105   CO2 mmol/L 29   BUN mg/dL 23   CREATININE mg/dL 0 93   CALCIUM mg/dL 8 1*   EGFR ml/min/1 73sq m 72     Coags:   Results from last 7 days   Lab Units 21  1731   PTT seconds 29   INR  1 04     TSH:   Results from last 7 days   Lab Units 21  0552   TSH 3RD GENERATON uIU/mL 1 419     Magnesium:   Results from last 7 days   Lab Units 21  0552   MAGNESIUM mg/dL 2 1     Lipid Profile:   Results from last 7 days   Lab Units 21  0552   HDL mg/dL 34*   LDL CALC mg/dL 68   TRIGLYCERIDES mg/dL 113     Imaging: I have personally reviewed pertinent reports      EK lead EKG demonstrates sinus rhythm with first-degree AV heart block, T-wave inversion was seen in the lateral leads and this is unchanged from previous EKG of 2019  VTE Prophylaxis: Sequential compression device [facility] [place])     Code Status: Level 1 - Full Code  Advance Directive and Living Will:      Power of :    POLST:      Jesús Kuo [place]  Cardiology

## 2021-02-27 NOTE — ASSESSMENT & PLAN NOTE
Patient presented with chest pain in one spot of left chest initially and then shifted a little to the center which resolved after taking Nitrostat x 3 and ASA  Reported chest pain was different compared to when he had MI, no associated nausea/SOB/diaphoresis/dizziness  Patient chewed 2 baby aspirin and took 2 Nitro at home  Given full dose ASA and one more nitrostat in ED  Patient has extensive cardiac history  Status post CABG x 6 and stents with multiple cardiac catheterization in the past   EULA score 3  EKG in ED showed NSR, T-wave inversion in inferior leads, lateral leads,and V4  I,avL,V4 TWI appears new compared to prior EKG in 12/2019 available in [facility]  Troponins negative  lipid panel - LDL 68, A1c 6, TSH within normal limits  Continue home cardiac medications

## 2021-02-27 NOTE — ASSESSMENT & PLAN NOTE
Wt Readings from Last 3 Encounters:   02/26/21 66 5 kg (146 lb 9 7 oz)   12/15/20 68 9 kg (152 lb)   06/24/20 68 7 kg (151 lb 6 4 oz)     2D echo in 7/2020 showed normal EF  No signs of fluid overload  Chest x-ray negative for acute pathology  Patient is not on diuretic and ACEI at home     Continue metoprolol

## 2021-02-27 NOTE — ASSESSMENT & PLAN NOTE
Status post CABG x6 and stents with multiple cardiac catheterization in the past   Patient has diffuse 3 vessel disease with poor targets on latest cardiac catheterization about one year ago per outpatient Cardiology note in 12/2020  Patient was recommended medical therapy  Patient has history of chronic stable angina due to above  Continue Toprol-XL 25mg p o  TID, Ranexa, Imdur, Plavix, statin at home  continue home medications per cardiology and follow-up with primary cardiologist after discharge

## 2021-02-28 LAB
ATRIAL RATE: 95 BPM
P AXIS: 102 DEGREES
PR INTERVAL: 200 MS
QRS AXIS: 40 DEGREES
QRSD INTERVAL: 96 MS
QT INTERVAL: 346 MS
QTC INTERVAL: 434 MS
T WAVE AXIS: 232 DEGREES
VENTRICULAR RATE: 95 BPM

## 2021-02-28 PROCEDURE — 93010 ELECTROCARDIOGRAM REPORT: CPT | Performed by: INTERNAL MEDICINE

## 2021-03-01 ENCOUNTER — TELEPHONE (OUTPATIENT)
Dept: CARDIOLOGY CLINIC | Facility: CLINIC | Age: 86
End: 2021-03-01

## 2021-03-02 ENCOUNTER — TRANSCRIBE ORDERS (OUTPATIENT)
Dept: ADMINISTRATIVE | Facility: HOSPITAL | Age: 86
End: 2021-03-02

## 2021-03-02 ENCOUNTER — LAB (OUTPATIENT)
Dept: LAB | Facility: HOSPITAL | Age: 86
End: 2021-03-02
Attending: FAMILY MEDICINE
Payer: MEDICARE

## 2021-03-02 DIAGNOSIS — E03.9 HYPOTHYROIDISM, UNSPECIFIED TYPE: Primary | ICD-10-CM

## 2021-03-02 DIAGNOSIS — N18.30 STAGE 3 CHRONIC KIDNEY DISEASE, UNSPECIFIED WHETHER STAGE 3A OR 3B CKD (HCC): ICD-10-CM

## 2021-03-02 DIAGNOSIS — Z13.29 SCREENING FOR THYROID DISORDER: ICD-10-CM

## 2021-03-02 DIAGNOSIS — Z13.9 SCREENING FOR UNSPECIFIED CONDITION: ICD-10-CM

## 2021-03-02 DIAGNOSIS — E03.9 HYPOTHYROIDISM, UNSPECIFIED TYPE: ICD-10-CM

## 2021-03-02 LAB
ALBUMIN SERPL BCP-MCNC: 3.5 G/DL (ref 3.5–5)
ALP SERPL-CCNC: 79 U/L (ref 46–116)
ALT SERPL W P-5'-P-CCNC: 22 U/L (ref 12–78)
ANION GAP SERPL CALCULATED.3IONS-SCNC: 4 MMOL/L (ref 4–13)
AST SERPL W P-5'-P-CCNC: 8 U/L (ref 5–45)
BILIRUB SERPL-MCNC: 0.5 MG/DL (ref 0.2–1)
BUN SERPL-MCNC: 22 MG/DL (ref 5–25)
CALCIUM SERPL-MCNC: 8.8 MG/DL (ref 8.3–10.1)
CHLORIDE SERPL-SCNC: 101 MMOL/L (ref 100–108)
CO2 SERPL-SCNC: 32 MMOL/L (ref 21–32)
CREAT SERPL-MCNC: 1.14 MG/DL (ref 0.6–1.3)
ERYTHROCYTE [DISTWIDTH] IN BLOOD BY AUTOMATED COUNT: 12.5 % (ref 11.6–15.1)
GFR SERPL CREATININE-BSD FRML MDRD: 56 ML/MIN/1.73SQ M
GLUCOSE P FAST SERPL-MCNC: 110 MG/DL (ref 65–99)
HCT VFR BLD AUTO: 41.2 % (ref 36.5–49.3)
HGB BLD-MCNC: 13 G/DL (ref 12–17)
MCH RBC QN AUTO: 30.6 PG (ref 26.8–34.3)
MCHC RBC AUTO-ENTMCNC: 31.6 G/DL (ref 31.4–37.4)
MCV RBC AUTO: 97 FL (ref 82–98)
PLATELET # BLD AUTO: 145 THOUSANDS/UL (ref 149–390)
PMV BLD AUTO: 10.7 FL (ref 8.9–12.7)
POTASSIUM SERPL-SCNC: 4.4 MMOL/L (ref 3.5–5.3)
PROT SERPL-MCNC: 7.4 G/DL (ref 6.4–8.2)
RBC # BLD AUTO: 4.25 MILLION/UL (ref 3.88–5.62)
SODIUM SERPL-SCNC: 137 MMOL/L (ref 136–145)
TSH SERPL DL<=0.05 MIU/L-ACNC: 1.53 UIU/ML (ref 0.36–3.74)
WBC # BLD AUTO: 4.95 THOUSAND/UL (ref 4.31–10.16)

## 2021-03-02 PROCEDURE — 84443 ASSAY THYROID STIM HORMONE: CPT

## 2021-03-02 PROCEDURE — 80053 COMPREHEN METABOLIC PANEL: CPT | Performed by: FAMILY MEDICINE

## 2021-03-02 PROCEDURE — 36415 COLL VENOUS BLD VENIPUNCTURE: CPT | Performed by: FAMILY MEDICINE

## 2021-03-02 PROCEDURE — 83036 HEMOGLOBIN GLYCOSYLATED A1C: CPT | Performed by: FAMILY MEDICINE

## 2021-03-02 PROCEDURE — 85027 COMPLETE CBC AUTOMATED: CPT

## 2021-03-03 LAB
EST. AVERAGE GLUCOSE BLD GHB EST-MCNC: 123 MG/DL
HBA1C MFR BLD: 5.9 %

## 2021-03-26 ENCOUNTER — OFFICE VISIT (OUTPATIENT)
Dept: CARDIOLOGY CLINIC | Facility: CLINIC | Age: 86
End: 2021-03-26
Payer: MEDICARE

## 2021-03-26 VITALS
HEART RATE: 66 BPM | BODY MASS INDEX: 24.99 KG/M2 | WEIGHT: 150 LBS | DIASTOLIC BLOOD PRESSURE: 62 MMHG | OXYGEN SATURATION: 99 % | SYSTOLIC BLOOD PRESSURE: 124 MMHG | TEMPERATURE: 98.4 F | HEIGHT: 65 IN

## 2021-03-26 DIAGNOSIS — I10 ESSENTIAL HYPERTENSION: Chronic | ICD-10-CM

## 2021-03-26 DIAGNOSIS — I25.10 3-VESSEL CAD: ICD-10-CM

## 2021-03-26 DIAGNOSIS — I20.8 CHRONIC STABLE ANGINA (HCC): ICD-10-CM

## 2021-03-26 DIAGNOSIS — I50.32 CHRONIC DIASTOLIC HEART FAILURE (HCC): ICD-10-CM

## 2021-03-26 DIAGNOSIS — R01.1 CARDIAC MURMUR: ICD-10-CM

## 2021-03-26 DIAGNOSIS — F41.9 ANXIETY: ICD-10-CM

## 2021-03-26 DIAGNOSIS — Z86.718 HISTORY OF DVT (DEEP VEIN THROMBOSIS): Chronic | ICD-10-CM

## 2021-03-26 PROCEDURE — 99214 OFFICE O/P EST MOD 30 MIN: CPT | Performed by: INTERNAL MEDICINE

## 2021-03-26 RX ORDER — ESCITALOPRAM OXALATE 5 MG/1
TABLET ORAL
COMMUNITY
Start: 2021-03-05 | End: 2021-11-03 | Stop reason: HOSPADM

## 2021-03-26 NOTE — PROGRESS NOTES
Progress Note - Cardiology Office  Danuta Nolasco 80 y o  male MRN: [id number]  : 1930  Encounter: [id number]      Assessment:     1  Chronic stable angina (HCC)    2  3-vessel CAD    3  Chronic diastolic heart failure ([address] )    4  Essential hypertension    5  Cardiac murmur    6  History of DVT (deep vein thrombosis)    7  Anxiety        Discussion Summary and Plan:  1  Coronary artery disease status post CABG and stents with multiple cardiac catheterization a year ago ago in [facility] by me and it was recommended medical therapy  Patient has diffuse 3 vessel disease with poor targets  Patient still occasionally gets symptoms of chest pain but has been under control  His prescription for nitro was renewed  Will continue beta-blockers but will increase the dose to 25 mg 3 times daily  Continue Imdur  No changes in medication at this time  He was in the hospital and troponins were neck      2  Chronic stable angina  As mentioned above history of chronic stable angina  Beta-blocker increase he is taking Ranexa and Imdur  Continue same medication      3  Generalized anxiety  Patient gets anxious very easily  He is on Xanax as needed  As per his wife he has always negative in his thinking  4  Dyslipidemia  Patient has history of severe three-vessel disease  He was tried on multiple statins he did not take it now he is willing to take statins  He is taking Crestor 5 mg daily  He is not taking Zetia  He is only on Crestor 5 mg daily  Recently done blood test on 2020 were acceptable      5  Chronic diastolic heart failure [place] Heart Association class 2  No more admission to hospital with heart failure  He has slowly stop taking his diuretics needed to use only p r n  Latest echo shows his EF has improved to 50-55% he does have moderate MR and moderate aortic stenosis    He is not on ACE-inhibitor due to low blood pressure and he is not using diuretics as he has no now swelling and shortness of breath  Continue same medication      6  Benign prostate hypertrophy status post surgery  Currently stable  7   Ischemic cardiomyopathy EF around  50%     Metoprolol XL total dose 75 mg daily  He takes 25 mg 3 times daily due to low blood pressure  8   History of peptic ulcer disease  Patient was previously on Protonix  He got better with stopping aspirin  Advised to consider using enteric-coated aspirin  Counseling :  A description of the counseling  Spoke to patient about chronic stable angina, coronary artery disease, dyslipidemia, diastolic heart failure at length  Diet advised  Patient's ability to self care: Yes  Medication side effect reviewed with patient in detail and all their questions answered to their satisfaction  HPI :     Wes Arthur is a 80y o  year old male who came for follow up  Patient has a past medical history significant for coronary artery disease status post CABG status post stent into his MCKENNA to LAD widely patent, SVG vein graft to 100% occluded and circumflex was severely diffusely disease with severe diffuse distal disease, chronic stable angina, status post MI anxiety, GERD, dyslipidemia who came for follow-up  was admitted to [facility] with chronic diastolic heart failure and is now compensated  He denies any chest pain or any shortness of breath  Occasionally feel dizzy in the morning  Has been on Demadex 20 mg and potassium 20 mg daily  All medications reviewed with him  12/15/2020  Above reviewed  Patient came for follow-up  He has been doing well still occasionally gets  Episodes of chest pain but they are not worse than since last visit    He had a complex medical history of coronary artery disease status post CABG, ischemic cardiomyopathy EF around  50%, history of GI bleed, history of intolerant to aspirin on Plavix, history of intolerant to high doses of statin on low-dose of Crestor and Zetia started last visit, chronic stable angina with multiple episodes of chest pain and having diffuse coronary artery disease came for follow-up  He still gets some periods of chest pain but does not have to use his nitro  No leg swelling  Today heart rate is 71 beats per minute EKG shows ST abnormality in inferior and lateral leads which is not much change from previous EKG  His med list is reviewed  He takes Imdur but he could not afford Ranexa  He has electrolytes done an echo to were 2020 which were acceptable  03/26/2021  Above reviewed  Patient came for follow-up  He occasionally get episodes of chest pain  He was in the emergency room and Hospital on 02/26/2021 with chest pain troponins were negative EKG shows no changes  He had a complex medical history of coronary artery disease status post CABG x6 with stents, ischemic cardiomyopathy with EF now around 45 50%, history of GI bleed, history of intolerant to aspirin on Plavix, history of intolerance to high dose of statin on low-dose Crestor and Zetia who came for follow-up  He had history of chronic stable angina and he get multiple episodes of chest pain and he has diffuse coronary artery disease which is not amenable to percutaneous technique  He is doing well  His note from Hospital reviewed troponins were negative he had blood test done which were acceptable  His cholesterol profile 02/26/2021 was acceptable  LFTs were acceptable  Review of Systems   Constitutional: Negative for activity change, chills, diaphoresis, fever and unexpected weight change  HENT: Negative for congestion  Eyes: Negative for discharge and redness  Respiratory: Negative for cough, chest tightness, shortness of breath and wheezing  Cardiovascular: Negative  Negative for chest pain, palpitations and leg swelling  Gastrointestinal: Negative for abdominal pain, diarrhea and nausea  Endocrine: Negative      Genitourinary: Negative for decreased urine volume and urgency  Musculoskeletal: Positive for arthralgias  Negative for back pain and gait problem  Skin: Negative for rash and wound  Allergic/Immunologic: Negative  Neurological: Negative for dizziness, seizures, syncope, weakness, light-headedness and headaches  Hematological: Negative  Psychiatric/Behavioral: Negative for agitation and confusion  The patient is nervous/anxious          Historical Information   Past Medical History:   Diagnosis Date    BPH (benign prostatic hyperplasia)     CAD (coronary artery disease)     Cardiac disease     Cervical spine fracture (HCC)     DVT (deep venous thrombosis) (HCC)     Fracture of lumbar spine (HCC)     Glaucoma     Hyperlipidemia     Hypertension     Myocardial infarct (HCC)     PUD (peptic ulcer disease)      Past Surgical History:   Procedure Laterality Date    CHOLECYSTECTOMY      CORONARY ARTERY BYPASS GRAFT      CORONARY ARTERY BYPASS GRAFT      CORONARY STENT PLACEMENT      EYE SURGERY      HERNIA REPAIR Right 11/3/2017    Procedure: REPAIR HERNIA INGUINAL;  Surgeon: Michael Rivera MD;  Location: [facility];  Service: General    IN CYSTOURETHROSCOPY W/IRRIG & EVAC CLOTS N/A 6/30/2018    Procedure: CYSTOSCOPY EVACUATION OF CLOTS, fulgeration;  Surgeon: Beck Diaz MD;  Location:  Main OR;  Service: Urology    STOMACH SURGERY      Billroth 2 gastrectomy    TRANSURETHRAL RESECTION OF PROSTATE      VENA CAVA FILTER PLACEMENT       Social History     Substance and Sexual Activity   Alcohol Use Never    Frequency: Never    Binge frequency: Never     Social History     Substance and Sexual Activity   Drug Use No     Social History     Tobacco Use   Smoking Status Never Smoker   Smokeless Tobacco Never Used     Family History:   Family History   Problem Relation Age of Onset    Coronary artery disease Brother        Meds/Allergies     Allergies   Allergen Reactions    Oxycodone-Acetaminophen Dizziness and Shortness Of Breath Other reaction(s): Faints  Reaction Date: [id number]; Category:  Adverse Reaction;     Omeprazole Other (See Comments)     pash    Statins Other (See Comments)     Muscle pain       Current Outpatient Medications:     ALPRAZolam (XANAX) 0 5 mg tablet, Take 0 5 mg by mouth daily at bedtime as needed , Disp: , Rfl:     Calcium Carbonate-Vitamin D (CALCIUM 600+D PO), Take by mouth daily, Disp: , Rfl:     cholecalciferol (VITAMIN D3) 400 units tablet, Take 400 Units by mouth daily, Disp: , Rfl:     clopidogrel (PLAVIX) 75 mg tablet, Take 1 tablet (75 mg total) by mouth daily, Disp: 90 tablet, Rfl: 3    dutasteride (AVODART) 0 5 mg capsule, Take 1 capsule (0 5 mg total) by mouth daily (Patient taking differently: Take 0 5 mg by mouth daily ), Disp: 90 capsule, Rfl: 3    escitalopram (LEXAPRO) 10 mg tablet, Take 5 mg by mouth daily , Disp: , Rfl:     escitalopram (LEXAPRO) 5 mg tablet, , Disp: , Rfl:     ezetimibe (ZETIA) 10 mg tablet, Take 1 tablet (10 mg total) by mouth daily, Disp: 30 tablet, Rfl: 3    famotidine (PEPCID) 20 mg tablet, Take 20 mg by mouth daily  , Disp: , Rfl:     isosorbide mononitrate (IMDUR) 30 mg 24 hr tablet, Take 1 tablet (30 mg total) by mouth daily, Disp: 90 tablet, Rfl: 3    Magnesium 250 MG TABS, Take 1 tablet by mouth daily, Disp: , Rfl:     metoprolol succinate (TOPROL-XL) 25 mg 24 hr tablet, Take 1 tablet (25 mg total) by mouth 3 (three) times a day, Disp:  , Rfl: 0    multivitamin-iron-minerals-folic acid (CENTRUM) chewable tablet, Chew 1 tablet daily, Disp: , Rfl:     nitroglycerin (NITROSTAT) 0 4 mg SL tablet, Place 1 tablet (0 4 mg total) under the tongue every 5 (five) minutes as needed for chest pain, Disp: 30 tablet, Rfl: 1    ranolazine (RANEXA) 500 mg 12 hr tablet, Take 1 tablet (500 mg total) by mouth 2 (two) times a day, Disp: 180 tablet, Rfl: 3    rosuvastatin (CRESTOR) 5 mg tablet, TAKE ONE TABLET BY MOUTH EVERY DAY, Disp: 90 tablet, Rfl: 3    tamsulosin (Flomax) 0 4 mg, Take 1 capsule (0 4 mg total) by mouth daily with dinner, Disp: 90 capsule, Rfl: 3    zinc gluconate 50 mg tablet, Take 25 mg by mouth daily  , Disp: , Rfl:     Vitals: Blood pressure 124/62, pulse 66, temperature 98 4 °F (36 9 °C), height 5' 5" (1 651 m), weight 68 kg (150 lb), SpO2 99 %  Body mass index is 24 96 kg/m²  Vitals:    03/26/21 1349   Weight: 68 kg (150 lb)     BP Readings from Last 3 Encounters:   03/26/21 124/62   02/26/21 135/62   12/15/20 120/64         Physical Exam   Constitutional: He is oriented to person, place, and time  He appears well-developed and well-nourished  No distress  HENT:   Head: Normocephalic and atraumatic  Eyes: Pupils are equal, round, and reactive to light  Neck: Neck supple  No JVD present  No tracheal deviation present  No thyromegaly present  Cardiovascular: Normal rate, regular rhythm, S1 normal and S2 normal  Exam reveals no gallop, no S3, no S4, no distant heart sounds and no friction rub  Murmur heard  Systolic (ejection) murmur is present with a grade of 2/6  S1-S2 regular with 3/6 ejection systolic murmur  S2 is heard  Pulmonary/Chest: Effort normal  No respiratory distress  He has no wheezes  He has no rales  He exhibits no tenderness  Bilateral air entry with prolonged expiratory phase no rhonchi no wheezing   Abdominal: Soft  Bowel sounds are normal  He exhibits no distension  There is no abdominal tenderness  Musculoskeletal:         General: No deformity or edema  Neurological: He is alert and oriented to person, place, and time  Skin: Skin is warm and dry  No rash noted  He is not diaphoretic  No pallor  Psychiatric: He has a normal mood and affect  His behavior is normal  Judgment normal          Diagnostic Studies Review Cardio:     echo Doppler  Echo Doppler done 07/08/2020 shows EF around 50-55%, paradoxical septal motion due to surgery, moderate aortic stenosis and mild AI, moderate MR, trace TR        Stress Test: Nuclear stress test done 5/14/2016 shows moderate-sized inferior lateral wall ischemia and apical infarction  Ejection fraction 45%  Which was consistent with his %, vein graft to RCA is occluded, as circumflex has diffuse disease and LIMA to LAD was widely patent  Catheterization: He has multiple cardiac catheterization performed  His last cardiac catheter patient performed by me shows EF around 50%, severe diffuse disease of LAD which is totally occluded,  ECG Report:   Comparison to prior ECGs:1  No interval change1   4/24 2017  Twelve-lead EKG shows normal sinus some heart rate 72 bpm  ST changes in inferior and lateral leads which is not changed from old EKG  Cannot rule out underlying ischemialead EKG shows normal sinus rhythm heart rate 63 beats per minute with deep T-wave inversions in inferior and lateral precordial leads  Not change from old EKG    Twelve lead EKG done 04/22/2019 shows normal sinus rhythm heart rate 71 beats per minute  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  ST abnormal it in precordial leads not change from old EKG  Twelve lead EKG done 10/31/2019 shows normal sinus rhythm evidence of old inferior infarct  ST abnormality cannot rule out ischemia  Twelve lead EKG done 12/15/2020 shows normal sinus rhythm ST changes in inferior as well as anterior  And atrial leads not changed from previous EKG  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          Lab Review   Lab Results   Component Value Date    WBC 4 95 03/02/2021    HGB 13 0 03/02/2021    HCT 41 2 03/02/2021    MCV 97 03/02/2021    RDW 12 5 03/02/2021     (L) 03/02/2021     BMP:  Lab Results   Component Value Date     12/07/2015    K 4 4 03/02/2021     03/02/2021    CO2 32 03/02/2021    ANIONGAP 10 2 12/07/2015    BUN 22 03/02/2021    CREATININE 1 14 03/02/2021    GLUCOSE 78 12/07/2015    GLUF 110 (H) 03/02/2021    CALCIUM 8 8 03/02/2021    CORRECTEDCA 8 8 02/25/2021    EGFR 56 03/02/2021 MG 2 1 02/26/2021     LFT:  Lab Results   Component Value Date    AST 8 03/02/2021    ALT 22 03/02/2021    ALKPHOS 79 03/02/2021    PROT 8 0 09/24/2015    BILITOT 0 64 09/24/2015     Lab Results   Component Value Date    BNP 69 09/25/2015      No results found for: TSH  Lab Results   Component Value Date    HGBA1C 5 9 (H) 03/02/2021     Lipid Profile:   Lab Results   Component Value Date    CHOL 192 10/29/2015    HDL 34 (L) 02/26/2021    LDLCALC 68 02/26/2021    TRIG 113 02/26/2021     Lab Results   Component Value Date    CHOL 192 10/29/2015    CHOL 168 09/25/2015       Dr Mendoza Garcia MD [facility]      "This note has been constructed using a voice recognition system  Therefore there may be syntax, spelling, and/or grammatical errors   Please call if you have any questions  "

## 2021-05-28 ENCOUNTER — OFFICE VISIT (OUTPATIENT)
Dept: UROLOGY | Facility: CLINIC | Age: 86
End: 2021-05-28
Payer: MEDICARE

## 2021-05-28 VITALS
WEIGHT: 157 LBS | HEART RATE: 62 BPM | BODY MASS INDEX: 26.16 KG/M2 | DIASTOLIC BLOOD PRESSURE: 60 MMHG | HEIGHT: 65 IN | SYSTOLIC BLOOD PRESSURE: 110 MMHG

## 2021-05-28 DIAGNOSIS — N40.1 BENIGN PROSTATIC HYPERPLASIA WITH URINARY RETENTION: Primary | ICD-10-CM

## 2021-05-28 DIAGNOSIS — R33.8 BENIGN PROSTATIC HYPERPLASIA WITH URINARY RETENTION: Primary | ICD-10-CM

## 2021-05-28 LAB
AMORPH URATE CRY URNS QL MICRO: ABNORMAL /HPF
BACTERIA UR QL AUTO: ABNORMAL /HPF
BILIRUB UR QL STRIP: ABNORMAL
CLARITY UR: ABNORMAL
COLOR UR: ABNORMAL
GLUCOSE UR STRIP-MCNC: NEGATIVE MG/DL
HGB UR QL STRIP.AUTO: NEGATIVE
KETONES UR STRIP-MCNC: ABNORMAL MG/DL
LEUKOCYTE ESTERASE UR QL STRIP: ABNORMAL
NITRITE UR QL STRIP: POSITIVE
NON-SQ EPI CELLS URNS QL MICRO: ABNORMAL /HPF
PH UR STRIP.AUTO: 5.5 [PH]
POST-VOID RESIDUAL VOLUME, ML POC: 0 ML
PROT UR STRIP-MCNC: ABNORMAL MG/DL
RBC #/AREA URNS AUTO: ABNORMAL /HPF
SL AMB  POCT GLUCOSE, UA: NORMAL
SL AMB LEUKOCYTE ESTERASE,UA: NORMAL
SL AMB POCT BILIRUBIN,UA: NORMAL
SL AMB POCT BLOOD,UA: NORMAL
SL AMB POCT CLARITY,UA: CLEAR
SL AMB POCT COLOR,UA: NORMAL
SL AMB POCT KETONES,UA: NORMAL
SL AMB POCT NITRITE,UA: NORMAL
SL AMB POCT PH,UA: 5
SL AMB POCT SPECIFIC GRAVITY,UA: 1.02
SL AMB POCT URINE PROTEIN: NORMAL
SL AMB POCT UROBILINOGEN: 0.2
SP GR UR STRIP.AUTO: 1.02 (ref 1–1.03)
UROBILINOGEN UR QL STRIP.AUTO: 0.2 E.U./DL
WBC #/AREA URNS AUTO: ABNORMAL /HPF

## 2021-05-28 PROCEDURE — 87086 URINE CULTURE/COLONY COUNT: CPT | Performed by: PHYSICIAN ASSISTANT

## 2021-05-28 PROCEDURE — 81001 URINALYSIS AUTO W/SCOPE: CPT | Performed by: PHYSICIAN ASSISTANT

## 2021-05-28 PROCEDURE — 51798 US URINE CAPACITY MEASURE: CPT | Performed by: PHYSICIAN ASSISTANT

## 2021-05-28 PROCEDURE — 99213 OFFICE O/P EST LOW 20 MIN: CPT | Performed by: PHYSICIAN ASSISTANT

## 2021-05-28 PROCEDURE — 81002 URINALYSIS NONAUTO W/O SCOPE: CPT | Performed by: PHYSICIAN ASSISTANT

## 2021-05-28 RX ORDER — METHYLPREDNISOLONE 4 MG/1
4 TABLET ORAL 2 TIMES DAILY
COMMUNITY
End: 2021-11-03 | Stop reason: HOSPADM

## 2021-05-28 NOTE — PROGRESS NOTES
1  Benign prostatic hyperplasia with urinary retention  POCT urine dip    POCT Measure PVR     1  Benign prostatic hyperplasia with urinary retention  POCT urine dip    POCT Measure PVR     Assessment and plan:          1  BPH  - History of TURP 6+ years ago outside institution     2  History of gross hematuria,  - s/p cysto clot evacuation (6/2018)  Hematuria source found to be from bladder neck which was fulgurated  Lower urinary tract symptoms stable well controlled at this time without any recurrent episodes of hematuria or infection  Continue tamsulosin and dutasteride dual therapy  Follow-up 1 year for reassessment  Encouraged to contact us sooner with any urinary concerns  All questions answered  Yamile Rich PA-C      Chief Complaint     LUTS    History of Present Illness     Payton Alvarado is a 80 y o  male patient previously under the care of Dr Cathy Frost with a history of BPH status post TURP (6+ years ago with [employer] [facility]), gross hematuria status post cystoscopy and clot evacuation/fulguration (2018), and nephrolithiasis presenting for follow-up      Patient had gross hematuria which required cystoscopy, clot evacuation, fulguration in June 2018  Intraoperatively patient was noted to have focal bleeding points at the bladder neck  Patient has been managed on dutasteride and tamsulosin dual therapy  patient has been doing excellently from urinary standpoint  Denies any recurrent episodes of hematuria or infection      Medical comorbidities include thrombocytopenia, CHF, CAD, depression  Urine dip in the office today reveals small leukocyte, nitrite and blood negative  PVR 0mL  AUA SYMPTOM SCORE      Most Recent Value   AUA SYMPTOM SCORE   How often have you had a sensation of not emptying your bladder completely after you finished urinating? 1   How often have you had to urinate again less than two hours after you finished urinating?   0   How often have you found you stopped and started again several times when you urinate? 1   How often have you found it difficult to postpone urination? 0   How often have you had a weak urinary stream?  0   How often have you had to push or strain to begin urination? 0   How many times did you most typically get up to urinate from the time you went to bed at night until the time you got up in the morning? 1   Quality of Life: If you were to spend the rest of your life with your urinary condition just the way it is now, how would you feel about that?  1   AUA SYMPTOM SCORE  3        Laboratory     Lab Results   Component Value Date    CREATININE 1 14 03/02/2021       Lab Results   Component Value Date    PSA 1 7 02/18/2019    PSA 2 7 02/16/2018    PSA 3 2 02/03/2017       Review of Systems     Review of Systems   Constitutional: Negative for activity change, appetite change, chills, diaphoresis, fatigue, fever and unexpected weight change  Respiratory: Negative for chest tightness and shortness of breath  Cardiovascular: Negative for chest pain, palpitations and leg swelling  Gastrointestinal: Negative for abdominal distention, abdominal pain, constipation, diarrhea, nausea and vomiting  Genitourinary: Negative for decreased urine volume, difficulty urinating, dysuria, enuresis, flank pain, frequency, genital sores, hematuria and urgency  Musculoskeletal: Negative for back pain, gait problem and myalgias  Skin: Negative for color change, pallor, rash and wound  Psychiatric/Behavioral: Negative for behavioral problems  The patient is not nervous/anxious  Allergies     Allergies   Allergen Reactions    Oxycodone-Acetaminophen Dizziness and Shortness Of Breath     Other reaction(s): Faints  Reaction Date: [id number]; Category:  Adverse Reaction;     Omeprazole Other (See Comments)     pash    Statins Other (See Comments)     Muscle pain       Physical Exam     Physical Exam  Constitutional:       General: He is not in acute distress  Appearance: Normal appearance  He is normal weight  He is not ill-appearing, toxic-appearing or diaphoretic  HENT:      Head: Normocephalic and atraumatic  Mouth/Throat:      Mouth: Mucous membranes are moist    Eyes:      General:         Right eye: No discharge  Left eye: No discharge  Conjunctiva/sclera: Conjunctivae normal    Pulmonary:      Effort: Pulmonary effort is normal  No respiratory distress  Musculoskeletal: Normal range of motion  Skin:     General: Skin is warm and dry  Coloration: Skin is not jaundiced or pale  Neurological:      General: No focal deficit present  Mental Status: He is alert and oriented to person, place, and time  Psychiatric:         Mood and Affect: Mood normal          Behavior: Behavior normal          Thought Content:  Thought content normal            Vital Signs     Vitals:    05/28/21 1335   BP: 110/60   Pulse: 62   Weight: 71 2 kg (157 lb)   Height: 5' 5" (1 651 m)         Current Medications       Current Outpatient Medications:     ALPRAZolam (XANAX) 0 5 mg tablet, Take 0 5 mg by mouth daily at bedtime as needed , Disp: , Rfl:     Calcium Carbonate-Vitamin D (CALCIUM 600+D PO), Take by mouth daily, Disp: , Rfl:     cholecalciferol (VITAMIN D3) 400 units tablet, Take 400 Units by mouth daily, Disp: , Rfl:     clopidogrel (PLAVIX) 75 mg tablet, Take 1 tablet (75 mg total) by mouth daily, Disp: 90 tablet, Rfl: 3    dutasteride (AVODART) 0 5 mg capsule, Take 1 capsule (0 5 mg total) by mouth daily (Patient taking differently: Take 0 5 mg by mouth daily ), Disp: 90 capsule, Rfl: 3    escitalopram (LEXAPRO) 10 mg tablet, Take 5 mg by mouth daily , Disp: , Rfl:     escitalopram (LEXAPRO) 5 mg tablet, , Disp: , Rfl:     ezetimibe (ZETIA) 10 mg tablet, Take 1 tablet (10 mg total) by mouth daily, Disp: 30 tablet, Rfl: 3    famotidine (PEPCID) 20 mg tablet, Take 20 mg by mouth daily  , Disp: , Rfl:     isosorbide mononitrate (IMDUR) 30 mg 24 hr tablet, Take 1 tablet (30 mg total) by mouth daily, Disp: 90 tablet, Rfl: 3    Magnesium 250 MG TABS, Take 1 tablet by mouth daily, Disp: , Rfl:     methylprednisolone (MEDROL) 4 mg tablet, Take 4 mg by mouth 2 (two) times a day, Disp: , Rfl:     metoprolol succinate (TOPROL-XL) 25 mg 24 hr tablet, Take 1 tablet (25 mg total) by mouth 3 (three) times a day, Disp:  , Rfl: 0    multivitamin-iron-minerals-folic acid (CENTRUM) chewable tablet, Chew 1 tablet daily, Disp: , Rfl:     nitroglycerin (NITROSTAT) 0 4 mg SL tablet, Place 1 tablet (0 4 mg total) under the tongue every 5 (five) minutes as needed for chest pain, Disp: 30 tablet, Rfl: 1    ranolazine (RANEXA) 500 mg 12 hr tablet, Take 1 tablet (500 mg total) by mouth 2 (two) times a day, Disp: 180 tablet, Rfl: 3    rosuvastatin (CRESTOR) 5 mg tablet, TAKE ONE TABLET BY MOUTH EVERY DAY, Disp: 90 tablet, Rfl: 3    tamsulosin (Flomax) 0 4 mg, Take 1 capsule (0 4 mg total) by mouth daily with dinner, Disp: 90 capsule, Rfl: 3    zinc gluconate 50 mg tablet, Take 25 mg by mouth daily  , Disp: , Rfl:       Active Problems     Patient Active Problem List   Diagnosis    History of DVT (deep vein thrombosis)    PUD (peptic ulcer disease)    Hypertension    Hyperlipidemia    Chronic diastolic heart failure ([address] )    Chest pain    Incarcerated right inguinal hernia    Benign prostatic hyperplasia    Anxiety    Bladder mass    Anemia of chronic disease    Depression    Dry eye    3-vessel CAD    Chronic stable angina (HCC)    Cardiac murmur    CAD (coronary artery disease)    Thrombocytopenia (HCC)         Past Medical History     Past Medical History:   Diagnosis Date    BPH (benign prostatic hyperplasia)     CAD (coronary artery disease)     Cardiac disease     Cervical spine fracture (HCC)     DVT (deep venous thrombosis) (HCC)     Fracture of lumbar spine (HCC)     Glaucoma     Hyperlipidemia     Hypertension  Myocardial infarct (HCC)     PUD (peptic ulcer disease)          Surgical History     Past Surgical History:   Procedure Laterality Date    CHOLECYSTECTOMY      CORONARY ARTERY BYPASS GRAFT      CORONARY ARTERY BYPASS GRAFT      CORONARY STENT PLACEMENT      EYE SURGERY      HERNIA REPAIR Right 11/3/2017    Procedure: REPAIR HERNIA INGUINAL;  Surgeon: Alicia Abdul MD;  Location: [facility];  Service: General    CT CYSTOURETHROSCOPY W/IRRIG & EVAC CLOTS N/A 6/30/2018    Procedure: CYSTOSCOPY EVACUATION OF CLOTS, fulgeration;  Surgeon: Ever Metzger MD;  [facility];  Service: Urology    STOMACH SURGERY      Billroth 2 gastrectomy    TRANSURETHRAL RESECTION OF PROSTATE      VENA CAVA FILTER PLACEMENT           Family History     Family History   Problem Relation Age of Onset    Coronary artery disease Brother          Social History     Social History       Radiology

## 2021-05-29 LAB — BACTERIA UR CULT: NORMAL

## 2021-06-12 ENCOUNTER — OFFICE VISIT (OUTPATIENT)
Dept: URGENT CARE | Facility: CLINIC | Age: 86
End: 2021-06-12
Payer: MEDICARE

## 2021-06-12 VITALS
HEART RATE: 70 BPM | WEIGHT: 149 LBS | OXYGEN SATURATION: 98 % | DIASTOLIC BLOOD PRESSURE: 60 MMHG | HEIGHT: 65 IN | SYSTOLIC BLOOD PRESSURE: 100 MMHG | BODY MASS INDEX: 24.83 KG/M2 | RESPIRATION RATE: 16 BRPM | TEMPERATURE: 96 F

## 2021-06-12 DIAGNOSIS — K92.1 HEMATOCHEZIA: Primary | ICD-10-CM

## 2021-06-12 PROCEDURE — 99213 OFFICE O/P EST LOW 20 MIN: CPT | Performed by: FAMILY MEDICINE

## 2021-06-12 NOTE — PROGRESS NOTES
[address]        NAME: Scott Cobos is a 80 y o  male  : 1930    MRN: [id number]  DATE: 2021  TIME: 12:31 PM    Assessment and Plan   Hematochezia [K92 1]  1  Hematochezia  Ambulatory referral to Gastroenterology     Vital signs are normal   Hematochezia likely secondary to internal hemorrhoids  Referred to gastroenterology for further evaluation management  Patient Instructions     Follow up with PCP in 3-5 days  Proceed to  ER if symptoms worsen  Chief Complaint     Chief Complaint   Patient presents with   Marylene Divers Stool     Patient complains of dark loose formed stool starting yesterday  Notes bright red blood in toilet  Denies any recent straining to have bowel movement  Complains of abdominal pain that comes and goes  History of Present Illness       44-year-old male on Plavix presents today due to 2 days of rectal bleeding which has progressively worsened  Reports seeing bright red blood today in the toilet bowl  Denies melena  Reports intermittent periumbilical abdominal pain which has persisted for a while; usually develops after meal   Denies any fevers, chills, dizziness, dyspnea, nausea or diarrhea  Takes a stool softener for constipation  Of note, underwent surgical removal of hemorrhoids over 1 decade ago  Review of Systems   Review of Systems   Constitutional: Negative for chills and fever  Respiratory: Negative for cough, shortness of breath and wheezing  Cardiovascular: Positive for chest pain (Occasional)  Gastrointestinal: Positive for abdominal pain (Periumbilical), blood in stool and constipation  Negative for diarrhea and nausea  Skin: Negative for rash  Neurological: Positive for light-headedness  Negative for headaches           Current Medications       Current Outpatient Medications:     ALPRAZolam (XANAX) 0 5 mg tablet, Take 0 5 mg by mouth daily at bedtime as needed , Disp: , Rfl:     Calcium Carbonate-Vitamin D (CALCIUM 600+D PO), Take by mouth daily, Disp: , Rfl:     cholecalciferol (VITAMIN D3) 400 units tablet, Take 400 Units by mouth daily, Disp: , Rfl:     clopidogrel (PLAVIX) 75 mg tablet, Take 1 tablet (75 mg total) by mouth daily, Disp: 90 tablet, Rfl: 3    dutasteride (AVODART) 0 5 mg capsule, Take 1 capsule (0 5 mg total) by mouth daily (Patient taking differently: Take 0 5 mg by mouth daily ), Disp: 90 capsule, Rfl: 3    escitalopram (LEXAPRO) 5 mg tablet, , Disp: , Rfl:     famotidine (PEPCID) 20 mg tablet, Take 20 mg by mouth daily  , Disp: , Rfl:     isosorbide mononitrate (IMDUR) 30 mg 24 hr tablet, Take 1 tablet (30 mg total) by mouth daily, Disp: 90 tablet, Rfl: 3    Magnesium 250 MG TABS, Take 1 tablet by mouth daily, Disp: , Rfl:     methylprednisolone (MEDROL) 4 mg tablet, Take 4 mg by mouth 2 (two) times a day, Disp: , Rfl:     metoprolol succinate (TOPROL-XL) 25 mg 24 hr tablet, Take 1 tablet (25 mg total) by mouth 3 (three) times a day, Disp:  , Rfl: 0    multivitamin-iron-minerals-folic acid (CENTRUM) chewable tablet, Chew 1 tablet daily, Disp: , Rfl:     ranolazine (RANEXA) 500 mg 12 hr tablet, Take 1 tablet (500 mg total) by mouth 2 (two) times a day, Disp: 180 tablet, Rfl: 3    rosuvastatin (CRESTOR) 5 mg tablet, TAKE ONE TABLET BY MOUTH EVERY DAY, Disp: 90 tablet, Rfl: 3    tamsulosin (Flomax) 0 4 mg, Take 1 capsule (0 4 mg total) by mouth daily with dinner, Disp: 90 capsule, Rfl: 3    zinc gluconate 50 mg tablet, Take 25 mg by mouth daily  , Disp: , Rfl:     escitalopram (LEXAPRO) 10 mg tablet, Take 5 mg by mouth daily , Disp: , Rfl:     ezetimibe (ZETIA) 10 mg tablet, Take 1 tablet (10 mg total) by mouth daily (Patient not taking: Reported on 6/12/2021), Disp: 30 tablet, Rfl: 3    nitroglycerin (NITROSTAT) 0 4 mg SL tablet, Place 1 tablet (0 4 mg total) under the tongue every 5 (five) minutes as needed for chest pain, Disp: 30 tablet, Rfl: 1    Current Allergies     Allergies as of 06/12/2021 - Reviewed 06/12/2021   Allergen Reaction Noted    Oxycodone-acetaminophen Dizziness and Shortness Of Breath 11/13/2017    Omeprazole Other (See Comments) 04/03/2015    Statins Other (See Comments) 04/02/2015            The following portions of the patient's history were reviewed and updated as appropriate: allergies, current medications, past family history, past medical history, past social history, past surgical history and problem list      Past Medical History:   Diagnosis Date    BPH (benign prostatic hyperplasia)     CAD (coronary artery disease)     Cardiac disease     [facility]     DVT [facility]deep venous thrombosis) [facility]     Fracture of lumbar spine ([facility]     Glaucoma     Hyperlipidemia     Hypertension     [facility]     PUD (peptic ulcer disease)        Past Surgical History:   Procedure Laterality Date    CHOLECYSTECTOMY      CORONARY ARTERY BYPASS GRAFT      CORONARY ARTERY BYPASS GRAFT      CORONARY STENT PLACEMENT      EYE SURGERY      HERNIA REPAIR Right 11/3/2017    Procedure: REPAIR HERNIA INGUINAL;  Surgeon: Rickie Shrestha MD;  Location: [facility];  Service: General    [place] CYSTOURETHROSCOPY W/IRRIG & EVAC CLOTS N/A 6/30/2018    Procedure: CYSTOSCOPY EVACUATION OF CLOTS, fulgeration;  Surgeon: Juan Manuel Meneses MD;  Location:  Main OR;  Service: Urology    STOMACH SURGERY      Billroth 2 gastrectomy    TRANSURETHRAL RESECTION OF PROSTATE      VENA CAVA FILTER PLACEMENT         Family History   Problem Relation Age of Onset    Coronary artery disease Brother          Medications have been verified  Objective   /60   Pulse 70   Temp (!) 96 °F (35 6 °C) (Tympanic)   Resp 16   Ht 5' 5" (1 651 m)   Wt 67 6 kg (149 lb)   SpO2 98%   BMI 24 79 kg/m²   No LMP for male patient  Physical Exam     Physical Exam  Vitals signs and nursing note reviewed  Constitutional:       General: He is not in acute distress  Appearance: Normal appearance  He is normal weight  He is not ill-appearing, toxic-appearing or diaphoretic  HENT:      Head: Normocephalic and atraumatic  Eyes:      General:         Right eye: No discharge  Left eye: No discharge  Conjunctiva/sclera: Conjunctivae normal    Cardiovascular:      Rate and Rhythm: Normal rate and regular rhythm  Heart sounds: Murmur (Systolic ejection) present  Pulmonary:      Effort: Pulmonary effort is normal  No respiratory distress  Breath sounds: Normal breath sounds  No wheezing, rhonchi or rales  Abdominal:      General: Abdomen is flat  Palpations: There is no mass  Tenderness: There is abdominal tenderness  There is no guarding  Genitourinary:     Comments: Small external hemorrhoids  No active bleeding  Skin:     General: Skin is warm  Findings: No erythema  Neurological:      General: No focal deficit present  Mental Status: He is alert and oriented to person, place, and time  Psychiatric:         Mood and Affect: Mood normal          Behavior: Behavior normal          Thought Content:  Thought content normal          Judgment: Judgment normal

## 2021-06-15 ENCOUNTER — LAB (OUTPATIENT)
Dept: LAB | Facility: CLINIC | Age: 86
End: 2021-06-15
Payer: MEDICARE

## 2021-06-15 ENCOUNTER — OFFICE VISIT (OUTPATIENT)
Dept: GASTROENTEROLOGY | Facility: CLINIC | Age: 86
End: 2021-06-15
Payer: MEDICARE

## 2021-06-15 VITALS
SYSTOLIC BLOOD PRESSURE: 104 MMHG | WEIGHT: 148 LBS | BODY MASS INDEX: 24.66 KG/M2 | HEART RATE: 81 BPM | HEIGHT: 65 IN | DIASTOLIC BLOOD PRESSURE: 55 MMHG

## 2021-06-15 DIAGNOSIS — K62.5 RECTAL BLEEDING: ICD-10-CM

## 2021-06-15 DIAGNOSIS — K62.5 RECTAL BLEEDING: Primary | ICD-10-CM

## 2021-06-15 DIAGNOSIS — K92.1 HEMATOCHEZIA: ICD-10-CM

## 2021-06-15 LAB
ERYTHROCYTE [DISTWIDTH] IN BLOOD BY AUTOMATED COUNT: 13.4 % (ref 11.6–15.1)
HCT VFR BLD AUTO: 35.3 % (ref 36.5–49.3)
HGB BLD-MCNC: 11.1 G/DL (ref 12–17)
MCH RBC QN AUTO: 29.9 PG (ref 26.8–34.3)
MCHC RBC AUTO-ENTMCNC: 31.4 G/DL (ref 31.4–37.4)
MCV RBC AUTO: 95 FL (ref 82–98)
PLATELET # BLD AUTO: 140 THOUSANDS/UL (ref 149–390)
PMV BLD AUTO: 10.8 FL (ref 8.9–12.7)
RBC # BLD AUTO: 3.71 MILLION/UL (ref 3.88–5.62)
WBC # BLD AUTO: 5.54 THOUSAND/UL (ref 4.31–10.16)

## 2021-06-15 PROCEDURE — 36415 COLL VENOUS BLD VENIPUNCTURE: CPT

## 2021-06-15 PROCEDURE — 85027 COMPLETE CBC AUTOMATED: CPT

## 2021-06-15 PROCEDURE — 99204 OFFICE O/P NEW MOD 45 MIN: CPT | Performed by: INTERNAL MEDICINE

## 2021-06-15 RX ORDER — METHYLPREDNISOLONE 4 MG/1
TABLET ORAL
COMMUNITY
Start: 2021-06-07 | End: 2021-11-03 | Stop reason: HOSPADM

## 2021-06-15 NOTE — PROGRESS NOTES
[facility] - Outpatient Consultation  Payton Alvarado 80 y o  male MRN: [id number]  Encounter: [id number]        ASSESSMENT AND PLAN:      1  Rectal bleeding   bleeding Most likey hemorrhoidal and  Hemodynamically insignificant at this point, but exacerbated by use of clopidogrel  Other etiologies cannot be ruled out, however  We discussed risk of continued or worsening bleeding with Plavix use versus risk of thrombosis if this is held  I will have him continue Plavix for now and contact his cardiologist to determine risk of holding this if bleeding worsens or persists  Will tentatively plan for further evaluation with colonoscopy, though if symptoms resolve may hold off on this  Will try increasing dietary fiber if tolerated or use MiraLax otherwise  Follow-up here in a few weeks  Will present to the emergency room if symptoms worsen    - CBC; Future    ______________________________________________________________________    HPI:   Patient with history of internal hemorrhoids status post hemorrhoidectomy as well as  prior peptic ulcer disease status post partial gastrectomy, coronary artery disease status post CABG and DVT and embolism, currently on Plavix, who presents with 3-4 days of bright red hematochezia  This has occurred with bowel movements and has actually improved somewhat over that time  He was seen at the urgent care center and sent here for further evaluation  Has had no abdominal pain, nausea vomiting, fever chills, jaundice or rashes  Has had some constipation recently as well as increased flatulence  Has had an 8 lb weight loss over the past month or 2  Last colonoscopy was in 2014 and unremarkable  Does have a history of polyps prior to this  REVIEW OF SYSTEMS:    Review of Systems   Constitutional: Positive for weight loss  Negative for chills, diaphoresis, fever and malaise/fatigue     Cardiovascular: Negative for chest pain, dyspnea on exertion, irregular heartbeat and palpitations  Respiratory: Negative for cough           Historical Information   Past Medical History:   Diagnosis Date    BPH (benign prostatic hyperplasia)     CAD (coronary artery disease)     Cardiac disease     Cervical spine fracture (HCC)     DVT (deep venous thrombosis) (HCC)     Fracture of lumbar spine (HCC)     Glaucoma     Hyperlipidemia     Hypertension     Myocardial infarct (HCC)     PUD (peptic ulcer disease)      Past Surgical History:   Procedure Laterality Date    CHOLECYSTECTOMY      CORONARY ARTERY BYPASS GRAFT      CORONARY ARTERY BYPASS GRAFT      CORONARY STENT PLACEMENT      EYE SURGERY      HERNIA REPAIR Right 11/3/2017    Procedure: REPAIR HERNIA INGUINAL;  Surgeon: Eva Rodriguez MD;  Location: [address] [place] [address];  Service: General    [place] CYSTOURETHROSCOPY W/IRRIG & EVAC CLOTS N/A 6/30/2018    Procedure: CYSTOSCOPY EVACUATION OF CLOTS, fulgeration;  Surgeon: Jac Barajas MD;  [facility];  Service: Urology    STOMACH SURGERY      Billroth 2 gastrectomy    TRANSURETHRAL RESECTION OF PROSTATE      VENA CAVA FILTER PLACEMENT       Social History   Social History     Substance and Sexual Activity   Alcohol Use Never     Social History     Substance and Sexual Activity   Drug Use No     Social History     Tobacco Use   Smoking Status Never Smoker   Smokeless Tobacco Never Used     Family History   Problem Relation Age of Onset    Coronary artery disease Brother        Meds/Allergies       Current Outpatient Medications:     ALPRAZolam (XANAX) 0 5 mg tablet    Calcium Carbonate-Vitamin D (CALCIUM 600+D PO)    cholecalciferol (VITAMIN D3) 400 units tablet    clopidogrel (PLAVIX) 75 mg tablet    dutasteride (AVODART) 0 5 mg capsule    escitalopram (LEXAPRO) 10 mg tablet    escitalopram (LEXAPRO) 5 mg tablet    ezetimibe (ZETIA) 10 mg tablet    famotidine (PEPCID) 20 mg tablet    isosorbide mononitrate (IMDUR) 30 mg 24 hr tablet   Magnesium 250 MG TABS    methylprednisolone (MEDROL) 4 mg tablet    methylPREDNISolone 4 MG tablet therapy pack    metoprolol succinate (TOPROL-XL) 25 mg 24 hr tablet    multivitamin-iron-minerals-folic acid (CENTRUM) chewable tablet    nitroglycerin (NITROSTAT) 0 4 mg SL tablet    ranolazine (RANEXA) 500 mg 12 hr tablet    rosuvastatin (CRESTOR) 5 mg tablet    tamsulosin (Flomax) 0 4 mg    zinc gluconate 50 mg tablet    Allergies   Allergen Reactions    Oxycodone-Acetaminophen Dizziness and Shortness Of Breath     Other reaction(s): Faints  Reaction Date: [id number]; Category: Adverse Reaction;     Omeprazole Other (See Comments)     pash    Statins Other (See Comments)     Muscle pain           Objective     Blood pressure 104/55, pulse 81, height 5' 5" (1 651 m), weight 67 1 kg (148 lb)  Body mass index is 24 63 kg/m²  PHYSICAL EXAM:      Physical Exam  Vitals and nursing note reviewed  Constitutional:       General: He is not in acute distress  HENT:      Head: Normocephalic and atraumatic  Mouth/Throat:      Mouth: Mucous membranes are moist    Eyes:      General: No scleral icterus  Pupils: Pupils are equal, round, and reactive to light  Cardiovascular:      Rate and Rhythm: Normal rate and regular rhythm  Pulmonary:      Effort: Pulmonary effort is normal  No respiratory distress  Abdominal:      General: There is no distension  Palpations: Abdomen is soft  Genitourinary:     Comments: External  Skin tags  No pain or discomfort with rectal exam   No mass  Maroon stool present  Anoscopy performed with grade 2 internal hemorrhoids without active bleeding  Musculoskeletal:         General: Normal range of motion  Cervical back: Normal range of motion and neck supple  Skin:     General: Skin is warm and dry  Neurological:      General: No focal deficit present  Mental Status: He is alert and oriented to person, place, and time     Psychiatric: Mood and Affect: Mood normal          Behavior: Behavior normal               Lab Results:   No visits with results within 1 Day(s) from this visit  Latest known visit with results is:   Office Visit on 05/28/2021   Component Date Value    LEUKOCYTE ESTERASE,UA 05/28/2021 small     NITRITE,UA 05/28/2021 -     SL AMB POCT UROBILINOGEN 05/28/2021 0 2     POCT URINE PROTEIN 05/28/2021 -      PH,UA 05/28/2021 5 0     BLOOD,UA 05/28/2021 -     SPECIFIC GRAVITY,UA 05/28/2021 1 025     KETONES,UA 05/28/2021 -     BILIRUBIN,UA 05/28/2021 -     GLUCOSE, UA 05/28/2021 -      COLOR,UA 05/28/2021 dark queen     CLARITY,UA 05/28/2021 clear     POST-VOID RESIDUAL VOLUM* 05/28/2021 0     Clarity, UA 05/28/2021 Cloudy     Color, UA 05/28/2021 Red     Specific Gravity, UA 05/28/2021 1 025     pH, UA 05/28/2021 5 5     Glucose, UA 05/28/2021 Negative     Ketones, UA 05/28/2021 Trace*    Blood, UA 05/28/2021 Negative     Protein, UA 05/28/2021 Trace*    Nitrite, UA 05/28/2021 Positive*    Bilirubin, UA 05/28/2021 Small*    Urobilinogen, UA 05/28/2021 0 2     Leukocytes, UA 05/28/2021 Moderate*    WBC, UA 05/28/2021 10-20*    RBC, UA 05/28/2021 None Seen     Bacteria, UA 05/28/2021 Occasional     AMORPH URATES 05/28/2021 Moderate     Epithelial Cells 05/28/2021 Occasional     Urine Culture 05/28/2021 No Growth <1000 cfu/mL          Radiology Results:   No results found

## 2021-06-18 DIAGNOSIS — N40.0 BENIGN PROSTATIC HYPERPLASIA, UNSPECIFIED WHETHER LOWER URINARY TRACT SYMPTOMS PRESENT: ICD-10-CM

## 2021-06-18 NOTE — TELEPHONE ENCOUNTER
Patient left a message on the Medication Refill voice mail line requesting a new prescription for tamsulosin 0 4 mg, Qty 90  please forward to [facility] on file   Thank you

## 2021-06-21 RX ORDER — TAMSULOSIN HYDROCHLORIDE 0.4 MG/1
0.4 CAPSULE ORAL
Qty: 90 CAPSULE | Refills: 3 | Status: SHIPPED | OUTPATIENT
Start: 2021-06-21 | End: 2022-06-15 | Stop reason: SDUPTHER

## 2021-06-22 NOTE — RESULT ENCOUNTER NOTE
Please call the patient regarding his result  Hemoglobin has dropped again, now 11 1  Would definitely proceed with colonoscopy as scheduled    Will recheck a hemoglobin later this week and if continues to drop will try to move procedure up sooner

## 2021-06-24 ENCOUNTER — LAB (OUTPATIENT)
Dept: LAB | Facility: CLINIC | Age: 86
End: 2021-06-24
Payer: MEDICARE

## 2021-06-24 DIAGNOSIS — K62.5 RECTAL BLEEDING: ICD-10-CM

## 2021-06-24 LAB
ERYTHROCYTE [DISTWIDTH] IN BLOOD BY AUTOMATED COUNT: 13.5 % (ref 11.6–15.1)
HCT VFR BLD AUTO: 33.2 % (ref 36.5–49.3)
HGB BLD-MCNC: 10.2 G/DL (ref 12–17)
MCH RBC QN AUTO: 29.1 PG (ref 26.8–34.3)
MCHC RBC AUTO-ENTMCNC: 30.7 G/DL (ref 31.4–37.4)
MCV RBC AUTO: 95 FL (ref 82–98)
PLATELET # BLD AUTO: 155 THOUSANDS/UL (ref 149–390)
PMV BLD AUTO: 10.7 FL (ref 8.9–12.7)
RBC # BLD AUTO: 3.5 MILLION/UL (ref 3.88–5.62)
WBC # BLD AUTO: 4.19 THOUSAND/UL (ref 4.31–10.16)

## 2021-06-24 PROCEDURE — 36415 COLL VENOUS BLD VENIPUNCTURE: CPT

## 2021-06-24 PROCEDURE — 85027 COMPLETE CBC AUTOMATED: CPT

## 2021-07-09 ENCOUNTER — OFFICE VISIT (OUTPATIENT)
Dept: CARDIOLOGY CLINIC | Facility: CLINIC | Age: 86
End: 2021-07-09
Payer: MEDICARE

## 2021-07-09 VITALS
BODY MASS INDEX: 25.33 KG/M2 | TEMPERATURE: 98.5 F | OXYGEN SATURATION: 97 % | DIASTOLIC BLOOD PRESSURE: 58 MMHG | SYSTOLIC BLOOD PRESSURE: 122 MMHG | WEIGHT: 152 LBS | HEIGHT: 65 IN | HEART RATE: 68 BPM

## 2021-07-09 DIAGNOSIS — D63.8 ANEMIA OF CHRONIC DISEASE: ICD-10-CM

## 2021-07-09 DIAGNOSIS — R01.1 CARDIAC MURMUR: ICD-10-CM

## 2021-07-09 DIAGNOSIS — Z86.718 HISTORY OF DVT (DEEP VEIN THROMBOSIS): ICD-10-CM

## 2021-07-09 DIAGNOSIS — I10 ESSENTIAL HYPERTENSION: ICD-10-CM

## 2021-07-09 DIAGNOSIS — E78.2 MIXED HYPERLIPIDEMIA: ICD-10-CM

## 2021-07-09 DIAGNOSIS — I20.8 CHRONIC STABLE ANGINA (HCC): ICD-10-CM

## 2021-07-09 DIAGNOSIS — I25.10 3-VESSEL CAD: ICD-10-CM

## 2021-07-09 DIAGNOSIS — I50.32 CHRONIC DIASTOLIC HEART FAILURE (HCC): ICD-10-CM

## 2021-07-09 DIAGNOSIS — I20.8 STABLE ANGINA PECTORIS (HCC): ICD-10-CM

## 2021-07-09 PROCEDURE — 99214 OFFICE O/P EST MOD 30 MIN: CPT | Performed by: INTERNAL MEDICINE

## 2021-07-09 PROCEDURE — 93000 ELECTROCARDIOGRAM COMPLETE: CPT | Performed by: INTERNAL MEDICINE

## 2021-07-09 NOTE — PROGRESS NOTES
Progress Note - Cardiology Office  Petr Duty 80 y o  male MRN: [id number]  : 1930  Encounter: [id number]      Assessment:     1  Chronic stable angina (HCC)    2  3-vessel CAD    3  Chronic diastolic heart failure ([facility] )    4  Essential hypertension    5  Cardiac murmur    6  History of DVT (deep vein thrombosis)    7  Stable angina pectoris ([facility] )    8  Mixed hyperlipidemia    9  Anemia of chronic disease        Discussion Summary and Plan:  1  Coronary artery disease status post CABG and stents with multiple cardiac catheterization a year ago ago in [facility] by me and it was recommended medical therapy  Patient has diffuse 3 vessel disease with poor targets  Patient still occasionally gets symptoms of chest pain but has been under control  His prescription for nitro was renewed  Will continue beta-blockers but will increase the dose to 25 mg 3 times daily  Continue Imdur  No other changes in his medication at this time he will take Plavix every other day to avoid recurrent bleeding  He need to have some antiplatelet agent as he cannot take aspirin  2  Chronic stable angina  As mentioned above history of chronic stable angina  Beta-blocker increase he is taking Ranexa and Imdur  Continue same medication  Try to keep hemoglobin above 10 that will help with angina also  3  Generalized anxiety  Patient gets anxious very easily  He is on Xanax as needed  As per his wife he has always negative in his thinking  4  Dyslipidemia  Patient has history of severe three-vessel disease  He was tried on multiple statins he did not take it now he is willing to take statins  He is taking Crestor 5 mg daily  He is not taking Zetia  He is only on Crestor 5 mg daily  Continue current Rx      5  Chronic diastolic heart failure [place] Heart Association class 2  No more admission to hospital with heart failure  He has slowly stop taking his diuretics needed to use only p r n  Latest echo shows his EF has improved to 50-55% he does have moderate MR and moderate aortic stenosis  He is not on ACE-inhibitor due to low blood pressure and he is not using diuretics as he has no now swelling and shortness of breath  Will continue same Rx and update echo Doppler      6  Benign prostate hypertrophy status post surgery  Currently stable  7   Ischemic cardiomyopathy EF around  50%     Metoprolol XL total dose 75 mg daily  He takes 25 mg 3 times daily due to low blood pressure  8   History of peptic ulcer disease  He was previously on Protonix  He is no longer taking aspirin as it causes ulceration  He can take Plavix every other day  He is okay with that plan  9  Cardiac murmur  Cardiac murmur with history of Moderate aortic stenosis with AI and have moderate MR  Will update echo Doppler  Continue current other Rx as before  Will decrease Plavix to every other day  He need to be on 1 antiplatelet agents  Echo Doppler ordered continue other medication as before  EKG shows no changes from previous EKG  Counseling :  A description of the counseling  Spoke to patient about chronic stable angina, coronary artery disease, dyslipidemia, diastolic heart failure at length  Diet advised  Patient's ability to self care: Yes  Medication side effect reviewed with patient in detail and all their questions answered to their satisfaction  HPI :     Jesica Arriaga is a 80y o  year old male who came for follow up  Patient has a past medical history significant for coronary artery disease status post CABG status post stent into his MCKENNA to LAD widely patent, SVG vein graft to 100% occluded and circumflex was severely diffusely disease with severe diffuse distal disease, chronic stable angina, status post MI anxiety, GERD, dyslipidemia who came for follow-up  was admitted to [facility] with chronic diastolic heart failure and is now compensated   He denies any chest pain or any shortness of breath  Occasionally feel dizzy in the morning  Has been on Demadex 20 mg and potassium 20 mg daily  All medications reviewed with him       03/26/2021  Above reviewed  Patient came for follow-up  He occasionally get episodes of chest pain  He was in the emergency room and Hospital on 02/26/2021 with chest pain troponins were negative EKG shows no changes  He had a complex medical history of coronary artery disease status post CABG x6 with stents, ischemic cardiomyopathy with EF now around 45 50%, history of GI bleed, history of intolerant to aspirin on Plavix, history of intolerance to high dose of statin on low-dose Crestor and Zetia who came for follow-up  He had history of chronic stable angina and he get multiple episodes of chest pain and he has diffuse coronary artery disease which is not amenable to percutaneous technique  He is doing well  His note from Hospital reviewed troponins were negative he had blood test done which were acceptable  His cholesterol profile 02/26/2021 was acceptable  LFTs were acceptable  07/09/2021  Above reviewed  Patient came for follow-up  He has recently lower GI bleed  It was thought to be hemorrhoidal bleed by the GI he is on antiplatelet agent  He could not tolerate aspirin he is on Plavix he does have history of coronary artery disease with CABG and stents  Though there were many years ago he cannot take aspirin is a he has been on Plavix  He will take it every other day now  He does have history of cardiomyopathy with EF 45-50%, history of intolerance to aspirin, history of intolerance to high dose of Crestor and currently on low-dose Crestor and Zetia history of chronic stable angina and anxiety  He is doing well he has bleeding has also improved  He denies any chest pain shortness of breath reviewed blood test shows his hemoglobin is around 10 2  He has noted no further bleeding    No nausea no vomiting no chest pain no other cardiovascular issues  He has decently active  Review of Systems   Constitutional: Negative for activity change, chills, diaphoresis, fever and unexpected weight change  HENT: Negative for congestion  Eyes: Negative for discharge and redness  Respiratory: Negative for cough, chest tightness, shortness of breath and wheezing  Cardiovascular: Negative  Negative for chest pain, palpitations and leg swelling  History of chronic stable angina currently stable   Gastrointestinal: Negative for abdominal pain, diarrhea and nausea  History of mild rectal bleed thought to be hemorrhoidal now getting better as per patient   Endocrine: Negative  Genitourinary: Negative for decreased urine volume and urgency  Musculoskeletal: Positive for arthralgias  Negative for back pain and gait problem  Skin: Negative for rash and wound  Allergic/Immunologic: Negative  Neurological: Negative for dizziness, seizures, syncope, weakness, light-headedness and headaches  Hematological: Negative  Psychiatric/Behavioral: Negative for agitation and confusion  The patient is nervous/anxious          Historical Information   Past Medical History:   Diagnosis Date    BPH (benign prostatic hyperplasia)     CAD (coronary artery disease)     Cardiac disease     Cervical spine fracture (HCC)     DVT (deep venous thrombosis) (HCC)     Fracture of lumbar spine (HCC)     Glaucoma     Hyperlipidemia     Hypertension     Myocardial infarct [facility] )     PUD (peptic ulcer disease)      Past Surgical History:   Procedure Laterality Date    CHOLECYSTECTOMY      CORONARY ARTERY BYPASS GRAFT      CORONARY ARTERY BYPASS GRAFT      CORONARY STENT PLACEMENT      EYE SURGERY      HERNIA REPAIR Right 11/3/2017    Procedure: REPAIR HERNIA INGUINAL;  Surgeon: Richard Bedolla MD;  Location: [address] [place] [address];  Service: General    [place] CYSTOURETHROSCOPY W/IRRIG & EVAC CLOTS N/A 6/30/2018    Procedure: CYSTOSCOPY EVACUATION OF CLOTS, fulgeration;  Surgeon: Madeline Samuel MD;  [facility];  Service: Urology    STOMACH SURGERY      Billroth 2 gastrectomy    TRANSURETHRAL RESECTION OF PROSTATE      VENA CAVA FILTER PLACEMENT       Social History     Substance and Sexual Activity   Alcohol Use Never     Social History     Substance and Sexual Activity   Drug Use No     Social History     Tobacco Use   Smoking Status Never Smoker   Smokeless Tobacco Never Used     Family History:   Family History   Problem Relation Age of Onset    Coronary artery disease Brother        Meds/Allergies     Allergies   Allergen Reactions    Oxycodone-Acetaminophen Dizziness and Shortness Of Breath     Other reaction(s): Faints  Reaction Date: [id number]; Category:  Adverse Reaction;     Omeprazole Other (See Comments)     pash    Statins Other (See Comments)     Muscle pain       Current Outpatient Medications:     ALPRAZolam (XANAX) 0 5 mg tablet, Take 0 5 mg by mouth daily at bedtime as needed , Disp: , Rfl:     Calcium Carbonate-Vitamin D (CALCIUM 600+D PO), Take by mouth daily, Disp: , Rfl:     cholecalciferol (VITAMIN D3) 400 units tablet, Take 400 Units by mouth daily, Disp: , Rfl:     clopidogrel (PLAVIX) 75 mg tablet, Take 1 tablet (75 mg total) by mouth daily, Disp: 90 tablet, Rfl: 3    dutasteride (AVODART) 0 5 mg capsule, Take 1 capsule (0 5 mg total) by mouth daily (Patient taking differently: Take 0 5 mg by mouth daily ), Disp: 90 capsule, Rfl: 3    escitalopram (LEXAPRO) 10 mg tablet, Take 5 mg by mouth daily , Disp: , Rfl:     escitalopram (LEXAPRO) 5 mg tablet, , Disp: , Rfl:     ezetimibe (ZETIA) 10 mg tablet, Take 1 tablet (10 mg total) by mouth daily, Disp: 30 tablet, Rfl: 3    famotidine (PEPCID) 20 mg tablet, Take 20 mg by mouth daily  , Disp: , Rfl:     isosorbide mononitrate (IMDUR) 30 mg 24 hr tablet, Take 1 tablet (30 mg total) by mouth daily, Disp: 90 tablet, Rfl: 3    Magnesium 250 MG TABS, Take 1 tablet by mouth daily, Disp: , Rfl:     metoprolol succinate (TOPROL-XL) 25 mg 24 hr tablet, Take 1 tablet (25 mg total) by mouth 3 (three) times a day, Disp:  , Rfl: 0    multivitamin-iron-minerals-folic acid (CENTRUM) chewable tablet, Chew 1 tablet daily, Disp: , Rfl:     nitroglycerin (NITROSTAT) 0 4 mg SL tablet, Place 1 tablet (0 4 mg total) under the tongue every 5 (five) minutes as needed for chest pain, Disp: 30 tablet, Rfl: 1    ranolazine (RANEXA) 500 mg 12 hr tablet, Take 1 tablet (500 mg total) by mouth 2 (two) times a day, Disp: 180 tablet, Rfl: 3    rosuvastatin (CRESTOR) 5 mg tablet, TAKE ONE TABLET BY MOUTH EVERY DAY, Disp: 90 tablet, Rfl: 3    tamsulosin (Flomax) 0 4 mg, Take 1 capsule (0 4 mg total) by mouth daily with dinner, Disp: 90 capsule, Rfl: 3    zinc gluconate 50 mg tablet, Take 25 mg by mouth daily  , Disp: , Rfl:     methylprednisolone (MEDROL) 4 mg tablet, Take 4 mg by mouth 2 (two) times a day (Patient not taking: Reported on 7/9/2021), Disp: , Rfl:     methylPREDNISolone 4 MG tablet therapy pack, , Disp: , Rfl:     Vitals: Blood pressure 122/58, pulse 68, temperature 98 5 °F (36 9 °C), height 5' 5" (1 651 m), weight 68 9 kg (152 lb), SpO2 97 %  Body mass index is 25 29 kg/m²  Vitals:    07/09/21 1506   Weight: 68 9 kg (152 lb)     BP Readings from Last 3 Encounters:   07/09/21 122/58   06/15/21 104/55   06/12/21 100/60         Physical Exam  Constitutional:       General: He is not in acute distress  Appearance: He is well-developed  He is not diaphoretic  HENT:      Head: Normocephalic and atraumatic  Eyes:      Pupils: Pupils are equal, round, and reactive to light  Neck:      Thyroid: No thyromegaly  Vascular: No JVD  Trachea: No tracheal deviation  Cardiovascular:      Rate and Rhythm: Normal rate and regular rhythm  Heart sounds: S1 normal and S2 normal  Heart sounds not distant  Murmur heard     Systolic (ejection) murmur is present with a grade of 2/6  No friction rub  No gallop  No S3 or S4 sounds  Comments: S1-S2 regular with 3/6 harsh systolic murmur  S2 is heard no softer  Pulmonary:      Effort: Pulmonary effort is normal  No respiratory distress  Breath sounds: No wheezing or rales  Comments: Bilateral air entry coarse breath sound no rhonchi no wheezing  Chest:      Chest wall: No tenderness  Abdominal:      General: Bowel sounds are normal  There is no distension  Palpations: Abdomen is soft  Tenderness: There is no abdominal tenderness  Musculoskeletal:         General: No deformity  Cervical back: Neck supple  Comments: No edema   Skin:     General: Skin is warm and dry  Coloration: Skin is not pale  Findings: No rash  Neurological:      Mental Status: He is alert and oriented to person, place, and time  Psychiatric:         Behavior: Behavior normal          Judgment: Judgment normal            Diagnostic Studies Review Cardio:     echo Doppler  Echo Doppler done 07/08/2020 shows EF around 50-55%, paradoxical septal motion due to surgery, moderate aortic stenosis and mild AI, moderate MR, trace TR  Stress Test: Nuclear stress test done 5/14/2016 shows moderate-sized inferior lateral wall ischemia and apical infarction  Ejection fraction 45%  Which was consistent with his %, vein graft to RCA is occluded, as circumflex has diffuse disease and LIMA to LAD was widely patent  Catheterization: He has multiple cardiac catheterization performed  His last cardiac catheter patient performed by me shows EF around 50%, severe diffuse disease of LAD which is totally occluded,  ECG Report:   Comparison to prior ECGs:1  No interval change1   4/24 2017  Twelve-lead EKG shows normal sinus some heart rate 72 bpm  ST changes in inferior and lateral leads which is not changed from old EKG   Cannot rule out underlying ischemialead EKG shows normal sinus rhythm heart rate 63 beats Reji Hackett MD [facility]      "This note has been constructed using a voice recognition system  Therefore there may be syntax, spelling, and/or grammatical errors   Please call if you have any questions  "

## 2021-07-16 ENCOUNTER — HOSPITAL ENCOUNTER (OUTPATIENT)
Dept: NON INVASIVE DIAGNOSTICS | Facility: HOSPITAL | Age: 86
Discharge: HOME | End: 2021-07-16
Payer: MEDICARE

## 2021-07-16 ENCOUNTER — TELEPHONE (OUTPATIENT)
Dept: PREADMISSION TESTING | Facility: HOSPITAL | Age: 86
End: 2021-07-16

## 2021-07-16 DIAGNOSIS — E78.2 MIXED HYPERLIPIDEMIA: ICD-10-CM

## 2021-07-16 DIAGNOSIS — I10 ESSENTIAL HYPERTENSION: ICD-10-CM

## 2021-07-16 DIAGNOSIS — I20.8 STABLE ANGINA PECTORIS (HCC): ICD-10-CM

## 2021-07-16 DIAGNOSIS — I25.10 3-VESSEL CAD: ICD-10-CM

## 2021-07-16 DIAGNOSIS — R01.1 CARDIAC MURMUR: ICD-10-CM

## 2021-07-16 DIAGNOSIS — I20.8 CHRONIC STABLE ANGINA (HCC): ICD-10-CM

## 2021-07-16 DIAGNOSIS — Z86.718 HISTORY OF DVT (DEEP VEIN THROMBOSIS): ICD-10-CM

## 2021-07-16 DIAGNOSIS — I50.32 CHRONIC DIASTOLIC HEART FAILURE (HCC): ICD-10-CM

## 2021-07-16 DIAGNOSIS — D63.8 ANEMIA OF CHRONIC DISEASE: ICD-10-CM

## 2021-07-16 PROCEDURE — 93306 TTE W/DOPPLER COMPLETE: CPT | Performed by: INTERNAL MEDICINE

## 2021-07-16 PROCEDURE — 93306 TTE W/DOPPLER COMPLETE: CPT

## 2021-07-19 ENCOUNTER — TELEPHONE (OUTPATIENT)
Dept: CARDIOLOGY CLINIC | Facility: CLINIC | Age: 86
End: 2021-07-19

## 2021-07-19 NOTE — TELEPHONE ENCOUNTER
----- Message from Sotero Holder MD sent at 7/19/2021  1:42 PM EDT -----  Patient's echo shows his EF is acceptable  He does have some leaky valves  Which are more than before but he seems to be doing okay on it continue medical Rx  She does have moderate aortic stenosis  Continue current Rx he should keep his appointment

## 2021-07-20 ENCOUNTER — APPOINTMENT (OUTPATIENT)
Dept: LAB | Facility: HOSPITAL | Age: 86
End: 2021-07-20
Attending: FAMILY MEDICINE
Payer: MEDICARE

## 2021-07-20 DIAGNOSIS — E78.00 PURE HYPERCHOLESTEROLEMIA: ICD-10-CM

## 2021-07-20 DIAGNOSIS — D64.9 ANEMIA, UNSPECIFIED TYPE: ICD-10-CM

## 2021-07-20 LAB
ALBUMIN SERPL BCP-MCNC: 3.3 G/DL (ref 3.5–5)
ALP SERPL-CCNC: 86 U/L (ref 46–116)
ALT SERPL W P-5'-P-CCNC: 14 U/L (ref 12–78)
AST SERPL W P-5'-P-CCNC: 6 U/L (ref 5–45)
BILIRUB DIRECT SERPL-MCNC: 0.14 MG/DL (ref 0–0.2)
BILIRUB SERPL-MCNC: 0.46 MG/DL (ref 0.2–1)
FERRITIN SERPL-MCNC: 26 NG/ML (ref 8–388)
IRON SERPL-MCNC: 36 UG/DL (ref 65–175)
PROT SERPL-MCNC: 7 G/DL (ref 6.4–8.2)

## 2021-07-20 PROCEDURE — 83540 ASSAY OF IRON: CPT

## 2021-07-20 PROCEDURE — 80076 HEPATIC FUNCTION PANEL: CPT

## 2021-07-20 PROCEDURE — 36415 COLL VENOUS BLD VENIPUNCTURE: CPT

## 2021-07-20 PROCEDURE — 82728 ASSAY OF FERRITIN: CPT

## 2021-07-21 NOTE — RESULT ENCOUNTER NOTE
Please call the patient regarding his result  Hemoglobin continues to drift down slowly  Now 10 2  Unclear if this is due to blood loss, however    Follow-up with me as scheduled

## 2021-07-21 NOTE — RESULT ENCOUNTER NOTE
Spoke with pt  He said at his age he does not want colon  He said his other drs agreed with him  I cancelled appt

## 2021-08-09 DIAGNOSIS — I20.8 CHRONIC STABLE ANGINA (HCC): ICD-10-CM

## 2021-08-09 RX ORDER — RANOLAZINE 500 MG/1
500 TABLET, EXTENDED RELEASE ORAL 2 TIMES DAILY
Qty: 180 TABLET | Refills: 3 | Status: SHIPPED | OUTPATIENT
Start: 2021-08-09 | End: 2022-07-11

## 2021-08-10 DIAGNOSIS — N40.1 BENIGN PROSTATIC HYPERPLASIA WITH URINARY RETENTION: ICD-10-CM

## 2021-08-10 DIAGNOSIS — R33.8 BENIGN PROSTATIC HYPERPLASIA WITH URINARY RETENTION: ICD-10-CM

## 2021-08-10 RX ORDER — DUTASTERIDE 0.5 MG/1
0.5 CAPSULE, LIQUID FILLED ORAL DAILY
Qty: 90 CAPSULE | Refills: 0 | Status: SHIPPED | OUTPATIENT
Start: 2021-08-10 | End: 2021-11-11 | Stop reason: SDUPTHER

## 2021-08-13 ENCOUNTER — APPOINTMENT (OUTPATIENT)
Dept: LAB | Facility: HOSPITAL | Age: 86
End: 2021-08-13
Attending: FAMILY MEDICINE
Payer: MEDICARE

## 2021-08-13 DIAGNOSIS — D64.9 RELATIVE ANEMIA: ICD-10-CM

## 2021-08-13 LAB
ERYTHROCYTE [DISTWIDTH] IN BLOOD BY AUTOMATED COUNT: 15.1 % (ref 11.6–15.1)
FERRITIN SERPL-MCNC: 30 NG/ML (ref 8–388)
HCT VFR BLD AUTO: 34.7 % (ref 36.5–49.3)
HGB BLD-MCNC: 10.4 G/DL (ref 12–17)
IRON SERPL-MCNC: 108 UG/DL (ref 65–175)
MCH RBC QN AUTO: 28 PG (ref 26.8–34.3)
MCHC RBC AUTO-ENTMCNC: 30 G/DL (ref 31.4–37.4)
MCV RBC AUTO: 94 FL (ref 82–98)
PLATELET # BLD AUTO: 167 THOUSANDS/UL (ref 149–390)
PMV BLD AUTO: 10.7 FL (ref 8.9–12.7)
RBC # BLD AUTO: 3.71 MILLION/UL (ref 3.88–5.62)
WBC # BLD AUTO: 3.74 THOUSAND/UL (ref 4.31–10.16)

## 2021-08-13 PROCEDURE — 36415 COLL VENOUS BLD VENIPUNCTURE: CPT

## 2021-08-13 PROCEDURE — 82728 ASSAY OF FERRITIN: CPT

## 2021-08-13 PROCEDURE — 83540 ASSAY OF IRON: CPT

## 2021-08-13 PROCEDURE — 85027 COMPLETE CBC AUTOMATED: CPT

## 2021-08-17 DIAGNOSIS — I20.8 CHRONIC STABLE ANGINA (HCC): ICD-10-CM

## 2021-08-17 RX ORDER — ISOSORBIDE MONONITRATE 30 MG/1
30 TABLET, EXTENDED RELEASE ORAL DAILY
Qty: 90 TABLET | Refills: 3 | Status: SHIPPED | OUTPATIENT
Start: 2021-08-17

## 2021-09-13 ENCOUNTER — APPOINTMENT (OUTPATIENT)
Dept: LAB | Facility: CLINIC | Age: 86
End: 2021-09-13
Payer: MEDICARE

## 2021-10-31 ENCOUNTER — APPOINTMENT (EMERGENCY)
Dept: RADIOLOGY | Facility: HOSPITAL | Age: 86
DRG: 392 | End: 2021-10-31
Payer: MEDICARE

## 2021-10-31 ENCOUNTER — HOSPITAL ENCOUNTER (INPATIENT)
Facility: HOSPITAL | Age: 86
LOS: 2 days | Discharge: HOME | DRG: 392 | End: 2021-11-03
Attending: EMERGENCY MEDICINE | Admitting: INTERNAL MEDICINE
Payer: MEDICARE

## 2021-10-31 DIAGNOSIS — Z95.1 HX OF CABG: ICD-10-CM

## 2021-10-31 DIAGNOSIS — I25.10 3-VESSEL CAD: ICD-10-CM

## 2021-10-31 DIAGNOSIS — F32.A DEPRESSION: ICD-10-CM

## 2021-10-31 DIAGNOSIS — I10 HYPERTENSION: Chronic | ICD-10-CM

## 2021-10-31 DIAGNOSIS — R07.9 CHEST PAIN: Primary | ICD-10-CM

## 2021-10-31 DIAGNOSIS — I25.10 CAD (CORONARY ARTERY DISEASE): ICD-10-CM

## 2021-10-31 LAB
ALBUMIN SERPL BCP-MCNC: 3.4 G/DL (ref 3.5–5)
ALP SERPL-CCNC: 82 U/L (ref 46–116)
ALT SERPL W P-5'-P-CCNC: 18 U/L (ref 12–78)
ANION GAP SERPL CALCULATED.3IONS-SCNC: 8 MMOL/L (ref 4–13)
APTT PPP: 28 SECONDS (ref 23–37)
AST SERPL W P-5'-P-CCNC: 8 U/L (ref 5–45)
BASOPHILS # BLD AUTO: 0.02 THOUSANDS/ΜL (ref 0–0.1)
BASOPHILS NFR BLD AUTO: 0 % (ref 0–1)
BILIRUB SERPL-MCNC: 0.29 MG/DL (ref 0.2–1)
BUN SERPL-MCNC: 24 MG/DL (ref 5–25)
CALCIUM ALBUM COR SERPL-MCNC: 9.2 MG/DL (ref 8.3–10.1)
CALCIUM SERPL-MCNC: 8.7 MG/DL (ref 8.3–10.1)
CHLORIDE SERPL-SCNC: 105 MMOL/L (ref 100–108)
CO2 SERPL-SCNC: 30 MMOL/L (ref 21–32)
CREAT SERPL-MCNC: 1.08 MG/DL (ref 0.6–1.3)
EOSINOPHIL # BLD AUTO: 0.06 THOUSAND/ΜL (ref 0–0.61)
EOSINOPHIL NFR BLD AUTO: 1 % (ref 0–6)
ERYTHROCYTE [DISTWIDTH] IN BLOOD BY AUTOMATED COUNT: 15.8 % (ref 11.6–15.1)
GFR SERPL CREATININE-BSD FRML MDRD: 60 ML/MIN/1.73SQ M
GLUCOSE SERPL-MCNC: 122 MG/DL (ref 65–140)
HCT VFR BLD AUTO: 38.6 % (ref 36.5–49.3)
HGB BLD-MCNC: 12 G/DL (ref 12–17)
IMM GRANULOCYTES # BLD AUTO: 0.01 THOUSAND/UL (ref 0–0.2)
IMM GRANULOCYTES NFR BLD AUTO: 0 % (ref 0–2)
INR PPP: 0.94 (ref 0.84–1.19)
LYMPHOCYTES # BLD AUTO: 1.44 THOUSANDS/ΜL (ref 0.6–4.47)
LYMPHOCYTES NFR BLD AUTO: 31 % (ref 14–44)
MCH RBC QN AUTO: 28.4 PG (ref 26.8–34.3)
MCHC RBC AUTO-ENTMCNC: 31.1 G/DL (ref 31.4–37.4)
MCV RBC AUTO: 92 FL (ref 82–98)
MONOCYTES # BLD AUTO: 0.54 THOUSAND/ΜL (ref 0.17–1.22)
MONOCYTES NFR BLD AUTO: 12 % (ref 4–12)
NEUTROPHILS # BLD AUTO: 2.61 THOUSANDS/ΜL (ref 1.85–7.62)
NEUTS SEG NFR BLD AUTO: 56 % (ref 43–75)
NRBC BLD AUTO-RTO: 0 /100 WBCS
PLATELET # BLD AUTO: 158 THOUSANDS/UL (ref 149–390)
PMV BLD AUTO: 10.1 FL (ref 8.9–12.7)
POTASSIUM SERPL-SCNC: 4.2 MMOL/L (ref 3.5–5.3)
PROT SERPL-MCNC: 7 G/DL (ref 6.4–8.2)
PROTHROMBIN TIME: 12.4 SECONDS (ref 11.6–14.5)
RBC # BLD AUTO: 4.22 MILLION/UL (ref 3.88–5.62)
SODIUM SERPL-SCNC: 143 MMOL/L (ref 136–145)
TROPONIN I SERPL-MCNC: <0.02 NG/ML
WBC # BLD AUTO: 4.68 THOUSAND/UL (ref 4.31–10.16)

## 2021-10-31 PROCEDURE — 85025 COMPLETE CBC W/AUTO DIFF WBC: CPT | Performed by: EMERGENCY MEDICINE

## 2021-10-31 PROCEDURE — 85730 THROMBOPLASTIN TIME PARTIAL: CPT | Performed by: EMERGENCY MEDICINE

## 2021-10-31 PROCEDURE — 84484 ASSAY OF TROPONIN QUANT: CPT | Performed by: EMERGENCY MEDICINE

## 2021-10-31 PROCEDURE — 36415 COLL VENOUS BLD VENIPUNCTURE: CPT | Performed by: EMERGENCY MEDICINE

## 2021-10-31 PROCEDURE — 99285 EMERGENCY DEPT VISIT HI MDM: CPT

## 2021-10-31 PROCEDURE — 83735 ASSAY OF MAGNESIUM: CPT | Performed by: EMERGENCY MEDICINE

## 2021-10-31 PROCEDURE — 85610 PROTHROMBIN TIME: CPT | Performed by: EMERGENCY MEDICINE

## 2021-10-31 PROCEDURE — 80053 COMPREHEN METABOLIC PANEL: CPT | Performed by: EMERGENCY MEDICINE

## 2021-10-31 PROCEDURE — 93005 ELECTROCARDIOGRAM TRACING: CPT

## 2021-10-31 PROCEDURE — 83880 ASSAY OF NATRIURETIC PEPTIDE: CPT | Performed by: EMERGENCY MEDICINE

## 2021-10-31 PROCEDURE — 99285 EMERGENCY DEPT VISIT HI MDM: CPT | Performed by: EMERGENCY MEDICINE

## 2021-10-31 RX ORDER — SODIUM CHLORIDE 9 MG/ML
125 INJECTION, SOLUTION INTRAVENOUS CONTINUOUS
Status: DISCONTINUED | OUTPATIENT
Start: 2021-10-31 | End: 2021-11-01

## 2021-10-31 RX ORDER — MORPHINE SULFATE 4 MG/ML
4 INJECTION, SOLUTION INTRAMUSCULAR; INTRAVENOUS ONCE
Status: DISCONTINUED | OUTPATIENT
Start: 2021-11-01 | End: 2021-10-31

## 2021-10-31 RX ORDER — ASPIRIN 81 MG/1
162 TABLET, CHEWABLE ORAL ONCE
Status: COMPLETED | OUTPATIENT
Start: 2021-11-01 | End: 2021-11-01

## 2021-11-01 ENCOUNTER — APPOINTMENT (EMERGENCY)
Dept: RADIOLOGY | Facility: HOSPITAL | Age: 86
DRG: 392 | End: 2021-11-01
Payer: MEDICARE

## 2021-11-01 PROBLEM — J18.9 PNEUMONIA: Status: ACTIVE | Noted: 2021-11-01

## 2021-11-01 PROBLEM — Z86.79 HX OF ATRIAL FIBRILLATION, NO CURRENT MEDICATION: Status: ACTIVE | Noted: 2021-11-01

## 2021-11-01 PROBLEM — Z95.1 HX OF CABG: Status: ACTIVE | Noted: 2021-11-01

## 2021-11-01 PROBLEM — R06.02 SHORTNESS OF BREATH: Status: ACTIVE | Noted: 2021-11-01

## 2021-11-01 LAB
BACTERIA UR QL AUTO: ABNORMAL /HPF
BILIRUB UR QL STRIP: NEGATIVE
CLARITY UR: ABNORMAL
COLOR UR: YELLOW
GLUCOSE UR STRIP-MCNC: NEGATIVE MG/DL
HGB UR QL STRIP.AUTO: NEGATIVE
KETONES UR STRIP-MCNC: NEGATIVE MG/DL
LEUKOCYTE ESTERASE UR QL STRIP: ABNORMAL
MAGNESIUM SERPL-MCNC: 2.2 MG/DL (ref 1.6–2.6)
NITRITE UR QL STRIP: POSITIVE
NON-SQ EPI CELLS URNS QL MICRO: ABNORMAL /HPF
NT-PROBNP SERPL-MCNC: 818 PG/ML
PH UR STRIP.AUTO: 6 [PH]
PROT UR STRIP-MCNC: NEGATIVE MG/DL
RBC #/AREA URNS AUTO: ABNORMAL /HPF
SP GR UR STRIP.AUTO: 1.01 (ref 1–1.03)
TROPONIN I SERPL-MCNC: 0.03 NG/ML
TROPONIN I SERPL-MCNC: <0.02 NG/ML
UROBILINOGEN UR QL STRIP.AUTO: 0.2 E.U./DL
WBC #/AREA URNS AUTO: ABNORMAL /HPF

## 2021-11-01 PROCEDURE — 96375 TX/PRO/DX INJ NEW DRUG ADDON: CPT

## 2021-11-01 PROCEDURE — 74174 CTA ABD&PLVS W/CONTRAST: CPT

## 2021-11-01 PROCEDURE — 96361 HYDRATE IV INFUSION ADD-ON: CPT

## 2021-11-01 PROCEDURE — 71275 CT ANGIOGRAPHY CHEST: CPT

## 2021-11-01 PROCEDURE — 36415 COLL VENOUS BLD VENIPUNCTURE: CPT | Performed by: EMERGENCY MEDICINE

## 2021-11-01 PROCEDURE — 84484 ASSAY OF TROPONIN QUANT: CPT | Performed by: NURSE PRACTITIONER

## 2021-11-01 PROCEDURE — 93005 ELECTROCARDIOGRAM TRACING: CPT

## 2021-11-01 PROCEDURE — 81001 URINALYSIS AUTO W/SCOPE: CPT | Performed by: INTERNAL MEDICINE

## 2021-11-01 PROCEDURE — G1004 CDSM NDSC: HCPCS

## 2021-11-01 PROCEDURE — 99222 1ST HOSP IP/OBS MODERATE 55: CPT | Performed by: INTERNAL MEDICINE

## 2021-11-01 PROCEDURE — 99219 PR INITIAL OBSERVATION CARE/DAY 50 MINUTES: CPT | Performed by: INTERNAL MEDICINE

## 2021-11-01 PROCEDURE — 84484 ASSAY OF TROPONIN QUANT: CPT | Performed by: EMERGENCY MEDICINE

## 2021-11-01 PROCEDURE — 96374 THER/PROPH/DIAG INJ IV PUSH: CPT

## 2021-11-01 PROCEDURE — 71045 X-RAY EXAM CHEST 1 VIEW: CPT

## 2021-11-01 RX ORDER — METOPROLOL SUCCINATE 25 MG/1
25 TABLET, EXTENDED RELEASE ORAL 3 TIMES DAILY
Status: DISCONTINUED | OUTPATIENT
Start: 2021-11-01 | End: 2021-11-02

## 2021-11-01 RX ORDER — NITROGLYCERIN 0.4 MG/1
0.4 TABLET SUBLINGUAL
Status: DISCONTINUED | OUTPATIENT
Start: 2021-11-01 | End: 2021-11-01

## 2021-11-01 RX ORDER — ALPRAZOLAM 0.5 MG/1
0.5 TABLET ORAL
Status: DISCONTINUED | OUTPATIENT
Start: 2021-11-01 | End: 2021-11-03 | Stop reason: HOSPADM

## 2021-11-01 RX ORDER — NITROGLYCERIN 0.4 MG/1
0.4 TABLET SUBLINGUAL
Status: DISCONTINUED | OUTPATIENT
Start: 2021-11-01 | End: 2021-11-03 | Stop reason: HOSPADM

## 2021-11-01 RX ORDER — TAMSULOSIN HYDROCHLORIDE 0.4 MG/1
0.4 CAPSULE ORAL
Status: DISCONTINUED | OUTPATIENT
Start: 2021-11-01 | End: 2021-11-03 | Stop reason: HOSPADM

## 2021-11-01 RX ORDER — HEPARIN SODIUM 5000 [USP'U]/ML
5000 INJECTION, SOLUTION INTRAVENOUS; SUBCUTANEOUS EVERY 8 HOURS SCHEDULED
Status: DISCONTINUED | OUTPATIENT
Start: 2021-11-01 | End: 2021-11-03 | Stop reason: HOSPADM

## 2021-11-01 RX ORDER — UREA 10 %
250 LOTION (ML) TOPICAL DAILY
Status: DISCONTINUED | OUTPATIENT
Start: 2021-11-01 | End: 2021-11-03 | Stop reason: HOSPADM

## 2021-11-01 RX ORDER — ASPIRIN 81 MG/1
81 TABLET, CHEWABLE ORAL DAILY
Status: DISCONTINUED | OUTPATIENT
Start: 2021-11-02 | End: 2021-11-03 | Stop reason: HOSPADM

## 2021-11-01 RX ORDER — PRAVASTATIN SODIUM 40 MG
40 TABLET ORAL
Status: DISCONTINUED | OUTPATIENT
Start: 2021-11-01 | End: 2021-11-03 | Stop reason: HOSPADM

## 2021-11-01 RX ORDER — ZINC SULFATE 50(220)MG
220 CAPSULE ORAL DAILY
Status: DISCONTINUED | OUTPATIENT
Start: 2021-11-01 | End: 2021-11-03 | Stop reason: HOSPADM

## 2021-11-01 RX ORDER — MAGNESIUM HYDROXIDE/ALUMINUM HYDROXICE/SIMETHICONE 120; 1200; 1200 MG/30ML; MG/30ML; MG/30ML
30 SUSPENSION ORAL EVERY 6 HOURS PRN
Status: DISCONTINUED | OUTPATIENT
Start: 2021-11-01 | End: 2021-11-03 | Stop reason: HOSPADM

## 2021-11-01 RX ORDER — FUROSEMIDE 10 MG/ML
10 INJECTION INTRAMUSCULAR; INTRAVENOUS ONCE
Status: COMPLETED | OUTPATIENT
Start: 2021-11-01 | End: 2021-11-01

## 2021-11-01 RX ORDER — RANOLAZINE 500 MG/1
500 TABLET, EXTENDED RELEASE ORAL 2 TIMES DAILY
Status: DISCONTINUED | OUTPATIENT
Start: 2021-11-01 | End: 2021-11-03 | Stop reason: HOSPADM

## 2021-11-01 RX ORDER — EZETIMIBE 10 MG/1
10 TABLET ORAL DAILY
Status: DISCONTINUED | OUTPATIENT
Start: 2021-11-01 | End: 2021-11-02

## 2021-11-01 RX ORDER — FAMOTIDINE 20 MG/1
20 TABLET, FILM COATED ORAL DAILY
Status: DISCONTINUED | OUTPATIENT
Start: 2021-11-01 | End: 2021-11-03 | Stop reason: HOSPADM

## 2021-11-01 RX ORDER — FINASTERIDE 5 MG/1
5 TABLET, FILM COATED ORAL DAILY
Status: DISCONTINUED | OUTPATIENT
Start: 2021-11-01 | End: 2021-11-03 | Stop reason: HOSPADM

## 2021-11-01 RX ORDER — CLOPIDOGREL BISULFATE 75 MG/1
75 TABLET ORAL DAILY
Status: DISCONTINUED | OUTPATIENT
Start: 2021-11-01 | End: 2021-11-03 | Stop reason: HOSPADM

## 2021-11-01 RX ORDER — ISOSORBIDE MONONITRATE 30 MG/1
30 TABLET, EXTENDED RELEASE ORAL DAILY
Status: DISCONTINUED | OUTPATIENT
Start: 2021-11-01 | End: 2021-11-03 | Stop reason: HOSPADM

## 2021-11-01 RX ORDER — ESCITALOPRAM OXALATE 5 MG/1
5 TABLET ORAL DAILY
Status: DISCONTINUED | OUTPATIENT
Start: 2021-11-01 | End: 2021-11-02

## 2021-11-01 RX ADMIN — NITROGLYCERIN 0.5 INCH: 20 OINTMENT TOPICAL at 01:38

## 2021-11-01 RX ADMIN — METOPROLOL SUCCINATE 25 MG: 25 TABLET, EXTENDED RELEASE ORAL at 17:07

## 2021-11-01 RX ADMIN — ZINC SULFATE 220 MG (50 MG) CAPSULE 220 MG: CAPSULE at 10:28

## 2021-11-01 RX ADMIN — FAMOTIDINE 40 MG: 10 INJECTION, SOLUTION INTRAVENOUS at 00:51

## 2021-11-01 RX ADMIN — ESCITALOPRAM 5 MG: 5 TABLET, FILM COATED ORAL at 10:27

## 2021-11-01 RX ADMIN — Medication 250 MG: at 10:28

## 2021-11-01 RX ADMIN — PRAVASTATIN SODIUM 40 MG: 40 TABLET ORAL at 17:07

## 2021-11-01 RX ADMIN — SODIUM CHLORIDE 125 ML/HR: 0.9 INJECTION, SOLUTION INTRAVENOUS at 01:01

## 2021-11-01 RX ADMIN — HEPARIN SODIUM 5000 UNITS: 5000 INJECTION INTRAVENOUS; SUBCUTANEOUS at 17:07

## 2021-11-01 RX ADMIN — METOPROLOL SUCCINATE 25 MG: 25 TABLET, EXTENDED RELEASE ORAL at 10:29

## 2021-11-01 RX ADMIN — RANOLAZINE 500 MG: 500 TABLET, EXTENDED RELEASE ORAL at 10:27

## 2021-11-01 RX ADMIN — FUROSEMIDE 10 MG: 10 INJECTION, SOLUTION INTRAMUSCULAR; INTRAVENOUS at 12:42

## 2021-11-01 RX ADMIN — CLOPIDOGREL BISULFATE 75 MG: 75 TABLET ORAL at 10:28

## 2021-11-01 RX ADMIN — ISOSORBIDE MONONITRATE 30 MG: 30 TABLET, EXTENDED RELEASE ORAL at 10:28

## 2021-11-01 RX ADMIN — RANOLAZINE 500 MG: 500 TABLET, EXTENDED RELEASE ORAL at 17:07

## 2021-11-01 RX ADMIN — FINASTERIDE 5 MG: 5 TABLET, FILM COATED ORAL at 10:27

## 2021-11-01 RX ADMIN — MORPHINE SULFATE 2 MG: 2 INJECTION, SOLUTION INTRAMUSCULAR; INTRAVENOUS at 01:42

## 2021-11-01 RX ADMIN — IOHEXOL 70 ML: 350 INJECTION, SOLUTION INTRAVENOUS at 01:24

## 2021-11-01 RX ADMIN — FAMOTIDINE 20 MG: 20 TABLET ORAL at 10:28

## 2021-11-01 RX ADMIN — ASPIRIN 81 MG CHEWABLE TABLET 162 MG: 81 TABLET CHEWABLE at 00:50

## 2021-11-01 RX ADMIN — HEPARIN SODIUM 5000 UNITS: 5000 INJECTION INTRAVENOUS; SUBCUTANEOUS at 10:36

## 2021-11-01 RX ADMIN — METOPROLOL SUCCINATE 25 MG: 25 TABLET, EXTENDED RELEASE ORAL at 21:12

## 2021-11-01 RX ADMIN — TAMSULOSIN HYDROCHLORIDE 0.4 MG: 0.4 CAPSULE ORAL at 17:06

## 2021-11-01 RX ADMIN — EZETIMIBE 10 MG: 10 TABLET ORAL at 10:27

## 2021-11-02 PROBLEM — R13.10 DYSPHAGIA: Status: ACTIVE | Noted: 2021-11-02

## 2021-11-02 LAB
ALBUMIN SERPL BCP-MCNC: 3.1 G/DL (ref 3.5–5)
ALP SERPL-CCNC: 86 U/L (ref 46–116)
ALT SERPL W P-5'-P-CCNC: 17 U/L (ref 12–78)
ANION GAP SERPL CALCULATED.3IONS-SCNC: 11 MMOL/L (ref 4–13)
AST SERPL W P-5'-P-CCNC: 7 U/L (ref 5–45)
ATRIAL RATE: 89 BPM
ATRIAL RATE: 90 BPM
BASOPHILS # BLD AUTO: 0.01 THOUSANDS/ΜL (ref 0–0.1)
BASOPHILS NFR BLD AUTO: 0 % (ref 0–1)
BILIRUB SERPL-MCNC: 0.63 MG/DL (ref 0.2–1)
BUN SERPL-MCNC: 21 MG/DL (ref 5–25)
CALCIUM ALBUM COR SERPL-MCNC: 9.2 MG/DL (ref 8.3–10.1)
CALCIUM SERPL-MCNC: 8.5 MG/DL (ref 8.3–10.1)
CHLORIDE SERPL-SCNC: 101 MMOL/L (ref 100–108)
CHOLEST SERPL-MCNC: 144 MG/DL (ref 50–200)
CO2 SERPL-SCNC: 25 MMOL/L (ref 21–32)
CREAT SERPL-MCNC: 1.08 MG/DL (ref 0.6–1.3)
EOSINOPHIL # BLD AUTO: 0.05 THOUSAND/ΜL (ref 0–0.61)
EOSINOPHIL NFR BLD AUTO: 1 % (ref 0–6)
ERYTHROCYTE [DISTWIDTH] IN BLOOD BY AUTOMATED COUNT: 15.4 % (ref 11.6–15.1)
GFR SERPL CREATININE-BSD FRML MDRD: 60 ML/MIN/1.73SQ M
GLUCOSE SERPL-MCNC: 120 MG/DL (ref 65–140)
HCT VFR BLD AUTO: 37.6 % (ref 36.5–49.3)
HDLC SERPL-MCNC: 41 MG/DL
HGB BLD-MCNC: 11.9 G/DL (ref 12–17)
IMM GRANULOCYTES # BLD AUTO: 0.02 THOUSAND/UL (ref 0–0.2)
IMM GRANULOCYTES NFR BLD AUTO: 0 % (ref 0–2)
LDLC SERPL CALC-MCNC: 80 MG/DL (ref 0–100)
LYMPHOCYTES # BLD AUTO: 1.18 THOUSANDS/ΜL (ref 0.6–4.47)
LYMPHOCYTES NFR BLD AUTO: 23 % (ref 14–44)
MCH RBC QN AUTO: 28.6 PG (ref 26.8–34.3)
MCHC RBC AUTO-ENTMCNC: 31.6 G/DL (ref 31.4–37.4)
MCV RBC AUTO: 90 FL (ref 82–98)
MONOCYTES # BLD AUTO: 0.4 THOUSAND/ΜL (ref 0.17–1.22)
MONOCYTES NFR BLD AUTO: 8 % (ref 4–12)
NEUTROPHILS # BLD AUTO: 3.5 THOUSANDS/ΜL (ref 1.85–7.62)
NEUTS SEG NFR BLD AUTO: 68 % (ref 43–75)
NRBC BLD AUTO-RTO: 0 /100 WBCS
P AXIS: 94 DEGREES
P AXIS: 99 DEGREES
PLATELET # BLD AUTO: 144 THOUSANDS/UL (ref 149–390)
PMV BLD AUTO: 10.2 FL (ref 8.9–12.7)
POTASSIUM SERPL-SCNC: 4.2 MMOL/L (ref 3.5–5.3)
PR INTERVAL: 206 MS
PR INTERVAL: 212 MS
PROT SERPL-MCNC: 6.6 G/DL (ref 6.4–8.2)
QRS AXIS: 36 DEGREES
QRS AXIS: 39 DEGREES
QRSD INTERVAL: 94 MS
QRSD INTERVAL: 98 MS
QT INTERVAL: 370 MS
QT INTERVAL: 374 MS
QTC INTERVAL: 450 MS
QTC INTERVAL: 457 MS
RBC # BLD AUTO: 4.16 MILLION/UL (ref 3.88–5.62)
SODIUM SERPL-SCNC: 137 MMOL/L (ref 136–145)
T WAVE AXIS: 200 DEGREES
T WAVE AXIS: 204 DEGREES
TRIGL SERPL-MCNC: 115 MG/DL
TROPONIN I SERPL-MCNC: <0.02 NG/ML
VENTRICULAR RATE: 89 BPM
VENTRICULAR RATE: 90 BPM
WBC # BLD AUTO: 5.16 THOUSAND/UL (ref 4.31–10.16)

## 2021-11-02 PROCEDURE — 80061 LIPID PANEL: CPT | Performed by: INTERNAL MEDICINE

## 2021-11-02 PROCEDURE — 99232 SBSQ HOSP IP/OBS MODERATE 35: CPT | Performed by: FAMILY MEDICINE

## 2021-11-02 PROCEDURE — 87086 URINE CULTURE/COLONY COUNT: CPT | Performed by: FAMILY MEDICINE

## 2021-11-02 PROCEDURE — 80053 COMPREHEN METABOLIC PANEL: CPT | Performed by: INTERNAL MEDICINE

## 2021-11-02 PROCEDURE — 85025 COMPLETE CBC W/AUTO DIFF WBC: CPT | Performed by: INTERNAL MEDICINE

## 2021-11-02 PROCEDURE — 84484 ASSAY OF TROPONIN QUANT: CPT | Performed by: FAMILY MEDICINE

## 2021-11-02 PROCEDURE — 93010 ELECTROCARDIOGRAM REPORT: CPT | Performed by: INTERNAL MEDICINE

## 2021-11-02 PROCEDURE — 99232 SBSQ HOSP IP/OBS MODERATE 35: CPT | Performed by: INTERNAL MEDICINE

## 2021-11-02 PROCEDURE — 92610 EVALUATE SWALLOWING FUNCTION: CPT

## 2021-11-02 PROCEDURE — 93005 ELECTROCARDIOGRAM TRACING: CPT

## 2021-11-02 RX ORDER — METOPROLOL SUCCINATE 25 MG/1
25 TABLET, EXTENDED RELEASE ORAL
Status: DISCONTINUED | OUTPATIENT
Start: 2021-11-03 | End: 2021-11-03 | Stop reason: HOSPADM

## 2021-11-02 RX ORDER — ESCITALOPRAM OXALATE 10 MG/1
10 TABLET ORAL DAILY
Status: DISCONTINUED | OUTPATIENT
Start: 2021-11-03 | End: 2021-11-03 | Stop reason: HOSPADM

## 2021-11-02 RX ADMIN — TAMSULOSIN HYDROCHLORIDE 0.4 MG: 0.4 CAPSULE ORAL at 16:58

## 2021-11-02 RX ADMIN — HEPARIN SODIUM 5000 UNITS: 5000 INJECTION INTRAVENOUS; SUBCUTANEOUS at 21:19

## 2021-11-02 RX ADMIN — ISOSORBIDE MONONITRATE 30 MG: 30 TABLET, EXTENDED RELEASE ORAL at 10:05

## 2021-11-02 RX ADMIN — PRAVASTATIN SODIUM 40 MG: 40 TABLET ORAL at 17:02

## 2021-11-02 RX ADMIN — FAMOTIDINE 20 MG: 20 TABLET ORAL at 10:06

## 2021-11-02 RX ADMIN — RANOLAZINE 500 MG: 500 TABLET, EXTENDED RELEASE ORAL at 10:15

## 2021-11-02 RX ADMIN — EZETIMIBE 10 MG: 10 TABLET ORAL at 10:06

## 2021-11-02 RX ADMIN — HEPARIN SODIUM 5000 UNITS: 5000 INJECTION INTRAVENOUS; SUBCUTANEOUS at 05:03

## 2021-11-02 RX ADMIN — CLOPIDOGREL BISULFATE 75 MG: 75 TABLET ORAL at 10:06

## 2021-11-02 RX ADMIN — RANOLAZINE 500 MG: 500 TABLET, EXTENDED RELEASE ORAL at 17:02

## 2021-11-02 RX ADMIN — HEPARIN SODIUM 5000 UNITS: 5000 INJECTION INTRAVENOUS; SUBCUTANEOUS at 14:33

## 2021-11-02 RX ADMIN — METOPROLOL SUCCINATE 25 MG: 25 TABLET, EXTENDED RELEASE ORAL at 17:02

## 2021-11-02 RX ADMIN — ESCITALOPRAM 5 MG: 5 TABLET, FILM COATED ORAL at 10:05

## 2021-11-02 RX ADMIN — FINASTERIDE 5 MG: 5 TABLET, FILM COATED ORAL at 10:05

## 2021-11-02 RX ADMIN — Medication 250 MG: at 10:05

## 2021-11-02 RX ADMIN — ZINC SULFATE 220 MG (50 MG) CAPSULE 220 MG: CAPSULE at 10:04

## 2021-11-02 RX ADMIN — ASPIRIN 81 MG CHEWABLE TABLET 81 MG: 81 TABLET CHEWABLE at 10:04

## 2021-11-02 RX ADMIN — METOPROLOL SUCCINATE 25 MG: 25 TABLET, EXTENDED RELEASE ORAL at 10:06

## 2021-11-03 ENCOUNTER — TELEPHONE (OUTPATIENT)
Dept: GASTROENTEROLOGY | Facility: CLINIC | Age: 86
End: 2021-11-03

## 2021-11-03 ENCOUNTER — APPOINTMENT (INPATIENT)
Dept: RADIOLOGY | Facility: HOSPITAL | Age: 86
DRG: 392 | End: 2021-11-03
Payer: MEDICARE

## 2021-11-03 VITALS
DIASTOLIC BLOOD PRESSURE: 58 MMHG | SYSTOLIC BLOOD PRESSURE: 139 MMHG | BODY MASS INDEX: 24.16 KG/M2 | RESPIRATION RATE: 18 BRPM | OXYGEN SATURATION: 98 % | WEIGHT: 145 LBS | HEIGHT: 65 IN | HEART RATE: 80 BPM | TEMPERATURE: 97.4 F

## 2021-11-03 LAB — BACTERIA UR CULT: NORMAL

## 2021-11-03 PROCEDURE — 97165 OT EVAL LOW COMPLEX 30 MIN: CPT

## 2021-11-03 PROCEDURE — 74220 X-RAY XM ESOPHAGUS 1CNTRST: CPT

## 2021-11-03 PROCEDURE — 99232 SBSQ HOSP IP/OBS MODERATE 35: CPT | Performed by: INTERNAL MEDICINE

## 2021-11-03 PROCEDURE — 99239 HOSP IP/OBS DSCHRG MGMT >30: CPT | Performed by: FAMILY MEDICINE

## 2021-11-03 RX ORDER — ESCITALOPRAM OXALATE 10 MG/1
10 TABLET ORAL DAILY
Refills: 0
Start: 2021-11-03 | End: 2021-11-05

## 2021-11-03 RX ORDER — METOPROLOL SUCCINATE 25 MG/1
25 TABLET, EXTENDED RELEASE ORAL DAILY
Refills: 0
Start: 2021-11-03 | End: 2022-01-03 | Stop reason: SDUPTHER

## 2021-11-03 RX ADMIN — RANOLAZINE 500 MG: 500 TABLET, EXTENDED RELEASE ORAL at 09:41

## 2021-11-03 RX ADMIN — FAMOTIDINE 20 MG: 20 TABLET ORAL at 09:41

## 2021-11-03 RX ADMIN — Medication 250 MG: at 09:40

## 2021-11-03 RX ADMIN — ESCITALOPRAM OXALATE 10 MG: 10 TABLET, FILM COATED ORAL at 09:41

## 2021-11-03 RX ADMIN — ZINC SULFATE 220 MG (50 MG) CAPSULE 220 MG: CAPSULE at 09:40

## 2021-11-03 RX ADMIN — HEPARIN SODIUM 5000 UNITS: 5000 INJECTION INTRAVENOUS; SUBCUTANEOUS at 05:15

## 2021-11-03 RX ADMIN — ASPIRIN 81 MG CHEWABLE TABLET 81 MG: 81 TABLET CHEWABLE at 09:40

## 2021-11-03 RX ADMIN — ISOSORBIDE MONONITRATE 30 MG: 30 TABLET, EXTENDED RELEASE ORAL at 09:41

## 2021-11-03 RX ADMIN — FINASTERIDE 5 MG: 5 TABLET, FILM COATED ORAL at 09:41

## 2021-11-03 RX ADMIN — CLOPIDOGREL BISULFATE 75 MG: 75 TABLET ORAL at 09:40

## 2021-11-05 ENCOUNTER — TELEPHONE (OUTPATIENT)
Dept: CARDIOLOGY CLINIC | Facility: CLINIC | Age: 86
End: 2021-11-05

## 2021-11-05 ENCOUNTER — TELEPHONE (OUTPATIENT)
Dept: PREADMISSION TESTING | Facility: HOSPITAL | Age: 86
End: 2021-11-05

## 2021-11-05 VITALS — BODY MASS INDEX: 24.16 KG/M2 | HEIGHT: 65 IN | WEIGHT: 145 LBS

## 2021-11-05 RX ORDER — ZINC 25 MG
25 TABLET ORAL
COMMUNITY

## 2021-11-09 ENCOUNTER — TELEPHONE (OUTPATIENT)
Dept: CARDIOLOGY CLINIC | Facility: CLINIC | Age: 86
End: 2021-11-09

## 2021-11-10 ENCOUNTER — TELEPHONE (OUTPATIENT)
Dept: GASTROENTEROLOGY | Facility: CLINIC | Age: 86
End: 2021-11-10

## 2021-11-11 ENCOUNTER — ANESTHESIA EVENT (OUTPATIENT)
Dept: GASTROENTEROLOGY | Facility: AMBULARY SURGERY CENTER | Age: 86
End: 2021-11-11

## 2021-11-11 ENCOUNTER — ANESTHESIA (OUTPATIENT)
Dept: GASTROENTEROLOGY | Facility: AMBULARY SURGERY CENTER | Age: 86
End: 2021-11-11

## 2021-11-11 ENCOUNTER — HOSPITAL ENCOUNTER (OUTPATIENT)
Dept: GASTROENTEROLOGY | Facility: AMBULARY SURGERY CENTER | Age: 86
Setting detail: OUTPATIENT SURGERY
Discharge: HOME | End: 2021-11-11
Attending: INTERNAL MEDICINE
Payer: MEDICARE

## 2021-11-11 VITALS
RESPIRATION RATE: 18 BRPM | OXYGEN SATURATION: 99 % | DIASTOLIC BLOOD PRESSURE: 63 MMHG | HEART RATE: 84 BPM | SYSTOLIC BLOOD PRESSURE: 119 MMHG

## 2021-11-11 DIAGNOSIS — R13.10 DYSPHAGIA, UNSPECIFIED TYPE: ICD-10-CM

## 2021-11-11 DIAGNOSIS — R13.19 ESOPHAGEAL DYSPHAGIA: Primary | ICD-10-CM

## 2021-11-11 DIAGNOSIS — N40.1 BENIGN PROSTATIC HYPERPLASIA WITH URINARY RETENTION: Primary | ICD-10-CM

## 2021-11-11 DIAGNOSIS — K22.5: ICD-10-CM

## 2021-11-11 DIAGNOSIS — R33.8 BENIGN PROSTATIC HYPERPLASIA WITH URINARY RETENTION: Primary | ICD-10-CM

## 2021-11-11 DIAGNOSIS — K27.9 PUD (PEPTIC ULCER DISEASE): Primary | ICD-10-CM

## 2021-11-11 LAB
ATRIAL RATE: 110 BPM
ATRIAL RATE: 111 BPM
ATRIAL RATE: 97 BPM
P AXIS: 66 DEGREES
P AXIS: 80 DEGREES
P AXIS: 85 DEGREES
PR INTERVAL: 200 MS
PR INTERVAL: 200 MS
PR INTERVAL: 226 MS
QRS AXIS: 39 DEGREES
QRS AXIS: 69 DEGREES
QRS AXIS: 71 DEGREES
QRSD INTERVAL: 104 MS
QRSD INTERVAL: 98 MS
QRSD INTERVAL: 98 MS
QT INTERVAL: 314 MS
QT INTERVAL: 326 MS
QT INTERVAL: 354 MS
QTC INTERVAL: 427 MS
QTC INTERVAL: 441 MS
QTC INTERVAL: 449 MS
T WAVE AXIS: 215 DEGREES
T WAVE AXIS: 262 DEGREES
T WAVE AXIS: 264 DEGREES
VENTRICULAR RATE: 110 BPM
VENTRICULAR RATE: 111 BPM
VENTRICULAR RATE: 97 BPM

## 2021-11-11 PROCEDURE — 93010 ELECTROCARDIOGRAM REPORT: CPT | Performed by: INTERNAL MEDICINE

## 2021-11-11 PROCEDURE — 88313 SPECIAL STAINS GROUP 2: CPT | Performed by: PATHOLOGY

## 2021-11-11 PROCEDURE — 88305 TISSUE EXAM BY PATHOLOGIST: CPT | Performed by: PATHOLOGY

## 2021-11-11 PROCEDURE — 88341 IMHCHEM/IMCYTCHM EA ADD ANTB: CPT | Performed by: PATHOLOGY

## 2021-11-11 PROCEDURE — 88342 IMHCHEM/IMCYTCHM 1ST ANTB: CPT | Performed by: PATHOLOGY

## 2021-11-11 PROCEDURE — 43239 EGD BIOPSY SINGLE/MULTIPLE: CPT | Performed by: INTERNAL MEDICINE

## 2021-11-11 RX ORDER — SODIUM CHLORIDE, SODIUM LACTATE, POTASSIUM CHLORIDE, CALCIUM CHLORIDE 600; 310; 30; 20 MG/100ML; MG/100ML; MG/100ML; MG/100ML
INJECTION, SOLUTION INTRAVENOUS CONTINUOUS PRN
Status: DISCONTINUED | OUTPATIENT
Start: 2021-11-11 | End: 2021-11-11

## 2021-11-11 RX ORDER — SUCRALFATE 1 G/1
TABLET ORAL
Qty: 60 TABLET | Refills: 5 | Status: ON HOLD | OUTPATIENT
Start: 2021-11-11 | End: 2022-02-14 | Stop reason: CLARIF

## 2021-11-11 RX ORDER — PROPOFOL 10 MG/ML
INJECTION, EMULSION INTRAVENOUS AS NEEDED
Status: DISCONTINUED | OUTPATIENT
Start: 2021-11-11 | End: 2021-11-11

## 2021-11-11 RX ORDER — DUTASTERIDE 0.5 MG/1
0.5 CAPSULE, LIQUID FILLED ORAL DAILY
Qty: 90 CAPSULE | Refills: 0 | Status: SHIPPED | OUTPATIENT
Start: 2021-11-11 | End: 2022-02-07 | Stop reason: SDUPTHER

## 2021-11-11 RX ORDER — SUCRALFATE ORAL 1 G/10ML
1 SUSPENSION ORAL 2 TIMES DAILY
Qty: 420 ML | Refills: 1 | Status: ON HOLD | OUTPATIENT
Start: 2021-11-11 | End: 2022-02-14 | Stop reason: CLARIF

## 2021-11-11 RX ORDER — SODIUM CHLORIDE, SODIUM LACTATE, POTASSIUM CHLORIDE, CALCIUM CHLORIDE 600; 310; 30; 20 MG/100ML; MG/100ML; MG/100ML; MG/100ML
125 INJECTION, SOLUTION INTRAVENOUS CONTINUOUS
OUTPATIENT
Start: 2022-07-11

## 2021-11-11 RX ORDER — LIDOCAINE HYDROCHLORIDE 10 MG/ML
INJECTION, SOLUTION EPIDURAL; INFILTRATION; INTRACAUDAL; PERINEURAL AS NEEDED
Status: DISCONTINUED | OUTPATIENT
Start: 2021-11-11 | End: 2021-11-11

## 2021-11-11 RX ADMIN — LIDOCAINE HYDROCHLORIDE 50 MG: 10 INJECTION, SOLUTION EPIDURAL; INFILTRATION; INTRACAUDAL; PERINEURAL at 11:48

## 2021-11-11 RX ADMIN — PROPOFOL 50 MG: 10 INJECTION, EMULSION INTRAVENOUS at 11:50

## 2021-11-11 RX ADMIN — PROPOFOL 30 MG: 10 INJECTION, EMULSION INTRAVENOUS at 11:48

## 2021-11-11 RX ADMIN — SODIUM CHLORIDE, SODIUM LACTATE, POTASSIUM CHLORIDE, AND CALCIUM CHLORIDE: .6; .31; .03; .02 INJECTION, SOLUTION INTRAVENOUS at 11:45

## 2021-11-11 RX ADMIN — PROPOFOL 30 MG: 10 INJECTION, EMULSION INTRAVENOUS at 11:49

## 2021-11-11 RX ADMIN — PROPOFOL 20 MG: 10 INJECTION, EMULSION INTRAVENOUS at 11:52

## 2021-11-18 ENCOUNTER — TELEPHONE (OUTPATIENT)
Dept: GASTROENTEROLOGY | Facility: CLINIC | Age: 86
End: 2021-11-18

## 2021-12-01 ENCOUNTER — APPOINTMENT (OUTPATIENT)
Dept: LAB | Facility: CLINIC | Age: 86
End: 2021-12-01
Payer: MEDICARE

## 2021-12-14 ENCOUNTER — OFFICE VISIT (OUTPATIENT)
Dept: CARDIOLOGY CLINIC | Facility: CLINIC | Age: 86
End: 2021-12-14
Payer: MEDICARE

## 2021-12-14 VITALS
BODY MASS INDEX: 24.99 KG/M2 | TEMPERATURE: 98.6 F | OXYGEN SATURATION: 97 % | DIASTOLIC BLOOD PRESSURE: 70 MMHG | HEIGHT: 65 IN | SYSTOLIC BLOOD PRESSURE: 110 MMHG | WEIGHT: 150 LBS | HEART RATE: 73 BPM

## 2021-12-14 DIAGNOSIS — Z98.61 CAD S/P PERCUTANEOUS CORONARY ANGIOPLASTY: ICD-10-CM

## 2021-12-14 DIAGNOSIS — F41.9 ANXIETY: ICD-10-CM

## 2021-12-14 DIAGNOSIS — I50.32 CHRONIC DIASTOLIC HEART FAILURE (HCC): ICD-10-CM

## 2021-12-14 DIAGNOSIS — I10 ESSENTIAL HYPERTENSION: ICD-10-CM

## 2021-12-14 DIAGNOSIS — R01.1 CARDIAC MURMUR: ICD-10-CM

## 2021-12-14 DIAGNOSIS — Z86.718 HISTORY OF DVT (DEEP VEIN THROMBOSIS): ICD-10-CM

## 2021-12-14 DIAGNOSIS — E78.2 MIXED HYPERLIPIDEMIA: ICD-10-CM

## 2021-12-14 DIAGNOSIS — I25.10 3-VESSEL CAD: ICD-10-CM

## 2021-12-14 DIAGNOSIS — I25.10 CAD S/P PERCUTANEOUS CORONARY ANGIOPLASTY: ICD-10-CM

## 2021-12-14 DIAGNOSIS — I20.8 CHRONIC STABLE ANGINA (HCC): ICD-10-CM

## 2021-12-14 PROCEDURE — 99214 OFFICE O/P EST MOD 30 MIN: CPT | Performed by: INTERNAL MEDICINE

## 2021-12-14 RX ORDER — CLOPIDOGREL BISULFATE 75 MG/1
75 TABLET ORAL DAILY
Qty: 90 TABLET | Refills: 1 | Status: SHIPPED | OUTPATIENT
Start: 2021-12-14 | End: 2022-06-16

## 2021-12-14 RX ORDER — PANTOPRAZOLE SODIUM 40 MG/1
40 TABLET, DELAYED RELEASE ORAL
COMMUNITY
End: 2022-01-11 | Stop reason: DRUGHIGH

## 2022-01-03 DIAGNOSIS — I10 HYPERTENSION: Chronic | ICD-10-CM

## 2022-01-03 DIAGNOSIS — R07.9 CHEST PAIN: ICD-10-CM

## 2022-01-03 DIAGNOSIS — I25.10 CAD (CORONARY ARTERY DISEASE): ICD-10-CM

## 2022-01-03 RX ORDER — METOPROLOL SUCCINATE 25 MG/1
25 TABLET, EXTENDED RELEASE ORAL 3 TIMES DAILY
Qty: 270 TABLET | Refills: 3 | Status: SHIPPED | OUTPATIENT
Start: 2022-01-03

## 2022-01-11 ENCOUNTER — OFFICE VISIT (OUTPATIENT)
Dept: GASTROENTEROLOGY | Facility: CLINIC | Age: 87
End: 2022-01-11
Payer: MEDICARE

## 2022-01-11 VITALS
BODY MASS INDEX: 24.66 KG/M2 | HEIGHT: 65 IN | HEART RATE: 71 BPM | WEIGHT: 148 LBS | DIASTOLIC BLOOD PRESSURE: 53 MMHG | SYSTOLIC BLOOD PRESSURE: 114 MMHG

## 2022-01-11 DIAGNOSIS — K31.A12 INTESTINAL METAPLASIA OF BODY OF STOMACH WITHOUT DYSPLASIA: ICD-10-CM

## 2022-01-11 DIAGNOSIS — K62.5 RECTAL BLEEDING: ICD-10-CM

## 2022-01-11 DIAGNOSIS — R13.19 ESOPHAGEAL DYSPHAGIA: Primary | ICD-10-CM

## 2022-01-11 DIAGNOSIS — K27.9 PUD (PEPTIC ULCER DISEASE): ICD-10-CM

## 2022-01-11 DIAGNOSIS — Z98.890 STATUS POST GASTRIC SURGERY: ICD-10-CM

## 2022-01-11 DIAGNOSIS — K29.00 ACUTE SUPERFICIAL GASTRITIS WITHOUT HEMORRHAGE: ICD-10-CM

## 2022-01-11 PROBLEM — D69.6 PLATELETS DECREASED (HCC): Status: ACTIVE | Noted: 2022-01-11

## 2022-01-11 PROCEDURE — 99214 OFFICE O/P EST MOD 30 MIN: CPT | Performed by: INTERNAL MEDICINE

## 2022-01-11 RX ORDER — PANTOPRAZOLE SODIUM 20 MG/1
20 TABLET, DELAYED RELEASE ORAL DAILY
Qty: 90 TABLET | Refills: 1 | Status: SHIPPED | OUTPATIENT
Start: 2022-01-11 | End: 2022-07-11

## 2022-01-11 NOTE — PROGRESS NOTES
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    2  PUD (peptic ulcer disease)  Complicated in the past now has gastritis with some intestinal metaplasia is using Mylanta he is on Protonix 40 mg will decrease that to 20    - pantoprazole (PROTONIX) 20 mg tablet; Take 1 tablet (20 mg total) by mouth daily  Dispense: 90 tablet; Refill: 1    3  Status post gastric surgery  Partial gastrectomy with Billroth II    4  Acute superficial gastritis without hemorrhage  As above  - pantoprazole (PROTONIX) 20 mg tablet; Take 1 tablet (20 mg total) by mouth daily  Dispense: 90 tablet; Refill: 1    5  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  Repeat EGD 3-6 months    6  Rectal bleeding  Resolved will do watchful waiting consider repeat colonoscopy if bleeding continues  ______________________________________________________________________    SUBJECTIVE:  25-year-old gentleman status post partial gastrectomy with Billroth II after complications peptic ulcer disease  Presents for follow-up after upper endoscopy was found to have some diverticuli of the mid esophagus  He did have dysphagia however this has since resolved  Addition was found to have intestinal metaplasia of the gastric mucosa which we discussed at length  He did not tolerate sucralfate and is now using Mylanta at night  He was on Protonix 40 mg a day we discussed reducing that to 20  Colon cancer screening is up-to-date  Previously had some rectal bleeding and this has since resolved  REVIEW OF SYSTEMS IS OTHERWISE NEGATIVE        Historical Information   Past Medical History:   Diagnosis Date    Arthritis     BPH (benign prostatic hyperplasia)     Cervical spine fracture ([address] ) 1997    C3    Chest pain     Colon polyp     Coronary artery disease     Dry eye     D[facility] ) 2015    right leg- passed thru the IVC filter-stopped at the "patch" from bypass surgery    Dysphagia 11/2021    minimal-"mass" esophagus with a "knob" in the middle    Fracture of thoracic spine ([facility] ) 1997    T3    Glaucoma     Hyperlipidemia     Hypertension     Myocardial infarction ([facility] )     PUD (peptic ulcer disease)      Past Surgical History:   Procedure Laterality Date    ANGIOPLASTY      2 stents    CHOLECYSTECTOMY      COLONOSCOPY      CORONARY ARTERY BYPASS GRAFT      x6    CORONARY ARTERY BYPASS GRAFT      CORONARY STENT PLACEMENT      CYSTOSCOPY      EYE SURGERY Right     HERNIA REPAIR Right 11/3/2017    Procedure: REPAIR HERNIA INGUINAL;  Surgeon: Lupe Sanches MD;  Location: [facility];  Service: General    IVC FILTER INSERTION      IVC filter    [place] CYSTOURETHROSCOPY W/IRRIG & EVAC CLOTS N/A 6/30/2018    Procedure: CYSTOSCOPY EVACUATION OF CLOTS, fulgeration;  Surgeon: aP Martinez MD;  Location:  Main OR;  Service: Urology   [address]    Billroth 2 gastrectomy-2/3 removal- bleeding ulcer    TRANSURETHRAL RESECTION OF PROSTATE       Social History   Social History     Substance and Sexual Activity   Alcohol Use Never     Social History     Substance and Sexual Activity   Drug Use No     Social History     Tobacco Use   Smoking Status Never Smoker   Smokeless Tobacco Never Used     Family History   Problem Relation Age of Onset    Coronary artery disease Brother     Heart disease Father         CAD       Meds/Allergies       Current Outpatient Medications:     ALPRAZolam (XANAX) 0 5 mg tablet    Calcium Carbonate-Vitamin D (CALCIUM 600+D PO)    Carboxymethylcellulose Sodium (REFRESH TEARS OP)    cholecalciferol (VITAMIN D3) 400 units tablet    clopidogrel (PLAVIX) 75 mg tablet    Docusate Calcium (STOOL SOFTENER PO)    dutasteride (AVODART) 0 5 mg capsule    famotidine (PEPCID) 20 mg tablet    isosorbide mononitrate (IMDUR) 30 mg 24 hr tablet    Magnesium 250 MG TABS    metoprolol succinate (TOPROL-XL) 25 mg 24 hr tablet    multivitamin-iron-minerals-folic acid (CENTRUM) chewable tablet    nitroglycerin (NITROSTAT) 0 4 mg SL tablet    ranolazine (RANEXA) 500 mg 12 hr tablet    rosuvastatin (CRESTOR) 5 mg tablet    sucralfate (CARAFATE) 1 g tablet    tamsulosin (Flomax) 0 4 mg    Zinc 25 MG TABS    pantoprazole (PROTONIX) 20 mg tablet    sucralfate (CARAFATE) 1 g/10 mL suspension    Allergies   Allergen Reactions    Escitalopram Other (See Comments)     Suicidal feelings, sweating    Oxycodone-Acetaminophen Dizziness and Shortness Of Breath     Other reaction(s): Faints  Reaction Date: [id number]; Category: Adverse Reaction;     Omeprazole Rash    Statins Other (See Comments)     Muscle pain           Objective     Blood pressure 114/53, pulse 71, height 5' 5" (1 651 m), weight 67 1 kg (148 lb)  Body mass index is 24 63 kg/m²  PHYSICAL EXAM:      General Appearance:   Alert, cooperative, no distress   HEENT:   Normocephalic, atraumatic, anicteric      Neck:  Supple, symmetrical, trachea midline   Lungs:   Clear to auscultation bilaterally; no rales, rhonchi or wheezing; respirations unlabored    Heart[de-identified]   Regular rate and rhythm; no murmur, rub, or gallop  Abdomen:   Soft, non-tender, non-distended; normal bowel sounds; no masses, no organomegaly    Genitalia:   Deferred    Rectal:   Deferred    Extremities:  No cyanosis, clubbing or edema    Pulses:  2+ and symmetric    Skin:  No jaundice, rashes, or lesions    Lymph nodes:  No palpable cervical lymphadenopathy        Lab Results:   No visits with results within 1 Day(s) from this visit  Latest known visit with results is:   Transcribe Orders on 12/01/2021   Component Date Value    Iron 12/01/2021 207*    Ferritin 12/01/2021 24          Radiology Results:   No results found

## 2022-01-12 DIAGNOSIS — R07.9 CHEST PAIN: ICD-10-CM

## 2022-01-12 DIAGNOSIS — I20.8 CHRONIC STABLE ANGINA (HCC): ICD-10-CM

## 2022-01-12 RX ORDER — ROSUVASTATIN CALCIUM 5 MG/1
5 TABLET, COATED ORAL
Qty: 90 TABLET | Refills: 3 | Status: SHIPPED | OUTPATIENT
Start: 2022-01-12

## 2022-02-07 DIAGNOSIS — N40.1 BENIGN PROSTATIC HYPERPLASIA WITH URINARY RETENTION: ICD-10-CM

## 2022-02-07 DIAGNOSIS — R33.8 BENIGN PROSTATIC HYPERPLASIA WITH URINARY RETENTION: ICD-10-CM

## 2022-02-07 RX ORDER — DUTASTERIDE 0.5 MG/1
0.5 CAPSULE, LIQUID FILLED ORAL DAILY
Qty: 90 CAPSULE | Refills: 0 | Status: SHIPPED | OUTPATIENT
Start: 2022-02-07 | End: 2022-05-16 | Stop reason: SDUPTHER

## 2022-02-11 ENCOUNTER — APPOINTMENT (EMERGENCY)
Dept: RADIOLOGY | Facility: HOSPITAL | Age: 87
DRG: 069 | End: 2022-02-11
Payer: MEDICARE

## 2022-02-11 ENCOUNTER — HOSPITAL ENCOUNTER (INPATIENT)
Facility: HOSPITAL | Age: 87
LOS: 3 days | Discharge: HOME | DRG: 069 | End: 2022-02-14
Attending: EMERGENCY MEDICINE | Admitting: INTERNAL MEDICINE
Payer: MEDICARE

## 2022-02-11 DIAGNOSIS — I25.10 3-VESSEL CAD: ICD-10-CM

## 2022-02-11 DIAGNOSIS — I51.3 ATRIAL THROMBUS: ICD-10-CM

## 2022-02-11 DIAGNOSIS — R53.1 WEAKNESS: Primary | ICD-10-CM

## 2022-02-11 DIAGNOSIS — M54.2 NECK PAIN, ACUTE: ICD-10-CM

## 2022-02-11 DIAGNOSIS — K27.9 PUD (PEPTIC ULCER DISEASE): ICD-10-CM

## 2022-02-11 LAB
2HR DELTA HS TROPONIN: 0 NG/L
ALBUMIN SERPL BCP-MCNC: 3.4 G/DL (ref 3.5–5)
ALP SERPL-CCNC: 99 U/L (ref 46–116)
ALT SERPL W P-5'-P-CCNC: 16 U/L (ref 12–78)
AMORPH URATE CRY URNS QL MICRO: ABNORMAL /HPF
ANION GAP SERPL CALCULATED.3IONS-SCNC: 8 MMOL/L (ref 4–13)
APTT PPP: 29 SECONDS (ref 23–37)
AST SERPL W P-5'-P-CCNC: 5 U/L (ref 5–45)
BACTERIA UR QL AUTO: ABNORMAL /HPF
BASOPHILS # BLD AUTO: 0.02 THOUSANDS/ΜL (ref 0–0.1)
BASOPHILS NFR BLD AUTO: 0 % (ref 0–1)
BILIRUB SERPL-MCNC: 0.42 MG/DL (ref 0.2–1)
BILIRUB UR QL STRIP: NEGATIVE
BUN SERPL-MCNC: 27 MG/DL (ref 5–25)
CALCIUM ALBUM COR SERPL-MCNC: 8.9 MG/DL (ref 8.3–10.1)
CALCIUM SERPL-MCNC: 8.4 MG/DL (ref 8.3–10.1)
CARDIAC TROPONIN I PNL SERPL HS: 19 NG/L
CARDIAC TROPONIN I PNL SERPL HS: 19 NG/L
CHLORIDE SERPL-SCNC: 100 MMOL/L (ref 100–108)
CLARITY UR: CLEAR
CO2 SERPL-SCNC: 29 MMOL/L (ref 21–32)
COLOR UR: YELLOW
CREAT SERPL-MCNC: 1.07 MG/DL (ref 0.6–1.3)
EOSINOPHIL # BLD AUTO: 0.08 THOUSAND/ΜL (ref 0–0.61)
EOSINOPHIL NFR BLD AUTO: 2 % (ref 0–6)
ERYTHROCYTE [DISTWIDTH] IN BLOOD BY AUTOMATED COUNT: 14 % (ref 11.6–15.1)
FLUAV RNA RESP QL NAA+PROBE: NEGATIVE
FLUBV RNA RESP QL NAA+PROBE: NEGATIVE
GFR SERPL CREATININE-BSD FRML MDRD: 60 ML/MIN/1.73SQ M
GLUCOSE SERPL-MCNC: 179 MG/DL (ref 65–140)
GLUCOSE SERPL-MCNC: 181 MG/DL (ref 65–140)
GLUCOSE UR STRIP-MCNC: NEGATIVE MG/DL
HCT VFR BLD AUTO: 37 % (ref 36.5–49.3)
HGB BLD-MCNC: 11.2 G/DL (ref 12–17)
HGB UR QL STRIP.AUTO: NEGATIVE
IMM GRANULOCYTES # BLD AUTO: 0.02 THOUSAND/UL (ref 0–0.2)
IMM GRANULOCYTES NFR BLD AUTO: 0 % (ref 0–2)
INR PPP: 1.01 (ref 0.84–1.19)
KETONES UR STRIP-MCNC: NEGATIVE MG/DL
LEUKOCYTE ESTERASE UR QL STRIP: ABNORMAL
LYMPHOCYTES # BLD AUTO: 1.29 THOUSANDS/ΜL (ref 0.6–4.47)
LYMPHOCYTES NFR BLD AUTO: 26 % (ref 14–44)
MCH RBC QN AUTO: 27.9 PG (ref 26.8–34.3)
MCHC RBC AUTO-ENTMCNC: 30.3 G/DL (ref 31.4–37.4)
MCV RBC AUTO: 92 FL (ref 82–98)
MONOCYTES # BLD AUTO: 0.5 THOUSAND/ΜL (ref 0.17–1.22)
MONOCYTES NFR BLD AUTO: 10 % (ref 4–12)
NEUTROPHILS # BLD AUTO: 3.04 THOUSANDS/ΜL (ref 1.85–7.62)
NEUTS SEG NFR BLD AUTO: 62 % (ref 43–75)
NITRITE UR QL STRIP: POSITIVE
NON-SQ EPI CELLS URNS QL MICRO: ABNORMAL /HPF
NRBC BLD AUTO-RTO: 0 /100 WBCS
NT-PROBNP SERPL-MCNC: 1126 PG/ML
PH UR STRIP.AUTO: 6 [PH]
PLATELET # BLD AUTO: 174 THOUSANDS/UL (ref 149–390)
PMV BLD AUTO: 10.8 FL (ref 8.9–12.7)
POTASSIUM SERPL-SCNC: 4.2 MMOL/L (ref 3.5–5.3)
PROT SERPL-MCNC: 7.7 G/DL (ref 6.4–8.2)
PROT UR STRIP-MCNC: NEGATIVE MG/DL
PROTHROMBIN TIME: 13.1 SECONDS (ref 11.6–14.5)
RBC # BLD AUTO: 4.01 MILLION/UL (ref 3.88–5.62)
RBC #/AREA URNS AUTO: ABNORMAL /HPF
RSV RNA RESP QL NAA+PROBE: NEGATIVE
SARS-COV-2 RNA RESP QL NAA+PROBE: NEGATIVE
SODIUM SERPL-SCNC: 137 MMOL/L (ref 136–145)
SP GR UR STRIP.AUTO: 1.01 (ref 1–1.03)
UROBILINOGEN UR QL STRIP.AUTO: 0.2 E.U./DL
WBC # BLD AUTO: 4.95 THOUSAND/UL (ref 4.31–10.16)
WBC #/AREA URNS AUTO: ABNORMAL /HPF

## 2022-02-11 PROCEDURE — 99285 EMERGENCY DEPT VISIT HI MDM: CPT

## 2022-02-11 PROCEDURE — 84484 ASSAY OF TROPONIN QUANT: CPT | Performed by: EMERGENCY MEDICINE

## 2022-02-11 PROCEDURE — 71045 X-RAY EXAM CHEST 1 VIEW: CPT

## 2022-02-11 PROCEDURE — 70498 CT ANGIOGRAPHY NECK: CPT

## 2022-02-11 PROCEDURE — 99285 EMERGENCY DEPT VISIT HI MDM: CPT | Performed by: EMERGENCY MEDICINE

## 2022-02-11 PROCEDURE — 82948 REAGENT STRIP/BLOOD GLUCOSE: CPT

## 2022-02-11 PROCEDURE — 81001 URINALYSIS AUTO W/SCOPE: CPT | Performed by: EMERGENCY MEDICINE

## 2022-02-11 PROCEDURE — 96360 HYDRATION IV INFUSION INIT: CPT

## 2022-02-11 PROCEDURE — 93005 ELECTROCARDIOGRAM TRACING: CPT

## 2022-02-11 PROCEDURE — 85730 THROMBOPLASTIN TIME PARTIAL: CPT | Performed by: EMERGENCY MEDICINE

## 2022-02-11 PROCEDURE — 99222 1ST HOSP IP/OBS MODERATE 55: CPT | Performed by: INTERNAL MEDICINE

## 2022-02-11 PROCEDURE — 80053 COMPREHEN METABOLIC PANEL: CPT | Performed by: EMERGENCY MEDICINE

## 2022-02-11 PROCEDURE — 83880 ASSAY OF NATRIURETIC PEPTIDE: CPT | Performed by: INTERNAL MEDICINE

## 2022-02-11 PROCEDURE — 36415 COLL VENOUS BLD VENIPUNCTURE: CPT | Performed by: EMERGENCY MEDICINE

## 2022-02-11 PROCEDURE — G1004 CDSM NDSC: HCPCS

## 2022-02-11 PROCEDURE — 85025 COMPLETE CBC W/AUTO DIFF WBC: CPT | Performed by: EMERGENCY MEDICINE

## 2022-02-11 PROCEDURE — 85610 PROTHROMBIN TIME: CPT | Performed by: EMERGENCY MEDICINE

## 2022-02-11 PROCEDURE — 0241U HB NFCT DS VIR RESP RNA 4 TRGT: CPT | Performed by: EMERGENCY MEDICINE

## 2022-02-11 PROCEDURE — 70496 CT ANGIOGRAPHY HEAD: CPT

## 2022-02-11 PROCEDURE — 1124F ACP DISCUSS-NO DSCNMKR DOCD: CPT | Performed by: EMERGENCY MEDICINE

## 2022-02-11 RX ORDER — ALBUTEROL SULFATE 2.5 MG/3ML
2.5 SOLUTION RESPIRATORY (INHALATION) EVERY 4 HOURS PRN
Status: DISCONTINUED | OUTPATIENT
Start: 2022-02-11 | End: 2022-02-14 | Stop reason: HOSPADM

## 2022-02-11 RX ORDER — FUROSEMIDE 10 MG/ML
40 INJECTION INTRAMUSCULAR; INTRAVENOUS ONCE
Status: COMPLETED | OUTPATIENT
Start: 2022-02-11 | End: 2022-02-11

## 2022-02-11 RX ORDER — IPRATROPIUM BROMIDE AND ALBUTEROL SULFATE 2.5; .5 MG/3ML; MG/3ML
3 SOLUTION RESPIRATORY (INHALATION) ONCE
Status: COMPLETED | OUTPATIENT
Start: 2022-02-11 | End: 2022-02-11

## 2022-02-11 RX ADMIN — NITROGLYCERIN 1 INCH: 20 OINTMENT TOPICAL at 23:06

## 2022-02-11 RX ADMIN — IPRATROPIUM BROMIDE AND ALBUTEROL SULFATE 3 ML: 2.5; .5 SOLUTION RESPIRATORY (INHALATION) at 22:40

## 2022-02-11 RX ADMIN — SODIUM CHLORIDE 1000 ML: 0.9 INJECTION, SOLUTION INTRAVENOUS at 21:00

## 2022-02-11 RX ADMIN — ENOXAPARIN SODIUM 40 MG: 40 INJECTION SUBCUTANEOUS at 22:44

## 2022-02-11 RX ADMIN — IOHEXOL 85 ML: 350 INJECTION, SOLUTION INTRAVENOUS at 20:35

## 2022-02-11 RX ADMIN — FUROSEMIDE 40 MG: 10 INJECTION, SOLUTION INTRAVENOUS at 23:06

## 2022-02-11 NOTE — Clinical Note
Case was discussed with hospitalist and the patient's admission status was agreed to be Admission Status: inpatient status to the service of Dr Stevan Libman

## 2022-02-12 ENCOUNTER — APPOINTMENT (INPATIENT)
Dept: RADIOLOGY | Facility: HOSPITAL | Age: 87
DRG: 069 | End: 2022-02-12
Payer: MEDICARE

## 2022-02-12 ENCOUNTER — APPOINTMENT (INPATIENT)
Dept: NON INVASIVE DIAGNOSTICS | Facility: HOSPITAL | Age: 87
DRG: 069 | End: 2022-02-12
Payer: MEDICARE

## 2022-02-12 PROBLEM — M54.2 NECK PAIN, ACUTE: Status: ACTIVE | Noted: 2022-02-12

## 2022-02-12 PROBLEM — R06.02 SOB (SHORTNESS OF BREATH): Status: ACTIVE | Noted: 2022-02-12

## 2022-02-12 PROBLEM — I51.3 THROMBUS OF LEFT ATRIAL APPENDAGE: Status: ACTIVE | Noted: 2022-02-12

## 2022-02-12 LAB
4HR DELTA HS TROPONIN: 15 NG/L
ALBUMIN SERPL BCP-MCNC: 3.3 G/DL (ref 3.5–5)
ALP SERPL-CCNC: 103 U/L (ref 46–116)
ALT SERPL W P-5'-P-CCNC: 14 U/L (ref 12–78)
ANION GAP SERPL CALCULATED.3IONS-SCNC: 10 MMOL/L (ref 4–13)
APTT PPP: 32 SECONDS (ref 23–37)
AST SERPL W P-5'-P-CCNC: 9 U/L (ref 5–45)
BASOPHILS # BLD AUTO: 0.02 THOUSANDS/ΜL (ref 0–0.1)
BASOPHILS NFR BLD AUTO: 0 % (ref 0–1)
BILIRUB SERPL-MCNC: 0.45 MG/DL (ref 0.2–1)
BUN SERPL-MCNC: 25 MG/DL (ref 5–25)
CALCIUM ALBUM COR SERPL-MCNC: 9.2 MG/DL (ref 8.3–10.1)
CALCIUM SERPL-MCNC: 8.6 MG/DL (ref 8.3–10.1)
CARDIAC TROPONIN I PNL SERPL HS: 34 NG/L
CHLORIDE SERPL-SCNC: 100 MMOL/L (ref 100–108)
CO2 SERPL-SCNC: 27 MMOL/L (ref 21–32)
CREAT SERPL-MCNC: 1.07 MG/DL (ref 0.6–1.3)
EOSINOPHIL # BLD AUTO: 0.04 THOUSAND/ΜL (ref 0–0.61)
EOSINOPHIL NFR BLD AUTO: 1 % (ref 0–6)
ERYTHROCYTE [DISTWIDTH] IN BLOOD BY AUTOMATED COUNT: 14 % (ref 11.6–15.1)
GFR SERPL CREATININE-BSD FRML MDRD: 60 ML/MIN/1.73SQ M
GLUCOSE SERPL-MCNC: 136 MG/DL (ref 65–140)
HCT VFR BLD AUTO: 37.9 % (ref 36.5–49.3)
HGB BLD-MCNC: 11.7 G/DL (ref 12–17)
IMM GRANULOCYTES # BLD AUTO: 0.03 THOUSAND/UL (ref 0–0.2)
IMM GRANULOCYTES NFR BLD AUTO: 0 % (ref 0–2)
INR PPP: 1.02 (ref 0.84–1.19)
LYMPHOCYTES # BLD AUTO: 1.25 THOUSANDS/ΜL (ref 0.6–4.47)
LYMPHOCYTES NFR BLD AUTO: 17 % (ref 14–44)
MAGNESIUM SERPL-MCNC: 2.3 MG/DL (ref 1.6–2.6)
MCH RBC QN AUTO: 28.1 PG (ref 26.8–34.3)
MCHC RBC AUTO-ENTMCNC: 30.9 G/DL (ref 31.4–37.4)
MCV RBC AUTO: 91 FL (ref 82–98)
MONOCYTES # BLD AUTO: 0.71 THOUSAND/ΜL (ref 0.17–1.22)
MONOCYTES NFR BLD AUTO: 10 % (ref 4–12)
NEUTROPHILS # BLD AUTO: 5.18 THOUSANDS/ΜL (ref 1.85–7.62)
NEUTS SEG NFR BLD AUTO: 72 % (ref 43–75)
NRBC BLD AUTO-RTO: 0 /100 WBCS
NT-PROBNP SERPL-MCNC: 2264 PG/ML
PLATELET # BLD AUTO: 170 THOUSANDS/UL (ref 149–390)
PMV BLD AUTO: 10.2 FL (ref 8.9–12.7)
POTASSIUM SERPL-SCNC: 4.3 MMOL/L (ref 3.5–5.3)
PROT SERPL-MCNC: 7.5 G/DL (ref 6.4–8.2)
PROTHROMBIN TIME: 13.2 SECONDS (ref 11.6–14.5)
RBC # BLD AUTO: 4.16 MILLION/UL (ref 3.88–5.62)
SODIUM SERPL-SCNC: 137 MMOL/L (ref 136–145)
WBC # BLD AUTO: 7.23 THOUSAND/UL (ref 4.31–10.16)

## 2022-02-12 PROCEDURE — 85610 PROTHROMBIN TIME: CPT | Performed by: INTERNAL MEDICINE

## 2022-02-12 PROCEDURE — 99223 1ST HOSP IP/OBS HIGH 75: CPT | Performed by: PSYCHIATRY & NEUROLOGY

## 2022-02-12 PROCEDURE — 93306 TTE W/DOPPLER COMPLETE: CPT | Performed by: INTERNAL MEDICINE

## 2022-02-12 PROCEDURE — 85025 COMPLETE CBC W/AUTO DIFF WBC: CPT | Performed by: INTERNAL MEDICINE

## 2022-02-12 PROCEDURE — 84484 ASSAY OF TROPONIN QUANT: CPT | Performed by: EMERGENCY MEDICINE

## 2022-02-12 PROCEDURE — 93306 TTE W/DOPPLER COMPLETE: CPT

## 2022-02-12 PROCEDURE — 83735 ASSAY OF MAGNESIUM: CPT | Performed by: INTERNAL MEDICINE

## 2022-02-12 PROCEDURE — 85730 THROMBOPLASTIN TIME PARTIAL: CPT | Performed by: INTERNAL MEDICINE

## 2022-02-12 PROCEDURE — 80053 COMPREHEN METABOLIC PANEL: CPT | Performed by: INTERNAL MEDICINE

## 2022-02-12 PROCEDURE — 94760 N-INVAS EAR/PLS OXIMETRY 1: CPT

## 2022-02-12 PROCEDURE — 36415 COLL VENOUS BLD VENIPUNCTURE: CPT | Performed by: EMERGENCY MEDICINE

## 2022-02-12 PROCEDURE — 99222 1ST HOSP IP/OBS MODERATE 55: CPT | Performed by: INTERNAL MEDICINE

## 2022-02-12 PROCEDURE — 99232 SBSQ HOSP IP/OBS MODERATE 35: CPT | Performed by: FAMILY MEDICINE

## 2022-02-12 PROCEDURE — 83880 ASSAY OF NATRIURETIC PEPTIDE: CPT | Performed by: INTERNAL MEDICINE

## 2022-02-12 PROCEDURE — 70551 MRI BRAIN STEM W/O DYE: CPT

## 2022-02-12 PROCEDURE — G1004 CDSM NDSC: HCPCS

## 2022-02-12 RX ORDER — OMEGA-3S/DHA/EPA/FISH OIL/D3 300MG-1000
400 CAPSULE ORAL
Status: DISCONTINUED | OUTPATIENT
Start: 2022-02-12 | End: 2022-02-14 | Stop reason: HOSPADM

## 2022-02-12 RX ORDER — FAMOTIDINE 20 MG/1
20 TABLET, FILM COATED ORAL DAILY
Status: DISCONTINUED | OUTPATIENT
Start: 2022-02-12 | End: 2022-02-14 | Stop reason: HOSPADM

## 2022-02-12 RX ORDER — PRAVASTATIN SODIUM 40 MG
40 TABLET ORAL
Status: DISCONTINUED | OUTPATIENT
Start: 2022-02-12 | End: 2022-02-14 | Stop reason: HOSPADM

## 2022-02-12 RX ORDER — DOCUSATE SODIUM 100 MG/1
100 CAPSULE, LIQUID FILLED ORAL DAILY
Status: DISCONTINUED | OUTPATIENT
Start: 2022-02-12 | End: 2022-02-14 | Stop reason: HOSPADM

## 2022-02-12 RX ORDER — PANTOPRAZOLE SODIUM 20 MG/1
20 TABLET, DELAYED RELEASE ORAL
Status: DISCONTINUED | OUTPATIENT
Start: 2022-02-12 | End: 2022-02-14 | Stop reason: HOSPADM

## 2022-02-12 RX ORDER — ISOSORBIDE MONONITRATE 30 MG/1
30 TABLET, EXTENDED RELEASE ORAL EVERY MORNING
Status: DISCONTINUED | OUTPATIENT
Start: 2022-02-13 | End: 2022-02-14 | Stop reason: HOSPADM

## 2022-02-12 RX ORDER — FUROSEMIDE 10 MG/ML
40 INJECTION INTRAMUSCULAR; INTRAVENOUS DAILY
Status: DISCONTINUED | OUTPATIENT
Start: 2022-02-12 | End: 2022-02-12

## 2022-02-12 RX ORDER — FINASTERIDE 5 MG/1
5 TABLET, FILM COATED ORAL DAILY
Status: DISCONTINUED | OUTPATIENT
Start: 2022-02-12 | End: 2022-02-14 | Stop reason: HOSPADM

## 2022-02-12 RX ORDER — MINERAL OIL AND PETROLATUM 150; 830 MG/G; MG/G
OINTMENT OPHTHALMIC
Status: DISCONTINUED | OUTPATIENT
Start: 2022-02-12 | End: 2022-02-14 | Stop reason: HOSPADM

## 2022-02-12 RX ORDER — NITROGLYCERIN 0.4 MG/1
0.4 TABLET SUBLINGUAL
Status: DISCONTINUED | OUTPATIENT
Start: 2022-02-12 | End: 2022-02-14 | Stop reason: HOSPADM

## 2022-02-12 RX ORDER — METOPROLOL SUCCINATE 25 MG/1
25 TABLET, EXTENDED RELEASE ORAL 3 TIMES DAILY
Status: DISCONTINUED | OUTPATIENT
Start: 2022-02-12 | End: 2022-02-12

## 2022-02-12 RX ORDER — METOPROLOL SUCCINATE 25 MG/1
25 TABLET, EXTENDED RELEASE ORAL 3 TIMES DAILY
Status: DISCONTINUED | OUTPATIENT
Start: 2022-02-12 | End: 2022-02-14 | Stop reason: HOSPADM

## 2022-02-12 RX ORDER — ZINC SULFATE 50(220)MG
220 CAPSULE ORAL DAILY
Status: DISCONTINUED | OUTPATIENT
Start: 2022-02-12 | End: 2022-02-14 | Stop reason: HOSPADM

## 2022-02-12 RX ORDER — ALPRAZOLAM 0.5 MG/1
0.5 TABLET ORAL
Status: DISCONTINUED | OUTPATIENT
Start: 2022-02-12 | End: 2022-02-14 | Stop reason: HOSPADM

## 2022-02-12 RX ORDER — B-COMPLEX WITH VITAMIN C
1 TABLET ORAL EVERY MORNING
Status: DISCONTINUED | OUTPATIENT
Start: 2022-02-12 | End: 2022-02-14

## 2022-02-12 RX ORDER — RANOLAZINE 500 MG/1
500 TABLET, EXTENDED RELEASE ORAL 2 TIMES DAILY
Status: DISCONTINUED | OUTPATIENT
Start: 2022-02-12 | End: 2022-02-12

## 2022-02-12 RX ORDER — MULTIVIT-MIN/FERROUS GLUCONATE 9 MG/15 ML
15 LIQUID (ML) ORAL DAILY
Status: DISCONTINUED | OUTPATIENT
Start: 2022-02-12 | End: 2022-02-14 | Stop reason: HOSPADM

## 2022-02-12 RX ORDER — TAMSULOSIN HYDROCHLORIDE 0.4 MG/1
0.4 CAPSULE ORAL
Status: DISCONTINUED | OUTPATIENT
Start: 2022-02-12 | End: 2022-02-14 | Stop reason: HOSPADM

## 2022-02-12 RX ORDER — ISOSORBIDE MONONITRATE 30 MG/1
30 TABLET, EXTENDED RELEASE ORAL EVERY MORNING
Status: DISCONTINUED | OUTPATIENT
Start: 2022-02-12 | End: 2022-02-12

## 2022-02-12 RX ORDER — RANOLAZINE 500 MG/1
500 TABLET, EXTENDED RELEASE ORAL 2 TIMES DAILY
Status: DISCONTINUED | OUTPATIENT
Start: 2022-02-12 | End: 2022-02-14 | Stop reason: HOSPADM

## 2022-02-12 RX ORDER — CLOPIDOGREL BISULFATE 75 MG/1
75 TABLET ORAL DAILY
Status: DISCONTINUED | OUTPATIENT
Start: 2022-02-12 | End: 2022-02-14 | Stop reason: HOSPADM

## 2022-02-12 RX ORDER — UREA 10 %
250 LOTION (ML) TOPICAL
Status: DISCONTINUED | OUTPATIENT
Start: 2022-02-12 | End: 2022-02-14 | Stop reason: HOSPADM

## 2022-02-12 RX ORDER — SUCRALFATE 1 G/1
1 TABLET ORAL
Status: DISCONTINUED | OUTPATIENT
Start: 2022-02-12 | End: 2022-02-14

## 2022-02-12 RX ADMIN — METOPROLOL SUCCINATE 25 MG: 25 TABLET, EXTENDED RELEASE ORAL at 17:15

## 2022-02-12 RX ADMIN — FAMOTIDINE 20 MG: 20 TABLET, FILM COATED ORAL at 09:32

## 2022-02-12 RX ADMIN — CHOLECALCIFEROL (VITAMIN D3) 10 MCG (400 UNIT) TABLET 400 UNITS: at 14:16

## 2022-02-12 RX ADMIN — DOCUSATE SODIUM 100 MG: 100 CAPSULE ORAL at 09:32

## 2022-02-12 RX ADMIN — ZINC SULFATE 220 MG (50 MG) CAPSULE 220 MG: CAPSULE at 09:32

## 2022-02-12 RX ADMIN — CLOPIDOGREL BISULFATE 75 MG: 75 TABLET ORAL at 09:32

## 2022-02-12 RX ADMIN — SUCRALFATE 1 G: 1 TABLET ORAL at 06:32

## 2022-02-12 RX ADMIN — FINASTERIDE 5 MG: 5 TABLET, FILM COATED ORAL at 09:32

## 2022-02-12 RX ADMIN — RANOLAZINE 500 MG: 500 TABLET, EXTENDED RELEASE ORAL at 21:10

## 2022-02-12 RX ADMIN — ENOXAPARIN SODIUM 20 MG: 40 INJECTION SUBCUTANEOUS at 01:49

## 2022-02-12 RX ADMIN — FUROSEMIDE 40 MG: 10 INJECTION, SOLUTION INTRAMUSCULAR; INTRAVENOUS at 09:33

## 2022-02-12 RX ADMIN — METOPROLOL SUCCINATE 25 MG: 25 TABLET, EXTENDED RELEASE ORAL at 21:10

## 2022-02-12 RX ADMIN — ENOXAPARIN SODIUM 40 MG: 40 INJECTION SUBCUTANEOUS at 09:33

## 2022-02-12 RX ADMIN — SENNOSIDES A AND B 15 ML: 8.8 SYRUP ORAL at 09:33

## 2022-02-12 RX ADMIN — Medication 250 MG: at 06:32

## 2022-02-12 RX ADMIN — PANTOPRAZOLE SODIUM 20 MG: 20 TABLET, DELAYED RELEASE ORAL at 05:54

## 2022-02-12 RX ADMIN — Medication 250 MG: at 15:16

## 2022-02-12 RX ADMIN — METOPROLOL SUCCINATE 25 MG: 25 TABLET, EXTENDED RELEASE ORAL at 05:54

## 2022-02-12 RX ADMIN — PRAVASTATIN SODIUM 40 MG: 40 TABLET ORAL at 17:15

## 2022-02-12 RX ADMIN — Medication 1 TABLET: at 09:32

## 2022-02-12 RX ADMIN — SUCRALFATE 1 G: 1 TABLET ORAL at 15:16

## 2022-02-12 RX ADMIN — TAMSULOSIN HYDROCHLORIDE 0.4 MG: 0.4 CAPSULE ORAL at 17:15

## 2022-02-12 NOTE — PROGRESS NOTES
Marcel 45  Progress Note - Yee Fuel 8/2/1930, 80 y o  male MRN: [id number]  Unit/Bed#: [address] [place] [address] Encounter: [id number]  Primary Care Provider: Shanon Acharya DO   Date and time admitted to hospital: 2/11/2022  8:03 PM    * Neck pain, acute  Assessment & Plan  Neck tightness when looking down with associated bilateral lower extremity weakness - now resolved  · Suspect TIA or lacunar infarction  · Stroke protocol started in ER  · CT head negative for acute findings  · No intervention needed for incidental aneurysm finding  · CTA head and neck with no stenosis or occlusion of vessels  · Not a candidate for TPA per neurology  · Per neurology continue with plavix, lovenox, statin  · MRI pending  · Neurology following    Thrombus of left atrial appendage  Assessment & Plan  Suspected left atrial appendage thrombus seen on CTA head and neck  · TTE could not visualize, plan for WALT Monday  · Cardiology following    SOB (shortness of breath)  Assessment & Plan  Developed SOB while in CT scan  · Required up to 5L NC, now on room air  · COVID negative  · BNP 2,264  · Chest xray shows ild interstitial pulmonary edema    · Cardiology consulted  · Echo pending    3-vessel CAD  Assessment & Plan  Intermittent chest pain POA   · No active chest pain now  · Not on lasix at home due to low BP - ordered 40 mg IV lasix while inpatient, discontinued this morning  · No on ACEI due to low baseline BP   · Has moderate MR and AS  · Troponin negative, no EKG changes   · Daily weights, I/Os, low sodium diet with fluid restriction  · Echo pending      Benign prostatic hyperplasia  Assessment & Plan  Continue flomax  · Ochoa discontinued  · Patient urinating frequently     Hyperlipidemia  Assessment & Plan  Continue statin    PUD (peptic ulcer disease)  Assessment & Plan  Continue PPI and sucralfate  · EGD 11/21 showed gastritis, intestinal metaplasia without dysplasia, multiple diverticula with dysphagia in past   · Spoke with GI who stated there was no additional workup to be done at this time      VTE Pharmacologic Prophylaxis:   Continue lovenox and plavix    Patient Centered Rounds: I performed bedside rounds with nursing staff today  Discussions with Specialists or Other Care Team Provider:     Education and Discussions with Family / Patient: Updated  (wife) at bedside  Time Spent for Care: 30 minutes  More than 50% of total time spent on counseling and coordination of care as described above  Current Length of Stay: 1 day(s)  Current Patient Status: Inpatient   Certification Statement: The patient will continue to require additional inpatient hospital stay due to pending workup  Discharge Plan: Anticipate discharge in 24-48 hrs to home  Code Status: Level 1 - Full Code    Subjective:   Pt is doing well today  He denies any symptoms that he was experiencing on admission yesterday  He is no longer requiring oxygen  He states he was able to ambulate this morning without any weakness  He denies any lightheadedness, dysarthria, leg weakness  He denies any chest pain, palpitations, SOB, abdominal pain, dysuria  Objective:     Vitals:   Temp (24hrs), Av 9 °F (36 6 °C), Min:97 6 °F (36 4 °C), Max:98 4 °F (36 9 °C)    Temp:  [97 6 °F (36 4 °C)-98 4 °F (36 9 °C)] 98 4 °F (36 9 °C)  HR:  [74-98] 85  Resp:  [12-30] 18  BP: (129-213)/() 144/70  SpO2:  [89 %-98 %] 98 %  Body mass index is 24 63 kg/m²  Input and Output Summary (last 24 hours): Intake/Output Summary (Last 24 hours) at 2022 1502  Last data filed at 2022 1208  Gross per 24 hour   Intake 1000 ml   Output 2560 ml   Net -1560 ml       Physical Exam:   Physical Exam  Vitals and nursing note reviewed  Constitutional:       Appearance: He is well-developed  HENT:      Head: Normocephalic and atraumatic     Eyes:      Conjunctiva/sclera: Conjunctivae normal    Cardiovascular:      Rate and Rhythm: Normal rate and regular rhythm  Heart sounds: No murmur heard  Pulmonary:      Effort: Pulmonary effort is normal  No respiratory distress  Breath sounds: Decreased breath sounds present  Comments: Now on room air  Abdominal:      Palpations: Abdomen is soft  Tenderness: There is no abdominal tenderness  Musculoskeletal:      Comments: Strength 5/5 bilaterally, ambulating without difficulty   Skin:     General: Skin is warm and dry  Neurological:      Mental Status: He is alert        Comments: No weakness noted         Additional Data:     Labs:  Results from last 7 days   Lab Units 02/12/22  0553   WBC Thousand/uL 7 23   HEMOGLOBIN g/dL 11 7*   HEMATOCRIT % 37 9   PLATELETS Thousands/uL 170   NEUTROS PCT % 72   LYMPHS PCT % 17   MONOS PCT % 10   EOS PCT % 1     Results from last 7 days   Lab Units 02/12/22  0553   SODIUM mmol/L 137   POTASSIUM mmol/L 4 3   CHLORIDE mmol/L 100   CO2 mmol/L 27   BUN mg/dL 25   CREATININE mg/dL 1 07   ANION GAP mmol/L 10   CALCIUM mg/dL 8 6   ALBUMIN g/dL 3 3*   TOTAL BILIRUBIN mg/dL 0 45   ALK PHOS U/L 103   ALT U/L 14   AST U/L 9   GLUCOSE RANDOM mg/dL 136     Results from last 7 days   Lab Units 02/12/22  0553   INR  1 02     Results from last 7 days   Lab Units 02/11/22  2008   POC GLUCOSE mg/dl 181*               Lines/Drains:  Invasive Devices  Report    Peripheral Intravenous Line            Peripheral IV 02/11/22 Left Arm <1 day    Peripheral IV 02/11/22 Right Antecubital <1 day                  Telemetry:  Telemetry Orders (From admission, onward)             24 Hour Telemetry Monitoring  Continuous x 24 Hours (Telem)        References:    Telemetry Guidelines   Question:  Reason for 24 Hour Telemetry  Answer:  Acute CVA (>24 hrs old)                          Imaging: Reviewed radiology reports from this admission including: CT head    Recent Cultures (last 7 days):         Last 24 Hours Medication List:   Current Facility-Administered Medications   Medication Dose Route Frequency Provider Last Rate    albuterol  2 5 mg Nebulization Q4H PRN Jem Brand MD      ALPRAZolam  0 5 mg Oral HS PRN Jem Brand MD      artificial tear   Both Eyes HS PRN Jem Brand MD      calcium carbonate-vitamin D  1 tablet Oral QAM Jem Brand MD      cholecalciferol  400 Units Oral After Sergio Barrera MD      clopidogrel  75 mg Oral Daily Jem Brand MD      docusate sodium  100 mg Oral Daily Jem Brand MD      enoxaparin  40 mg Subcutaneous Daily Jem Brand MD      famotidine  20 mg Oral Daily Jem Brand MD      finasteride  5 mg Oral Daily Jem Brand MD      magnesium gluconate  250 mg Oral BID AC Jem Brand MD      multivitamin with iron-minerals  15 mL Oral Daily Jem Brand MD      nitroglycerin  0 4 mg Sublingual Q5 Min PRN Jem Brand MD      pantoprazole  20 mg Oral Early Morning Jem Brand MD      pravastatin  40 mg Oral Daily With Yessica Diane MD      sucralfate  1 g Oral BID AC Jem Brand MD      tamsulosin  0 4 mg Oral Daily With Yessica Diane MD      zinc sulfate  220 mg Oral Daily Jem Brand MD          Today, Patient Was Seen By: Denisse Chavarria PA-C    **Please Note: This note may have been constructed using a voice recognition system  **

## 2022-02-12 NOTE — ASSESSMENT & PLAN NOTE
Suspected left atrial appendage thrombus seen on CTA head and neck  · TTE could not visualize, plan for WALT Monday  · Cardiology following

## 2022-02-12 NOTE — ASSESSMENT & PLAN NOTE
Neck tightness when looking down with associated bilateral lower extremity weakness - now resolved  · Suspect TIA or lacunar infarction  · Stroke protocol started in ER  · CT head negative for acute findings  · No intervention needed for incidental aneurysm finding  · CTA head and neck with no stenosis or occlusion of vessels  · Not a candidate for TPA per neurology  · Per neurology continue with plavix, lovenox, statin  · MRI pending  · Neurology following

## 2022-02-12 NOTE — CONSULTS
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 Will try and obtain prior outpatient records  Plan for WALT on Monday  Agree with anticoagulation until ruled out for left atrial appendage thrombus  Coronary artery disease with previous history of bypass grafting and PCI/chronic stable angina- patient has been stable on optimal medical therapy  Continue his beta-blocker plus Plavix plus Imdur plus Ranexa    Ischemic cardiomyopathy with EF around 50%    Moderate aortic stenosis and moderate mitral regurgitation    HPI: John Issa is a 80y o  year old male who presents with new onset neck pain and lower extremity weakness  He was ruled out for acute CVA  He denied having any irregular heart rhythms  He denies having chest heaviness  He denies having any major bleeding  He has been compliant with his cardiac medications  He denies having any fevers or chills        Historical Information   Past Medical History:   Diagnosis Date    Arthritis     BPH (benign prostatic hyperplasia)     Cervical spine fracture ([facility] [address] ) 1997    C3    Chest pain     Colon polyp     Coronary artery disease     Dry eye     DVT (deep venous thrombosis) ([facility] [address] ) 2015    right leg- passed thru the IVC filter-stopped at the "patch" from bypass surgery    Dysphagia 11/2021    minimal-"mass" esophagus with a "knob" in the middle    Fracture of thoracic spine ([facility] [address] ) 1997    T3    Glaucoma     Hyperlipidemia     Hypertension     Myocardial infarction (HCC)     PUD (peptic ulcer disease)      Past Surgical History:   Procedure Laterality Date    ANGIOPLASTY      2 stents    CHOLECYSTECTOMY      COLONOSCOPY      CORONARY ARTERY BYPASS GRAFT      x6    CORONARY ARTERY BYPASS GRAFT      CORONARY STENT PLACEMENT      CYSTOSCOPY      EYE SURGERY Right     HERNIA REPAIR Right 11/3/2017    Procedure: REPAIR HERNIA INGUINAL;  Surgeon: Bettie Quiñonez MD;  Location: [facility];  Service: General    IVC FILTER INSERTION      IVC filter    UT CYSTOURETHROSCOPY W/IRRIG & EVAC CLOTS N/A 6/30/2018    Procedure: CYSTOSCOPY EVACUATION OF CLOTS, fulgeration;  Surgeon: Konstantin Ordaz MD;  Location: [facility];  Service: Urology   [address]    Billroth 2 gastrectomy-2/3 removal- bleeding ulcer    TRANSURETHRAL RESECTION OF PROSTATE       Social History     Substance and Sexual Activity   Alcohol Use Never     Social History     Substance and Sexual Activity   Drug Use No     Social History     Tobacco Use   Smoking Status Never Smoker   Smokeless Tobacco Never Used     Family History   Problem Relation Age of Onset    Coronary artery disease Brother     Heart disease Father         CAD       Meds/Allergies   Prior to Admission medications    Medication Sig Start Date End Date Taking?  Authorizing Provider   ALPRAZolam Osie Cools) 0 5 mg tablet Take 0 5 mg by mouth daily at bedtime as needed  7/19/18  Yes Historical Provider, MD   Calcium Carbonate-Vitamin D (CALCIUM 600+D PO) Take by mouth every morning     Historical Provider, MD   Carboxymethylcellulose Sodium (REFRESH TEARS OP) Apply to eye as needed    Historical Provider, MD   cholecalciferol (VITAMIN D3) 400 units tablet Take 400 Units by mouth daily after lunch     Historical Provider, MD   clopidogrel (PLAVIX) 75 mg tablet Take 1 tablet (75 mg total) by mouth daily Last dose 11/5/21 12/14/21   Lin Valencia MD   Docusate Calcium (STOOL SOFTENER PO) Take by mouth OTC; takes with lunch    Historical Provider, MD   dutasteride (AVODART) 0 5 mg capsule Take 1 capsule (0 5 mg total) by mouth daily 2/7/22   Jeanette Nix PA-C   famotidine (PEPCID) 20 mg tablet Take 20 mg by mouth daily      Historical Provider, MD   isosorbide mononitrate (IMDUR) 30 mg 24 hr tablet Take 1 tablet (30 mg total) by mouth daily  Patient taking differently: Take 30 mg by mouth every morning  8/17/21   Lin Valencia MD   Magnesium 250 MG TABS Take 1 tablet by mouth every morning     Historical Provider, MD   metoprolol succinate (TOPROL-XL) 25 mg 24 hr tablet Take 1 tablet (25 mg total) by mouth 3 (three) times a day 1/3/22   Lin Valencia MD   multivitamin-iron-minerals-folic acid (CENTRUM) chewable tablet Chew 1 tablet every morning     Historical Provider, MD   nitroglycerin (NITROSTAT) 0 4 mg SL tablet Place 1 tablet (0 4 mg total) under the tongue every 5 (five) minutes as needed for chest pain 12/15/20   Lin Valencia MD   pantoprazole (PROTONIX) 20 mg tablet Take 1 tablet (20 mg total) by mouth daily 1/11/22   Irina Paniagua MD   ranolazine (RANEXA) 500 mg 12 hr tablet Take 1 tablet (500 mg total) by mouth 2 (two) times a day 8/9/21   Polina Simons DO   rosuvastatin (CRESTOR) 5 mg tablet Take 1 tablet (5 mg total) by mouth daily at bedtime 1/12/22   Lin Valencia MD   sucralfate (CARAFATE) 1 g tablet Dissolve 1 tablet in 2 tablespoons of water and swallow two times a day 11/11/21   Gage Gamez PA-C   sucralfate (CARAFATE) 1 g/10 mL suspension Take 10 mL (1 g total) by mouth 2 (two) times a day  Patient not taking: Reported on 12/14/2021 11/11/21   Malka Miller MD   tamsulosin (Flomax) 0 4 mg Take 1 capsule (0 4 mg total) by mouth daily with dinner 6/21/21   Vicki Paz PA-C   Zinc 25 MG TABS Take 25 mg by mouth daily at bedtime    Historical Provider, MD     Current Facility-Administered Medications   Medication Dose Route Frequency Provider Last Rate Last Admin    albuterol inhalation solution 2 5 mg  2 5 mg Nebulization Q4H PRN Kamille Rico MD        ALPRAZolam [facility]) tablet 0 5 mg  0 5 mg Oral HS PRN Kamille Rico MD        artificial tear (LUBRIFRESH P M ) ophthalmic ointment   Both Eyes HS PRN Kamille Rico MD        calcium carbonate-vitamin D (OSCAL-D) 500 mg-200 units per tablet 1 tablet  1 tablet Oral QAM Kamille Rico MD   1 tablet at 02/12/22 0932    cholecalciferol (VITAMIN D3) tablet 400 Units  400 Units Oral After Oleg Vazquez MD   400 Units at 02/12/22 1416    clopidogrel (PLAVIX) tablet 75 mg  75 mg Oral Daily Kamille Rico MD   75 mg at 02/12/22 0932    docusate sodium (COLACE) capsule 100 mg  100 mg Oral Daily Kamille Rico MD   100 mg at 02/12/22 0932    enoxaparin (LOVENOX) subcutaneous injection 40 mg  40 mg Subcutaneous Daily Kamille Rico MD   40 mg at 02/12/22 0933    famotidine (PEPCID) tablet 20 mg  20 mg Oral Daily Kamille Rico MD   20 mg at 02/12/22 0932    finasteride (PROSCAR) tablet 5 mg  5 mg Oral Daily Kamille Rico MD   5 mg at 02/12/22 0932    [START ON 2/13/2022] isosorbide mononitrate (IMDUR) 24 hr tablet 30 mg  30 mg Oral QAM Kiya Barrera DO        magnesium gluconate (MAGONATE) tablet 250 mg  250 mg Oral BID AC Kamille Rico MD   250 mg at 02/12/22 1516    metoprolol succinate (TOPROL-XL) 24 hr tablet 25 mg  25 mg Oral TID Kiya Barrera DO        multivitamin with iron-minerals liquid 15 mL  15 mL Oral Daily Kamille Rico MD   15 mL at 02/12/22 0933    nitroglycerin (NITROSTAT) SL tablet 0 4 mg  0 4 mg Sublingual Q5 Min PRN Kamille Rico MD        pantoprazole (PROTONIX) EC tablet 20 mg  20 mg Oral Early Morning Kamille Rico MD   20 mg at 02/12/22 0554    pravastatin (PRAVACHOL) tablet 40 mg  40 mg Oral Daily With Matthias Hester MD        ranolazine (RANEXA) 12 hr tablet 500 mg  500 mg Oral BID Sabrina Carlson, DO        sucralfate (CARAFATE) tablet 1 g  1 g Oral BID AC Angelina Musa MD   1 g at 02/12/22 1516    tamsulosin (FLOMAX) capsule 0 4 mg  0 4 mg Oral Daily With Karli Noonan MD        zinc sulfate (ZINCATE) capsule 220 mg  220 mg Oral Daily Angelina Musa MD   220 mg at 02/12/22 0932     Allergies   Allergen Reactions    Escitalopram Other (See Comments)     Suicidal feelings, sweating    Oxycodone-Acetaminophen Dizziness and Shortness Of Breath     Other reaction(s): Faints  Reaction Date: [id number]; Category: Adverse Reaction;     Omeprazole Rash    Statins Other (See Comments)     Muscle pain       Review of systems  CONSTITUTIONAL:  As per hpi  HEENT:  Eyes:  No visual loss, blurred vision, double vision or yellow sclerae  Ears, Nose, Throat:  No hearing loss, sneezing, congestion, runny nose or sore throat  SKIN:  No rash or itching  CARDIOVASCULAR:  As per hpi  RESPIRATORY:  As per hpi  GASTROINTESTINAL:  No anorexia, nausea, vomiting or diarrhea  No abdominal pain or blood  GENITOURINARY:  Burning on urination  No flank pain  NEUROLOGICAL:  No headache, dizziness, syncope, paralysis, ataxia, numbness or tingling in the extremities  No change in bowel or bladder control  MUSCULOSKELETAL:  No muscle, back pain, joint pain or stiffness  HEMATOLOGIC:  No anemia, bleeding or bruising  LYMPHATICS:  No enlarged nodes  No history of splenectomy  PSYCHIATRIC:  No active suicidal or homicidal ideation  ENDOCRINOLOGIC:  No reports of sweating, cold or heat intolerance  No polyuria or polydipsia  ALLERGIES:  No history of asthma, hives, eczema or rhinitis  More than 10 systems reviewed and otherwise noncontributory  Objective   Vitals: Blood pressure 130/65, pulse 89, temperature 97 7 °F (36 5 °C), resp  rate 18, height 5' 5" (1 651 m), weight 67 1 kg (148 lb), SpO2 93 %  Blood pressure 130/65, pulse 89, temperature 97 7 °F (36 5 °C), resp   rate 18, height 5' 5" (1 651 m), weight 67 1 kg (148 lb), SpO2 93 %  Body mass index is 24 63 kg/m²  BP Readings from Last 3 Encounters:   02/12/22 130/65   01/11/22 114/53   12/14/21 110/70     Orthostatic Blood Pressures      Most Recent Value   Blood Pressure 130/65 filed at 02/12/2022 1513   Patient Position - Orthostatic VS Lying filed at 02/12/2022 0106          Intake/Output Summary (Last 24 hours) at 2/12/2022 1615  Last data filed at 2/12/2022 1208  Gross per 24 hour   Intake 1000 ml   Output 2560 ml   Net -1560 ml     Invasive Devices  Report    Peripheral Intravenous Line            Peripheral IV 02/11/22 Left Arm <1 day    Peripheral IV 02/11/22 Right Antecubital <1 day                Physical Exam    Lab Results:  I Have Reviewed All Lab Data Below:  Results from last 7 days   Lab Units 02/12/22  0553   WBC Thousand/uL 7 23   HEMOGLOBIN g/dL 11 7*   HEMATOCRIT % 37 9   PLATELETS Thousands/uL 170   NEUTROS PCT % 72   LYMPHS PCT % 17   MONOS PCT % 10   EOS PCT % 1     Results from last 7 days   Lab Units 02/12/22  0553   POTASSIUM mmol/L 4 3   CHLORIDE mmol/L 100   CO2 mmol/L 27   BUN mg/dL 25   CREATININE mg/dL 1 07   CALCIUM mg/dL 8 6   ALK PHOS U/L 103   ALT U/L 14   AST U/L 9     Troponins:       CBC with diff:   Results from last 7 days   Lab Units 02/12/22  0553 02/11/22 2019   WBC Thousand/uL 7 23 4 95   HEMOGLOBIN g/dL 11 7* 11 2*   HEMATOCRIT % 37 9 37 0   MCV fL 91 92   PLATELETS Thousands/uL 170 174   MCH pg 28 1 27 9   MCHC g/dL 30 9* 30 3*   RDW % 14 0 14 0   MPV fL 10 2 10 8   NRBC AUTO /100 WBCs 0 0       CMP:   Results from last 7 days   Lab Units 02/12/22 0553 02/11/22 2019   SODIUM mmol/L 137 137   POTASSIUM mmol/L 4 3 4 2   CHLORIDE mmol/L 100 100   CO2 mmol/L 27 29   ANION GAP mmol/L 10 8   BUN mg/dL 25 27*   CREATININE mg/dL 1 07 1 07   CALCIUM mg/dL 8 6 8 4   AST U/L 9 5   ALT U/L 14 16   ALK PHOS U/L 103 99   TOTAL PROTEIN g/dL 7 5 7 7   ALBUMIN g/dL 3 3* 3 4*   TOTAL BILIRUBIN mg/dL 0 45 0 42   EGFR ml/min/1 73sq m 60 60 GLUCOSE RANDOM mg/dL 136 179*     Magnesium:   Results from last 7 days   Lab Units 22  0553   MAGNESIUM mg/dL 2 3       NT-proBNP:   Recent Labs     22  0553   NTBNP 1,126* 2,264*        Coags:   Results from last 7 days   Lab Units 22  0553 22   PTT seconds 32 29   INR  1 02 1 01       Troponins:       Lipid Profile:                              Lab Results   Component Value Date    CHOLESTEROL 144 2021    HDL 41 2021    LDLCALC 80 2021    TRIG 115 2021       TSH:        Hgb A1c:       Imaging: I have personally reviewed pertinent films in PACS    Cardiac testing:   Results for orders placed during the hospital encounter of 21    Echo complete with contrast if indicated    Narrative  [address]  [address] [facility] [address]  [phone]    Transthoracic Echocardiogram  2D, M-mode, Doppler, and Color Doppler    Study date:  2021    Patient: Ralph Gay  MR number: [id number]  Account number: [de-identified]  : 02-Aug-1930  Age: 80 years  Gender: Male  Status: Outpatient  Location: Echo lab  Height: 65 in  Weight: 151 6 lb  BP: 122/ 58 mmHg    Indications: Aortic Stenosis    Diagnoses: 424 1 - AORTIC VALVE DISORDER    Sonographer:  YURI Austin  Primary Physician:  Claudette Kwan DO  Referring Physician:  Juan Brady MD  Group:  [facility]  Interpreting Physician:  Thomas Chanel MD    SUMMARY    LEFT VENTRICLE:  Systolic function was at the lower limits of normal by visual assessment  Ejection fraction was estimated in the range of 45 % to 50 % to be 45 %  There was mild diffuse hypokinesis  Wall thickness was mildly increased  LEFT ATRIUM:  The atrium was mildly dilated  MITRAL VALVE:  There was mild to moderate annular calcification  Transmitral velocity was increased due to increased transvalvular flow  There was moderate to severe regurgitation  Based on elevated E' and central color flow jet  Unable to obtain PISA measurement for effective ERO calculation  Consider WALT for better evaluation    AORTIC VALVE:  The valve was probably trileaflet  Leaflets exhibited mildly increased thickness, mild calcification, moderately reduced cuspal separation, and sclerosis  Transaortic velocity was increased due to increased transvalvular flow  There was moderate stenosis  There was mild to moderate regurgitation  Valve mean gradient was 26 mmHg  HISTORY: PRIOR HISTORY: 3-vessel CAD,Chronic stable Angina,Chronic Diastolic heart failure,HTN,Murmur,DVT,Hyperlipidemia,anemia  PROCEDURE: The procedure was performed in the echo lab  This was a routine study  The transthoracic approach was used  The study included complete 2D imaging, M-mode, complete spectral Doppler, and color Doppler  The heart rate was 61 bpm,  at the start of the study  Images were obtained from the parasternal, apical, subcostal, and suprasternal notch acoustic windows  Image quality was adequate  LEFT VENTRICLE: Size was normal  Systolic function was at the lower limits of normal by visual assessment  Ejection fraction was estimated in the range of 45 % to 50 % to be 45 %  There was mild diffuse hypokinesis  Wall thickness was  mildly increased  No evidence of apical thrombus  DOPPLER: The study was not technically sufficient to allow evaluation of LV diastolic function  RIGHT VENTRICLE: The size was normal  Systolic function was normal  Wall thickness was normal     LEFT ATRIUM: The atrium was mildly dilated  RIGHT ATRIUM: Size was normal     MITRAL VALVE: There was mild to moderate annular calcification  There was mild-moderate calcification  There was mildly reduced leaflet separation  DOPPLER: Transmitral velocity was increased due to increased transvalvular flow  There was  no evidence for stenosis  There was moderate to severe regurgitation   Based on elevated E' and central color flow jet  Unable to obtain PISA measurement for effective ERO calculation  Consider WALT for better evaluation    AORTIC VALVE: The valve was probably trileaflet  Leaflets exhibited mildly increased thickness, mild calcification, moderately reduced cuspal separation, and sclerosis  DOPPLER: Transaortic velocity was increased due to increased  transvalvular flow  There was moderate stenosis  There was mild to moderate regurgitation  TRICUSPID VALVE: The valve structure was normal  There was normal leaflet separation  DOPPLER: The transtricuspid velocity was within the normal range  There was no evidence for stenosis  There was trace regurgitation  PULMONIC VALVE: Leaflets exhibited normal thickness, no calcification, and normal cuspal separation  DOPPLER: The transpulmonic velocity was within the normal range  There was trace regurgitation  PERICARDIUM: There was no pericardial effusion  The pericardium was normal in appearance  AORTA: The root exhibited normal size  SYSTEMIC VEINS: IVC: The inferior vena cava was not well visualized  MEASUREMENT TABLES    DOPPLER MEASUREMENTS  Aortic valve   (Reference normals)  Peak gilda   350 cm/s   (--)  Peak gradient   48 mmHg   (--)  Mean gradient   26 mmHg   (--)  Regurg PHT   460 ms   (--)    SYSTEM MEASUREMENT TABLES    2D  EF (Teich): 49 86 %  %FS: 25 27 %  JUDY Planimetry: 0 86 cm2  Ao Diam: 3 1 cm  EDV(Teich): 104 22 ml  ESV(Teich): 52 25 ml  IVSd: 1 24 cm  LA Area: 22 39 cm2  LA Diam: 4 03 cm  LVEDV MOD A4C: 174 08 ml  LVEF MOD A4C: 50 26 %  LVESV MOD A4C: 86 59 ml  LVIDd: 4 74 cm  LVIDs: 3 54 cm  LVLd A4C: 9 35 cm  LVLs A4C: 8 17 cm  LVOT Diam: 1 96 cm  LVPWd: 1 21 cm  RA Area: 12 71 cm2  RVIDd: 2 45 cm  SV (Teich): 51 97 ml  SV MOD A4C: 87 49 ml    CW  AV Env  Ti: 361 56 ms  AV VTI: 86 51 cm  AV Vmax: 3 48 m/s  AV Vmean: 2 39 m/s  AV maxP 38 mmHg  AV meanP 03 mmHg  TR Vmax: 3 25 m/s  TR maxP 23 mmHg    PW  E' Sept: 0 05 m/s  LVOT Env Ti: 361 56 ms  LVOT VTI: 27 25 cm  LVOT Vmax: 1 15 m/s  LVOT Vmean: 0 75 m/s  LVOT maxP 29 mmHg  LVOT meanP 61 mmHg    [facility] Accredited Echocardiography Laboratory    Prepared and electronically signed by    Sonia Dobbs MD  Signed 2021 17:34:59    EKG: sinus rhythm with first degree heart block diffuse st changes    Counseling / Coordination of Care Time: 50 min  Greater than 50% of total time spent on patient counseling and coordination of care  Code Status: Level 1 - Full Code  Collaboration of Care: Were Recommendations Directly Discussed with Primary Treatment Team? - Yes     Sonia Dobbs MD [facility]    "This note has been constructed using a voice recognition system  Therefore there may be syntax, spelling, and/or grammatical errors   Please call if you have any questions  "

## 2022-02-12 NOTE — ASSESSMENT & PLAN NOTE
Continue PPI and sucralfate  · EGD 11/21 showed gastritis, intestinal metaplasia without dysplasia, multiple diverticula with dysphagia in past   · Spoke with GI who stated there was no additional workup to be done at this time

## 2022-02-12 NOTE — ED PROVIDER NOTES
History  Chief Complaint   Patient presents with    Extremity Weakness     pt reports bilateral leg weakness and is unable to stand up  Pt also reports neck pain  Wife states over the past hour patient has become more confused     Patient states he was sitting at the table at dinner about an hour and half to 2 hours prior to arrival   Patient flexed his head looking down, felt immediate pain in the back of his neck  Pain rated most on the left side up to the occipital crest   Patient states the pain was sharp and severe  It was associated with weakness in both legs  Patient states he could not hold himself up and had to be carried to the car  Wife administered 3 baby aspirins prior to arrival   Patient states he feels weak in both legs and has poor coordination  No visual changes,  no nausea vomiting          Prior to Admission Medications   Prescriptions Last Dose Informant Patient Reported? Taking?    ALPRAZolam (XANAX) 0 5 mg tablet  Spouse/Significant Other Yes No   Sig: Take 0 5 mg by mouth daily at bedtime as needed    Calcium Carbonate-Vitamin D (CALCIUM 600+D PO)  Spouse/Significant Other Yes No   Sig: Take by mouth every morning    Carboxymethylcellulose Sodium (REFRESH TEARS OP)   Yes No   Sig: Apply to eye as needed   Docusate Calcium (STOOL SOFTENER PO)  Self Yes No   Sig: Take by mouth OTC; takes with lunch   Magnesium 250 MG TABS  Spouse/Significant Other Yes No   Sig: Take 1 tablet by mouth every morning    Zinc 25 MG TABS   Yes No   Sig: Take 25 mg by mouth daily at bedtime   cholecalciferol (VITAMIN D3) 400 units tablet  Spouse/Significant Other Yes No   Sig: Take 400 Units by mouth daily after lunch    clopidogrel (PLAVIX) 75 mg tablet   No No   Sig: Take 1 tablet (75 mg total) by mouth daily Last dose 11/5/21   dutasteride (AVODART) 0 5 mg capsule   No No   Sig: Take 1 capsule (0 5 mg total) by mouth daily   famotidine (PEPCID) 20 mg tablet  Spouse/Significant Other Yes No   Sig: Take 20 mg by mouth daily     isosorbide mononitrate (IMDUR) 30 mg 24 hr tablet   No No   Sig: Take 1 tablet (30 mg total) by mouth daily   Patient taking differently: Take 30 mg by mouth every morning    metoprolol succinate (TOPROL-XL) 25 mg 24 hr tablet   No No   Sig: Take 1 tablet (25 mg total) by mouth 3 (three) times a day   multivitamin-iron-minerals-folic acid (CENTRUM) chewable tablet  Spouse/Significant Other Yes No   Sig: Chew 1 tablet every morning    nitroglycerin (NITROSTAT) 0 4 mg SL tablet  Spouse/Significant Other No No   Sig: Place 1 tablet (0 4 mg total) under the tongue every 5 (five) minutes as needed for chest pain   pantoprazole (PROTONIX) 20 mg tablet   No No   Sig: Take 1 tablet (20 mg total) by mouth daily   ranolazine (RANEXA) 500 mg 12 hr tablet   No No   Sig: Take 1 tablet (500 mg total) by mouth 2 (two) times a day   rosuvastatin (CRESTOR) 5 mg tablet   No No   Sig: Take 1 tablet (5 mg total) by mouth daily at bedtime   sucralfate (CARAFATE) 1 g tablet   No No   Sig: Dissolve 1 tablet in 2 tablespoons of water and swallow two times a day   sucralfate (CARAFATE) 1 g/10 mL suspension   No No   Sig: Take 10 mL (1 g total) by mouth 2 (two) times a day   Patient not taking: Reported on 12/14/2021    tamsulosin (Flomax) 0 4 mg   No No   Sig: Take 1 capsule (0 4 mg total) by mouth daily with dinner      Facility-Administered Medications: None       Past Medical History:   Diagnosis Date    Arthritis     BPH (benign prostatic hyperplasia)     Cervical spine fracture (HCC) 1997    C3    Chest pain     Colon polyp     Coronary artery disease     Dry eye     DVT (deep venous thrombosis) ([facility] ) 2015    right leg- passed thru the IVC filter-stopped at the "patch" from bypass surgery    Dysphagia 11/2021    minimal-"mass" esophagus with a "knob" in the middle    Fracture of thoracic spine ([facility] ) 1997    T3    Glaucoma     Hyperlipidemia     Hypertension     Myocardial infarction ([facility] )     PUD (peptic ulcer disease)        Past Surgical History:   Procedure Laterality Date    ANGIOPLASTY      2 stents    CHOLECYSTECTOMY      COLONOSCOPY      CORONARY ARTERY BYPASS GRAFT      x6    CORONARY ARTERY BYPASS GRAFT      CORONARY STENT PLACEMENT      CYSTOSCOPY      EYE SURGERY Right     HERNIA REPAIR Right 11/3/2017    Procedure: REPAIR HERNIA INGUINAL;  Surgeon: Faye Olguin MD;  Location: [facility];  Service: General    IVC FILTER INSERTION      IVC filter    [place] CYSTOURETHROSCOPY W/IRRIG & EVAC CLOTS N/A 6/30/2018    Procedure: CYSTOSCOPY EVACUATION OF CLOTS, fulgeration;  Surgeon: Ricardo Nava MD;  Location: [facility];  Service: Urology   [address]    [place] 2 gastrectomy-2/3 removal- bleeding ulcer    TRANSURETHRAL RESECTION OF PROSTATE         Family History   Problem Relation Age of Onset    Coronary artery disease Brother     Heart disease Father         CAD     I have reviewed and agree with the history as documented  E-Cigarette/Vaping    E-Cigarette Use Never User      E-Cigarette/Vaping Substances    Nicotine No     THC No     CBD No     Flavoring No     Other No     Unknown No      Social History     Tobacco Use    Smoking status: Never Smoker    Smokeless tobacco: Never Used   Vaping Use    Vaping Use: Never used   Substance Use Topics    Alcohol use: Never    Drug use: No       Review of Systems   Constitutional: Negative for chills and fever  HENT: Negative for congestion, sore throat, trouble swallowing and voice change  Eyes: Negative for photophobia and visual disturbance  Respiratory: Negative for cough, shortness of breath and wheezing  Cardiovascular: Negative for chest pain and leg swelling  Gastrointestinal: Negative for abdominal distention and nausea  Genitourinary: Negative for dysuria and flank pain  Musculoskeletal: Positive for neck pain  Skin: Negative for rash     Neurological: Positive for weakness and headaches  Negative for syncope, speech difficulty, light-headedness and numbness  Hematological: Does not bruise/bleed easily  Psychiatric/Behavioral: Positive for confusion  Patient states he could hear what people were saying but it did not process well   All other systems reviewed and are negative  Physical Exam  Physical Exam  Vitals and nursing note reviewed  Constitutional:       Appearance: Normal appearance  HENT:      Head: Normocephalic  Right Ear: External ear normal       Left Ear: External ear normal       Nose: Nose normal       Mouth/Throat:      Pharynx: Oropharynx is clear  Eyes:      Extraocular Movements: Extraocular movements intact  Conjunctiva/sclera: Conjunctivae normal    Cardiovascular:      Rate and Rhythm: Normal rate and regular rhythm  Pulses: Normal pulses  Pulmonary:      Effort: Pulmonary effort is normal       Breath sounds: Normal breath sounds  Abdominal:      Palpations: Abdomen is soft  Tenderness: There is no abdominal tenderness  Musculoskeletal:         General: Normal range of motion  Cervical back: Normal range of motion  Skin:     General: Skin is warm and dry  Neurological:      General: No focal deficit present  Mental Status: He is alert and oriented to person, place, and time  Cranial Nerves: No cranial nerve deficit  Sensory: No sensory deficit  Coordination: Coordination normal       Comments: Patient has drift against gravity in both lower extremities at about 3 seconds    There is some weakness on the right arm compared to the left which the patient states is an old nerve injury and has not changed today   Psychiatric:         Mood and Affect: Mood normal          Behavior: Behavior normal          Vital Signs  ED Triage Vitals [02/11/22 1958]   Temperature Pulse Respirations Blood Pressure SpO2   97 6 °F (36 4 °C) 85 18 (!) 196/78 97 %      Temp Source Heart Rate Source Patient Position - Orthostatic VS BP Location FiO2 (%)   Temporal Monitor Sitting Right arm --      Pain Score       5           Vitals:    02/11/22 2030 02/11/22 2100 02/11/22 2115 02/11/22 2130   BP: 162/72 156/68 161/66 162/76   Pulse: 90 80 75 76   Patient Position - Orthostatic VS:   Sitting Sitting         Visual Acuity  Visual Acuity      Most Recent Value   L Pupil Size (mm) 3   R Pupil Size (mm) 3          ED Medications  Medications   sodium chloride 0 9 % bolus 1,000 mL (1,000 mL Intravenous New Bag 2/11/22 2100)   iohexol (OMNIPAQUE) 350 MG/ML injection (SINGLE-DOSE) 100 mL (85 mL Intravenous Given 2/11/22 2035)       Diagnostic Studies  Results Reviewed     Procedure Component Value Units Date/Time    Protime-INR [[id number]]  (Normal) Collected: 02/11/22 2019    Lab Status: Final result Specimen: Blood from Arm, Right Updated: 02/11/22 2140     Protime 13 1 seconds      INR 1 01    APTT [[id number]]  (Normal) Collected: 02/11/22 2019    Lab Status: Final result Specimen: Blood from Arm, Right Updated: 02/11/22 2140     PTT 29 seconds     HS Troponin I 2hr [[id number]]     Lab Status: No result Specimen: Blood     HS Troponin 0hr (reflex protocol) [[id number]]  (Normal) Collected: 02/11/22 2019    Lab Status: Final result Specimen: Blood from Arm, Right Updated: 02/11/22 2053     hs TnI 0hr 19 ng/L     Comprehensive metabolic panel [[id number]]  (Abnormal) Collected: 02/11/22 2019    Lab Status: Final result Specimen: Blood from Arm, Right Updated: 02/11/22 2045     Sodium 137 mmol/L      Potassium 4 2 mmol/L      Chloride 100 mmol/L      CO2 29 mmol/L      ANION GAP 8 mmol/L      BUN 27 mg/dL      Creatinine 1 07 mg/dL      Glucose 179 mg/dL      Calcium 8 4 mg/dL      Corrected Calcium 8 9 mg/dL      AST 5 U/L      ALT 16 U/L      Alkaline Phosphatase 99 U/L      Total Protein 7 7 g/dL      Albumin 3 4 g/dL      Total Bilirubin 0 42 mg/dL      eGFR 60 ml/min/1 73sq m     Narrative:      [place] guidelines for Chronic Kidney Disease (CKD):     Stage 1 with normal or high GFR (GFR > 90 mL/min/1 73 square meters)    Stage 2 Mild CKD (GFR = 60-89 mL/min/1 73 square meters)    Stage 3A Moderate CKD (GFR = 45-59 mL/min/1 73 square meters)    Stage 3B Moderate CKD (GFR = 30-44 mL/min/1 73 square meters)    Stage 4 Severe CKD (GFR = 15-29 mL/min/1 73 square meters)    Stage 5 End Stage CKD (GFR <15 mL/min/1 73 square meters)  Note: GFR calculation is accurate only with a steady state creatinine    CBC and differential [[id number]]  (Abnormal) Collected: 02/11/22 2019    Lab Status: Final result Specimen: Blood from Arm, Right Updated: 02/11/22 2023     WBC 4 95 Thousand/uL      RBC 4 01 Million/uL      Hemoglobin 11 2 g/dL      Hematocrit 37 0 %      MCV 92 fL      MCH 27 9 pg      MCHC 30 3 g/dL      RDW 14 0 %      MPV 10 8 fL      Platelets 340 Thousands/uL      nRBC 0 /100 WBCs      Neutrophils Relative 62 %      Immat GRANS % 0 %      Lymphocytes Relative 26 %      Monocytes Relative 10 %      Eosinophils Relative 2 %      Basophils Relative 0 %      Neutrophils Absolute 3 04 Thousands/µL      Immature Grans Absolute 0 02 Thousand/uL      Lymphocytes Absolute 1 29 Thousands/µL      Monocytes Absolute 0 50 Thousand/µL      Eosinophils Absolute 0 08 Thousand/µL      Basophils Absolute 0 02 Thousands/µL     UA (URINE) with reflex to Scope [[id number]]     Lab Status: No result Specimen: Urine     Fingerstick Glucose (POCT) [[id number]]  (Abnormal) Collected: 02/11/22 2008    Lab Status: Final result Updated: 02/11/22 2011     POC Glucose 181 mg/dl                  CTA stroke alert (head/neck)   Final Result by Carter Muniz MD (02/11 2130)      1  No intracranial large vessel occlusion or critical stenosis  2   No hemodynamically significant stenosis of cervical carotid and vertebral arteries  3   Suspected left atrial appendage thrombus    Recommend further evaluation with cardiac echocardiogram  4   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  5   Calcified mediastinal lymph nodes suggesting granulomatous disease  Noncalcified prominent hilar lymph nodes  6   Fluid and debris within the visualized thoracic esophagus increases risk for aspiration  Correlate for GERD  I personally discussed this study with Dr Alycia Ochoa on 2/11/2022 at 8:42 PM                         Workstation performed: [id number]         CT stroke alert brain   Final Result by Jeanette Bailey MD (02/11 2132)      1  No acute intracranial CT abnormality  Chronic microangiopathic change        2   Cerebral atrophy                I personally discussed this study with Dr Alycia Ochoa on 2/11/2022 at 8:42 PM                 Workstation performed: [id number]         XR chest 1 view portable    (Results Pending)              Procedures  ECG 12 Lead Documentation Only    Date/Time: 2/11/2022 8:42 PM  Performed by: Taniya Limon MD  Authorized by: Taniya Limon MD     Indications / Diagnosis:  Weakness  ECG reviewed by me, the ED Provider: yes    Patient location:  ED  Interpretation:     Interpretation: abnormal    Rate:     ECG rate:  91    ECG rate assessment: normal    Rhythm:     Rhythm: sinus rhythm    Ectopy:     Ectopy: none    QRS:     QRS axis:  Normal    QRS intervals:  Normal  Conduction:     Conduction: normal    ST segments:     ST segments:  Abnormal  T waves:     T waves: inverted      Inverted:  V6, V5, II and III             ED Course                  Stroke Assessment     Row Name 02/11/22 2032             NIH Stroke Scale    Interval Baseline      Level of Consciousness (1a ) 0      LOC Questions (1b ) 0      LOC Commands (1c ) 0      Best Gaze (2 ) 0      Visual (3 ) 0      Facial Palsy (4 ) 0      Motor Arm, Left (5a ) 0      Motor Arm, Right (5b ) 1      Motor Leg, Left (6a ) 1      Motor Leg, Right (6b ) 1      Limb Ataxia (7 ) 0 Sensory (8 ) 0      Best Language (9 ) 0      Dysarthria (10 ) 0      Extinction and Inattention (11 ) (Formerly Neglect) 0      Total 3                                          MDM  Number of Diagnoses or Management Options  Atrial thrombus  Weakness  Diagnosis management comments: Possible vertebral artery dissection  Will CTA  Vertebral artery dissection not present  However incidental finding of intramural thrombus in the left atrium suggested  Patient is on antiplatelet therapy  Will get echo      Disposition  Final diagnoses:   Weakness   Atrial thrombus     Time reflects when diagnosis was documented in both MDM as applicable and the Disposition within this note     Time User Action Codes Description Comment    2/11/2022  9:31 PM Priya Cunas Add [R53 1] Weakness     2/11/2022  9:31 PM Priya Cunas Add [I51 3] Atrial thrombus       ED Disposition     ED Disposition Condition Date/Time Comment    No Disposition Selected  Fri Feb 11, 2022  9:52 PM Case was discussed with hospitalist and the patient's admission status was agreed to be Admission Status: inpatient status to the service of Dr Pablo Pederson   Follow-up Information    None         Patient's Medications   Discharge Prescriptions    No medications on file       No discharge procedures on file      PDMP Review     None          ED Provider  Electronically Signed by           Altaf Enriquez MD  02/11/22 2138       Altaf Enriquez MD  02/11/22 8206

## 2022-02-12 NOTE — PLAN OF CARE
Problem: Potential for Falls  Goal: Patient will remain free of falls  Description: INTERVENTIONS:  - Educate patient/family on patient safety including physical limitations  - Instruct patient to call for assistance with activity   - Consult OT/PT to assist with strengthening/mobility   - Keep Call bell within reach  - Keep bed low and locked with side rails adjusted as appropriate  - Keep care items and personal belongings within reach  - Initiate and maintain comfort rounds  - Make Fall Risk Sign visible to staff  - Offer Toileting every 3 Hours, in advance of need  - Initiate/Maintain bed/chair alarm  - Obtain necessary fall risk management equipment:   - Apply yellow socks and bracelet for high fall risk patients  - Consider moving patient to room near nurses station  Outcome: Progressing     Problem: MOBILITY - ADULT  Goal: Maintain or return to baseline ADL function  Description: INTERVENTIONS:  -  Assess patient's ability to carry out ADLs; assess patient's baseline for ADL function and identify physical deficits which impact ability to perform ADLs (bathing, care of mouth/teeth, toileting, grooming, dressing, etc )  - Assess/evaluate cause of self-care deficits   - Assess range of motion  - Assess patient's mobility; develop plan if impaired  - Assess patient's need for assistive devices and provide as appropriate  - Encourage maximum independence but intervene and supervise when necessary  - Involve family in performance of ADLs  - Assess for home care needs following discharge   - Consider OT consult to assist with ADL evaluation and planning for discharge  - Provide patient education as appropriate  Outcome: Progressing    Problem: PAIN - ADULT  Goal: Verbalizes/displays adequate comfort level or baseline comfort level  Description: Interventions:  - Encourage patient to monitor pain and request assistance  - Assess pain using appropriate pain scale  - Administer analgesics based on type and severity of pain and evaluate response  - Implement non-pharmacological measures as appropriate and evaluate response  - Consider cultural and social influences on pain and pain management  - Notify physician/advanced practitioner if interventions unsuccessful or patient reports new pain  Outcome: Progressing       Goal: Maintain or return to baseline ADL function  Description: INTERVENTIONS:  -  Assess patient's ability to carry out ADLs; assess patient's baseline for ADL function and identify physical deficits which impact ability to perform ADLs (bathing, care of mouth/teeth, toileting, grooming, dressing, etc )  - Assess/evaluate cause of self-care deficits   - Assess range of motion  - Assess patient's mobility; develop plan if impaired  - Assess patient's need for assistive devices and provide as appropriate  - Encourage maximum independence but intervene and supervise when necessary  - Involve family in performance of ADLs  - Assess for home care needs following discharge   - Consider OT consult to assist with ADL evaluation and planning for discharge  - Provide patient education as appropriate  Outcome: Progressing  Goal: Maintains/Returns to pre admission functional level  Description: INTERVENTIONS:  - Perform BMAT or MOVE assessment daily    - Set and communicate daily mobility goal to care team and patient/family/caregiver  - Collaborate with rehabilitation services on mobility goals if consulted  - Perform Range of Motion 3 times a day  - Reposition patient every 2 hours    - Dangle patient 3 times a day  - Stand patient 3 times a day  - Ambulate patient 3 times a day  - Out of bed to chair 3 times a day   - Out of bed for meals 3 times a day  - Out of bed for toileting  - Record patient progress and toleration of activity level   Outcome: Progressing     Problem: DISCHARGE PLANNING  Goal: Discharge to home or other facility with appropriate resources  Description: INTERVENTIONS:  - Identify barriers to discharge w/patient and caregiver  - Arrange for needed discharge resources and transportation as appropriate  - Identify discharge learning needs (meds, wound care, etc )  - Arrange for interpretive services to assist at discharge as needed  - Refer to Case Management Department for coordinating discharge planning if the patient needs post-hospital services based on physician/advanced practitioner order or complex needs related to functional status, cognitive ability, or social support system  Outcome: Progressing     Problem: Knowledge Deficit  Goal: Patient/family/caregiver demonstrates understanding of disease process, treatment plan, medications, and discharge instructions  Description: Complete learning assessment and assess knowledge base  Interventions:  - Provide teaching at level of understanding  - Provide teaching via preferred learning methods  Outcome: Progressing     Problem: NEUROSENSORY - ADULT  Goal: Achieves stable or improved neurological status  Description: INTERVENTIONS  - Monitor and report changes in neurological status  - Monitor vital signs such as temperature, blood pressure, glucose, and any other labs ordered   - Initiate measures to prevent increased intracranial pressure  - Monitor for seizure activity and implement precautions if appropriate      Outcome: Progressing  Goal: Achieves maximal functionality and self care  Description: INTERVENTIONS  - Monitor swallowing and airway patency with patient fatigue and changes in neurological status  - Encourage and assist patient to increase activity and self care     - Encourage visually impaired, hearing impaired and aphasic patients to use assistive/communication devices  Outcome: Progressing     Problem: CARDIOVASCULAR - ADULT  Goal: Maintains optimal cardiac output and hemodynamic stability  Description: INTERVENTIONS:  - Monitor I/O, vital signs and rhythm  - Monitor for S/S and trends of decreased cardiac output  - Administer and titrate ordered vasoactive medications to optimize hemodynamic stability  - Assess quality of pulses, skin color and temperature  - Assess for signs of decreased coronary artery perfusion  - Instruct patient to report change in severity of symptoms  Outcome: Progressing  Goal: Absence of cardiac dysrhythmias or at baseline rhythm  Description: INTERVENTIONS:  - Continuous cardiac monitoring, vital signs, obtain 12 lead EKG if ordered  - Administer antiarrhythmic and heart rate control medications as ordered  - Monitor electrolytes and administer replacement therapy as ordered  Outcome: Progressing     Problem: RESPIRATORY - ADULT  Goal: Achieves optimal ventilation and oxygenation  Description: INTERVENTIONS:  - Assess for changes in respiratory status  - Assess for changes in mentation and behavior  - Position to facilitate oxygenation and minimize respiratory effort  - Oxygen administered by appropriate delivery if ordered  - Initiate smoking cessation education as indicated  - Encourage broncho-pulmonary hygiene including cough, deep breathe, Incentive Spirometry  - Assess the need for suctioning and aspirate as needed  - Assess and instruct to report SOB or any respiratory difficulty  - Respiratory Therapy support as indicated  Outcome: Progressing     Problem: GASTROINTESTINAL - ADULT  Goal: Minimal or absence of nausea and/or vomiting  Description: INTERVENTIONS:  - Administer IV fluids if ordered to ensure adequate hydration  - Maintain NPO status until nausea and vomiting are resolved  - Nasogastric tube if ordered  - Administer ordered antiemetic medications as needed  - Provide nonpharmacologic comfort measures as appropriate  - Advance diet as tolerated, if ordered  - Consider nutrition services referral to assist patient with adequate nutrition and appropriate food choices  Outcome: Progressing  Goal: Maintains adequate nutritional intake  Description: INTERVENTIONS:  - Monitor percentage of each meal consumed  - Identify factors contributing to decreased intake, treat as appropriate  - Assist with meals as needed  - Monitor I&O, weight, and lab values if indicated  - Obtain nutrition services referral as needed  Outcome: Progressing  Goal: Oral mucous membranes remain intact  Description: INTERVENTIONS  - Assess oral mucosa and hygiene practices  - Implement preventative oral hygiene regimen  - Implement oral medicated treatments as ordered  - Initiate Nutrition services referral as needed  Outcome: Progressing     Problem: GENITOURINARY - ADULT  Goal: Maintains or returns to baseline urinary function  Description: INTERVENTIONS:  - Assess urinary function  - Encourage oral fluids to ensure adequate hydration if ordered  - Administer IV fluids as ordered to ensure adequate hydration  - Administer ordered medications as needed  - Offer frequent toileting  - Follow urinary retention protocol if ordered  Outcome: Progressing  Goal: Absence of urinary retention  Description: INTERVENTIONS:  - Assess patients ability to void and empty bladder  - Monitor I/O  - Bladder scan as needed  - Discuss with physician/AP medications to alleviate retention as needed  - Discuss catheterization for long term situations as appropriate  Outcome: Progressing  Goal: Urinary catheter remains patent  Description: INTERVENTIONS:  - Assess patency of urinary catheter  - If patient has a chronic lake, consider changing catheter if non-functioning  - Follow guidelines for intermittent irrigation of non-functioning urinary catheter  Outcome: Progressing

## 2022-02-12 NOTE — CONSULTS
[address]   Neurology Initial Consult    West Garnica is a 80 y o  male  4 [facility] [address] [facility]-*          Information obtained from:   Chief Complaint   Patient presents with    Extremity Weakness     pt reports bilateral leg weakness and is unable to stand up  Pt also reports neck pain  Wife states over the past hour patient has become more confused         Assessment/Plan:    1  Probable TIA   2  Possible atrial thrombus   3  Cough/sob  4  CAD  5  Incidental right cavernous ica 2mmg aneurysm     Patient doesn't have any focal neurological signs at present  His strength in LE is intact  Our suspicion for stroke is very low  His MRI brain is official now read as negative  Cont statin and plavix  As for possible atrial thrombus, confirmation will need to wait for WALT Monday  TTE could not see this  May cont lovenox until it's better evaluated  No intervention needed for incidental aneurysm  Supportive care per primary team          HPI:  West Garnica is a 81 yo M with PMH of CAD presented with neck pain and lower extremity weakness  He was sitting down for dinner and looked down and felt immediate pain in back of neck  He described sharp, severe pain on left side and to occipital region  He felt weak in both legs and needed to be carried to car  His NIH was 2 for drift in both LE  Patient's CT was normal except age associated atrophy  His CTA is negative for posterior circulation but showedepossible atrial thrombus that will need further evaluation  Today during encounter, patient has no symptoms of yesterday  He says he has regained strength in his legs  He was able to walk slowly without assistance  He did state that in 2015 he was dx with atrial thrombus and had surgery for it  Looking back into his records upto 2016, I'm not finding this evidence  He is on plavix at home         Past Medical History:   Diagnosis Date    Arthritis     BPH (benign prostatic hyperplasia)     Cervical spine fracture (HCC) 1997    C3    Chest pain     Colon polyp     Coronary artery disease     Dry eye     DVT (deep venous thrombosis) ([facility] [address] ) 2015    right leg- passed thru the IVC filter-stopped at the "patch" from bypass surgery    Dysphagia 11/2021    minimal-"mass" esophagus with a "knob" in the middle    Fracture of thoracic spine ([facility] [address] ) 1997    T3    Glaucoma     Hyperlipidemia     Hypertension     Myocardial infarction ([facility] [address] )     PUD (peptic ulcer disease)        Past Surgical History:   Procedure Laterality Date    ANGIOPLASTY      2 stents    CHOLECYSTECTOMY      COLONOSCOPY      CORONARY ARTERY BYPASS GRAFT      x6    CORONARY ARTERY BYPASS GRAFT      CORONARY STENT PLACEMENT      CYSTOSCOPY      EYE SURGERY Right     HERNIA REPAIR Right 11/3/2017    Procedure: REPAIR HERNIA INGUINAL;  Surgeon: Marizol Matta MD;  Location: [facility];  Service: General    IVC FILTER INSERTION      IVC filter    AL CYSTOURETHROSCOPY W/IRRIG & EVAC CLOTS N/A 6/30/2018    Procedure: CYSTOSCOPY EVACUATION OF CLOTS, fulgeration;  Surgeon: Isidra Foster MD;  Location: [facility];  Service: Urology    STOMACH SURGERY  1973    Billroth 2 gastrectomy-2/3 removal- bleeding ulcer    TRANSURETHRAL RESECTION OF PROSTATE         Allergies   Allergen Reactions    Escitalopram Other (See Comments)     Suicidal feelings, sweating    Oxycodone-Acetaminophen Dizziness and Shortness Of Breath     Other reaction(s): Faints  Reaction Date: [id number]; Category:  Adverse Reaction;     Omeprazole Rash    Statins Other (See Comments)     Muscle pain         Current Facility-Administered Medications:     albuterol inhalation solution 2 5 mg, 2 5 mg, Nebulization, Q4H PRN, Kelsey Martell MD    ALPRAZolam Zaina [place]) tablet 0 5 mg, 0 5 mg, Oral, HS PRN, Kelsey Martell MD    artificial tear (LUBRIFRESH P M ) ophthalmic ointment, , Both Eyes, HS PRN, Kelsey Martell MD    calcium carbonate-vitamin D (OSCAL-D) 500 mg-200 units per tablet 1 tablet, 1 tablet, Oral, QAM, Jada Chavis MD, 1 tablet at 02/12/22 0932    cholecalciferol (VITAMIN D3) tablet 400 Units, 400 Units, Oral, After Lunch, Jada Chavis MD    clopidogrel (PLAVIX) tablet 75 mg, 75 mg, Oral, Daily, Jada Chavis MD, 75 mg at 02/12/22 0932    docusate sodium (COLACE) capsule 100 mg, 100 mg, Oral, Daily, Jada Chavis MD, 100 mg at 02/12/22 0932    enoxaparin (LOVENOX) subcutaneous injection 40 mg, 40 mg, Subcutaneous, Daily, Jada Chavis MD, 40 mg at 02/12/22 0933    famotidine (PEPCID) tablet 20 mg, 20 mg, Oral, Daily, Jada Chavis MD, 20 mg at 02/12/22 0932    finasteride (PROSCAR) tablet 5 mg, 5 mg, Oral, Daily, Jada Chavis MD, 5 mg at 02/12/22 0932    magnesium gluconate (MAGONATE) tablet 250 mg, 250 mg, Oral, BID AC, Jada Chavis MD, 250 mg at 02/12/22 5814    multivitamin with iron-minerals liquid 15 mL, 15 mL, Oral, Daily, Jada Chavis MD, 15 mL at 02/12/22 0933    nitroglycerin (NITROSTAT) SL tablet 0 4 mg, 0 4 mg, Sublingual, Q5 Min PRN, Jada Chavis MD    pantoprazole (PROTONIX) EC tablet 20 mg, 20 mg, Oral, Early Morning, Jada Chavis MD, 20 mg at 02/12/22 0554    pravastatin (PRAVACHOL) tablet 40 mg, 40 mg, Oral, Daily With Dinner, Jada Chavis MD    sucralfate (CARAFATE) tablet 1 g, 1 g, Oral, BID AC, Jada Chavis MD, 1 g at 02/12/22 8832    tamsulosin (FLOMAX) capsule 0 4 mg, 0 4 mg, Oral, Daily With Dinner, Jada Chavis MD    zinc sulfate (ZINCATE) capsule 220 mg, 220 mg, Oral, Daily, Jada Chavis MD, 220 mg at 02/12/22 0932    Social History     Socioeconomic History    Marital status: /Civil Union     Spouse name: Not on file    Number of children: Not on file    Years of education: Not on file    Highest education level: Not on file   Occupational History    Not on file   Tobacco Use    Smoking status: Never Smoker    Smokeless tobacco: Never Used   Vaping Use    Vaping Use: Never used   Substance and Sexual Activity  Alcohol use: Never    Drug use: No    Sexual activity: Not on file   Other Topics Concern    Not on file   Social History Narrative    Not on file     Social Determinants of Health     Financial Resource Strain: Not on file   Food Insecurity: Not on file   Transportation Needs: Not on file   Physical Activity: Not on file   Stress: Not on file   Social Connections: Not on file   Intimate Partner Violence: Not on file   Housing Stability: Not on file       Family History   Problem Relation Age of Onset    Coronary artery disease Brother     Heart disease Father         CAD         Review of systems:  Please see HPI for positive symptoms  No fever, no chills, no weight change  Ocular: No drainage, no blurred vision  HEENT:  No sore throat, earache, or congestion  No neck pain  COR:  No chest pain  No palpitations  Lungs:  no sob, wheezing,  GI:  no  nausea, no vomiting, no diarrhea, no constipation, no anorexia  :  No dysuria, frequency, or urgency  No hematuria  Musculoskeletal:  No joint pain or swelling or edema  Skin:  No rash or itching  Psychiatric:  no anxiety, no depression  Endocrine:  No polyuria or polydipsia  Physical examination:  Vitals:    02/12/22 0930   BP: 144/70   Pulse: 85   Resp: 18   Temp: 98 4 °F (36 9 °C)   SpO2: 98%       GENERAL APPEARANCE:  The patient is alert, oriented  He is in no acute distress  HEENT:  Head is normocephalic  The sinuses are otherwise nontender  Pupils are equal and reactive  NECK:  Supple without lymphadenopathy  HEART:  Regular rate and rhythm  LUNGS:  clear to auscultation  No crackles or wheezes are heard  ABDOMEN:  Soft, nontender, nondistended with good bowel sounds heard  EXTREMITIES:  Without cyanosis, clubbing or edema  Mental status: The patient is alert, attentive, and oriented  Speech is clear and fluent, good repetition, comprehension, and naming  He recalls 3/3 objects at 5 minutes    Cranial nerves:  CN II: Visual fields are full to confrontation  Fundoscopic exam is normal with sharp discs and no vascular changes    Pupils are 3 mm and briskly reactive to light  CN III, IV, VI: At primary gaze, there is no eye deviation  CN V: Facial sensation is intact to pinprick in all 3 divisions bilaterally  Corneal responses are intact  CN VII: Face is symmetric with normal eye closure and smile  CN VIII: Hearing is normal to rubbing fingers  CN IX, X: Palate elevates symmetrically  Phonation is normal   CN XI: Head turning and shoulder shrug are intact  CN XII: Tongue is midline with normal movements and no atrophy  Motor: There is no pronator drift of out-stretched arms  Muscle bulk and tone are normal      Muscle exam  Arm Right Left Leg Right Left   Deltoid 5/5 5/5 Iliopsoas 5/5 5/5   Biceps 5/5 5/5 Quads 5/5 5/5   Triceps 5/5 5/5 Hamstrings 5/5 5/5   Wrist Extension 5/5 5/5 Ankle Dorsi Flexion 5/5 5/5   Wrist Flexion 5/5 5/5 Ankle Plantar Flexion 5/5 5/5   Interossei 5/5 5/5 Ankle Eversion 5/5 5/5   APB 5/5 5/5 Ankle Inversion 5/5 5/5       Reflexes   RJ BJ TJ KJ AJ Plantars Marroquin's   Right 2+ 2+ 2+ 2+  Downgoing Not present   Left 2+ 2+ 2+ 2+  Downgoing Not present     Sensory:  Light touch, pinprick, position sense, and vibration sense are intact in fingers and toes  Coordination:  Rapid alternating movements and fine finger movements are intact  There is no dysmetria on finger-to-nose and heel-knee-shin  There are no abnormal or extraneous movements  Romberg negative  Gait/Stance:  Appropriate for age       Lab Results   Component Value Date    WBC 7 23 02/12/2022    HGB 11 7 (L) 02/12/2022    HCT 37 9 02/12/2022    MCV 91 02/12/2022     02/12/2022     Lab Results   Component Value Date    HGBA1C 5 9 (H) 03/02/2021     Lab Results   Component Value Date    ALT 14 02/12/2022    AST 9 02/12/2022    ALKPHOS 103 02/12/2022    BILITOT 0 64 09/24/2015     Lab Results   Component Value Date    GLUCOSE 78 12/07/2015 CALCIUM 8 6 02/12/2022     12/07/2015    K 4 3 02/12/2022    CO2 27 02/12/2022     02/12/2022    BUN 25 02/12/2022    CREATININE 1 07 02/12/2022         Radiology          Review of reports and notes reveal:  XR chest 1 view portable    Result Date: 2/12/2022  Mild interstitial pulmonary edema  Workstation performed: [id number]     Independent Interpretation of images and review of reports:    CT stroke alert brain    Result Date: 2/11/2022  1  No acute intracranial CT abnormality  Chronic microangiopathic change  2   Cerebral atrophy  I personally discussed this study with Dr Luis Manuel White on 2/11/2022 at 8:42 PM   Workstation performed: [id number]     CTA stroke alert (head/neck)    Result Date: 2/11/2022  1  No intracranial large vessel occlusion or critical stenosis  2   No hemodynamically significant stenosis of cervical carotid and vertebral arteries  3   Suspected left atrial appendage thrombus  Recommend further evaluation with cardiac echocardiogram  4   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  5   Calcified mediastinal lymph nodes suggesting granulomatous disease  Noncalcified prominent hilar lymph nodes  6   Fluid and debris within the visualized thoracic esophagus increases risk for aspiration  Correlate for GERD  I personally discussed this study with Dr Luis Manuel White on 2/11/2022 at 8:42 PM  Workstation performed: [id number]     MRI brain: 2/12/22        Thank you for this consult  Total time of encounter: 70+ min  More than 50% of time was spent in counseling and coordination of care of patient  ROSA Jarrell    [facility]  [address]  [address]

## 2022-02-12 NOTE — ASSESSMENT & PLAN NOTE
Developed SOB while in CT scan  · Required up to 5L NC, now on room air  · COVID negative  · BNP 2,264  · Chest xray shows ild interstitial pulmonary edema    · Cardiology consulted  · Echo pending

## 2022-02-12 NOTE — QUICK NOTE
John Quach is a 81 yo M with PMH of CAD, HLD presents with neck pain  He was sitting down for dinner and looked down and felt immediate pain in back of neck  He described sharp, severe pain on left side and to occipital region  He felt weak in both legs and needed to be carried to car  His NIH was 2 for drift in both LE  Patient's CT was normal except age associated atrophy  His CTA is negative for posterior circulation but shows possible atrial thrombus that will need further evaluation  TPA Decision: Patient not a TPA candidate  Symptoms resolved/clearly non disabling  Patient's clinical history is not very impressive for stroke with b/l LE sxs but no other associated symptoms  This also had resolved later  With patient's age, risk with tPA would be higher than benefit  His chest x ray is suspicious for Covid pneumonia  Test pending  Recommend DAPT for now in case of ischemic process  Order TTE to further evaluate atrial thrombus  Tele  MRI brain   Permissive HTN   lipitor 40mg  Will consider cervical spine imaging if necessary after evaluation  Reason for Consult / Principal Problem: stroke like sxs  Hx and PE limited by: none  Patient last known well: 6:40pm  Stroke alert called: 8:18pm  Neurology time of arrival: discussed case with Dr Angelina Cortés at 8:41  Operation initially had Dr Parveen Wilkinson on schedule

## 2022-02-12 NOTE — ASSESSMENT & PLAN NOTE
Intermittent chest pain POA   · No active chest pain now  · Not on lasix at home due to low BP - ordered 40 mg IV lasix while inpatient, discontinued this morning  · No on ACEI due to low baseline BP   · Has moderate MR and AS  · Troponin negative, no EKG changes   · Daily weights, I/Os, low sodium diet with fluid restriction  · Echo pending

## 2022-02-12 NOTE — PLAN OF CARE
Problem: Potential for Falls  Goal: Patient will remain free of falls  Description: INTERVENTIONS:  - Educate patient/family on patient safety including physical limitations  - Instruct patient to call for assistance with activity   - Consult OT/PT to assist with strengthening/mobility   - Keep Call bell within reach  - Keep bed low and locked with side rails adjusted as appropriate  - Keep care items and personal belongings within reach  - Initiate and maintain comfort rounds  - Make Fall Risk Sign visible to staff  - Offer Toileting every 2 Hours, in advance of need  - Initiate/Maintain bed alarm  - Obtain necessary fall risk management equipment:   - Apply yellow socks and bracelet for high fall risk patients  - Consider moving patient to room near nurses station  Outcome: Progressing     Problem: MOBILITY - ADULT  Goal: Maintain or return to baseline ADL function  Description: INTERVENTIONS:  - Educate patient/family on patient safety including physical limitations  - Instruct patient to call for assistance with activity   - Consult OT/PT to assist with strengthening/mobility   - Keep Call bell within reach  - Keep bed low and locked with side rails adjusted as appropriate  - Keep care items and personal belongings within reach  - Initiate and maintain comfort rounds  - Make Fall Risk Sign visible to staff  - Offer Toileting every 2  Hours, in advance of need  - Initiate/Maintain bed alarm  - Obtain necessary fall risk management equipment:   - Apply yellow socks and bracelet for high fall risk patients  - Consider moving patient to room near nurses station  Outcome: Progressing     Problem: PAIN - ADULT  Goal: Verbalizes/displays adequate comfort level or baseline comfort level  Description: Interventions:  - Encourage patient to monitor pain and request assistance  - Assess pain using appropriate pain scale  - Administer analgesics based on type and severity of pain and evaluate response  - Implement non-pharmacological measures as appropriate and evaluate response  - Consider cultural and social influences on pain and pain management  - Notify physician/advanced practitioner if interventions unsuccessful or patient reports new pain  Outcome: Progressing     Problem: SAFETY ADULT  Goal: Patient will remain free of falls  Description: INTERVENTIONS:  - Educate patient/family on patient safety including physical limitations  - Instruct patient to call for assistance with activity   - Consult OT/PT to assist with strengthening/mobility   - Keep Call bell within reach  - Keep bed low and locked with side rails adjusted as appropriate  - Keep care items and personal belongings within reach  - Initiate and maintain comfort rounds  - Make Fall Risk Sign visible to staff  - Offer Toileting every 2 Hours, in advance of need  - Initiate/Maintain bed alarm  - Obtain necessary fall risk management equipment:   - Apply yellow socks and bracelet for high fall risk patients  - Consider moving patient to room near nurses station  Outcome: Progressing  Goal: Maintain or return to baseline ADL function  Description: INTERVENTIONS:  - Educate patient/family on patient safety including physical limitations  - Instruct patient to call for assistance with activity   - Consult OT/PT to assist with strengthening/mobility   - Keep Call bell within reach  - Keep bed low and locked with side rails adjusted as appropriate  - Keep care items and personal belongings within reach  - Initiate and maintain comfort rounds  - Make Fall Risk Sign visible to staff  - Offer Toileting every 2  Hours, in advance of need  - Initiate/Maintain bed alarm  - Obtain necessary fall risk management equipment:   - Apply yellow socks and bracelet for high fall risk patients  - Consider moving patient to room near nurses station  Outcome: Progressing     Problem: DISCHARGE PLANNING  Goal: Discharge to home or other facility with appropriate resources  Description: INTERVENTIONS:  - Identify barriers to discharge w/patient and caregiver  - Arrange for needed discharge resources and transportation as appropriate  - Identify discharge learning needs (meds, wound care, etc )  - Arrange for interpretive services to assist at discharge as needed  - Refer to Case Management Department for coordinating discharge planning if the patient needs post-hospital services based on physician/advanced practitioner order or complex needs related to functional status, cognitive ability, or social support system  Outcome: Progressing     Problem: Knowledge Deficit  Goal: Patient/family/caregiver demonstrates understanding of disease process, treatment plan, medications, and discharge instructions  Description: Complete learning assessment and assess knowledge base  Interventions:  - Provide teaching at level of understanding  - Provide teaching via preferred learning methods  Outcome: Progressing     Problem: NEUROSENSORY - ADULT  Goal: Achieves stable or improved neurological status  Description: INTERVENTIONS  - Monitor and report changes in neurological status  - Monitor vital signs such as temperature, blood pressure, glucose, and any other labs ordered   - Initiate measures to prevent increased intracranial pressure  - Monitor for seizure activity and implement precautions if appropriate      Outcome: Progressing  Goal: Achieves maximal functionality and self care  Description: INTERVENTIONS  - Monitor swallowing and airway patency with patient fatigue and changes in neurological status  - Encourage and assist patient to increase activity and self care     - Encourage visually impaired, hearing impaired and aphasic patients to use assistive/communication devices  Outcome: Progressing     Problem: CARDIOVASCULAR - ADULT  Goal: Maintains optimal cardiac output and hemodynamic stability  Description: INTERVENTIONS:  - Monitor I/O, vital signs and rhythm  - Monitor for S/S and trends of decreased cardiac output  - Administer and titrate ordered vasoactive medications to optimize hemodynamic stability  - Assess quality of pulses, skin color and temperature  - Assess for signs of decreased coronary artery perfusion  - Instruct patient to report change in severity of symptoms  Outcome: Progressing  Goal: Absence of cardiac dysrhythmias or at baseline rhythm  Description: INTERVENTIONS:  - Continuous cardiac monitoring, vital signs, obtain 12 lead EKG if ordered  - Administer antiarrhythmic and heart rate control medications as ordered  - Monitor electrolytes and administer replacement therapy as ordered  Outcome: Progressing     Problem: RESPIRATORY - ADULT  Goal: Achieves optimal ventilation and oxygenation  Description: INTERVENTIONS:  - Assess for changes in respiratory status  - Assess for changes in mentation and behavior  - Position to facilitate oxygenation and minimize respiratory effort  - Oxygen administered by appropriate delivery if ordered  - Initiate smoking cessation education as indicated  - Encourage broncho-pulmonary hygiene including cough, deep breathe, Incentive Spirometry  - Assess the need for suctioning and aspirate as needed  - Assess and instruct to report SOB or any respiratory difficulty  - Respiratory Therapy support as indicated  Outcome: Progressing     Problem: GASTROINTESTINAL - ADULT  Goal: Minimal or absence of nausea and/or vomiting  Description: INTERVENTIONS:  - Administer IV fluids if ordered to ensure adequate hydration  - Maintain NPO status until nausea and vomiting are resolved  - Nasogastric tube if ordered  - Administer ordered antiemetic medications as needed  - Provide nonpharmacologic comfort measures as appropriate  - Advance diet as tolerated, if ordered  - Consider nutrition services referral to assist patient with adequate nutrition and appropriate food choices  Outcome: Progressing  Goal: Maintains adequate nutritional intake  Description: INTERVENTIONS:  - Monitor percentage of each meal consumed  - Identify factors contributing to decreased intake, treat as appropriate  - Assist with meals as needed  - Monitor I&O, weight, and lab values if indicated  - Obtain nutrition services referral as needed  Outcome: Progressing  Goal: Oral mucous membranes remain intact  Description: INTERVENTIONS  - Assess oral mucosa and hygiene practices  - Implement preventative oral hygiene regimen  - Implement oral medicated treatments as ordered  - Initiate Nutrition services referral as needed  Outcome: Progressing     Problem: GENITOURINARY - ADULT  Goal: Maintains or returns to baseline urinary function  Description: INTERVENTIONS:  - Assess urinary function  - Encourage oral fluids to ensure adequate hydration if ordered  - Administer IV fluids as ordered to ensure adequate hydration  - Administer ordered medications as needed  - Offer frequent toileting  - Follow urinary retention protocol if ordered  Outcome: Progressing  Goal: Absence of urinary retention  Description: INTERVENTIONS:  - Assess patients ability to void and empty bladder  - Monitor I/O  - Bladder scan as needed  - Discuss with physician/AP medications to alleviate retention as needed  - Discuss catheterization for long term situations as appropriate  Outcome: Progressing  Goal: Urinary catheter remains patent  Description: INTERVENTIONS:  - Assess patency of urinary catheter  - If patient has a chronic lake, consider changing catheter if non-functioning  - Follow guidelines for intermittent irrigation of non-functioning urinary catheter  Outcome: Progressing     Problem: Prexisting or High Potential for Compromised Skin Integrity  Goal: Skin integrity is maintained or improved  Description: INTERVENTIONS:  - Identify patients at risk for skin breakdown  - Assess and monitor skin integrity  - Assess and monitor nutrition and hydration status  - Monitor labs   - Assess for incontinence   - Turn and reposition patient  - Assist with mobility/ambulation  - Relieve pressure over bony prominences  - Avoid friction and shearing  - Provide appropriate hygiene as needed including keeping skin clean and dry  - Evaluate need for skin moisturizer/barrier cream  - Collaborate with interdisciplinary team   - Patient/family teaching  - Consider wound care consult   Outcome: Progressing

## 2022-02-12 NOTE — DISCHARGE INSTRUCTIONS
Transesophageal Echocardiogram     WHAT YOU NEED TO KNOW:   A transesophageal echocardiogram (WALT) is a procedure used to check for problems with your heart  It will also show any problems in the blood vessels near your heart  Sound waves are sent to the heart through a tube inserted into your throat  The sound waves show the structure and function of your heart through pictures on a monitor  DISCHARGE INSTRUCTIONS:     No driving for 24 hours, because you had sedation  Rest: Rest until you are fully awake  You may be sleepy for a while after your procedure  If your throat was numbed, do not eat or drink until you are able to swallow normally/ or until directed by your healthcare provider: start with small sips of water to assure you do not choke  If you cough, wait a little longer and then try again  Avoid hot liquids for the day  Follow up with your healthcare provider as directed: Write down your questions so you remember to ask them during your visits  Contact your healthcare provider if:   · You have a fever or chills  · You taste blood in your mouth  · You have a severe sore throat or difficulty swallowing  · You have questions or concerns about your condition or care  Seek care immediately or call 911 if:   · You have any of the following signs of a heart attack:    ? Squeezing, pressure, or pain in your chest that lasts longer than 5 minutes or returns   ? Discomfort or pain in your back, neck, jaw, stomach, or arm    ? Trouble breathing   ? Nausea or vomiting   ?  Lightheadedness or a sudden cold sweat, especially with chest pain or trouble breathing

## 2022-02-12 NOTE — H&P
Marcel 45  H&P- Jarred Linda 8/2/1930, 80 y o  male MRN: [id number]  Unit/Bed#: 4 [place] 421-01 Encounter: [id number]  Primary Care Provider: Ryan Rapp DO   Date and time admitted to hospital: 2/11/2022  8:03 PM    No new Assessment & Plan notes have been filed under this hospital service since the last note was generated  Service: Hospitalist      1  Neck/ back pain   - acute on looking down with associated bilateral lower extremities - now resolved   - CT head negative acute findings   - CTA head & neck with no stenosis or occlusion of vessels    - Not a candidate for tpa per neurology   - LA appendage thrombus suspected on CTA head & neck - telemetry to monitor   - as per neurology continue with plavix in addition to lovenox     2  Left atrial appendage thrombosis   - incidental finding - seen on CTA head & neck   - d/w neurology who agreed with lovenox full dose in addition to plavix   - telemetry   - one dose lovenox 60 mg ordered to be given / was given 3 doses of baby aspirin given in ER so plavix ordered to be resumed tomorrow       3  History of PUD and s/p gastric surgery Bilroth II   - c/w PPI and sucralfate   - could be reason for acute neck / back pain ? - if reoccurs - consider EGD ?   - EGD 11/2021 : gastritis, intestinal metaplasia without dysplasia, multiple diverticula with dysphagia in past   - GI consulted     4  Coughing/ sob   - COVID negative( vaccinated x 3 doses)   - BNP elevated / cxr pending --> acute on chronic CHF exxacerbation   - EF 35 % as per last echo   - Cardiology consulted - echo ordered but in view of left atrial appendage might consider TTE?   - lovenox one dose 60 mg ordered to be given       5   Coronary artery disease - with multiple vessel involvement s/p CABG  - with intermittent chest pain - medical rx per cardiology   - no active chest pain   - not on lasix at home due to low BP - ordered 40 mg IV lasix while inpatient   - no on ACEI due to low baseline BP   - has moderate MR and AS - echo ordered for AM  - lake daily weights, In and outs/ low sodium diet with fluid restriction and trend trop - no active chest pain or EKG changes reported by ER  - sees dr valencia - consulted  6  BPH   - c/w flomax   - lake to be placed for strict input output due to chf   - hx of hematuria with clot evacuation in 2018 & bladder neck fulguration     7  Dyslipidemia   - c/w statin     8  Anxiety depression   - c/w xanax prn               VTE Pharmacologic Prophylaxis:   Moderate Risk (Score 3-4) - Pharmacological DVT Prophylaxis Ordered: enoxaparin (Lovenox)  Code Status: Level 1 - Full Code    Discussion with family: Patient declined call to   Anticipated Length of Stay: Patient will be admitted on an inpatient basis with an anticipated length of stay of greater than 2 midnights secondary to back pain , LA atrial appendage   Total Time for Visit, including Counseling / Coordination of Care: 60 minutes Greater than 50% of this total time spent on direct patient counseling and coordination of care  Chief Complaint:  back/ neck pain  History of Present Illness:  Frieda Laird is a 80 y o  male with a PMH of hypertension, dyslipidemia, chronic CHF Ef 35 %, PUD, BPH, multiple esophageal diverticula and dysphagia who came in for evaluation of back & neck pain  Patient was sitting for dinner and as he looked down and he developed a sharp pain in back of his neck radiating to left side of occipital region  Patient also developed bilateral leg weakness and had to be carried to car  He underwent a CT head that was negative except age associated atrophy  CTA head and neck was negative for vessel occlusion or stenosis but incidental findings of left atrial appendage thrombosis  His leg weakness eventually resolved  He was evaluated by neurology and was deemed not a tpa candidate     I discussed with Neurology on call who agreed with one dose of full dose lovenox  In addition to antiplatelets given earlier in ER  Patient also complained of cough for which Covid testing was done in ER that was negative ( vaccinated x 3 ) patient  However, BNP was elevated and recvd one dose of v lasix 40 mg   Patient's history also significant for PUD and resulting in bilroth gastric surgery  CTA head and neck had incidental finding of left atrial appendage thrombus  No other cx of concern  Review of Systems:  Review of Systems   Constitutional: Positive for fatigue  Musculoskeletal: Positive for back pain  All other systems reviewed and are negative        Past Medical and Surgical History:   Past Medical History:   Diagnosis Date    Arthritis     BPH (benign prostatic hyperplasia)     Cervical spine fracture ([facility] [address] ) 1997    C3    Chest pain     Colon polyp     Coronary artery disease     Dry eye     DVT (deep venous thrombosis) ([facility] [address] ) 2015    right leg- passed thru the IVC filter-stopped at the "patch" from bypass surgery    Dysphagia 11/2021    minimal-"mass" esophagus with a "knob" in the middle    Fracture of thoracic spine ([facility] [address] ) 1997    T3    Glaucoma     Hyperlipidemia     Hypertension     Myocardial infarction ([facility] [address] )     PUD (peptic ulcer disease)        Past Surgical History:   Procedure Laterality Date    ANGIOPLASTY      2 stents    CHOLECYSTECTOMY      COLONOSCOPY      CORONARY ARTERY BYPASS GRAFT      x6    CORONARY ARTERY BYPASS GRAFT      CORONARY STENT PLACEMENT      CYSTOSCOPY      EYE SURGERY Right     HERNIA REPAIR Right 11/3/2017    Procedure: REPAIR HERNIA INGUINAL;  Surgeon: Nicholas Campbell MD;  Location: [facility];  Service: General    IVC FILTER INSERTION      IVC filter    CT CYSTOURETHROSCOPY W/IRRIG & EVAC CLOTS N/A 6/30/2018    Procedure: CYSTOSCOPY EVACUATION OF CLOTS, fulgeration;  Surgeon: Melany Figueredo MD;  Location:  Main OR;  Service: Urology    STOMACH SURGERY  1973    Billroth 2 gastrectomy-2/3 removal- bleeding ulcer    TRANSURETHRAL RESECTION OF PROSTATE         Meds/Allergies:  Prior to Admission medications    Medication Sig Start Date End Date Taking?  Authorizing Provider   ALPRAZolam Johnson Glance) 0 5 mg tablet Take 0 5 mg by mouth daily at bedtime as needed  7/19/18  Yes Historical Provider, MD   Calcium Carbonate-Vitamin D (CALCIUM 600+D PO) Take by mouth every morning     Historical Provider, MD   Carboxymethylcellulose Sodium (REFRESH TEARS OP) Apply to eye as needed    Historical Provider, MD   cholecalciferol (VITAMIN D3) 400 units tablet Take 400 Units by mouth daily after lunch     Historical Provider, MD   clopidogrel (PLAVIX) 75 mg tablet Take 1 tablet (75 mg total) by mouth daily Last dose 11/5/21 12/14/21   Nathanael Falk MD   Docusate Calcium (STOOL SOFTENER PO) Take by mouth OTC; takes with lunch    Historical Provider, MD   dutasteride (AVODART) 0 5 mg capsule Take 1 capsule (0 5 mg total) by mouth daily 2/7/22   Jeanette Nix PA-C   famotidine (PEPCID) 20 mg tablet Take 20 mg by mouth daily      Historical Provider, MD   isosorbide mononitrate (IMDUR) 30 mg 24 hr tablet Take 1 tablet (30 mg total) by mouth daily  Patient taking differently: Take 30 mg by mouth every morning  8/17/21   Nathanael Falk MD   Magnesium 250 MG TABS Take 1 tablet by mouth every morning     Historical Provider, MD   metoprolol succinate (TOPROL-XL) 25 mg 24 hr tablet Take 1 tablet (25 mg total) by mouth 3 (three) times a day 1/3/22   Nathanael Falk MD   multivitamin-iron-minerals-folic acid (CENTRUM) chewable tablet Chew 1 tablet every morning     Historical Provider, MD   nitroglycerin (NITROSTAT) 0 4 mg SL tablet Place 1 tablet (0 4 mg total) under the tongue every 5 (five) minutes as needed for chest pain 12/15/20   Nathanael Falk MD   pantoprazole (PROTONIX) 20 mg tablet Take 1 tablet (20 mg total) by mouth daily 1/11/22   Adriana Fall MD ranolazine (RANEXA) 500 mg 12 hr tablet Take 1 tablet (500 mg total) by mouth 2 (two) times a day 8/9/21   Polina Simons DO   rosuvastatin (CRESTOR) 5 mg tablet Take 1 tablet (5 mg total) by mouth daily at bedtime 1/12/22   Darling Elam MD   sucralfate (CARAFATE) 1 g tablet Dissolve 1 tablet in 2 tablespoons of water and swallow two times a day 11/11/21   Tena Dyson PA-C   sucralfate (CARAFATE) 1 g/10 mL suspension Take 10 mL (1 g total) by mouth 2 (two) times a day  Patient not taking: Reported on 12/14/2021 11/11/21   Richard Sullivan MD   tamsulosin (Flomax) 0 4 mg Take 1 capsule (0 4 mg total) by mouth daily with dinner 6/21/21   Margarita Mcbride PA-C   Zinc 25 MG TABS Take 25 mg by mouth daily at bedtime    Historical Provider, MD     I have reviewed home medications using recent Epic encounter  Allergies: Allergies   Allergen Reactions    Escitalopram Other (See Comments)     Suicidal feelings, sweating    Oxycodone-Acetaminophen Dizziness and Shortness Of Breath     Other reaction(s): Faints  Reaction Date: [id number]; Category:  Adverse Reaction;     Omeprazole Rash    Statins Other (See Comments)     Muscle pain       Social History:  Marital Status: /Civil Union      Patient Pre-hospital Living Situation: Home  Patient Pre-hospital Level of Mobility: walks     Substance Use History:   Social History     Substance and Sexual Activity   Alcohol Use Never     Social History     Tobacco Use   Smoking Status Never Smoker   Smokeless Tobacco Never Used     Social History     Substance and Sexual Activity   Drug Use No       Family History:  Family History   Problem Relation Age of Onset    Coronary artery disease Brother     Heart disease Father         CAD       Physical Exam:     Vitals:   Blood Pressure: 149/76 (02/12/22 0106)  Pulse: 82 (02/12/22 0226)  Temperature: 97 9 °F (36 6 °C) (02/12/22 0106)  Temp Source: Oral (02/12/22 0106)  Respirations: 18 (02/12/22 0226)  Height: 5' 5" (165 1 cm) (02/11/22 1958)  Weight - Scale: 63 5 kg (140 lb) (02/11/22 1958)  SpO2: 93 % (02/12/22 0226)    Physical Exam  Constitutional:       Appearance: Normal appearance  HENT:      Head: Normocephalic and atraumatic  Nose: Nose normal       Mouth/Throat:      Mouth: Mucous membranes are dry  Eyes:      Extraocular Movements: Extraocular movements intact  Pupils: Pupils are equal, round, and reactive to light  Cardiovascular:      Rate and Rhythm: Normal rate  Pulmonary:      Effort: Pulmonary effort is normal       Breath sounds: Normal breath sounds  Abdominal:      General: Abdomen is flat  Bowel sounds are normal       Palpations: Abdomen is soft  Musculoskeletal:         General: Normal range of motion  Cervical back: Normal range of motion and neck supple  Skin:     General: Skin is warm  Neurological:      General: No focal deficit present  Mental Status: He is alert  Mental status is at baseline  Additional Data:     Lab Results:  Results from last 7 days   Lab Units 02/11/22 2019   WBC Thousand/uL 4 95   HEMOGLOBIN g/dL 11 2*   HEMATOCRIT % 37 0   PLATELETS Thousands/uL 174   NEUTROS PCT % 62   LYMPHS PCT % 26   MONOS PCT % 10   EOS PCT % 2     Results from last 7 days   Lab Units 02/11/22 2019   SODIUM mmol/L 137   POTASSIUM mmol/L 4 2   CHLORIDE mmol/L 100   CO2 mmol/L 29   BUN mg/dL 27*   CREATININE mg/dL 1 07   ANION GAP mmol/L 8   CALCIUM mg/dL 8 4   ALBUMIN g/dL 3 4*   TOTAL BILIRUBIN mg/dL 0 42   ALK PHOS U/L 99   ALT U/L 16   AST U/L 5   GLUCOSE RANDOM mg/dL 179*     Results from last 7 days   Lab Units 02/11/22  2019   INR  1 01     Results from last 7 days   Lab Units 02/11/22 2008   POC GLUCOSE mg/dl 181*               Imaging: Reviewed radiology reports from this admission including: CT head  CTA stroke alert (head/neck)   Final Result by Lubna Marcum MD (02/11 2130)      1  No intracranial large vessel occlusion or critical stenosis  2   No hemodynamically significant stenosis of cervical carotid and vertebral arteries  3   Suspected left atrial appendage thrombus  Recommend further evaluation with cardiac echocardiogram       4   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  5   Calcified mediastinal lymph nodes suggesting granulomatous disease  Noncalcified prominent hilar lymph nodes  6   Fluid and debris within the visualized thoracic esophagus increases risk for aspiration  Correlate for GERD  I personally discussed this study with Dr Loni Suarez on 2/11/2022 at 8:42 PM                         Workstation performed: [id number]         CT stroke alert brain   Final Result by Boyd Win MD (02/11 2132)      1  No acute intracranial CT abnormality  Chronic microangiopathic change  2   Cerebral atrophy                I personally discussed this study with Dr Loni Suarez on 2/11/2022 at 8:42 PM                 Workstation performed: [id number]         XR chest 1 view portable    (Results Pending)       EKG and Other Studies Reviewed on Admission:   · EKG: NSR  HR 83     ** Please Note: This note has been constructed using a voice recognition system   **

## 2022-02-13 PROBLEM — R06.02 SOB (SHORTNESS OF BREATH): Status: RESOLVED | Noted: 2022-02-12 | Resolved: 2022-02-13

## 2022-02-13 LAB
ANION GAP SERPL CALCULATED.3IONS-SCNC: 6 MMOL/L (ref 4–13)
AORTIC ROOT: 3.1 CM
AORTIC VALVE MEAN VELOCITY: 23.5 M/S
APICAL FOUR CHAMBER EJECTION FRACTION: 50 %
ASCENDING AORTA: 3.4 CM (ref 1.9–2.85)
ATRIAL RATE: 91 BPM
AV AREA BY CONTINUOUS VTI: 1 CM2
AV AREA PEAK VELOCITY: 0.9 CM2
AV LVOT MEAN GRADIENT: 3 MMHG
AV LVOT PEAK GRADIENT: 4 MMHG
AV MEAN GRADIENT: 25 MMHG
AV PEAK GRADIENT: 42 MMHG
AV VALVE AREA: 0.97 CM2
AV VELOCITY RATIO: 0.32
BASOPHILS # BLD AUTO: 0.01 THOUSANDS/ΜL (ref 0–0.1)
BASOPHILS NFR BLD AUTO: 0 % (ref 0–1)
BUN SERPL-MCNC: 24 MG/DL (ref 5–25)
CALCIUM SERPL-MCNC: 8.1 MG/DL (ref 8.3–10.1)
CHLORIDE SERPL-SCNC: 102 MMOL/L (ref 100–108)
CO2 SERPL-SCNC: 29 MMOL/L (ref 21–32)
CREAT SERPL-MCNC: 1.05 MG/DL (ref 0.6–1.3)
DOP CALC AO PEAK VEL: 3.24 M/S
DOP CALC AO VTI: 78.61 CM
DOP CALC LVOT AREA: 2.83 CM2
DOP CALC LVOT DIAMETER: 1.9 CM
DOP CALC LVOT PEAK VEL VTI: 27.04 CM
DOP CALC LVOT PEAK VEL: 1.04 M/S
DOP CALC LVOT STROKE INDEX: 43.1 ML/M2
DOP CALC LVOT STROKE VOLUME: 76.63 CM3
E WAVE DECELERATION TIME: 214 MS
EOSINOPHIL # BLD AUTO: 0.07 THOUSAND/ΜL (ref 0–0.61)
EOSINOPHIL NFR BLD AUTO: 1 % (ref 0–6)
ERYTHROCYTE [DISTWIDTH] IN BLOOD BY AUTOMATED COUNT: 13.9 % (ref 11.6–15.1)
FRACTIONAL SHORTENING: 23 % (ref 28–44)
GFR SERPL CREATININE-BSD FRML MDRD: 61 ML/MIN/1.73SQ M
GLUCOSE SERPL-MCNC: 120 MG/DL (ref 65–140)
HCT VFR BLD AUTO: 34.4 % (ref 36.5–49.3)
HGB BLD-MCNC: 10.9 G/DL (ref 12–17)
IMM GRANULOCYTES # BLD AUTO: 0.01 THOUSAND/UL (ref 0–0.2)
IMM GRANULOCYTES NFR BLD AUTO: 0 % (ref 0–2)
INTERVENTRICULAR SEPTUM IN DIASTOLE (PARASTERNAL SHORT AXIS VIEW): 0.9 CM (ref 0.51–0.95)
INTERVENTRICULAR SEPTUM: 0.9 CM (ref 0.6–1.1)
LAAS-AP2: 22.7 CM2
LAAS-AP4: 19 CM2
LEFT ATRIUM AREA SYSTOLE SINGLE PLANE A4C: 19.8 CM2
LEFT ATRIUM SIZE: 3.8 CM
LEFT INTERNAL DIMENSION IN SYSTOLE: 3.4 CM (ref 2.1–4)
LEFT VENTRICULAR INTERNAL DIMENSION IN DIASTOLE: 4.4 CM (ref 3.99–5.94)
LEFT VENTRICULAR POSTERIOR WALL IN END DIASTOLE: 0.9 CM (ref 0.49–0.94)
LEFT VENTRICULAR STROKE VOLUME: 39 ML
LVSV (TEICH): 39 ML
LYMPHOCYTES # BLD AUTO: 1.12 THOUSANDS/ΜL (ref 0.6–4.47)
LYMPHOCYTES NFR BLD AUTO: 23 % (ref 14–44)
MCH RBC QN AUTO: 28.3 PG (ref 26.8–34.3)
MCHC RBC AUTO-ENTMCNC: 31.7 G/DL (ref 31.4–37.4)
MCV RBC AUTO: 89 FL (ref 82–98)
MONOCYTES # BLD AUTO: 0.57 THOUSAND/ΜL (ref 0.17–1.22)
MONOCYTES NFR BLD AUTO: 12 % (ref 4–12)
MV E'TISSUE VEL-SEP: 4 CM/S
MV PEAK A VEL: 1.15 M/S
MV PEAK E VEL: 128 CM/S
NEUTROPHILS # BLD AUTO: 3.19 THOUSANDS/ΜL (ref 1.85–7.62)
NEUTS SEG NFR BLD AUTO: 64 % (ref 43–75)
NRBC BLD AUTO-RTO: 0 /100 WBCS
P AXIS: 93 DEGREES
PLATELET # BLD AUTO: 156 THOUSANDS/UL (ref 149–390)
PMV BLD AUTO: 10.1 FL (ref 8.9–12.7)
POTASSIUM SERPL-SCNC: 3.8 MMOL/L (ref 3.5–5.3)
PR INTERVAL: 240 MS
QRS AXIS: 46 DEGREES
QRSD INTERVAL: 102 MS
QT INTERVAL: 370 MS
QTC INTERVAL: 455 MS
RBC # BLD AUTO: 3.85 MILLION/UL (ref 3.88–5.62)
RIGHT ATRIUM AREA SYSTOLE A4C: 13.8 CM2
RIGHT VENTRICLE ID DIMENSION: 3.2 CM
SL CV LEFT ATRIUM LENGTH A2C: 5.9 CM
SL CV LV EF: 50
SL CV PED ECHO LEFT VENTRICLE DIASTOLIC VOLUME (MOD BIPLANE) 2D: 88 ML
SL CV PED ECHO LEFT VENTRICLE SYSTOLIC VOLUME (MOD BIPLANE) 2D: 49 ML
SODIUM SERPL-SCNC: 137 MMOL/L (ref 136–145)
T WAVE AXIS: 232 DEGREES
TR MAX PG: 38 MMHG
TR PEAK VELOCITY: 3.1 M/S
TRICUSPID VALVE PEAK REGURGITATION VELOCITY: 3.08 M/S
VENTRICULAR RATE: 91 BPM
WBC # BLD AUTO: 4.97 THOUSAND/UL (ref 4.31–10.16)
Z-SCORE OF ASCENDING AORTA: 4.29
Z-SCORE OF INTERVENTRICULAR SEPTUM IN END DIASTOLE: 1.53
Z-SCORE OF LEFT VENTRICULAR DIMENSION IN END SYSTOLE: -1.01
Z-SCORE OF LEFT VENTRICULAR POSTERIOR WALL IN END DIASTOLE: 1.64

## 2022-02-13 PROCEDURE — 99232 SBSQ HOSP IP/OBS MODERATE 35: CPT | Performed by: FAMILY MEDICINE

## 2022-02-13 PROCEDURE — 85025 COMPLETE CBC W/AUTO DIFF WBC: CPT

## 2022-02-13 PROCEDURE — 80048 BASIC METABOLIC PNL TOTAL CA: CPT

## 2022-02-13 PROCEDURE — 93010 ELECTROCARDIOGRAM REPORT: CPT | Performed by: INTERNAL MEDICINE

## 2022-02-13 PROCEDURE — 99233 SBSQ HOSP IP/OBS HIGH 50: CPT | Performed by: INTERNAL MEDICINE

## 2022-02-13 RX ORDER — CALCIUM CARBONATE 200(500)MG
500 TABLET,CHEWABLE ORAL DAILY PRN
Status: DISCONTINUED | OUTPATIENT
Start: 2022-02-13 | End: 2022-02-14 | Stop reason: HOSPADM

## 2022-02-13 RX ORDER — MAGNESIUM HYDROXIDE/ALUMINUM HYDROXICE/SIMETHICONE 120; 1200; 1200 MG/30ML; MG/30ML; MG/30ML
30 SUSPENSION ORAL EVERY 4 HOURS PRN
Status: DISCONTINUED | OUTPATIENT
Start: 2022-02-13 | End: 2022-02-14 | Stop reason: HOSPADM

## 2022-02-13 RX ADMIN — FAMOTIDINE 20 MG: 20 TABLET, FILM COATED ORAL at 09:32

## 2022-02-13 RX ADMIN — PANTOPRAZOLE SODIUM 20 MG: 20 TABLET, DELAYED RELEASE ORAL at 06:11

## 2022-02-13 RX ADMIN — ISOSORBIDE MONONITRATE 30 MG: 30 TABLET, EXTENDED RELEASE ORAL at 09:31

## 2022-02-13 RX ADMIN — TAMSULOSIN HYDROCHLORIDE 0.4 MG: 0.4 CAPSULE ORAL at 16:40

## 2022-02-13 RX ADMIN — ALUMINA, MAGNESIA, AND SIMETHICONE ORAL SUSPENSION REGULAR STRENGTH 30 ML: 1200; 1200; 120 SUSPENSION ORAL at 21:19

## 2022-02-13 RX ADMIN — SUCRALFATE 1 G: 1 TABLET ORAL at 06:11

## 2022-02-13 RX ADMIN — METOPROLOL SUCCINATE 25 MG: 25 TABLET, EXTENDED RELEASE ORAL at 06:11

## 2022-02-13 RX ADMIN — CLOPIDOGREL BISULFATE 75 MG: 75 TABLET ORAL at 09:31

## 2022-02-13 RX ADMIN — ENOXAPARIN SODIUM 30 MG: 30 INJECTION SUBCUTANEOUS at 15:17

## 2022-02-13 RX ADMIN — PRAVASTATIN SODIUM 40 MG: 40 TABLET ORAL at 16:40

## 2022-02-13 RX ADMIN — ZINC SULFATE 220 MG (50 MG) CAPSULE 220 MG: CAPSULE at 09:31

## 2022-02-13 RX ADMIN — METOPROLOL SUCCINATE 25 MG: 25 TABLET, EXTENDED RELEASE ORAL at 21:18

## 2022-02-13 RX ADMIN — ENOXAPARIN SODIUM 40 MG: 40 INJECTION SUBCUTANEOUS at 09:32

## 2022-02-13 RX ADMIN — Medication 250 MG: at 06:11

## 2022-02-13 RX ADMIN — Medication 250 MG: at 15:18

## 2022-02-13 RX ADMIN — SENNOSIDES A AND B 15 ML: 8.8 SYRUP ORAL at 09:32

## 2022-02-13 RX ADMIN — Medication 1 TABLET: at 09:31

## 2022-02-13 RX ADMIN — RANOLAZINE 500 MG: 500 TABLET, EXTENDED RELEASE ORAL at 21:19

## 2022-02-13 RX ADMIN — DOCUSATE SODIUM 100 MG: 100 CAPSULE ORAL at 09:31

## 2022-02-13 RX ADMIN — RANOLAZINE 500 MG: 500 TABLET, EXTENDED RELEASE ORAL at 09:31

## 2022-02-13 RX ADMIN — ENOXAPARIN SODIUM 70 MG: 80 INJECTION SUBCUTANEOUS at 21:18

## 2022-02-13 RX ADMIN — SUCRALFATE 1 G: 1 TABLET ORAL at 15:17

## 2022-02-13 RX ADMIN — FINASTERIDE 5 MG: 5 TABLET, FILM COATED ORAL at 09:32

## 2022-02-13 RX ADMIN — CHOLECALCIFEROL (VITAMIN D3) 10 MCG (400 UNIT) TABLET 400 UNITS: at 13:54

## 2022-02-13 RX ADMIN — METOPROLOL SUCCINATE 25 MG: 25 TABLET, EXTENDED RELEASE ORAL at 13:55

## 2022-02-13 NOTE — PLAN OF CARE
Problem: Potential for Falls  Goal: Patient will remain free of falls  Description: INTERVENTIONS:  - Educate patient/family on patient safety including physical limitations  - Instruct patient to call for assistance with activity   - Consult OT/PT to assist with strengthening/mobility   - Keep Call bell within reach  - Keep bed low and locked with side rails adjusted as appropriate  - Keep care items and personal belongings within reach  - Initiate and maintain comfort rounds  - Make Fall Risk Sign visible to staff  - Offer Toileting every 2 Hours, in advance of need  - Initiate/Maintain bed alarm  - Obtain necessary fall risk management equipment:   - Apply yellow socks and bracelet for high fall risk patients  - Consider moving patient to room near nurses station  Outcome: Progressing     Problem: MOBILITY - ADULT  Goal: Maintain or return to baseline ADL function  Description: INTERVENTIONS:  - Educate patient/family on patient safety including physical limitations  - Instruct patient to call for assistance with activity   - Consult OT/PT to assist with strengthening/mobility   - Keep Call bell within reach  - Keep bed low and locked with side rails adjusted as appropriate  - Keep care items and personal belongings within reach  - Initiate and maintain comfort rounds  - Make Fall Risk Sign visible to staff  - Offer Toileting every 2  Hours, in advance of need  - Initiate/Maintain bed alarm  - Obtain necessary fall risk management equipment:   - Apply yellow socks and bracelet for high fall risk patients  - Consider moving patient to room near nurses station  Outcome: Progressing  Goal: Maintains/Returns to pre admission functional level  Description: INTERVENTIONS:  - Perform BMAT or MOVE assessment daily    - Set and communicate daily mobility goal to care team and patient/family/caregiver  - Collaborate with rehabilitation services on mobility goals if consulted  - Perform Range of Motion 4 times a day    - Reposition patient every 2 hours    - Dangle patient 3 times a day  - Stand patient 3 times a day  - Ambulate patient 3 times a day  - Out of bed to chair 3 times a day   - Out of bed for meals 3 times a day  - Out of bed for toileting  - Record patient progress and toleration of activity level   Outcome: Progressing     Problem: PAIN - ADULT  Goal: Verbalizes/displays adequate comfort level or baseline comfort level  Description: Interventions:  - Encourage patient to monitor pain and request assistance  - Assess pain using appropriate pain scale  - Administer analgesics based on type and severity of pain and evaluate response  - Implement non-pharmacological measures as appropriate and evaluate response  - Consider cultural and social influences on pain and pain management  - Notify physician/advanced practitioner if interventions unsuccessful or patient reports new pain  Outcome: Progressing     Problem: SAFETY ADULT  Goal: Patient will remain free of falls  Description: INTERVENTIONS:  - Educate patient/family on patient safety including physical limitations  - Instruct patient to call for assistance with activity   - Consult OT/PT to assist with strengthening/mobility   - Keep Call bell within reach  - Keep bed low and locked with side rails adjusted as appropriate  - Keep care items and personal belongings within reach  - Initiate and maintain comfort rounds  - Make Fall Risk Sign visible to staff  - Offer Toileting every 2 Hours, in advance of need  - Initiate/Maintain bed alarm  - Obtain necessary fall risk management equipment:   - Apply yellow socks and bracelet for high fall risk patients  - Consider moving patient to room near nurses station  Outcome: Progressing  Goal: Maintain or return to baseline ADL function  Description: INTERVENTIONS:  - Educate patient/family on patient safety including physical limitations  - Instruct patient to call for assistance with activity   - Consult OT/PT to assist with strengthening/mobility   - Keep Call bell within reach  - Keep bed low and locked with side rails adjusted as appropriate  - Keep care items and personal belongings within reach  - Initiate and maintain comfort rounds  - Make Fall Risk Sign visible to staff  - Offer Toileting every 2  Hours, in advance of need  - Initiate/Maintain bed alarm  - Obtain necessary fall risk management equipment:   - Apply yellow socks and bracelet for high fall risk patients  - Consider moving patient to room near nurses station  Outcome: Progressing  Goal: Maintains/Returns to pre admission functional level  Description: INTERVENTIONS:  - Perform BMAT or MOVE assessment daily    - Set and communicate daily mobility goal to care team and patient/family/caregiver  - Collaborate with rehabilitation services on mobility goals if consulted  - Perform Range of Motion 4 times a day  - Reposition patient every 2 hours    - Dangle patient 3 times a day  - Stand patient 3 times a day  - Ambulate patient 3 times a day  - Out of bed to chair 3 times a day   - Out of bed for meals 3 times a day  - Out of bed for toileting  - Record patient progress and toleration of activity level   Outcome: Progressing     Problem: DISCHARGE PLANNING  Goal: Discharge to home or other facility with appropriate resources  Description: INTERVENTIONS:  - Identify barriers to discharge w/patient and caregiver  - Arrange for needed discharge resources and transportation as appropriate  - Identify discharge learning needs (meds, wound care, etc )  - Arrange for interpretive services to assist at discharge as needed  - Refer to Case Management Department for coordinating discharge planning if the patient needs post-hospital services based on physician/advanced practitioner order or complex needs related to functional status, cognitive ability, or social support system  Outcome: Progressing     Problem: Knowledge Deficit  Goal: Patient/family/caregiver demonstrates understanding of disease process, treatment plan, medications, and discharge instructions  Description: Complete learning assessment and assess knowledge base  Interventions:  - Provide teaching at level of understanding  - Provide teaching via preferred learning methods  Outcome: Progressing     Problem: NEUROSENSORY - ADULT  Goal: Achieves stable or improved neurological status  Description: INTERVENTIONS  - Monitor and report changes in neurological status  - Monitor vital signs such as temperature, blood pressure, glucose, and any other labs ordered   - Initiate measures to prevent increased intracranial pressure  - Monitor for seizure activity and implement precautions if appropriate      Outcome: Progressing  Goal: Achieves maximal functionality and self care  Description: INTERVENTIONS  - Monitor swallowing and airway patency with patient fatigue and changes in neurological status  - Encourage and assist patient to increase activity and self care     - Encourage visually impaired, hearing impaired and aphasic patients to use assistive/communication devices  Outcome: Progressing     Problem: CARDIOVASCULAR - ADULT  Goal: Maintains optimal cardiac output and hemodynamic stability  Description: INTERVENTIONS:  - Monitor I/O, vital signs and rhythm  - Monitor for S/S and trends of decreased cardiac output  - Administer and titrate ordered vasoactive medications to optimize hemodynamic stability  - Assess quality of pulses, skin color and temperature  - Assess for signs of decreased coronary artery perfusion  - Instruct patient to report change in severity of symptoms  Outcome: Progressing  Goal: Absence of cardiac dysrhythmias or at baseline rhythm  Description: INTERVENTIONS:  - Continuous cardiac monitoring, vital signs, obtain 12 lead EKG if ordered  - Administer antiarrhythmic and heart rate control medications as ordered  - Monitor electrolytes and administer replacement therapy as ordered  Outcome: Progressing     Problem: RESPIRATORY - ADULT  Goal: Achieves optimal ventilation and oxygenation  Description: INTERVENTIONS:  - Assess for changes in respiratory status  - Assess for changes in mentation and behavior  - Position to facilitate oxygenation and minimize respiratory effort  - Oxygen administered by appropriate delivery if ordered  - Initiate smoking cessation education as indicated  - Encourage broncho-pulmonary hygiene including cough, deep breathe, Incentive Spirometry  - Assess the need for suctioning and aspirate as needed  - Assess and instruct to report SOB or any respiratory difficulty  - Respiratory Therapy support as indicated  Outcome: Progressing     Problem: GASTROINTESTINAL - ADULT  Goal: Minimal or absence of nausea and/or vomiting  Description: INTERVENTIONS:  - Administer IV fluids if ordered to ensure adequate hydration  - Maintain NPO status until nausea and vomiting are resolved  - Nasogastric tube if ordered  - Administer ordered antiemetic medications as needed  - Provide nonpharmacologic comfort measures as appropriate  - Advance diet as tolerated, if ordered  - Consider nutrition services referral to assist patient with adequate nutrition and appropriate food choices  Outcome: Progressing  Goal: Maintains adequate nutritional intake  Description: INTERVENTIONS:  - Monitor percentage of each meal consumed  - Identify factors contributing to decreased intake, treat as appropriate  - Assist with meals as needed  - Monitor I&O, weight, and lab values if indicated  - Obtain nutrition services referral as needed  Outcome: Progressing  Goal: Oral mucous membranes remain intact  Description: INTERVENTIONS  - Assess oral mucosa and hygiene practices  - Implement preventative oral hygiene regimen  - Implement oral medicated treatments as ordered  - Initiate Nutrition services referral as needed  Outcome: Progressing     Problem: GENITOURINARY - ADULT  Goal: Maintains or returns to baseline urinary function  Description: INTERVENTIONS:  - Assess urinary function  - Encourage oral fluids to ensure adequate hydration if ordered  - Administer IV fluids as ordered to ensure adequate hydration  - Administer ordered medications as needed  - Offer frequent toileting  - Follow urinary retention protocol if ordered  Outcome: Progressing  Goal: Absence of urinary retention  Description: INTERVENTIONS:  - Assess patients ability to void and empty bladder  - Monitor I/O  - Bladder scan as needed  - Discuss with physician/AP medications to alleviate retention as needed  - Discuss catheterization for long term situations as appropriate  Outcome: Progressing  Goal: Urinary catheter remains patent  Description: INTERVENTIONS:  - Assess patency of urinary catheter  - If patient has a chronic lake, consider changing catheter if non-functioning  - Follow guidelines for intermittent irrigation of non-functioning urinary catheter  Outcome: Progressing     Problem: Prexisting or High Potential for Compromised Skin Integrity  Goal: Skin integrity is maintained or improved  Description: INTERVENTIONS:  - Identify patients at risk for skin breakdown  - Assess and monitor skin integrity  - Assess and monitor nutrition and hydration status  - Monitor labs   - Assess for incontinence   - Turn and reposition patient  - Assist with mobility/ambulation  - Relieve pressure over bony prominences  - Avoid friction and shearing  - Provide appropriate hygiene as needed including keeping skin clean and dry  - Evaluate need for skin moisturizer/barrier cream  - Collaborate with interdisciplinary team   - Patient/family teaching  - Consider wound care consult   Outcome: Progressing

## 2022-02-13 NOTE — ASSESSMENT & PLAN NOTE
Developed SOB while in CT scan  · Required up to 5L NC, now on room air  · COVID negative  · BNP 2,264  · Chest xray shows mild interstitial pulmonary edema however patient does not appear clinically volume overloaded    IV Lasix was discontinued previously

## 2022-02-13 NOTE — PLAN OF CARE
Problem: Potential for Falls  Goal: Patient will remain free of falls  Description: INTERVENTIONS:  - Educate patient/family on patient safety including physical limitations  - Instruct patient to call for assistance with activity   - Consult OT/PT to assist with strengthening/mobility   - Keep Call bell within reach  - Keep bed low and locked with side rails adjusted as appropriate  - Keep care items and personal belongings within reach  - Initiate and maintain comfort rounds  - Make Fall Risk Sign visible to staff  - Offer Toileting every 2 Hours, in advance of need  - Initiate/Maintain bed alarm  - Obtain necessary fall risk management equipment:   - Apply yellow socks and bracelet for high fall risk patients  - Consider moving patient to room near nurses station  Outcome: Progressing     Problem: MOBILITY - ADULT  Goal: Maintain or return to baseline ADL function  Description: INTERVENTIONS:  - Educate patient/family on patient safety including physical limitations  - Instruct patient to call for assistance with activity   - Consult OT/PT to assist with strengthening/mobility   - Keep Call bell within reach  - Keep bed low and locked with side rails adjusted as appropriate  - Keep care items and personal belongings within reach  - Initiate and maintain comfort rounds  - Make Fall Risk Sign visible to staff  - Offer Toileting every 2  Hours, in advance of need  - Initiate/Maintain bed alarm  - Obtain necessary fall risk management equipment:   - Apply yellow socks and bracelet for high fall risk patients  - Consider moving patient to room near nurses station  Outcome: Progressing  Goal: Maintains/Returns to pre admission functional level  Description: INTERVENTIONS:  - Perform BMAT or MOVE assessment daily    - Set and communicate daily mobility goal to care team and patient/family/caregiver  - Collaborate with rehabilitation services on mobility goals if consulted  - Perform Range of Motion 4 times a day    - Reposition patient every 2 hours    - Dangle patient 4 times a day  - Stand patient 3 times a day  - Ambulate patient 3 times a day  - Out of bed to chair 3 times a day   - Out of bed for meals 3 times a day  - Out of bed for toileting  - Record patient progress and toleration of activity level   Outcome: Progressing     Problem: PAIN - ADULT  Goal: Verbalizes/displays adequate comfort level or baseline comfort level  Description: Interventions:  - Encourage patient to monitor pain and request assistance  - Assess pain using appropriate pain scale  - Administer analgesics based on type and severity of pain and evaluate response  - Implement non-pharmacological measures as appropriate and evaluate response  - Consider cultural and social influences on pain and pain management  - Notify physician/advanced practitioner if interventions unsuccessful or patient reports new pain  Outcome: Progressing     Problem: SAFETY ADULT  Goal: Patient will remain free of falls  Description: INTERVENTIONS:  - Educate patient/family on patient safety including physical limitations  - Instruct patient to call for assistance with activity   - Consult OT/PT to assist with strengthening/mobility   - Keep Call bell within reach  - Keep bed low and locked with side rails adjusted as appropriate  - Keep care items and personal belongings within reach  - Initiate and maintain comfort rounds  - Make Fall Risk Sign visible to staff  - Offer Toileting every 2 Hours, in advance of need  - Initiate/Maintain bed alarm  - Obtain necessary fall risk management equipment:   - Apply yellow socks and bracelet for high fall risk patients  - Consider moving patient to room near nurses station  Outcome: Progressing  Goal: Maintain or return to baseline ADL function  Description: INTERVENTIONS:  - Educate patient/family on patient safety including physical limitations  - Instruct patient to call for assistance with activity   - Consult OT/PT to assist with strengthening/mobility   - Keep Call bell within reach  - Keep bed low and locked with side rails adjusted as appropriate  - Keep care items and personal belongings within reach  - Initiate and maintain comfort rounds  - Make Fall Risk Sign visible to staff  - Offer Toileting every 2  Hours, in advance of need  - Initiate/Maintain bed alarm  - Obtain necessary fall risk management equipment:   - Apply yellow socks and bracelet for high fall risk patients  - Consider moving patient to room near nurses station  Outcome: Progressing  Goal: Maintains/Returns to pre admission functional level  Description: INTERVENTIONS:  - Perform BMAT or MOVE assessment daily    - Set and communicate daily mobility goal to care team and patient/family/caregiver  - Collaborate with rehabilitation services on mobility goals if consulted  - Perform Range of Motion 5 times a day  - Reposition patient every 2 hours    - Dangle patient 4 times a day  - Stand patient 4 times a day  - Ambulate patient 4 times a day  - Out of bed to chair 3 times a day   - Out of bed for meals 3 times a day  - Out of bed for toileting  - Record patient progress and toleration of activity level   Outcome: Progressing     Problem: DISCHARGE PLANNING  Goal: Discharge to home or other facility with appropriate resources  Description: INTERVENTIONS:  - Identify barriers to discharge w/patient and caregiver  - Arrange for needed discharge resources and transportation as appropriate  - Identify discharge learning needs (meds, wound care, etc )  - Arrange for interpretive services to assist at discharge as needed  - Refer to Case Management Department for coordinating discharge planning if the patient needs post-hospital services based on physician/advanced practitioner order or complex needs related to functional status, cognitive ability, or social support system  Outcome: Progressing     Problem: Knowledge Deficit  Goal: Patient/family/caregiver demonstrates understanding of disease process, treatment plan, medications, and discharge instructions  Description: Complete learning assessment and assess knowledge base  Interventions:  - Provide teaching at level of understanding  - Provide teaching via preferred learning methods  Outcome: Progressing     Problem: NEUROSENSORY - ADULT  Goal: Achieves stable or improved neurological status  Description: INTERVENTIONS  - Monitor and report changes in neurological status  - Monitor vital signs such as temperature, blood pressure, glucose, and any other labs ordered   - Initiate measures to prevent increased intracranial pressure  - Monitor for seizure activity and implement precautions if appropriate      Outcome: Progressing  Goal: Achieves maximal functionality and self care  Description: INTERVENTIONS  - Monitor swallowing and airway patency with patient fatigue and changes in neurological status  - Encourage and assist patient to increase activity and self care     - Encourage visually impaired, hearing impaired and aphasic patients to use assistive/communication devices  Outcome: Progressing     Problem: CARDIOVASCULAR - ADULT  Goal: Maintains optimal cardiac output and hemodynamic stability  Description: INTERVENTIONS:  - Monitor I/O, vital signs and rhythm  - Monitor for S/S and trends of decreased cardiac output  - Administer and titrate ordered vasoactive medications to optimize hemodynamic stability  - Assess quality of pulses, skin color and temperature  - Assess for signs of decreased coronary artery perfusion  - Instruct patient to report change in severity of symptoms  Outcome: Progressing  Goal: Absence of cardiac dysrhythmias or at baseline rhythm  Description: INTERVENTIONS:  - Continuous cardiac monitoring, vital signs, obtain 12 lead EKG if ordered  - Administer antiarrhythmic and heart rate control medications as ordered  - Monitor electrolytes and administer replacement therapy as ordered  Outcome: Progressing     Problem: RESPIRATORY - ADULT  Goal: Achieves optimal ventilation and oxygenation  Description: INTERVENTIONS:  - Assess for changes in respiratory status  - Assess for changes in mentation and behavior  - Position to facilitate oxygenation and minimize respiratory effort  - Oxygen administered by appropriate delivery if ordered  - Initiate smoking cessation education as indicated  - Encourage broncho-pulmonary hygiene including cough, deep breathe, Incentive Spirometry  - Assess the need for suctioning and aspirate as needed  - Assess and instruct to report SOB or any respiratory difficulty  - Respiratory Therapy support as indicated  Outcome: Progressing     Problem: GASTROINTESTINAL - ADULT  Goal: Minimal or absence of nausea and/or vomiting  Description: INTERVENTIONS:  - Administer IV fluids if ordered to ensure adequate hydration  - Maintain NPO status until nausea and vomiting are resolved  - Nasogastric tube if ordered  - Administer ordered antiemetic medications as needed  - Provide nonpharmacologic comfort measures as appropriate  - Advance diet as tolerated, if ordered  - Consider nutrition services referral to assist patient with adequate nutrition and appropriate food choices  Outcome: Progressing  Goal: Maintains adequate nutritional intake  Description: INTERVENTIONS:  - Monitor percentage of each meal consumed  - Identify factors contributing to decreased intake, treat as appropriate  - Assist with meals as needed  - Monitor I&O, weight, and lab values if indicated  - Obtain nutrition services referral as needed  Outcome: Progressing  Goal: Oral mucous membranes remain intact  Description: INTERVENTIONS  - Assess oral mucosa and hygiene practices  - Implement preventative oral hygiene regimen  - Implement oral medicated treatments as ordered  - Initiate Nutrition services referral as needed  Outcome: Progressing     Problem: GENITOURINARY - ADULT  Goal: Maintains or returns to baseline urinary function  Description: INTERVENTIONS:  - Assess urinary function  - Encourage oral fluids to ensure adequate hydration if ordered  - Administer IV fluids as ordered to ensure adequate hydration  - Administer ordered medications as needed  - Offer frequent toileting  - Follow urinary retention protocol if ordered  Outcome: Progressing  Goal: Absence of urinary retention  Description: INTERVENTIONS:  - Assess patients ability to void and empty bladder  - Monitor I/O  - Bladder scan as needed  - Discuss with physician/AP medications to alleviate retention as needed  - Discuss catheterization for long term situations as appropriate  Outcome: Progressing  Goal: Urinary catheter remains patent  Description: INTERVENTIONS:  - Assess patency of urinary catheter  - If patient has a chronic lake, consider changing catheter if non-functioning  - Follow guidelines for intermittent irrigation of non-functioning urinary catheter  Outcome: Progressing     Problem: Prexisting or High Potential for Compromised Skin Integrity  Goal: Skin integrity is maintained or improved  Description: INTERVENTIONS:  - Identify patients at risk for skin breakdown  - Assess and monitor skin integrity  - Assess and monitor nutrition and hydration status  - Monitor labs   - Assess for incontinence   - Turn and reposition patient  - Assist with mobility/ambulation  - Relieve pressure over bony prominences  - Avoid friction and shearing  - Provide appropriate hygiene as needed including keeping skin clean and dry  - Evaluate need for skin moisturizer/barrier cream  - Collaborate with interdisciplinary team   - Patient/family teaching  - Consider wound care consult   Outcome: Progressing

## 2022-02-13 NOTE — ASSESSMENT & PLAN NOTE
Continue Protonix, Pepcid  Patient states that he is no longer taking Carafate as it was tearing up his stomach  · EGD 11/21 showed gastritis, intestinal metaplasia without dysplasia, multiple diverticula with dysphagia in past   · Spoke with GI who stated there was no additional workup to be done at this time

## 2022-02-13 NOTE — ASSESSMENT & PLAN NOTE
History of CABG  Intermittent chest pain POA   · No active chest pain now  · Not on lasix at home due to low BP - ordered 40 mg IV lasix while inpatient which was discontinued as patient did not appear volume overloaded clinically  · No on ACEI due to low baseline BP   · Has moderate MR and AS  · Troponin negative, no EKG changes   · Patient to follow-up with primary cardiologist after discharge  · Continue Ranexa, Imdur  · TTE showed EF of 50% with mild global hypokinesis

## 2022-02-13 NOTE — PROGRESS NOTES
[address] Internal Medicine Progress Note  Patient: Jarvis Chaves 80 y o  male   MRN: [id number]  PCP: Bridget Cr DO  Unit/Bed#: [address] [place] [address] Encounter: [id number]  Date Of Visit: 02/13/22    Problem List:    Principal Problem:    Neck pain, acute  Active Problems: Thrombus of left atrial appendage    PUD (peptic ulcer disease)    Hypertension    Hyperlipidemia    Benign prostatic hyperplasia    3-vessel CAD      Assessment & Plan:    * Neck pain, acute  Assessment & Plan  Neck tightness when looking down with associated bilateral lower extremity weakness - now resolved  · Stroke protocol started in ER  · CT head negative for acute findings  · No intervention needed for incidental aneurysm finding  · CTA head and neck with no stenosis or occlusion of vessels  · Not a candidate for TPA per neurology  · Per neurology continue with plavix, lovenox, statin  · MRI negative for CVA    Thrombus of left atrial appendage  Assessment & Plan  Suspected left atrial appendage thrombus seen on CTA head and neck  · TTE could not visualize, plan for WALT tomm  · Continue weight based Lovenox  · Cardiology following    3-vessel CAD  Assessment & Plan  History of CABG  Intermittent chest pain POA   · No active chest pain now  · Not on lasix at home due to low BP - ordered 40 mg IV lasix while inpatient which was discontinued as patient does not appear volume overloaded clinically  · No on ACEI due to low baseline BP   · Has moderate MR and AS  · Troponin negative, no EKG changes   · Daily weights, I/Os, low sodium diet with fluid restriction  · Echo results      Benign prostatic hyperplasia  Assessment & Plan  Continue flomax  Hyperlipidemia  Assessment & Plan  Continue statin    Hypertension  Assessment & Plan  Continue Toprol-XL      PUD (peptic ulcer disease)  Assessment & Plan  Continue PPI and sucralfate  · EGD 11/21 showed gastritis, intestinal metaplasia without dysplasia, multiple diverticula with dysphagia in past   · Spoke with GI who stated there was no additional workup to be done at this time  SOB (shortness of breath)-resolved as of 2022  Assessment & Plan  Developed SOB while in CT scan  · Required up to 5L NC, now on room air  · COVID negative  · BNP 2,264  · Chest xray shows mild interstitial pulmonary edema however patient does not appear clinically volume overloaded  IV Lasix was discontinued yesterday            VTE Pharmacologic Prophylaxis:   Lovenox    Patient Centered Rounds: I performed bedside rounds with nursing staff today  Discussions with Specialists or Other Care Team Provider: yes - cardio    Education and Discussions with Family / Patient: yes - wife    Time Spent for Care: 30 minutes  More than 50% of total time spent on counseling and coordination of care as described above  Current Length of Stay: 2 day(s)  Current Patient Status: Inpatient   Certification Statement: The patient will continue to require additional inpatient hospital stay due to Left atrial thrombus requiring WALT  Discharge Plan: Anticipate discharge in 24-48 hrs to home  Code Status: Level 1 - Full Code    Subjective:     Denies any shortness of breath, chest pain, weakness    Objective:     Vitals:   Temp (24hrs), Av 1 °F (36 7 °C), Min:97 7 °F (36 5 °C), Max:98 4 °F (36 9 °C)    Temp:  [97 7 °F (36 5 °C)-98 4 °F (36 9 °C)] 98 4 °F (36 9 °C)  HR:  [75-94] 75  Resp:  [18] 18  BP: (116-136)/(51-69) 116/53  SpO2:  [93 %-98 %] 98 %  Body mass index is 24 4 kg/m²  Input and Output Summary (last 24 hours): Intake/Output Summary (Last 24 hours) at 2022 1437  Last data filed at 2022 3775  Gross per 24 hour   Intake 300 ml   Output 600 ml   Net -300 ml       Physical Exam:   Physical Exam  Constitutional:       General: He is not in acute distress  Appearance: He is not diaphoretic  HENT:      Head: Normocephalic and atraumatic  Eyes:      General:         Right eye: No discharge  Left eye: No discharge  Cardiovascular:      Rate and Rhythm: Normal rate and regular rhythm  Heart sounds: Murmur heard  Pulmonary:      Effort: Pulmonary effort is normal  No respiratory distress  Breath sounds: Normal breath sounds  No wheezing or rales  Abdominal:      General: Bowel sounds are normal  There is no distension  Palpations: Abdomen is soft  Tenderness: There is no abdominal tenderness  Musculoskeletal:      Right lower leg: No edema  Left lower leg: No edema  Neurological:      Mental Status: He is alert and oriented to person, place, and time  Additional Data:     Labs:  Results from last 7 days   Lab Units 02/13/22  0610   WBC Thousand/uL 4 97   HEMOGLOBIN g/dL 10 9*   HEMATOCRIT % 34 4*   PLATELETS Thousands/uL 156   NEUTROS PCT % 64   LYMPHS PCT % 23   MONOS PCT % 12   EOS PCT % 1     Results from last 7 days   Lab Units 02/13/22  0610 02/12/22  0553 02/12/22  0553   SODIUM mmol/L 137   < > 137   POTASSIUM mmol/L 3 8   < > 4 3   CHLORIDE mmol/L 102   < > 100   CO2 mmol/L 29   < > 27   BUN mg/dL 24   < > 25   CREATININE mg/dL 1 05   < > 1 07   ANION GAP mmol/L 6   < > 10   CALCIUM mg/dL 8 1*   < > 8 6   ALBUMIN g/dL  --   --  3 3*   TOTAL BILIRUBIN mg/dL  --   --  0 45   ALK PHOS U/L  --   --  103   ALT U/L  --   --  14   AST U/L  --   --  9   GLUCOSE RANDOM mg/dL 120   < > 136    < > = values in this interval not displayed       Results from last 7 days   Lab Units 02/12/22  0553   INR  1 02     Results from last 7 days   Lab Units 02/11/22 2008   POC GLUCOSE mg/dl 181*               Lines/Drains:  Invasive Devices  Report    Peripheral Intravenous Line            Peripheral IV 02/11/22 Left Arm 1 day    Peripheral IV 02/11/22 Right Antecubital 1 day                Imaging: Reviewed radiology reports from this admission including: MRI brain    Recent Cultures (last 7 days):         Last 24 Hours Medication List:   Current Facility-Administered Medications   Medication Dose Route Frequency Provider Last Rate    albuterol  2 5 mg Nebulization Q4H PRN Danyelle Ariza, MD      ALPRAZolam  0 5 mg Oral HS PRN Danyelle Ariza, MD      artificial tear   Both Eyes HS PRN Danyelle Ariza, MD      calcium carbonate-vitamin D  1 tablet Oral QAM Danyelle Ariza, MD      cholecalciferol  400 Units Oral After Starleen Kiss, MD      clopidogrel  75 mg Oral Daily Danyelle Ariza, MD      docusate sodium  100 mg Oral Daily Danyelle Ariza, MD      enoxaparin  40 mg Subcutaneous Daily Danyelle Ariza, MD      famotidine  20 mg Oral Daily Danyelle Ariza, MD      finasteride  5 mg Oral Daily Danyelle Ariza, MD      isosorbide mononitrate  30 mg Oral QAM Kiya Barrera,       magnesium gluconate  250 mg Oral BID AC Danyelle Ariza, MD      metoprolol succinate  25 mg Oral TID Kiya Barrera,       multivitamin with iron-minerals  15 mL Oral Daily Danyelle Ariza, MD      nitroglycerin  0 4 mg Sublingual Q5 Min PRN Danyelle Ariza, MD      pantoprazole  20 mg Oral Early Morning Danyelle Ariza, MD      pravastatin  40 mg Oral Daily With Ari Cifuentes MD      ranolazine  500 mg Oral BID Kiya Barrera DO      sucralfate  1 g Oral BID AC Danyelle Ariza, MD      tamsulosin  0 4 mg Oral Daily With Ari Cifuentes MD      zinc sulfate  220 mg Oral Daily Danyelle Ariza MD          Today, Patient Was Seen By: Brandon Vazquez DO    ** Please Note: "This note has been constructed using a voice recognition system  Therefore there may be syntax, spelling, and/or grammatical errors   Please call if you have any questions  "**

## 2022-02-13 NOTE — PROGRESS NOTES
Cardiology Inpatient Progress Note  [facility]   [facility]  8/2/1930  [id number]  PCP: Roxanne Romero DO  Date of Admission:  2/11/2022    Assessment/Plan:   TIA versus lacunar infarction-MRI with mild scattered chronic microvascular ischemic changes  CT cannot exclude left atrial appendage thrombus? Patient has a history of previous coronary bypass grafting- unclear previous patch or ligation of left atrial appendage? 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    Mediastinal lymph nodes suggesting granulomatous disease    Subjective:   Denies having any recurrence of weakness  Denies chest pain  Denies major palpitations  Review of Systems:   CONSTITUTIONAL:  As per hpi  HEENT:  Eyes:  No visual loss, blurred vision, double vision or yellow sclerae  Ears, Nose, Throat:  No hearing loss, sneezing, congestion, runny nose or sore throat  SKIN:  No rash or itching  CARDIOVASCULAR:  As per hpi  RESPIRATORY:  As per hpi  GASTROINTESTINAL:  No anorexia, nausea, vomiting or diarrhea  No abdominal pain or blood  GENITOURINARY:  Burning on urination  No flank pain  NEUROLOGICAL:  No headache, dizziness, syncope, paralysis, ataxia, numbness or tingling in the extremities  No change in bowel or bladder control  MUSCULOSKELETAL:  No muscle, back pain, joint pain or stiffness  HEMATOLOGIC:  No anemia, bleeding or bruising  LYMPHATICS:  No enlarged nodes  No history of splenectomy  PSYCHIATRIC:  No active suicidal or homicidal ideation  ENDOCRINOLOGIC:  No reports of sweating, cold or heat intolerance   No polyuria or polydipsia  ALLERGIES:  No history of asthma, hives, eczema or rhinitis  More than 10 systems reviewed and otherwise noncontributory  Vitals: Blood pressure 114/54, pulse 70, temperature (!) 97 2 °F (36 2 °C), resp  rate 18, height 5' 5" (1 651 m), weight 66 5 kg (146 lb 9 6 oz), SpO2 99 %  Vitals:    02/12/22 0815 02/13/22 0600   Weight: 67 1 kg (148 lb) 66 5 kg (146 lb 9 6 oz)     Body mass index is 24 4 kg/m²  BP Readings from Last 3 Encounters:   02/13/22 114/54   01/11/22 114/53   12/14/21 110/70     Orthostatic Blood Pressures      Most Recent Value   Blood Pressure 114/54 filed at 02/13/2022 1548   Patient Position - Orthostatic VS Lying filed at 02/12/2022 0106        I/O       02/11 0701  02/12 0700 02/12 0701  02/13 0700 02/13 0701  02/14 0700    P  O   840     IV Piggyback 1000      Total Intake(mL/kg) 1000 (14 8) 840 (12 6)     Urine (mL/kg/hr) 1710 1450 (0 9)     Stool  0     Total Output 1710 1450     Net -710 -610            Unmeasured Stool Occurrence  1 x         Invasive Devices  Report    Peripheral Intravenous Line            Peripheral IV 02/11/22 Left Arm 1 day    Peripheral IV 02/11/22 Right Antecubital 1 day                  Intake/Output Summary (Last 24 hours) at 2/13/2022 1605  Last data filed at 2/13/2022 1150  Gross per 24 hour   Intake 300 ml   Output 600 ml   Net -300 ml     Physical Examination:   Physical Exam  Constitutional:       General: He is not in acute distress  Appearance: He is well-developed  He is not diaphoretic  HENT:      Head: Normocephalic and atraumatic  Right Ear: External ear normal       Left Ear: External ear normal    Eyes:      General: No scleral icterus  Right eye: No discharge  Left eye: No discharge  Conjunctiva/sclera: Conjunctivae normal       Pupils: Pupils are equal, round, and reactive to light  Neck:      Thyroid: No thyromegaly  Vascular: No JVD  Trachea: No tracheal deviation  Cardiovascular:      Rate and Rhythm: Normal rate and regular rhythm  Heart sounds: Murmur heard  No friction rub  Gallop present  Pulmonary:      Effort: Pulmonary effort is normal  No respiratory distress  Breath sounds: Normal breath sounds  No stridor  No wheezing or rales  Chest:      Chest wall: No tenderness  Abdominal:      General: Bowel sounds are normal  There is distension  Palpations: Abdomen is soft  There is no mass  Tenderness: There is no abdominal tenderness  There is no guarding or rebound  Musculoskeletal:         General: No tenderness or deformity  Normal range of motion  Cervical back: Normal range of motion and neck supple  Skin:     General: Skin is warm and dry  Coloration: Skin is not pale  Findings: No erythema or rash  Neurological:      Mental Status: He is alert and oriented to person, place, and time  Cranial Nerves: No cranial nerve deficit  Motor: No abnormal muscle tone  Coordination: Coordination normal       Deep Tendon Reflexes: Reflexes are normal and symmetric  Reflexes normal    Psychiatric:         Behavior: Behavior normal          Thought Content: Thought content normal          Judgment: Judgment normal          Labs:   Lab Results:  I Have Reviewed All Lab Data Below:  CBC with diff:  Results from last 7 days   Lab Units 02/13/22  0610 02/12/22 0553 02/11/22 2019   WBC Thousand/uL 4 97 7 23 4 95   HEMOGLOBIN g/dL 10 9* 11 7* 11 2*   HEMATOCRIT % 34 4* 37 9 37 0   MCV fL 89 91 92   PLATELETS Thousands/uL 156 170 174   MCH pg 28 3 28 1 27 9   MCHC g/dL 31 7 30 9* 30 3*   RDW % 13 9 14 0 14 0   MPV fL 10 1 10 2 10 8   NRBC AUTO /100 WBCs 0 0 0       CMP:  Results from last 7 days   Lab Units 02/13/22  0610 02/12/22  0553 02/11/22 2019   SODIUM mmol/L 137 137 137   POTASSIUM mmol/L 3 8 4 3 4 2   CHLORIDE mmol/L 102 100 100   CO2 mmol/L 29 27 29   ANION GAP mmol/L 6 10 8   BUN mg/dL 24 25 27*   CREATININE mg/dL 1 05 1 07 1 07   CALCIUM mg/dL 8 1* 8 6 8 4   AST U/L  --  9 5   ALT U/L  --  14 16   ALK PHOS U/L  --  103 99   TOTAL PROTEIN g/dL  --  7 5 7 7   ALBUMIN g/dL  --  3 3* 3 4*   TOTAL BILIRUBIN mg/dL  --  0 45 0 42   EGFR ml/min/1 73sq m 61 60 60     Magnesium:  Results from last 7 days   Lab Units 22   MAGNESIUM mg/dL 2 3       Coags:  Results from last 7 days   Lab Units 2253 22   PTT seconds 32 29   INR  1 02 1 01     TSH:    Lipid Profile:    Hgb A1c:    NT-proBNP:   Recent Labs     22   NTBNP 1,126* 2,264*        Troponins:      Imaging & Testing   I have personally reviewed pertinent reports  Cardiac Testing   Results for orders placed during the hospital encounter of 21    Echo complete with contrast if indicated    Narrative  [address]  [address] [facility]  [phone]    Transthoracic Echocardiogram  2D, M-mode, Doppler, and Color Doppler    Study date:  2021    Patient: Misael Godoy  MR number: [id number]  Account number: [de-identified]  : 02-Aug-1930  Age: 80 years  Gender: Male  Status: Outpatient  Location: Echo lab  Height: 65 in  Weight: 151 6 lb  BP: 122/ 58 mmHg    Indications: Aortic Stenosis    Diagnoses: 424 1 - AORTIC VALVE DISORDER    Sonographer:  YURI Duckworth  Primary Physician:  Steven Robles DO  Referring Physician:  Darling Elam MD  Group:  [facility]  Interpreting Physician:  Frank Gillette MD    SUMMARY    LEFT VENTRICLE:  Systolic function was at the lower limits of normal by visual assessment  Ejection fraction was estimated in the range of 45 % to 50 % to be 45 %  There was mild diffuse hypokinesis  Wall thickness was mildly increased  LEFT ATRIUM:  The atrium was mildly dilated  MITRAL VALVE:  There was mild to moderate annular calcification    Transmitral velocity was increased due to increased transvalvular flow   There was moderate to severe regurgitation  Based on elevated E' and central color flow jet  Unable to obtain PISA measurement for effective ERO calculation  Consider WALT for better evaluation    AORTIC VALVE:  The valve was probably trileaflet  Leaflets exhibited mildly increased thickness, mild calcification, moderately reduced cuspal separation, and sclerosis  Transaortic velocity was increased due to increased transvalvular flow  There was moderate stenosis  There was mild to moderate regurgitation  Valve mean gradient was 26 mmHg  HISTORY: PRIOR HISTORY: 3-vessel CAD,Chronic stable Angina,Chronic Diastolic heart failure,HTN,Murmur,DVT,Hyperlipidemia,anemia  PROCEDURE: The procedure was performed in the echo lab  This was a routine study  The transthoracic approach was used  The study included complete 2D imaging, M-mode, complete spectral Doppler, and color Doppler  The heart rate was 61 bpm,  at the start of the study  Images were obtained from the parasternal, apical, subcostal, and suprasternal notch acoustic windows  Image quality was adequate  LEFT VENTRICLE: Size was normal  Systolic function was at the lower limits of normal by visual assessment  Ejection fraction was estimated in the range of 45 % to 50 % to be 45 %  There was mild diffuse hypokinesis  Wall thickness was  mildly increased  No evidence of apical thrombus  DOPPLER: The study was not technically sufficient to allow evaluation of LV diastolic function  RIGHT VENTRICLE: The size was normal  Systolic function was normal  Wall thickness was normal     LEFT ATRIUM: The atrium was mildly dilated  RIGHT ATRIUM: Size was normal     MITRAL VALVE: There was mild to moderate annular calcification  There was mild-moderate calcification  There was mildly reduced leaflet separation  DOPPLER: Transmitral velocity was increased due to increased transvalvular flow  There was  no evidence for stenosis   There was moderate to severe regurgitation  Based on elevated E' and central color flow jet  Unable to obtain PISA measurement for effective ERO calculation  Consider WALT for better evaluation    AORTIC VALVE: The valve was probably trileaflet  Leaflets exhibited mildly increased thickness, mild calcification, moderately reduced cuspal separation, and sclerosis  DOPPLER: Transaortic velocity was increased due to increased  transvalvular flow  There was moderate stenosis  There was mild to moderate regurgitation  TRICUSPID VALVE: The valve structure was normal  There was normal leaflet separation  DOPPLER: The transtricuspid velocity was within the normal range  There was no evidence for stenosis  There was trace regurgitation  PULMONIC VALVE: Leaflets exhibited normal thickness, no calcification, and normal cuspal separation  DOPPLER: The transpulmonic velocity was within the normal range  There was trace regurgitation  PERICARDIUM: There was no pericardial effusion  The pericardium was normal in appearance  AORTA: The root exhibited normal size  SYSTEMIC VEINS: IVC: The inferior vena cava was not well visualized  MEASUREMENT TABLES    DOPPLER MEASUREMENTS  Aortic valve   (Reference normals)  Peak gilda   350 cm/s   (--)  Peak gradient   48 mmHg   (--)  Mean gradient   26 mmHg   (--)  Regurg PHT   460 ms   (--)    SYSTEM MEASUREMENT TABLES    2D  EF (Teich): 49 86 %  %FS: 25 27 %  JUDY Planimetry: 0 86 cm2  Ao Diam: 3 1 cm  EDV(Teich): 104 22 ml  ESV(Teich): 52 25 ml  IVSd: 1 24 cm  LA Area: 22 39 cm2  LA Diam: 4 03 cm  LVEDV MOD A4C: 174 08 ml  LVEF MOD A4C: 50 26 %  LVESV MOD A4C: 86 59 ml  LVIDd: 4 74 cm  LVIDs: 3 54 cm  LVLd A4C: 9 35 cm  LVLs A4C: 8 17 cm  LVOT Diam: 1 96 cm  LVPWd: 1 21 cm  RA Area: 12 71 cm2  RVIDd: 2 45 cm  SV (Teich): 51 97 ml  SV MOD A4C: 87 49 ml    CW  AV Env  Ti: 361 56 ms  AV VTI: 86 51 cm  AV Vmax: 3 48 m/s  AV Vmean: 2 39 m/s  AV maxP 38 mmHg  AV meanP 03 mmHg  TR Vmax: 3 25 m/s  TR maxP 23 mmHg    PW  E' Sept: 0 05 m/s  LVOT Env  Ti: 361 56 ms  LVOT VTI: 27 25 cm  LVOT Vmax: 1 15 m/s  LVOT Vmean: 0 75 m/s  LVOT maxP 29 mmHg  LVOT meanP 61 mmHg    [facility] Accredited Echocardiography Laboratory    Prepared and electronically signed by    Louise Anaya MD  Signed 2021 17:34:59    No results found for this or any previous visit  No results found for this or any previous visit  No results found for this or any previous visit  No results found for this or any previous visit  Results for orders placed during the hospital encounter of 18    NM Myocardial Perfusion Spect (Pharmacological Induced Stress and/or Rest)    [place] [id number]  [phone]    Rest/Stress Gated SPECT Myocardial Perfusion Imaging After Exercise    ! ! ERROR!! End tag 'TR' does not match the start tag 'TD'  Error at or before /TR    Allergies: OXYCODONE-ACETAMINOPHEN, OMEPRAZOLE, STATINS    Diagnosis: R07 9 - Chest pain, unspecified    Primary Physician:  Ben Sim DO  Technician:  Rivera Mathias  RN:  KENY Madrigal  Group:  Hazel Mehta  Report Prepared By[de-identified]  KENY Madrigal  Interpreting Physician:  Avani Whitmore DO    INDICATIONS: Evaluation of known coronary artery disease  HISTORY: The patient is a 80year old  male  Chest pain status: chest pain  Coronary artery disease risk factors: dyslipidemia, hypertension, and family history of premature coronary artery disease  Cardiovascular history:  coronary artery disease, prior myocardial infarction, congestive heart failure, and peripheral vascular occlusive disease  Prior cardiovascular procedures: percutaneous transluminal coronary angioplasty and coronary artery bypass grafting  Co-morbidity: anemia Medications: a beta blocker, digoxin, aspirin, and clopidogrel      PHYSICAL EXAM: Baseline physical exam screening: normal     REST ECG: Normal sinus rhythm  PROCEDURE: The study was performed in the stress lab  Treadmill exercise testing was performed, using the Hilary protocol  Gated SPECT myocardial perfusion imaging was performed after stress and at rest  Systolic blood pressure was 118  mmHg, at the start of the study  Diastolic blood pressure was 68 mmHg, at the start of the study  The heart rate was 77 bpm, at the start of the study  IV double checked  HILARY PROTOCOL:  HR bpm SBP mmHg DBP mmHg Symptoms ST change Rhythm/conduct  Baseline 82 118 68 none -- NSR  Stage 1 131 12 68 moderate dyspnea downsloping depression, 1-1 5 mm in II, III, aVF, V5 and V6 --  Stage 2 137 -- -- severe dyspnea, moderate fatigue downsloping depression, 1 5-2 mm in II, III, aVF, V5 and V6 --  Immediate 136 140 72 same as above same as above --  Recovery 1 112 130 72 subsiding -- --  Recovery 2 96 120 70 none downsloping depression, 1-1 5 mm in II, III, aVF, V5 and V6 --  Recovery 3 94 -- -- -- 0 5-1 mm --  No medications or fluids given  STRESS SUMMARY: Duration of exercise was 3 min and 33 sec  The patient exercised to protocol stage 2  Maximal work rate was 5 9 METs  Maximal heart rate during stress was 137 bpm ( 104 % of maximal predicted heart rate)  The heart rate  response to stress was exaggerated  There was normal resting blood pressure with an appropriate response to stress  The rate-pressure product for the peak heart rate and blood pressure was 27447  There was no chest pain during stress  The  stress test was terminated due to achievement of target heart rate and severe dyspnea  Pre oxygen saturation: 97 %  Peak oxygen saturation: 97 %  There were no stress arrhythmias or conduction abnormalities      ISOTOPE ADMINISTRATION:  Resting isotope administration Stress isotope administration  Agent Tetrofosmin Tetrofosmin  Dose 9 8 mCi 31 2 mCi  Date 08/10/2018 08/10/2018    Radiopharmaceutical was administered 2 min, 32 sec into the stress protocol  There was 1 min of exercise after the injection  MYOCARDIAL PERFUSION IMAGING:  The image quality was good  Rotating projection images reveal subdiaphragmatic activity  Left ventricular size was normal     PERFUSION DEFECTS:  -  There was a moderate-sized, severe, fixed myocardial perfusion defect of the apical wall  GATED SPECT:  The calculated left ventricular ejection fraction was 43 %  There was severely reduced myocardial thickening and motion of the apical wall of the left ventricle  SUMMARY:  -  Stress results: Duration of exercise was 3 min and 33 sec  Target heart rate was achieved  There was no chest pain during stress  -  Perfusion imaging: There was a moderate-sized, severe, fixed myocardial perfusion defect of the apical wall  -  Gated SPECT: The calculated left ventricular ejection fraction was 43 %  There was severely reduced myocardial thickening and motion of the apical wall of the left ventricle  IMPRESSIONS: Abnormal study after pharmacologic stress  There was a small infarct in the apical region  Left ventricular systolic function was reduced, with regional wall motion abnormalities  Prepared and signed by    Saumya Romero DO  Signed 08/10/2018 14:38:25      EKG: Personally reviewed  Counseling :  Counseling / Coordination of Care Time: 40min  Greater than 50% of total time spent on patient counseling and coordination of care  Code Status: Level 1 - Full Code  Collaboration of Care: Were Recommendations Directly Discussed with Primary Treatment Team? - Yes     Ximena Oh MD [facility]    "This note has been constructed using a voice recognition system  Therefore there may be syntax, spelling, and/or grammatical errors   Please call if you have any questions  "

## 2022-02-13 NOTE — ASSESSMENT & PLAN NOTE
Patient reported Neck tightness when looking down with associated bilateral lower extremity weakness - now resolved  · Stroke protocol started in ER  · CT head negative for acute findings, possible TIA  · No intervention needed for incidental aneurysm finding  · CTA head and neck with no stenosis or occlusion of vessels  · Not a candidate for TPA per neurology  · Per neurology continue with plavix, statin  · MRI negative for CVA

## 2022-02-13 NOTE — ASSESSMENT & PLAN NOTE
Suspected left atrial appendage thrombus seen on CTA head and neck  · TTE could not visualize    WALT done today which was negative for thrombus  · Received weight based Lovenox while here  · No indication for anticoagulation on discharge

## 2022-02-14 ENCOUNTER — ANESTHESIA (INPATIENT)
Dept: NON INVASIVE DIAGNOSTICS | Facility: HOSPITAL | Age: 87
DRG: 069 | End: 2022-02-14
Payer: MEDICARE

## 2022-02-14 ENCOUNTER — APPOINTMENT (INPATIENT)
Dept: NON INVASIVE DIAGNOSTICS | Facility: HOSPITAL | Age: 87
DRG: 069 | End: 2022-02-14
Attending: INTERNAL MEDICINE
Payer: MEDICARE

## 2022-02-14 VITALS
HEIGHT: 65 IN | HEART RATE: 82 BPM | TEMPERATURE: 97.4 F | WEIGHT: 146 LBS | DIASTOLIC BLOOD PRESSURE: 72 MMHG | SYSTOLIC BLOOD PRESSURE: 123 MMHG | RESPIRATION RATE: 18 BRPM | OXYGEN SATURATION: 97 % | BODY MASS INDEX: 24.32 KG/M2

## 2022-02-14 PROBLEM — R29.898 BILATERAL LEG WEAKNESS: Status: RESOLVED | Noted: 2022-02-12 | Resolved: 2022-02-14

## 2022-02-14 PROBLEM — I51.3 THROMBUS OF LEFT ATRIAL APPENDAGE: Status: RESOLVED | Noted: 2022-02-12 | Resolved: 2022-02-14

## 2022-02-14 PROBLEM — R29.898 BILATERAL LEG WEAKNESS: Status: ACTIVE | Noted: 2022-02-12

## 2022-02-14 LAB
ANION GAP SERPL CALCULATED.3IONS-SCNC: 10 MMOL/L (ref 4–13)
BUN SERPL-MCNC: 23 MG/DL (ref 5–25)
CALCIUM SERPL-MCNC: 8.3 MG/DL (ref 8.3–10.1)
CHLORIDE SERPL-SCNC: 102 MMOL/L (ref 100–108)
CO2 SERPL-SCNC: 28 MMOL/L (ref 21–32)
CREAT SERPL-MCNC: 0.94 MG/DL (ref 0.6–1.3)
GFR SERPL CREATININE-BSD FRML MDRD: 70 ML/MIN/1.73SQ M
GLUCOSE SERPL-MCNC: 119 MG/DL (ref 65–140)
POTASSIUM SERPL-SCNC: 4.1 MMOL/L (ref 3.5–5.3)
SL CV LV EF: 45
SODIUM SERPL-SCNC: 140 MMOL/L (ref 136–145)

## 2022-02-14 PROCEDURE — 93320 DOPPLER ECHO COMPLETE: CPT | Performed by: INTERNAL MEDICINE

## 2022-02-14 PROCEDURE — 93312 ECHO TRANSESOPHAGEAL: CPT | Performed by: INTERNAL MEDICINE

## 2022-02-14 PROCEDURE — 93312 ECHO TRANSESOPHAGEAL: CPT

## 2022-02-14 PROCEDURE — 99239 HOSP IP/OBS DSCHRG MGMT >30: CPT | Performed by: FAMILY MEDICINE

## 2022-02-14 PROCEDURE — 80048 BASIC METABOLIC PNL TOTAL CA: CPT | Performed by: FAMILY MEDICINE

## 2022-02-14 PROCEDURE — 99232 SBSQ HOSP IP/OBS MODERATE 35: CPT | Performed by: INTERNAL MEDICINE

## 2022-02-14 PROCEDURE — 93325 DOPPLER ECHO COLOR FLOW MAPG: CPT | Performed by: INTERNAL MEDICINE

## 2022-02-14 PROCEDURE — B24BZZ4 ULTRASONOGRAPHY OF HEART WITH AORTA, TRANSESOPHAGEAL: ICD-10-PCS | Performed by: INTERNAL MEDICINE

## 2022-02-14 RX ORDER — SODIUM CHLORIDE 9 MG/ML
INJECTION, SOLUTION INTRAVENOUS CONTINUOUS PRN
Status: DISCONTINUED | OUTPATIENT
Start: 2022-02-14 | End: 2022-02-14

## 2022-02-14 RX ORDER — PROPOFOL 10 MG/ML
INJECTION, EMULSION INTRAVENOUS AS NEEDED
Status: DISCONTINUED | OUTPATIENT
Start: 2022-02-14 | End: 2022-02-14

## 2022-02-14 RX ORDER — LIDOCAINE HYDROCHLORIDE 10 MG/ML
INJECTION, SOLUTION EPIDURAL; INFILTRATION; INTRACAUDAL; PERINEURAL AS NEEDED
Status: DISCONTINUED | OUTPATIENT
Start: 2022-02-14 | End: 2022-02-14

## 2022-02-14 RX ADMIN — PROPOFOL 60 MG: 10 INJECTION, EMULSION INTRAVENOUS at 14:35

## 2022-02-14 RX ADMIN — LIDOCAINE HYDROCHLORIDE 50 MG: 10 INJECTION, SOLUTION EPIDURAL; INFILTRATION; INTRACAUDAL; PERINEURAL at 14:35

## 2022-02-14 RX ADMIN — FAMOTIDINE 20 MG: 20 TABLET, FILM COATED ORAL at 16:18

## 2022-02-14 RX ADMIN — METOPROLOL SUCCINATE 25 MG: 25 TABLET, EXTENDED RELEASE ORAL at 16:26

## 2022-02-14 RX ADMIN — PRAVASTATIN SODIUM 40 MG: 40 TABLET ORAL at 16:19

## 2022-02-14 RX ADMIN — DOCUSATE SODIUM 100 MG: 100 CAPSULE ORAL at 16:17

## 2022-02-14 RX ADMIN — CLOPIDOGREL BISULFATE 75 MG: 75 TABLET ORAL at 16:21

## 2022-02-14 RX ADMIN — TAMSULOSIN HYDROCHLORIDE 0.4 MG: 0.4 CAPSULE ORAL at 16:22

## 2022-02-14 RX ADMIN — FINASTERIDE 5 MG: 5 TABLET, FILM COATED ORAL at 16:20

## 2022-02-14 RX ADMIN — SODIUM CHLORIDE: 9 INJECTION, SOLUTION INTRAVENOUS at 14:30

## 2022-02-14 RX ADMIN — ISOSORBIDE MONONITRATE 30 MG: 30 TABLET, EXTENDED RELEASE ORAL at 16:20

## 2022-02-14 RX ADMIN — Medication 250 MG: at 16:20

## 2022-02-14 RX ADMIN — METOPROLOL SUCCINATE 25 MG: 25 TABLET, EXTENDED RELEASE ORAL at 05:20

## 2022-02-14 RX ADMIN — LIDOCAINE HYDROCHLORIDE 20 MG: 10 INJECTION, SOLUTION EPIDURAL; INFILTRATION; INTRACAUDAL; PERINEURAL at 14:42

## 2022-02-14 RX ADMIN — PANTOPRAZOLE SODIUM 20 MG: 20 TABLET, DELAYED RELEASE ORAL at 05:20

## 2022-02-14 RX ADMIN — LIDOCAINE HYDROCHLORIDE 20 MG: 10 INJECTION, SOLUTION EPIDURAL; INFILTRATION; INTRACAUDAL; PERINEURAL at 14:39

## 2022-02-14 RX ADMIN — RANOLAZINE 500 MG: 500 TABLET, EXTENDED RELEASE ORAL at 16:25

## 2022-02-14 RX ADMIN — LIDOCAINE HYDROCHLORIDE 50 MG: 10 INJECTION, SOLUTION EPIDURAL; INFILTRATION; INTRACAUDAL; PERINEURAL at 14:37

## 2022-02-14 RX ADMIN — CHOLECALCIFEROL (VITAMIN D3) 10 MCG (400 UNIT) TABLET 400 UNITS: at 16:21

## 2022-02-14 RX ADMIN — ZINC SULFATE 220 MG (50 MG) CAPSULE 220 MG: CAPSULE at 16:26

## 2022-02-14 NOTE — CASE MANAGEMENT
Case Management Assessment & Discharge Planning Note    Patient name John Quach  Location [address] [facility] [address] Alexis Ibarra 55-* MRN [id number]  : 1930 Date 2022       Current Admission Date: 2022  Current Admission Diagnosis:Neck pain, acute   Patient Active Problem List    Diagnosis Date Noted    Neck pain, acute 2022    Thrombus of left atrial appendage 2022    Status post gastric surgery 2022    Rectal bleeding 2022    Platelets decreased ([facility] [address] ) 2022    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 2022    Dysphagia 2021    Pneumonia 2021    Hx of CABG 2021    CAD (coronary artery disease) 2021    Cardiac murmur 2020    3-vessel CAD 2018    Dry eye 2018    Anemia of chronic disease 2018    Depression 2018    Bladder mass 2018    Anxiety 2018    Incarcerated right inguinal hernia 2017    History of DVT (deep vein thrombosis) 2017    PUD (peptic ulcer disease) 2017    Hypertension 2017    Hyperlipidemia 2017    Chronic diastolic heart failure (HCC) 2017    Chest pain 2017    Benign prostatic hyperplasia 10/22/2015      LOS (days): 3  Geometric Mean LOS (GMLOS) (days): 2 10  Days to GMLOS:-0 6     OBJECTIVE:    Risk of Unplanned Readmission Score: 15   Bundled Patient Payment: No Current Bundle     Current admission status: Inpatient  Referral Reason: Other (Discharge planning)    Preferred Pharmacy:   [id number] [address] [address]  Phone: [phone] Fax: [phone]    Primary Care Provider: Ben Sim DO    Primary Insurance: MEDICARE  Secondary Insurance: AARP    ASSESSMENT:  Active Health Care Agents    There are no active Health Care Agents on file          Readmission Root Cause  30 Day Readmission: No    Patient Information  Admitted from[de-identified] Home  Mental Status: Alert  During Assessment patient was accompanied by: Not accompanied during assessment  Assessment information provided by[de-identified] Patient  Primary Caregiver: Self  Support Systems: Spouse/significant other,Daughter,Family members  [place] of Residence: [address] [place] [address] do you live in?: [place]  entry access options   Select all that apply : Stairs  Number of steps to enter home : 3  Type of Current Residence: [place]  In the last 12 months, was there a time when you were not able to pay the mortgage or rent on time?: No  In the last 12 months, how many places have you lived?: 1  In the last 12 months, was there a time when you did not have a steady place to sleep or slept in a shelter (including now)?: No  Homeless/housing insecurity resource given?: N/A  Living Arrangements: Lives w/ Spouse/significant other  Is patient a ?: No    Activities of Daily Living Prior to Admission  Functional Status: Independent  Completes ADLs independently?: Yes  Ambulates independently?: Yes  Does patient use assisted devices?: No  Does patient currently own DME?: No  Does patient have a history of Outpatient Therapy (PT/OT)?: No  Does the patient have a history of Short-Term Rehab?: No  Does patient have a history of HHC?: No  Does patient currently have [facility]?: No    Patient Information Continued  Income Source: Pension/prison  Does patient have prescription coverage?: Yes  Within the past 12 months, you worried that your food would run out before you got the money to buy more : Never true  Within the past 12 months, the food you bought just didnt last and you didnt have money to get more : Never true  Food insecurity resource given?: N/A  Does patient receive dialysis treatments?: No    Means of Transportation  Means of Transport to Appts[de-identified] Drives Self  In the past 12 months, has lack of transportation kept you from medical appointments or from getting medications?: No  In the past 12 months, has lack of transportation kept you from meetings, work, or from getting things needed for daily living?: No  Was application for public transport provided?: N/A    DISCHARGE DETAILS:    Discharge planning discussed with[de-identified] Patient and wife [place] of Choice: Yes  Comments - Freedom of Choice: FOC reviewed with patient and wife  At this time there are no anticipated discharge needs  CM contacted family/caregiver?: Yes     Contacts  Patient Contacts: Laurence Freire (wife)  Relationship to Patient[de-identified] Family  Contact Method: In Person  Reason/Outcome: Continuity of [address]          Is the patient interested in [facility] at discharge?: No    DME Referral Provided  Referral made for DME?: No    Other Referral/Resources/Interventions Provided:  Interventions: None Indicated    Treatment Team Recommendation: Home  Discharge Destination Plan[de-identified] Home  Transport at Discharge : Family      IMM Given (Date):: 02/14/22  IMM Given to[de-identified] Patient (IMM reviewed with and signed by patient  Patient declined copy and copy was placed in scan bin for chart )      DANIELLE spoke with patient at bedside to introduce role of CM and obtain assessment information  Per discussion with treatment team, chart review and conversation with patient he is independent with all needs at baseline  Plan is for discharge to home with no anticipated therapy needs  Patient lives with his wife in a one-story home in [place]  Patient requested information on resources for seniors as he feels his wife needs assistance  SW later spoke with wife Maribel Melara in the room while patient was having a procedure and she confirmed that they live in [facility]  In [place] is their mailing address which is [place]  DANIELLE provided information on the [facility] of Aging and Disabilities    Maribel Melara stated that patient is always concerned that she needs more help but she feels they are doing fine  Their daughter is retired from the [employer] and is a support to them  SW will continue to follow for discharge planning needs

## 2022-02-14 NOTE — PLAN OF CARE
Problem: Potential for Falls  Goal: Patient will remain free of falls  Description: INTERVENTIONS:  - Educate patient/family on patient safety including physical limitations  - Instruct patient to call for assistance with activity   - Consult OT/PT to assist with strengthening/mobility   - Keep Call bell within reach  - Keep bed low and locked with side rails adjusted as appropriate  - Keep care items and personal belongings within reach  - Initiate and maintain comfort rounds  - Make Fall Risk Sign visible to staff  - Offer Toileting every 2 Hours, in advance of need  - Initiate/Maintain bed alarm  - Obtain necessary fall risk management equipment:   - Apply yellow socks and bracelet for high fall risk patients  - Consider moving patient to room near nurses station  Outcome: Progressing     Problem: MOBILITY - ADULT  Goal: Maintain or return to baseline ADL function  Description: INTERVENTIONS:  - Educate patient/family on patient safety including physical limitations  - Instruct patient to call for assistance with activity   - Consult OT/PT to assist with strengthening/mobility   - Keep Call bell within reach  - Keep bed low and locked with side rails adjusted as appropriate  - Keep care items and personal belongings within reach  - Initiate and maintain comfort rounds  - Make Fall Risk Sign visible to staff  - Offer Toileting every 2 Hours, in advance of need  - Initiate/Maintain bed alarm  - Obtain necessary fall risk management equipment:   - Apply yellow socks and bracelet for high fall risk patients  - Consider moving patient to room near nurses station  Outcome: Progressing     Problem: PAIN - ADULT  Goal: Verbalizes/displays adequate comfort level or baseline comfort level  Description: Interventions:  - Encourage patient to monitor pain and request assistance  - Assess pain using appropriate pain scale  - Administer analgesics based on type and severity of pain and evaluate response  - Implement non-pharmacological measures as appropriate and evaluate response  - Consider cultural and social influences on pain and pain management  - Notify physician/advanced practitioner if interventions unsuccessful or patient reports new pain  Outcome: Progressing     Problem: Knowledge Deficit  Goal: Patient/family/caregiver demonstrates understanding of disease process, treatment plan, medications, and discharge instructions  Description: Complete learning assessment and assess knowledge base    Interventions:  - Provide teaching at level of understanding  - Provide teaching via preferred learning methods  Outcome: Progressing

## 2022-02-14 NOTE — PLAN OF CARE
Problem: Potential for Falls  Goal: Patient will remain free of falls  Description: INTERVENTIONS:  - Educate patient/family on patient safety including physical limitations  - Instruct patient to call for assistance with activity   - Consult OT/PT to assist with strengthening/mobility   - Keep Call bell within reach  - Keep bed low and locked with side rails adjusted as appropriate  - Keep care items and personal belongings within reach  - Initiate and maintain comfort rounds  - Make Fall Risk Sign visible to staff  - Offer Toileting every 2 Hours, in advance of need  - Initiate/Maintain bed alarm  - Obtain necessary fall risk management equipment:   - Apply yellow socks and bracelet for high fall risk patients  - Consider moving patient to room near nurses station  2/14/2022 1658 by Allie Mittal RN  Outcome: Adequate for Discharge  2/14/2022 1308 by Allie Mittal RN  Outcome: Progressing     Problem: MOBILITY - ADULT  Goal: Maintain or return to baseline ADL function  Description: INTERVENTIONS:  - Educate patient/family on patient safety including physical limitations  - Instruct patient to call for assistance with activity   - Consult OT/PT to assist with strengthening/mobility   - Keep Call bell within reach  - Keep bed low and locked with side rails adjusted as appropriate  - Keep care items and personal belongings within reach  - Initiate and maintain comfort rounds  - Make Fall Risk Sign visible to staff  - Offer Toileting every 2  Hours, in advance of need  - Initiate/Maintain bed alarm  - Obtain necessary fall risk management equipment:   - Apply yellow socks and bracelet for high fall risk patients  - Consider moving patient to room near nurses station  2/14/2022 1658 by Allie Mittal RN  Outcome: Adequate for Discharge  2/14/2022 1308 by Allie Mittal RN  Outcome: Progressing  Goal: Maintains/Returns to pre admission functional level  Description: INTERVENTIONS:  - Perform BMAT or MOVE assessment daily    - Set and communicate daily mobility goal to care team and patient/family/caregiver  - Collaborate with rehabilitation services on mobility goals if consulted  - Perform Range of Motion 3 times a day  - Reposition patient every 2 hours    - Dangle patient 2 times a day  - Stand patient 2 times a day  - Ambulate patient 3 times a day  - Out of bed to chair 2 times a day   - Out of bed for meals 3 times a day  - Out of bed for toileting  - Record patient progress and toleration of activity level   2/14/2022 1658 by Demi Beauchamp RN  Outcome: Adequate for Discharge  2/14/2022 1308 by Demi Beauchamp RN  Outcome: Progressing     Problem: PAIN - ADULT  Goal: Verbalizes/displays adequate comfort level or baseline comfort level  Description: Interventions:  - Encourage patient to monitor pain and request assistance  - Assess pain using appropriate pain scale  - Administer analgesics based on type and severity of pain and evaluate response  - Implement non-pharmacological measures as appropriate and evaluate response  - Consider cultural and social influences on pain and pain management  - Notify physician/advanced practitioner if interventions unsuccessful or patient reports new pain  2/14/2022 1658 by Demi Beauchamp RN  Outcome: Adequate for Discharge  2/14/2022 1308 by Demi Beauchamp RN  Outcome: Progressing     Problem: SAFETY ADULT  Goal: Patient will remain free of falls  Description: INTERVENTIONS:  - Educate patient/family on patient safety including physical limitations  - Instruct patient to call for assistance with activity   - Consult OT/PT to assist with strengthening/mobility   - Keep Call bell within reach  - Keep bed low and locked with side rails adjusted as appropriate  - Keep care items and personal belongings within reach  - Initiate and maintain comfort rounds  - Make Fall Risk Sign visible to staff  - Offer Toileting every 2 Hours, in advance of need  - Initiate/Maintain bed alarm  - Obtain necessary fall risk management equipment:   - Apply yellow socks and bracelet for high fall risk patients  - Consider moving patient to room near nurses station  2/14/2022 1658 by Doreen Phillips RN  Outcome: Adequate for Discharge  2/14/2022 1308 by Doreen Phillips RN  Outcome: Progressing  Goal: Maintain or return to baseline ADL function  Description: INTERVENTIONS:  - Educate patient/family on patient safety including physical limitations  - Instruct patient to call for assistance with activity   - Consult OT/PT to assist with strengthening/mobility   - Keep Call bell within reach  - Keep bed low and locked with side rails adjusted as appropriate  - Keep care items and personal belongings within reach  - Initiate and maintain comfort rounds  - Make Fall Risk Sign visible to staff  - Offer Toileting every 2  Hours, in advance of need  - Initiate/Maintain bed alarm  - Obtain necessary fall risk management equipment:   - Apply yellow socks and bracelet for high fall risk patients  - Consider moving patient to room near nurses station  2/14/2022 1658 by Doreen Phillips RN  Outcome: Adequate for Discharge  2/14/2022 1308 by Doreen Phillips RN  Outcome: Progressing  Goal: Maintains/Returns to pre admission functional level  Description: INTERVENTIONS:  - Perform BMAT or MOVE assessment daily    - Set and communicate daily mobility goal to care team and patient/family/caregiver  - Collaborate with rehabilitation services on mobility goals if consulted  - Perform Range of Motion 3 times a day  - Reposition patient every 2 hours    - Dangle patient 2 times a day  - Stand patient 2 times a day  - Ambulate patient 2 times a day  - Out of bed to chair 2 times a day   - Out of bed for meals 3 times a day  - Out of bed for toileting  - Record patient progress and toleration of activity level   2/14/2022 1658 by Doreen Phillips RN  Outcome: Adequate for Discharge  2/14/2022 1308 by Doreen Phillips RN  Outcome: Progressing     Problem: DISCHARGE PLANNING  Goal: Discharge to home or other facility with appropriate resources  Description: INTERVENTIONS:  - Identify barriers to discharge w/patient and caregiver  - Arrange for needed discharge resources and transportation as appropriate  - Identify discharge learning needs (meds, wound care, etc )  - Arrange for interpretive services to assist at discharge as needed  - Refer to Case Management Department for coordinating discharge planning if the patient needs post-hospital services based on physician/advanced practitioner order or complex needs related to functional status, cognitive ability, or social support system  2/14/2022 1658 by Joao Giordano RN  Outcome: Adequate for Discharge  2/14/2022 1308 by Joao Giordano RN  Outcome: Progressing     Problem: Knowledge Deficit  Goal: Patient/family/caregiver demonstrates understanding of disease process, treatment plan, medications, and discharge instructions  Description: Complete learning assessment and assess knowledge base    Interventions:  - Provide teaching at level of understanding  - Provide teaching via preferred learning methods  2/14/2022 1658 by Joao Giordano RN  Outcome: Adequate for Discharge  2/14/2022 1308 by Joao Giordano RN  Outcome: Progressing     Problem: NEUROSENSORY - ADULT  Goal: Achieves stable or improved neurological status  Description: INTERVENTIONS  - Monitor and report changes in neurological status  - Monitor vital signs such as temperature, blood pressure, glucose, and any other labs ordered   - Initiate measures to prevent increased intracranial pressure  - Monitor for seizure activity and implement precautions if appropriate      2/14/2022 1658 by Joao Giordano RN  Outcome: Adequate for Discharge  2/14/2022 1308 by Joao Giordano RN  Outcome: Progressing  Goal: Achieves maximal functionality and self care  Description: INTERVENTIONS  - Monitor swallowing and airway patency with patient fatigue and changes in neurological status  - Encourage and assist patient to increase activity and self care     - Encourage visually impaired, hearing impaired and aphasic patients to use assistive/communication devices  2/14/2022 1658 by Scott Muniz RN  Outcome: Adequate for Discharge  2/14/2022 1308 by Scott Muniz RN  Outcome: Progressing     Problem: CARDIOVASCULAR - ADULT  Goal: Maintains optimal cardiac output and hemodynamic stability  Description: INTERVENTIONS:  - Monitor I/O, vital signs and rhythm  - Monitor for S/S and trends of decreased cardiac output  - Administer and titrate ordered vasoactive medications to optimize hemodynamic stability  - Assess quality of pulses, skin color and temperature  - Assess for signs of decreased coronary artery perfusion  - Instruct patient to report change in severity of symptoms  2/14/2022 1658 by Scott Muniz RN  Outcome: Adequate for Discharge  2/14/2022 1308 by Scott Muniz RN  Outcome: Progressing  Goal: Absence of cardiac dysrhythmias or at baseline rhythm  Description: INTERVENTIONS:  - Continuous cardiac monitoring, vital signs, obtain 12 lead EKG if ordered  - Administer antiarrhythmic and heart rate control medications as ordered  - Monitor electrolytes and administer replacement therapy as ordered  2/14/2022 1658 by Scott Muniz RN  Outcome: Adequate for Discharge  2/14/2022 1308 by Scott Muniz RN  Outcome: Progressing     Problem: RESPIRATORY - ADULT  Goal: Achieves optimal ventilation and oxygenation  Description: INTERVENTIONS:  - Assess for changes in respiratory status  - Assess for changes in mentation and behavior  - Position to facilitate oxygenation and minimize respiratory effort  - Oxygen administered by appropriate delivery if ordered  - Initiate smoking cessation education as indicated  - Encourage broncho-pulmonary hygiene including cough, deep breathe, Incentive Spirometry  - Assess the need for suctioning and aspirate as needed  - Assess and instruct to report SOB or any respiratory difficulty  - Respiratory Therapy support as indicated  2/14/2022 1658 by Jourdan Mariee RN  Outcome: Adequate for Discharge  2/14/2022 1308 by Jourdan Mariee RN  Outcome: Progressing     Problem: GASTROINTESTINAL - ADULT  Goal: Minimal or absence of nausea and/or vomiting  Description: INTERVENTIONS:  - Administer IV fluids if ordered to ensure adequate hydration  - Maintain NPO status until nausea and vomiting are resolved  - Nasogastric tube if ordered  - Administer ordered antiemetic medications as needed  - Provide nonpharmacologic comfort measures as appropriate  - Advance diet as tolerated, if ordered  - Consider nutrition services referral to assist patient with adequate nutrition and appropriate food choices  2/14/2022 1658 by Jourdan Mariee RN  Outcome: Adequate for Discharge  2/14/2022 1308 by Jourdan Mariee RN  Outcome: Progressing  Goal: Maintains adequate nutritional intake  Description: INTERVENTIONS:  - Monitor percentage of each meal consumed  - Identify factors contributing to decreased intake, treat as appropriate  - Assist with meals as needed  - Monitor I&O, weight, and lab values if indicated  - Obtain nutrition services referral as needed  2/14/2022 1658 by Jourdan Mariee RN  Outcome: Adequate for Discharge  2/14/2022 1308 by Jourdan Mariee RN  Outcome: Progressing  Goal: Oral mucous membranes remain intact  Description: INTERVENTIONS  - Assess oral mucosa and hygiene practices  - Implement preventative oral hygiene regimen  - Implement oral medicated treatments as ordered  - Initiate Nutrition services referral as needed  2/14/2022 1658 by Jourdan Mariee RN  Outcome: Adequate for Discharge  2/14/2022 1308 by Jourdan Mariee RN  Outcome: Progressing     Problem: GENITOURINARY - ADULT  Goal: Maintains or returns to baseline urinary function  Description: INTERVENTIONS:  - Assess urinary function  - Encourage oral fluids to ensure adequate hydration if ordered  - Administer IV fluids as ordered to ensure adequate hydration  - Administer ordered medications as needed  - Offer frequent toileting  - Follow urinary retention protocol if ordered  2/14/2022 1658 by Jourdan Mariee RN  Outcome: Adequate for Discharge  2/14/2022 1308 by Jourdan Mariee RN  Outcome: Progressing  Goal: Absence of urinary retention  Description: INTERVENTIONS:  - Assess patients ability to void and empty bladder  - Monitor I/O  - Bladder scan as needed  - Discuss with physician/AP medications to alleviate retention as needed  - Discuss catheterization for long term situations as appropriate  2/14/2022 1658 by Jourdan Mariee RN  Outcome: Adequate for Discharge  2/14/2022 1308 by Jourdan Mariee RN  Outcome: Progressing  Goal: Urinary catheter remains patent  Description: INTERVENTIONS:  - Assess patency of urinary catheter  - If patient has a chronic lake, consider changing catheter if non-functioning  - Follow guidelines for intermittent irrigation of non-functioning urinary catheter  2/14/2022 1658 by Jourdan Mariee RN  Outcome: Adequate for Discharge  2/14/2022 1308 by Jourdan Mariee RN  Outcome: Progressing     Problem: Prexisting or High Potential for Compromised Skin Integrity  Goal: Skin integrity is maintained or improved  Description: INTERVENTIONS:  - Identify patients at risk for skin breakdown  - Assess and monitor skin integrity  - Assess and monitor nutrition and hydration status  - Monitor labs   - Assess for incontinence   - Turn and reposition patient  - Assist with mobility/ambulation  - Relieve pressure over bony prominences  - Avoid friction and shearing  - Provide appropriate hygiene as needed including keeping skin clean and dry  - Evaluate need for skin moisturizer/barrier cream  - Collaborate with interdisciplinary team   - Patient/family teaching  - Consider wound care consult   2/14/2022 1658 by Jourdan Mariee RN  Outcome: Adequate for Discharge  2/14/2022 1308 by Joao Giordano, RN  Outcome: Progressing

## 2022-02-14 NOTE — INCIDENTAL FINDINGS
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

## 2022-02-14 NOTE — PROGRESS NOTES
Daily Cardiology Progress Note              LOS: 3 days     Assessment/Plan     Principal Problem:    Neck pain, acute  Active Problems:    PUD (peptic ulcer disease)    Hypertension    Hyperlipidemia    Benign prostatic hyperplasia    3-vessel CAD    Thrombus of left atrial appendage    1  TIA VS lacunar infarct  Patient had WALT done today which did not show any ELIESER thrombus  - May discontinue lovenox   - Continue Plavix alone    2  CAD with chronic stable angina  - Will follow up with Dr Karin Dale to adjust anti-anginal medication regimen     - Continue Plavix, Ranexa and Imdur   - Continue Toprol Xl 25 mg tid  - Continue pravastatin    3  Dyslipidemia  - continue Pravastatin      Subjective     Interval History: Patient says leg weakness has resolved and not reoccurred  Had abdominal discomfort overnight which has resolved  He denies any chest pain or shortness of breath  Objective     Vital signs in last 24 hours:  Temp:  [97 2 °F (36 2 °C)-98 3 °F (36 8 °C)] 97 4 °F (36 3 °C)  HR:  [70-91] 86  Resp:  [17-18] 18  BP: (114-153)/(53-86) 139/72    Intake/Output last 3 shifts:  I/O last 3 completed shifts:  In: -   Out: 300 [Urine:300]  Intake/Output this shift:  No intake/output data recorded  Physical Exam:  Physical Exam   Constitutional: He appears healthy  No distress  Eyes: Pupils are equal, round, and reactive to light  Conjunctivae are normal    Neck: No JVD present  Cardiovascular: Normal rate and regular rhythm  Exam reveals no gallop and no friction rub  Murmur heard  Pulmonary/Chest: Effort normal and breath sounds normal  He has no wheezes  He has no rales  Musculoskeletal:         General: No tenderness, deformity or edema  Cervical back: Normal range of motion and neck supple  Neurological: He is alert and oriented to person, place, and time  Skin: Skin is warm and dry  Lab Results: I have personally reviewed pertinent lab results      Imaging: I have personally reviewed pertinent films in PACS  EKG/Tele: Reviewed

## 2022-02-14 NOTE — SEDATION DOCUMENTATION
Procedure ended  WALT completed by Dr Yazmin Rodriguez   Patient transferred to inpatient room, report and care assumed by primary RN

## 2022-02-14 NOTE — ANESTHESIA PREPROCEDURE EVALUATION
Procedure:  WALT    Relevant Problems   CARDIO   (+) 3-vessel CAD   (+) CAD (coronary artery disease)   (+) Cardiac murmur   (+) Chest pain   (+) Hx of CABG   (+) Hyperlipidemia   (+) Hypertension      GI/HEPATIC   (+) Dysphagia   (+) PUD (peptic ulcer disease)   (+) Rectal bleeding      /RENAL   (+) Benign prostatic hyperplasia      HEMATOLOGY   (+) Anemia of chronic disease   (+) Platelets decreased (HCC)      NEURO/PSYCH   (+) Anxiety   (+) Depression   (+) History of DVT (deep vein thrombosis)      PULMONARY   (+) Pneumonia        Physical Exam    Airway    Mallampati score: II  TM Distance: >3 FB  Neck ROM: full     Dental   No notable dental hx     Cardiovascular  Rhythm: regular, Rate: normal, Murmur,     Pulmonary  Decreased breath sounds,     Other Findings        Anesthesia Plan  ASA Score- 3     Anesthesia Type- IV sedation with anesthesia with ASA Monitors  Additional Monitors:   Airway Plan:           Plan Factors-Exercise tolerance (METS): <4 METS  Chart reviewed  EKG reviewed  Existing labs reviewed  Patient summary reviewed  Patient is not a current smoker  Induction- intravenous  Postoperative Plan- Plan for postoperative opioid use  Informed Consent- Anesthetic plan and risks discussed with patient  I personally reviewed this patient with the CRNA  Discussed and agreed on the Anesthesia Plan with the CRNA  Rhea Glaser

## 2022-02-14 NOTE — ANESTHESIA POSTPROCEDURE EVALUATION
Post-Op Assessment Note    CV Status:  Stable  Pain Score: 0    Pain management: adequate     Mental Status:  Alert and awake   Hydration Status:  Euvolemic   PONV Controlled:  Controlled   Airway Patency:  Patent      Post Op Vitals Reviewed: Yes      Staff: Anesthesiologist, CRNA         No complications documented      /63 (02/14/22 1446)    Temp      Pulse 85 (02/14/22 1446)   Resp 20 (02/14/22 1446)    SpO2 100 % (02/14/22 1446)

## 2022-02-14 NOTE — DISCHARGE SUMMARY
Discharge Summary - [facility] Internal Medicine    Patient Information: Mau Benson 80 y o  male MRN: [id number]  Unit/Bed#: [address] [place] [address] Encounter: [id number]    Discharging Physician / Practitioner: Kedar Salvador DO  PCP: Darina Primrose, DO  Admission Date: 2/11/2022  Discharge Date: 02/14/22    Reason for Admission: Extremity Weakness (pt reports bilateral leg weakness and is unable to stand up  Pt also reports neck pain  Wife states over the past hour patient has become more confused)      Discharge Diagnoses:     Principal Problem (Resolved):    Bilateral leg weakness  Active Problems:    PUD (peptic ulcer disease)    Hypertension    Hyperlipidemia    Benign prostatic hyperplasia    3-vessel CAD  Resolved Problems: Thrombus of left atrial appendage    SOB (shortness of breath)        * Bilateral leg weakness-resolved as of 2/14/2022  Assessment & Plan  Patient reported Neck tightness when looking down with associated bilateral lower extremity weakness - now resolved  · Stroke protocol started in ER  · CT head negative for acute findings, possible TIA  · No intervention needed for incidental aneurysm finding  · CTA head and neck with no stenosis or occlusion of vessels  · Not a candidate for TPA per neurology  · Per neurology continue with plavix, statin  · MRI negative for CVA    Thrombus of left atrial appendage-resolved as of 2/14/2022  Assessment & Plan  Suspected left atrial appendage thrombus seen on CTA head and neck  · TTE could not visualize  WALT done today which was negative for thrombus  · Received weight based Lovenox while here  · No indication for anticoagulation on discharge    3-vessel CAD  Assessment & Plan  History of CABG    Intermittent chest pain POA   · No active chest pain now  · Not on lasix at home due to low BP - ordered 40 mg IV lasix while inpatient which was discontinued as patient did not appear volume overloaded clinically  · No on ACEI due to low baseline BP   · Has moderate MR and AS  · Troponin negative, no EKG changes   · Patient to follow-up with primary cardiologist after discharge  · Continue Ranexa, Imdur  · TTE showed EF of 50% with mild global hypokinesis  Benign prostatic hyperplasia  Assessment & Plan  Continue home med    Hyperlipidemia  Assessment & Plan  Continue statin  Hypertension  Assessment & Plan  Continue Toprol-XL    PUD (peptic ulcer disease)  Assessment & Plan  Continue Protonix, Pepcid  Patient states that he is no longer taking Carafate as it was tearing up his stomach  · EGD 11/21 showed gastritis, intestinal metaplasia without dysplasia, multiple diverticula with dysphagia in past   · Spoke with GI who stated there was no additional workup to be done at this time  SOB (shortness of breath)-resolved as of 2/13/2022  Assessment & Plan  Developed SOB while in CT scan  · Required up to 5L NC, now on room air  · COVID negative  · BNP 2,264  · Chest xray shows mild interstitial pulmonary edema however patient does not appear clinically volume overloaded  IV Lasix was discontinued previously      Consultations During Hospital Stay:  [address] TO CARDIOLOGY    Procedures Performed:     · TTE  · WALT    Significant Findings:     · Refer to hospital course and above listed diagnosis related plan for details    Imaging while in hospital:    XR chest 1 view portable    Result Date: 2/12/2022  Narrative: CHEST INDICATION:   Stroke alert  COMPARISON:  11/1/2021 EXAM PERFORMED/VIEWS:  XR CHEST PORTABLE  AP semierect Images:  1 FINDINGS:  There are median sternotomy wires indicating prior cardiac surgery  Cardiomediastinal silhouette appears unremarkable  Mild diffuse interstitial edema identified with trace fluid tracking into the right fissure  No large pleural effusions or pneumothorax  Osseous structures appear within normal limits for patient age  Impression: Mild interstitial pulmonary edema   Workstation performed: [id number]     MRI brain wo contrast    Result Date: 2/12/2022  Narrative: MRI BRAIN WITHOUT CONTRAST INDICATION: neck pain, leg weakness b/l  COMPARISON:   None  TECHNIQUE:  Sagittal T1, axial T2, axial FLAIR, axial T1, axial [place] and axial diffusion imaging  IMAGE QUALITY:  Diagnostic  FINDINGS: BRAIN PARENCHYMA:  There is no discrete mass, mass effect or midline shift  There is no intracranial hemorrhage  There is no evidence of acute infarction and diffusion imaging is unremarkable  Small scattered hyperintensities on T2/FLAIR imaging are noted in the periventricular and subcortical white matter demonstrating an appearance that is statistically most likely to represent mild microangiopathic change  VENTRICLES:  Normal for the patient's age  SELLA AND PITUITARY GLAND:  Normal  ORBITS:  Normal  PARANASAL SINUSES:  Normal  VASCULATURE:  Evaluation of the major intracranial vasculature demonstrates appropriate flow voids  CALVARIUM AND SKULL BASE:  Normal  EXTRACRANIAL SOFT TISSUES:  Normal      Impression: No mass effect, acute intracranial hemorrhage or evidence of recent infarction  Mild scattered chronic microvascular ischemic change  Workstation performed: [id number]     CT stroke alert brain    Result Date: 2/11/2022  Narrative: CT BRAIN - STROKE ALERT PROTOCOL INDICATION:   Bilateral leg weakness  COMPARISON:  Head CT 2/22/2016 TECHNIQUE:  CT examination of the brain was performed  In addition to axial images, coronal reformatted images were created and submitted for interpretation  Radiation dose length product (DLP) for this visit:  989 57 mGy-cm   This examination, like all CT scans performed in the [facility], was performed utilizing techniques to minimize radiation dose exposure, including the use of iterative  reconstruction and automated exposure control  IMAGE QUALITY:  Diagnostic  FINDINGS:  PARENCHYMA: Cerebral atrophy   Nonspecific  decreased attenuation involving periventricular and subcortical white matter, most compatible with mild microangiopathic change  No intracranial mass, mass effect or midline shift  No CT signs of acute infarction  No acute parenchymal hemorrhage  VENTRICLES AND EXTRA-AXIAL SPACES:  Normal for patient's age  VISUALIZED ORBITS AND PARANASAL SINUSES:  Unremarkable  CALVARIUM AND EXTRACRANIAL SOFT TISSUES:   Normal      Impression: 1  No acute intracranial CT abnormality  Chronic microangiopathic change  2   Cerebral atrophy  I personally discussed this study with Dr Kim Espana on 2/11/2022 at 8:42 PM   Workstation performed: [id number]     CTA stroke alert (head/neck)    Result Date: 2/11/2022  Narrative: CTA NECK AND BRAIN WITH CONTRAST INDICATION: Vertebral artery dissection suspected Vertebral artery dissection COMPARISON:   None  TECHNIQUE:   Post contrast imaging was performed after administration of iodinated contrast through the neck and brain  Post contrast axial 0 625 mm images timed to opacify the arterial system  3D rendering was performed on an independent workstation  MIP reconstructions performed  Coronal reconstructions were performed of the noncontrast portion of the brain  Radiation dose length product (DLP) for this visit:  552 mGy-cm   This examination, like all CT scans performed in the [facility], was performed utilizing techniques to minimize radiation dose exposure, including the use of iterative reconstruction and automated exposure control  IV Contrast:  85 mL of iohexol (OMNIPAQUE)  IMAGE QUALITY:   Diagnostic FINDINGS: CERVICAL VASCULATURE AORTIC ARCH AND GREAT VESSELS: Atherosclerotic calcification of aortic arch  Great vessel origins are patent without significant stenosis  Suspected thrombus in the left atrial appendage  RIGHT VERTEBRAL ARTERY CERVICAL SEGMENT:Mild atherosclerotic narrowing at the origin  The vessel is patent throughout the neck without high-grade stenosis   LEFT VERTEBRAL ARTERY CERVICAL SEGMENT: The vessel origin is unremarkable  The vessel is patent throughout the neck without high-grade stenosis  RIGHT EXTRACRANIAL CAROTID SEGMENT:Partially calcified atherosclerotic plaque at the bifurcation and proximal internal carotid artery  No hemodynamically significant stenosis of cervical ICA by NASCET criteria ( less than 50%) LEFT EXTRACRANIAL CAROTID SEGMENT: Partially calcified atherosclerotic plaque at the bifurcation and proximal internal carotid artery  No hemodynamically significant stenosis of cervical ICA by NASCET criteria  INTRACRANIAL VASCULATURE INTERNAL CAROTID ARTERIES: Mild atherosclerotic disease of cavernous and supraclinoid segments of bilateral internal carotid arteries without critical stenosis  Normal ophthalmic artery origins  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 (series 3 image 190) ANTERIOR CIRCULATION:  Hypoplastic right A1  Normal left A1 segment  Bilateral anterior cerebral arteries are unremarkable  Anterior communicating artery fenestration  MIDDLE CEREBRAL ARTERY CIRCULATION:  Bilateral M1 segments and major M2 branches are patent without high-grade stenosis  DISTAL VERTEBRAL ARTERIES:  Distal vertebral arteries are patent without high-grade stenosis  Posterior inferior cerebellar artery origins are patent  BASILAR ARTERY:  Basilar artery is unremarkable  Normal superior cerebellar arteries  POSTERIOR CEREBRAL ARTERIES:    Bilateral posterior cerebral arteries are patent without critical stenosis  Absent/hypoplastic posterior communicating arteries  DURAL VENOUS SINUSES:  Unremarkable  NON VASCULAR ANATOMY BONY STRUCTURES: There is focal kyphotic angulation at level T3 secondary to chronic wedge compression deformity  No acute or aggressive appearing osseous abnormality  SOFT TISSUES OF THE NECK:  Unremarkable  THORACIC INLET: Calcified mediastinal lymph nodes suggesting granulomatous disease  Noncalcified prominent hilar lymph nodes  Fluid and debris within the visualized thoracic esophagus  Impression: 1  No intracranial large vessel occlusion or critical stenosis  2   No hemodynamically significant stenosis of cervical carotid and vertebral arteries  3   Suspected left atrial appendage thrombus  Recommend further evaluation with cardiac echocardiogram  4   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  5   Calcified mediastinal lymph nodes suggesting granulomatous disease  Noncalcified prominent hilar lymph nodes  6   Fluid and debris within the visualized thoracic esophagus increases risk for aspiration  Correlate for GERD  I personally discussed this study with Dr Carolina Montilla on 2/11/2022 at 8:42 PM  Workstation performed: [id number]     Echo complete w/ contrast if indicated    Result Date: 2/13/2022  Narrative: Odette Dalton  Left Ventricle: Left ventricular cavity size is normal  Wall thickness is normal  The left ventricular ejection fraction is 50%  Systolic function is mildly reduced  There is mild global hypokinesis  Diastolic function is severely abnormal, consistent with ungraded restrictive relaxation  Left atrial filling pressure is elevated    Right Ventricle: Systolic function is low normal    Left Atrium: The atrium is mildly dilated    Aortic Valve: The aortic valve is functionally bicuspid  The leaflets are moderately thickened  The leaflets are moderately calcified  There is severely reduced mobility  There is mild regurgitation with a centrally directed jet  There is moderate stenosis    Mitral Valve: There is moderate regurgitation    Tricuspid Valve: There is mild regurgitation    Pulmonic Valve: There is mild to moderate regurgitation    Prior TTE study available for comparison  Prior study date: 7/16/2021  No significant changes noted compared to the prior study   Cannot exclude left atrial appendage thrombus from TTE- recommend WALT for better evaluation       Incidental Findings:   · 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  · granulomatous disease  Noncalcified prominent hilar lymph nodes    Test Results Pending at Discharge (will require follow up):   · As per After Visit Summary     Outpatient Tests Requested:  · none    Complications:  Refer to hospital course and above listed diagnosis related plan, if any    Hospital Course:     Jacinta Randle is a 80 y o  male patient who originally presented to the hospital on 2/11/2022 due to Neck pain with bilateral lower extremity weakness  In the ER imaging was negative for stroke but did show possible left atrial appendage thrombus  Leg weakness did eventually resolve  Patient was seen by Neurology  MRI was negative for CVA  Patient was also seen by Cardiology due to suspected left atrial thrombus and underwent WALT which was negative  Cleared by all consultants involved in his care prior to discharge  Discharge plan discussed with patient and his wife over the phone    Please see above list of diagnoses and related plan for additional information  Condition at Discharge: stable     Discharge Day Visit / Exam:     Subjective:  Patient states he does not take Carafate at home but has been getting it here  No further weakness    Vitals: Blood Pressure: 122/75 (02/14/22 1528)  Pulse: 84 (02/14/22 1528)  Temperature: 97 7 °F (36 5 °C) (02/14/22 1528)  Temp Source: Oral (02/14/22 1329)  Respirations: 18 (02/14/22 1528)  Height: 5' 5" (165 1 cm) (02/12/22 0815)  Weight - Scale: 66 2 kg (146 lb) (02/14/22 0600)  SpO2: 98 % (02/14/22 1528)  Exam:   Physical Exam  Constitutional:       General: He is not in acute distress  Appearance: He is not diaphoretic  HENT:      Head: Normocephalic and atraumatic  Eyes:      General:         Right eye: No discharge  Left eye: No discharge  Cardiovascular:      Rate and Rhythm: Normal rate and regular rhythm        Heart sounds: Murmur heard        Pulmonary:      Effort: Pulmonary effort is normal  No respiratory distress  Breath sounds: Normal breath sounds  No wheezing or rales  Abdominal:      General: Bowel sounds are normal  There is no distension  Palpations: Abdomen is soft  Tenderness: There is no abdominal tenderness  Musculoskeletal:      Right lower leg: No edema  Left lower leg: No edema  Neurological:      Mental Status: He is alert and oriented to person, place, and time  Discharge instructions/Information to patient and family:(Discharge Medications and Follow up):   See after visit summary for information provided to patient and family  Provisions for Follow-Up Care:  See after visit summary for information related to follow-up care and any pertinent home health orders  Disposition: Home    Planned Readmission:  No     Discharge Statement:  I spent 35 minutes discharging the patient  This time was spent on the day of discharge  I had direct contact with the patient on the day of discharge  Greater than 50% of the total time was spent examining patient, answering all patient questions, arranging and discussing plan of care with patient as well as directly providing post-discharge instructions  Additional time then spent on discharge activities  Discharge Medications:  See after visit summary for reconciled discharge medications provided to patient and family  ** Please Note: "This note has been constructed using a voice recognition system  Therefore there may be syntax, spelling, and/or grammatical errors   Please call if you have any questions  "**

## 2022-02-16 ENCOUNTER — TELEPHONE (OUTPATIENT)
Dept: CARDIOLOGY CLINIC | Facility: CLINIC | Age: 87
End: 2022-02-16

## 2022-02-16 NOTE — TELEPHONE ENCOUNTER
Patient was discharged from the hospital  He would only like to f/u with you  Is it ok for us to wait to see him? I could double book you on 3/9/22 @ 11am      If He should be seen sooner please advise

## 2022-03-09 ENCOUNTER — TELEPHONE (OUTPATIENT)
Dept: GASTROENTEROLOGY | Facility: CLINIC | Age: 87
End: 2022-03-09

## 2022-03-09 ENCOUNTER — OFFICE VISIT (OUTPATIENT)
Dept: CARDIOLOGY CLINIC | Facility: CLINIC | Age: 87
End: 2022-03-09
Payer: MEDICARE

## 2022-03-09 VITALS
TEMPERATURE: 98.3 F | HEIGHT: 65 IN | DIASTOLIC BLOOD PRESSURE: 60 MMHG | OXYGEN SATURATION: 98 % | BODY MASS INDEX: 25.16 KG/M2 | HEART RATE: 68 BPM | SYSTOLIC BLOOD PRESSURE: 120 MMHG | WEIGHT: 151 LBS

## 2022-03-09 DIAGNOSIS — R01.1 CARDIAC MURMUR: ICD-10-CM

## 2022-03-09 DIAGNOSIS — I25.10 3-VESSEL CAD: ICD-10-CM

## 2022-03-09 DIAGNOSIS — D63.8 ANEMIA OF CHRONIC DISEASE: ICD-10-CM

## 2022-03-09 DIAGNOSIS — I50.32 CHRONIC DIASTOLIC HEART FAILURE (HCC): ICD-10-CM

## 2022-03-09 DIAGNOSIS — R13.10 DYSPHAGIA, UNSPECIFIED TYPE: ICD-10-CM

## 2022-03-09 DIAGNOSIS — I10 ESSENTIAL HYPERTENSION: ICD-10-CM

## 2022-03-09 DIAGNOSIS — I20.8 CHRONIC STABLE ANGINA (HCC): ICD-10-CM

## 2022-03-09 DIAGNOSIS — E78.2 MIXED HYPERLIPIDEMIA: ICD-10-CM

## 2022-03-09 PROCEDURE — 93000 ELECTROCARDIOGRAM COMPLETE: CPT | Performed by: INTERNAL MEDICINE

## 2022-03-09 PROCEDURE — 99214 OFFICE O/P EST MOD 30 MIN: CPT | Performed by: INTERNAL MEDICINE

## 2022-03-09 NOTE — TELEPHONE ENCOUNTER
Scheduled date of EGD(as of today):5/9/22  Physician performing EGD:Dr Mejia Meneses  Location of EGD:[facility]   Instructions reviewed with patient by:bren  Clearances: Will send through system 1 mo prior to EGD on 5/9/22 due to cardiac hx and taking plavix

## 2022-03-09 NOTE — PROGRESS NOTES
Progress Note - Cardiology Office  Payton Alvarado 80 y o  male MRN: [id number]  : 1930  Encounter: [id number]      Assessment:     1  3-vessel CAD    2  Chronic stable angina ([facility] [address] )    3  Chronic diastolic heart failure ([facility] [address] )    4  Essential hypertension    5  Cardiac murmur    6  Mixed hyperlipidemia    7  Anemia of chronic disease    8  Dysphagia, unspecified type        Discussion Summary and Plan:  1  Coronary artery disease status post CABG and stents with multiple cardiac catheterization a year ago ago in [facility] by me and it was recommended medical therapy  Patient has diffuse 3 vessel disease with poor targets  Patient still occasionally gets symptoms of chest pain but has been under control  His prescription for nitro was renewed  Will continue beta-blockers but will increase the dose to 25 mg 3 times daily  Continue Imdur  Continue Plavix  2  Chronic stable angina  As mentioned above history of chronic stable angina  Beta-blocker increase he is taking Ranexa and Imdur  Continue same medication  Try to keep hemoglobin above 10 that will help with angina also  Continue current Rx      3  Generalized anxiety  Patient easily gets anxious  He is on Xanax as needed  He has negative thinking  He was in the hospital he got scared and now more nervous  He was reassured  4  Dyslipidemia  Patient has history of severe three-vessel disease  He was tried on multiple statins he did not take it now he is willing to take statins  He is taking Crestor 5 mg daily  He is not taking Zetia  He is only on Crestor 5 mg daily  Continue same medication      5  Chronic diastolic heart failure [place] Heart Association class 2  No more admission to hospital with heart failure  He has slowly stop taking his diuretics needed to use only p r n  Echo done and WALT done shows EF 45%, mild-to-moderate MR and mild-to-moderate AI with some stenosis    Not much changed from previous echo         6  Benign prostate hypertrophy status post surgery  Currently stable  Will continue to monitor    7  Ischemic cardiomyopathy EF around  45%     Metoprolol XL total dose 75 mg daily  He takes 25 mg 3 times daily due to low blood pressure  No evidence of volume overload    8  History of dysphagia  Patient is official disease and previous history of gastric surgery follow up with GI    9  Cardiac murmur  Cardiac murmur with history of Moderate aortic stenosis with AI and have moderate MR  A WALT done on 02/14/2022 shows EF 45%, no PFO, left atrial appendage has normal function, aortic valve thickened moderately calcified and moderately reduced with mild-to-moderate AI and moderate aortic stenosis, moderate to severe mitral regurgitation  At this time there is no evidence of volume overload  Continue current Rx  Counseling :  A description of the counseling  Spoke to patient about chronic stable angina, coronary artery disease, dyslipidemia, diastolic heart failure at length  Diet advised  Patient's ability to self care: Yes  Medication side effect reviewed with patient in detail and all their questions answered to their satisfaction  HPI :     Feroz Link is a 80y o  year old male who came for follow up  Patient has a past medical history significant for coronary artery disease status post CABG status post stent into his MCKENNA to LAD widely patent, SVG vein graft to 100% occluded and circumflex was severely diffusely disease with severe diffuse distal disease, chronic stable angina, status post MI anxiety, GERD, dyslipidemia who came for follow-up  was admitted to [facility] with chronic diastolic heart failure and is now compensated  He denies any chest pain or any shortness of breath  Occasionally feel dizzy in the morning  Has been on Demadex 20 mg and potassium 20 mg daily  All medications reviewed with him     07/09/2021  Above reviewed  Patient came for follow-up    He has recently lower GI bleed  It was thought to be hemorrhoidal bleed by the GI he is on antiplatelet agent  He could not tolerate aspirin he is on Plavix he does have history of coronary artery disease with CABG and stents  Though there were many years ago he cannot take aspirin is a he has been on Plavix  He will take it every other day now  He does have history of cardiomyopathy with EF 45-50%, history of intolerance to aspirin, history of intolerance to high dose of Crestor and currently on low-dose Crestor and Zetia history of chronic stable angina and anxiety  He is doing well he has bleeding has also improved  He denies any chest pain shortness of breath reviewed blood test shows his hemoglobin is around 10 2  He has noted no further bleeding  No nausea no vomiting no chest pain no other cardiovascular issues  He has decently active  12/14/2021  Above reviewed  Patient came for follow-up  In November he was admitted with epigastric discomfort and was found to have peptic ulcer disease  He underwent EGD as he was getting choking sensations he was also evaluated by speech pathologist   His barium esophagram shows 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 and small hiatal hernia  He also had a esophagitis he was put on Carafate he is feeling lot better  He does have history of coronary artery disease status post CABG and stents many years ago, history of chronic stable angina with cardiomyopathy EF around 45 50%, history of intolerance to aspirin on Plavix, history of intolerance to high dose of statin currently on low-dose Crestor and Zetia  He seems to be doing well he denies any new complaints no chest pain no shortness of breath and he has not used any nitro since he went home  03/09/2022  Above reviewed  Patient came for follow-up  In February he was admitted with neck pain when looking down and associated with bilateral lower extremity weakness which has now resolved    Initially it was thought he had a stroke later on workup was negative but he was started on Plavix and statin  MRI was negative for CPA  There was some question of thrombin in his left atrial appendage none was found by WALT  There was no indication for antithrombotic therapy  He does have history of coronary artery disease status post CABG and has diffuse coronary artery disease his EF is around 45%  He is intolerant to high dose of statin he is on 5 mg Crestor and Zetia  He could not tolerate aspirin he is on Plavix  He had problem in his esophagus which shows diverticula and he has previous stomach surgery  He is scheduled to have EGD done again  His little upset with his care but otherwise he has seems to be now back to his baseline  He still feels little fatigue and tired  Denies any chest pain  No leg edema no change in functional status  He gets episodes of chest pain when he sometimes can not swallow the food however when he eats pills with the help of applesauce he feels okay  Review of Systems   Constitutional: Negative for activity change, chills, diaphoresis, fever and unexpected weight change  HENT: Negative for congestion  Eyes: Negative for discharge and redness  Respiratory: Positive for shortness of breath  Negative for cough, chest tightness and wheezing  Chronic exertional   Cardiovascular: Negative  Negative for chest pain, palpitations and leg swelling  Gastrointestinal: Negative for abdominal pain, diarrhea and nausea  Gastric issues   Endocrine: Negative  Genitourinary: Negative for decreased urine volume and urgency  Musculoskeletal: Positive for arthralgias  Negative for back pain and gait problem  Skin: Negative for rash and wound  Allergic/Immunologic: Negative  Neurological: Negative for dizziness, seizures, syncope, weakness, light-headedness and headaches  Hematological: Negative  Psychiatric/Behavioral: Negative for agitation and confusion   The patient is nervous/anxious          Historical Information   Past Medical History:   Diagnosis Date    Arthritis     BPH (benign prostatic hyperplasia)     Cervical spine fracture ([facility] ) 1997    C3    Chest pain     Colon polyp     Coronary artery disease     Dry eye     DVT (deep venous thrombosis) ([place] [address] ) 2015    right leg- passed thru the IVC filter-stopped at the "patch" from bypass surgery    Dysphagia 11/2021    minimal-"mass" esophagus with a "knob" in the middle    Fracture of thoracic spine ([place] [address] ) 1997    T3    Glaucoma     Hyperlipidemia     Hypertension     Myocardial infarction ([place] [address] )     PUD (peptic ulcer disease)      Past Surgical History:   Procedure Laterality Date    ANGIOPLASTY      2 stents    CHOLECYSTECTOMY      COLONOSCOPY      CORONARY ARTERY BYPASS GRAFT      x6    CORONARY ARTERY BYPASS GRAFT      CORONARY STENT PLACEMENT      CYSTOSCOPY      EYE SURGERY Right     HERNIA REPAIR Right 11/3/2017    Procedure: REPAIR HERNIA INGUINAL;  Surgeon: Kai Hanna MD;  Location: [facility];  Service: General    IVC FILTER INSERTION      IVC filter    HI CYSTOURETHROSCOPY W/IRRIG & EVAC CLOTS N/A 6/30/2018    Procedure: CYSTOSCOPY EVACUATION OF CLOTS, fulgeration;  Surgeon: Barbara Tiwari MD;  Location:  Main OR;  Service: Urology    STOMACH SURGERY  1973    Billroth 2 gastrectomy-2/3 removal- bleeding ulcer    TRANSURETHRAL RESECTION OF PROSTATE       Social History     Substance and Sexual Activity   Alcohol Use Never     Social History     Substance and Sexual Activity   Drug Use No     Social History     Tobacco Use   Smoking Status Never Smoker   Smokeless Tobacco Never Used     Family History:   Family History   Problem Relation Age of Onset    Coronary artery disease Brother     Heart disease Father         CAD       Meds/Allergies     Allergies   Allergen Reactions    Escitalopram Other (See Comments)     Suicidal feelings, sweating    Oxycodone-Acetaminophen Dizziness and Shortness Of Breath     Other reaction(s): Faints  Reaction Date: [id number]; Category:  Adverse Reaction;     Omeprazole Rash    Statins Other (See Comments)     Muscle pain       Current Outpatient Medications:     ALPRAZolam (XANAX) 0 5 mg tablet, Take 0 5 mg by mouth daily at bedtime as needed , Disp: , Rfl:     Calcium Carbonate-Vitamin D (CALCIUM 600+D PO), Take by mouth every morning , Disp: , Rfl:     Carboxymethylcellulose Sodium (REFRESH TEARS OP), Apply to eye as needed, Disp: , Rfl:     cholecalciferol (VITAMIN D3) 400 units tablet, Take 400 Units by mouth daily after lunch , Disp: , Rfl:     clopidogrel (PLAVIX) 75 mg tablet, Take 1 tablet (75 mg total) by mouth daily Last dose 11/5/21, Disp: 90 tablet, Rfl: 1    Docusate Calcium (STOOL SOFTENER PO), Take by mouth OTC; takes with lunch, Disp: , Rfl:     dutasteride (AVODART) 0 5 mg capsule, Take 1 capsule (0 5 mg total) by mouth daily, Disp: 90 capsule, Rfl: 0    famotidine (PEPCID) 20 mg tablet, Take 20 mg by mouth daily  , Disp: , Rfl:     isosorbide mononitrate (IMDUR) 30 mg 24 hr tablet, Take 1 tablet (30 mg total) by mouth daily (Patient taking differently: Take 30 mg by mouth every morning ), Disp: 90 tablet, Rfl: 3    Magnesium 250 MG TABS, Take 1 tablet by mouth every morning , Disp: , Rfl:     metoprolol succinate (TOPROL-XL) 25 mg 24 hr tablet, Take 1 tablet (25 mg total) by mouth 3 (three) times a day, Disp: 270 tablet, Rfl: 3    multivitamin-iron-minerals-folic acid (CENTRUM) chewable tablet, Chew 1 tablet every morning , Disp: , Rfl:     nitroglycerin (NITROSTAT) 0 4 mg SL tablet, Place 1 tablet (0 4 mg total) under the tongue every 5 (five) minutes as needed for chest pain, Disp: 30 tablet, Rfl: 1    pantoprazole (PROTONIX) 20 mg tablet, Take 1 tablet (20 mg total) by mouth daily, Disp: 90 tablet, Rfl: 1    ranolazine (RANEXA) 500 mg 12 hr tablet, Take 1 tablet (500 mg total) by mouth 2 (two) times a day, Disp: 180 tablet, Rfl: 3    rosuvastatin (CRESTOR) 5 mg tablet, Take 1 tablet (5 mg total) by mouth daily at bedtime, Disp: 90 tablet, Rfl: 3    tamsulosin (Flomax) 0 4 mg, Take 1 capsule (0 4 mg total) by mouth daily with dinner, Disp: 90 capsule, Rfl: 3    Zinc 25 MG TABS, Take 25 mg by mouth daily at bedtime, Disp: , Rfl:     Vitals: Blood pressure 120/60, pulse 68, temperature 98 3 °F (36 8 °C), height 5' 5" (1 651 m), weight 68 5 kg (151 lb), SpO2 98 %  Body mass index is 25 13 kg/m²  Vitals:    03/09/22 1050   Weight: 68 5 kg (151 lb)     BP Readings from Last 3 Encounters:   03/09/22 120/60   02/14/22 123/72   01/11/22 114/53         Physical Exam  Constitutional:       General: He is not in acute distress  Appearance: He is well-developed  He is not diaphoretic  Neck:      Thyroid: No thyromegaly  Vascular: No JVD  Trachea: No tracheal deviation  Cardiovascular:      Rate and Rhythm: Normal rate and regular rhythm  Heart sounds: S1 normal and S2 normal  Heart sounds not distant  Murmur heard  Systolic (ejection) murmur is present with a grade of 2/6  No friction rub  No gallop  No S3 or S4 sounds  Comments: S1-S2 regular with 3/6 pansystolic murmur S4 present  Pulmonary:      Effort: Pulmonary effort is normal  No respiratory distress  Breath sounds: No wheezing or rales  Comments: Bilateral entry coarse breath sound no rhonchi no wheezing  Chest:      Chest wall: No tenderness  Abdominal:      General: Bowel sounds are normal  There is no distension  Palpations: Abdomen is soft  Tenderness: There is no abdominal tenderness  Musculoskeletal:         General: No deformity  Cervical back: Neck supple  Comments: No lower extremity edema   Skin:     General: Skin is warm and dry  Coloration: Skin is not pale  Findings: No rash     Neurological:      Mental Status: He is alert and oriented to person, place, and time    Psychiatric:         Behavior: Behavior normal          Judgment: Judgment normal            Diagnostic Studies Review Cardio:     echo Doppler  Echo Doppler done 07/08/2020 shows EF around 50-55%, paradoxical septal motion due to surgery, moderate aortic stenosis and mild AI, moderate MR, trace TR  WALT done on February 2022      Left Ventricle: Left ventricular cavity size is normal  Wall thickness is normal  The left ventricular ejection fraction is 45% by visual estimation  Systolic function is mildly reduced  Wall motion is normal     Atrial Septum: No patent foramen ovale confirmed at rest by color flow Doppler    Left Atrial Appendage: There is normal function  There is no thrombus    Aortic Valve: The aortic valve is trileaflet  The leaflets are not thickened  The leaflets are moderately calcified  There is moderately reduced mobility  There is mild to moderate regurgitation  There is moderate stenosis  The aortic valve velocity is increased due to stenosis    Mitral Valve: There is moderate thickening  There is moderate calcification  There is mildly reduced mobility  There is moderate to severe regurgitation  Stress Test: Nuclear stress test done 5/14/2016 shows moderate-sized inferior lateral wall ischemia and apical infarction  Ejection fraction 45%  Which was consistent with his %, vein graft to RCA is occluded, as circumflex has diffuse disease and LIMA to LAD was widely patent  Catheterization: He has multiple cardiac catheterization performed  His last cardiac catheter patient performed by me shows EF around 50%, severe diffuse disease of LAD which is totally occluded,  ECG Report:   Comparison to prior ECGs:1  No interval change1   4/24 2017  Twelve-lead EKG shows normal sinus some heart rate 72 bpm  ST changes in inferior and lateral leads which is not changed from old EKG   Cannot rule out underlying ischemialead EKG shows normal sinus rhythm heart rate 63 beats per minute with deep T-wave inversions in inferior and lateral precordial leads  Not change from old EKG    Twelve lead EKG done 04/22/2019 shows normal sinus rhythm heart rate 71 beats per minute  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  ST abnormal it in precordial leads not change from old EKG  Twelve lead EKG done 10/31/2019 shows normal sinus rhythm evidence of old inferior infarct  ST abnormality cannot rule out ischemia  Twelve lead EKG done 12/15/2020 shows normal sinus rhythm ST changes in inferior as well as anterior  And atrial leads not changed from previous EKG  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  Twelve lead EKG 07/09/2021 shows normal sinus rhythm evidence of 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 with Q-wave inferior leads  ST abnormality in precordial leads not changed from previous EKGs  Twelve lead EKG 03/09/2022 shows normal sinus rhythm heart rate 68 beats per minute evidence of old inferior infarct    T-wave inversions in lateral lead cannot rule out ischemia        Lab Review   Lab Results   Component Value Date    WBC 4 97 02/13/2022    HGB 10 9 (L) 02/13/2022    HCT 34 4 (L) 02/13/2022    MCV 89 02/13/2022    RDW 13 9 02/13/2022     02/13/2022     BMP:  Lab Results   Component Value Date     12/07/2015    K 4 1 02/14/2022     02/14/2022    CO2 28 02/14/2022    ANIONGAP 10 2 12/07/2015    BUN 23 02/14/2022    CREATININE 0 94 02/14/2022    GLUCOSE 78 12/07/2015    GLUF 110 (H) 03/02/2021    CALCIUM 8 3 02/14/2022    CORRECTEDCA 9 2 02/12/2022    EGFR 70 02/14/2022    MG 2 3 02/12/2022     LFT:  Lab Results   Component Value Date    AST 9 02/12/2022    ALT 14 02/12/2022    ALKPHOS 103 02/12/2022    PROT 8 0 09/24/2015    BILITOT 0 64 09/24/2015     Lab Results   Component Value Date    BNP 69 09/25/2015      No results found for: TSH  Lab Results   Component Value Date    HGBA1C 5 9 (H) 03/02/2021     Lipid Profile:   Lab Results   Component Value Date    CHOL 192 10/29/2015    HDL 41 11/02/2021    LDLCALC 80 11/02/2021    TRIG 115 11/02/2021     Lab Results   Component Value Date    CHOL 192 10/29/2015    CHOL 168 09/25/2015       Dr Kevin Kebede MD [facility] [place]      "This note has been constructed using a voice recognition system  Therefore there may be syntax, spelling, and/or grammatical errors   Please call if you have any questions  "

## 2022-03-09 NOTE — TELEPHONE ENCOUNTER
Will send Cardiac/Plavix clearance 1 month prior to procedure on 5/9/22 to Dr Yesenia Ford due to pt having cardiac hx and taking Plavix

## 2022-03-24 DIAGNOSIS — I25.10 3-VESSEL CAD: ICD-10-CM

## 2022-03-24 DIAGNOSIS — I20.8 CHRONIC STABLE ANGINA (HCC): ICD-10-CM

## 2022-03-24 RX ORDER — NITROGLYCERIN 0.4 MG/1
0.4 TABLET SUBLINGUAL
Qty: 30 TABLET | Refills: 1 | Status: SHIPPED | OUTPATIENT
Start: 2022-03-24 | End: 2022-07-19 | Stop reason: SDUPTHER

## 2022-04-08 ENCOUNTER — APPOINTMENT (OUTPATIENT)
Dept: LAB | Facility: CLINIC | Age: 87
End: 2022-04-08
Payer: MEDICARE

## 2022-04-11 NOTE — TELEPHONE ENCOUNTER
Dr Nery Rowe    Our mutual patient is scheduled for procedure:  Please advise if pt is cleared for procedure and if and for how long may hold his Plavix  Thank you so much! On: __5__/_  9  _/_ 25   _      With:   ___Jamie______    He/She is taking the following blood thinner:  Plavix        Can this be stopped ___5___ days prior to the procedure?       Physician Approving clearance: ________________________ 

## 2022-04-11 NOTE — TELEPHONE ENCOUNTER
Pre  Op  Clearance note- Cardiology    Jarred Linda   80 y o   male  8/2/1930      Dr Meron Parra :     Patient's chart was reviewed for preop clearance  Patient was seen in our office on 03/09/2022  Patient has past medical history significant for CAD, HTN, hyperlipidemia, chronic diastolic heart failure, and cardiac murmur  Patient is now scheduled for EGD  Patient has no clinical evidence of  active heart failure or  active ischemia or active arrhythmia  Patient's last cardiac workup including echo Doppler and previous cardiac catheterization report reports were reviewed and it shows mildly decreased LV systolic function with mild-to-moderate valvular disease  In my opinion patient is in optimum condition for the procedure as planned  Patient is low risk for the surgery as planned from cardiac point of view  Continue current cardiac medications  Patient can hold  Aspirin for  5-7 days as required for surgery  Patient can hold Plavix for 5 days  Patient can hold Eliquis/Xarelto/Pradaxa for 3 days before the procedure  Please restart after the procedure  immediately or next day if no contraindication form surgical point of view and advise patient to contact our office  If you have any question please do not hesitate to call us at our office of [facility]    Phone # [phone]        Lab Results   Component Value Date    WBC 5 35 04/08/2022    HGB 11 4 (L) 04/08/2022    HCT 37 2 04/08/2022    MCV 87 04/08/2022     04/08/2022     Lab Results   Component Value Date    CREATININE 1 15 04/08/2022     Lab Results   Component Value Date    GLUF 156 (H) 04/08/2022       Cardiac testing:   Results for orders placed during the hospital encounter of 07/16/21    Echo complete with contrast if indicated    Narrative  [address] [facility]  [phone]    Transthoracic Echocardiogram  2D, M-mode, Doppler, and Color Doppler    Study date:  2021    Patient: Adilene Manzano  MR number: [id number]  Account number: [de-identified]  : 02-Aug-1930  Age: 80 years  Gender: Male  Status: Outpatient  Location: Echo lab  Height: 65 in  Weight: 151 6 lb  BP: 122/ 58 mmHg    Indications: Aortic Stenosis    Diagnoses: 424 1 - AORTIC VALVE DISORDER    Sonographer:  YURI Garcia  Primary Physician:  Hang Avendano DO  Referring Physician:  Tj Wan MD  Group:  [facility]  Interpreting Physician:  Piotr Pereira MD    SUMMARY    LEFT VENTRICLE:  Systolic function was at the lower limits of normal by visual assessment  Ejection fraction was estimated in the range of 45 % to 50 % to be 45 %  There was mild diffuse hypokinesis  Wall thickness was mildly increased  LEFT ATRIUM:  The atrium was mildly dilated  MITRAL VALVE:  There was mild to moderate annular calcification  Transmitral velocity was increased due to increased transvalvular flow  There was moderate to severe regurgitation  Based on elevated E' and central color flow jet  Unable to obtain PISA measurement for effective ERO calculation  Consider WALT for better evaluation    AORTIC VALVE:  The valve was probably trileaflet  Leaflets exhibited mildly increased thickness, mild calcification, moderately reduced cuspal separation, and sclerosis  Transaortic velocity was increased due to increased transvalvular flow  There was moderate stenosis  There was mild to moderate regurgitation  Valve mean gradient was 26 mmHg  HISTORY: PRIOR HISTORY: 3-vessel CAD,Chronic stable Angina,Chronic Diastolic heart failure,HTN,Murmur,DVT,Hyperlipidemia,anemia  PROCEDURE: The procedure was performed in the echo lab  This was a routine study  The transthoracic approach was used  The study included complete 2D imaging, M-mode, complete spectral Doppler, and color Doppler  The heart rate was 61 bpm,  at the start of the study   Images were obtained from the parasternal, apical, subcostal, and suprasternal notch acoustic windows  Image quality was adequate  LEFT VENTRICLE: Size was normal  Systolic function was at the lower limits of normal by visual assessment  Ejection fraction was estimated in the range of 45 % to 50 % to be 45 %  There was mild diffuse hypokinesis  Wall thickness was  mildly increased  No evidence of apical thrombus  DOPPLER: The study was not technically sufficient to allow evaluation of LV diastolic function  RIGHT VENTRICLE: The size was normal  Systolic function was normal  Wall thickness was normal     LEFT ATRIUM: The atrium was mildly dilated  RIGHT ATRIUM: Size was normal     MITRAL VALVE: There was mild to moderate annular calcification  There was mild-moderate calcification  There was mildly reduced leaflet separation  DOPPLER: Transmitral velocity was increased due to increased transvalvular flow  There was  no evidence for stenosis  There was moderate to severe regurgitation  Based on elevated E' and central color flow jet  Unable to obtain PISA measurement for effective ERO calculation  Consider WALT for better evaluation    AORTIC VALVE: The valve was probably trileaflet  Leaflets exhibited mildly increased thickness, mild calcification, moderately reduced cuspal separation, and sclerosis  DOPPLER: Transaortic velocity was increased due to increased  transvalvular flow  There was moderate stenosis  There was mild to moderate regurgitation  TRICUSPID VALVE: The valve structure was normal  There was normal leaflet separation  DOPPLER: The transtricuspid velocity was within the normal range  There was no evidence for stenosis  There was trace regurgitation  PULMONIC VALVE: Leaflets exhibited normal thickness, no calcification, and normal cuspal separation  DOPPLER: The transpulmonic velocity was within the normal range  There was trace regurgitation  PERICARDIUM: There was no pericardial effusion   The pericardium was normal in appearance  AORTA: The root exhibited normal size  SYSTEMIC VEINS: IVC: The inferior vena cava was not well visualized  MEASUREMENT TABLES    DOPPLER MEASUREMENTS  Aortic valve   (Reference normals)  Peak gilda   350 cm/s   (--)  Peak gradient   48 mmHg   (--)  Mean gradient   26 mmHg   (--)  Regurg PHT   460 ms   (--)    SYSTEM MEASUREMENT TABLES    2D  EF (Teich): 49 86 %  %FS: 25 27 %  JUDY Planimetry: 0 86 cm2  Ao Diam: 3 1 cm  EDV(Teich): 104 22 ml  ESV(Teich): 52 25 ml  IVSd: 1 24 cm  LA Area: 22 39 cm2  LA Diam: 4 03 cm  LVEDV MOD A4C: 174 08 ml  LVEF MOD A4C: 50 26 %  LVESV MOD A4C: 86 59 ml  LVIDd: 4 74 cm  LVIDs: 3 54 cm  LVLd A4C: 9 35 cm  LVLs A4C: 8 17 cm  LVOT Diam: 1 96 cm  LVPWd: 1 21 cm  RA Area: 12 71 cm2  RVIDd: 2 45 cm  SV (Teich): 51 97 ml  SV MOD A4C: 87 49 ml    CW  AV Env  Ti: 361 56 ms  AV VTI: 86 51 cm  AV Vmax: 3 48 m/s  AV Vmean: 2 39 m/s  AV maxP 38 mmHg  AV meanP 03 mmHg  TR Vmax: 3 25 m/s  TR maxP 23 mmHg    PW  E' Sept: 0 05 m/s  LVOT Env  Ti: 361 56 ms  LVOT VTI: 27 25 cm  LVOT Vmax: 1 15 m/s  LVOT Vmean: 0 75 m/s  LVOT maxP 29 mmHg  LVOT meanP 61 mmHg    [employer] Accredited Echocardiography Laboratory    Prepared and electronically signed by    Asya Lucia MD  Signed 2021 17:34:59    No results found for this or any previous visit  No results found for this or any previous visit  No results found for this or any previous visit  No results found for this or any previous visit        Jessica Kwok MD [facility]  2022  4:29 PM

## 2022-04-13 NOTE — TELEPHONE ENCOUNTER
Pt is cleared for procedure and may hold Plavix 5 days prior  I called and spoke to pt and he will hold his Plavix for 5 days prior to procedure

## 2022-05-03 ENCOUNTER — TELEPHONE (OUTPATIENT)
Dept: GASTROENTEROLOGY | Facility: CLINIC | Age: 87
End: 2022-05-03

## 2022-05-03 NOTE — TELEPHONE ENCOUNTER
I called and spoke to pt and confirmed his EGD on 5/9/22 at [facility] with Dr Giuseppe Glass  Pt is aware that he needs to hold his Plavix 5 days prior to the procedure and to be npo after midnight  He is also aware that he needs a  for the procedure

## 2022-05-09 ENCOUNTER — ANESTHESIA (OUTPATIENT)
Dept: GASTROENTEROLOGY | Facility: AMBULARY SURGERY CENTER | Age: 87
End: 2022-05-09

## 2022-05-09 ENCOUNTER — HOSPITAL ENCOUNTER (OUTPATIENT)
Dept: GASTROENTEROLOGY | Facility: AMBULARY SURGERY CENTER | Age: 87
Setting detail: OUTPATIENT SURGERY
Discharge: HOME | End: 2022-05-09
Attending: INTERNAL MEDICINE | Admitting: INTERNAL MEDICINE
Payer: MEDICARE

## 2022-05-09 ENCOUNTER — ANESTHESIA EVENT (OUTPATIENT)
Dept: GASTROENTEROLOGY | Facility: AMBULARY SURGERY CENTER | Age: 87
End: 2022-05-09

## 2022-05-09 VITALS
TEMPERATURE: 96.9 F | HEIGHT: 64 IN | BODY MASS INDEX: 25.78 KG/M2 | HEART RATE: 67 BPM | OXYGEN SATURATION: 99 % | RESPIRATION RATE: 18 BRPM | SYSTOLIC BLOOD PRESSURE: 120 MMHG | WEIGHT: 151 LBS | DIASTOLIC BLOOD PRESSURE: 61 MMHG

## 2022-05-09 DIAGNOSIS — R13.10 DYSPHAGIA, UNSPECIFIED TYPE: ICD-10-CM

## 2022-05-09 PROCEDURE — 88305 TISSUE EXAM BY PATHOLOGIST: CPT | Performed by: PATHOLOGY

## 2022-05-09 PROCEDURE — 43239 EGD BIOPSY SINGLE/MULTIPLE: CPT | Performed by: INTERNAL MEDICINE

## 2022-05-09 RX ORDER — SODIUM CHLORIDE, SODIUM LACTATE, POTASSIUM CHLORIDE, CALCIUM CHLORIDE 600; 310; 30; 20 MG/100ML; MG/100ML; MG/100ML; MG/100ML
INJECTION, SOLUTION INTRAVENOUS CONTINUOUS PRN
Status: DISCONTINUED | OUTPATIENT
Start: 2022-05-09 | End: 2022-05-09

## 2022-05-09 RX ORDER — PROPOFOL 10 MG/ML
INJECTION, EMULSION INTRAVENOUS AS NEEDED
Status: DISCONTINUED | OUTPATIENT
Start: 2022-05-09 | End: 2022-05-09

## 2022-05-09 RX ORDER — LIDOCAINE HYDROCHLORIDE 20 MG/ML
INJECTION, SOLUTION EPIDURAL; INFILTRATION; INTRACAUDAL; PERINEURAL AS NEEDED
Status: DISCONTINUED | OUTPATIENT
Start: 2022-05-09 | End: 2022-05-09

## 2022-05-09 RX ADMIN — PROPOFOL 20 MG: 10 INJECTION, EMULSION INTRAVENOUS at 10:46

## 2022-05-09 RX ADMIN — LIDOCAINE HYDROCHLORIDE 60 MG: 20 INJECTION, SOLUTION EPIDURAL; INFILTRATION; INTRACAUDAL; PERINEURAL at 10:37

## 2022-05-09 RX ADMIN — PROPOFOL 20 MG: 10 INJECTION, EMULSION INTRAVENOUS at 10:43

## 2022-05-09 RX ADMIN — PROPOFOL 20 MG: 10 INJECTION, EMULSION INTRAVENOUS at 10:38

## 2022-05-09 RX ADMIN — SODIUM CHLORIDE, SODIUM LACTATE, POTASSIUM CHLORIDE, AND CALCIUM CHLORIDE: .6; .31; .03; .02 INJECTION, SOLUTION INTRAVENOUS at 10:35

## 2022-05-09 RX ADMIN — PROPOFOL 80 MG: 10 INJECTION, EMULSION INTRAVENOUS at 10:37

## 2022-05-09 RX ADMIN — PROPOFOL 20 MG: 10 INJECTION, EMULSION INTRAVENOUS at 10:40

## 2022-05-09 NOTE — DISCHARGE INSTRUCTIONS
Upper Endoscopy   WHAT YOU NEED TO KNOW:   An upper endoscopy is also called an upper gastrointestinal (GI) endoscopy, or an esophagogastroduodenoscopy (EGD)  You may feel bloated, gassy, or have some abdominal discomfort after your procedure  Your throat may be sore for 24 to 36 hours  You may burp or pass gas from air that is still inside your body  DISCHARGE INSTRUCTIONS:   Call 911 if:   · You have sudden chest pain or trouble breathing  Seek care immediately if:   · You feel dizzy or faint  · You have trouble swallowing  · You have severe throat pain  · Your bowel movements are very dark or black  · Your abdomen is hard and firm and you have severe pain  · You vomit blood  Contact your healthcare provider if:   · You feel full or bloated and cannot burp or pass gas  · You have not had a bowel movement for 3 days after your procedure  · You have neck pain  · You have a fever or chills  · You have nausea or are vomiting  · You have a rash or hives  · You have questions or concerns about your endoscopy  Relieve a sore throat:  Suck on throat lozenges or crushed ice  Gargle with a small amount of warm salt water  Mix 1 teaspoon of salt and 1 cup of warm water to make salt water  Relieve gas and discomfort from bloating:  Lie on your right side with a heating pad on your abdomen  Take short walks to help pass gas  Eat small meals until bloating is relieved  Rest after your procedure:  Do not drive or make important decisions until the day after your procedure  Return to your normal activity as directed  You can usually return to work the day after your procedure  Follow up with your healthcare provider as directed:  Write down your questions so you remember to ask them during your visits  © Copyright [employer] 2022 Information is for End User's use only and may not be sold, redistributed or otherwise used for commercial purposes   All illustrations and images included in CareNotes® are the copyrighted property of A [employer] A M [employer] Inc  or Dominique Colon   The above information is an  only  It is not intended as medical advice for individual conditions or treatments  Talk to your doctor, nurse or pharmacist before following any medical regimen to see if it is safe and effective for you

## 2022-05-09 NOTE — H&P
History and Physical -  [facility]  Dawit Browne 80 y o  male MRN: [id number]                  HPI: Dawit Browne is a 80y o  year old male who presents for history of esophageal dysphagia, intestinal metaplasia the body of stomach as well as peptic ulcer disease  REVIEW OF SYSTEMS: Per the HPI, and otherwise unremarkable      Historical Information   Past Medical History:   Diagnosis Date    Arthritis     BPH (benign prostatic hyperplasia)     Cervical spine fracture ([facility] ) 1997    C3    Chest pain     Colon polyp     Coronary artery disease     Dry eye     DVT (deep venous thrombosis) ([place] [address] ) 2015    right leg- passed thru the IVC filter-stopped at the "patch" from bypass surgery    Dysphagia 11/2021    minimal-"mass" esophagus with a "knob" in the middle    Fracture of thoracic spine ([place] [address] ) 1997    T3    Glaucoma     Hyperlipidemia     Hypertension     Myocardial infarction ([place] [address] )     PUD (peptic ulcer disease)      Past Surgical History:   Procedure Laterality Date    ANGIOPLASTY      2 stents    CHOLECYSTECTOMY      COLONOSCOPY      CORONARY ARTERY BYPASS GRAFT      x6    CORONARY ARTERY BYPASS GRAFT      CORONARY STENT PLACEMENT      CYSTOSCOPY      EYE SURGERY Right     HERNIA REPAIR Right 11/3/2017    Procedure: REPAIR HERNIA INGUINAL;  Surgeon: Spencer Rincon MD;  Location: [facility];  Service: General    IVC FILTER INSERTION      IVC filter    NH CYSTOURETHROSCOPY W/IRRIG & EVAC CLOTS N/A 6/30/2018    Procedure: CYSTOSCOPY EVACUATION OF CLOTS, fulgeration;  Surgeon: Diana Ontiveros MD;  Location:  Main OR;  Service: Urology   [address]    Bill[place]th 2 gastrectomy-2/3 removal- bleeding ulcer    TRANSURETHRAL RESECTION OF PROSTATE       Social History   Social History     Substance and Sexual Activity   Alcohol Use Never     Social History     Substance and Sexual Activity   Drug Use No     Social History     Tobacco Use   Smoking Status Never Smoker   Smokeless Tobacco Never Used     Family History   Problem Relation Age of Onset    Coronary artery disease Brother     Heart disease Father         CAD       Meds/Allergies       Current Outpatient Medications:     ALPRAZolam (XANAX) 0 5 mg tablet    Calcium Carbonate-Vitamin D (CALCIUM 600+D PO)    Carboxymethylcellulose Sodium (REFRESH TEARS OP)    cholecalciferol (VITAMIN D3) 400 units tablet    Docusate Calcium (STOOL SOFTENER PO)    dutasteride (AVODART) 0 5 mg capsule    famotidine (PEPCID) 20 mg tablet    isosorbide mononitrate (IMDUR) 30 mg 24 hr tablet    Magnesium 250 MG TABS    metoprolol succinate (TOPROL-XL) 25 mg 24 hr tablet    multivitamin-iron-minerals-folic acid (CENTRUM) chewable tablet    nitroglycerin (NITROSTAT) 0 4 mg SL tablet    pantoprazole (PROTONIX) 20 mg tablet    ranolazine (RANEXA) 500 mg 12 hr tablet    rosuvastatin (CRESTOR) 5 mg tablet    tamsulosin (Flomax) 0 4 mg    Zinc 25 MG TABS    clopidogrel (PLAVIX) 75 mg tablet    Allergies   Allergen Reactions    Escitalopram Other (See Comments)     Suicidal feelings, sweating    Oxycodone-Acetaminophen Dizziness and Shortness Of Breath     Other reaction(s): Faints  Reaction Date: [id number]; Category: Adverse Reaction;     Omeprazole Rash    Statins Other (See Comments)     Muscle pain       Objective     /63   Pulse 71   Temp (!) 96 9 °F (36 1 °C) (Tympanic)   Resp 18   Ht 5' 4" (1 626 m)   Wt 68 5 kg (151 lb)   SpO2 99%   BMI 25 92 kg/m²       PHYSICAL EXAM    Gen: NAD  Head: NCAT  CV: RRR  CHEST: Clear  ABD: soft, NT/ND  EXT: no edema      ASSESSMENT/PLAN:  This is a 80y o  year old male here for upper endoscopy, and he is stable and optimized for his procedure

## 2022-05-09 NOTE — ANESTHESIA PREPROCEDURE EVALUATION
Procedure:  EGD    Relevant Problems   CARDIO   (+) 3-vessel CAD   (+) CAD (coronary artery disease)   (+) Cardiac murmur   (+) Chest pain   (+) Hx of CABG   (+) Hyperlipidemia   (+) Hypertension      GI/HEPATIC   (+) Dysphagia   (+) PUD (peptic ulcer disease)   (+) Rectal bleeding      /RENAL   (+) Benign prostatic hyperplasia      HEMATOLOGY   (+) Anemia of chronic disease   (+) Platelets decreased (HCC)      NEURO/PSYCH   (+) Anxiety   (+) Depression   (+) History of DVT (deep vein thrombosis)      PULMONARY   (+) Pneumonia        Physical Exam    Airway    Mallampati score: II  TM Distance: >3 FB  Neck ROM: full     Dental   No notable dental hx     Cardiovascular      Pulmonary      Other Findings  Interpretation Summary         Left Ventricle: Left ventricular cavity size is normal  Wall thickness is normal  The left ventricular ejection fraction is 50%  Systolic function is mildly reduced  There is mild global hypokinesis  Diastolic function is severely abnormal, consistent with ungraded restrictive relaxation  Left atrial filling pressure is elevated    Right Ventricle: Systolic function is low normal     Left Atrium: The atrium is mildly dilated    Aortic Valve: The aortic valve is functionally bicuspid  The leaflets are moderately thickened  The leaflets are moderately calcified  There is severely reduced mobility  There is mild regurgitation with a centrally directed jet  There is moderate stenosis    Mitral Valve: There is moderate regurgitation    Tricuspid Valve: There is mild regurgitation    Pulmonic Valve: There is mild to moderate regurgitation    Prior TTE study available for comparison  Prior study date: 7/16/2021   No significant changes noted compared to the prior study      Cannot exclude left atrial appendage thrombus from TTE- recommend WALT for better evaluation       Findings    Left Ventricle Left ventricular cavity size is normal  Wall thickness is normal  The left ventricular ejection fraction is 50%  Systolic function is mildly reduced  There is mild global hypokinesis  Diastolic function is severely abnormal, consistent with ungraded restrictive relaxation  Left atrial filling pressure is elevated  Right Ventricle Systolic function is low normal  The ejection fraction is borderline low  Left Atrium The atrium is mildly dilated  Right Atrium The atrium is normal in size  Aortic Valve The aortic valve is functionally bicuspid  The leaflets are moderately thickened  The leaflets are moderately calcified  There is severely reduced mobility  There is mild regurgitation with a centrally directed jet  There is moderate stenosis  Mitral Valve The mitral valve has normal structure  There is mild thickening  There is mild calcification  There is moderate regurgitation  Tricuspid Valve There is mild regurgitation  There is no evidence of stenosis  Pulmonic Valve The pulmonic valve was not well visualized  There is mild to moderate regurgitation  There is no evidence of stenosis      Left Ventricle Measurements    Function/Volumes  A4C EF   50 %    LVOT stroke volume   76 63 cm3    LVOT SI   43 1 ml/m2    Dimensions  LVIDd   4 4 cm    LVIDS   3 4 cm    IVSd   0 9 cm    LVPWd   0 9 cm    LVOT area   2 83 cm2    FS   23 %    Diastolic Filling  MV E' Tissue Velocity Septal   4 cm/s    E wave deceleration time   214 ms    MV Peak E Trino   128 cm/s    MV Peak A Trino   1 15 m/s       Report Measurements  AV LVOT peak gradient   4 mmHg        Interventricular Septum Measurements    Shunt Ratio  LVOT peak VTI   27 04 cm    LVOT peak trino   1 04 m/s        Right Ventricle Measurements    Dimensions  RVID d   3 2 cm         Left Atrium Measurements    Dimensions  LA size   3 8 cm    LA length (A2C)   5 9 cm    ELIESER A4C   19 8 cm2         Right Atrium Measurements    Dimensions  RAA A4C   13 8 cm2         Atrial Septum Measurements    Shunt Ratio  LVOT peak VTI   27 04 cm    LVOT peak trino   1 04 m/s         Aortic Valve Measurements    Stenosis  Ao peak trino retrograde   3 24 m/s    LVOT peak trino   1 04 m/s    Ao VTI   78 61 cm    LVOT peak VTI   27 04 cm    AV mean gradient   25 mmHg    LVOT mn grad   3 mmHg    AV peak gradient   42 mmHg    AV LVOT peak gradient   4 mmHg    AV Velocity Ratio   0 32     Area/Dimensions  AV valve area   0 97 cm2    AV area by cont VTI   1 cm2    AV area peak trino   0 9 cm2    LVOT diameter   1 9 cm    LVOT area   2 83 cm2         Tricuspid Valve Measurements    RVSP Parameters  TR Peak Trino   3 1 m/s    Triscuspid Valve Regurgitation Peak Gradient   38 mmHg         Aorta Measurements    Aortic Dimensions  Ao root   3 1 cm    Asc Ao   3 4 cm         Exam Details    Performed Procedure Technologist Supporting Staff Performing Physician  Echo complete SHERRY Berger                Anesthesia Plan  ASA Score- 3     Anesthesia Type- IV sedation with anesthesia with ASA Monitors  Additional Monitors:   Airway Plan:           Plan Factors-Exercise tolerance (METS): <4 METS  Chart reviewed  EKG reviewed  Existing labs reviewed  Patient summary reviewed  Patient is not a current smoker  Induction- intravenous  Postoperative Plan- Plan for postoperative opioid use  Informed Consent- Anesthetic plan and risks discussed with patient  I personally reviewed this patient with the CRNA  Discussed and agreed on the Anesthesia Plan with the CRNA  Marylu Console

## 2022-05-13 ENCOUNTER — APPOINTMENT (OUTPATIENT)
Dept: LAB | Facility: CLINIC | Age: 87
End: 2022-05-13
Payer: MEDICARE

## 2022-05-16 DIAGNOSIS — N40.1 BENIGN PROSTATIC HYPERPLASIA WITH URINARY RETENTION: ICD-10-CM

## 2022-05-16 DIAGNOSIS — N40.0 BENIGN PROSTATIC HYPERPLASIA, UNSPECIFIED WHETHER LOWER URINARY TRACT SYMPTOMS PRESENT: ICD-10-CM

## 2022-05-16 DIAGNOSIS — R33.8 BENIGN PROSTATIC HYPERPLASIA WITH URINARY RETENTION: ICD-10-CM

## 2022-05-16 RX ORDER — DUTASTERIDE 0.5 MG/1
0.5 CAPSULE, LIQUID FILLED ORAL DAILY
Qty: 90 CAPSULE | Refills: 0 | Status: CANCELLED | OUTPATIENT
Start: 2022-05-16

## 2022-05-16 RX ORDER — DUTASTERIDE 0.5 MG/1
0.5 CAPSULE, LIQUID FILLED ORAL DAILY
Qty: 90 CAPSULE | Refills: 3 | Status: SHIPPED | OUTPATIENT
Start: 2022-05-16 | End: 2023-01-01

## 2022-05-16 NOTE — TELEPHONE ENCOUNTER
Patient seen by Mis Lam at [facility]    Patient is calling to request a new prescription fordutasteride (AVODART) 0 5 mg capsule  90 day supply to [facility] [employer]      Patient can be reached at   [phone] (P)

## 2022-05-20 NOTE — RESULT ENCOUNTER NOTE
Please inform patient that their biopsies were benign  Repeat EGD in 3 years if clinically reasonable at that time

## 2022-05-31 ENCOUNTER — OFFICE VISIT (OUTPATIENT)
Dept: UROLOGY | Facility: CLINIC | Age: 87
End: 2022-05-31
Payer: MEDICARE

## 2022-05-31 VITALS
DIASTOLIC BLOOD PRESSURE: 78 MMHG | HEART RATE: 74 BPM | WEIGHT: 148.8 LBS | HEIGHT: 64 IN | BODY MASS INDEX: 25.4 KG/M2 | OXYGEN SATURATION: 97 % | RESPIRATION RATE: 16 BRPM | SYSTOLIC BLOOD PRESSURE: 130 MMHG

## 2022-05-31 DIAGNOSIS — R31.9 URINARY TRACT INFECTION WITH HEMATURIA, SITE UNSPECIFIED: Primary | ICD-10-CM

## 2022-05-31 DIAGNOSIS — N39.0 URINARY TRACT INFECTION WITH HEMATURIA, SITE UNSPECIFIED: Primary | ICD-10-CM

## 2022-05-31 LAB
BACTERIA UR QL AUTO: ABNORMAL /HPF
BILIRUB UR QL STRIP: NEGATIVE
CLARITY UR: ABNORMAL
COLOR UR: YELLOW
GLUCOSE UR STRIP-MCNC: NEGATIVE MG/DL
HGB UR QL STRIP.AUTO: ABNORMAL
KETONES UR STRIP-MCNC: NEGATIVE MG/DL
LEUKOCYTE ESTERASE UR QL STRIP: ABNORMAL
MUCOUS THREADS UR QL AUTO: ABNORMAL
NITRITE UR QL STRIP: NEGATIVE
NON-SQ EPI CELLS URNS QL MICRO: ABNORMAL /HPF
PH UR STRIP.AUTO: 6 [PH]
POST-VOID RESIDUAL VOLUME, ML POC: 45 ML
PROT UR STRIP-MCNC: ABNORMAL MG/DL
RBC #/AREA URNS AUTO: ABNORMAL /HPF
SL AMB  POCT GLUCOSE, UA: NORMAL
SL AMB LEUKOCYTE ESTERASE,UA: NORMAL
SL AMB POCT BILIRUBIN,UA: NORMAL
SL AMB POCT BLOOD,UA: NORMAL
SL AMB POCT CLARITY,UA: CLEAR
SL AMB POCT COLOR,UA: NORMAL
SL AMB POCT KETONES,UA: NORMAL
SL AMB POCT NITRITE,UA: NORMAL
SL AMB POCT PH,UA: 5
SL AMB POCT SPECIFIC GRAVITY,UA: 1
SL AMB POCT URINE PROTEIN: NORMAL
SL AMB POCT UROBILINOGEN: 0.2
SP GR UR STRIP.AUTO: 1.02 (ref 1–1.03)
UROBILINOGEN UR STRIP-ACNC: <2 MG/DL
WBC #/AREA URNS AUTO: ABNORMAL /HPF
WBC CLUMPS # UR AUTO: PRESENT /UL

## 2022-05-31 PROCEDURE — 87147 CULTURE TYPE IMMUNOLOGIC: CPT | Performed by: PHYSICIAN ASSISTANT

## 2022-05-31 PROCEDURE — 51798 US URINE CAPACITY MEASURE: CPT | Performed by: PHYSICIAN ASSISTANT

## 2022-05-31 PROCEDURE — 87086 URINE CULTURE/COLONY COUNT: CPT | Performed by: PHYSICIAN ASSISTANT

## 2022-05-31 PROCEDURE — 81002 URINALYSIS NONAUTO W/O SCOPE: CPT | Performed by: PHYSICIAN ASSISTANT

## 2022-05-31 PROCEDURE — 81001 URINALYSIS AUTO W/SCOPE: CPT | Performed by: PHYSICIAN ASSISTANT

## 2022-05-31 PROCEDURE — 99213 OFFICE O/P EST LOW 20 MIN: CPT | Performed by: PHYSICIAN ASSISTANT

## 2022-06-01 LAB — BACTERIA UR CULT: ABNORMAL

## 2022-06-06 DIAGNOSIS — N39.0 URINARY TRACT INFECTION WITH HEMATURIA, SITE UNSPECIFIED: Primary | ICD-10-CM

## 2022-06-06 DIAGNOSIS — R31.9 URINARY TRACT INFECTION WITH HEMATURIA, SITE UNSPECIFIED: Primary | ICD-10-CM

## 2022-06-06 RX ORDER — AMOXICILLIN AND CLAVULANATE POTASSIUM 500; 125 MG/1; MG/1
1 TABLET, FILM COATED ORAL EVERY 12 HOURS SCHEDULED
Qty: 10 TABLET | Refills: 0 | Status: SHIPPED | OUTPATIENT
Start: 2022-06-06 | End: 2022-06-11

## 2022-06-15 NOTE — TELEPHONE ENCOUNTER
Patient is calling to request a prescription refill    Medication: tamsulosin (Flomax) 0 4 mg         30 / 90 day supply: 90    Pharmacy: [address] [place] [address] [phone]    Pt can be reached at: [phone]

## 2022-06-16 DIAGNOSIS — Z98.61 CAD S/P PERCUTANEOUS CORONARY ANGIOPLASTY: ICD-10-CM

## 2022-06-16 DIAGNOSIS — I25.10 CAD S/P PERCUTANEOUS CORONARY ANGIOPLASTY: ICD-10-CM

## 2022-06-16 RX ORDER — TAMSULOSIN HYDROCHLORIDE 0.4 MG/1
0.4 CAPSULE ORAL
Qty: 90 CAPSULE | Refills: 3 | Status: SHIPPED | OUTPATIENT
Start: 2022-06-16

## 2022-06-17 RX ORDER — CLOPIDOGREL BISULFATE 75 MG/1
TABLET ORAL
Qty: 90 TABLET | Refills: 3 | Status: SHIPPED | OUTPATIENT
Start: 2022-06-17

## 2022-06-21 ENCOUNTER — APPOINTMENT (OUTPATIENT)
Dept: LAB | Facility: CLINIC | Age: 87
End: 2022-06-21
Payer: MEDICARE

## 2022-07-05 ENCOUNTER — TELEPHONE (OUTPATIENT)
Dept: GASTROENTEROLOGY | Facility: CLINIC | Age: 87
End: 2022-07-05

## 2022-07-05 DIAGNOSIS — K21.9 GASTROESOPHAGEAL REFLUX DISEASE WITHOUT ESOPHAGITIS: Primary | ICD-10-CM

## 2022-07-05 RX ORDER — FAMOTIDINE 20 MG/1
20 TABLET, FILM COATED ORAL
Qty: 90 TABLET | Refills: 3 | Status: SHIPPED | OUTPATIENT
Start: 2022-07-05 | End: 2022-10-03

## 2022-07-05 NOTE — TELEPHONE ENCOUNTER
Patients GI provider:  Dr Cas Clarke    Number to return call: [phone]    Reason for call: Pt calling stating he is experiencing stomach pain       Scheduled procedure/appointment date if applicable: Appt: 2/91/3824

## 2022-07-05 NOTE — TELEPHONE ENCOUNTER
Spoke to patient on phone  Patient reports that after he eats dinner he will get pain in his mid abdomen held take Laurel-Sultana and the pain goes away  I started patient on famotidine 20 mg daily at night  He is to continue taking his pantoprazole 20 mg daily in the morning  And he has an appointment with Dr Bereket Eisenberg 8/11    Thank you

## 2022-07-05 NOTE — TELEPHONE ENCOUNTER
I called and spoke to patient  He states he is having mid abdominal pain mainly at night  Pain level 10/10 when it comes on  He takes pantoprazole 20 mg daily in the morning and at night when the pain comes on he takes gavin seltzer and then the pain subsides  Ov scheduled 8/11/22  I offered a sooner appt but patient only wants to see Dr Thea Wallis  Please advise   Thank you

## 2022-07-11 ENCOUNTER — OFFICE VISIT (OUTPATIENT)
Dept: CARDIOLOGY CLINIC | Facility: CLINIC | Age: 87
End: 2022-07-11
Payer: MEDICARE

## 2022-07-11 VITALS
BODY MASS INDEX: 24.92 KG/M2 | HEART RATE: 70 BPM | DIASTOLIC BLOOD PRESSURE: 60 MMHG | OXYGEN SATURATION: 98 % | SYSTOLIC BLOOD PRESSURE: 120 MMHG | WEIGHT: 146 LBS | HEIGHT: 64 IN | TEMPERATURE: 97 F

## 2022-07-11 DIAGNOSIS — I25.10 3-VESSEL CAD: ICD-10-CM

## 2022-07-11 DIAGNOSIS — I50.32 CHRONIC DIASTOLIC HEART FAILURE (HCC): ICD-10-CM

## 2022-07-11 DIAGNOSIS — K27.9 PUD (PEPTIC ULCER DISEASE): ICD-10-CM

## 2022-07-11 DIAGNOSIS — E78.2 MIXED HYPERLIPIDEMIA: ICD-10-CM

## 2022-07-11 DIAGNOSIS — I20.8 CHRONIC STABLE ANGINA (HCC): ICD-10-CM

## 2022-07-11 DIAGNOSIS — K29.00 ACUTE SUPERFICIAL GASTRITIS WITHOUT HEMORRHAGE: ICD-10-CM

## 2022-07-11 DIAGNOSIS — I10 ESSENTIAL HYPERTENSION: ICD-10-CM

## 2022-07-11 DIAGNOSIS — R01.1 CARDIAC MURMUR: ICD-10-CM

## 2022-07-11 DIAGNOSIS — D63.8 ANEMIA OF CHRONIC DISEASE: ICD-10-CM

## 2022-07-11 PROCEDURE — 93000 ELECTROCARDIOGRAM COMPLETE: CPT | Performed by: INTERNAL MEDICINE

## 2022-07-11 PROCEDURE — 99214 OFFICE O/P EST MOD 30 MIN: CPT | Performed by: INTERNAL MEDICINE

## 2022-07-11 RX ORDER — RANOLAZINE 500 MG/1
TABLET, EXTENDED RELEASE ORAL
Qty: 180 TABLET | Refills: 3 | Status: SHIPPED | OUTPATIENT
Start: 2022-07-11 | End: 2022-07-11 | Stop reason: SDUPTHER

## 2022-07-11 RX ORDER — PANTOPRAZOLE SODIUM 20 MG/1
TABLET, DELAYED RELEASE ORAL
Qty: 90 TABLET | Refills: 1 | Status: SHIPPED | OUTPATIENT
Start: 2022-07-11

## 2022-07-11 RX ORDER — RANOLAZINE 500 MG/1
500 TABLET, EXTENDED RELEASE ORAL 2 TIMES DAILY
Qty: 180 TABLET | Refills: 3 | Status: SHIPPED | OUTPATIENT
Start: 2022-07-11

## 2022-07-11 NOTE — PROGRESS NOTES
Progress Note - Cardiology Office  Yoselyn Avina 80 y o  male MRN: [id number]  : 1930  Encounter: [id number]      Assessment:     1  3-vessel CAD    2  Chronic stable angina ([facility] [address] )    3  Chronic diastolic heart failure ([facility] [address] )    4  Essential hypertension    5  Mixed hyperlipidemia    6  Cardiac murmur    7  Anemia of chronic disease        Discussion Summary and Plan:  1  Coronary artery disease status post CABG and stents with multiple cardiac catheterization a year ago ago in [facility] by me and it was recommended medical therapy  Patient has diffuse 3 vessel disease with poor targets  Patient still occasionally gets symptoms of chest pain but has been under control  His prescription for nitro was renewed  Will continue beta-blockers but will increase the dose to 25 mg 3 times daily  Continue Imdur  Continue Plavix     No further issues at this time      2  Chronic stable angina  As mentioned above history of chronic stable angina  Beta-blocker increase he is taking Ranexa and Imdur  Continue same medication his hemoglobin has improved to 11 4 now      3  Generalized anxiety  Patient easily gets anxious  He is on Xanax as needed  He has negative thinking  He was in the hospital he got scared and now more nervous  He was reassured, his cardiac function is acceptable  4  Dyslipidemia  Patient has history of severe three-vessel disease  He was tried on multiple statins he did not take it now he is willing to take statins  He is taking Crestor 5 mg daily  He is not taking Zetia  He is only on Crestor 5 mg daily  Continue current dose of statin      5  Chronic diastolic heart failure New [place] Heart Association class 2  No more admission to hospital with heart failure  He has slowly stop taking his diuretics needed to use only p r n  Echo done and WALT done shows EF 45%, mild-to-moderate MR and mild-to-moderate AI with some stenosis  Not much changed from previous echo  Discussed with        6  Benign prostate hypertrophy status post surgery  Currently stable  Will continue to monitor    7  Ischemic cardiomyopathy EF around  45%     Metoprolol XL total dose 75 mg daily  He takes 25 mg 3 times daily due to low blood pressure  No evidence of volume overload at this time    8  History of dysphagia  Patient is official disease and previous history of gastric surgery follow up with GI  He had EGD done and instructions were given to him by the GI  9  Cardiac murmur  Cardiac murmur with history of Moderate aortic stenosis with AI and have moderate MR  A WALT done on 02/14/2022 shows EF 45%, no PFO, left atrial appendage has normal function, aortic valve thickened moderately calcified and moderately reduced with mild-to-moderate AI and moderate aortic stenosis, moderate to severe mitral regurgitation  At this time there is no evidence of volume overload  We will continue to monitor    Continue current Rx  Counseling :  A description of the counseling  Spoke to patient about chronic stable angina, coronary artery disease, dyslipidemia, diastolic heart failure at length  Diet advised  Patient's ability to self care: Yes  Medication side effect reviewed with patient in detail and all their questions answered to their satisfaction  HPI :     Jarred Linda is a 80y o  year old male who came for follow up  Patient has a past medical history significant for coronary artery disease status post CABG status post stent into his MCKENNA to LAD widely patent, SVG vein graft to 100% occluded and circumflex was severely diffusely disease with severe diffuse distal disease, chronic stable angina, status post MI anxiety, GERD, dyslipidemia who came for follow-up  was admitted to [facility] with chronic diastolic heart failure and is now compensated  He denies any chest pain or any shortness of breath  Occasionally feel dizzy in the morning   Has been on Demadex 20 mg and potassium 20 mg daily  All medications reviewed with him     07/09/2021  Above reviewed  Patient came for follow-up  He has recently lower GI bleed  It was thought to be hemorrhoidal bleed by the GI he is on antiplatelet agent  He could not tolerate aspirin he is on Plavix he does have history of coronary artery disease with CABG and stents  Though there were many years ago he cannot take aspirin is a he has been on Plavix  He will take it every other day now  He does have history of cardiomyopathy with EF 45-50%, history of intolerance to aspirin, history of intolerance to high dose of Crestor and currently on low-dose Crestor and Zetia history of chronic stable angina and anxiety  He is doing well he has bleeding has also improved  He denies any chest pain shortness of breath reviewed blood test shows his hemoglobin is around 10 2  He has noted no further bleeding  No nausea no vomiting no chest pain no other cardiovascular issues  He has decently active  03/09/2022  Above reviewed  Patient came for follow-up  In February he was admitted with neck pain when looking down and associated with bilateral lower extremity weakness which has now resolved  Initially it was thought he had a stroke later on workup was negative but he was started on Plavix and statin  MRI was negative for CPA  There was some question of thrombin in his left atrial appendage none was found by WALT  There was no indication for antithrombotic therapy  He does have history of coronary artery disease status post CABG and has diffuse coronary artery disease his EF is around 45%  He is intolerant to high dose of statin he is on 5 mg Crestor and Zetia  He could not tolerate aspirin he is on Plavix  He had problem in his esophagus which shows diverticula and he has previous stomach surgery  He is scheduled to have EGD done again  His little upset with his care but otherwise he has seems to be now back to his baseline    He still feels little fatigue and tired  Denies any chest pain  No leg edema no change in functional status  He gets episodes of chest pain when he sometimes can not swallow the food however when he eats pills with the help of applesauce he feels okay  07/11/2022    Above reviewed  Patient came for follow-up he is doing well  [place] He still get occasionally episodes of chest pain  Patient does have history of coronary artery disease status post CABG and diffuse coronary artery disease with EF around 45%  He is intolerant to high-dose statin but he takes small dose  He is doing well  He also had a problem with his esophagus which shows a diverticula and has previous stomach surgery  His whole problem with stomach started recently he had previous history of stomach surgery  He was told by GI it is small stomach and is very ED table  He has no nausea no vomiting no fever occasionally get chronic chest pain which is not changed  He still able to do his activities without any problems  No other cardiovascular issues  Blood test done in April 2020 shows hemoglobin has improved to 11 4  Other electrolytes were acceptable  Review of Systems   Constitutional: Negative for activity change, chills, diaphoresis, fever and unexpected weight change  HENT: Negative for congestion  Eyes: Negative for discharge and redness  Respiratory: Positive for shortness of breath  Negative for cough, chest tightness and wheezing  Chronic not changed   Cardiovascular: Negative  Negative for chest pain, palpitations and leg swelling  Gastrointestinal: Negative for abdominal pain, diarrhea and nausea  Gastric issues   Endocrine: Negative  Genitourinary: Negative for decreased urine volume and urgency  Musculoskeletal: Positive for arthralgias  Negative for back pain and gait problem  Skin: Negative for rash and wound  Allergic/Immunologic: Negative      Neurological: Negative for dizziness, seizures, syncope, weakness, light-headedness and headaches  Hematological: Negative  Psychiatric/Behavioral: Negative for agitation and confusion  The patient is nervous/anxious          Historical Information   Past Medical History:   Diagnosis Date    Arthritis     BPH (benign prostatic hyperplasia)     Cervical spine fracture ([place] [address] ) 1997    C3    Chest pain     Colon polyp     Coronary artery disease     Dry eye     DVT (deep venous thrombosis) ([place] [address] ) 2015    right leg- passed thru the IVC filter-stopped at the "patch" from bypass surgery    Dysphagia 11/2021    minimal-"mass" esophagus with a "knob" in the middle    Fracture of thoracic spine ([place] [address] ) 1997    T3    Glaucoma     Hyperlipidemia     Hypertension     Myocardial infarction ([place] [address] )     PUD (peptic ulcer disease)      Past Surgical History:   Procedure Laterality Date    ANGIOPLASTY      2 stents    CHOLECYSTECTOMY      COLONOSCOPY      CORONARY ARTERY BYPASS GRAFT      x6    CORONARY ARTERY BYPASS GRAFT      CORONARY STENT PLACEMENT      CYSTOSCOPY      EYE SURGERY Right     HERNIA REPAIR Right 11/3/2017    Procedure: REPAIR HERNIA INGUINAL;  Surgeon: Lisa Kirby MD;  Location: [facility];  Service: General    IVC FILTER INSERTION      IVC filter    NJ CYSTOURETHROSCOPY W/IRRIG & EVAC CLOTS N/A 6/30/2018    Procedure: CYSTOSCOPY EVACUATION OF CLOTS, fulgeration;  Surgeon: Saintclair Lah, MD;  Location:  Main OR;  Service: Urology    STOMACH SURGERY  1973    Billroth 2 gastrectomy-2/3 removal- bleeding ulcer    TRANSURETHRAL RESECTION OF PROSTATE       Social History     Substance and Sexual Activity   Alcohol Use Never     Social History     Substance and Sexual Activity   Drug Use No     Social History     Tobacco Use   Smoking Status Never Smoker   Smokeless Tobacco Never Used     Family History:   Family History   Problem Relation Age of Onset    Coronary artery disease Brother     Heart disease Father         CAD Meds/Allergies     Allergies   Allergen Reactions    Escitalopram Other (See Comments)     Suicidal feelings, sweating    Oxycodone-Acetaminophen Dizziness and Shortness Of Breath     Other reaction(s): Faints  Reaction Date: [id number]; Category:  Adverse Reaction;     Omeprazole Rash    Statins Other (See Comments)     Muscle pain       Current Outpatient Medications:     ALPRAZolam (XANAX) 0 5 mg tablet, Take 0 5 mg by mouth daily at bedtime as needed , Disp: , Rfl:     Calcium Carbonate-Vitamin D (CALCIUM 600+D PO), Take by mouth every morning , Disp: , Rfl:     Carboxymethylcellulose Sodium (REFRESH TEARS OP), Apply to eye as needed, Disp: , Rfl:     cholecalciferol (VITAMIN D3) 400 units tablet, Take 400 Units by mouth daily after lunch , Disp: , Rfl:     clopidogrel (PLAVIX) 75 mg tablet, TAKE ONE TABLET BY MOUTH EVERY DAY, Disp: 90 tablet, Rfl: 3    Docusate Calcium (STOOL SOFTENER PO), Take by mouth OTC; takes with lunch, Disp: , Rfl:     dutasteride (AVODART) 0 5 mg capsule, Take 1 capsule (0 5 mg total) by mouth in the morning , Disp: 90 capsule, Rfl: 3    famotidine (PEPCID) 20 mg tablet, Take 1 tablet (20 mg total) by mouth daily at bedtime, Disp: 90 tablet, Rfl: 3    isosorbide mononitrate (IMDUR) 30 mg 24 hr tablet, Take 1 tablet (30 mg total) by mouth daily (Patient taking differently: Take 30 mg by mouth every morning), Disp: 90 tablet, Rfl: 3    Magnesium 250 MG TABS, Take 1 tablet by mouth every morning , Disp: , Rfl:     metoprolol succinate (TOPROL-XL) 25 mg 24 hr tablet, Take 1 tablet (25 mg total) by mouth 3 (three) times a day, Disp: 270 tablet, Rfl: 3    multivitamin-iron-minerals-folic acid (CENTRUM) chewable tablet, Chew 1 tablet every morning , Disp: , Rfl:     nitroglycerin (NITROSTAT) 0 4 mg SL tablet, Place 1 tablet (0 4 mg total) under the tongue every 5 (five) minutes as needed for chest pain, Disp: 30 tablet, Rfl: 1    pantoprazole (PROTONIX) 20 mg tablet, Take 1 tablet (20 mg total) by mouth daily, Disp: 90 tablet, Rfl: 1    ranolazine (RANEXA) 500 mg 12 hr tablet, Take 1 tablet (500 mg total) by mouth 2 (two) times a day, Disp: 180 tablet, Rfl: 3    rosuvastatin (CRESTOR) 5 mg tablet, Take 1 tablet (5 mg total) by mouth daily at bedtime, Disp: 90 tablet, Rfl: 3    tamsulosin (Flomax) 0 4 mg, Take 1 capsule (0 4 mg total) by mouth daily with dinner, Disp: 90 capsule, Rfl: 3    Zinc 25 MG TABS, Take 25 mg by mouth daily at bedtime, Disp: , Rfl:     Vitals: Blood pressure 120/60, pulse 70, temperature (!) 97 °F (36 1 °C), height 5' 4" (1 626 m), weight 66 2 kg (146 lb), SpO2 98 %  ?  Body mass index is 25 06 kg/m²  Vitals:    07/11/22 1245   Weight: 66 2 kg (146 lb)     BP Readings from Last 3 Encounters:   07/11/22 120/60   05/31/22 130/78   05/09/22 120/61         Physical Exam  Constitutional:       General: He is not in acute distress  Appearance: He is well-developed  He is not diaphoretic  Neck:      Thyroid: No thyromegaly  Vascular: No JVD  Trachea: No tracheal deviation  Cardiovascular:      Rate and Rhythm: Normal rate and regular rhythm  Heart sounds: S1 normal and S2 normal  Heart sounds not distant  Murmur heard  Systolic (ejection) murmur is present with a grade of 2/6  No friction rub  No gallop  No S3 or S4 sounds  Comments: S1-S2 regular with 3/6 pansystolic murmur radiating towards exertional  Pulmonary:      Effort: Pulmonary effort is normal  No respiratory distress  Breath sounds: No wheezing or rales  Comments: Bilateral air entry prolonged phase no rhonchi no wheezing  Chest:      Chest wall: No tenderness  Abdominal:      General: Bowel sounds are normal  There is no distension  Palpations: Abdomen is soft  Tenderness: There is no abdominal tenderness  Musculoskeletal:         General: No deformity  Cervical back: Neck supple        Comments: No lower extremity edema   Skin: General: Skin is warm and dry  Coloration: Skin is not pale  Findings: No rash  Neurological:      Mental Status: He is alert and oriented to person, place, and time  Psychiatric:         Behavior: Behavior normal          Judgment: Judgment normal            Diagnostic Studies Review Cardio:     echo Doppler  Echo Doppler done 07/08/2020 shows EF around 50-55%, paradoxical septal motion due to surgery, moderate aortic stenosis and mild AI, moderate MR, trace TR  WALT done on February 2022      Left Ventricle: Left ventricular cavity size is normal  Wall thickness is normal  The left ventricular ejection fraction is 45% by visual estimation  Systolic function is mildly reduced  Wall motion is normal     Atrial Septum: No patent foramen ovale confirmed at rest by color flow Doppler    Left Atrial Appendage: There is normal function  There is no thrombus    Aortic Valve: The aortic valve is trileaflet  The leaflets are not thickened  The leaflets are moderately calcified  There is moderately reduced mobility  There is mild to moderate regurgitation  There is moderate stenosis  The aortic valve velocity is increased due to stenosis    Mitral Valve: There is moderate thickening  There is moderate calcification  There is mildly reduced mobility  There is moderate to severe regurgitation  Stress Test: Nuclear stress test done 5/14/2016 shows moderate-sized inferior lateral wall ischemia and apical infarction  Ejection fraction 45%  Which was consistent with his %, vein graft to RCA is occluded, as circumflex has diffuse disease and LIMA to LAD was widely patent  Catheterization: He has multiple cardiac catheterization performed  His last cardiac catheter patient performed by me shows EF around 50%, severe diffuse disease of LAD which is totally occluded,  ECG Report:   Comparison to prior ECGs:1  No interval change1   4/24 2017   Twelve-lead EKG shows normal sinus some heart rate 72 bpm  ST changes in inferior and lateral leads which is not changed from old EKG  Cannot rule out underlying ischemialead EKG shows normal sinus rhythm heart rate 63 beats per minute with deep T-wave inversions in inferior and lateral precordial leads  Not change from old EKG    Twelve lead EKG done 04/22/2019 shows normal sinus rhythm heart rate 71 beats per minute  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  ST abnormal it in precordial leads not change from old EKG  Twelve lead EKG done 10/31/2019 shows normal sinus rhythm evidence of old inferior infarct  ST abnormality cannot rule out ischemia  Twelve lead EKG done 12/15/2020 shows normal sinus rhythm ST changes in inferior as well as anterior  And atrial leads not changed from previous EKG  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  Twelve lead EKG 07/09/2021 shows normal sinus rhythm evidence of 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 with Q-wave inferior leads  ST abnormality in precordial leads not changed from previous EKGs  Twelve lead EKG 03/09/2022 shows normal sinus rhythm heart rate 68 beats per minute evidence of old inferior infarct  T-wave inversions in lateral lead cannot rule out ischemia     07/11/2022  Spencercirilo Villatoro Twelve lead EKG shows normal sinus rhythm heart rate 70 beats per minute  ST abnormality in inferior lateral leads change not changed from old EKG          Lab Review   Lab Results   Component Value Date    WBC 5 35 04/08/2022    HGB 11 4 (L) 04/08/2022    HCT 37 2 04/08/2022    MCV 87 04/08/2022    RDW 14 6 04/08/2022     04/08/2022     BMP:  Lab Results   Component Value Date     12/07/2015    K 4 6 04/08/2022     04/08/2022    CO2 29 04/08/2022    ANIONGAP 10 2 12/07/2015    BUN 27 (H) 04/08/2022    CREATININE 1 15 04/08/2022    GLUCOSE 78 12/07/2015    GLUF 156 (H) 04/08/2022    CALCIUM 9 2 04/08/2022    CORRECTEDCA 9 2 02/12/2022    EGFR 55 04/08/2022    MG 2 3 02/12/2022     LFT:  Lab Results   Component Value Date    AST 7 04/08/2022    ALT 17 04/08/2022    ALKPHOS 98 04/08/2022    PROT 8 0 09/24/2015    BILITOT 0 64 09/24/2015     Lab Results   Component Value Date    BNP 69 09/25/2015      No results found for: TSH  Lab Results   Component Value Date    HGBA1C 5 9 (H) 03/02/2021     Lipid Profile:   Lab Results   Component Value Date    CHOL 192 10/29/2015    HDL 41 11/02/2021    LDLCALC 80 11/02/2021    TRIG 115 11/02/2021     Lab Results   Component Value Date    CHOL 192 10/29/2015    CHOL 168 09/25/2015       Dr Juan Brady MD [facility]      "This note has been constructed using a voice recognition system  Therefore there may be syntax, spelling, and/or grammatical errors   Please call if you have any questions  "

## 2022-07-19 DIAGNOSIS — I25.10 3-VESSEL CAD: ICD-10-CM

## 2022-07-19 DIAGNOSIS — I20.8 CHRONIC STABLE ANGINA (HCC): ICD-10-CM

## 2022-07-19 RX ORDER — NITROGLYCERIN 0.4 MG/1
0.4 TABLET SUBLINGUAL
Qty: 30 TABLET | Refills: 1 | Status: SHIPPED | OUTPATIENT
Start: 2022-07-19

## 2022-08-11 ENCOUNTER — OFFICE VISIT (OUTPATIENT)
Dept: GASTROENTEROLOGY | Facility: CLINIC | Age: 87
End: 2022-08-11
Payer: MEDICARE

## 2022-08-11 VITALS
DIASTOLIC BLOOD PRESSURE: 55 MMHG | BODY MASS INDEX: 25.4 KG/M2 | WEIGHT: 148.8 LBS | SYSTOLIC BLOOD PRESSURE: 122 MMHG | HEART RATE: 78 BPM | HEIGHT: 64 IN

## 2022-08-11 DIAGNOSIS — K22.4 ESOPHAGEAL DYSMOTILITY: ICD-10-CM

## 2022-08-11 DIAGNOSIS — K29.00 ACUTE SUPERFICIAL GASTRITIS WITHOUT HEMORRHAGE: ICD-10-CM

## 2022-08-11 DIAGNOSIS — Z98.890 STATUS POST GASTRIC SURGERY: ICD-10-CM

## 2022-08-11 DIAGNOSIS — R13.12 OROPHARYNGEAL DYSPHAGIA: ICD-10-CM

## 2022-08-11 DIAGNOSIS — K27.9 PUD (PEPTIC ULCER DISEASE): ICD-10-CM

## 2022-08-11 DIAGNOSIS — K31.A12 INTESTINAL METAPLASIA OF BODY OF STOMACH WITHOUT DYSPLASIA: ICD-10-CM

## 2022-08-11 DIAGNOSIS — R13.19 ESOPHAGEAL DYSPHAGIA: ICD-10-CM

## 2022-08-11 DIAGNOSIS — K21.9 GASTROESOPHAGEAL REFLUX DISEASE WITHOUT ESOPHAGITIS: Primary | ICD-10-CM

## 2022-08-11 DIAGNOSIS — I20.8 CHRONIC STABLE ANGINA (HCC): ICD-10-CM

## 2022-08-11 DIAGNOSIS — Q39.6 ESOPHAGEAL DIVERTICULUM: ICD-10-CM

## 2022-08-11 PROCEDURE — 99214 OFFICE O/P EST MOD 30 MIN: CPT | Performed by: INTERNAL MEDICINE

## 2022-08-11 RX ORDER — ISOSORBIDE MONONITRATE 30 MG/1
30 TABLET, EXTENDED RELEASE ORAL EVERY MORNING
Qty: 90 TABLET | Refills: 1 | Status: SHIPPED | OUTPATIENT
Start: 2022-08-11

## 2022-08-11 RX ORDER — AZITHROMYCIN 500 MG/1
TABLET, FILM COATED ORAL
COMMUNITY
Start: 2022-08-04

## 2022-08-11 RX ORDER — CEFUROXIME AXETIL 250 MG/1
TABLET ORAL
COMMUNITY
Start: 2022-08-04

## 2022-08-11 NOTE — PROGRESS NOTES
[facility] - Outpatient Follow-up Note  Koffi Younger 80 y o  male MRN: [id number]  Encounter: [id number]          ASSESSMENT AND PLAN:      1  Gastroesophageal reflux disease without esophagitis  Well controlled on Protonix 20 mg in morning Pepcid 20 mg at night    2  PUD (peptic ulcer disease)  Asymptomatic status post surgery    3  Esophageal dysphagia  Resolved does have some esophageal dysmotility as well as a diverticulum and a tortuous esophagus    4  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  EGD 3 years if clinically reasonable at that time he will be 89    5  Acute superficial gastritis without hemorrhage  Asymptomatic    6  Status post gastric surgery  Some intestinal metaplasia    7  Esophageal diverticulum  Resolved    8  Esophageal dysmotility  Dysphagia resolved    9  Oropharyngeal dysphagia  Has trouble with pills on occasion will try applesauce  Recently diagnosed with pneumonia but was not choking on food was sitting in a meeting under an air conditioner  She will follow-up in 6 months    ______________________________________________________________________    SUBJECTIVE:  Very pleasant 19-year-old gentleman quite active  Recent diagnosed with pneumonia  He presents for follow-up after upper endoscopy  He does have intestinal metaplasia of the gastric mucosa is status post gastric surgery for peptic ulcer disease with a partial gastrectomy and Billroth II anastomosis  Does have esophageal dysmotility tortuous esophagus and esophageal diverticulum  His own so only complaint GI wise is occasional trouble with pills  REVIEW OF SYSTEMS IS OTHERWISE NEGATIVE        Historical Information   Past Medical History:   Diagnosis Date    Arthritis     BPH (benign prostatic hyperplasia)     [facility]    C[facility]    Chest pain     Colon polyp     Coronary artery disease     Dry eye     DVT (deep venous thrombosis) ([facility] ) 2015 right leg- passed thru the IVC filter-stopped at the "patch" from bypass surgery    Dysphagia 11/2021    minimal-"mass" esophagus with a "knob" in the middle    [facility] spine [facility] ) 1997    T3    Glaucoma     Hyperlipidemia     Hypertension     [facility]     Pneumonia     PUD (peptic ulcer disease)      Past Surgical History:   Procedure Laterality Date    ANGIOPLASTY      2 stents    CHOLECYSTECTOMY      COLONOSCOPY      CORONARY ARTERY BYPASS GRAFT      x6    CORONARY ARTERY BYPASS GRAFT      CORONARY STENT PLACEMENT      CYSTOSCOPY      EYE SURGERY Right     HERNIA REPAIR Right 11/3/2017    Procedure: REPAIR HERNIA INGUINAL;  Surgeon: Yanick Garrett MD;  Location: [facility];  Service: General    IVC FILTER INSERTION      IVC filter    AL CYSTOURETHROSCOPY W/IRRIG & EVAC CLOTS N/A 6/30/2018    Procedure: CYSTOSCOPY EVACUATION OF CLOTS, fulgeration;  Surgeon: Kameron Angeles MD;  Location:  Main OR;  Service: Urology   [address]    Billroth 2 gastrectomy-2/3 removal- bleeding ulcer    TRANSURETHRAL RESECTION OF PROSTATE       Social History   Social History     Substance and Sexual Activity   Alcohol Use Never     Social History     Substance and Sexual Activity   Drug Use No     Social History     Tobacco Use   Smoking Status Never Smoker   Smokeless Tobacco Never Used     Family History   Problem Relation Age of Onset    Coronary artery disease Brother     Heart disease Father         CAD       Meds/Allergies       Current Outpatient Medications:     ALPRAZolam (XANAX) 0 5 mg tablet    azithromycin (ZITHROMAX) 500 MG tablet    Calcium Carbonate-Vitamin D (CALCIUM 600+D PO)    Carboxymethylcellulose Sodium (REFRESH TEARS OP)    cefuroxime (CEFTIN) 250 mg tablet    cholecalciferol (VITAMIN D3) 400 units tablet    clopidogrel (PLAVIX) 75 mg tablet    Docusate Calcium (STOOL SOFTENER PO)    dutasteride (AVODART) 0 5 mg capsule   famotidine (PEPCID) 20 mg tablet    isosorbide mononitrate (IMDUR) 30 mg 24 hr tablet    Magnesium 250 MG TABS    metoprolol succinate (TOPROL-XL) 25 mg 24 hr tablet    multivitamin-iron-minerals-folic acid (CENTRUM) chewable tablet    nitroglycerin (NITROSTAT) 0 4 mg SL tablet    pantoprazole (PROTONIX) 20 mg tablet    ranolazine (RANEXA) 500 mg 12 hr tablet    rosuvastatin (CRESTOR) 5 mg tablet    tamsulosin (Flomax) 0 4 mg    Zinc 25 MG TABS    Allergies   Allergen Reactions    Escitalopram Other (See Comments)     Suicidal feelings, sweating    Oxycodone-Acetaminophen Dizziness and Shortness Of Breath     Other reaction(s): Faints  Reaction Date: [id number]; Category: Adverse Reaction;     Sucralfate GI Intolerance     Abdominal pain     Omeprazole Rash    Statins Other (See Comments)     Muscle pain           Objective     Blood pressure 122/55, pulse 78, height 5' 4" (1 626 m), weight 67 5 kg (148 lb 12 8 oz)  Body mass index is 25 54 kg/m²  PHYSICAL EXAM:      General Appearance:   Alert, cooperative, no distress   HEENT:   Normocephalic, atraumatic, anicteric      Neck:  Supple, symmetrical, trachea midline   Lungs:   Clear to auscultation bilaterally; no rales, rhonchi or wheezing; respirations unlabored    Heart[de-identified]   Regular rate and rhythm; no murmur, rub, or gallop  Abdomen:   Soft, non-tender, non-distended; normal bowel sounds; no masses, no organomegaly    Genitalia:   Deferred    Rectal:   Deferred    Extremities:  No cyanosis, clubbing or edema    Pulses:  2+ and symmetric    Skin:  No jaundice, rashes, or lesions    Lymph nodes:  No palpable cervical lymphadenopathy        Lab Results:   No visits with results within 1 Day(s) from this visit  Latest known visit with results is:   Transcribe Orders on 06/21/2022   Component Date Value    Iron 06/21/2022 77     Ferritin 06/21/2022 33          Radiology Results:   No results found

## 2022-08-16 ENCOUNTER — APPOINTMENT (OUTPATIENT)
Dept: LAB | Facility: CLINIC | Age: 87
End: 2022-08-16
Payer: MEDICARE

## 2022-10-12 PROBLEM — J18.9 PNEUMONIA: Status: RESOLVED | Noted: 2021-11-01 | Resolved: 2022-10-12

## 2022-10-31 ENCOUNTER — TELEPHONE (OUTPATIENT)
Dept: CARDIOLOGY CLINIC | Facility: CLINIC | Age: 87
End: 2022-10-31

## 2022-10-31 NOTE — TELEPHONE ENCOUNTER
Uriel Tillman and to the patient  He has reconsidered going to the ER at  [facility]  He feels okay right now  He will take Imdur as instructed

## 2022-11-02 ENCOUNTER — TELEPHONE (OUTPATIENT)
Dept: CARDIOLOGY CLINIC | Facility: CLINIC | Age: 87
End: 2022-11-02

## 2022-11-02 NOTE — TELEPHONE ENCOUNTER
Dr Sunshine Gutierrez patient, Aren Hansen, 8/2/1930, left a message on the [email or web address] line, asking to speak to a nurse about how he is feeling  Said his stomach is burning since med increase, and worried about heart problems from the covid vaccine  Ph: [phone]   Thanks, [facility] office

## 2022-11-29 ENCOUNTER — APPOINTMENT (OUTPATIENT)
Dept: LAB | Facility: CLINIC | Age: 87
End: 2022-11-29

## 2022-11-30 ENCOUNTER — TELEPHONE (OUTPATIENT)
Dept: CARDIOLOGY CLINIC | Facility: CLINIC | Age: 87
End: 2022-11-30

## 2022-11-30 NOTE — TELEPHONE ENCOUNTER
----- Message from Colletta Croissant, MD sent at 11/29/2022  5:01 PM EST -----  Patient's cardiac blood test are acceptable

## 2023-01-01 ENCOUNTER — TELEPHONE (OUTPATIENT)
Dept: FAMILY MEDICINE CLINIC | Facility: CLINIC | Age: 88
End: 2023-01-01

## 2023-01-01 ENCOUNTER — TRANSITIONAL CARE MANAGEMENT (OUTPATIENT)
Dept: FAMILY MEDICINE CLINIC | Facility: CLINIC | Age: 88
End: 2023-01-01

## 2023-01-01 DIAGNOSIS — K27.9 PUD (PEPTIC ULCER DISEASE): ICD-10-CM

## 2023-01-01 DIAGNOSIS — K29.00 ACUTE SUPERFICIAL GASTRITIS WITHOUT HEMORRHAGE: ICD-10-CM

## 2023-01-01 RX ORDER — PANTOPRAZOLE SODIUM 20 MG/1
20 TABLET, DELAYED RELEASE ORAL 2 TIMES DAILY
Qty: 180 TABLET | Refills: 1 | Status: SHIPPED | OUTPATIENT
Start: 2023-01-01 | End: 2023-07-04 | Stop reason: CLARIF

## 2023-01-04 DIAGNOSIS — K29.00 ACUTE SUPERFICIAL GASTRITIS WITHOUT HEMORRHAGE: ICD-10-CM

## 2023-01-04 DIAGNOSIS — K27.9 PUD (PEPTIC ULCER DISEASE): ICD-10-CM

## 2023-01-04 RX ORDER — PANTOPRAZOLE SODIUM 20 MG/1
20 TABLET, DELAYED RELEASE ORAL DAILY
Qty: 90 TABLET | Refills: 1 | Status: SHIPPED | OUTPATIENT
Start: 2023-01-04

## 2023-01-05 DIAGNOSIS — R07.9 CHEST PAIN: ICD-10-CM

## 2023-01-05 DIAGNOSIS — I10 HYPERTENSION: Chronic | ICD-10-CM

## 2023-01-05 DIAGNOSIS — I25.10 CAD (CORONARY ARTERY DISEASE): ICD-10-CM

## 2023-01-05 RX ORDER — METOPROLOL SUCCINATE 25 MG/1
25 TABLET, EXTENDED RELEASE ORAL 3 TIMES DAILY
Qty: 270 TABLET | Refills: 3 | Status: SHIPPED | OUTPATIENT
Start: 2023-01-05 | End: 2023-01-13 | Stop reason: SDUPTHER

## 2023-01-13 ENCOUNTER — OFFICE VISIT (OUTPATIENT)
Dept: CARDIOLOGY CLINIC | Facility: CLINIC | Age: 88
End: 2023-01-13

## 2023-01-13 VITALS
DIASTOLIC BLOOD PRESSURE: 80 MMHG | TEMPERATURE: 97 F | BODY MASS INDEX: 25.44 KG/M2 | HEART RATE: 77 BPM | WEIGHT: 149 LBS | SYSTOLIC BLOOD PRESSURE: 142 MMHG | OXYGEN SATURATION: 96 % | HEIGHT: 64 IN

## 2023-01-13 DIAGNOSIS — I25.10 CAD S/P PERCUTANEOUS CORONARY ANGIOPLASTY: ICD-10-CM

## 2023-01-13 DIAGNOSIS — I10 ESSENTIAL HYPERTENSION: ICD-10-CM

## 2023-01-13 DIAGNOSIS — Z98.61 CAD S/P PERCUTANEOUS CORONARY ANGIOPLASTY: ICD-10-CM

## 2023-01-13 DIAGNOSIS — E78.2 MIXED HYPERLIPIDEMIA: ICD-10-CM

## 2023-01-13 DIAGNOSIS — F41.9 ANXIETY: ICD-10-CM

## 2023-01-13 DIAGNOSIS — R07.9 CHEST PAIN: ICD-10-CM

## 2023-01-13 DIAGNOSIS — D63.8 ANEMIA OF CHRONIC DISEASE: ICD-10-CM

## 2023-01-13 DIAGNOSIS — I25.10 3-VESSEL CAD: ICD-10-CM

## 2023-01-13 DIAGNOSIS — I20.8 CHRONIC STABLE ANGINA (HCC): ICD-10-CM

## 2023-01-13 DIAGNOSIS — I10 HYPERTENSION: Chronic | ICD-10-CM

## 2023-01-13 DIAGNOSIS — R01.1 CARDIAC MURMUR: ICD-10-CM

## 2023-01-13 DIAGNOSIS — I50.32 CHRONIC DIASTOLIC HEART FAILURE (HCC): ICD-10-CM

## 2023-01-13 DIAGNOSIS — I25.10 CAD (CORONARY ARTERY DISEASE): ICD-10-CM

## 2023-01-13 RX ORDER — METOPROLOL SUCCINATE 50 MG/1
50 TABLET, EXTENDED RELEASE ORAL 2 TIMES DAILY
Qty: 180 TABLET | Refills: 1 | Status: SHIPPED | OUTPATIENT
Start: 2023-01-13

## 2023-01-13 NOTE — PROGRESS NOTES
Progress Note - Cardiology Office  Payton Alvarado 80 y o  male MRN: [id number]  : 1930  Encounter: [id number]      Assessment:     1  3-vessel CAD    2  Chronic stable angina ([address] )    3  Chronic diastolic heart failure ([address] )    4  Essential hypertension    5  CAD S/P percutaneous coronary angioplasty    6  Cardiac murmur    7  Anemia of chronic disease    8  Mixed hyperlipidemia    9  Anxiety    10  Chest pain    11  Hypertension    12  CAD (coronary artery disease)        Discussion Summary and Plan:  1  Coronary artery disease status post CABG and stents with multiple cardiac catheterization a year ago ago in [facility] by me and it was recommended medical therapy  Patient has diffuse 3 vessel disease with poor targets  Patient still occasionally gets symptoms of chest pain, but mostly has been under control  Today heart rate is 77 bpm   Increase metoprolol to to 50 mg twice a day continue already patient as before  2  Chronic stable angina  As mentioned above history of chronic stable angina  Beta-blocker increase he is taking Ranexa and Imdur  Continue same medication his hemoglobin has improved to 11 4 patient's labs reviewed  3  Generalized anxiety  Patient easily gets anxious  He is on Xanax as needed  He has negative thinking  He was in the hospital he got scared and now more nervous  He was reassured, his cardiac function is acceptable  As per wife he gets very easily anxious still again  4  Dyslipidemia  Patient has history of severe three-vessel disease  He was tried on multiple statins  He could not tolerate it  He is on Crestor 5 mg daily  Continue same dose  5  Chronic diastolic heart failure New [place] Heart Association class 2  No more admission to hospital with heart failure  He has slowly stop taking his diuretics needed to use only p r n  Echo done and WALT done shows EF 45%, mild-to-moderate MR and mild-to-moderate AI with some stenosis    Not much changed from previous echo  Discussed with patient  6  Benign prostate hypertrophy status post surgery  Currently stable  Will continue to monitor    7  Ischemic cardiomyopathy EF around  45%     Increase metoprolol 100 mg total dose daily  8  History of dysphagia  Patient is official disease and previous history of gastric surgery follow up with GI  He had EGD done and instructions were given to him by the GI  9  Cardiac murmur  Cardiac murmur with history of Moderate aortic stenosis with AI and have moderate MR  A WALT done on 02/14/2022 shows EF 45%, no PFO, left atrial appendage has normal function, aortic valve thickened moderately calcified and moderately reduced with mild-to-moderate AI and moderate aortic stenosis, moderate to severe mitral regurgitation  At this time there is no evidence of volume overload  Continue to monitor  Continue current Rx  Counseling :  A description of the counseling  Spoke to patient about chronic stable angina, coronary artery disease, dyslipidemia, diastolic heart failure at length  Diet advised  Patient's ability to self care: Yes  Medication side effect reviewed with patient in detail and all their questions answered to their satisfaction  HPI :     Payton Alvarado is a 80y o  year old male who came for follow up  Patient has a past medical history significant for coronary artery disease status post CABG status post stent into his MCKENNA to LAD widely patent, SVG vein graft to 100% occluded and circumflex was severely diffusely disease with severe diffuse distal disease, chronic stable angina, status post MI anxiety, GERD, dyslipidemia who came for follow-up  was admitted to [facility] with chronic diastolic heart failure and is now compensated  He denies any chest pain or any shortness of breath  Occasionally feel dizzy in the morning  Has been on Demadex 20 mg and potassium 20 mg daily   All medications reviewed with him     07/09/2021  Above reviewed  Patient came for follow-up  He has recently lower GI bleed  It was thought to be hemorrhoidal bleed by the GI he is on antiplatelet agent  He could not tolerate aspirin he is on Plavix he does have history of coronary artery disease with CABG and stents  Though there were many years ago he cannot take aspirin is a he has been on Plavix  He will take it every other day now  He does have history of cardiomyopathy with EF 45-50%, history of intolerance to aspirin, history of intolerance to high dose of Crestor and currently on low-dose Crestor and Zetia history of chronic stable angina and anxiety  He is doing well he has bleeding has also improved  He denies any chest pain shortness of breath reviewed blood test shows his hemoglobin is around 10 2  He has noted no further bleeding  No nausea no vomiting no chest pain no other cardiovascular issues  He has decently active  03/09/2022  Above reviewed  Patient came for follow-up  In February he was admitted with neck pain when looking down and associated with bilateral lower extremity weakness which has now resolved  Initially it was thought he had a stroke later on workup was negative but he was started on Plavix and statin  MRI was negative for CPA  There was some question of thrombin in his left atrial appendage none was found by WALT  There was no indication for antithrombotic therapy  He does have history of coronary artery disease status post CABG and has diffuse coronary artery disease his EF is around 45%  He is intolerant to high dose of statin he is on 5 mg Crestor and Zetia  He could not tolerate aspirin he is on Plavix  He had problem in his esophagus which shows diverticula and he has previous stomach surgery  He is scheduled to have EGD done again  His little upset with his care but otherwise he has seems to be now back to his baseline  He still feels little fatigue and tired    Denies any chest pain  No leg edema no change in functional status  He gets episodes of chest pain when he sometimes can not swallow the food however when he eats pills with the help of applesauce he feels okay  07/11/2022    Above reviewed  Patient came for follow-up he is doing well  Rhea Glaser He still get occasionally episodes of chest pain  Patient does have history of coronary artery disease status post CABG and diffuse coronary artery disease with EF around 45%  He is intolerant to high-dose statin but he takes small dose  He is doing well  He also had a problem with his esophagus which shows a diverticula and has previous stomach surgery  His whole problem with stomach started recently he had previous history of stomach surgery  He was told by GI it is small stomach and is very ED table  He has no nausea no vomiting no fever occasionally get chronic chest pain which is not changed  He still able to do his activities without any problems  No other cardiovascular issues  Blood test done in April 2020 shows hemoglobin has improved to 11 4  Other electrolytes were acceptable  1/13/2023  Above reviewed  Patient came for follow-up  He still have occasional episodes of chest pain  When he is nervous  History of coronary artery disease s/p CABG with diffuse coronary disease, EF around 45%, history of anxiety, history of intolerant to high-dose statin but takes small dose, history of moderate to severe MR, history of GI problems who came for follow-up  His hemoglobin has improved to 13 5  His vitals has been stable  His EKG was sinus rhythm with a heart rate 77 bpm and LVH with repolarization abnormality  His blood test from November 2022 reviewed no nausea no vomiting no fever no chills no other issues  Review of Systems   Constitutional: Negative for activity change, chills, diaphoresis, fever and unexpected weight change  HENT: Negative for congestion  Eyes: Negative for discharge and redness  Respiratory: Positive for chest tightness and shortness of breath  Negative for cough and wheezing  No Significant change from previous episodes  Cardiovascular: Negative  Negative for chest pain, palpitations and leg swelling  Gastrointestinal: Negative for abdominal pain, diarrhea and nausea  Endocrine: Negative  Genitourinary: Negative for decreased urine volume and urgency  Musculoskeletal: Positive for arthralgias  Negative for back pain and gait problem  Skin: Negative for rash and wound  Allergic/Immunologic: Negative  Neurological: Negative for dizziness, seizures, syncope, weakness, light-headedness and headaches  Hematological: Negative  Psychiatric/Behavioral: Negative for agitation and confusion  The patient is nervous/anxious          Historical Information   Past Medical History:   Diagnosis Date   • Arthritis    • BPH (benign prostatic hyperplasia)    • Cervical spine fracture ([facility] ) 1997    C3   • Chest pain    • Colon polyp    • Coronary artery disease    • Dry eye    • DVT (deep venous thrombosis) ([facility] ) 2015    right leg- passed thru the IVC filter-stopped at the "patch" from bypass surgery   • Dysphagia 11/2021    minimal-"mass" esophagus with a "knob" in the middle   • Fracture of thoracic spine ([facility] ) 1997    T3   • Glaucoma    • Hyperlipidemia    • Hypertension    • Myocardial infarction ([facility] )    • Pneumonia    • PUD (peptic ulcer disease)      Past Surgical History:   Procedure Laterality Date   • ANGIOPLASTY      2 stents   • CHOLECYSTECTOMY     • COLONOSCOPY     • CORONARY ARTERY BYPASS GRAFT      x6   • CORONARY ARTERY BYPASS GRAFT     • CORONARY STENT PLACEMENT     • CYSTOSCOPY     • EYE SURGERY Right    • HERNIA REPAIR Right 11/3/2017    Procedure: REPAIR HERNIA INGUINAL;  Surgeon: Marizol Matta MD;  Location: [facility];  Service: General   • IVC FILTER INSERTION      IVC filter   • UT CYSTO W/IRRIG & EVAC MULTPLE OBSTRUCTING CLOTS N/A 6/30/2018 Procedure: CYSTOSCOPY EVACUATION OF CLOTS, fulgeration;  Surgeon: Verner Squires, MD;  Location: [facility];  Service: Urology   • [address]    Billroth 2 gastrectomy-2/3 removal- bleeding ulcer   • TRANSURETHRAL RESECTION OF PROSTATE       Social History     Substance and Sexual Activity   Alcohol Use Never     Social History     Substance and Sexual Activity   Drug Use No     Social History     Tobacco Use   Smoking Status Never   Smokeless Tobacco Never     Family History:   Family History   Problem Relation Age of Onset   • Coronary artery disease Brother    • Heart disease Father         CAD       Meds/Allergies     Allergies   Allergen Reactions   • Escitalopram Other (See Comments)     Suicidal feelings, sweating   • Oxycodone-Acetaminophen Dizziness and Shortness Of Breath     Other reaction(s): Faints  Reaction Date: [id number]; Category:  Adverse Reaction;    • Sucralfate GI Intolerance     Abdominal pain    • Sulfa Antibiotics Other (See Comments)   • Omeprazole Rash   • Statins Other (See Comments)     Muscle pain       Current Outpatient Medications:   •  ALPRAZolam (XANAX) 0 5 mg tablet, Take 0 5 mg by mouth daily at bedtime as needed , Disp: , Rfl:   •  azithromycin (ZITHROMAX) 500 MG tablet, , Disp: , Rfl:   •  Calcium Carbonate-Vitamin D (CALCIUM 600+D PO), Take by mouth every morning , Disp: , Rfl:   •  Carboxymethylcellulose Sodium (REFRESH TEARS OP), Apply to eye as needed, Disp: , Rfl:   •  cefuroxime (CEFTIN) 250 mg tablet, , Disp: , Rfl:   •  cholecalciferol (VITAMIN D3) 400 units tablet, Take 400 Units by mouth daily after lunch , Disp: , Rfl:   •  clopidogrel (PLAVIX) 75 mg tablet, TAKE ONE TABLET BY MOUTH EVERY DAY, Disp: 90 tablet, Rfl: 3  •  Docusate Calcium (STOOL SOFTENER PO), Take by mouth OTC; takes with lunch, Disp: , Rfl:   •  dutasteride (AVODART) 0 5 mg capsule, Take 1 capsule (0 5 mg total) by mouth in the morning , Disp: 90 capsule, Rfl: 3  •  isosorbide mononitrate (IMDUR) 30 mg 24 hr tablet, Take 1 tablet (30 mg total) by mouth every morning, Disp: 90 tablet, Rfl: 1  •  Magnesium 250 MG TABS, Take 1 tablet by mouth every morning , Disp: , Rfl:   •  metoprolol succinate (TOPROL-XL) 50 mg 24 hr tablet, Take 1 tablet (50 mg total) by mouth 2 (two) times a day, Disp: 180 tablet, Rfl: 1  •  multivitamin-iron-minerals-folic acid (CENTRUM) chewable tablet, Chew 1 tablet every morning , Disp: , Rfl:   •  nitroglycerin (NITROSTAT) 0 4 mg SL tablet, Place 1 tablet (0 4 mg total) under the tongue every 5 (five) minutes as needed for chest pain, Disp: 30 tablet, Rfl: 1  •  pantoprazole (PROTONIX) 20 mg tablet, Take 1 tablet (20 mg total) by mouth daily, Disp: 90 tablet, Rfl: 1  •  ranolazine (RANEXA) 500 mg 12 hr tablet, Take 1 tablet (500 mg total) by mouth 2 (two) times a day, Disp: 180 tablet, Rfl: 3  •  rosuvastatin (CRESTOR) 5 mg tablet, Take 1 tablet (5 mg total) by mouth daily at bedtime, Disp: 90 tablet, Rfl: 3  •  tamsulosin (Flomax) 0 4 mg, Take 1 capsule (0 4 mg total) by mouth daily with dinner, Disp: 90 capsule, Rfl: 3  •  Zinc 25 MG TABS, Take 25 mg by mouth daily at bedtime, Disp: , Rfl:   •  famotidine (PEPCID) 20 mg tablet, Take 1 tablet (20 mg total) by mouth daily at bedtime, Disp: 90 tablet, Rfl: 3    Vitals: Blood pressure 142/80, pulse 77, temperature (!) 97 °F (36 1 °C), height 5' 4" (1 626 m), weight 67 6 kg (149 lb), SpO2 96 %  ?  Body mass index is 25 58 kg/m²  Vitals:    01/13/23 1457   Weight: 67 6 kg (149 lb)     BP Readings from Last 3 Encounters:   01/13/23 142/80   08/11/22 122/55   07/11/22 120/60         Physical Exam  Constitutional:       General: He is not in acute distress  Appearance: He is well-developed  He is not diaphoretic  Neck:      Thyroid: No thyromegaly  Vascular: No JVD  Trachea: No tracheal deviation  Cardiovascular:      Rate and Rhythm: Normal rate and regular rhythm        Heart sounds: S1 normal and S2 normal  Heart sounds not distant  Murmur heard  Systolic (ejection) murmur is present with a grade of 2/6  No friction rub  No gallop  No S3 or S4 sounds  Pulmonary:      Effort: Pulmonary effort is normal  No respiratory distress  Breath sounds: Normal breath sounds  No wheezing or rales  Chest:      Chest wall: No tenderness  Abdominal:      General: Bowel sounds are normal  There is no distension  Palpations: Abdomen is soft  Tenderness: There is no abdominal tenderness  Musculoskeletal:         General: No deformity  Cervical back: Neck supple  Skin:     General: Skin is warm and dry  Coloration: Skin is not pale  Findings: No rash  Neurological:      Mental Status: He is alert and oriented to person, place, and time  Psychiatric:         Behavior: Behavior normal          Judgment: Judgment normal            Diagnostic Studies Review Cardio:     echo Doppler  Echo Doppler done 07/08/2020 shows EF around 50-55%, paradoxical septal motion due to surgery, moderate aortic stenosis and mild AI, moderate MR, trace TR  WALT done on February 2022      Left Ventricle: Left ventricular cavity size is normal  Wall thickness is normal  The left ventricular ejection fraction is 45% by visual estimation  Systolic function is mildly reduced  Wall motion is normal   •  Atrial Septum: No patent foramen ovale confirmed at rest by color flow Doppler  •  Left Atrial Appendage: There is normal function  There is no thrombus  •  Aortic Valve: The aortic valve is trileaflet  The leaflets are not thickened  The leaflets are moderately calcified  There is moderately reduced mobility  There is mild to moderate regurgitation  There is moderate stenosis  The aortic valve velocity is increased due to stenosis  •  Mitral Valve: There is moderate thickening  There is moderate calcification  There is mildly reduced mobility  There is moderate to severe regurgitation          Stress Test: Nuclear stress test done 5/14/2016 shows moderate-sized inferior lateral wall ischemia and apical infarction  Ejection fraction 45%  Which was consistent with his %, vein graft to RCA is occluded, as circumflex has diffuse disease and LIMA to LAD was widely patent  Catheterization: He has multiple cardiac catheterization performed  His last cardiac catheter patient performed by me shows EF around 50%, severe diffuse disease of LAD which is totally occluded,  ECG Report:   Comparison to prior ECGs:1  No interval change1   4/24 2017  Twelve-lead EKG shows normal sinus some heart rate 72 bpm  ST changes in inferior and lateral leads which is not changed from old EKG  Cannot rule out underlying ischemialead EKG shows normal sinus rhythm heart rate 63 beats per minute with deep T-wave inversions in inferior and lateral precordial leads  Not change from old EKG    Twelve lead EKG done 04/22/2019 shows normal sinus rhythm heart rate 71 beats per minute  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  ST abnormal it in precordial leads not change from old EKG  Twelve lead EKG done 10/31/2019 shows normal sinus rhythm evidence of old inferior infarct  ST abnormality cannot rule out ischemia  Twelve lead EKG done 12/15/2020 shows normal sinus rhythm ST changes in inferior as well as anterior  And atrial leads not changed from previous EKG  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  Twelve lead EKG 07/09/2021 shows normal sinus rhythm evidence of 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 with Q-wave inferior leads  ST abnormality in precordial leads not changed from previous EKGs  Twelve lead EKG 03/09/2022 shows normal sinus rhythm heart rate 68 beats per minute evidence of old inferior infarct  T-wave inversions in lateral lead cannot rule out ischemia     07/11/2022  [facility] [place] Twelve lead EKG shows normal sinus rhythm heart rate 70 beats per minute    ST abnormality in inferior lateral leads change not changed from old EKG     Twelve-lead EKG done on 1/13/2023 shows sinus rhythm first-degree AV block LVH with repolarization normality heart rate 77 bpm          Lab Review   Lab Results   Component Value Date    WBC 5 25 11/29/2022    HGB 13 5 11/29/2022    HCT 41 2 11/29/2022    MCV 94 11/29/2022    RDW 13 3 11/29/2022     11/29/2022     BMP:  Lab Results   Component Value Date     12/07/2015    K 4 4 11/29/2022     11/29/2022    CO2 30 11/29/2022    ANIONGAP 10 2 12/07/2015    BUN 16 11/29/2022    CREATININE 1 03 11/29/2022    GLUCOSE 78 12/07/2015    GLUF 137 (H) 11/29/2022    CALCIUM 9 7 11/29/2022    CORRECTEDCA 9 2 02/12/2022    EGFR 62 11/29/2022    MG 2 3 02/12/2022     LFT:  Lab Results   Component Value Date    AST 5 11/29/2022    ALT 13 11/29/2022    ALKPHOS 86 11/29/2022    PROT 8 0 09/24/2015    BILITOT 0 64 09/24/2015     Lab Results   Component Value Date    BNP 69 09/25/2015      No results found for: TSH  Lab Results   Component Value Date    HGBA1C 5 9 (H) 03/02/2021     Lipid Profile:   Lab Results   Component Value Date    CHOL 192 10/29/2015    HDL 38 (L) 11/29/2022    LDLCALC 82 11/29/2022    TRIG 169 (H) 11/29/2022     Lab Results   Component Value Date    CHOL 192 10/29/2015    CHOL 168 09/25/2015       Dr Leopoldo Boas, MD [facility]      "This note has been constructed using a voice recognition system  Therefore there may be syntax, spelling, and/or grammatical errors   Please call if you have any questions  "

## 2023-01-18 DIAGNOSIS — R07.9 CHEST PAIN: ICD-10-CM

## 2023-01-18 RX ORDER — ROSUVASTATIN CALCIUM 5 MG/1
TABLET, COATED ORAL
Qty: 90 TABLET | Refills: 3 | Status: SHIPPED | OUTPATIENT
Start: 2023-01-18

## 2023-02-07 DIAGNOSIS — I20.8 CHRONIC STABLE ANGINA (HCC): ICD-10-CM

## 2023-02-07 RX ORDER — ISOSORBIDE MONONITRATE 30 MG/1
30 TABLET, EXTENDED RELEASE ORAL EVERY MORNING
Qty: 90 TABLET | Refills: 3 | Status: SHIPPED | OUTPATIENT
Start: 2023-02-07

## 2023-03-07 ENCOUNTER — TELEPHONE (OUTPATIENT)
Dept: GASTROENTEROLOGY | Facility: CLINIC | Age: 88
End: 2023-03-07

## 2023-03-07 NOTE — TELEPHONE ENCOUNTER
SPOKE TO PT, REPORTS OVER THE PAST WEEK HAS HAD WORSENING BURNING AND REFLUX, TAKING PANTOPRAZOLE 20MG DAILY  HE HAS TRIED AND FAILED FAMOTIDINE IN THE PAST  PLEASE ADVISE

## 2023-03-07 NOTE — TELEPHONE ENCOUNTER
Patients GI provider:  Dr Jess West    Number to return call: [phone]    Reason for call: Pt calling stating yesterday he had really back reflux  The burning in his throat was unbearable he stated  He requested apt asap but no available apts with Dr Jess West until June  Pt declined apt in June and declined being seen by anyone else      Scheduled procedure/appointment date if applicable: no apts or procedures

## 2023-03-09 DIAGNOSIS — K27.9 PUD (PEPTIC ULCER DISEASE): ICD-10-CM

## 2023-03-09 DIAGNOSIS — K29.00 ACUTE SUPERFICIAL GASTRITIS WITHOUT HEMORRHAGE: ICD-10-CM

## 2023-03-09 RX ORDER — PANTOPRAZOLE SODIUM 20 MG/1
20 TABLET, DELAYED RELEASE ORAL DAILY
Qty: 90 TABLET | Refills: 1 | Status: SHIPPED | OUTPATIENT
Start: 2023-03-09 | End: 2023-03-09 | Stop reason: SDUPTHER

## 2023-03-09 NOTE — TELEPHONE ENCOUNTER
PT INFORMED, HE WILL TAKE PANTOPRAZOLE 20MG BID  PT WILL CALL BACK TO SCHEDULE F/U APPT  PLEASE UPDATE RX TO REFLECT BID DOSING, THANK YOU

## 2023-03-15 ENCOUNTER — APPOINTMENT (EMERGENCY)
Dept: RADIOLOGY | Facility: HOSPITAL | Age: 88
End: 2023-03-15

## 2023-03-15 ENCOUNTER — HOSPITAL ENCOUNTER (INPATIENT)
Facility: HOSPITAL | Age: 88
LOS: 1 days | Discharge: HOME | End: 2023-03-17
Attending: EMERGENCY MEDICINE | Admitting: INTERNAL MEDICINE

## 2023-03-15 DIAGNOSIS — R07.9 CHEST PAIN: Primary | ICD-10-CM

## 2023-03-15 DIAGNOSIS — I25.10 CAD (CORONARY ARTERY DISEASE): ICD-10-CM

## 2023-03-15 DIAGNOSIS — K59.00 CONSTIPATION: ICD-10-CM

## 2023-03-15 DIAGNOSIS — R13.10 DYSPHAGIA, UNSPECIFIED TYPE: ICD-10-CM

## 2023-03-15 LAB
2HR DELTA HS TROPONIN: -2 NG/L
4HR DELTA HS TROPONIN: 1 NG/L
ALBUMIN SERPL BCP-MCNC: 3.9 G/DL (ref 3.5–5)
ALP SERPL-CCNC: 85 U/L (ref 34–104)
ALT SERPL W P-5'-P-CCNC: 8 U/L (ref 7–52)
ANION GAP SERPL CALCULATED.3IONS-SCNC: 7 MMOL/L (ref 4–13)
AST SERPL W P-5'-P-CCNC: 7 U/L (ref 13–39)
ATRIAL RATE: 97 BPM
BASOPHILS # BLD AUTO: 0.02 THOUSANDS/ÂΜL (ref 0–0.1)
BASOPHILS NFR BLD AUTO: 0 % (ref 0–1)
BILIRUB SERPL-MCNC: 0.51 MG/DL (ref 0.2–1)
BUN SERPL-MCNC: 18 MG/DL (ref 5–25)
CALCIUM SERPL-MCNC: 9.1 MG/DL (ref 8.4–10.2)
CARDIAC TROPONIN I PNL SERPL HS: 10 NG/L
CARDIAC TROPONIN I PNL SERPL HS: 12 NG/L
CARDIAC TROPONIN I PNL SERPL HS: 13 NG/L
CHLORIDE SERPL-SCNC: 101 MMOL/L (ref 96–108)
CO2 SERPL-SCNC: 29 MMOL/L (ref 21–32)
CREAT SERPL-MCNC: 1.06 MG/DL (ref 0.6–1.3)
EOSINOPHIL # BLD AUTO: 0.09 THOUSAND/ÂΜL (ref 0–0.61)
EOSINOPHIL NFR BLD AUTO: 1 % (ref 0–6)
ERYTHROCYTE [DISTWIDTH] IN BLOOD BY AUTOMATED COUNT: 13.2 % (ref 11.6–15.1)
FLUAV RNA RESP QL NAA+PROBE: NEGATIVE
FLUBV RNA RESP QL NAA+PROBE: NEGATIVE
GFR SERPL CREATININE-BSD FRML MDRD: 60 ML/MIN/1.73SQ M
GLUCOSE SERPL-MCNC: 144 MG/DL (ref 65–140)
GLUCOSE SERPL-MCNC: 199 MG/DL (ref 65–140)
GLUCOSE SERPL-MCNC: 263 MG/DL (ref 65–140)
HCT VFR BLD AUTO: 39.4 % (ref 36.5–49.3)
HGB BLD-MCNC: 12.9 G/DL (ref 12–17)
IMM GRANULOCYTES # BLD AUTO: 0.03 THOUSAND/UL (ref 0–0.2)
IMM GRANULOCYTES NFR BLD AUTO: 0 % (ref 0–2)
LYMPHOCYTES # BLD AUTO: 1.68 THOUSANDS/ÂΜL (ref 0.6–4.47)
LYMPHOCYTES NFR BLD AUTO: 24 % (ref 14–44)
MAGNESIUM SERPL-MCNC: 2.1 MG/DL (ref 1.9–2.7)
MCH RBC QN AUTO: 30.6 PG (ref 26.8–34.3)
MCHC RBC AUTO-ENTMCNC: 32.7 G/DL (ref 31.4–37.4)
MCV RBC AUTO: 93 FL (ref 82–98)
MONOCYTES # BLD AUTO: 0.58 THOUSAND/ÂΜL (ref 0.17–1.22)
MONOCYTES NFR BLD AUTO: 8 % (ref 4–12)
NEUTROPHILS # BLD AUTO: 4.74 THOUSANDS/ÂΜL (ref 1.85–7.62)
NEUTS SEG NFR BLD AUTO: 67 % (ref 43–75)
NRBC BLD AUTO-RTO: 0 /100 WBCS
P AXIS: 83 DEGREES
PLATELET # BLD AUTO: 169 THOUSANDS/UL (ref 149–390)
PMV BLD AUTO: 10 FL (ref 8.9–12.7)
POTASSIUM SERPL-SCNC: 4.3 MMOL/L (ref 3.5–5.3)
PR INTERVAL: 210 MS
PROT SERPL-MCNC: 7.2 G/DL (ref 6.4–8.4)
QRS AXIS: 35 DEGREES
QRSD INTERVAL: 106 MS
QT INTERVAL: 344 MS
QTC INTERVAL: 436 MS
RBC # BLD AUTO: 4.22 MILLION/UL (ref 3.88–5.62)
RSV RNA RESP QL NAA+PROBE: NEGATIVE
SARS-COV-2 RNA RESP QL NAA+PROBE: NEGATIVE
SODIUM SERPL-SCNC: 137 MMOL/L (ref 135–147)
T WAVE AXIS: 203 DEGREES
VENTRICULAR RATE: 97 BPM
WBC # BLD AUTO: 7.14 THOUSAND/UL (ref 4.31–10.16)

## 2023-03-15 RX ORDER — INSULIN LISPRO 100 [IU]/ML
1-5 INJECTION, SOLUTION INTRAVENOUS; SUBCUTANEOUS
Status: DISCONTINUED | OUTPATIENT
Start: 2023-03-15 | End: 2023-03-16

## 2023-03-15 RX ORDER — METOPROLOL SUCCINATE 50 MG/1
50 TABLET, EXTENDED RELEASE ORAL 2 TIMES DAILY
Status: DISCONTINUED | OUTPATIENT
Start: 2023-03-15 | End: 2023-03-15

## 2023-03-15 RX ORDER — PRAVASTATIN SODIUM 40 MG
40 TABLET ORAL
Status: DISCONTINUED | OUTPATIENT
Start: 2023-03-15 | End: 2023-03-17 | Stop reason: HOSPADM

## 2023-03-15 RX ORDER — DOCUSATE SODIUM 100 MG/1
100 CAPSULE, LIQUID FILLED ORAL DAILY
Status: DISCONTINUED | OUTPATIENT
Start: 2023-03-15 | End: 2023-03-17 | Stop reason: HOSPADM

## 2023-03-15 RX ORDER — MORPHINE SULFATE 4 MG/ML
4 INJECTION, SOLUTION INTRAMUSCULAR; INTRAVENOUS ONCE
Status: COMPLETED | OUTPATIENT
Start: 2023-03-15 | End: 2023-03-15

## 2023-03-15 RX ORDER — ISOSORBIDE MONONITRATE 30 MG/1
30 TABLET, EXTENDED RELEASE ORAL EVERY MORNING
Status: DISCONTINUED | OUTPATIENT
Start: 2023-03-15 | End: 2023-03-17 | Stop reason: HOSPADM

## 2023-03-15 RX ORDER — NITROGLYCERIN 0.4 MG/1
0.4 TABLET SUBLINGUAL ONCE
Status: COMPLETED | OUTPATIENT
Start: 2023-03-15 | End: 2023-03-15

## 2023-03-15 RX ORDER — FAMOTIDINE 20 MG/1
20 TABLET, FILM COATED ORAL
Status: DISCONTINUED | OUTPATIENT
Start: 2023-03-15 | End: 2023-03-17 | Stop reason: HOSPADM

## 2023-03-15 RX ORDER — TAMSULOSIN HYDROCHLORIDE 0.4 MG/1
0.4 CAPSULE ORAL
Status: DISCONTINUED | OUTPATIENT
Start: 2023-03-15 | End: 2023-03-17 | Stop reason: HOSPADM

## 2023-03-15 RX ORDER — INSULIN LISPRO 100 [IU]/ML
1-5 INJECTION, SOLUTION INTRAVENOUS; SUBCUTANEOUS
Status: DISCONTINUED | OUTPATIENT
Start: 2023-03-16 | End: 2023-03-17 | Stop reason: HOSPADM

## 2023-03-15 RX ORDER — NITROGLYCERIN 0.4 MG/1
0.4 TABLET SUBLINGUAL
Status: DISCONTINUED | OUTPATIENT
Start: 2023-03-15 | End: 2023-03-17 | Stop reason: HOSPADM

## 2023-03-15 RX ORDER — ENOXAPARIN SODIUM 100 MG/ML
40 INJECTION SUBCUTANEOUS DAILY
Status: DISCONTINUED | OUTPATIENT
Start: 2023-03-15 | End: 2023-03-17 | Stop reason: HOSPADM

## 2023-03-15 RX ORDER — METHYLPREDNISOLONE SODIUM SUCCINATE 125 MG/2ML
125 INJECTION, POWDER, LYOPHILIZED, FOR SOLUTION INTRAMUSCULAR; INTRAVENOUS ONCE
Status: COMPLETED | OUTPATIENT
Start: 2023-03-15 | End: 2023-03-15

## 2023-03-15 RX ORDER — PANTOPRAZOLE SODIUM 20 MG/1
20 TABLET, DELAYED RELEASE ORAL 2 TIMES DAILY WITH MEALS
Status: DISCONTINUED | OUTPATIENT
Start: 2023-03-15 | End: 2023-03-17

## 2023-03-15 RX ORDER — LANOLIN ALCOHOL/MO/W.PET/CERES
400 CREAM (GRAM) TOPICAL DAILY
Status: DISCONTINUED | OUTPATIENT
Start: 2023-03-15 | End: 2023-03-17 | Stop reason: HOSPADM

## 2023-03-15 RX ORDER — ALPRAZOLAM 0.5 MG/1
0.5 TABLET ORAL
Status: DISCONTINUED | OUTPATIENT
Start: 2023-03-15 | End: 2023-03-17 | Stop reason: HOSPADM

## 2023-03-15 RX ORDER — SODIUM CHLORIDE, SODIUM LACTATE, POTASSIUM CHLORIDE, CALCIUM CHLORIDE 600; 310; 30; 20 MG/100ML; MG/100ML; MG/100ML; MG/100ML
125 INJECTION, SOLUTION INTRAVENOUS CONTINUOUS
Status: DISCONTINUED | OUTPATIENT
Start: 2023-03-15 | End: 2023-03-15

## 2023-03-15 RX ORDER — IPRATROPIUM BROMIDE AND ALBUTEROL SULFATE 2.5; .5 MG/3ML; MG/3ML
3 SOLUTION RESPIRATORY (INHALATION) ONCE
Status: COMPLETED | OUTPATIENT
Start: 2023-03-15 | End: 2023-03-15

## 2023-03-15 RX ORDER — ZINC SULFATE 50(220)MG
220 CAPSULE ORAL DAILY
Status: DISCONTINUED | OUTPATIENT
Start: 2023-03-15 | End: 2023-03-17 | Stop reason: HOSPADM

## 2023-03-15 RX ORDER — LANOLIN ALCOHOL/MO/W.PET/CERES
1 CREAM (GRAM) TOPICAL
Status: DISCONTINUED | OUTPATIENT
Start: 2023-03-16 | End: 2023-03-17 | Stop reason: HOSPADM

## 2023-03-15 RX ORDER — CLOPIDOGREL BISULFATE 75 MG/1
75 TABLET ORAL DAILY
Status: DISCONTINUED | OUTPATIENT
Start: 2023-03-15 | End: 2023-03-17 | Stop reason: HOSPADM

## 2023-03-15 RX ORDER — ASPIRIN 81 MG/1
324 TABLET, CHEWABLE ORAL ONCE
Status: COMPLETED | OUTPATIENT
Start: 2023-03-15 | End: 2023-03-15

## 2023-03-15 RX ORDER — FUROSEMIDE 10 MG/ML
40 INJECTION INTRAMUSCULAR; INTRAVENOUS ONCE
Status: COMPLETED | OUTPATIENT
Start: 2023-03-15 | End: 2023-03-15

## 2023-03-15 RX ORDER — FINASTERIDE 5 MG/1
5 TABLET, FILM COATED ORAL DAILY
Status: DISCONTINUED | OUTPATIENT
Start: 2023-03-15 | End: 2023-03-17 | Stop reason: HOSPADM

## 2023-03-15 RX ORDER — RANOLAZINE 500 MG/1
500 TABLET, EXTENDED RELEASE ORAL 2 TIMES DAILY
Status: DISCONTINUED | OUTPATIENT
Start: 2023-03-15 | End: 2023-03-17 | Stop reason: HOSPADM

## 2023-03-15 RX ORDER — OMEGA-3S/DHA/EPA/FISH OIL/D3 300MG-1000
400 CAPSULE ORAL
Status: DISCONTINUED | OUTPATIENT
Start: 2023-03-15 | End: 2023-03-17 | Stop reason: HOSPADM

## 2023-03-15 RX ORDER — DIPHENHYDRAMINE HYDROCHLORIDE 50 MG/ML
50 INJECTION INTRAMUSCULAR; INTRAVENOUS ONCE
Status: COMPLETED | OUTPATIENT
Start: 2023-03-15 | End: 2023-03-15

## 2023-03-15 RX ADMIN — IPRATROPIUM BROMIDE AND ALBUTEROL SULFATE 3 ML: 2.5; .5 SOLUTION RESPIRATORY (INHALATION) at 10:58

## 2023-03-15 RX ADMIN — ENOXAPARIN SODIUM 40 MG: 40 INJECTION SUBCUTANEOUS at 14:39

## 2023-03-15 RX ADMIN — ALPRAZOLAM 0.5 MG: 0.5 TABLET ORAL at 21:36

## 2023-03-15 RX ADMIN — CHOLECALCIFEROL (VITAMIN D3) 10 MCG (400 UNIT) TABLET 400 UNITS: at 14:38

## 2023-03-15 RX ADMIN — IPRATROPIUM BROMIDE AND ALBUTEROL SULFATE 3 ML: 2.5; .5 SOLUTION RESPIRATORY (INHALATION) at 10:57

## 2023-03-15 RX ADMIN — FAMOTIDINE 20 MG: 20 TABLET, FILM COATED ORAL at 21:32

## 2023-03-15 RX ADMIN — CLOPIDOGREL BISULFATE 75 MG: 75 TABLET ORAL at 14:38

## 2023-03-15 RX ADMIN — FUROSEMIDE 40 MG: 10 INJECTION, SOLUTION INTRAMUSCULAR; INTRAVENOUS at 11:35

## 2023-03-15 RX ADMIN — MORPHINE SULFATE 4 MG: 4 INJECTION INTRAVENOUS at 10:48

## 2023-03-15 RX ADMIN — NITROGLYCERIN 0.4 MG: 0.4 TABLET SUBLINGUAL at 10:45

## 2023-03-15 RX ADMIN — NITROGLYCERIN 0.4 MG: 0.4 TABLET SUBLINGUAL at 09:06

## 2023-03-15 RX ADMIN — RANOLAZINE 500 MG: 500 TABLET, EXTENDED RELEASE ORAL at 21:32

## 2023-03-15 RX ADMIN — ASPIRIN 81 MG CHEWABLE TABLET 324 MG: 81 TABLET CHEWABLE at 11:35

## 2023-03-15 RX ADMIN — METOPROLOL SUCCINATE 75 MG: 50 TABLET, EXTENDED RELEASE ORAL at 21:32

## 2023-03-15 RX ADMIN — PANTOPRAZOLE SODIUM 20 MG: 20 TABLET, DELAYED RELEASE ORAL at 17:01

## 2023-03-15 RX ADMIN — Medication 1 TABLET: at 14:38

## 2023-03-15 RX ADMIN — METHYLPREDNISOLONE SODIUM SUCCINATE 125 MG: 125 INJECTION, POWDER, FOR SOLUTION INTRAMUSCULAR; INTRAVENOUS at 10:58

## 2023-03-15 RX ADMIN — DOCUSATE SODIUM 100 MG: 100 CAPSULE, LIQUID FILLED ORAL at 14:38

## 2023-03-15 RX ADMIN — FINASTERIDE 5 MG: 5 TABLET, FILM COATED ORAL at 14:38

## 2023-03-15 RX ADMIN — MAGNESIUM OXIDE TAB 400 MG (241.3 MG ELEMENTAL MG) 400 MG: 400 (241.3 MG) TAB at 14:38

## 2023-03-15 RX ADMIN — ZINC SULFATE 220 MG (50 MG) CAPSULE 220 MG: CAPSULE at 14:38

## 2023-03-15 RX ADMIN — PRAVASTATIN SODIUM 40 MG: 40 TABLET ORAL at 17:02

## 2023-03-15 RX ADMIN — DIPHENHYDRAMINE HYDROCHLORIDE 50 MG: 50 INJECTION, SOLUTION INTRAMUSCULAR; INTRAVENOUS at 10:57

## 2023-03-15 RX ADMIN — ISOSORBIDE MONONITRATE 30 MG: 30 TABLET, EXTENDED RELEASE ORAL at 14:38

## 2023-03-15 RX ADMIN — IOHEXOL 100 ML: 350 INJECTION, SOLUTION INTRAVENOUS at 10:12

## 2023-03-15 RX ADMIN — TAMSULOSIN HYDROCHLORIDE 0.4 MG: 0.4 CAPSULE ORAL at 17:01

## 2023-03-15 NOTE — ASSESSMENT & PLAN NOTE
Wt Readings from Last 3 Encounters:   03/15/23 65 8 kg (145 lb)   01/13/23 67 6 kg (149 lb)   08/11/22 67 5 kg (148 lb 12 8 oz)   History of chronic diastolic heart failure NYHA class II  Echo / WALT showed EF of 45% with mild to moderate MR and mild to moderate AI with some stenosis from April last year  Continue Lasix 40 mg p o  daily  Patient received a dose of Lasix in the ED  Patient is not hypoxic or complaining of shortness of breath    CT chest showed some pulmonary interstitial edema with small bilateral pleural effusions  Update 2D echo

## 2023-03-15 NOTE — ASSESSMENT & PLAN NOTE
Patient has history of Billroth II gastrectomy for PUD  Patient follows up with GI  Last EGD in May 2022-status post Billroth II, intestinal metaplasia of the gastric body, friability of the gastric mucosa possibly bile related, mild esophagitis with dysmotility and diverticulum at 30 cm  Continue PPI-Protonix milligrams in a m  and Pepcid 20 mg at night  Patient did have some choking with food this evening  Will get swallow evaluation

## 2023-03-15 NOTE — ASSESSMENT & PLAN NOTE
Continue Toprol-XL which was increased to 75 mg p o  twice daily by cardiology Patient arrived for INR testing and management of anticoagulation. INR is 2.7 with goal of 2.0 to 3.0. Per protocol, Warfarin dose remains the same with INR recheck in 4 weeks. Dose, return date and conditions requiring contact with clinic discussed.  Patient verbalized understanding of instructions and is aware of need to contact clinic with changes in medication, diet and/or health status.  No changes in diet.  Medication changes include: none.

## 2023-03-15 NOTE — H&P
[address]  H&P- [facility] 8/2/1930, 80 y o  male MRN: [id number]  Unit/Bed#: [address]  Encounter: [id number]  Primary Care Provider: Holland Gomez DO   Date and time admitted to hospital: 3/15/2023  8:50 AM    * Chest pain  Assessment & Plan  Patient presented with severe chest pain which is not relieved with nitroglycerin  Known history of chronic stable angina and has been on Imdur and Ranexa  Cardiology was consulted and appreciate input  Toprol-XL was increased to 75 mg p o  twice daily  Patient to be scheduled for nuclear stress test and echo in a   Continue aspirin, Plavix, Toprol-XL and statin    Acute on chronic diastolic heart failure (HCC)  Assessment & Plan  Wt Readings from Last 3 Encounters:   03/15/23 65 8 kg (145 lb)   01/13/23 67 6 kg (149 lb)   08/11/22 67 5 kg (148 lb 12 8 oz)   History of chronic diastolic heart failure NYHA class II  Echo / WALT showed EF of 45% with mild to moderate MR and mild to moderate AI with some stenosis from April last year  Continue Lasix 40 mg p o  daily  Patient received a dose of Lasix in the ED  Patient is not hypoxic or complaining of shortness of breath  CT chest showed some pulmonary interstitial edema with small bilateral pleural effusions  Update 2D echo            3-vessel CAD  Assessment & Plan  Status post CABG with stents    Patient has diffuse three-vessel disease with poor targets  Patient also has history of chronic stable angina-continue Ranexa and Imdur    Hyperlipidemia  Assessment & Plan  Crestor substituted with Pravachol 40 mg p o  daily    Hypertension  Assessment & Plan  Continue Toprol-XL which was increased to 75 mg p o  twice daily by cardiology    Benign prostatic hyperplasia  Assessment & Plan  Continue Avodart and Flomax    Dysphagia  Assessment & Plan  Patient has history of Billroth II gastrectomy for PUD  Patient follows up with GI  Last EGD in May 2022-status post Billroth II, intestinal metaplasia of the gastric body, friability of the gastric mucosa possibly bile related, mild esophagitis with dysmotility and diverticulum at 30 cm  Continue PPI-Protonix milligrams in a m  and Pepcid 20 mg at night  Patient did have some choking with food this evening  Will get swallow evaluation  VTE Pharmacologic Prophylaxis: VTE Score: 3 Moderate Risk (Score 3-4) - Pharmacological DVT Prophylaxis Ordered: enoxaparin (Lovenox)  Code Status: Level 1 - Full Code   Discussion with family: Updated  (wife and daughter) at bedside  Anticipated Length of Stay: Patient will be admitted on an observation basis with an anticipated length of stay of less than 2 midnights secondary to Chest pain, acute CHF  Total Time Spent on Date of Encounter in care of patient: 65 minutes This time was spent on one or more of the following: performing physical exam; counseling and coordination of care; obtaining or reviewing history; documenting in the medical record; reviewing/ordering tests, medications or procedures; communicating with other healthcare professionals and discussing with patient's family/caregivers  Chief Complaint: Chest pain    History of Present Illness:  Milagros Mckenzie is a 80 y o  male with a PMH of CAD status Post CABG and PTCA, hypertension, hyperlipidemia, chronic diastolic heart failure, DVT, PUD with dysphagia who presents with chest pain since 530 this morning  Patient has history of chronic stable angina and does get chest pain which usually gets relieved with nitroglycerin  Patient took 2 nitroglycerin and did not help resolve the pain  Patient has significant retrosternal chest pain sometimes radiating to the right which was 10/10 intensity  Patient received more nitroglycerin-4 nitroglycerin and morphine in the ED with improved symptoms  Patient also reports problems with swallowing and follows up with GI and had EGD last year  Recently no problems with swallowing    Patient reports the pain is worse with deep breathing  In the ED patient's troponins were negative and EKG was unremarkable  CT chest showed pulmonary interstitial edema with small bilateral pleural effusions with  Review of Systems:  Review of Systems   Constitutional: Negative for chills, diaphoresis, fatigue and unexpected weight change  HENT: Negative for congestion, ear discharge, ear pain, facial swelling, hearing loss, mouth sores, nosebleeds, postnasal drip, rhinorrhea, sinus pressure, sneezing, sore throat, tinnitus, trouble swallowing and voice change  Eyes: Negative for photophobia, discharge, redness and visual disturbance  Respiratory: Positive for chest tightness  Negative for cough, shortness of breath, wheezing and stridor  Cardiovascular: Positive for chest pain  Negative for palpitations and leg swelling  Gastrointestinal: Negative for abdominal distention, abdominal pain, anal bleeding, blood in stool, constipation, diarrhea, nausea and vomiting  Endocrine: Negative for polydipsia, polyphagia and polyuria  Genitourinary: Negative for decreased urine volume, difficulty urinating, dysuria, flank pain, frequency, hematuria and urgency  Musculoskeletal: Negative for arthralgias, back pain and neck stiffness  Skin: Negative for pallor and rash  Neurological: Negative for dizziness, seizures, facial asymmetry, speech difficulty, light-headedness, numbness and headaches  Hematological: Negative for adenopathy  Does not bruise/bleed easily  Psychiatric/Behavioral: Negative for agitation and confusion         Past Medical and Surgical History:   Past Medical History:   Diagnosis Date   • Arthritis    • BPH (benign prostatic hyperplasia)    • Cervical spine fracture ([facility] ) 1997    C3   • Chest pain    • Colon polyp    • Coronary artery disease    • Dry eye    • DVT (deep venous thrombosis) ([facility] ) 2015    right leg- passed thru the IVC filter-stopped at the "patch" from bypass surgery   • Dysphagia 11/2021    minimal-"mass" esophagus with a "knob" in the middle   • Fracture of thoracic spine ([facility] ) 1997    T3   • Glaucoma    • Hyperlipidemia    • Hypertension    • Myocardial infarction ([facility] )    • Pneumonia    • PUD (peptic ulcer disease)        Past Surgical History:   Procedure Laterality Date   • ANGIOPLASTY      2 stents   • CHOLECYSTECTOMY     • COLONOSCOPY     • CORONARY ARTERY BYPASS GRAFT      x6   • CORONARY ARTERY BYPASS GRAFT     • CORONARY STENT PLACEMENT     • CYSTOSCOPY     • EYE SURGERY Right    • HERNIA REPAIR Right 11/3/2017    Procedure: REPAIR HERNIA INGUINAL;  Surgeon: Eva Rodriguez MD;  Location: [address] [place] [address];  Service: General   • IVC FILTER INSERTION      IVC filter   • AK CYSTO W/IRRIG & EVAC MULTPLE OBSTRUCTING CLOTS N/A 6/30/2018    Procedure: CYSTOSCOPY EVACUATION OF CLOTS, fulgeration;  Surgeon: Jac Barajas MD;  Location: [facility];  Service: Urology   • [address]    Billroth 2 gastrectomy-2/3 removal- bleeding ulcer   • TRANSURETHRAL RESECTION OF PROSTATE         Meds/Allergies:  Prior to Admission medications    Medication Sig Start Date End Date Taking? Authorizing Provider   ALPRAZolam Daksha Starch) 0 5 mg tablet Take 0 5 mg by mouth daily at bedtime as needed  7/19/18  Yes Historical Provider, MD   azithromycin (ZITHROMAX) 500 MG tablet  8/4/22  Yes Historical Provider, MD   Calcium Carbonate-Vitamin D (CALCIUM 600+D PO) Take by mouth every morning    Yes Historical Provider, MD   Carboxymethylcellulose Sodium (REFRESH TEARS OP) Apply to eye as needed   Yes Historical Provider, MD   cholecalciferol (VITAMIN D3) 400 units tablet Take 400 Units by mouth daily after lunch    Yes Historical Provider, MD   clopidogrel (PLAVIX) 75 mg tablet TAKE ONE TABLET BY MOUTH EVERY DAY 6/17/22  Yes Adelso Clark MD   dutasteride (AVODART) 0 5 mg capsule Take 1 capsule (0 5 mg total) by mouth in the morning   5/16/22  Yes Jeanette Nix PA-C   isosorbide mononitrate (IMDUR) 30 mg 24 hr tablet Take 1 tablet (30 mg total) by mouth every morning 2/7/23  Yes Adrienne White MD   Magnesium 250 MG TABS Take 1 tablet by mouth every morning    Yes Historical Provider, MD   metoprolol succinate (TOPROL-XL) 50 mg 24 hr tablet Take 1 tablet (50 mg total) by mouth 2 (two) times a day 1/13/23  Yes Adrienne White MD   multivitamin-iron-minerals-folic acid (CENTRUM) chewable tablet Chew 1 tablet every morning    Yes Historical Provider, MD   nitroglycerin (NITROSTAT) 0 4 mg SL tablet Place 1 tablet (0 4 mg total) under the tongue every 5 (five) minutes as needed for chest pain 7/19/22  Yes Adrienne White MD   pantoprazole (PROTONIX) 20 mg tablet Take 1 tablet (20 mg total) by mouth 2 (two) times a day 3/9/23  Yes Ashleigh Keita PA-C   ranolazine (RANEXA) 500 mg 12 hr tablet Take 1 tablet (500 mg total) by mouth 2 (two) times a day 7/11/22  Yes Adrienne White MD   rosuvastatin (CRESTOR) 5 mg tablet TAKE ONE TABLET BY MOUTH EVERY DAY AT BEDTIME 1/18/23  Yes Adrienne White MD   tamsulosin (Flomax) 0 4 mg Take 1 capsule (0 4 mg total) by mouth daily with dinner 6/16/22  Yes Carol Timmons PA-C   Zinc 25 MG TABS Take 25 mg by mouth daily at bedtime   Yes Historical Provider, MD   cefuroxime (CEFTIN) 250 mg tablet  8/4/22   Historical Provider, MD   Docusate Calcium (STOOL SOFTENER PO) Take by mouth OTC; takes with lunch    Historical Provider, MD   famotidine (PEPCID) 20 mg tablet Take 1 tablet (20 mg total) by mouth daily at bedtime 7/5/22 10/3/22  MATT Escalona     I have reviewed home medications using recent Epic encounter  Allergies: Allergies   Allergen Reactions   • Escitalopram Other (See Comments)     Suicidal feelings, sweating   • Oxycodone-Acetaminophen Dizziness and Shortness Of Breath     Other reaction(s): Faints  Reaction Date: [id number]; Category:  Adverse Reaction;    • Sucralfate GI Intolerance     Abdominal pain    • Sulfa Antibiotics Other (See Comments)   • Omeprazole Rash   • Statins Other (See Comments)     Muscle pain       Social History:  Marital Status: /Civil Union     Substance Use History:   Social History     Substance and Sexual Activity   Alcohol Use Never     Social History     Tobacco Use   Smoking Status Never   Smokeless Tobacco Never     Social History     Substance and Sexual Activity   Drug Use No       Family History:  Family History   Problem Relation Age of Onset   • Coronary artery disease Brother    • Heart disease Father         CAD       Physical Exam:     Vitals:   Blood Pressure: 128/76 (03/15/23 1532)  Pulse: 104 (03/15/23 1532)  Temperature: 98 2 °F (36 8 °C) (03/15/23 1532)  Temp Source: Oral (03/15/23 1357)  Respirations: 18 (03/15/23 1532)  Height: 5' 4" (162 6 cm) (03/15/23 0857)  Weight - Scale: 65 8 kg (145 lb) (03/15/23 0857)  SpO2: 90 % (03/15/23 1532)    Physical Exam  Constitutional:       Appearance: Normal appearance  HENT:      Head: Normocephalic and atraumatic  Eyes:      Extraocular Movements: Extraocular movements intact  Pupils: Pupils are equal, round, and reactive to light  Cardiovascular:      Rate and Rhythm: Normal rate and regular rhythm  Heart sounds: Murmur heard  No gallop  Pulmonary:      Effort: Pulmonary effort is normal       Breath sounds: Rhonchi present  Abdominal:      General: Bowel sounds are normal       Palpations: Abdomen is soft  Tenderness: There is no abdominal tenderness  Musculoskeletal:         General: No swelling or deformity  Normal range of motion  Cervical back: Normal range of motion and neck supple  Skin:     General: Skin is warm and dry  Neurological:      General: No focal deficit present  Mental Status: He is alert            Additional Data:     Lab Results:  Results from last 7 days   Lab Units 03/15/23  0906   WBC Thousand/uL 7 14   HEMOGLOBIN g/dL 12 9   HEMATOCRIT % 39 4   PLATELETS Thousands/uL 169 NEUTROS PCT % 67   LYMPHS PCT % 24   MONOS PCT % 8   EOS PCT % 1     Results from last 7 days   Lab Units 03/15/23  0906   SODIUM mmol/L 137   POTASSIUM mmol/L 4 3   CHLORIDE mmol/L 101   CO2 mmol/L 29   BUN mg/dL 18   CREATININE mg/dL 1 06   ANION GAP mmol/L 7   CALCIUM mg/dL 9 1   ALBUMIN g/dL 3 9   TOTAL BILIRUBIN mg/dL 0 51   ALK PHOS U/L 85   ALT U/L 8   AST U/L 7*   GLUCOSE RANDOM mg/dL 144*         Results from last 7 days   Lab Units 03/15/23  1612   POC GLUCOSE mg/dl 199*               Lines/Drains:  Invasive Devices     Peripheral Intravenous Line  Duration           Peripheral IV 03/15/23 Left Antecubital <1 day                    Imaging: Reviewed radiology reports from this admission including: chest xray and chest CT scan  CTA dissection protocol chest/abdomen/pelvis   Final Result by Arleth Steiner MD (03/15 1113)      No aortic aneurysm or dissection  Pulmonary interstitial edema with small bilateral pleural effusions  Colonic diverticulosis without diverticulitis  Stable pancreatic cystic lesions  In retrospect, these have shown little to no growth since the CT of 1/7/2017, and are therefore almost certainly benign  Workstation performed: [id number]         XR chest 1 view portable   Final Result by Blossom Barcenas MD (03/15 1214)   Midline sternotomy      Previous bilateral opacities have resolved      No acute cardiopulmonary disease  Workstation performed: [id number]             EKG and Other Studies Reviewed on Admission:   · EKG: NSR  HR 76 with nonspecific ST-T changes  ** Please Note: This note has been constructed using a voice recognition system   **

## 2023-03-15 NOTE — ED NOTES
This RN received pt from [address] [place] [address]   Pt is currently resting with no sign of distress  Respirations even, unlabored  Pt reports Sob is subsiding and chest pain is intermittent at a 2/10 pain  Pt wife went to cafeteria and states she will be back soon  Call bell and urinal at pt bedside         Coleman Tidwell RN  03/15/23 5312

## 2023-03-15 NOTE — ASSESSMENT & PLAN NOTE
Patient presented with severe chest pain which is not relieved with nitroglycerin  Known history of chronic stable angina and has been on Imdur and Ranexa  Cardiology was consulted and appreciate input  Toprol-XL was increased to 75 mg p o  twice daily  Patient to be scheduled for nuclear stress test and echo in a m    Continue aspirin, Plavix, Toprol-XL and statin

## 2023-03-15 NOTE — CONSULTS
Consultation - Cardiology   [facility]     Jennifer Florian 80 y o  male MRN: [id number]  : 1930  Unit/Bed#: 2 [facility] [address] Encounter: [id number]      Assessment & Plan   1  Chest pain  -  Patient reports he awoke around 0530hrs this morning (3/15/23) with intense chest tightness and pain  He states he took a nitroglycerin tablet and pain subsided  Patient reports anther episode of chest pain around 0800hrs with mild relief with nitroglycerin prompting him to seek care  He reports mild shortness of breath and right arm pain with chest pain, but denies palpitations, lightheadedness, dizziness, nausea or vomiting  Patient also reports chest pain is worse with inspiration  Wife at bedside reports patient has had a cough for a few weeks  - Patient with known history of chronic stable angina  - 3/15/23 hs troponin: 12 (0hrs), 10 (2hrs), 13 (4hrs)  - Plan for nuclear stress test tomorrow, 3/16/23    - Follow up TTE    - Continue Imdur 30 mg daily and Ranexa 500 mg twice daily  - Will increase Toprol XL from 50 mg twice daily to 75 mg twice daily  - Continue plavix 75 mg daily  - Continue nitroglycerin PRN    - 3/15/23 EKG unchanged from previous EKGs  - Continue telemetry  2  Coronary artery disease s/p CABG  - Patient presenting with chest pain  See above  - As per Dr Yeison Zapata 23 note- coronary artery disease status post CABG and stents with multiple cardiac catheterization a year ago ago in [facility] by me and it was recommended medical therapy  Patient has diffuse 3 vessel disease with poor targets  - Continue Imdur 30 mg daily and Ranexa 500 mg twice daily  - Will increase Toprol XL from 50 mg twice daily to 75 mg twice daily  - Continue plavix 75 mg daily  - Continue pravastatin 40 mg daily  3  Chronic diastolic heart failure  - Does not appear in acute phase  Euvolemic on examination    - Continue Imdur 30 mg daily     - Will increase Toprol XL from 50 mg twice daily to 75 mg twice daily    - Follow up TTE    - Daily weights  - Monitor I/Os  4  Hypertension      - Elevated readings on presentation, now improved  - Continue Imdur 30 mg daily  - Will increase Toprol XL from 50 mg twice daily to 75 mg twice daily  5  Mixed hyperlipidemia  - 11/29/22 lipid panel: cholesterol 154, triglycerides 169, HDL 38, LDL 82    - Continue pravastatin 40 mg daily  6  GERD      - Patient denies chest pain is associated with eating    - Continue pantoprazole 20mg twice daily and famotidine 20 mg daily  Summary of Recommendations: Thank you for your consultation  Physician Requesting Consult: Namrata Silav MD    Reason for Consult / Principal Problem: chest pain  Inpatient consult to Cardiology  Consult performed by: Grabiel Logan PA-C  Consult ordered by: Namrata Silva MD        HPI: Alphonse Stevenson is a 80y o  year old male with significant PMHx of 3-vessel CAD s/p CABG and multiple PCIs, chronic stable angina, chronic diastolic heart failure, HTN, mixed hyperlipidemia, GERD, anemia of chronic disease who presents for evaluation for chest pain  Patient reports he awoke around 0530hrs this morning (3/15/23) with intense chest tightness and pain  He states he took a nitroglycerin tablet and pain subsided  Patient reports anther episode of chest pain around 0800hrs with mild relief with nitroglycerin prompting him to seek care  He reports mild shortness of breath and right arm pain with chest pain, but denies palpitations, lightheadedness, dizziness, nausea or vomiting  Patient also reports chest pain is worse with inspiration  Wife at bedside reports patient has had a cough for a few weeks  In ED, patient reports he had severe chest pain when given IV contrast for CTA  Patient received two doses of nitroglycerin in ER and morphine which he states helped relieve his pain   He states he did not experience any chest pain for a little while but now during examination is reporting mild chest pain  3/15/23 hs troponin 12 (0hrs), 10 (2hrs), 13 (4hrs)  Patient follows with Dr Fidelina Sosa, patient with known chronic stable angina  He has been treated with medical management with imdur, metoprolol succinate, and renexa  Review of Systems   Constitutional: Positive for fatigue  Negative for activity change and unexpected weight change  Respiratory: Positive for chest tightness and shortness of breath  Negative for cough and wheezing  Cardiovascular: Positive for chest pain  Negative for palpitations and leg swelling  Skin: Negative  Neurological: Negative for dizziness, syncope, weakness and light-headedness         Historical Information   Past Medical History:   Diagnosis Date   • Arthritis    • BPH (benign prostatic hyperplasia)    • Cervical spine fracture ([facility] [address] ) 1997    C3   • Chest pain    • Colon polyp    • Coronary artery disease    • Dry eye    • DVT (deep venous thrombosis) ([facility] [address] ) 2015    right leg- passed thru the IVC filter-stopped at the "patch" from bypass surgery   • Dysphagia 11/2021    minimal-"mass" esophagus with a "knob" in the middle   • Fracture of thoracic spine ([facility] [address] ) 1997    T3   • Glaucoma    • Hyperlipidemia    • Hypertension    • Myocardial infarction ([facility] [address] )    • Pneumonia    • PUD (peptic ulcer disease)      Past Surgical History:   Procedure Laterality Date   • ANGIOPLASTY      2 stents   • CHOLECYSTECTOMY     • COLONOSCOPY     • CORONARY ARTERY BYPASS GRAFT      x6   • CORONARY ARTERY BYPASS GRAFT     • CORONARY STENT PLACEMENT     • CYSTOSCOPY     • EYE SURGERY Right    • HERNIA REPAIR Right 11/3/2017    Procedure: REPAIR HERNIA INGUINAL;  Surgeon: Ivett Nolan MD;  Location: [address] [place] [address];  Service: General   • IVC FILTER INSERTION      IVC filter   • CA CYSTO W/IRRIG & EVAC MULTPLE OBSTRUCTING CLOTS N/A 6/30/2018    Procedure: CYSTOSCOPY EVACUATION OF CLOTS, fulgeration;  Surgeon: Oscar Alvarez MD;  Location: [facility];  Service: Urology   • [address]    Billrot 2 gastrectomy-2/3 removal- bleeding ulcer   • TRANSURETHRAL RESECTION OF PROSTATE       Social History     Substance and Sexual Activity   Alcohol Use Never     Social History     Substance and Sexual Activity   Drug Use No     Social History     Tobacco Use   Smoking Status Never   Smokeless Tobacco Never     Family History:   Family History   Problem Relation Age of Onset   • Coronary artery disease Brother    • Heart disease Father         CAD       Meds/Allergies    PTA meds:    Medications Prior to Admission   Medication   • isosorbide mononitrate (IMDUR) 30 mg 24 hr tablet   • pantoprazole (PROTONIX) 20 mg tablet   • ALPRAZolam (XANAX) 0 5 mg tablet   • azithromycin (ZITHROMAX) 500 MG tablet   • Calcium Carbonate-Vitamin D (CALCIUM 600+D PO)   • Carboxymethylcellulose Sodium (REFRESH TEARS OP)   • cefuroxime (CEFTIN) 250 mg tablet   • cholecalciferol (VITAMIN D3) 400 units tablet   • clopidogrel (PLAVIX) 75 mg tablet   • Docusate Calcium (STOOL SOFTENER PO)   • dutasteride (AVODART) 0 5 mg capsule   • famotidine (PEPCID) 20 mg tablet   • Magnesium 250 MG TABS   • metoprolol succinate (TOPROL-XL) 50 mg 24 hr tablet   • multivitamin-iron-minerals-folic acid (CENTRUM) chewable tablet   • nitroglycerin (NITROSTAT) 0 4 mg SL tablet   • ranolazine (RANEXA) 500 mg 12 hr tablet   • rosuvastatin (CRESTOR) 5 mg tablet   • tamsulosin (Flomax) 0 4 mg   • Zinc 25 MG TABS      Allergies   Allergen Reactions   • Escitalopram Other (See Comments)     Suicidal feelings, sweating   • Oxycodone-Acetaminophen Dizziness and Shortness Of Breath     Other reaction(s): Faints  Reaction Date: [id number]; Category:  Adverse Reaction;    • Sucralfate GI Intolerance     Abdominal pain    • Sulfa Antibiotics Other (See Comments)   • Omeprazole Rash   • Statins Other (See Comments)     Muscle pain       Current Facility-Administered Medications:   •  ALPRAZolam (XANAX) tablet 0 5 mg, 0 5 mg, Oral, HS PRN, Ct Tracey MD  •  [START ON 3/16/2023] calcium carbonate-vitamin D 500 mg-5 mcg tablet 1 tablet, 1 tablet, Oral, Daily With Breakfast, Ct Tracey MD  •  cholecalciferol (VITAMIN D3) tablet 400 Units, 400 Units, Oral, After Steve Wesley MD  •  clopidogrel (PLAVIX) tablet 75 mg, 75 mg, Oral, Daily, Ct Tracey MD  •  docusate sodium (COLACE) capsule 100 mg, 100 mg, Oral, Daily, Arlen Fritz MD  •  enoxaparin (LOVENOX) subcutaneous injection 40 mg, 40 mg, Subcutaneous, Daily, Ct Tracey MD  •  famotidine (PEPCID) tablet 20 mg, 20 mg, Oral, HS, Ct Tracey MD  •  finasteride (PROSCAR) tablet 5 mg, 5 mg, Oral, Daily, Ct Tracey MD  •  isosorbide mononitrate (IMDUR) 24 hr tablet 30 mg, 30 mg, Oral, QAM, Arlen Fritz MD  •  magnesium Oxide (MAG-OX) tablet 400 mg, 400 mg, Oral, Daily, Ct Tracey MD  •  metoprolol succinate (TOPROL-XL) 24 hr tablet 50 mg, 50 mg, Oral, BID, Ct rTacey MD  •  multivitamin-minerals (CENTRUM) tablet 1 tablet, 1 tablet, Oral, Daily, Poncho Fritz MD  •  nitroglycerin (NITROSTAT) SL tablet 0 4 mg, 0 4 mg, Sublingual, Q5 Min PRN, Ct Tracey MD  •  pantoprazole (PROTONIX) EC tablet 20 mg, 20 mg, Oral, BID With Meals, Ct Tracey MD  •  pravastatin (PRAVACHOL) tablet 40 mg, 40 mg, Oral, Daily With Javier Hussein MD  •  ranolazine (RANEXA) 12 hr tablet 500 mg, 500 mg, Oral, BID, Poncho Fritz MD  •  tamsulosin (FLOMAX) capsule 0 4 mg, 0 4 mg, Oral, Daily With Javier Hussein MD  •  zinc sulfate (ZINCATE) capsule 220 mg, 220 mg, Oral, Daily, Arlen Fritz MD    VTE Pharmacologic Prophylaxis: none  Objective:   Vitals: Blood pressure 149/91, pulse (!) 107, temperature 97 8 °F (36 6 °C), resp  rate 17, height 5' 4" (1 626 m), weight 65 8 kg (145 lb), SpO2 94 %    Body mass index is 24 89 kg/m²  Wt Readings from Last 3 Encounters:   03/15/23 65 8 kg (145 lb)   01/13/23 67 6 kg (149 lb)   08/11/22 67 5 kg (148 lb 12 8 oz)     BP Readings from Last 3 Encounters:   03/15/23 149/91   01/13/23 142/80   08/11/22 122/55     Orthostatic Blood Pressures    Flowsheet Row Most Recent Value   Blood Pressure 149/91 filed at 03/15/2023 1357   Patient Position - Orthostatic VS Sitting filed at 03/15/2023 1300          Intake/Output Summary (Last 24 hours) at 3/15/2023 1412  Last data filed at 3/15/2023 1253  Gross per 24 hour   Intake --   Output 375 ml   Net -375 ml       Invasive Devices     Peripheral Intravenous Line  Duration           Peripheral IV 03/15/23 Left Antecubital <1 day                  Physical Exam:   Physical Exam  Vitals reviewed  Constitutional:       General: He is not in acute distress  Cardiovascular:      Rate and Rhythm: Normal rate and regular rhythm  Heart sounds: Murmur heard  Pulmonary:      Effort: Pulmonary effort is normal  No respiratory distress  Breath sounds: Decreased breath sounds present  Musculoskeletal:      Right lower leg: No edema  Left lower leg: No edema  Skin:     General: Skin is warm and dry  Neurological:      Mental Status: He is alert         Labs:   Troponins:  Results from last 7 days   Lab Units 03/15/23  1308 03/15/23  1049   HSTNI D2 ng/L  --  -2   HSTNI D4 ng/L 1  --        CBC with diff:   Results from last 7 days   Lab Units 03/15/23  0906   WBC Thousand/uL 7 14   HEMOGLOBIN g/dL 12 9   HEMATOCRIT % 39 4   MCV fL 93   PLATELETS Thousands/uL 169   MCH pg 30 6   MCHC g/dL 32 7   RDW % 13 2   MPV fL 10 0   NRBC AUTO /100 WBCs 0       CMP:   Results from last 7 days   Lab Units 03/15/23  0906   SODIUM mmol/L 137   POTASSIUM mmol/L 4 3   CHLORIDE mmol/L 101   CO2 mmol/L 29   ANION GAP mmol/L 7   BUN mg/dL 18   CREATININE mg/dL 1 06   CALCIUM mg/dL 9 1   AST U/L 7*   ALT U/L 8   ALK PHOS U/L 85   TOTAL PROTEIN g/dL 7 2   ALBUMIN g/dL 3  9   TOTAL BILIRUBIN mg/dL 0 51   EGFR ml/min/1 73sq m 60   GLUCOSE RANDOM mg/dL 144*       Magnesium:   Results from last 7 days   Lab Units 03/15/23  0906   MAGNESIUM mg/dL 2 1     Coags:     TSH:      No components found for: TSH3  Lipid Profile:     Lipid Profile:   Lab Results   Component Value Date    CHOLESTEROL 154 11/29/2022    HDL 38 (L) 11/29/2022    LDLCALC 82 11/29/2022    TRIG 169 (H) 11/29/2022     Hgb A1c:     NT-proBNP: No results for input(s): NTBNP in the last 72 hours  Imaging & Testing     Cardiac testing:     Echo complete with contrast if indicated- 2/12/2022    Left Ventricle Left ventricular cavity size is normal  Wall thickness is normal  The left ventricular ejection fraction is 50%  Systolic function is mildly reduced  There is mild global hypokinesis  Diastolic function is severely abnormal, consistent with ungraded restrictive relaxation  Left atrial filling pressure is elevated  Right Ventricle Systolic function is low normal  The ejection fraction is borderline low  Left Atrium The atrium is mildly dilated  Right Atrium The atrium is normal in size  Aortic Valve The aortic valve is functionally bicuspid  The leaflets are moderately thickened  The leaflets are moderately calcified  There is severely reduced mobility  There is mild regurgitation with a centrally directed jet  There is moderate stenosis  Mitral Valve The mitral valve has normal structure  There is mild thickening  There is mild calcification  There is moderate regurgitation  Tricuspid Valve There is mild regurgitation  There is no evidence of stenosis  Pulmonic Valve The pulmonic valve was not well visualized  There is mild to moderate regurgitation  There is no evidence of stenosis         Results for orders placed during the hospital encounter of 07/16/21    Echo complete with contrast if indicated    Narrative  [address] [place] [address]  Satya Frausto [address]  [phone]    Transthoracic Echocardiogram  2D, M-mode, Doppler, and Color Doppler    Study date:  2021    Patient: Milana Adamson  MR number: [id number]  Account number: [de-identified]  : 02-Aug-1930  Age: 80 years  Gender: Male  Status: Outpatient  Location: Echo lab  Height: 65 in  Weight: 151 6 lb  BP: 122/ 58 mmHg    Indications: Aortic Stenosis    Diagnoses: 424 1 - AORTIC VALVE DISORDER    Sonographer:  YURI Ortega  Primary Physician:  Ben Sim DO  Referring Physician:  Beulah Funes MD  Group:  [facility]  Interpreting Physician:  Louise Anaya MD    SUMMARY    LEFT VENTRICLE:  Systolic function was at the lower limits of normal by visual assessment  Ejection fraction was estimated in the range of 45 % to 50 % to be 45 %  There was mild diffuse hypokinesis  Wall thickness was mildly increased  LEFT ATRIUM:  The atrium was mildly dilated  MITRAL VALVE:  There was mild to moderate annular calcification  Transmitral velocity was increased due to increased transvalvular flow  There was moderate to severe regurgitation  Based on elevated E' and central color flow jet  Unable to obtain PISA measurement for effective ERO calculation  Consider WALT for better evaluation    AORTIC VALVE:  The valve was probably trileaflet  Leaflets exhibited mildly increased thickness, mild calcification, moderately reduced cuspal separation, and sclerosis  Transaortic velocity was increased due to increased transvalvular flow  There was moderate stenosis  There was mild to moderate regurgitation  Valve mean gradient was 26 mmHg  HISTORY: PRIOR HISTORY: 3-vessel CAD,Chronic stable Angina,Chronic Diastolic heart failure,HTN,Murmur,DVT,Hyperlipidemia,anemia  PROCEDURE: The procedure was performed in the echo lab  This was a routine study  The transthoracic approach was used   The study included complete 2D imaging, M-mode, complete spectral Doppler, and color Doppler  The heart rate was 61 bpm,  at the start of the study  Images were obtained from the parasternal, apical, subcostal, and suprasternal notch acoustic windows  Image quality was adequate  LEFT VENTRICLE: Size was normal  Systolic function was at the lower limits of normal by visual assessment  Ejection fraction was estimated in the range of 45 % to 50 % to be 45 %  There was mild diffuse hypokinesis  Wall thickness was  mildly increased  No evidence of apical thrombus  DOPPLER: The study was not technically sufficient to allow evaluation of LV diastolic function  RIGHT VENTRICLE: The size was normal  Systolic function was normal  Wall thickness was normal     LEFT ATRIUM: The atrium was mildly dilated  RIGHT ATRIUM: Size was normal     MITRAL VALVE: There was mild to moderate annular calcification  There was mild-moderate calcification  There was mildly reduced leaflet separation  DOPPLER: Transmitral velocity was increased due to increased transvalvular flow  There was  no evidence for stenosis  There was moderate to severe regurgitation  Based on elevated E' and central color flow jet  Unable to obtain PISA measurement for effective ERO calculation  Consider WALT for better evaluation    AORTIC VALVE: The valve was probably trileaflet  Leaflets exhibited mildly increased thickness, mild calcification, moderately reduced cuspal separation, and sclerosis  DOPPLER: Transaortic velocity was increased due to increased  transvalvular flow  There was moderate stenosis  There was mild to moderate regurgitation  TRICUSPID VALVE: The valve structure was normal  There was normal leaflet separation  DOPPLER: The transtricuspid velocity was within the normal range  There was no evidence for stenosis  There was trace regurgitation  PULMONIC VALVE: Leaflets exhibited normal thickness, no calcification, and normal cuspal separation  DOPPLER: The transpulmonic velocity was within the normal range   There was trace regurgitation  PERICARDIUM: There was no pericardial effusion  The pericardium was normal in appearance  AORTA: The root exhibited normal size  SYSTEMIC VEINS: IVC: The inferior vena cava was not well visualized  MEASUREMENT TABLES    DOPPLER MEASUREMENTS  Aortic valve   (Reference normals)  Peak gilda   350 cm/s   (--)  Peak gradient   48 mmHg   (--)  Mean gradient   26 mmHg   (--)  Regurg PHT   460 ms   (--)    SYSTEM MEASUREMENT TABLES    2D  EF (Teich): 49 86 %  %FS: 25 27 %  JUDY Planimetry: 0 86 cm2  Ao Diam: 3 1 cm  EDV(Teich): 104 22 ml  ESV(Teich): 52 25 ml  IVSd: 1 24 cm  LA Area: 22 39 cm2  LA Diam: 4 03 cm  LVEDV MOD A4C: 174 08 ml  LVEF MOD A4C: 50 26 %  LVESV MOD A4C: 86 59 ml  LVIDd: 4 74 cm  LVIDs: 3 54 cm  LVLd A4C: 9 35 cm  LVLs A4C: 8 17 cm  LVOT Diam: 1 96 cm  LVPWd: 1 21 cm  RA Area: 12 71 cm2  RVIDd: 2 45 cm  SV (Teich): 51 97 ml  SV MOD A4C: 87 49 ml    CW  AV Env  Ti: 361 56 ms  AV VTI: 86 51 cm  AV Vmax: 3 48 m/s  AV Vmean: 2 39 m/s  AV maxP 38 mmHg  AV meanP 03 mmHg  TR Vmax: 3 25 m/s  TR maxP 23 mmHg    PW  E' Sept: 0 05 m/s  LVOT Env  Ti: 361 56 ms  LVOT VTI: 27 25 cm  LVOT Vmax: 1 15 m/s  LVOT Vmean: 0 75 m/s  LVOT maxP 29 mmHg  LVOT meanP 61 mmHg    [facility] Accredited [facility]chocardiography Laboratory    Prepared and electronically signed by    Oren Post MD  Signed 2021 17:34:59    Results for orders placed during the hospital encounter of 18    NM Myocardial Perfusion Spect (Pharmacological Induced Stress and/or Rest)    [address]  [address] [facility] [phone]    Rest/Stress Gated SPECT Myocardial Perfusion Imaging After Exercise    ! ! ERROR!! End tag 'TR' does not match the start tag 'TD'   Error at or before /TR    Allergies: OXYCODONE-ACETAMINOPHEN, OMEPRAZOLE, STATINS    Diagnosis: R07 9 - Chest pain, unspecified    Primary Physician:  Katia Rasmussen,   Technician:  Octavio Willis  RN:  KENY Fernandez  Group:  [facility]  Report Prepared By[de-identified]  KENY Fernandez  Interpreting Physician:  Jumana Palmer DO    INDICATIONS: Evaluation of known coronary artery disease  HISTORY: The patient is a 80year old  male  Chest pain status: chest pain  Coronary artery disease risk factors: dyslipidemia, hypertension, and family history of premature coronary artery disease  Cardiovascular history:  coronary artery disease, prior myocardial infarction, congestive heart failure, and peripheral vascular occlusive disease  Prior cardiovascular procedures: percutaneous transluminal coronary angioplasty and coronary artery bypass grafting  Co-morbidity: anemia Medications: a beta blocker, digoxin, aspirin, and clopidogrel  PHYSICAL EXAM: Baseline physical exam screening: normal     REST ECG: Normal sinus rhythm  PROCEDURE: The study was performed in the stress lab  Treadmill exercise testing was performed, using the Alex protocol  Gated SPECT myocardial perfusion imaging was performed after stress and at rest  Systolic blood pressure was 118  mmHg, at the start of the study  Diastolic blood pressure was 68 mmHg, at the start of the study  The heart rate was 77 bpm, at the start of the study  IV double checked  ALEX PROTOCOL:  HR bpm SBP mmHg DBP mmHg Symptoms ST change Rhythm/conduct  Baseline 82 118 68 none -- NSR  Stage 1 131 12 68 moderate dyspnea downsloping depression, 1-1 5 mm in II, III, aVF, V5 and V6 --  Stage 2 137 -- -- severe dyspnea, moderate fatigue downsloping depression, 1 5-2 mm in II, III, aVF, V5 and V6 --  Immediate 136 140 72 same as above same as above --  Recovery 1 112 130 72 subsiding -- --  Recovery 2 96 120 70 none downsloping depression, 1-1 5 mm in II, III, aVF, V5 and V6 --  Recovery 3 94 -- -- -- 0 5-1 mm --  No medications or fluids given  STRESS SUMMARY: Duration of exercise was 3 min and 33 sec   The patient exercised to protocol stage 2  Maximal work rate was 5 9 METs  Maximal heart rate during stress was 137 bpm ( 104 % of maximal predicted heart rate)  The heart rate  response to stress was exaggerated  There was normal resting blood pressure with an appropriate response to stress  The rate-pressure product for the peak heart rate and blood pressure was 09095  There was no chest pain during stress  The  stress test was terminated due to achievement of target heart rate and severe dyspnea  Pre oxygen saturation: 97 %  Peak oxygen saturation: 97 %  There were no stress arrhythmias or conduction abnormalities  ISOTOPE ADMINISTRATION:  Resting isotope administration Stress isotope administration  Agent Tetrofosmin Tetrofosmin  Dose 9 8 mCi 31 2 mCi  Date 08/10/2018 08/10/2018    Radiopharmaceutical was administered 2 min, 32 sec into the stress protocol  There was 1 min of exercise after the injection  MYOCARDIAL PERFUSION IMAGING:  The image quality was good  Rotating projection images reveal subdiaphragmatic activity  Left ventricular size was normal     PERFUSION DEFECTS:  -  There was a moderate-sized, severe, fixed myocardial perfusion defect of the apical wall  GATED SPECT:  The calculated left ventricular ejection fraction was 43 %  There was severely reduced myocardial thickening and motion of the apical wall of the left ventricle  SUMMARY:  -  Stress results: Duration of exercise was 3 min and 33 sec  Target heart rate was achieved  There was no chest pain during stress  -  Perfusion imaging: There was a moderate-sized, severe, fixed myocardial perfusion defect of the apical wall  -  Gated SPECT: The calculated left ventricular ejection fraction was 43 %  There was severely reduced myocardial thickening and motion of the apical wall of the left ventricle  IMPRESSIONS: Abnormal study after pharmacologic stress  There was a small infarct in the apical region   Left ventricular systolic function was reduced, with regional wall motion abnormalities  Prepared and signed by    Sheri Jackson DO  Signed 08/10/2018 14:38:25      Imaging: I have personally reviewed pertinent reports  XR chest 1 view portable    Result Date: 3/15/2023  Narrative: CHEST INDICATION:   chest pain  COMPARISON:  2/11/2022 EXAM PERFORMED/VIEWS:  XR CHEST PORTABLE Single view FINDINGS: Midline sternotomy again evident Cardiomediastinal silhouette appears unremarkable  The lungs are clear  No pneumothorax or pleural effusion  Osseous structures appear within normal limits for patient age  Impression: Midline sternotomy Previous bilateral opacities have resolved No acute cardiopulmonary disease  Workstation performed: [id number]     CTA dissection protocol chest/abdomen/pelvis    Result Date: 3/15/2023  Narrative: CTA - CHEST, ABDOMEN AND PELVIS - WITHOUT AND WITH IV CONTRAST INDICATION:   chest pain  COMPARISON: Multiple prior CT scans, most recent a CTA dissection study 11/1/2021  TECHNIQUE: CT examination of the chest, abdomen and pelvis was performed both prior to and after the administration of intravenous contrast   The noncontrast portion of this examination was performed utilizing low radiation dose technique  Thin section angiographic arterial phase post contrast technique was used in order to evaluate for aortic dissection  3D reformatted images and volume rendering were performed on an independent workstation  Additionally, axial, sagittal, and coronal 2D reformatted images were created from the source data and submitted for interpretation  Radiation dose length product (DLP) for this visit:  1099 14 mGy-cm   This examination, like all CT scans performed in the [facility], was performed utilizing techniques to minimize radiation dose exposure, including the use of iterative reconstruction and automated exposure control   IV Contrast:  100 mL of iohexol (OMNIPAQUE) Enteric Contrast:  Enteric contrast was not administered  FINDINGS: AORTA:  There is no aortic dissection or intramural hematoma  There is no aortic aneurysm  Heavy diffuse atherosclerotic calcification of the entire aorta  CHEST LUNGS:  Diffuse interlobular septal thickening and mild groundglass densities bilaterally  There is no tracheal or endobronchial lesion  PLEURA:  Small bilateral effusions  HEART/PULMONARY ARTERIAL TREE:  Normal heart size  Extensive coronary artery calcifications  Status post CABG  Normal pulmonary arteries  MEDIASTINUM AND YARELI:  Calcified mediastinal and hilar lymph nodes consistent with prior granulomatous disease  CHEST WALL AND LOWER NECK:  Status post median sternotomy  ABDOMEN LIVER/BILIARY TREE:  Unremarkable  GALLBLADDER:  No calcified gallstones  No pericholecystic inflammatory change  SPLEEN:  Unremarkable  PANCREAS:  Unchanged 1 0 cm cystic lesion in the pancreatic neck on image 3/101, 1 4 cm cyst in the body on image 3/113, and 1 5 cm cyst in the tail on image 3/111  No significant ductal dilatation  No inflammatory changes  ADRENAL GLANDS:  Unremarkable  KIDNEYS/URETERS:  Unremarkable  No hydronephrosis  STOMACH AND BOWEL:  There is colonic diverticulosis without evidence of acute diverticulitis  APPENDIX:  No findings to suggest appendicitis  ABDOMINOPELVIC CAVITY:  No ascites or free intraperitoneal air  No lymphadenopathy  Stable IVC filter  PELVIS REPRODUCTIVE ORGANS:  Enlarged prostate gland status post TURP  URINARY BLADDER:  Unremarkable  ABDOMINAL WALL/INGUINAL REGIONS:  Unremarkable  OSSEOUS STRUCTURES:  There are age appropriate degenerative changes  No acute fracture or destructive osseous lesion  Unchanged chronic severe compression deformity of T3  Impression: No aortic aneurysm or dissection  Pulmonary interstitial edema with small bilateral pleural effusions  Colonic diverticulosis without diverticulitis  Stable pancreatic cystic lesions    In retrospect, these have shown little to no growth since the CT of 1/7/2017, and are therefore almost certainly benign  Workstation performed: [id number]       EKG/ Monitor: Personally reviewed       teleme     Code Status: Level 1 - Full Code  Advance Directive and Living Will:      POLST:        Kavon Caraballo PA-C

## 2023-03-15 NOTE — ASSESSMENT & PLAN NOTE
Status post CABG with stents    Patient has diffuse three-vessel disease with poor targets  Patient also has history of chronic stable angina-continue Ranexa and Imdur

## 2023-03-15 NOTE — APP STUDENT NOTE
AUBREY STUDENT  Inpatient H&P Exam for TRAINING ONLY  Not Part of Legal Medical Record       H&P Exam - Jarred Linda 80 y o  male MRN: [id number]  Unit/Bed#: ED 10 Encounter: [id number]    Assessment and Plan:  1  Chest pain  · Suspect unstable angina, as patient's angina is uncontrolled with negative troponins, EKG, and CT dissection studies,  · Patient has chronic stable angina that is normally controlled with nitroglycerin, aspirin, and clopidogrel (Plavix)  Patient's chest pain was not controlled by these medications today, but it did improve with morphine in the ED  · High sensitivity troponin at hour 0 and hour 2 are normal  Awaiting hour 4 results  · Magnesium normal at 2 1   · EKG in ED was normal sinus rhythm without ST segment elevation  · CT dissection protocol showed no aortic dissection or intramural hematoma  There is no aortic aneurysm  Heavy diffuse atherosclerotic calcification of the entire aorta  Normal heart size  Extensive coronary artery calcifications  S/P CABG  Normal pulmonary arteries  Pulmonary interstitial edema with small B/L pleural effusions  Colonic diverticulosis without diverticulitis  Stable pancreatic cystic lesions, which have shown little to no growth since CT performed on 1/7/2017  · Portable CXR showed no acute cardiopulmonary disease  Evidence of midline sternotomy noted  Cardiomediastinal silhouette appears unremarkable  · CBC shows no leukocytosis  Hb 11 4, which is decreased when compared to his last Hb in November that was 13 5  Denies rectal bleeding, gingival bleeding, hematemesis, and dark stools  · Nitroglycerin and ASA given in the ED  · Consider increasing dose of ranolazine (Ranexa) to 1000 mg PO BID  · Consider increasing dose of isosorbide mononitrate (Imdur) to 60 mg PO every morning  · Continue morphine 4 mg IV q4h PRN pain  · Continue clopidogrel (Plavix) 75 mg PO daily  · Continue metoprolol succinate (Toprol-XL) 50 mg PO BID    · Continue alprazolam (Xanax) 0 5 mg PO PRN at bedtime for anxiety  · Continue 2 L oxygen via nasal cannula  · Patient's last visit with Cardiology was on 1/13/2023 with Dr Mckenzie Vuong  Recommended that patient continue with current medical therapy  · Consult Cardiology due to patient's extensive cardiac history  Consider nuclear stress test vs medical management  2  Coronary artery disease   · History of cardiac catheterization with angioplasty (2 stents) and CABG x 6   · High sensitivity troponin at hour 0 and hour 2 are normal  Awaiting hour 4 results  · Magnesium normal at 2 1   · EKG in ED was normal sinus rhythm without ST segment elevation  · CT dissection protocol showed no aortic dissection or intramural hematoma  There is no aortic aneurysm  Heavy diffuse atherosclerotic calcification of the entire aorta  Normal heart size  Extensive coronary artery calcifications  S/P CABG  Normal pulmonary arteries  Pulmonary interstitial edema with small B/L pleural effusions  Colonic diverticulosis without diverticulitis  Stable pancreatic cystic lesions, which have shown little to no growth since CT performed on 1/7/2017  · Portable CXR showed no acute cardiopulmonary disease  Evidence of midline sternotomy noted  Cardiomediastinal silhouette appears unremarkable  · CBC shows no leukocytosis  Hb 11 4, which is decreased when compared to his last Hb in November that was 13 5  Denies rectal bleeding, hematemesis, and dark stools  · Nitroglycerin and ASA given in the ED  · Continue clopidogrel (Plavix) 75 mg PO daily  · Continue metoprolol succinate (Toprol-XL) 50 mg PO BID  · Continue 2 L oxygen via nasal cannula  · Order lipid panel in the AM   · Patient's last visit with Cardiology was on 1/13/2023 with Dr Mckenzie Vuong  Recommended that patient continue with current medical therapy  · Consult Cardiology due to patient's extensive cardiac history  Consider nuclear stress test vs medical management      3  Chronic diastolic heart failure  · Patient's last visit with Cardiology was on 1/13/2023 with Dr Sandrine Perdomo  Recommended continue with current medical therapy  · Patient's last ECHO  · Patient was given furosemide (Lasix) 40 mg IV once in the ED  · Continue clopidogrel (Plavix) 75 mg PO daily  · Continue metoprolol succinate (Toprol-XL) 50 mg PO BID  · Monitor daily weights  Patient's weight on admission is 65 8 kg (145 lb)  Last weight in Cardiology office was 67 6 kg (149 lb)  · Strict I/Os monitoring  · Patient's last visit with Cardiology was on 1/13/2023 with Dr Sandrine Perdomo  Recommended that patient continue with current medical therapy  · Consult Cardiology due to patient's extensive cardiac history  Consider nuclear stress test vs medical management  4  Hypertension  · Continue to monitor BP  · BP upon admission was 133/61  · Continue metoprolol succinate (Toprol-XL) 50 mg PO BID  5  Hyperlipidemia  · Order lipid panel in the AM   · Continue rosuvastatin (Crestor) 500 mg PO BID  · Continue to monitor  6  Peptic ulcer disease  · Continue famotidine (Pepcid) 20 mg PO once daily at bed time  · Continue to monitor  VTE Prophylaxis: Heparin  / sequential compression device   Code Status: Full Code  POLST: There is no POLST form on file for this patient (pre-hospital)  Discussion with family: Dr Kayla Valadez will discuss at bedside with patient and his wife  Anticipated Length of Stay:  Patient will be admitted on an Observation basis with an anticipated length of stay of less than 2 midnights  Justification for Hospital Stay: Worsening uncontrolled chest pain, concern for unstable angina  Negative troponins, EKG, and CT dissection studies  Monitor EKG and symptoms in observation  Total Time for Visit, including Counseling / Coordination of Care: 45 minutes  Greater than 50% of this total time spent on direct patient counseling and coordination of care      Chief Complaint:  Chest pain    History of Present Illness:    Frieda Laird is a 80 y o  male with significant PMH of CAD, HTN, HLD, chronic diastolic heart failure, DVT, peptic ulcer disease, dysphagia, and history of CABG who  presented to the ED c/o chest pain since 0530 this morning  Patient notes that he has had chest pain before and usually takes nitroglycerin, which helps to resolve the pain  This morning, he took 2 nitroglycerin and it did not help resolve the pain, which prompted him to come to the ED  He notes that the pain is located in both the left chest and the center of his chest  The pain is not constant, but when it does come it is a 10/10  He describes the pain as a "dull pressure"  Denies radiation of the pain  Patient states that the chest pain has gotten better with morphine  He has associated SOB and anxiety with his episodes of chest pain  The SOB resolves when the chest pain resolves  He admits to associated fatigue, which has been worsening for the past few months according to his wife  Patient can ambulate independently without assistance by cane or walker  After being in the ED, he received a total of 4 NTG and 3 baby ASA  He notes that he has had a CABG x 6 and cardiac catheterization (2 stents) before due to his CAD and associated chest pain  He also has an IVC filter in place due to history of DVT  Patient states that he is compliant with his home medications  Denies history of tobacco, alcohol, and illicit drug use  Denies significant FH of sudden cardiac death or MI  Denies associated orthopnea, diaphoresis, chest palpitations, N/V/D, abdominal pain, numbness, tingling, weakness, changes in vision, changes in hearing  Review of Systems:    Review of Systems   Constitutional: Negative for chills, diaphoresis, fatigue and fever  HENT: Negative for congestion, rhinorrhea and sore throat  Eyes: Negative for photophobia and visual disturbance  Respiratory: Positive for shortness of breath  Negative for cough  Cardiovascular: Positive for chest pain  Negative for palpitations and leg swelling  Gastrointestinal: Negative for abdominal pain, diarrhea, nausea and vomiting  Endocrine: Negative for cold intolerance and heat intolerance  Genitourinary: Negative for difficulty urinating and dysuria  Musculoskeletal: Negative for arthralgias and myalgias  Skin: Negative for pallor and rash  Neurological: Negative for dizziness, weakness, light-headedness, numbness and headaches  Hematological: Negative for adenopathy  Does not bruise/bleed easily  Psychiatric/Behavioral: Negative for agitation and confusion  The patient is nervous/anxious          Past Medical and Surgical History:     Past Medical History:   Diagnosis Date   • Arthritis    • BPH (benign prostatic hyperplasia)    • Cervical spine fracture [facility] ) 1997    C3   • Chest pain    • Colon polyp    • Coronary artery disease    • Dry eye    • DVT (deep venous thrombosis) ([facility] ) 2015    right leg- passed thru the IVC filter-stopped at the "patch" from bypass surgery   • Dysphagia 11/2021    minimal-"mass" esophagus with a "knob" in the middle   • Fracture of thoracic spine ([facility] ) 1997    T3   • Glaucoma    • Hyperlipidemia    • Hypertension    • Myocardial infarction ([facility] )    • Pneumonia    • PUD (peptic ulcer disease)        Past Surgical History:   Procedure Laterality Date   • ANGIOPLASTY      2 stents   • CHOLECYSTECTOMY     • COLONOSCOPY     • CORONARY ARTERY BYPASS GRAFT      x6   • CORONARY ARTERY BYPASS GRAFT     • CORONARY STENT PLACEMENT     • CYSTOSCOPY     • EYE SURGERY Right    • HERNIA REPAIR Right 11/3/2017    Procedure: REPAIR HERNIA INGUINAL;  Surgeon: Yazmin Ibarra MD;  Location: [address] [place] [address];  Service: General   • IVC FILTER INSERTION      IVC filter   • OK CYSTO W/IRRIG & EVAC MULTPLE OBSTRUCTING CLOTS N/A 6/30/2018    Procedure: CYSTOSCOPY EVACUATION OF CLOTS, fulgeration;  Surgeon: Katty Regalado MD;  Location: [facility]; Service: Urology   • STOMACH SURGERY  1973    Billroth 2 gastrectomy-2/3 removal- bleeding ulcer   • TRANSURETHRAL RESECTION OF PROSTATE         Meds/Allergies:    Prior to Admission medications    Medication Sig Start Date End Date Taking? Authorizing Provider   isosorbide mononitrate (IMDUR) 30 mg 24 hr tablet Take 1 tablet (30 mg total) by mouth every morning 2/7/23   Rui Colvint, MD   pantoprazole (PROTONIX) 20 mg tablet Take 1 tablet (20 mg total) by mouth 2 (two) times a day 3/9/23   Gus Yost PA-C   ALPRAZolam Marvie See) 0 5 mg tablet Take 0 5 mg by mouth daily at bedtime as needed  7/19/18   Historical Provider, MD   azithromycin (ZITHROMAX) 500 MG tablet  8/4/22   Historical Provider, MD   Calcium Carbonate-Vitamin D (CALCIUM 600+D PO) Take by mouth every morning     Historical Provider, MD   Carboxymethylcellulose Sodium (REFRESH TEARS OP) Apply to eye as needed    Historical Provider, MD   cefuroxime (CEFTIN) 250 mg tablet  8/4/22   Historical Provider, MD   cholecalciferol (VITAMIN D3) 400 units tablet Take 400 Units by mouth daily after lunch     Historical Provider, MD   clopidogrel (PLAVIX) 75 mg tablet TAKE ONE TABLET BY MOUTH EVERY DAY 6/17/22   Rui Ort, MD   Docusate Calcium (STOOL SOFTENER PO) Take by mouth OTC; takes with lunch    Historical Provider, MD   dutasteride (AVODART) 0 5 mg capsule Take 1 capsule (0 5 mg total) by mouth in the morning   5/16/22   Jeanette Nix PA-C   famotidine (PEPCID) 20 mg tablet Take 1 tablet (20 mg total) by mouth daily at bedtime 7/5/22 10/3/22  MATT Tamayo   Magnesium 250 MG TABS Take 1 tablet by mouth every morning     Historical Provider, MD   metoprolol succinate (TOPROL-XL) 50 mg 24 hr tablet Take 1 tablet (50 mg total) by mouth 2 (two) times a day 1/13/23   Rui Ort, MD   multivitamin-iron-minerals-folic acid (CENTRUM) chewable tablet Chew 1 tablet every morning     Historical Provider, MD   nitroglycerin (NITROSTAT) 0 4 mg SL tablet Place 1 tablet (0 4 mg total) under the tongue every 5 (five) minutes as needed for chest pain 7/19/22   Carmen Dial MD   ranolazine (RANEXA) 500 mg 12 hr tablet Take 1 tablet (500 mg total) by mouth 2 (two) times a day 7/11/22   Carmen Dial MD   rosuvastatin (CRESTOR) 5 mg tablet TAKE ONE TABLET BY MOUTH EVERY DAY AT BEDTIME 1/18/23   Carmen Dial MD   tamsulosin (Flomax) 0 4 mg Take 1 capsule (0 4 mg total) by mouth daily with dinner 6/16/22   Marion Timmons PA-C   Zinc 25 MG TABS Take 25 mg by mouth daily at bedtime    Historical Provider, MD     I have reviewed home medications with patient personally  Allergies: Allergies   Allergen Reactions   • Escitalopram Other (See Comments)     Suicidal feelings, sweating   • Oxycodone-Acetaminophen Dizziness and Shortness Of Breath     Other reaction(s): Faints  Reaction Date: [id number]; Category:  Adverse Reaction;    • Sucralfate GI Intolerance     Abdominal pain    • Sulfa Antibiotics Other (See Comments)   • Omeprazole Rash   • Statins Other (See Comments)     Muscle pain       Social History:     Marital Status: /Civil Union   Occupation: Retired  Patient Pre-hospital Living Situation: Lives at home with his wife   Patient Pre-hospital Level of Mobility: Ambulatory independently  Patient Pre-hospital Diet Restrictions: N/A  Substance Use History:   Social History     Substance and Sexual Activity   Alcohol Use Never     Social History     Tobacco Use   Smoking Status Never   Smokeless Tobacco Never     Social History     Substance and Sexual Activity   Drug Use No       Family History:    Family History   Problem Relation Age of Onset   • Coronary artery disease Brother    • Heart disease Father         CAD       Physical Exam:     Vitals:   Blood Pressure: 133/61 (03/15/23 1200)  Pulse: 100 (03/15/23 1200)  Temperature: 97 8 °F (36 6 °C) (03/15/23 0857)  Temp Source: Oral (03/15/23 0857)  Respirations: 16 (03/15/23 1200)  Height: 5' 4" (162 6 cm) (03/15/23 0857)  Weight - Scale: 65 8 kg (145 lb) (03/15/23 0857)  SpO2: 96 % (03/15/23 1200)    Physical Exam  Constitutional:       General: He is awake  Appearance: He is normal weight  He is not toxic-appearing or diaphoretic  HENT:      Head: Normocephalic and atraumatic  Mouth/Throat:      Mouth: Mucous membranes are moist       Pharynx: No oropharyngeal exudate or posterior oropharyngeal erythema  Eyes:      Extraocular Movements: Extraocular movements intact  Conjunctiva/sclera: Conjunctivae normal       Pupils: Pupils are equal, round, and reactive to light  Cardiovascular:      Rate and Rhythm: Normal rate and regular rhythm  Pulses: Normal pulses  Heart sounds: S1 normal and S2 normal  No murmur heard  No gallop  No S3 or S4 sounds  Pulmonary:      Effort: Pulmonary effort is normal  No respiratory distress  Breath sounds: No wheezing, rhonchi or rales  Chest:      Comments: Median sternotomy scar noted  Abdominal:      General: Abdomen is flat  There is no distension  Palpations: Abdomen is soft  Tenderness: There is no abdominal tenderness  There is no guarding  Musculoskeletal:         General: No swelling or tenderness  Normal range of motion  Cervical back: Normal range of motion and neck supple  Right lower leg: No edema  Left lower leg: No edema  Skin:     General: Skin is warm and dry  Capillary Refill: Capillary refill takes less than 2 seconds  Findings: No lesion or rash  Neurological:      General: No focal deficit present  Mental Status: He is alert and oriented to person, place, and time  Motor: No weakness  Psychiatric:         Behavior: Behavior is cooperative  Additional Data:     Lab Results: I have personally reviewed pertinent reports        Results from last 7 days   Lab Units 03/15/23  0906   WBC Thousand/uL 7 14   HEMOGLOBIN g/dL 12 9 HEMATOCRIT % 39 4   PLATELETS Thousands/uL 169   NEUTROS PCT % 67   LYMPHS PCT % 24   MONOS PCT % 8   EOS PCT % 1     Results from last 7 days   Lab Units 03/15/23  0906   SODIUM mmol/L 137   POTASSIUM mmol/L 4 3   CHLORIDE mmol/L 101   CO2 mmol/L 29   BUN mg/dL 18   CREATININE mg/dL 1 06   ANION GAP mmol/L 7   CALCIUM mg/dL 9 1   ALBUMIN g/dL 3 9   TOTAL BILIRUBIN mg/dL 0 51   ALK PHOS U/L 85   ALT U/L 8   AST U/L 7*   GLUCOSE RANDOM mg/dL 144*                       Imaging: I have personally reviewed pertinent reports  CTA dissection protocol chest/abdomen/pelvis   Final Result by Felipe Del Valle MD (03/15 1113)      No aortic aneurysm or dissection  Pulmonary interstitial edema with small bilateral pleural effusions  Colonic diverticulosis without diverticulitis  Stable pancreatic cystic lesions  In retrospect, these have shown little to no growth since the CT of 1/7/2017, and are therefore almost certainly benign  Workstation performed: [id number]         XR chest 1 view portable   Final Result by Oseas Lujan MD (03/15 1214)   Midline sternotomy      Previous bilateral opacities have resolved      No acute cardiopulmonary disease  Workstation performed: [id number]             EKG, Pathology, and Other Studies Reviewed on Admission:   · EKG: NSR  No ST segment elevations  Allscripts / Epic Records Reviewed: Yes     ** Please Note: This note has been constructed using a voice recognition system   **

## 2023-03-15 NOTE — ED PROVIDER NOTES
History  Chief Complaint   Patient presents with   • Chest Pain     Pt  States    chest pain since 5: 30 am took 2 nitro and  1 at 5: 30 and 1 at 8am  Slight relief now none  Only in middle and right side of chest Pt  States slight Sob   no nausea no vomiting     80-year-old male with a history of CABG CAD comanagement at this time presents with crushing chest pain that started today he is very tearful and anxious feels like he is going to die  Denies any shortness of breath pleurisy no radiation of the pain no cough congestion fevers chills or any other symptoms  Has chronic angina  History provided by:  Patient   used: No        Prior to Admission Medications   Prescriptions Last Dose Informant Patient Reported? Taking? ALPRAZolam (XANAX) 0 5 mg tablet 3/14/2023  Yes Yes   Sig: Take 0 5 mg by mouth daily at bedtime as needed    Calcium Carbonate-Vitamin D (CALCIUM 600+D PO) 3/14/2023  Yes Yes   Sig: Take by mouth every morning    Carboxymethylcellulose Sodium (REFRESH TEARS OP) 3/14/2023  Yes Yes   Sig: Apply to eye as needed   Docusate Calcium (STOOL SOFTENER PO)   Yes No   Sig: Take by mouth OTC; takes with lunch   Magnesium 250 MG TABS 3/14/2023  Yes Yes   Sig: Take 1 tablet by mouth every morning    Zinc 25 MG TABS 3/14/2023  Yes Yes   Sig: Take 25 mg by mouth daily at bedtime   azithromycin (ZITHROMAX) 500 MG tablet 3/14/2023  Yes Yes   cefuroxime (CEFTIN) 250 mg tablet Not Taking  Yes No   Patient not taking: Reported on 3/15/2023   cholecalciferol (VITAMIN D3) 400 units tablet 3/14/2023  Yes Yes   Sig: Take 400 Units by mouth daily after lunch    clopidogrel (PLAVIX) 75 mg tablet 3/14/2023  No Yes   Sig: TAKE ONE TABLET BY MOUTH EVERY DAY   dutasteride (AVODART) 0 5 mg capsule 3/14/2023  No Yes   Sig: Take 1 capsule (0 5 mg total) by mouth in the morning     famotidine (PEPCID) 20 mg tablet   No No   Sig: Take 1 tablet (20 mg total) by mouth daily at bedtime   isosorbide mononitrate (IMDUR) 30 mg 24 hr tablet 3/14/2023  No Yes   Sig: Take 1 tablet (30 mg total) by mouth every morning   metoprolol succinate (TOPROL-XL) 50 mg 24 hr tablet 3/14/2023  No Yes   Sig: Take 1 tablet (50 mg total) by mouth 2 (two) times a day   multivitamin-iron-minerals-folic acid (CENTRUM) chewable tablet 3/14/2023  Yes Yes   Sig: Chew 1 tablet every morning    nitroglycerin (NITROSTAT) 0 4 mg SL tablet 3/15/2023  No Yes   Sig: Place 1 tablet (0 4 mg total) under the tongue every 5 (five) minutes as needed for chest pain   pantoprazole (PROTONIX) 20 mg tablet 3/15/2023  No Yes   Sig: Take 1 tablet (20 mg total) by mouth 2 (two) times a day   ranolazine (RANEXA) 500 mg 12 hr tablet 3/14/2023  No Yes   Sig: Take 1 tablet (500 mg total) by mouth 2 (two) times a day   rosuvastatin (CRESTOR) 5 mg tablet 3/14/2023  No Yes   Sig: TAKE ONE TABLET BY MOUTH EVERY DAY AT BEDTIME   tamsulosin (Flomax) 0 4 mg 3/14/2023  No Yes   Sig: Take 1 capsule (0 4 mg total) by mouth daily with dinner      Facility-Administered Medications: None       Past Medical History:   Diagnosis Date   • Arthritis    • BPH (benign prostatic hyperplasia)    • Cervical spine fracture (HCC) 1997    C3   • Chest pain    • Colon polyp    • Coronary artery disease    • Dry eye    • DVT (deep venous thrombosis) ([facility] [address] ) 2015    right leg- passed thru the IVC filter-stopped at the "patch" from bypass surgery   • Dysphagia 11/2021    minimal-"mass" esophagus with a "knob" in the middle   • Fracture of thoracic spine ([facility] [address] ) 1997    T3   • Glaucoma    • Hyperlipidemia    • Hypertension    • Myocardial infarction ([facility] [address] )    • Pneumonia    • PUD (peptic ulcer disease)        Past Surgical History:   Procedure Laterality Date   • ANGIOPLASTY      2 stents   • CHOLECYSTECTOMY     • COLONOSCOPY     • CORONARY ARTERY BYPASS GRAFT      x6   • CORONARY ARTERY BYPASS GRAFT     • CORONARY STENT PLACEMENT     • CYSTOSCOPY     • EYE SURGERY Right    • HERNIA REPAIR Right 11/3/2017    Procedure: REPAIR HERNIA INGUINAL;  Surgeon: Ruiz Hartmann MD;  Location: [address] [place] [address];  Service: General   • IVC FILTER INSERTION      IVC filter   • TN CYSTO W/IRRIG & EVAC MULTPLE OBSTRUCTING CLOTS N/A 6/30/2018    Procedure: CYSTOSCOPY EVACUATION OF CLOTS, fulgeration;  Surgeon: Delio Evans MD;  [facility];  Service: Urology   • STOMACH SURGERY  1973    Billroth 2 gastrectomy-2/3 removal- bleeding ulcer   • TRANSURETHRAL RESECTION OF PROSTATE         Family History   Problem Relation Age of Onset   • Coronary artery disease Brother    • Heart disease Father         CAD     I have reviewed and agree with the history as documented  E-Cigarette/Vaping   • E-Cigarette Use Never User      E-Cigarette/Vaping Substances   • Nicotine No    • THC No    • CBD No    • Flavoring No    • Other No    • Unknown No      Social History     Tobacco Use   • Smoking status: Never   • Smokeless tobacco: Never   Vaping Use   • Vaping Use: Never used   Substance Use Topics   • Alcohol use: Never   • Drug use: No       Review of Systems   Constitutional: Negative  HENT: Negative  Eyes: Negative  Respiratory: Negative  Cardiovascular: Positive for chest pain  Gastrointestinal: Negative  Endocrine: Negative  Genitourinary: Negative  Musculoskeletal: Negative  Skin: Negative  Allergic/Immunologic: Negative  Neurological: Negative  Hematological: Negative  Psychiatric/Behavioral: Negative  All other systems reviewed and are negative  Physical Exam  Physical Exam  Vitals and nursing note reviewed  Constitutional:       Appearance: Normal appearance  HENT:      Head: Normocephalic and atraumatic  Nose: Nose normal       Mouth/Throat:      Mouth: Mucous membranes are moist    Eyes:      Extraocular Movements: Extraocular movements intact  Pupils: Pupils are equal, round, and reactive to light     Cardiovascular:      Rate and Rhythm: Normal rate and regular rhythm  Pulmonary:      Effort: Pulmonary effort is normal       Breath sounds: Normal breath sounds  Abdominal:      General: Abdomen is flat  Bowel sounds are normal       Palpations: Abdomen is soft  Musculoskeletal:         General: Normal range of motion  Cervical back: Normal range of motion and neck supple  Skin:     General: Skin is warm  Capillary Refill: Capillary refill takes less than 2 seconds  Neurological:      General: No focal deficit present  Mental Status: He is alert and oriented to person, place, and time  Mental status is at baseline  Psychiatric:         Mood and Affect: Mood normal          Thought Content:  Thought content normal          Vital Signs  ED Triage Vitals   Temperature Pulse Respirations Blood Pressure SpO2   03/15/23 0857 03/15/23 0857 03/15/23 0857 03/15/23 0857 03/15/23 0857   97 8 °F (36 6 °C) 94 18 (!) 184/87 96 %      Temp Source Heart Rate Source Patient Position - Orthostatic VS BP Location FiO2 (%)   03/15/23 0857 03/15/23 0857 03/15/23 1245 03/15/23 1245 --   Oral Monitor Sitting Right arm       Pain Score       03/15/23 0857       8           Vitals:    03/15/23 1245 03/15/23 1300 03/15/23 1357 03/15/23 1532   BP: 122/55 119/58 149/91 128/76   Pulse: 96 96 (!) 107 104   Patient Position - Orthostatic VS: Sitting Sitting Lying          Visual Acuity      ED Medications  Medications   ALPRAZolam (XANAX) tablet 0 5 mg (has no administration in time range)   calcium carbonate-vitamin D 500 mg-5 mcg tablet 1 tablet (has no administration in time range)   cholecalciferol (VITAMIN D3) tablet 400 Units (400 Units Oral Given 3/15/23 1438)   clopidogrel (PLAVIX) tablet 75 mg (75 mg Oral Given 3/15/23 1438)   docusate sodium (COLACE) capsule 100 mg (100 mg Oral Given 3/15/23 1438)   finasteride (PROSCAR) tablet 5 mg (5 mg Oral Given 3/15/23 1438)   famotidine (PEPCID) tablet 20 mg (has no administration in time range)   isosorbide mononitrate (IMDUR) 24 hr tablet 30 mg (30 mg Oral Given 3/15/23 1438)   magnesium Oxide (MAG-OX) tablet 400 mg (400 mg Oral Given 3/15/23 1438)   multivitamin-minerals (CENTRUM) tablet 1 tablet (1 tablet Oral Given 3/15/23 1438)   nitroglycerin (NITROSTAT) SL tablet 0 4 mg (has no administration in time range)   pantoprazole (PROTONIX) EC tablet 20 mg (20 mg Oral Given 3/15/23 1701)   ranolazine (RANEXA) 12 hr tablet 500 mg (has no administration in time range)   pravastatin (PRAVACHOL) tablet 40 mg (40 mg Oral Given 3/15/23 1702)   tamsulosin (FLOMAX) capsule 0 4 mg (0 4 mg Oral Given 3/15/23 1701)   zinc sulfate (ZINCATE) capsule 220 mg (220 mg Oral Given 3/15/23 1438)   enoxaparin (LOVENOX) subcutaneous injection 40 mg (40 mg Subcutaneous Given 3/15/23 1439)   metoprolol succinate (TOPROL-XL) 24 hr tablet 75 mg (has no administration in time range)   nitroglycerin (NITROSTAT) SL tablet 0 4 mg (0 4 mg Sublingual Given 3/15/23 0906)   iohexol (OMNIPAQUE) 350 MG/ML injection (SINGLE-DOSE) 100 mL (100 mL Intravenous Given 3/15/23 1012)   morphine injection 4 mg (4 mg Intravenous Given 3/15/23 1048)   nitroglycerin (NITROSTAT) SL tablet 0 4 mg (0 4 mg Sublingual Given 3/15/23 1045)   diphenhydrAMINE (BENADRYL) injection 50 mg (50 mg Intravenous Given 3/15/23 1057)   methylPREDNISolone sodium succinate (Solu-MEDROL) injection 125 mg (125 mg Intravenous Given 3/15/23 1058)   ipratropium-albuterol (DUO-NEB) 0 5-2 5 mg/3 mL inhalation solution 3 mL (3 mL Nebulization Given 3/15/23 1058)   ipratropium-albuterol (DUO-NEB) 0 5-2 5 mg/3 mL inhalation solution 3 mL (3 mL Nebulization Given 3/15/23 1057)   furosemide (LASIX) injection 40 mg (40 mg Intravenous Given 3/15/23 1135)   aspirin chewable tablet 324 mg (324 mg Oral Given 3/15/23 1135)       Diagnostic Studies  Results Reviewed     Procedure Component Value Units Date/Time    HS Troponin I 4hr [[id number]]  (Normal) Collected: 03/15/23 1308    Lab Status: Final result Specimen: Blood from Arm, Left Updated: 03/15/23 1351     hs TnI 4hr 13 ng/L      Delta 4hr hsTnI 1 ng/L     FLU/RSV/COVID - if FLU/RSV clinically relevant [[id number]]  (Normal) Collected: 03/15/23 1153    Lab Status: Final result Specimen: Nares from Nose Updated: 03/15/23 1245     SARS-CoV-2 Negative     INFLUENZA A PCR Negative     INFLUENZA B PCR Negative     RSV PCR Negative    Narrative:      FOR PEDIATRIC PATIENTS - copy/paste COVID Guidelines URL to browser: [email or web address]  Takesx    SARS-CoV-2 assay is a Nucleic Acid Amplification assay intended for the  qualitative detection of nucleic acid from SARS-CoV-2 in nasopharyngeal  swabs  Results are for the presumptive identification of SARS-CoV-2 RNA  Positive results are indicative of infection with SARS-CoV-2, the virus  causing COVID-19, but do not rule out bacterial infection or co-infection  with other viruses  Laboratories within the [facility] and its  territories are required to report all positive results to the appropriate  public health authorities  Negative results do not preclude SARS-CoV-2  infection and should not be used as the sole basis for treatment or other  patient management decisions  Negative results must be combined with  clinical observations, patient history, and epidemiological information  This test has not been FDA cleared or approved  This test has been authorized by FDA under an Emergency Use Authorization  (EUA)  This test is only authorized for the duration of time the  declaration that circumstances exist justifying the authorization of the  emergency use of an in vitro diagnostic tests for detection of SARS-CoV-2  virus and/or diagnosis of COVID-19 infection under section 564(b)(1) of  the Act, 21 U  S C  342QGE-2(P)(1), unless the authorization is terminated  or revoked sooner  The test has been validated but independent review by FDA  and CLIA is pending      Test performed using [employer] GeneXpert: This RT-PCR assay targets N2,  a region unique to SARS-CoV-2  A conserved region in the E-gene was chosen  for pan-Sarbecovirus detection which includes SARS-CoV-2  According to CMS-2020-01-R, this platform meets the definition of high-throughput technology      HS Troponin I 2hr [[id number]]  (Normal) Collected: 03/15/23 1049    Lab Status: Final result Specimen: Blood from Arm, Left Updated: 03/15/23 1220     hs TnI 2hr 10 ng/L      Delta 2hr hsTnI -2 ng/L     Comprehensive metabolic panel [[id number]]  (Abnormal) Collected: 03/15/23 0906    Lab Status: Final result Specimen: Blood from Arm, Left Updated: 03/15/23 0940     Sodium 137 mmol/L      Potassium 4 3 mmol/L      Chloride 101 mmol/L      CO2 29 mmol/L      ANION GAP 7 mmol/L      BUN 18 mg/dL      Creatinine 1 06 mg/dL      Glucose 144 mg/dL      Calcium 9 1 mg/dL      AST 7 U/L      ALT 8 U/L      Alkaline Phosphatase 85 U/L      Total Protein 7 2 g/dL      Albumin 3 9 g/dL      Total Bilirubin 0 51 mg/dL      eGFR 60 ml/min/1 73sq m     Narrative:      [place] guidelines for Chronic Kidney Disease (CKD):   •  Stage 1 with normal or high GFR (GFR > 90 mL/min/1 73 square meters)  •  Stage 2 Mild CKD (GFR = 60-89 mL/min/1 73 square meters)  •  Stage 3A Moderate CKD (GFR = 45-59 mL/min/1 73 square meters)  •  Stage 3B Moderate CKD (GFR = 30-44 mL/min/1 73 square meters)  •  Stage 4 Severe CKD (GFR = 15-29 mL/min/1 73 square meters)  •  Stage 5 End Stage CKD (GFR <15 mL/min/1 73 square meters)  Note: GFR calculation is accurate only with a steady state creatinine    Magnesium [[id number]]  (Normal) Collected: 03/15/23 0906    Lab Status: Final result Specimen: Blood from Arm, Left Updated: 03/15/23 0940     Magnesium 2 1 mg/dL     HS Troponin 0hr (reflex protocol) [[id number]]  (Normal) Collected: 03/15/23 0906    Lab Status: Final result Specimen: Blood from Arm, Left Updated: 03/15/23 0939     hs TnI 0hr 12 ng/L     CBC and differential [[id number]] Collected: 03/15/23 0906    Lab Status: Final result Specimen: Blood from Arm, Left Updated: 03/15/23 0913     WBC 7 14 Thousand/uL      RBC 4 22 Million/uL      Hemoglobin 12 9 g/dL      Hematocrit 39 4 %      MCV 93 fL      MCH 30 6 pg      MCHC 32 7 g/dL      RDW 13 2 %      MPV 10 0 fL      Platelets 411 Thousands/uL      nRBC 0 /100 WBCs      Neutrophils Relative 67 %      Immat GRANS % 0 %      Lymphocytes Relative 24 %      Monocytes Relative 8 %      Eosinophils Relative 1 %      Basophils Relative 0 %      Neutrophils Absolute 4 74 Thousands/µL      Immature Grans Absolute 0 03 Thousand/uL      Lymphocytes Absolute 1 68 Thousands/µL      Monocytes Absolute 0 58 Thousand/µL      Eosinophils Absolute 0 09 Thousand/µL      Basophils Absolute 0 02 Thousands/µL                  CTA dissection protocol chest/abdomen/pelvis   Final Result by Nacho Joaquin MD (03/15 1113)      No aortic aneurysm or dissection  Pulmonary interstitial edema with small bilateral pleural effusions  Colonic diverticulosis without diverticulitis  Stable pancreatic cystic lesions  In retrospect, these have shown little to no growth since the CT of 1/7/2017, and are therefore almost certainly benign  Workstation performed: [id number]         XR chest 1 view portable   Final Result by Marcos Rose MD (03/15 1214)   Midline sternotomy      Previous bilateral opacities have resolved      No acute cardiopulmonary disease                    Workstation performed: [id number]                    Procedures  ECG 12 Lead Documentation Only    Date/Time: 3/15/2023 5:25 PM  Performed by: Geraldo Rocha DO  Authorized by: Geraldo Rocha DO     ECG reviewed by me, the ED Provider: yes    Patient location:  ED  Previous ECG:     Previous ECG:  Unavailable    Comparison to cardiac monitor: Yes    Interpretation:     Interpretation: non-specific    Rate:     ECG rate assessment: normal    Rhythm:     Rhythm: sinus rhythm    Ectopy:     Ectopy: none    QRS:     QRS axis:  Normal  Conduction:     Conduction: normal    ST segments:     ST segments:  Non-specific  T waves:     T waves: non-specific               ED Course                                             Medical Decision Making  Patient evaluated with labs, imaging, UA  Reviewed results discussed with patient  Patient given IV pain medications, Patient improved with symptoms, admitted to hospitalist service for further evaluation and management  Chest pain: acute illness or injury  Amount and/or Complexity of Data Reviewed  Independent Historian: jaya  External Data Reviewed: labs, radiology, ECG and notes  Labs: ordered  Decision-making details documented in ED Course  Radiology: ordered  Decision-making details documented in ED Course  ECG/medicine tests: ordered  Decision-making details documented in ED Course  Risk  OTC drugs  Prescription drug management  Decision regarding hospitalization            Disposition  Final diagnoses:   Chest pain     Time reflects when diagnosis was documented in both MDM as applicable and the Disposition within this note     Time User Action Codes Description Comment    3/15/2023 11:35 AM Reyes De Souza Add [R07 9] Chest pain     3/15/2023  1:22 PM Poncho Fritz Add [I25 10] CAD (coronary artery disease)       ED Disposition     ED Disposition   Admit    Condition   Stable    Date/Time   Wed Mar 15, 2023 11:35 AM    Comment               Follow-up Information    None         Current Discharge Medication List      CONTINUE these medications which have NOT CHANGED    Details   ALPRAZolam (XANAX) 0 5 mg tablet Take 0 5 mg by mouth daily at bedtime as needed       azithromycin (ZITHROMAX) 500 MG tablet       Calcium Carbonate-Vitamin D (CALCIUM 600+D PO) Take by mouth every morning       Carboxymethylcellulose Sodium (REFRESH TEARS OP) Apply to eye as needed      cholecalciferol (VITAMIN D3) 400 units tablet Take 400 Units by mouth daily after lunch       clopidogrel (PLAVIX) 75 mg tablet TAKE ONE TABLET BY MOUTH EVERY DAY  Qty: 90 tablet, Refills: 3    Associated Diagnoses: CAD S/P percutaneous coronary angioplasty      dutasteride (AVODART) 0 5 mg capsule Take 1 capsule (0 5 mg total) by mouth in the morning  Qty: 90 capsule, Refills: 3    Associated Diagnoses: Benign prostatic hyperplasia with urinary retention      isosorbide mononitrate (IMDUR) 30 mg 24 hr tablet Take 1 tablet (30 mg total) by mouth every morning  Qty: 90 tablet, Refills: 3    Associated Diagnoses: Chronic stable angina (HCC)      Magnesium 250 MG TABS Take 1 tablet by mouth every morning       metoprolol succinate (TOPROL-XL) 50 mg 24 hr tablet Take 1 tablet (50 mg total) by mouth 2 (two) times a day  Qty: 180 tablet, Refills: 1    Associated Diagnoses: Chest pain; Hypertension; CAD (coronary artery disease)      multivitamin-iron-minerals-folic acid (CENTRUM) chewable tablet Chew 1 tablet every morning       nitroglycerin (NITROSTAT) 0 4 mg SL tablet Place 1 tablet (0 4 mg total) under the tongue every 5 (five) minutes as needed for chest pain  Qty: 30 tablet, Refills: 1    Associated Diagnoses: 3-vessel CAD; Chronic stable angina (HCC)      pantoprazole (PROTONIX) 20 mg tablet Take 1 tablet (20 mg total) by mouth 2 (two) times a day  Qty: 180 tablet, Refills: 1    Associated Diagnoses: PUD (peptic ulcer disease);  Acute superficial gastritis without hemorrhage      ranolazine (RANEXA) 500 mg 12 hr tablet Take 1 tablet (500 mg total) by mouth 2 (two) times a day  Qty: 180 tablet, Refills: 3    Associated Diagnoses: Chronic stable angina (HCC)      rosuvastatin (CRESTOR) 5 mg tablet TAKE ONE TABLET BY MOUTH EVERY DAY AT BEDTIME  Qty: 90 tablet, Refills: 3    Associated Diagnoses: Chest pain      tamsulosin (Flomax) 0 4 mg Take 1 capsule (0 4 mg total) by mouth daily with dinner  Qty: 90 capsule, Refills: 3 Associated Diagnoses: Benign prostatic hyperplasia, unspecified whether lower urinary tract symptoms present      Zinc 25 MG TABS Take 25 mg by mouth daily at bedtime      cefuroxime (CEFTIN) 250 mg tablet       Docusate Calcium (STOOL SOFTENER PO) Take by mouth OTC; takes with lunch      famotidine (PEPCID) 20 mg tablet Take 1 tablet (20 mg total) by mouth daily at bedtime  Qty: 90 tablet, Refills: 3    Associated Diagnoses: Gastroesophageal reflux disease without esophagitis             No discharge procedures on file      PDMP Review     None          ED Provider  Electronically Signed by           Adrianna Mcgregor DO  03/15/23 3217

## 2023-03-15 NOTE — PLAN OF CARE
Problem: MOBILITY - ADULT  Goal: Maintain or return to baseline ADL function  Description: INTERVENTIONS:  -  Assess patient's ability to carry out ADLs; assess patient's baseline for ADL function and identify physical deficits which impact ability to perform ADLs (bathing, care of mouth/teeth, toileting, grooming, dressing, etc )  - Assess/evaluate cause of self-care deficits   - Assess range of motion  - Assess patient's mobility; develop plan if impaired  - Assess patient's need for assistive devices and provide as appropriate  - Encourage maximum independence but intervene and supervise when necessary  - Involve family in performance of ADLs  - Assess for home care needs following discharge   - Consider OT consult to assist with ADL evaluation and planning for discharge  - Provide patient education as appropriate  Outcome: Progressing  Goal: Maintains/Returns to pre admission functional level  Description: INTERVENTIONS:  - Perform BMAT or MOVE assessment daily    - Set and communicate daily mobility goal to care team and patient/family/caregiver  - Collaborate with rehabilitation services on mobility goals if consulted  - Perform Range of Motion   times a day  - Reposition patient every   hours    - Dangle patient   times a day  - Stand patient   times a day  - Ambulate patient   times a day  - Out of bed to chair times a day   - Out of bed for meals   times a day  - Out of bed for toileting  - Record patient progress and toleration of activity level   Outcome: Progressing

## 2023-03-16 ENCOUNTER — APPOINTMENT (OUTPATIENT)
Dept: NON INVASIVE DIAGNOSTICS | Facility: HOSPITAL | Age: 88
End: 2023-03-16

## 2023-03-16 ENCOUNTER — APPOINTMENT (OUTPATIENT)
Dept: RADIOLOGY | Facility: HOSPITAL | Age: 88
End: 2023-03-16

## 2023-03-16 ENCOUNTER — TELEPHONE (OUTPATIENT)
Dept: GASTROENTEROLOGY | Facility: CLINIC | Age: 88
End: 2023-03-16

## 2023-03-16 LAB
ANION GAP SERPL CALCULATED.3IONS-SCNC: 9 MMOL/L (ref 4–13)
AORTIC ROOT: 2.9 CM
AORTIC VALVE MEAN VELOCITY: 23.3 M/S
APICAL FOUR CHAMBER EJECTION FRACTION: 55 %
ATRIAL RATE: 102 BPM
AV AREA BY CONTINUOUS VTI: 0.7 CM2
AV AREA PEAK VELOCITY: 0.7 CM2
AV LVOT MEAN GRADIENT: 2 MMHG
AV LVOT PEAK GRADIENT: 4 MMHG
AV MEAN GRADIENT: 25 MMHG
AV PEAK GRADIENT: 45 MMHG
AV REGURGITATION PRESSURE HALF TIME: 409 MS
AV VALVE AREA: 0.67 CM2
AV VELOCITY RATIO: 0.29
BUN SERPL-MCNC: 29 MG/DL (ref 5–25)
CALCIUM SERPL-MCNC: 8.3 MG/DL (ref 8.4–10.2)
CHEST PAIN STATEMENT: NORMAL
CHLORIDE SERPL-SCNC: 100 MMOL/L (ref 96–108)
CO2 SERPL-SCNC: 27 MMOL/L (ref 21–32)
CREAT SERPL-MCNC: 1.14 MG/DL (ref 0.6–1.3)
DOP CALC AO PEAK VEL: 3.37 M/S
DOP CALC AO VTI: 71.58 CM
DOP CALC LVOT AREA: 2.27 CM2
DOP CALC LVOT DIAMETER: 1.7 CM
DOP CALC LVOT PEAK VEL VTI: 21.25 CM
DOP CALC LVOT PEAK VEL: 0.99 M/S
DOP CALC LVOT STROKE INDEX: 28.1 ML/M2
DOP CALC LVOT STROKE VOLUME: 48.21
DOP CALC MV VTI: 47.38 CM
E WAVE DECELERATION TIME: 222 MS
EST. AVERAGE GLUCOSE BLD GHB EST-MCNC: 134 MG/DL
FRACTIONAL SHORTENING: 20 (ref 28–44)
GFR SERPL CREATININE-BSD FRML MDRD: 55 ML/MIN/1.73SQ M
GLUCOSE SERPL-MCNC: 131 MG/DL (ref 65–140)
GLUCOSE SERPL-MCNC: 135 MG/DL (ref 65–140)
GLUCOSE SERPL-MCNC: 153 MG/DL (ref 65–140)
GLUCOSE SERPL-MCNC: 160 MG/DL (ref 65–140)
HBA1C MFR BLD: 6.3 %
INTERVENTRICULAR SEPTUM IN DIASTOLE (PARASTERNAL SHORT AXIS VIEW): 0.9 CM
INTERVENTRICULAR SEPTUM: 0.9 CM (ref 0.6–1.1)
LAAS-AP2: 22.3 CM2
LAAS-AP4: 30.3 CM2
LEFT ATRIUM SIZE: 4.1 CM
LEFT INTERNAL DIMENSION IN SYSTOLE: 3.6 CM (ref 2.1–4)
LEFT VENTRICULAR INTERNAL DIMENSION IN DIASTOLE: 4.5 CM (ref 3.5–6)
LEFT VENTRICULAR POSTERIOR WALL IN END DIASTOLE: 1 CM
LEFT VENTRICULAR STROKE VOLUME: 38 ML
LVSV (TEICH): 38 ML
MAX DIASTOLIC BP: 55 MMHG
MAX HEART RATE: 110 BPM
MAX PREDICTED HEART RATE: 128 BPM
MAX. SYSTOLIC BP: 113 MMHG
MITRAL REGURGITATION PEAK VELOCITY: 5.65 M/S
MITRAL VALVE MEAN INFLOW VELOCITY: 4.28 M/S
MITRAL VALVE REGURGITANT PEAK GRADIENT: 128 MMHG
MV E'TISSUE VEL-SEP: 3 CM/S
MV MEAN GRADIENT: 5 MMHG
MV PEAK A VEL: 0.97 M/S
MV PEAK E VEL: 149 CM/S
MV PEAK GRADIENT: 16 MMHG
MV STENOSIS PRESSURE HALF TIME: 64 MS
MV VALVE AREA BY CONTINUITY EQUATION: 1.02 CM2
MV VALVE AREA P 1/2 METHOD: 3.44
NUC STRESS EJECTION FRACTION: 33 %
P AXIS: 44 DEGREES
POTASSIUM SERPL-SCNC: 4.4 MMOL/L (ref 3.5–5.3)
PR INTERVAL: 226 MS
PROTOCOL NAME: NORMAL
QRS AXIS: 34 DEGREES
QRSD INTERVAL: 104 MS
QT INTERVAL: 344 MS
QTC INTERVAL: 448 MS
RA PRESSURE ESTIMATED: 8 MMHG
RATE PRESSURE PRODUCT: 9114
REASON FOR TERMINATION: NORMAL
RIGHT ATRIUM AREA SYSTOLE A4C: 10 CM2
RIGHT VENTRICLE ID DIMENSION: 2.9 CM
RV PSP: 54 MMHG
SARS-COV-2 RNA RESP QL NAA+PROBE: NEGATIVE
SL CV AV DECELERATION TIME RETROGRADE: 1410 MS
SL CV AV PEAK GRADIENT RETROGRADE: 36 MMHG
SL CV DOP CALC MV VTI RETROGRADE: 182.6 CM
SL CV LEFT ATRIUM LENGTH A2C: 5.8 CM
SL CV LV EF: 45
SL CV MV MEAN GRADIENT RETROGRADE: 86 MMHG
SL CV PED ECHO LEFT VENTRICLE DIASTOLIC VOLUME (MOD BIPLANE) 2D: 92 ML
SL CV PED ECHO LEFT VENTRICLE SYSTOLIC VOLUME (MOD BIPLANE) 2D: 54 ML
SL CV REST NUCLEAR ISOTOPE DOSE: 10.8 MCI
SL CV STRESS NUCLEAR ISOTOPE DOSE: 31.4 MCI
SL CV STRESS RECOVERY BP: NORMAL MMHG
SL CV STRESS RECOVERY HR: 88 BPM
SL CV STRESS RECOVERY O2 SAT: 98 %
SODIUM SERPL-SCNC: 136 MMOL/L (ref 135–147)
STRESS BASELINE BP: NORMAL MMHG
STRESS BASELINE HR: 109 BPM
STRESS O2 SAT REST: 96 %
STRESS PEAK HR: 93 BPM
STRESS POST O2 SAT PEAK: 98 %
STRESS POST PEAK BP: 98 MMHG
STRESS ST DEPRESSION: 0 MM
T WAVE AXIS: 176 DEGREES
TARGET HR FORMULA: NORMAL
TEST INDICATION: NORMAL
TIME IN EXERCISE PHASE: NORMAL
TR MAX PG: 46 MMHG
TR PEAK VELOCITY: 3.4 M/S
TRICUSPID ANNULAR PLANE SYSTOLIC EXCURSION: 1.1 CM
TRICUSPID VALVE PEAK REGURGITATION VELOCITY: 3.4 M/S
VENTRICULAR RATE: 102 BPM

## 2023-03-16 RX ORDER — METOPROLOL SUCCINATE 100 MG/1
100 TABLET, EXTENDED RELEASE ORAL 2 TIMES DAILY
Status: DISCONTINUED | OUTPATIENT
Start: 2023-03-16 | End: 2023-03-17

## 2023-03-16 RX ORDER — ALPRAZOLAM 0.5 MG/1
0.5 TABLET ORAL ONCE
Status: COMPLETED | OUTPATIENT
Start: 2023-03-16 | End: 2023-03-16

## 2023-03-16 RX ADMIN — RANOLAZINE 500 MG: 500 TABLET, EXTENDED RELEASE ORAL at 20:33

## 2023-03-16 RX ADMIN — Medication 1 TABLET: at 08:58

## 2023-03-16 RX ADMIN — DOCUSATE SODIUM 100 MG: 100 CAPSULE, LIQUID FILLED ORAL at 09:00

## 2023-03-16 RX ADMIN — RANOLAZINE 500 MG: 500 TABLET, EXTENDED RELEASE ORAL at 09:01

## 2023-03-16 RX ADMIN — FINASTERIDE 5 MG: 5 TABLET, FILM COATED ORAL at 09:01

## 2023-03-16 RX ADMIN — PRAVASTATIN SODIUM 40 MG: 40 TABLET ORAL at 16:13

## 2023-03-16 RX ADMIN — TAMSULOSIN HYDROCHLORIDE 0.4 MG: 0.4 CAPSULE ORAL at 16:14

## 2023-03-16 RX ADMIN — FAMOTIDINE 20 MG: 20 TABLET, FILM COATED ORAL at 21:31

## 2023-03-16 RX ADMIN — Medication 1 TABLET: at 08:59

## 2023-03-16 RX ADMIN — ENOXAPARIN SODIUM 40 MG: 40 INJECTION SUBCUTANEOUS at 09:01

## 2023-03-16 RX ADMIN — CHOLECALCIFEROL (VITAMIN D3) 10 MCG (400 UNIT) TABLET 400 UNITS: at 13:52

## 2023-03-16 RX ADMIN — METOPROLOL SUCCINATE 100 MG: 100 TABLET, EXTENDED RELEASE ORAL at 20:33

## 2023-03-16 RX ADMIN — REGADENOSON 0.4 MG: 0.08 INJECTION, SOLUTION INTRAVENOUS at 13:10

## 2023-03-16 RX ADMIN — ALPRAZOLAM 0.5 MG: 0.5 TABLET ORAL at 02:39

## 2023-03-16 RX ADMIN — PANTOPRAZOLE SODIUM 20 MG: 20 TABLET, DELAYED RELEASE ORAL at 08:51

## 2023-03-16 RX ADMIN — MAGNESIUM OXIDE TAB 400 MG (241.3 MG ELEMENTAL MG) 400 MG: 400 (241.3 MG) TAB at 09:00

## 2023-03-16 RX ADMIN — ZINC SULFATE 220 MG (50 MG) CAPSULE 220 MG: CAPSULE at 08:59

## 2023-03-16 RX ADMIN — CLOPIDOGREL BISULFATE 75 MG: 75 TABLET ORAL at 09:01

## 2023-03-16 RX ADMIN — METOPROLOL SUCCINATE 75 MG: 50 TABLET, EXTENDED RELEASE ORAL at 08:59

## 2023-03-16 RX ADMIN — ISOSORBIDE MONONITRATE 30 MG: 30 TABLET, EXTENDED RELEASE ORAL at 08:58

## 2023-03-16 RX ADMIN — PANTOPRAZOLE SODIUM 20 MG: 20 TABLET, DELAYED RELEASE ORAL at 16:14

## 2023-03-16 NOTE — ASSESSMENT & PLAN NOTE
Patient presented with severe chest pain which is not relieved with nitroglycerin  Known history of chronic stable angina and has been on Imdur and Ranexa  Cardiology was consulted and appreciate input  Toprol-XL was increased to 75 mg p o  twice daily  Patient underwent nuclear stress test-left ventricular perfusion defect that is medium in size with severe reduction in the apex that is fixed with, moderate reduction in the left ventricular perfusion defect that is partially reversible    Left ventricular function poststress is abnormal with moderately reduced global function with EF of 33%-endings 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

## 2023-03-16 NOTE — TELEPHONE ENCOUNTER
Patients GI provider:  Dr Refugio Tanner    Number to return call: [phone]    Reason for call: Pt's daughter calling stating pt is currently hospitalized and is wanting Dr Refugio Tanner to consult with pt regarding treatment for his throat  Pt's daughter asked that you reach out to pt's wife at above number regarding this matter      Scheduled procedure/appointment date if applicable: Appt: 2/68/7146

## 2023-03-16 NOTE — ASSESSMENT & PLAN NOTE
Wt Readings from Last 3 Encounters:   03/16/23 65 8 kg (145 lb)   01/13/23 67 6 kg (149 lb)   08/11/22 67 5 kg (148 lb 12 8 oz)   History of chronic diastolic heart failure NYHA class II  Echo / WALT showed EF of 45% with mild to moderate MR and mild to moderate AI with some stenosis from April last year  Continue Lasix 40 mg p o  daily  Patient received a dose of Lasix in the ED  Patient is not hypoxic or complaining of shortness of breath    CT chest showed some pulmonary interstitial edema with small bilateral pleural effusions  2D echo showed EF of 45% with grade 2 diastolic dysfunction with moderate aortic stenosis with a peak gradient of 45 mmHg with moderate MR without any significant change from prior echo

## 2023-03-16 NOTE — CONSULTS
Consultation - [facility]astroenterology   Milagros Mckenzie 80 y o  male MRN: [id number]  Unit/Bed#: 2 [facility] 219  Encounter: [id number]        Inpatient consult to gastroenterology  Consult performed by: MATT Bueno  Consult ordered by: Luari Toussaint MD          Reason for Consult / Principal Problem:     Chest pain/dysphagia      ASSESSMENT AND PLAN:      80 y o  male with history of CAD status post CABG and PTCA on Plavix, HTN, HLD, chronic diastolic heart failure, DVT, PUD status post partial gastrectomy with Billroth II anastomosis, dysphagia secondary to esophageal dysmotility/tortuous esophagus, esophageal diverticulum who presented with chest pain  1  Chest pain  2  Esophageal dysphagia  Patient presented with chest pain which woke him from sleep at 3 AM unrelieved with nitro  He has a history of partial gastrectomy due to PUD, esophageal dysmotility with tortuous esophagus and esophageal diverticulum  His last EGD was in May 2022 showing status post Billroth II, no ulcerations, friability of gastric mucosa possibly bile related, mild esophagitis with dysmotility and a diverticulum at 30 cm  He has been taking pantoprazole 40 mg in the morning at 3 AM instead of 20 twice daily was previously unable to afford liquid Carafate  He has been having issues with throat irritation and chronic cough as well as some sticking of foods  Yesterday choked on some salad as he was eating when his cough started     -Cardiac stress test pending    -Chew food well, avoid dry foods, alternate liquid/solids    -Continue pantoprazole 20mg BID, Pepcid 20mg at bedtime    -Will make NPO past midnight  If stress test negative, then can plan for diagnostic EGD on Plavix tomorrow  Thank you for the consultation    Patient seen and examined with Dr Amalia Hurley    ______________________________________________________________________    HPI:  Milagros Mckenzie is a 80 y o  male with history of CAD status post CABG and PTCA, HTN, HLD, chronic diastolic heart failure, DVT, PUD status post partial gastrectomy with Billroth II anastomosis, dysphagia who presented with chest pain  He reports he woke up at 3 AM with chest pain which was unrelieved with 2 nitro and presented to the ED for further evaluation  States that normally his chest pain is relieved when he takes a nitro and this felt different  In the ED he was given more nitro as well as aspirin without much improvement and finally had relief with morphine  Cardiac enzymes, chest x-ray, CTA dissection protocol chest/abdomen/pelvis were negative for any acute findings  He underwent stress test this morning  Patient is known to the GI group and follows with Dr Beth Srinivasan  He has a history of peptic ulcer disease status post partial gastrectomy with Billroth anastomosis, esophageal dysphagia secondary to dysmotility as well as a diverticulum and tortuous esophagus  He was previously advised to take pantoprazole 20 mg twice daily, but has been taking this as 40 mg in the morning at 3 AM   He had a choking episode yesterday with some Salad  Reports he had a tickle in his throat  Has been having a cough  Last EGD was in May 2022 showing status post Billroth II, no ulcerations, friability of the gastric mucosa possibly bile related, mild esophagitis with dysmotility and a diverticulum at 30 cm  Esophageal and gastric body biopsies benign  REVIEW OF SYSTEMS:    CONSTITUTIONAL: Denies any fever, chills, rigors, and weight loss  HEENT: No earache or tinnitus  Denies hearing loss or visual disturbances  CARDIOVASCULAR: No chest pain or palpitations  RESPIRATORY: Denies any cough, hemoptysis, shortness of breath or dyspnea on exertion  GASTROINTESTINAL: As noted in the History of Present Illness  GENITOURINARY: No problems with urination  Denies any hematuria or dysuria  NEUROLOGIC: No dizziness or vertigo, denies headaches     MUSCULOSKELETAL: Denies any muscle or joint pain  SKIN: Denies skin rashes or itching  ENDOCRINE: Denies excessive thirst  Denies intolerance to heat or cold  PSYCHOSOCIAL: Denies depression or anxiety  Denies any recent memory loss         Historical Information   Past Medical History:   Diagnosis Date   • Arthritis    • BPH (benign prostatic hyperplasia)    • Cervical spine fracture ([facility] [address] ) 1997    C3   • Chest pain    • Colon polyp    • Coronary artery disease    • Dry eye    • DVT [facility]deep [facility] thrombosis) [facility] ) 2015    right leg- passed thru the IVC filter-stopped at the "patch" from bypass surgery   • Dysphagia 11/2021    minimal-"mass" esophagus with a "knob" in the middle   • Fracture of thoracic spine ([facility] [address] ) 1997    T3   • Glaucoma    • Hyperlipidemia    • Hypertension    • Myocardial infarction ([facility] )    • Pneumonia    • PUD (peptic ulcer disease)      Past Surgical History:   Procedure Laterality Date   • ANGIOPLASTY      2 stents   • CHOLECYSTECTOMY     • COLONOSCOPY     • CORONARY ARTERY BYPASS GRAFT      x6   • CORONARY ARTERY BYPASS GRAFT     • CORONARY STENT PLACEMENT     • CYSTOSCOPY     • EYE SURGERY Right    • HERNIA REPAIR Right 11/3/2017    Procedure: REPAIR HERNIA INGUINAL;  Surgeon: Yazmin Ibarra MD;  Location: [facility]IN OR;  Service: General   • IVC FILTER INSERTION      IVC filter   • NC CYSTO W/IRRIG & EVAC MULTPLE OBSTRUCTING CLOTS N/A 6/30/2018    Procedure: CYSTOSCOPY EVACUATION OF CLOTS, fulgeration;  Surgeon: Katty Regalado MD;  Location:  Main OR;  Service: Urology   • STOMACH SURGERY  1973    Billroth 2 gastrectomy-2/3 removal- bleeding ulcer   • TRANSURETHRAL RESECTION OF PROSTATE       Social History   Social History     Substance and Sexual Activity   Alcohol Use Never     Social History     Substance and Sexual Activity   Drug Use No     Social History     Tobacco Use   Smoking Status Never   Smokeless Tobacco Never     Family History   Problem Relation Age of Onset   • Coronary artery disease Brother    • Heart disease Father         CAD       Meds/Allergies     Medications Prior to Admission   Medication   • ALPRAZolam (XANAX) 0 5 mg tablet   • azithromycin (ZITHROMAX) 500 MG tablet   • Calcium Carbonate-Vitamin D (CALCIUM 600+D PO)   • Carboxymethylcellulose Sodium (REFRESH TEARS OP)   • cholecalciferol (VITAMIN D3) 400 units tablet   • clopidogrel (PLAVIX) 75 mg tablet   • dutasteride (AVODART) 0 5 mg capsule   • isosorbide mononitrate (IMDUR) 30 mg 24 hr tablet   • Magnesium 250 MG TABS   • metoprolol succinate (TOPROL-XL) 50 mg 24 hr tablet   • multivitamin-iron-minerals-folic acid (CENTRUM) chewable tablet   • nitroglycerin (NITROSTAT) 0 4 mg SL tablet   • pantoprazole (PROTONIX) 20 mg tablet   • ranolazine (RANEXA) 500 mg 12 hr tablet   • rosuvastatin (CRESTOR) 5 mg tablet   • tamsulosin (Flomax) 0 4 mg   • Zinc 25 MG TABS   • cefuroxime (CEFTIN) 250 mg tablet   • Docusate Calcium (STOOL SOFTENER PO)   • famotidine (PEPCID) 20 mg tablet     Current Facility-Administered Medications   Medication Dose Route Frequency   • ALPRAZolam (XANAX) tablet 0 5 mg  0 5 mg Oral HS PRN   • calcium carbonate-vitamin D 500 mg-5 mcg tablet 1 tablet  1 tablet Oral Daily With Breakfast   • cholecalciferol (VITAMIN D3) tablet 400 Units  400 Units Oral After Lunch   • clopidogrel (PLAVIX) tablet 75 mg  75 mg Oral Daily   • docusate sodium (COLACE) capsule 100 mg  100 mg Oral Daily   • enoxaparin (LOVENOX) subcutaneous injection 40 mg  40 mg Subcutaneous Daily   • famotidine (PEPCID) tablet 20 mg  20 mg Oral HS   • finasteride (PROSCAR) tablet 5 mg  5 mg Oral Daily   • insulin lispro (HumaLOG) 100 units/mL subcutaneous injection 1-5 Units  1-5 Units Subcutaneous TID AC   • isosorbide mononitrate (IMDUR) 24 hr tablet 30 mg  30 mg Oral QAM   • magnesium Oxide (MAG-OX) tablet 400 mg  400 mg Oral Daily   • metoprolol succinate (TOPROL-XL) 24 hr tablet 75 mg  75 mg Oral BID   • multivitamin-minerals (CENTRUM) tablet 1 tablet 1 tablet Oral Daily   • nitroglycerin (NITROSTAT) SL tablet 0 4 mg  0 4 mg Sublingual Q5 Min PRN   • pantoprazole (PROTONIX) EC tablet 20 mg  20 mg Oral BID With Meals   • pravastatin (PRAVACHOL) tablet 40 mg  40 mg Oral Daily With Dinner   • ranolazine (RANEXA) 12 hr tablet 500 mg  500 mg Oral BID   • tamsulosin (FLOMAX) capsule 0 4 mg  0 4 mg Oral Daily With Dinner   • zinc sulfate (ZINCATE) capsule 220 mg  220 mg Oral Daily       Allergies   Allergen Reactions   • Escitalopram Other (See Comments)     Suicidal feelings, sweating   • Oxycodone-Acetaminophen Dizziness and Shortness Of Breath     Other reaction(s): Faints  Reaction Date: [id number]; Category: Adverse Reaction;    • Sucralfate GI Intolerance     Abdominal pain    • Sulfa Antibiotics Other (See Comments)   • Omeprazole Rash   • Statins Other (See Comments)     Muscle pain           Objective     Blood pressure 104/60, pulse (!) 109, temperature 97 8 °F (36 6 °C), temperature source Oral, resp  rate 17, height 5' 4" (1 626 m), weight 65 8 kg (145 lb), SpO2 96 %  Body mass index is 24 89 kg/m²  Intake/Output Summary (Last 24 hours) at 3/16/2023 1356  Last data filed at 3/15/2023 2001  Gross per 24 hour   Intake --   Output 450 ml   Net -450 ml         PHYSICAL EXAM:      General Appearance:   Alert, cooperative, no distress   HEENT:   Normocephalic, atraumatic, anicteric  Neck:  Supple, symmetrical, trachea midline   Lungs:   Clear to auscultation bilaterally; no rales, rhonchi or wheezing; respirations unlabored    Heart[de-identified]   Regular rate and rhythm; no murmur, rub, or gallop     Abdomen:   Soft, non-tender, non-distended; normal bowel sounds; no masses, no organomegaly    Genitalia:   Deferred    Rectal:   Deferred    Extremities:  No cyanosis, clubbing or edema    Pulses:  2+ and symmetric all extremities    Skin:  No jaundice, rashes, or lesions    Lymph nodes:  No palpable cervical lymphadenopathy        Lab Results:   Admission on 03/15/2023   Component Date Value   • WBC 03/15/2023 7 14    • RBC 03/15/2023 4 22    • Hemoglobin 03/15/2023 12 9    • Hematocrit 03/15/2023 39 4    • MCV 03/15/2023 93    • MCH 03/15/2023 30 6    • MCHC 03/15/2023 32 7    • RDW 03/15/2023 13 2    • MPV 03/15/2023 10 0    • Platelets 49/19/9072 169    • nRBC 03/15/2023 0    • Neutrophils Relative 03/15/2023 67    • Immat GRANS % 03/15/2023 0    • Lymphocytes Relative 03/15/2023 24    • Monocytes Relative 03/15/2023 8    • Eosinophils Relative 03/15/2023 1    • Basophils Relative 03/15/2023 0    • Neutrophils Absolute 03/15/2023 4 74    • Immature Grans Absolute 03/15/2023 0 03    • Lymphocytes Absolute 03/15/2023 1 68    • Monocytes Absolute 03/15/2023 0 58    • Eosinophils Absolute 03/15/2023 0 09    • Basophils Absolute 03/15/2023 0 02    • Sodium 03/15/2023 137    • Potassium 03/15/2023 4 3    • Chloride 03/15/2023 101    • CO2 03/15/2023 29    • ANION GAP 03/15/2023 7    • BUN 03/15/2023 18    • Creatinine 03/15/2023 1 06    • Glucose 03/15/2023 144 (H)    • Calcium 03/15/2023 9 1    • AST 03/15/2023 7 (L)    • ALT 03/15/2023 8    • Alkaline Phosphatase 03/15/2023 85    • Total Protein 03/15/2023 7 2    • Albumin 03/15/2023 3 9    • Total Bilirubin 03/15/2023 0 51    • eGFR 03/15/2023 60    • Magnesium 03/15/2023 2 1    • hs TnI 0hr 03/15/2023 12    • hs TnI 2hr 03/15/2023 10    • Delta 2hr hsTnI 03/15/2023 -2    • hs TnI 4hr 03/15/2023 13    • Delta 4hr hsTnI 03/15/2023 1    • SARS-CoV-2 03/15/2023 Negative    • INFLUENZA A PCR 03/15/2023 Negative    • INFLUENZA B PCR 03/15/2023 Negative    • RSV PCR 03/15/2023 Negative    • Baseline HR 03/16/2023 109    • Baseline BP 03/16/2023 104/60    • O2 sat rest 03/16/2023 96    • Stress peak HR 03/16/2023 93    • Post peak BP 03/16/2023 98    • Rate Pressure Product 03/16/2023 9,114 0    • O2 sat peak 03/16/2023 98    • Recovery HR 03/16/2023 88    • Recovery BP 03/16/2023 111/53    • O2 sat recovery 03/16/2023 98    • A4C EF 03/16/2023 55    • LVOT stroke volume 03/16/2023 48 21    • LVOT stroke volume index 03/16/2023 28 10    • LVIDd 03/16/2023 4 50    • LVIDS 03/16/2023 3 60    • IVSd 03/16/2023 0 90    • LVPWd 03/16/2023 1 00    • FS 03/16/2023 20    • MV E' Tissue Velocity Se* 03/16/2023 3    • E wave deceleration time 03/16/2023 222    • MV Peak E Trino 03/16/2023 149    • MV Peak A Trino 03/16/2023 0 97    • AV LVOT peak gradient 03/16/2023 4    • LVOT peak VTI 03/16/2023 21 25    • LVOT peak trino 03/16/2023 0 99    • RVID d 03/16/2023 2 9    • Tricuspid annular plane * 03/16/2023 1 10    • LA size 03/16/2023 4 1    • LA length (A2C) 03/16/2023 5 80    • RAA A4C 03/16/2023 10    • LVOT diameter 03/16/2023 1 7    • Aortic valve peak veloci* 03/16/2023 3 37    • Ao VTI 03/16/2023 71 58    • AV mean gradient 03/16/2023 25    • LVOT mn grad 03/16/2023 2 0    • AV peak gradient 03/16/2023 45    • AV Deceleration Time 03/16/2023 1,410    • AV regurgitation pressur* 03/16/2023 409    • AV area by cont VTI 03/16/2023 0 7    • AV area peak trino 03/16/2023 0 7    • MV mean gradient antegra* 03/16/2023 5    • MV peak gradient antegra* 03/16/2023 16    • MV VTI 03/16/2023 47 38    • MV VTI RETROGRADE 03/16/2023 182 6    • MV stenosis pressure 1/2* 03/16/2023 64    • MV valve area p 1/2 meth* 03/16/2023 3 44    • MV mean gradient retrogr* 03/16/2023 86    • MR PG 03/16/2023 128    • TR Peak Trino 03/16/2023 3 4    • Triscuspid Valve Regurgi* 03/16/2023 46 0    • Ao root 03/16/2023 2 90    • AV peak gradient 03/16/2023 36    • Aortic valve mean veloci* 03/16/2023 23 30    • Mitral regurgitation pea* 03/16/2023 5 65    • Mitral valve mean inflow* 03/16/2023 4 28    • Tricuspid valve peak reg* 03/16/2023 3 40    • Left ventricular stroke * 03/16/2023 38 00    • IVS 03/16/2023 0 9    • LEFT VENTRICLE SYSTOLIC * 54/02/3172 54    • LV DIASTOLIC VOLUME (MOD* 06/11/4454 92    • Left Atrium Area-systoli* 03/16/2023 30 3    • Left Atrium Area-systoli* 03/16/2023 22 3    • LVSV, 2D 03/16/2023 38    • LVOT area 03/16/2023 2 27    • DVI 03/16/2023 0 29    • AV valve area 03/16/2023 0 67    • MV valve area by continu* 03/16/2023 1 02    • POC Glucose 03/15/2023 199 (H)    • Ventricular Rate 03/15/2023 97    • Atrial Rate 03/15/2023 97    • CT Interval 03/15/2023 210    • QRSD Interval 03/15/2023 106    • QT Interval 03/15/2023 344    • QTC Interval 03/15/2023 436    • P Axis 03/15/2023 83    • QRS Axis 03/15/2023 35    • T Wave Axis 03/15/2023 203    • POC Glucose 03/15/2023 263 (H)    • Hemoglobin A1C 03/15/2023 6 3 (H)    • EAG 03/15/2023 134    • Sodium 03/16/2023 136    • Potassium 03/16/2023 4 4    • Chloride 03/16/2023 100    • CO2 03/16/2023 27    • ANION GAP 03/16/2023 9    • BUN 03/16/2023 29 (H)    • Creatinine 03/16/2023 1 14    • Glucose 03/16/2023 153 (H)    • Calcium 03/16/2023 8 3 (L)    • eGFR 03/16/2023 55    • POC Glucose 03/16/2023 135    • POC Glucose 03/16/2023 160 (H)    • Protocol Name 03/16/2023 LINDA WALK    • Time In Exercise Phase 03/16/2023 00:03:00    • MAX  SYSTOLIC BP 85/01/4417 443    • Max Diastolic Bp 89/33/5158 55    • Max Heart Rate 03/16/2023 110    • Max Predicted Heart Rate 03/16/2023 128    • Reason for Termination 03/16/2023 Protocol Complete    • Test Indication 03/16/2023 Chest Discomfort    • Target Hr Formular 03/16/2023 (220 - Age)*100%    • Chest Pain Statement 03/16/2023 none        Imaging Studies: I have personally reviewed pertinent imaging studies

## 2023-03-16 NOTE — APP STUDENT NOTE
AUBREY STUDENT  Inpatient Progress Note for TRAINING ONLY  Not Part of Legal Medical Record     Progress Note - Taylor Liz 80 y o  male MRN: [id number]  Unit/Bed#: 2 [address] Encounter: [id number]    Assessment:  Principal Problem:    Chest pain  Active Problems:    Hypertension    Hyperlipidemia    Acute on chronic diastolic heart failure (HCC)    Benign prostatic hyperplasia    3-vessel CAD    Dysphagia    Plan:  1  Chest pain  · Troponins on 3/15/23 were negative: 12 (0 hours), 10 (2 hours), 13 (4 hours)  Patient denies new episodes of chest pain  · Plan for nuclear stress test today  · Plan for follow-up TTE  · Patient has a known history of chronic stable angina  It has been controlled with Imdur and Ranexa  · Continue Imdur 30 mg PO daily  · Continue Ranexa 500 mg PO twice daily  · Continue Toprol-XL 75 mg twice daily  · Continue Plavix 75 mg PO daily  · Continue nitroglycerin 0 4 mg SL PRN chest pain  · Continue telemetry  In room, patient is NSR with HR in the 90s  · 3/15/23 EKG is unchanged from previous EKGs  Nonspecific ST-T abnormalities noted  · Suspect unstable angina  Awaiting results from nuclear stress test that is being performed today  2  Chronic diastolic heart failure  · CXR 3/15/23: Midline sternotomy  Previous bilateral opacities have resolved  No acute cardiopulmonary disease  · CT dissection protocol 3/15/23: No aortic aneurysm or dissection   Pulmonary interstitial edema with small bilateral pleural effusions  Colonic diverticulosis without diverticulitis  · Stable pancreatic cystic lesions   In retrospect, these have shown little to no growth since the CT of 1/7/2017, and are therefore almost certainly benign  · Patient does not have edema in the lower extremities or abdomen  No rales noted on exam  He does not appear to be in an acute CHF exacerbation  · Continue Imdur 30 mg PO daily  · Continue Ranexa 500 mg PO twice daily    · Continue Toprol-XL 75 mg twice daily   · Plan for follow-up TTE in outpatient setting  · Continue strict I/Os monitoring  · Continue monitoring daily weights  3  3-vessel coronary artery disease, s/p CABG  · Patient follows with Dr Sidney Nunn for his CAD  Per Dr Jono Sosa note: coronary artery disease status post CABG and stents with multiple cardiac catheterization a year ago ago in [facility] by me and it was recommended medical therapy  Strunk Fulling has diffuse 3 vessel disease with poor targets  · Continue Imdur 30 mg PO daily  · Continue Ranexa 500 mg PO twice daily  · Continue Toprol-XL 75 mg twice daily  · Continue Plavix 75 mg PO daily  · Continue pravastatin 40 mg PO daily  4  Hyperlipidemia  · Continue pravastatin 40 mg PO daily  5  Hypertension  · BP in ED yesterday was elevated at 184/87  · Most recent BP was 124/74  BP is improving  · Continue Imdur 30 mg PO daily  · Continue Ranexa 500 mg PO twice daily  · Continue Toprol-XL 75 mg twice daily  6  Dysphagia  · Patient has noted intermittent difficulties with eating and drinking  He also notes a burning sensation in his throat  He has had episodes of "choking" and "regurgitation" of his food  He follows with a GI doctor for this and has had 2 EGDs  Last EGD in 5/2022 showed intestinal metaplasia of the gastric body, friability of the gastric mucosa possibly bile related, mild esophagitis with dysmotility and diverticulum at 30 cm  · Patient denies chest pain associated with eating or laying down at night  · I was able to participate with Speech Pathology's evaluation of the patient's dysphagia  Concern exists for esophageal dysphagia  Patient reports need for slow rate of food/drink intake, occasions of regurgitation of food, and coughing with eating  Patient deemed low risk for anterograde aspiration  Recommend continuing diet of regular textured food and thin liquids  Continue to take PO medications slowly over time with liquids and/or applesauce   Patient should stay in an upright position during and for 45 minutes after meals  He should continue slow rate of feeding with small bites, single sips  He should avoid ice cold drinks and other foods that may exacerbate his symptoms  · Continue pantoprazole (Protonix) 20 mg PO twice daily  · Continue famotidine (Pepcid) 20 mg PO daily  · Patient is requesting a consult to GI for his esophageal dysphagia and possible EGD  7  Benign prostatic hyperplasia  · Patient notes that he needs to strain when he urinates  Denies dysuria, hematuria, urgency, hesitancy  · Continue Avodart and Flomax  VTE Pharmacologic Prophylaxis:   Pharmacologic: Enoxaparin (Lovenox)  Mechanical VTE Prophylaxis in Place: Yes    Patient Centered Rounds: I have performed bedside rounds with nursing staff today  Discussions with Specialists or Other Care Team Provider: Cardiology, Speech Language Pathology, GI    Education and Discussions with Family / Patient: Dr Lida Lomeli discussed patient's care plan with patient, patient's wife, and patient's daughter  Time Spent for Care: 1 hour  More than 50% of total time spent on counseling and coordination of care as described above  Current Length of Stay: 0 day(s)    Current Patient Status: Observation   Certification Statement: The patient will continue to require additional inpatient hospital stay due to chest pain and CHF  Discharge Plan: Anticipate discharge in 24-48 hours  Discharge is dependent on results of nuclear stress test, if there are EKG changes, and if patient needs to be seen by GI for his dysphagia  Code Status: Level 1 - Full Code    Subjective:   81 y/o M on Hospital Day #1 for chest pain  Patient had an episode of chest pain at 0530 yesterday morning, which did not resolve with nitroglycerin  He received 4 nitroglycerin and 3 baby aspirin yesterday in the ED, which helped with his chest pain  He reports today that his chest pain is mostly resolved   Yesterday, he noted that his chest pain was worse with inspiration  Today, he states that the pain is not as severe, but still occurs when he takes a deep breath  He rates the chest pain as a 2/10 when he takes a deep breath  CT chest performed in the ED yesterday did show pulmonary interstitial edema with small B/L pleural effusions  Denies acute overnight events  Patient slept well  He is able to ambulate independently  He has been urinating well with a urinal and has been able to get up and go to the bathroom with some assistance secondary to his BPH  He does note that he has to strain when he urinates, but this is normal for him  He had an episode of "choking" and regurgitation last night while eating dinner  This does occur to him intermittently at home  He states that he has had issues with dysphagia and follows up with a GI doctor for this  He has been passing gas from below, but has not had a bowel movement  Denies SOB, chest palpitations, lightheadedness, dizziness abdominal pain, N/V/D, dysuria, hematuria  Objective:     Vitals:   Temp (24hrs), Av °F (36 7 °C), Min:97 8 °F (36 6 °C), Max:98 5 °F (36 9 °C)    Temp:  [97 8 °F (36 6 °C)-98 5 °F (36 9 °C)] 97 8 °F (36 6 °C)  HR:  [] 104  Resp:  [14-20] 17  BP: (113-162)/(55-91) 124/74  SpO2:  [90 %-100 %] 93 %  Body mass index is 24 89 kg/m²  Intake/Output Summary (Last 24 hours) at 3/16/2023 1122  Last data filed at 3/15/2023 2001  Gross per 24 hour   Intake --   Output 825 ml   Net -825 ml     Physical Exam  Constitutional:       General: He is awake  He is not in acute distress  HENT:      Head: Normocephalic and atraumatic  Cardiovascular:      Rate and Rhythm: Normal rate and regular rhythm  Heart sounds: S1 normal and S2 normal  Murmur (History of aortic stenosis) heard  Pulmonary:      Breath sounds: Normal breath sounds  No stridor  No wheezing, rhonchi or rales  Chest:      Comments: Median sternotomy scar noted    Abdominal: General: Abdomen is flat  Bowel sounds are normal  There is no distension  Palpations: Abdomen is soft  Tenderness: There is no abdominal tenderness  Musculoskeletal:      Right lower leg: No edema  Left lower leg: No edema  Neurological:      Mental Status: He is alert  Psychiatric:         Behavior: Behavior is cooperative         Historical Information   Past Medical History:   Diagnosis Date   • Arthritis    • BPH (benign prostatic hyperplasia)    • Cervical spine fracture ([facility] ) 1997    C3   • Chest pain    • Colon polyp    • Coronary artery disease    • Dry eye    • DVT (deep venous thrombosis) ([place] [address] ) 2015    right leg- passed thru the IVC filter-stopped at the "patch" from bypass surgery   • Dysphagia 11/2021    minimal-"mass" esophagus with a "knob" in the middle   • Fracture of thoracic spine ([facility] ) 1997    T3   • Glaucoma    • Hyperlipidemia    • Hypertension    • Myocardial infarction ([place] [address] )    • Pneumonia    • PUD (peptic ulcer disease)      Past Surgical History:   Procedure Laterality Date   • ANGIOPLASTY      2 stents   • CHOLECYSTECTOMY     • COLONOSCOPY     • CORONARY ARTERY BYPASS GRAFT      x6   • CORONARY ARTERY BYPASS GRAFT     • CORONARY STENT PLACEMENT     • CYSTOSCOPY     • EYE SURGERY Right    • HERNIA REPAIR Right 11/3/2017    Procedure: REPAIR HERNIA INGUINAL;  Surgeon: Javon Lowe MD;  Location: [address] [place] [address];  Service: General   • IVC FILTER INSERTION      IVC filter   • MO CYSTO W/IRRIG & EVAC MULTPLE OBSTRUCTING CLOTS N/A 6/30/2018    Procedure: CYSTOSCOPY EVACUATION OF CLOTS, fulgeration;  Surgeon: Jeremy Ortiz MD;  [facility];  Service: Urology   • STOMACH SURGERY  1973    Billroth 2 gastrectomy-2/3 removal- bleeding ulcer   • TRANSURETHRAL RESECTION OF PROSTATE       Social History   Social History     Substance and Sexual Activity   Alcohol Use Never     Social History     Substance and Sexual Activity   Drug Use No     Social History     Tobacco Use   Smoking Status Never   Smokeless Tobacco Never     Family History:   Family History   Problem Relation Age of Onset   • Coronary artery disease Brother    • Heart disease Father         CAD       Meds/Allergies   PTA meds:   Prior to Admission Medications   Prescriptions Last Dose Informant Patient Reported? Taking? ALPRAZolam (XANAX) 0 5 mg tablet 3/14/2023  Yes Yes   Sig: Take 0 5 mg by mouth daily at bedtime as needed    Calcium Carbonate-Vitamin D (CALCIUM 600+D PO) 3/14/2023  Yes Yes   Sig: Take by mouth every morning    Carboxymethylcellulose Sodium (REFRESH TEARS OP) 3/14/2023  Yes Yes   Sig: Apply to eye as needed   Docusate Calcium (STOOL SOFTENER PO)   Yes No   Sig: Take by mouth OTC; takes with lunch   Magnesium 250 MG TABS 3/14/2023  Yes Yes   Sig: Take 1 tablet by mouth every morning    Zinc 25 MG TABS 3/14/2023  Yes Yes   Sig: Take 25 mg by mouth daily at bedtime   azithromycin (ZITHROMAX) 500 MG tablet 3/14/2023  Yes Yes   cefuroxime (CEFTIN) 250 mg tablet Not Taking  Yes No   Patient not taking: Reported on 3/15/2023   cholecalciferol (VITAMIN D3) 400 units tablet 3/14/2023  Yes Yes   Sig: Take 400 Units by mouth daily after lunch    clopidogrel (PLAVIX) 75 mg tablet 3/14/2023  No Yes   Sig: TAKE ONE TABLET BY MOUTH EVERY DAY   dutasteride (AVODART) 0 5 mg capsule 3/14/2023  No Yes   Sig: Take 1 capsule (0 5 mg total) by mouth in the morning     famotidine (PEPCID) 20 mg tablet   No No   Sig: Take 1 tablet (20 mg total) by mouth daily at bedtime   isosorbide mononitrate (IMDUR) 30 mg 24 hr tablet 3/14/2023  No Yes   Sig: Take 1 tablet (30 mg total) by mouth every morning   metoprolol succinate (TOPROL-XL) 50 mg 24 hr tablet 3/14/2023  No Yes   Sig: Take 1 tablet (50 mg total) by mouth 2 (two) times a day   multivitamin-iron-minerals-folic acid (CENTRUM) chewable tablet 3/14/2023  Yes Yes   Sig: Chew 1 tablet every morning    nitroglycerin (NITROSTAT) 0 4 mg SL tablet 3/15/2023  No Yes   Sig: Place 1 tablet (0 4 mg total) under the tongue every 5 (five) minutes as needed for chest pain   pantoprazole (PROTONIX) 20 mg tablet 3/15/2023  No Yes   Sig: Take 1 tablet (20 mg total) by mouth 2 (two) times a day   ranolazine (RANEXA) 500 mg 12 hr tablet 3/14/2023  No Yes   Sig: Take 1 tablet (500 mg total) by mouth 2 (two) times a day   rosuvastatin (CRESTOR) 5 mg tablet 3/14/2023  No Yes   Sig: TAKE ONE TABLET BY MOUTH EVERY DAY AT BEDTIME   tamsulosin (Flomax) 0 4 mg 3/14/2023  No Yes   Sig: Take 1 capsule (0 4 mg total) by mouth daily with dinner      Facility-Administered Medications: None     Allergies   Allergen Reactions   • Escitalopram Other (See Comments)     Suicidal feelings, sweating   • Oxycodone-Acetaminophen Dizziness and Shortness Of Breath     Other reaction(s): Faints  Reaction Date: [id number]; Category:  Adverse Reaction;    • Sucralfate GI Intolerance     Abdominal pain    • Sulfa Antibiotics Other (See Comments)   • Omeprazole Rash   • Statins Other (See Comments)     Muscle pain     Additional Data:     Labs:    Results from last 7 days   Lab Units 03/15/23  0906   WBC Thousand/uL 7 14   HEMOGLOBIN g/dL 12 9   HEMATOCRIT % 39 4   PLATELETS Thousands/uL 169   NEUTROS PCT % 67   LYMPHS PCT % 24   MONOS PCT % 8   EOS PCT % 1     Results from last 7 days   Lab Units 03/16/23  0447 03/15/23  0906   SODIUM mmol/L 136 137   POTASSIUM mmol/L 4 4 4 3   CHLORIDE mmol/L 100 101   CO2 mmol/L 27 29   BUN mg/dL 29* 18   CREATININE mg/dL 1 14 1 06   ANION GAP mmol/L 9 7   CALCIUM mg/dL 8 3* 9 1   ALBUMIN g/dL  --  3 9   TOTAL BILIRUBIN mg/dL  --  0 51   ALK PHOS U/L  --  85   ALT U/L  --  8   AST U/L  --  7*   GLUCOSE RANDOM mg/dL 153* 144*         Results from last 7 days   Lab Units 03/16/23  1104 03/16/23  0736 03/15/23  2100 03/15/23  1612   POC GLUCOSE mg/dl 160* 135 263* 199*     Results from last 7 days   Lab Units 03/15/23  0906   HEMOGLOBIN A1C % 6 3*         * I Have Reviewed All Lab Data Listed Above   * Additional Pertinent Lab Tests Reviewed: All Labs Within Last 24 Hours Reviewed      Last 24 Hours Medication List:   Current Facility-Administered Medications   Medication Dose Route Frequency Provider Last Rate   • ALPRAZolam  0 5 mg Oral HS PRN Maryanne Torrez MD     • calcium carbonate-vitamin D  1 tablet Oral Daily With Breakfast Maryanne Torrez MD     • cholecalciferol  400 Units Oral After Indigo Arrington MD     • clopidogrel  75 mg Oral Daily Maryanne Torrez MD     • docusate sodium  100 mg Oral Daily Maryanne Torrez MD     • enoxaparin  40 mg Subcutaneous Daily Maryanne Torrez MD     • famotidine  20 mg Oral HS Maryanne Torrez MD     • finasteride  5 mg Oral Daily Maryanne Torrez MD     • insulin lispro  1-5 Units Subcutaneous TID AC Kiya Barrera DO     • insulin lispro  1-5 Units Subcutaneous HS Kiya Barrera DO     • isosorbide mononitrate  30 mg Oral QAM Maryanne Torrez MD     • magnesium Oxide  400 mg Oral Daily Maryanne Torrez MD     • metoprolol succinate  75 mg Oral BID Christine Elise PA-C     • multivitamin-minerals  1 tablet Oral Daily Maryanne Torrez MD     • nitroglycerin  0 4 mg Sublingual Q5 Min PRN Maryanne Torrez MD     • pantoprazole  20 mg Oral BID With Meals Maryanne Torrez MD     • pravastatin  40 mg Oral Daily With Jessenia Mendoza MD     • ranolazine  500 mg Oral BID Maryanne Torrez MD     • tamsulosin  0 4 mg Oral Daily With Jessenia Mendoza MD     • zinc sulfate  220 mg Oral Daily Maryanne Torrez MD          Today, Patient Was Seen By: PATTIE Aguila and Dr Laura Peterson    ** Please Note: Dictation voice to text software may have been used in the creation of this document   **

## 2023-03-16 NOTE — PROGRESS NOTES
Progress Note - Cardiology   [facility]     June Leal 80 y o  male MRN: [id number]  : 1930  Unit/Bed#: 2 South 219 B Encounter: [id number]    Assessment and Plan:   1  Chest pain       -  Patient reports he awoke around 0530hrs this morning (3/15/23) with intense chest tightness and pain  He states he took a nitroglycerin tablet and pain subsided  Patient reports anther episode of chest pain around 0800hrs with mild relief with nitroglycerin prompting him to seek care  He reports mild shortness of breath and right arm pain with chest pain, but denies palpitations, lightheadedness, dizziness, nausea or vomiting  Patient also reports chest pain is worse with inspiration  Wife at bedside reports patient has had a cough for a few weeks  - Patient with known history of chronic stable angina  - 3/15/23 hs troponin: 12 (0hrs), 10 (2hrs), 13 (4hrs)  - Plan for nuclear stress test today  - Follow up TTE    - Continue Imdur 30 mg daily and Ranexa 500 mg twice daily  - Continue Toprol XL 75 mg twice daily  - Continue plavix 75 mg daily  - Continue nitroglycerin PRN    - 3/15/23 EKG unchanged from previous EKGs  - Continue telemetry       2  Coronary artery disease s/p CABG     - Patient presenting with chest pain  See above  - As per Dr Elie Gimenez 23 note- coronary artery disease status post CABG and stents with multiple cardiac catheterization a year ago ago in [facility] by me and it was recommended medical therapy  [facility] has diffuse 3 vessel disease with poor targets  - Continue Imdur 30 mg daily and Ranexa 500 mg twice daily  - Continue Toprol XL 75 mg twice daily  - Continue plavix 75 mg daily  - Continue pravastatin 40 mg daily        3  Chronic diastolic heart failure       - Does not appear in acute phase  Euvolemic on examination    - Continue Imdur 30 mg daily  - Continue Toprol XL 75 mg twice daily    - Follow up TTE    - Daily weights     - Monitor I/Os     4  Hypertension       - Elevated readings on presentation, now improved  - Continue Imdur 30 mg daily  - Continue Toprol XL 75 mg twice daily       5  Mixed hyperlipidemia       - 11/29/22 lipid panel: cholesterol 154, triglycerides 169, HDL 38, LDL 82    - Continue pravastatin 40 mg daily        6  GERD     - Patient denies chest pain is associated with eating    - Continue pantoprazole 20mg twice daily and famotidine 20 mg daily    - Patient requesting GI consult for issues swallowing and feelings of regurgitation with his breakfast       Subjective / Objective:          No acute events  He states he slept well and denies experiencing chest pain since yesterday afternoon  He is asking that GI be consulted due to history of GERD and esophageal dysphagia  He states he has some issues swallowing and feelings of regurgitation with his breakfast           Plan for nuclear stress test today, patient agreeable  He denies chest pain, palpitations, shortness of breath, lightheadedness, dizziness, nausea or vomiting  Vitals: Blood pressure 113/72, pulse 91, temperature 97 9 °F (36 6 °C), resp  rate 18, height 5' 4" (1 626 m), weight 65 8 kg (145 lb), SpO2 93 %  Vitals:    03/15/23 0857   Weight: 65 8 kg (145 lb)     Body mass index is 24 89 kg/m²    BP Readings from Last 3 Encounters:   03/16/23 113/72   01/13/23 142/80   08/11/22 122/55     Orthostatic Blood Pressures    Flowsheet Row Most Recent Value   Blood Pressure 113/72 filed at 03/16/2023 0226   Patient Position - Orthostatic VS Lying filed at 03/15/2023 1945        I/O       03/14 0701  03/15 0700 03/15 0701  03/16 0700 03/16 0701  03/17 0700    Urine (mL/kg/hr)  825     Total Output  825     Net  -825                Invasive Devices     Peripheral Intravenous Line  Duration           Peripheral IV 03/15/23 Left Antecubital <1 day                Intake/Output Summary (Last 24 hours) at 3/16/2023 0900  Last data filed at 3/15/2023 2001  Gross per 24 hour   Intake --   Output 825 ml   Net -825 ml     Physical Exam:   Physical Exam  Vitals reviewed  Constitutional:       General: He is not in acute distress  Cardiovascular:      Rate and Rhythm: Normal rate and regular rhythm  Heart sounds: Murmur heard  Pulmonary:      Breath sounds: Decreased breath sounds present  Musculoskeletal:      Right lower leg: No edema  Left lower leg: No edema  Skin:     General: Skin is warm and dry  Neurological:      Mental Status: He is alert and oriented to person, place, and time         Medications/ Allergies:     Current Facility-Administered Medications   Medication Dose Route Frequency Provider Last Rate   • ALPRAZolam  0 5 mg Oral HS PRN Lauri Toussaint MD     • calcium carbonate-vitamin D  1 tablet Oral Daily With Breakfast Lauri Toussaint MD     • cholecalciferol  400 Units Oral After Leandra Herring MD     • clopidogrel  75 mg Oral Daily Lauri Toussaint MD     • docusate sodium  100 mg Oral Daily Lauri Toussaint MD     • enoxaparin  40 mg Subcutaneous Daily Lauri Toussaint MD     • famotidine  20 mg Oral HS Lauri Toussaint MD     • finasteride  5 mg Oral Daily Lauri Toussaint MD     • insulin lispro  1-5 Units Subcutaneous TID AC Kiya Barrera DO     • insulin lispro  1-5 Units Subcutaneous HS Kiya Barrera DO     • isosorbide mononitrate  30 mg Oral QAM Lauri Toussaint MD     • magnesium Oxide  400 mg Oral Daily Lauri Toussaint MD     • metoprolol succinate  75 mg Oral BID Rosa Maria You PA-C     • multivitamin-minerals  1 tablet Oral Daily Lauri Toussaint MD     • nitroglycerin  0 4 mg Sublingual Q5 Min PRN Lauir Toussaint MD     • pantoprazole  20 mg Oral BID With Meals Lauri Toussaint MD     • pravastatin  40 mg Oral Daily With Edis Feliz MD     • ranolazine  500 mg Oral BID Lauri Toussaint MD     • tamsulosin  0 4 mg Oral Daily With Edis Feliz MD • zinc sulfate  220 mg Oral Daily Poncho Fritz MD       ALPRAZolam, 0 5 mg, HS PRN  nitroglycerin, 0 4 mg, Q5 Min PRN      Allergies   Allergen Reactions   • Escitalopram Other (See Comments)     Suicidal feelings, sweating   • Oxycodone-Acetaminophen Dizziness and Shortness Of Breath     Other reaction(s): Faints  Reaction Date: [id number]; Category: Adverse Reaction;    • Sucralfate GI Intolerance     Abdominal pain    • Sulfa Antibiotics Other (See Comments)   • Omeprazole Rash   • Statins Other (See Comments)     Muscle pain       VTE Pharmacologic Prophylaxis:   Enoxaparin (Lovenox)    Labs:   Troponins:  Results from last 7 days   Lab Units 03/15/23  1308 03/15/23  1049   HSTNI D2 ng/L  --  -2   HSTNI D4 ng/L 1  --          CBC with diff:  Results from last 7 days   Lab Units 03/15/23  0906   WBC Thousand/uL 7 14   HEMOGLOBIN g/dL 12 9   HEMATOCRIT % 39 4   MCV fL 93   PLATELETS Thousands/uL 169   MCH pg 30 6   MCHC g/dL 32 7   RDW % 13 2   MPV fL 10 0   NRBC AUTO /100 WBCs 0       CMP:  Results from last 7 days   Lab Units 03/16/23  0447 03/15/23  0906   SODIUM mmol/L 136 137   POTASSIUM mmol/L 4 4 4 3   CHLORIDE mmol/L 100 101   CO2 mmol/L 27 29   ANION GAP mmol/L 9 7   BUN mg/dL 29* 18   CREATININE mg/dL 1 14 1 06   CALCIUM mg/dL 8 3* 9 1   AST U/L  --  7*   ALT U/L  --  8   ALK PHOS U/L  --  85   TOTAL PROTEIN g/dL  --  7 2   ALBUMIN g/dL  --  3 9   TOTAL BILIRUBIN mg/dL  --  0 51   EGFR ml/min/1 73sq m 55 60       Magnesium:  Results from last 7 days   Lab Units 03/15/23  0906   MAGNESIUM mg/dL 2 1     Coags:    TSH:    No components found for: TSH3  Lipid Profile:    Hgb A1c:    NT-proBNP: No results for input(s): NTBNP in the last 72 hours  Imaging & Testing   I have personally reviewed pertinent reports  XR chest 1 view portable    Result Date: 3/15/2023  Narrative: CHEST INDICATION:   chest pain   COMPARISON:  2/11/2022 EXAM PERFORMED/VIEWS:  XR CHEST PORTABLE Single view FINDINGS: Midline sternotomy again evident Cardiomediastinal silhouette appears unremarkable  The lungs are clear  No pneumothorax or pleural effusion  Osseous structures appear within normal limits for patient age  Impression: Midline sternotomy Previous bilateral opacities have resolved No acute cardiopulmonary disease  Workstation performed: [id number]     CTA dissection protocol chest/abdomen/pelvis    Result Date: 3/15/2023  Narrative: CTA - CHEST, ABDOMEN AND PELVIS - WITHOUT AND WITH IV CONTRAST INDICATION:   chest pain  COMPARISON: Multiple prior CT scans, most recent a CTA dissection study 11/1/2021  TECHNIQUE: CT examination of the chest, abdomen and pelvis was performed both prior to and after the administration of intravenous contrast   The noncontrast portion of this examination was performed utilizing low radiation dose technique  Thin section angiographic arterial phase post contrast technique was used in order to evaluate for aortic dissection  3D reformatted images and volume rendering were performed on an independent workstation  Additionally, axial, sagittal, and coronal 2D reformatted images were created from the source data and submitted for interpretation  Radiation dose length product (DLP) for this visit:  1099 14 mGy-cm   This examination, like all CT scans performed in the [facility], was performed utilizing techniques to minimize radiation dose exposure, including the use of iterative reconstruction and automated exposure control  IV Contrast:  100 mL of iohexol (OMNIPAQUE) Enteric Contrast:  Enteric contrast was not administered  FINDINGS: AORTA:  There is no aortic dissection or intramural hematoma  There is no aortic aneurysm  Heavy diffuse atherosclerotic calcification of the entire aorta  CHEST LUNGS:  Diffuse interlobular septal thickening and mild groundglass densities bilaterally  There is no tracheal or endobronchial lesion  PLEURA:  Small bilateral effusions  HEART/PULMONARY ARTERIAL TREE:  Normal heart size  Extensive coronary artery calcifications  Status post CABG  Normal pulmonary arteries  MEDIASTINUM AND YARELI:  Calcified mediastinal and hilar lymph nodes consistent with prior granulomatous disease  CHEST WALL AND LOWER NECK:  Status post median sternotomy  ABDOMEN LIVER/BILIARY TREE:  Unremarkable  GALLBLADDER:  No calcified gallstones  No pericholecystic inflammatory change  SPLEEN:  Unremarkable  PANCREAS:  Unchanged 1 0 cm cystic lesion in the pancreatic neck on image 3/101, 1 4 cm cyst in the body on image 3/113, and 1 5 cm cyst in the tail on image 3/111  No significant ductal dilatation  No inflammatory changes  ADRENAL GLANDS:  Unremarkable  KIDNEYS/URETERS:  Unremarkable  No hydronephrosis  STOMACH AND BOWEL:  There is colonic diverticulosis without evidence of acute diverticulitis  APPENDIX:  No findings to suggest appendicitis  ABDOMINOPELVIC CAVITY:  No ascites or free intraperitoneal air  No lymphadenopathy  Stable IVC filter  PELVIS REPRODUCTIVE ORGANS:  Enlarged prostate gland status post TURP  URINARY BLADDER:  Unremarkable  ABDOMINAL WALL/INGUINAL REGIONS:  Unremarkable  OSSEOUS STRUCTURES:  There are age appropriate degenerative changes  No acute fracture or destructive osseous lesion  Unchanged chronic severe compression deformity of T3  Impression: No aortic aneurysm or dissection  Pulmonary interstitial edema with small bilateral pleural effusions  Colonic diverticulosis without diverticulitis  Stable pancreatic cystic lesions  In retrospect, these have shown little to no growth since the CT of 1/7/2017, and are therefore almost certainly benign  Workstation performed: [id number]        EKG / Monitor: Personally reviewed  Telemetry reviewed: sinus rhythm, rate 84 bpm  No events overnight       Cardiac testing:     Results for orders placed during the hospital encounter of 07/16/21    Echo complete with contrast if indicated    Narrative  [address]  [address] [facility]  [place] [address]  [phone]    Transthoracic Echocardiogram  2D, M-mode, Doppler, and Color Doppler    Study date:  2021    Patient: Sally Cobian  MR number: [id number]  Account number: [de-identified]  : 02-Aug-1930  Age: 80 years  Gender: Male  Status: Outpatient  Location: Echo lab  Height: 65 in  Weight: 151 6 lb  BP: 122/ 58 mmHg    Indications: Aortic Stenosis    Diagnoses: 424 1 - AORTIC VALVE DISORDER    Sonographer:  YURI Alvarado  Primary Physician:  Holland Gomez DO  Referring Physician:  Thony Albarado MD  Group:  [facility]  Interpreting Physician:  Mario Vaughn MD    SUMMARY    LEFT VENTRICLE:  Systolic function was at the lower limits of normal by visual assessment  Ejection fraction was estimated in the range of 45 % to 50 % to be 45 %  There was mild diffuse hypokinesis  Wall thickness was mildly increased  LEFT ATRIUM:  The atrium was mildly dilated  MITRAL VALVE:  There was mild to moderate annular calcification  Transmitral velocity was increased due to increased transvalvular flow  There was moderate to severe regurgitation  Based on elevated E' and central color flow jet  Unable to obtain PISA measurement for effective ERO calculation  Consider WALT for better evaluation    AORTIC VALVE:  The valve was probably trileaflet  Leaflets exhibited mildly increased thickness, mild calcification, moderately reduced cuspal separation, and sclerosis  Transaortic velocity was increased due to increased transvalvular flow  There was moderate stenosis  There was mild to moderate regurgitation  Valve mean gradient was 26 mmHg  HISTORY: PRIOR HISTORY: 3-vessel CAD,Chronic stable Angina,Chronic Diastolic heart failure,HTN,Murmur,DVT,Hyperlipidemia,anemia  PROCEDURE: The procedure was performed in the echo lab  This was a routine study   The transthoracic approach was used  The study included complete 2D imaging, M-mode, complete spectral Doppler, and color Doppler  The heart rate was 61 bpm,  at the start of the study  Images were obtained from the parasternal, apical, subcostal, and suprasternal notch acoustic windows  Image quality was adequate  LEFT VENTRICLE: Size was normal  Systolic function was at the lower limits of normal by visual assessment  Ejection fraction was estimated in the range of 45 % to 50 % to be 45 %  There was mild diffuse hypokinesis  Wall thickness was  mildly increased  No evidence of apical thrombus  DOPPLER: The study was not technically sufficient to allow evaluation of LV diastolic function  RIGHT VENTRICLE: The size was normal  Systolic function was normal  Wall thickness was normal     LEFT ATRIUM: The atrium was mildly dilated  RIGHT ATRIUM: Size was normal     MITRAL VALVE: There was mild to moderate annular calcification  There was mild-moderate calcification  There was mildly reduced leaflet separation  DOPPLER: Transmitral velocity was increased due to increased transvalvular flow  There was  no evidence for stenosis  There was moderate to severe regurgitation  Based on elevated E' and central color flow jet  Unable to obtain PISA measurement for effective ERO calculation  Consider WALT for better evaluation    AORTIC VALVE: The valve was probably trileaflet  Leaflets exhibited mildly increased thickness, mild calcification, moderately reduced cuspal separation, and sclerosis  DOPPLER: Transaortic velocity was increased due to increased  transvalvular flow  There was moderate stenosis  There was mild to moderate regurgitation  TRICUSPID VALVE: The valve structure was normal  There was normal leaflet separation  DOPPLER: The transtricuspid velocity was within the normal range  There was no evidence for stenosis  There was trace regurgitation      PULMONIC VALVE: Leaflets exhibited normal thickness, no calcification, and normal cuspal separation  DOPPLER: The transpulmonic velocity was within the normal range  There was trace regurgitation  PERICARDIUM: There was no pericardial effusion  The pericardium was normal in appearance  AORTA: The root exhibited normal size  SYSTEMIC VEINS: IVC: The inferior vena cava was not well visualized  MEASUREMENT TABLES    DOPPLER MEASUREMENTS  Aortic valve   (Reference normals)  Peak gilda   350 cm/s   (--)  Peak gradient   48 mmHg   (--)  Mean gradient   26 mmHg   (--)  Regurg PHT   460 ms   (--)    SYSTEM MEASUREMENT TABLES    2D  EF (Teich): 49 86 %  %FS: 25 27 %  JUDY Planimetry: 0 86 cm2  Ao Diam: 3 1 cm  EDV(Teich): 104 22 ml  ESV(Teich): 52 25 ml  IVSd: 1 24 cm  LA Area: 22 39 cm2  LA Diam: 4 03 cm  LVEDV MOD A4C: 174 08 ml  LVEF MOD A4C: 50 26 %  LVESV MOD A4C: 86 59 ml  LVIDd: 4 74 cm  LVIDs: 3 54 cm  LVLd A4C: 9 35 cm  LVLs A4C: 8 17 cm  LVOT Diam: 1 96 cm  LVPWd: 1 21 cm  RA Area: 12 71 cm2  RVIDd: 2 45 cm  SV (Teich): 51 97 ml  SV MOD A4C: 87 49 ml    CW  AV Env  Ti: 361 56 ms  AV VTI: 86 51 cm  AV Vmax: 3 48 m/s  AV Vmean: 2 39 m/s  AV maxP 38 mmHg  AV meanP 03 mmHg  TR Vmax: 3 25 m/s  TR maxP 23 mmHg    PW  E' Sept: 0 05 m/s  LVOT Env  Ti: 361 56 ms  LVOT VTI: 27 25 cm  LVOT Vmax: 1 15 m/s  LVOT Vmean: 0 75 m/s  LVOT maxP 29 mmHg  LVOT meanP 61 mmHg    Inters\[facility]\"" Commission Accredited Echocardiography Laboratory    Prepared and electronically signed by    Sonia Dobbs MD  Signed 2021 17:34:59      Results for orders placed during the hospital encounter of 18    NM Myocardial Perfusion Spect (Pharmacological Induced Stress and/or Rest)    [address]  [address] [facility] [address] [phone]    Rest/Stress Gated SPECT Myocardial Perfusion Imaging After Exercise    ! ! ERROR!! End tag 'TR' does not match the start tag 'TD'   Error at or before /TR    Allergies: OXYCODONE-ACETAMINOPHEN, OMEPRAZOLE, STATINS    Diagnosis: R07 9 - Chest pain, unspecified    Primary Physician:  Sumeet Romero DO  Technician:  Reinier Ziegler  RN:  KENY Andrade  Group:  [facility]  Report Prepared By[de-identified]  KENY Andrade  Interpreting Physician:  Sheri Jackson DO    INDICATIONS: Evaluation of known coronary artery disease  HISTORY: The patient is a 80year old  male  Chest pain status: chest pain  Coronary artery disease risk factors: dyslipidemia, hypertension, and family history of premature coronary artery disease  Cardiovascular history:  coronary artery disease, prior myocardial infarction, congestive heart failure, and peripheral vascular occlusive disease  Prior cardiovascular procedures: percutaneous transluminal coronary angioplasty and coronary artery bypass grafting  Co-morbidity: anemia Medications: a beta blocker, digoxin, aspirin, and clopidogrel  PHYSICAL EXAM: Baseline physical exam screening: normal     REST ECG: Normal sinus rhythm  PROCEDURE: The study was performed in the stress lab  Treadmill exercise testing was performed, using the Alex protocol  Gated SPECT myocardial perfusion imaging was performed after stress and at rest  Systolic blood pressure was 118  mmHg, at the start of the study  Diastolic blood pressure was 68 mmHg, at the start of the study  The heart rate was 77 bpm, at the start of the study  IV double checked      ALEX PROTOCOL:  HR bpm SBP mmHg DBP mmHg Symptoms ST change Rhythm/conduct  Baseline 82 118 68 none -- NSR  Stage 1 131 12 68 moderate dyspnea downsloping depression, 1-1 5 mm in II, III, aVF, V5 and V6 --  Stage 2 137 -- -- severe dyspnea, moderate fatigue downsloping depression, 1 5-2 mm in II, III, aVF, V5 and V6 --  Immediate 136 140 72 same as above same as above --  Recovery 1 112 130 72 subsiding -- --  Recovery 2 96 120 70 none downsloping depression, 1-1 5 mm in II, III, aVF, V5 and V6 --  Recovery 3 94 -- -- -- 0 5-1 mm --  No medications or fluids given  STRESS SUMMARY: Duration of exercise was 3 min and 33 sec  The patient exercised to protocol stage 2  Maximal work rate was 5 9 METs  Maximal heart rate during stress was 137 bpm ( 104 % of maximal predicted heart rate)  The heart rate  response to stress was exaggerated  There was normal resting blood pressure with an appropriate response to stress  The rate-pressure product for the peak heart rate and blood pressure was 16038  There was no chest pain during stress  The  stress test was terminated due to achievement of target heart rate and severe dyspnea  Pre oxygen saturation: 97 %  Peak oxygen saturation: 97 %  There were no stress arrhythmias or conduction abnormalities  ISOTOPE ADMINISTRATION:  Resting isotope administration Stress isotope administration  Agent Tetrofosmin Tetrofosmin  Dose 9 8 mCi 31 2 mCi  Date 08/10/2018 08/10/2018    Radiopharmaceutical was administered 2 min, 32 sec into the stress protocol  There was 1 min of exercise after the injection  MYOCARDIAL PERFUSION IMAGING:  The image quality was good  Rotating projection images reveal subdiaphragmatic activity  Left ventricular size was normal     PERFUSION DEFECTS:  -  There was a moderate-sized, severe, fixed myocardial perfusion defect of the apical wall  GATED SPECT:  The calculated left ventricular ejection fraction was 43 %  There was severely reduced myocardial thickening and motion of the apical wall of the left ventricle  SUMMARY:  -  Stress results: Duration of exercise was 3 min and 33 sec  Target heart rate was achieved  There was no chest pain during stress  -  Perfusion imaging: There was a moderate-sized, severe, fixed myocardial perfusion defect of the apical wall  -  Gated SPECT: The calculated left ventricular ejection fraction was 43 %  There was severely reduced myocardial thickening and motion of the apical wall of the left ventricle      IMPRESSIONS: Abnormal study after pharmacologic stress  There was a small infarct in the apical region  Left ventricular systolic function was reduced, with regional wall motion abnormalities      Prepared and signed by    Jumana Palmer DO  Signed 08/10/2018 14:38:25            Thais Newell PA-C

## 2023-03-16 NOTE — ASSESSMENT & PLAN NOTE
Patient has history of Billroth II gastrectomy for PUD  Patient follows up with GI  Last EGD in May 2022-status post Billroth II, intestinal metaplasia of the gastric body, friability of the gastric mucosa possibly bile related, mild esophagitis with dysmotility and diverticulum at 30 cm  Continue PPI-Protonix milligrams in a m  and Pepcid 20 mg at night  Patient had some choking with food on the day of admission  Patient was seen by speech therapy and recommended regular diet    GI was consulted for repeat endoscopy

## 2023-03-16 NOTE — PLAN OF CARE
Problem: MOBILITY - ADULT  Goal: Maintain or return to baseline ADL function  Description: INTERVENTIONS:  -  Assess patient's ability to carry out ADLs; assess patient's baseline for ADL function and identify physical deficits which impact ability to perform ADLs (bathing, care of mouth/teeth, toileting, grooming, dressing, etc )  - Assess/evaluate cause of self-care deficits   - Assess range of motion  - Assess patient's mobility; develop plan if impaired  - Assess patient's need for assistive devices and provide as appropriate  - Encourage maximum independence but intervene and supervise when necessary  - Involve family in performance of ADLs  - Assess for home care needs following discharge   - Consider OT consult to assist with ADL evaluation and planning for discharge  - Provide patient education as appropriate  Outcome: Progressing  Goal: Maintains/Returns to pre admission functional level  Description: INTERVENTIONS:  - Perform BMAT or MOVE assessment daily    - Set and communicate daily mobility goal to care team and patient/family/caregiver  - Collaborate with rehabilitation services on mobility goals if consulted  - Perform Range of Motion   times a day  - Reposition patient every   hours    - Dangle patient   times a day  - Stand patient   times a day  - Ambulate patient   times a day  - Out of bed to chair   times a day   - Out of bed for meals   times a day  - Out of bed for toileting  - Record patient progress and toleration of activity level   Outcome: Progressing     Problem: PAIN - ADULT  Goal: Verbalizes/displays adequate comfort level or baseline comfort level  Description: Interventions:  - Encourage patient to monitor pain and request assistance  - Assess pain using appropriate pain scale  - Administer analgesics based on type and severity of pain and evaluate response  - Implement non-pharmacological measures as appropriate and evaluate response  - Consider cultural and social influences on pain and pain management  - Notify physician/advanced practitioner if interventions unsuccessful or patient reports new pain  Outcome: Progressing     Problem: SAFETY ADULT  Goal: Patient will remain free of falls  Description: INTERVENTIONS:  - Educate patient/family on patient safety including physical limitations  - Instruct patient to call for assistance with activity   - Consult OT/PT to assist with strengthening/mobility   - Keep Call bell within reach  - Keep bed low and locked with side rails adjusted as appropriate  - Keep care items and personal belongings within reach  - Initiate and maintain comfort rounds  - Make Fall Risk Sign visible to staff  - Offer Toileting every   Hours, in advance of need  - Initiate/Maintain  alarm  - Obtain necessary fall risk management equipment:     - Apply yellow socks and bracelet for high fall risk patients  - Consider moving patient to room near nurses station  Outcome: Progressing     Problem: CARDIOVASCULAR - ADULT  Goal: Maintains optimal cardiac output and hemodynamic stability  Description: INTERVENTIONS:  - Monitor I/O, vital signs and rhythm  - Monitor for S/S and trends of decreased cardiac output  - Administer and titrate ordered vasoactive medications to optimize hemodynamic stability  - Assess quality of pulses, skin color and temperature  - Assess for signs of decreased coronary artery perfusion  - Instruct patient to report change in severity of symptoms  Outcome: Progressing  Goal: Absence of cardiac dysrhythmias or at baseline rhythm  Description: INTERVENTIONS:  - Continuous cardiac monitoring, vital signs, obtain 12 lead EKG if ordered  - Administer antiarrhythmic and heart rate control medications as ordered  - Monitor electrolytes and administer replacement therapy as ordered  Outcome: Progressing

## 2023-03-16 NOTE — SPEECH THERAPY NOTE
Speech-Language Pathology Bedside Swallow Evaluation      Patient Name: Kevin Vidal    DTEDX'H Date: 3/16/2023     Problem List  Principal Problem:    Chest pain  Active Problems:    Hypertension    Hyperlipidemia    Acute on chronic diastolic heart failure (HCC)    Benign prostatic hyperplasia    3-vessel CAD    Dysphagia      Past Medical History  Past Medical History:   Diagnosis Date   • Arthritis    • BPH (benign prostatic hyperplasia)    • Cervical spine fracture ([facility] [address] ) 1997    C3   • Chest pain    • Colon polyp    • Coronary artery disease    • Dry eye    • DVT (deep venous thrombosis) ([facility] [facility] ) 2015    right leg- passed thru the IVC filter-stopped at the "patch" from bypass surgery   • Dysphagia 11/2021    minimal-"mass" esophagus with a "knob" in the middle   • Fracture of thoracic spine ([facility] [address] ) 1997    T3   • Glaucoma    • Hyperlipidemia    • Hypertension    • Myocardial infarction ([facility] [place] [facility] )    • Pneumonia    • PUD (peptic ulcer disease)        Past Surgical History  Past Surgical History:   Procedure Laterality Date   • ANGIOPLASTY      2 stents   • CHOLECYSTECTOMY     • COLONOSCOPY     • CORONARY ARTERY BYPASS GRAFT      x6   • CORONARY ARTERY BYPASS GRAFT     • CORONARY STENT PLACEMENT     • CYSTOSCOPY     • EYE SURGERY Right    • HERNIA REPAIR Right 11/3/2017    Procedure: REPAIR HERNIA INGUINAL;  Surgeon: Mortimer Keeling, MD;  Location: [facility] MAIN OR;  Service: General   • IVC FILTER INSERTION      IVC filter   • AZ CYSTO W/IRRIG & EVAC MULTPLE OBSTRUCTING CLOTS N/A 6/30/2018    Procedure: CYSTOSCOPY EVACUATION OF CLOTS, fulgeration;  Surgeon: Leatha Londono MD;  Location:  Main OR;  Service: Urology   • STOMACH SURGERY  1973    Billroth 2 gastrectomy-2/3 removal- bleeding ulcer   • TRANSURETHRAL RESECTION OF PROSTATE         Summary   Pt presented with functional appearing oral and pharyngeal stage swallowing skills with materials administered today   Concern exists for esophageal dysphagia (reports need for slow rate of intake and occasions of regurgitation then coughing on food as well as dry burnign throat  Pt on PPI bid  Risk/s for Anterograde Aspiration: appeared low, ?risk for retrograde flow    Recommended Diet: regular textured food and thin liquids   Recommended Form of Meds: per pt (generally takes meds slowly over time (whole w/water) but takes 1 pill at night in applesauce   Reflux precautions: upright posture during and for 45 mins after meals, slow rate of feeding and small bits, single sips, avoid foods and ice cold drinks which exacerbate sxs  Other Recommendations: Continue frequent oral care, ?if additional GI evaluation would be of benefit at this time        Current Medical Status  Lizzeth Staton is a 80 y o  male with a PMH of CAD status Post CABG and PTCA, hypertension, hyperlipidemia, chronic diastolic heart failure, DVT, PUD with dysphagia who presents with chest pain since 530 this morning  Patient has history of chronic stable angina and does get chest pain which usually gets relieved with nitroglycerin  Patient took 2 nitroglycerin and did not help resolve the pain  Patient has significant retrosternal chest pain sometimes radiating to the right which was 10/10 intensity  Patient received more nitroglycerin-4 nitroglycerin and morphine in the ED with improved symptoms  Patient also reports problems with swallowing and follows up with GI and had EGD last year  Recently no problems with swallowing  CT chest showed pulmonary interstitial edema with small bilateral pleural effusions  DX[de-identified] CP, acute on chronic CHF, 3V CAD, hld, htn, benign hyperplasia, dysphagia (history of Billroth II gastrectomy for PUD, most recent EGD in May 2022-status post Billroth II, intestinal metaplasia of the gastric body, friability of the gastric mucosa possibly bile related, mild esophagitis with dysmotility and diverticulum at 30 cm  But report of "choking" pm of admit)     SLP Swallowing Evaluation ordered at this time  Allergies:  No known food allergies    Past medical history:  Please see H&P for details    Special Studies:  3/15/23:  CTA dissection protocol chest/abd/pelvis: No aortic aneurysm or dissection  Pulmonary interstitial edema with small bilateral pleural effusions  Colonic diverticulosis without diverticulitis  Stable pancreatic cystic lesions  In retrospect, these have shown little to no growth since the CT of 1/7/2017, and are therefore almost certainly benign  3/15 CXR: Midline sternotomy  Previous bilateral opacities have resolved  No acute cardiopulmonary disease  Social/Education/Vocational Hx:  Pt lives with family/wife [place]    Swallow Information   Current Symptoms/Concerns:concern for reflux, regurgitation and coughing/ choking incident  Current Diet: regular diet and thin liquids   Baseline Diet: regular diet and thin liquids      Baseline Assessment   Behavior/Cognition: alert  Speech/Language Status: able to participate in conversation  Patient Positioning: upright in bed  Pain Status/Interventions/Response to Interventions:  No report of or nonverbal indications of pain  Swallow Mechanism Exam  Facial: symmetrical  Labial: WFL  Lingual: WFL  Velum: symmetrical  Mandible: adequate ROM  Dentition: adequate  Vocal quality:clear/adequate   Respiratory Status: on RA       Consistencies Assessed and Performance   Materials administered included thin liquid, applesauce, jairo crackers (had eaten all of his breakfast prior to my arrival)    Oral Stage:   Mastication was adequate with the materials administered today  Bolus formation and transfer were functional with no significant oral residue noted  No overt s/s reduced oral control  Pharyngeal Stage:   Swallow Mechanics:  Swallowing initiation appeared prompt  Laryngeal rise was palpated and judged to be within functional limits    No coughing, throat clearing, change in vocal quality or respiratory status noted today      Esophageal Concerns: need for slowed rate, occasions of regurgitaiton and cough    Strategies and Efficacy: pt reported h/o thorough chewing, slow rate of intake including slow rate with pills ("How fast can I go as I only have 1/3 of a stomach")    Summary and Recommendations (see above)    Results Reviewed with: patient, RN and jose martin CORDOVA     Treatment Recommended: None at this time as pt independently uses strategies  Thank you for this referral   Please do not hesitate to contact me with any questions or concerns      Raphael [facility]   Speech Pathologist  [place] License [id number]  [place] License [id number]  Available via [facility] Text

## 2023-03-16 NOTE — CASE MANAGEMENT
Case Management Assessment & Discharge Planning Note    Patient name Agatha Hobbs  Location [address]/ Damir GAMBOA MRN [id number]  : 1930 Date 3/16/2023       Current Admission Date: 3/15/2023  Current Admission Diagnosis:Chest pain   Patient Active Problem List    Diagnosis Date Noted   • Status post gastric surgery 2022   • Rectal bleeding 2022   • Platelets decreased ([facility] [address] ) 2022   • 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 2022   • Dysphagia 2021   • Hx of CABG 2021   • CAD (coronary artery disease) 2021   • Cardiac murmur 2020   • 3-vessel CAD 2018   • Dry eye 2018   • Anemia of chronic disease 2018   • Depression 2018   • Bladder mass 2018   • Anxiety 2018   • Incarcerated right inguinal hernia 2017   • History of DVT (deep vein thrombosis) 2017   • PUD (peptic ulcer disease) 2017   • Hypertension 2017   • Hyperlipidemia 2017   • Acute on chronic diastolic heart failure ([facility] [address] ) 2017   • Chest pain 2017   • Benign prostatic hyperplasia 10/22/2015      LOS (days): 0  Geometric Mean LOS (GMLOS) (days):   Days to GMLOS:     OBJECTIVE:     Current admission status: Observation     Preferred Pharmacy:   [address] [place] [address]  [id number] [place] [address]  Phone: [phone] Fax: [phone]    Primary Care Provider: Samson Castaneda DO    Primary Insurance: MEDICARE  Secondary Insurance: AARP    ASSESSMENT:  Active Health Care Proxies    There are no active Health Care Proxies on file      Readmission Root Cause  30 Day Readmission: No    Patient Information  Admitted from[de-identified] Home  Mental Status: Alert  During Assessment patient was accompanied by: Spouse, Daughter  Assessment information provided by[de-identified] Spouse, Daughter  Primary Caregiver: Self  Support Systems: Family members, Self, Spouse/significant other  What city do you live in[place] entry access options   Select all that apply : Stairs  Number of steps to enter home : 3  Type of Current Residence: [place]  In the last 12 months, was there a time when you were not able to pay the mortgage or rent on time?: No  In the last 12 months, how many places have you lived?: 1  In the last 12 months, was there a time when you did not have a steady place to sleep or slept in a shelter (including now)?: No  Homeless/housing insecurity resource given?: N/A  Living Arrangements: Lives w/ Spouse/significant other    Activities of Daily Living Prior to Admission  Functional Status: Independent  Completes ADLs independently?: Yes  Ambulates independently?: Yes  Does patient use assisted devices?: No  Does patient currently own DME?: No         Patient Information Continued  Income Source: Pension/residential  Does patient have prescription coverage?: Yes  Within the past 12 months, you worried that your food would run out before you got the money to buy more : Never true  Within the past 12 months, the food you bought just didn't last and you didn't have money to get more : Never true  Food insecurity resource given?: N/A  Does patient receive dialysis treatments?: No  Does patient have a history of substance abuse?: No  Does patient have a history of Mental Health Diagnosis?: No     Means of Transportation  Means of Transport to Appts[de-identified] Family transport  In the past 12 months, has lack of transportation kept you from medical appointments or from getting medications?: No  In the past 12 months, has lack of transportation kept you from meetings, work, or from getting things needed for daily living?: No  Was application for public transport provided?: N/A    DISCHARGE DETAILS:    Discharge planning discussed with[de-identified] Les Jeong  [place] of Choice: Yes  Comments - Freedom of Choice: Spouse and dtr advise SW that choice of discharge plan is for patient to return back home w/ spouse  Neither have any questions, concerns, or needs regarding discharge plan that SW can assist with at this time  CM contacted family/caregiver?: Yes  Were Treatment Team discharge recommendations reviewed with patient/caregiver?: Yes  Did patient/caregiver verbalize understanding of patient care needs?: Yes  Were patient/caregiver advised of the risks associated with not following Treatment Team discharge recommendations?: Yes    Contacts  Patient Contacts: Francis Portillo (wife)/ Rj Schneider (dtr)  Relationship to Patient[de-identified] Family  Contact Method:  In Person  Reason/Outcome: Continuity of Care, Emergency Contact, Discharge [address]         Is the patient interested in [facility] at discharge?: No    DME Referral Provided  Referral made for DME?: No     Would you like to participate in our [address] [place] [address] service program?  : No - Declined    Treatment Team Recommendation: Home  Discharge Destination Plan[de-identified] Home  Transport at Discharge : Family

## 2023-03-16 NOTE — PLAN OF CARE
Problem: MOBILITY - ADULT  Goal: Maintain or return to baseline ADL function  Description: INTERVENTIONS:  -  Assess patient's ability to carry out ADLs; assess patient's baseline for ADL function and identify physical deficits which impact ability to perform ADLs (bathing, care of mouth/teeth, toileting, grooming, dressing, etc )  - Assess/evaluate cause of self-care deficits   - Assess range of motion  - Assess patient's mobility; develop plan if impaired  - Assess patient's need for assistive devices and provide as appropriate  - Encourage maximum independence but intervene and supervise when necessary  - Involve family in performance of ADLs  - Assess for home care needs following discharge   - Consider OT consult to assist with ADL evaluation and planning for discharge  - Provide patient education as appropriate  Outcome: Progressing  Goal: Maintains/Returns to pre admission functional level  Description: INTERVENTIONS:  - Perform BMAT or MOVE assessment daily    - Set and communicate daily mobility goal to care team and patient/family/caregiver     - Collaborate with rehabilitation services on mobility goals if consulted  - Out of bed for toileting  - Record patient progress and toleration of activity level   Outcome: Progressing     Problem: PAIN - ADULT  Goal: Verbalizes/displays adequate comfort level or baseline comfort level  Description: Interventions:  - Encourage patient to monitor pain and request assistance  - Assess pain using appropriate pain scale  - Administer analgesics based on type and severity of pain and evaluate response  - Implement non-pharmacological measures as appropriate and evaluate response  - Consider cultural and social influences on pain and pain management  - Notify physician/advanced practitioner if interventions unsuccessful or patient reports new pain  Outcome: Progressing     Problem: SAFETY ADULT  Goal: Patient will remain free of falls  Description: INTERVENTIONS:  - Educate patient/family on patient safety including physical limitations  - Instruct patient to call for assistance with activity   - Consult OT/PT to assist with strengthening/mobility   - Keep Call bell within reach  - Keep bed low and locked with side rails adjusted as appropriate  - Keep care items and personal belongings within reach  - Initiate and maintain comfort rounds  - Make Fall Risk Sign visible to staff  - Apply yellow socks and bracelet for high fall risk patients  - Consider moving patient to room near nurses station  Outcome: Progressing     Problem: CARDIOVASCULAR - ADULT  Goal: Maintains optimal cardiac output and hemodynamic stability  Description: INTERVENTIONS:  - Monitor I/O, vital signs and rhythm  - Monitor for S/S and trends of decreased cardiac output  - Administer and titrate ordered vasoactive medications to optimize hemodynamic stability  - Assess quality of pulses, skin color and temperature  - Assess for signs of decreased coronary artery perfusion  - Instruct patient to report change in severity of symptoms  Outcome: Progressing  Goal: Absence of cardiac dysrhythmias or at baseline rhythm  Description: INTERVENTIONS:  - Continuous cardiac monitoring, vital signs, obtain 12 lead EKG if ordered  - Administer antiarrhythmic and heart rate control medications as ordered  - Monitor electrolytes and administer replacement therapy as ordered  Outcome: Progressing

## 2023-03-16 NOTE — PROGRESS NOTES
[facility] 45  Progress Note - Jarred Linda 8/2/1930, 80 y o  male MRN: [id number]  Unit/Bed#: [address] Encounter: [id number]  Primary Care Provider: Ryan Rapp DO   Date and time admitted to hospital: 3/15/2023  8:50 AM    * Chest pain  Assessment & Plan  Patient presented with severe chest pain which is not relieved with nitroglycerin  Known history of chronic stable angina and has been on Imdur and Ranexa  Cardiology was consulted and appreciate input  Toprol-XL was increased to 75 mg p o  twice daily  Patient underwent nuclear stress test-left ventricular perfusion defect that is medium in size with severe reduction in the apex that is fixed with, moderate reduction in the left ventricular perfusion defect that is partially reversible  Left ventricular function poststress is abnormal with moderately reduced global function with EF of 33%-endings 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    Dysphagia  Assessment & Plan  Patient has history of Billroth II gastrectomy for PUD  Patient follows up with GI  Last EGD in May 2022-status post Billroth II, intestinal metaplasia of the gastric body, friability of the gastric mucosa possibly bile related, mild esophagitis with dysmotility and diverticulum at 30 cm  Continue PPI-Protonix milligrams in a m  and Pepcid 20 mg at night  Patient had some choking with food on the day of admission  Patient was seen by speech therapy and recommended regular diet  GI was consulted for repeat endoscopy    Acute on chronic diastolic heart failure (HCC)  Assessment & Plan  Wt Readings from Last 3 Encounters:   03/16/23 65 8 kg (145 lb)   01/13/23 67 6 kg (149 lb)   08/11/22 67 5 kg (148 lb 12 8 oz)   History of chronic diastolic heart failure NYHA class II  Echo / WALT showed EF of 45% with mild to moderate MR and mild to moderate AI with some stenosis from April last year  Continue Lasix 40 mg p o  daily    Patient received a dose of Lasix in the ED  Patient is not hypoxic or complaining of shortness of breath  CT chest showed some pulmonary interstitial edema with small bilateral pleural effusions  2D echo showed EF of 45% with grade 2 diastolic dysfunction with moderate aortic stenosis with a peak gradient of 45 mmHg with moderate MR without any significant change from prior echo            3-vessel CAD  Assessment & Plan  Status post CABG with stents  Patient has diffuse three-vessel disease with poor targets  Patient also has history of chronic stable angina-continue Ranexa and Imdur    Hyperlipidemia  Assessment & Plan  Crestor substituted with Pravachol 40 mg p o  daily    Hypertension  Assessment & Plan  Continue Toprol-XL which was increased to 75 mg p o  twice daily by cardiology    Benign prostatic hyperplasia  Assessment & Plan  Continue Avodart and Flomax        VTE Pharmacologic Prophylaxis: VTE Score: 3 Moderate Risk (Score 3-4) - Pharmacological DVT Prophylaxis Ordered: enoxaparin (Lovenox)  Patient Centered Rounds: I performed bedside rounds with nursing staff today  Discussions with Specialists or Other Care Team Provider: Cardiology and GI    Education and Discussions with Family / Patient: Updated  (daughter) via phone  Total Time Spent on Date of Encounter in care of patient: 55 minutes This time was spent on one or more of the following: performing physical exam; counseling and coordination of care; obtaining or reviewing history; documenting in the medical record; reviewing/ordering tests, medications or procedures; communicating with other healthcare professionals and discussing with patient's family/caregivers  Current Length of Stay: 0 day(s)  Current Patient Status: Inpatient   Certification Statement: The patient will continue to require additional inpatient hospital stay due to Dysphagia, chest pain  Discharge Plan: Anticipate discharge tomorrow to home      Code Status: Level 1 - Full Code    Subjective:   Patient denies any further chest pain, shortness of breath, abdominal pain, nausea or vomiting  Objective:     Vitals:   Temp (24hrs), Av 2 °F (36 8 °C), Min:97 8 °F (36 6 °C), Max:98 8 °F (37 1 °C)    Temp:  [97 8 °F (36 6 °C)-98 8 °F (37 1 °C)] 98 8 °F (37 1 °C)  HR:  [] 86  Resp:  [16-18] 17  BP: (104-124)/(60-75) 107/68  SpO2:  [91 %-96 %] 95 %  Body mass index is 24 89 kg/m²  Input and Output Summary (last 24 hours): Intake/Output Summary (Last 24 hours) at 3/16/2023 183  Last data filed at 3/15/2023 2001  Gross per 24 hour   Intake --   Output 300 ml   Net -300 ml       Physical Exam:   Physical Exam  Constitutional:       Appearance: Normal appearance  HENT:      Head: Normocephalic and atraumatic  Eyes:      Extraocular Movements: Extraocular movements intact  Pupils: Pupils are equal, round, and reactive to light  Cardiovascular:      Rate and Rhythm: Normal rate and regular rhythm  Heart sounds: Murmur heard  No gallop  Pulmonary:      Effort: Pulmonary effort is normal       Breath sounds: Normal breath sounds  Abdominal:      General: Bowel sounds are normal       Palpations: Abdomen is soft  Tenderness: There is no abdominal tenderness  Musculoskeletal:         General: No swelling or deformity  Normal range of motion  Cervical back: Normal range of motion and neck supple  Skin:     General: Skin is warm and dry  Neurological:      General: No focal deficit present  Mental Status: He is alert            Additional Data:     Labs:  Results from last 7 days   Lab Units 03/15/23  0906   WBC Thousand/uL 7 14   HEMOGLOBIN g/dL 12 9   HEMATOCRIT % 39 4   PLATELETS Thousands/uL 169   NEUTROS PCT % 67   LYMPHS PCT % 24   MONOS PCT % 8   EOS PCT % 1     Results from last 7 days   Lab Units 23  0447 03/15/23  0906   SODIUM mmol/L 136 137   POTASSIUM mmol/L 4 4 4 3   CHLORIDE mmol/L 100 101   CO2 mmol/L 27 29   BUN mg/dL 29* 18   CREATININE mg/dL 1 14 1 06   ANION GAP mmol/L 9 7   CALCIUM mg/dL 8 3* 9 1   ALBUMIN g/dL  --  3 9   TOTAL BILIRUBIN mg/dL  --  0 51   ALK PHOS U/L  --  85   ALT U/L  --  8   AST U/L  --  7*   GLUCOSE RANDOM mg/dL 153* 144*         Results from last 7 days   Lab Units 23  1613 23  1104 23  0736 03/15/23  2100 03/15/23  1612   POC GLUCOSE mg/dl 131 160* 135 263* 199*     Results from last 7 days   Lab Units 03/15/23  0906   HEMOGLOBIN A1C % 6 3*           Lines/Drains:  Invasive Devices     Peripheral Intravenous Line  Duration           Peripheral IV 03/15/23 Left Antecubital 1 day                  Telemetry:  Telemetry Orders (From admission, onward)             24 Hour Telemetry Monitoring  (ED Bridging Orders Panel)  Continuous x 24 Hours (Telem)           References:    Telemetry Guidelines   Question:  Reason for 24 Hour Telemetry  Answer:  Patients waiting for PCI/EP Study/CABG                 Telemetry Reviewed: Normal Sinus Rhythm  Indication for Continued Telemetry Use: No indication for continued use  Will discontinue                Imaging: Reviewed radiology reports from this admission including: chest CT scan    Recent Cultures (last 7 days):         Last 24 Hours Medication List:   Current Facility-Administered Medications   Medication Dose Route Frequency Provider Last Rate   • ALPRAZolam  0 5 mg Oral HS PRN Lauri Toussaint MD     • calcium carbonate-vitamin D  1 tablet Oral Daily With Breakfast Lauri Toussaint MD     • cholecalciferol  400 Units Oral After Leandra Herring MD     • clopidogrel  75 mg Oral Daily Lauri Toussaint MD     • docusate sodium  100 mg Oral Daily Lauri Toussaint MD     • enoxaparin  40 mg Subcutaneous Daily Lauri Toussaint MD     • famotidine  20 mg Oral HS Lauri Toussaint MD     • finasteride  5 mg Oral Daily Lauri Toussaint MD     • insulin lispro  1-5 Units Subcutaneous TID TELLO Barrera DO     • isosorbide mononitrate  30 mg Oral QAM Abdirashid Juarez MD     • magnesium Oxide  400 mg Oral Daily Abdirashid Juarez MD     • metoprolol succinate  100 mg Oral BID Waleska Vallejo PA-C     • multivitamin-minerals  1 tablet Oral Daily Abdirashid Juarez MD     • nitroglycerin  0 4 mg Sublingual Q5 Min PRN Abdirashid Juarez MD     • pantoprazole  20 mg Oral BID With Meals Abdirashid Juarez MD     • pravastatin  40 mg Oral Daily With Cori Lama MD     • ranolazine  500 mg Oral BID Abdirashid Juarez MD     • tamsulosin  0 4 mg Oral Daily With Cori Lama MD     • zinc sulfate  220 mg Oral Daily Abdirashid Juarez MD          Today, Patient Was Seen By: Abdirashid Juarez MD    **Please Note: This note may have been constructed using a voice recognition system  **

## 2023-03-17 ENCOUNTER — APPOINTMENT (INPATIENT)
Dept: RADIOLOGY | Facility: HOSPITAL | Age: 88
End: 2023-03-17

## 2023-03-17 VITALS
TEMPERATURE: 97.8 F | DIASTOLIC BLOOD PRESSURE: 78 MMHG | OXYGEN SATURATION: 96 % | HEART RATE: 87 BPM | BODY MASS INDEX: 24.75 KG/M2 | SYSTOLIC BLOOD PRESSURE: 151 MMHG | RESPIRATION RATE: 18 BRPM | WEIGHT: 145 LBS | HEIGHT: 64 IN

## 2023-03-17 LAB
GLUCOSE SERPL-MCNC: 124 MG/DL (ref 65–140)
GLUCOSE SERPL-MCNC: 144 MG/DL (ref 65–140)
GLUCOSE SERPL-MCNC: 147 MG/DL (ref 65–140)

## 2023-03-17 RX ORDER — PROMETHAZINE HYDROCHLORIDE 25 MG/ML
12.5 INJECTION, SOLUTION INTRAMUSCULAR; INTRAVENOUS ONCE
Status: DISCONTINUED | OUTPATIENT
Start: 2023-03-17 | End: 2023-03-17 | Stop reason: HOSPADM

## 2023-03-17 RX ORDER — POLYETHYLENE GLYCOL 3350 17 G/17G
17 POWDER, FOR SOLUTION ORAL DAILY PRN
Status: DISCONTINUED | OUTPATIENT
Start: 2023-03-17 | End: 2023-03-17 | Stop reason: HOSPADM

## 2023-03-17 RX ORDER — POLYETHYLENE GLYCOL 3350 17 G/17G
17 POWDER, FOR SOLUTION ORAL DAILY
Qty: 170 G | Refills: 0 | Status: SHIPPED | OUTPATIENT
Start: 2023-03-17 | End: 2023-03-27

## 2023-03-17 RX ORDER — METOPROLOL SUCCINATE 25 MG/1
75 TABLET, EXTENDED RELEASE ORAL 2 TIMES DAILY
Qty: 60 TABLET | Refills: 0 | Status: SHIPPED | OUTPATIENT
Start: 2023-03-17

## 2023-03-17 RX ORDER — SENNOSIDES 8.6 MG
8.6 TABLET ORAL
Qty: 10 TABLET | Refills: 0 | Status: SHIPPED | OUTPATIENT
Start: 2023-03-17 | End: 2023-03-27

## 2023-03-17 RX ORDER — PANTOPRAZOLE SODIUM 40 MG/1
40 TABLET, DELAYED RELEASE ORAL
Status: DISCONTINUED | OUTPATIENT
Start: 2023-03-18 | End: 2023-03-17 | Stop reason: HOSPADM

## 2023-03-17 RX ORDER — ONDANSETRON 2 MG/ML
4 INJECTION INTRAMUSCULAR; INTRAVENOUS EVERY 6 HOURS PRN
Status: DISCONTINUED | OUTPATIENT
Start: 2023-03-17 | End: 2023-03-17 | Stop reason: HOSPADM

## 2023-03-17 RX ADMIN — CLOPIDOGREL BISULFATE 75 MG: 75 TABLET ORAL at 16:54

## 2023-03-17 RX ADMIN — POLYETHYLENE GLYCOL 3350 17 G: 17 POWDER, FOR SOLUTION ORAL at 15:09

## 2023-03-17 RX ADMIN — RANOLAZINE 500 MG: 500 TABLET, EXTENDED RELEASE ORAL at 16:54

## 2023-03-17 RX ADMIN — DOCUSATE SODIUM 100 MG: 100 CAPSULE, LIQUID FILLED ORAL at 16:54

## 2023-03-17 RX ADMIN — TAMSULOSIN HYDROCHLORIDE 0.4 MG: 0.4 CAPSULE ORAL at 16:54

## 2023-03-17 RX ADMIN — PRAVASTATIN SODIUM 40 MG: 40 TABLET ORAL at 16:54

## 2023-03-17 RX ADMIN — ENOXAPARIN SODIUM 40 MG: 40 INJECTION SUBCUTANEOUS at 16:56

## 2023-03-17 RX ADMIN — ISOSORBIDE MONONITRATE 30 MG: 30 TABLET, EXTENDED RELEASE ORAL at 09:20

## 2023-03-17 RX ADMIN — FINASTERIDE 5 MG: 5 TABLET, FILM COATED ORAL at 16:54

## 2023-03-17 RX ADMIN — METOPROLOL SUCCINATE 75 MG: 50 TABLET, EXTENDED RELEASE ORAL at 09:20

## 2023-03-17 RX ADMIN — ONDANSETRON 4 MG: 2 INJECTION INTRAMUSCULAR; INTRAVENOUS at 15:09

## 2023-03-17 NOTE — ASSESSMENT & PLAN NOTE
Patient has history of Billroth II gastrectomy for PUD  Patient follows up with GI  Last EGD in May 2022-status post Billroth II, intestinal metaplasia of the gastric body, friability of the gastric mucosa possibly bile related, mild esophagitis with dysmotility and diverticulum at 30 cm  Continue PPI-Protonix milligrams in a m  and Pepcid 20 mg at night  Patient had some choking with food on the day of admission  Patient was seen by speech therapy and recommended regular diet  GI was consulted    GI recommended barium esophagogram which showed stable esophagogram with redemonstration of multiple diverticula at the mid esophagus and small hiatal hernia  Outpatient follow-up with GI for repeat endoscopy after holding Plavix

## 2023-03-17 NOTE — APP STUDENT NOTE
AUBREY STUDENT  Inpatient Progress Note for TRAINING ONLY  Not Part of Legal Medical Record     Progress Note - Jennifer Florian 80 y o  male MRN: [id number]  Unit/Bed#: 2 [address] Encounter: [id number]    Assessment:    Principal Problem:    Chest pain  Active Problems:    Hypertension    Hyperlipidemia    Acute on chronic diastolic heart failure (HCC)    Benign prostatic hyperplasia    3-vessel CAD    Dysphagia    Plan:  1  Chest pain  • Patient presented to the ED on 3/15/23 with severe chest pain, not relieved by nitroglycerin  He has a known history of chronic stable angina  • Troponins on 3/15/23 were negative: 12 (0 hours), 10 (2 hours), 13 (4 hours)  Patient denies new episodes of chest pain  • Nuclear stress test 3/16/23: Stress Combined Conclusion: Left ventricular perfusion is abnormal  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 which may be due to artifact  Overall, study appears consistent with previous study  • Plan for follow-up TTE  • Patient has a known history of chronic stable angina  It has been controlled with Imdur and Ranexa  • Continue Imdur 30 mg PO daily  • Continue Ranexa 500 mg PO twice daily  • Continue Toprol-XL 75 mg twice daily  • Continue Plavix 75 mg PO daily  • Continue nitroglycerin 0 4 mg SL PRN chest pain  • Continue telemetry  In room, patient is NSR with HR in the 70s  • 3/15/23 EKG is unchanged from previous EKGs  Nonspecific ST-T abnormalities noted  • Appreciate Cardiology input  2  Dysphagia  • Patient has noted intermittent difficulties with eating and drinking  He also notes a burning sensation in his throat  He has had episodes of "choking" and "regurgitation" of his food  He follows with a GI doctor for this and has had 2 EGDs  Last EGD in 5/2022 showed intestinal metaplasia of the gastric body, friability of the gastric mucosa possibly bile related, mild esophagitis with dysmotility and diverticulum at 30 cm     • Patient follows regularly with GI   • Patient denies chest pain associated with eating or laying down at night  • Continue pantoprazole (Protonix) 20 mg PO twice daily  • Continue famotidine (Pepcid) 20 mg PO daily  • GI was consulted to consider repeat endoscopy due to patient's atypical chest pain and dysphagia  Awaiting cardiac clearance before scheduling endoscopy  Patient and family verbalized understanding that the endoscopy may be scheduled in the outpatient setting  3  Chronic diastolic heart failure  • 2D ECHO/WALT: EF of 45% with grade 2 diastolic dysfunction with moderate aortic stenosis  Diastolic function is moderately abnormal, consistent with grade II (pseudonormal) relaxation  Left atrial filling pressure is normal  Left atrium is severely dilated  Aortic valve is functionally bicuspid  The leaflets are moderately thickened and calcified  Peak gradient of 45 mmHg with moderate mitral regurgitation without any significant change from prior ECHO  Compared to previous exam, there is no significant change  • CXR 3/15/23: Midline sternotomy  Previous bilateral opacities have resolved   No acute cardiopulmonary disease  • CT dissection protocol 3/15/23: No aortic aneurysm or dissection   Pulmonary interstitial edema with small bilateral pleural effusions  Colonic diverticulosis without diverticulitis  • Patient does not have edema in the lower extremities or abdomen  No rales noted on exam  He does not appear to be in an acute CHF exacerbation  • Continue furosemide (Lasix) 40 mg PO daily  • Continue Imdur 30 mg PO daily  • Continue Ranexa 500 mg PO twice daily  • Continue Toprol-XL 75 mg twice daily  • Plan for follow-up TTE in outpatient setting  • Continue strict I/Os monitoring  • Continue monitoring daily weights  4  3-vessel CAD, s/p CABG  • Patient follows with Dr Madonna Bal for his CAD   Per Dr Genny Horn note: coronary artery disease status post CABG and stents with multiple cardiac catheterization a year ago ago in [facility] by me and it was recommended medical therapy  Arlie Seip has diffuse 3 vessel disease with poor targets  • Continue Imdur 30 mg PO daily  • Continue Ranexa 500 mg PO twice daily  • Continue Toprol-XL 75 mg twice daily  • Continue Plavix 75 mg PO daily  • Continue pravastatin 40 mg PO daily  5  Hyperlipidemia  • Continue pravastatin 40 mg PO daily  6  Hypertension  • BP in ED yesterday was elevated at 184/87  • Most recent BP was 151/78 prior to administration of antihypertensives  • Continue Imdur 30 mg PO daily  • Continue Ranexa 500 mg PO twice daily  • Continue Toprol-XL 75 mg twice daily  7  Benign prostatic hyperplasia  • Patient notes that he needs to strain when he urinates  Denies dysuria, hematuria, urgency, hesitancy  • Continue Avodart and Flomax  VTE Pharmacologic Prophylaxis:   Pharmacologic: Enoxaparin (Lovenox)  Mechanical VTE Prophylaxis in Place: No    Patient Centered Rounds: I have performed bedside rounds with nursing staff today  Discussions with Specialists or Other Care Team Provider: Cardiology, GI     Education and Discussions with Family / Patient: Dr Spencer Torres updated plan with patient at bedside  Time Spent for Care: 45 minutes  More than 50% of total time spent on counseling and coordination of care as described above  Current Length of Stay: 1 day(s)    Current Patient Status: Inpatient   Certification Statement: The patient, admitted on an observation basis, will now require > 2 midnight hospital stay due to CHF and dysphagia  Discharge Plan: Anticipate discharge home today or tomorrow, pending GI's plans for endoscopy  Code Status: Level 1 - Full Code    Subjective:   81 y/o M on Hospital Day #2 for chest pain  Patient denies new episodes of chest pain  He states that his throat is bothering him most  He notes a burning sensation in the back of his throat   He has discussed options with GI and may require outpatient endoscopy  He was evaluated by SLP yesterday, who determined that the patient is at low risk for aspiration and is most likely suffering from esophageal dysphagia  Patient denies acute overnight events  Patient slept well  He is able to ambulate independently  He has been urinating well with a urinal and has been able to get up and go to the bathroom with some assistance secondary to his BPH  He does note that he has to strain when he urinates, but this is normal for him  Patient did have a BM yesterday, but states that it was very hard for him to pass  He typically takes stool softeners at home to help with his constipation  Denies SOB, chest palpitations, lightheadedness, dizziness abdominal pain, N/V/D, dysuria, hematuria  Objective:     Vitals:   Temp (24hrs), Av 2 °F (36 8 °C), Min:97 8 °F (36 6 °C), Max:98 8 °F (37 1 °C)    Temp:  [97 8 °F (36 6 °C)-98 8 °F (37 1 °C)] 97 8 °F (36 6 °C)  HR:  [] 87  Resp:  [17-18] 18  BP: (104-151)/(60-78) 151/78  SpO2:  [91 %-96 %] 96 %  Body mass index is 24 89 kg/m²  Intake/Output Summary (Last 24 hours) at 3/17/2023 1002  Last data filed at 3/16/2023 2201  Gross per 24 hour   Intake 260 ml   Output --   Net 260 ml     Physical Exam  Vitals and nursing note reviewed  Constitutional:       General: He is awake  He is not in acute distress  HENT:      Head: Normocephalic and atraumatic  Cardiovascular:      Rate and Rhythm: Normal rate and regular rhythm  Pulses: Normal pulses  Heart sounds: S1 normal and S2 normal  Murmur (History of aortic stenosis) heard  No gallop  No S3 sounds  Pulmonary:      Effort: Pulmonary effort is normal  No respiratory distress  Breath sounds: Normal breath sounds  No wheezing, rhonchi or rales  Chest:      Comments: Median sternotomy scar noted  Abdominal:      General: Abdomen is flat  Bowel sounds are normal       Palpations: Abdomen is soft  Tenderness:  There is no abdominal tenderness  Musculoskeletal:      Right lower leg: No edema  Left lower leg: No edema  Neurological:      Mental Status: He is alert  Psychiatric:         Behavior: Behavior is cooperative  EKG: Pt is on continuous telemetry  In room, patient is in NSR with HR in the 70s      Historical Information   Past Medical History:   Diagnosis Date   • Arthritis    • BPH (benign prostatic hyperplasia)    • Cerv[facility] ) 1997    C3   • Chest pain    • Colon polyp    • Coronary artery disease    • Dry eye    • DVT (deep venous thrombosis) ([place] [address] ) 2015    right leg- passed thru the IVC filter-stopped at the "patch" from bypass surgery   • Dysphagia 11/2021    minimal-"mass" esophagus with a "knob" in the middle   • Fracture of thoracic spine ([facility] ) 1997    T3   • Glaucoma    • Hyperlipidemia    • Hypertension    • Myocardial infarction ([place] [address] )    • Pneumonia    • PUD (peptic ulcer disease)      Past Surgical History:   Procedure Laterality Date   • ANGIOPLASTY      2 stents   • CHOLECYSTECTOMY     • COLONOSCOPY     • CORONARY ARTERY BYPASS GRAFT      x6   • CORONARY ARTERY BYPASS GRAFT     • CORONARY STENT PLACEMENT     • CYSTOSCOPY     • EYE SURGERY Right    • HERNIA REPAIR Right 11/3/2017    Procedure: REPAIR HERNIA INGUINAL;  Surgeon: Elizabeth Goodwin MD;  Location: [address] [place] [address];  Service: General   • IVC FILTER INSERTION      IVC filter   • LA CYSTO W/IRRIG & EVAC MULTPLE OBSTRUCTING CLOTS N/A 6/30/2018    Procedure: CYSTOSCOPY EVACUATION OF CLOTS, fulgeration;  Surgeon: All Negro MD;  [facility];  Service: Urology   • STOMACH SURGERY  1973    Billroth 2 gastrectomy-2/3 removal- bleeding ulcer   • TRANSURETHRAL RESECTION OF PROSTATE       Social History   Social History     Substance and Sexual Activity   Alcohol Use Never     Social History     Substance and Sexual Activity   Drug Use No     Social History     Tobacco Use   Smoking Status Never   Smokeless Tobacco Never Family History:   Family History   Problem Relation Age of Onset   • Coronary artery disease Brother    • Heart disease Father         CAD       Meds/Allergies   PTA meds:   Prior to Admission Medications   Prescriptions Last Dose Informant Patient Reported? Taking? ALPRAZolam (XANAX) 0 5 mg tablet 3/14/2023  Yes Yes   Sig: Take 0 5 mg by mouth daily at bedtime as needed    Calcium Carbonate-Vitamin D (CALCIUM 600+D PO) 3/14/2023  Yes Yes   Sig: Take by mouth every morning    Carboxymethylcellulose Sodium (REFRESH TEARS OP) 3/14/2023  Yes Yes   Sig: Apply to eye as needed   Docusate Calcium (STOOL SOFTENER PO)   Yes No   Sig: Take by mouth OTC; takes with lunch   Magnesium 250 MG TABS 3/14/2023  Yes Yes   Sig: Take 1 tablet by mouth every morning    Zinc 25 MG TABS 3/14/2023  Yes Yes   Sig: Take 25 mg by mouth daily at bedtime   azithromycin (ZITHROMAX) 500 MG tablet 3/14/2023  Yes Yes   cefuroxime (CEFTIN) 250 mg tablet Not Taking  Yes No   Patient not taking: Reported on 3/15/2023   cholecalciferol (VITAMIN D3) 400 units tablet 3/14/2023  Yes Yes   Sig: Take 400 Units by mouth daily after lunch    clopidogrel (PLAVIX) 75 mg tablet 3/14/2023  No Yes   Sig: TAKE ONE TABLET BY MOUTH EVERY DAY   dutasteride (AVODART) 0 5 mg capsule 3/14/2023  No Yes   Sig: Take 1 capsule (0 5 mg total) by mouth in the morning     famotidine (PEPCID) 20 mg tablet   No No   Sig: Take 1 tablet (20 mg total) by mouth daily at bedtime   isosorbide mononitrate (IMDUR) 30 mg 24 hr tablet 3/14/2023  No Yes   Sig: Take 1 tablet (30 mg total) by mouth every morning   metoprolol succinate (TOPROL-XL) 50 mg 24 hr tablet 3/14/2023  No Yes   Sig: Take 1 tablet (50 mg total) by mouth 2 (two) times a day   multivitamin-iron-minerals-folic acid (CENTRUM) chewable tablet 3/14/2023  Yes Yes   Sig: Chew 1 tablet every morning    nitroglycerin (NITROSTAT) 0 4 mg SL tablet 3/15/2023  No Yes   Sig: Place 1 tablet (0 4 mg total) under the tongue every 5 (five) minutes as needed for chest pain   pantoprazole (PROTONIX) 20 mg tablet 3/15/2023  No Yes   Sig: Take 1 tablet (20 mg total) by mouth 2 (two) times a day   ranolazine (RANEXA) 500 mg 12 hr tablet 3/14/2023  No Yes   Sig: Take 1 tablet (500 mg total) by mouth 2 (two) times a day   rosuvastatin (CRESTOR) 5 mg tablet 3/14/2023  No Yes   Sig: TAKE ONE TABLET BY MOUTH EVERY DAY AT BEDTIME   tamsulosin (Flomax) 0 4 mg 3/14/2023  No Yes   Sig: Take 1 capsule (0 4 mg total) by mouth daily with dinner      Facility-Administered Medications: None     Allergies   Allergen Reactions   • Escitalopram Other (See Comments)     Suicidal feelings, sweating   • Oxycodone-Acetaminophen Dizziness and Shortness Of Breath     Other reaction(s): Faints  Reaction Date: [id number]; Category: Adverse Reaction;    • Sucralfate GI Intolerance     Abdominal pain    • Sulfa Antibiotics Other (See Comments)   • Omeprazole Rash   • Statins Other (See Comments)     Muscle pain       Additional Data:     Labs:    Results from last 7 days   Lab Units 03/15/23  0906   WBC Thousand/uL 7 14   HEMOGLOBIN g/dL 12 9   HEMATOCRIT % 39 4   PLATELETS Thousands/uL 169   NEUTROS PCT % 67   LYMPHS PCT % 24   MONOS PCT % 8   EOS PCT % 1     Results from last 7 days   Lab Units 03/16/23  0447 03/15/23  0906   SODIUM mmol/L 136 137   POTASSIUM mmol/L 4 4 4 3   CHLORIDE mmol/L 100 101   CO2 mmol/L 27 29   BUN mg/dL 29* 18   CREATININE mg/dL 1 14 1 06   ANION GAP mmol/L 9 7   CALCIUM mg/dL 8 3* 9 1   ALBUMIN g/dL  --  3 9   TOTAL BILIRUBIN mg/dL  --  0 51   ALK PHOS U/L  --  85   ALT U/L  --  8   AST U/L  --  7*   GLUCOSE RANDOM mg/dL 153* 144*         Results from last 7 days   Lab Units 03/17/23  0733 03/16/23  1613 03/16/23  1104 03/16/23  0736 03/15/23  2100 03/15/23  1612   POC GLUCOSE mg/dl 124 131 160* 135 263* 199*     Results from last 7 days   Lab Units 03/15/23  0906   HEMOGLOBIN A1C % 6 3*         * I Have Reviewed All Lab Data Listed Above    * Additional Pertinent Lab Tests [facility]ed: [facility]      Last 24 Hours Medication List:   Current Facility-Administered Medications   Medication Dose Route Frequency Provider Last Rate   • ALPRAZolam  0 5 mg Oral HS PRN Alona Lynne MD     • calcium carbonate-vitamin D  1 tablet Oral Daily With Breakfast Alona Lynne MD     • cholecalciferol  400 Units Oral After Joanie Navarro MD     • clopidogrel  75 mg Oral Daily Alona Lynne MD     • docusate sodium  100 mg Oral Daily Alona Lynne MD     • enoxaparin  40 mg Subcutaneous Daily Alona Lynne MD     • famotidine  20 mg Oral HS Alona Lynne MD     • finasteride  5 mg Oral Daily Alona Lynne MD     • insulin lispro  1-5 Units Subcutaneous TID AC Kiya Barrera DO     • isosorbide mononitrate  30 mg Oral QAM Alona Lynne MD     • magnesium Oxide  400 mg Oral Daily Alona Lynne MD     • metoprolol succinate  75 mg Oral BID Gregorio Link PA-C     • multivitamin-minerals  1 tablet Oral Daily Alona Lynne MD     • nitroglycerin  0 4 mg Sublingual Q5 Min PRN Alona Lynne MD     • pantoprazole  20 mg Oral BID With Meals Alona Lynne MD     • pravastatin  40 mg Oral Daily With Brigid Solis MD     • ranolazine  500 mg Oral BID Alona Lynne MD     • tamsulosin  0 4 mg Oral Daily With Brigid Solis MD     • zinc sulfate  220 mg Oral Daily Alona Lynne MD          Today, Patient Was Seen By: ALICIA BuenoS and Dr Lida Lomeli    ** Please Note: Dictation voice to text software may have been used in the creation of this document   **

## 2023-03-17 NOTE — DISCHARGE SUMMARY
Marcel 45  Discharge- [place] 8/2/1930, 80 y o  male MRN: [id number]  Unit/Bed#: [address] Encounter: [id number]  Primary Care Provider: Genesis Deleon, DO   Date and time admitted to hospital: 3/15/2023  8:50 AM    * Chest pain  Assessment & Plan  Patient presented with severe chest pain which is not relieved with nitroglycerin  Known history of chronic stable angina and has been on Imdur and Ranexa  Cardiology was consulted and appreciate input  Toprol-XL was increased to 75 mg p o  twice daily  Patient underwent nuclear stress test-left ventricular perfusion defect that is medium in size with severe reduction in the apex that is fixed with, moderate reduction in the left ventricular perfusion defect that is partially reversible  Left ventricular function poststress is abnormal with moderately reduced global function with EF of 33%-endings 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    Dysphagia  Assessment & Plan  Patient has history of Billroth II gastrectomy for PUD  Patient follows up with GI  Last EGD in May 2022-status post Billroth II, intestinal metaplasia of the gastric body, friability of the gastric mucosa possibly bile related, mild esophagitis with dysmotility and diverticulum at 30 cm  Continue PPI-Protonix milligrams in a m  and Pepcid 20 mg at night  Patient had some choking with food on the day of admission  Patient was seen by speech therapy and recommended regular diet  GI was consulted    GI recommended barium esophagogram which showed stable esophagogram with redemonstration of multiple diverticula at the mid esophagus and small hiatal hernia  Outpatient follow-up with GI for repeat endoscopy after holding Plavix    Acute on chronic diastolic heart failure [facility])  Assessment & Plan  Wt Readings from Last 3 Encounters:   03/16/23 65 8 kg (145 lb)   01/13/23 67 6 kg (149 lb)   08/11/22 67 5 kg (148 lb 12 8 oz)   History of chronic diastolic heart failure NYHA class II  Echo / WALT showed EF of 45% with mild to moderate MR and mild to moderate AI with some stenosis from April last year  Continue Lasix 40 mg p o  daily  Patient received a dose of Lasix in the ED  Patient is not hypoxic or complaining of shortness of breath  CT chest showed some pulmonary interstitial edema with small bilateral pleural effusions  2D echo showed EF of 45% with grade 2 diastolic dysfunction with moderate aortic stenosis with a peak gradient of 45 mmHg with moderate MR without any significant change from prior echo            3-vessel CAD  Assessment & Plan  Status post CABG with stents    Patient has diffuse three-vessel disease with poor targets  Patient also has history of chronic stable angina-continue Ranexa and Imdur    Hyperlipidemia  Assessment & Plan  Continue Crestor    Hypertension  Assessment & Plan  Continue Toprol-XL which was increased to 75 mg p o  twice daily by cardiology    Benign prostatic hyperplasia  Assessment & Plan  Continue Avodart and Flomax        Medical Problems     Resolved Problems  Date Reviewed: 3/17/2023   None       Discharging Physician / Practitioner: Jasiel Tanner MD  PCP: Jax Castellanos DO  Admission Date:   Admission Orders (From admission, onward)     Ordered        03/16/23 Antwon Garcia 1  Inpatient Admission  Once            03/15/23 1135  Place in Observation  Once                      Discharge Date: 03/17/23    Consultations During Hospital Stay:  · Cardiology, GI    Barium esophagogram  Significant Findings / Test Results:   · -Stable since 1121 with multiple diverticula at the mid esophagus and small hiatal hernia  · 2D echo-EF of 45% with grade 2 diastolic dysfunction with moderate aortic stenosis  · Nuclear stress test-abnormal left ventricular perfusion with findings 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     Outpatient Tests Requested:  · Follow-up with cardiology and GI    Complications: None    Reason for Admission: Chest pain    Hospital Course:   Jennifer Florian is a 80 y o  male patient with past medical's of CAD status post CABG/PTCA, hypertension, hyperlipidemia, chronic diastolic heart failure, DVT, PUD who originally presented to the hospital on 3/15/2023 due to severe retrosternal chest pain  CAT scan in the ED showed some interstitial edema with small bilateral pleural effusions  Patient was sent to the hospital to rule out ACS  Patient was seen by cardiology and patient underwent nuclear stress test and echo which were not changed from previous ones  Patient was also seen by GI as patient had choking during dinner  Barium esophagogram was ordered by GI which showed no changes  Patient will need outpatient follow-up with GI for repeat endoscopy after holding Plavix  Patient continued to remain stable without any further chest pain or abdominal pain and was able to tolerate diet and was discharged home  Toprol dose was increased by cardiology  Please see above list of diagnoses and related plan for additional information  Condition at Discharge: stable    Discharge Day Visit / Exam:   Subjective: Patient has no further chest pain, shortness of breath, abdominal pain and is feeling well  Vitals: Blood Pressure: 151/78 (03/17/23 0823)  Pulse: 87 (03/17/23 0823)  Temperature: 97 8 °F (36 6 °C) (03/17/23 0823)  Temp Source: Oral (03/17/23 0823)  Respirations: 18 (03/17/23 0823)  Height: 5' 4" (162 6 cm) (03/16/23 1304)  Weight - Scale: 65 8 kg (145 lb) (03/16/23 1304)  SpO2: 96 % (03/17/23 0823)  Exam:   Physical Exam  Constitutional:       Appearance: Normal appearance  HENT:      Head: Normocephalic and atraumatic  Eyes:      Extraocular Movements: Extraocular movements intact  Pupils: Pupils are equal, round, and reactive to light  Cardiovascular:      Rate and Rhythm: Normal rate and regular rhythm  Heart sounds: Murmur heard  No gallop     Pulmonary:      Effort: Pulmonary effort is normal       Breath sounds: Normal breath sounds  Abdominal:      General: Bowel sounds are normal       Palpations: Abdomen is soft  Tenderness: There is no abdominal tenderness  Musculoskeletal:         General: No swelling or deformity  Normal range of motion  Cervical back: Normal range of motion and neck supple  Skin:     General: Skin is warm and dry  Neurological:      General: No focal deficit present  Mental Status: He is alert  Discussion with Family: Updated  (wife and daughter) at bedside  Discharge instructions/Information to patient and family:   See after visit summary for information provided to patient and family  Provisions for Follow-Up Care:  See after visit summary for information related to follow-up care and any pertinent home health orders  Disposition:   Home    Planned Readmission: No     Discharge Statement:  I spent 45 minutes discharging the patient  This time was spent on the day of discharge  I had direct contact with the patient on the day of discharge  Greater than 50% of the total time was spent examining patient, answering all patient questions, arranging and discussing plan of care with patient as well as directly providing post-discharge instructions  Additional time then spent on discharge activities  Discharge Medications:  See after visit summary for reconciled discharge medications provided to patient and/or family        **Please Note: This note may have been constructed using a voice recognition system**

## 2023-03-17 NOTE — PROGRESS NOTES
Progress Note - Cardiology   [facility]     Alphonse Stevenson 80 y o  male MRN: [id number]  : 1930  Unit/Bed#: 2 [facility] 219 B Encounter: [id number]    Assessment and Plan:   1  Chest pain       -  Patient reports he awoke around 0530hrs this morning (3/15/23) with intense chest tightness and pain  He states he took a nitroglycerin tablet and pain subsided  Patient reports anther episode of chest pain around 0800hrs with mild relief with nitroglycerin prompting him to seek care  He reports mild shortness of breath and right arm pain with chest pain, but denies palpitations, lightheadedness, dizziness, nausea or vomiting  Patient also reports chest pain is worse with inspiration  Wife at bedside reports patient has had a cough for a few weeks    - Patient with known history of chronic stable angina  - 3/15/23 hs troponin: 12 (0hrs), 10 (2hrs), 13 (4hrs)  - 3/16/23 Nuclear stress: there is a left ventricular perfusion defect that is medium in size with severe reduction in uptake present in the entire apex location(s) that is fixed  There is a left ventricular perfusion defect that is small in size with moderate reduction in uptake present in the entire inferior location(s) that is partially reversible  The defect appears to be probable artifact caused by subdiaphragmatic activity  Stress Combined Conclusion: Left ventricular perfusion is abnormal  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 which may be due to artifact  Overall, study appears consistent with previous study  - 3/16/23 TTE: LVEF 27%, systolic function is mildly reduced  There is mild global hypokinesis  Diastolic function is moderately abnormal, consistent with grade II (pseudonormal) relaxation  Compared to previous exam, there is no significant change  - Continue Imdur 30 mg daily and Ranexa 500 mg twice daily  - Continue Toprol XL 75 mg twice daily    - Continue plavix 75 mg daily     - 3/15/23 EKG unchanged from previous EKGs     - No further Cardiology workup at this time    - Recommend patient follow up with Cardiology within one month of discharge       2  Coronary artery disease s/p CABG     - Patient presenting with chest pain  See above  - As per Dr Karin Dale 1/13/23 note- coronary artery disease status post CABG and stents with multiple cardiac catheterization a year ago ago in [facility] by me and it was recommended medical therapy  Santos Mark has diffuse 3 vessel disease with poor targets  - Continue Imdur 30 mg daily and Ranexa 500 mg twice daily  - Continue Toprol XL 75 mg twice daily    - Continue plavix 75 mg daily  - Continue pravastatin 40 mg daily        3  Chronic diastolic heart failure       - Does not appear in acute phase  Euvolemic on examination    - Continue Imdur 30 mg daily  - Continue Toprol XL 75 mg twice daily    - 3/16/23 TTE: LVEF 08%, systolic function is mildly reduced  There is mild global hypokinesis  Diastolic function is moderately abnormal, consistent with grade II (pseudonormal) relaxation  Compared to previous exam, there is no significant change  - Daily weights  - Monitor I/Os     4  Hypertension       - Elevated readings on presentation, now improved  - Continue Imdur 30 mg daily  - Continue Toprol XL 75 mg twice daily       5  Mixed hyperlipidemia       - 11/29/22 lipid panel: cholesterol 154, triglycerides 169, HDL 38, LDL 82    - Continue pravastatin 40 mg daily        6  GERD     - Patient denies chest pain is associated with eating    - Continue pantoprazole 20mg twice daily and famotidine 20 mg daily    - GI recommendations noted  Subjective / Objective:          No acute events  Patient in good spirits, he states he feels well  He reports some mild throat discomfort  He denies chest pain, palpitations, shortness of breath, lightheadedness, dizziness, nausea or vomiting       Vitals: Blood pressure 151/78, pulse 87, temperature 97 8 °F (36 6 °C), temperature source Oral, resp  rate 18, height 5' 4" (1 626 m), weight 65 8 kg (145 lb), SpO2 96 %  Vitals:    03/16/23 1005 03/16/23 1304   Weight: 65 8 kg (145 lb) 65 8 kg (145 lb)     Body mass index is 24 89 kg/m²  BP Readings from Last 3 Encounters:   03/17/23 151/78   01/13/23 142/80   08/11/22 122/55     Orthostatic Blood Pressures    Flowsheet Row Most Recent Value   Blood Pressure 151/78 filed at 03/17/2023 0823   Patient Position - Orthostatic VS Lying filed at 03/17/2023 0823        I/O       03/15 0701  03/16 0700 03/16 0701  03/17 0700 03/17 0701  03/18 0700    P  O   250     I V  (mL/kg)  10 (0 2)     Total Intake(mL/kg)  260 (4)     Urine (mL/kg/hr) 825      Total Output 825      Net -825 +260            Unmeasured Urine Occurrence  1 x     Unmeasured Stool Occurrence  1 x         Invasive Devices     Peripheral Intravenous Line  Duration           Peripheral IV 03/15/23 Left Antecubital 2 days                  Intake/Output Summary (Last 24 hours) at 3/17/2023 1300  Last data filed at 3/16/2023 2201  Gross per 24 hour   Intake 260 ml   Output --   Net 260 ml         Physical Exam:   Physical Exam  Vitals reviewed  Constitutional:       General: He is not in acute distress  Cardiovascular:      Rate and Rhythm: Normal rate and regular rhythm  Heart sounds: Murmur heard  Pulmonary:      Effort: Pulmonary effort is normal       Breath sounds: Decreased breath sounds present  Musculoskeletal:      Right lower leg: No edema  Left lower leg: No edema  Skin:     General: Skin is warm and dry  Neurological:      Mental Status: He is alert and oriented to person, place, and time         Medications/ Allergies:     Current Facility-Administered Medications   Medication Dose Route Frequency Provider Last Rate   • ALPRAZolam  0 5 mg Oral HS PRN Elaine Murphy MD     • calcium carbonate-vitamin D  1 tablet Oral Daily With Breakfast Elaine Murphy MD • cholecalciferol  400 Units Oral After Lunch Ángela Meneses MD     • clopidogrel  75 mg Oral Daily Ángela Meneses MD     • docusate sodium  100 mg Oral Daily Ángela Meneses MD     • enoxaparin  40 mg Subcutaneous Daily Ángela Meneses MD     • famotidine  20 mg Oral HS Ángela Meneses MD     • finasteride  5 mg Oral Daily Ángela Meneses MD     • insulin lispro  1-5 Units Subcutaneous TID AC Kiya Barrera DO     • isosorbide mononitrate  30 mg Oral QAM Ángela Meneses MD     • magnesium Oxide  400 mg Oral Daily Ángela Meneses MD     • metoprolol succinate  75 mg Oral BID Adelina Collier PA-C     • multivitamin-minerals  1 tablet Oral Daily Ángela Meneses MD     • nitroglycerin  0 4 mg Sublingual Q5 Min PRN Ángela Meneses MD     • [START ON 3/18/2023] pantoprazole  40 mg Oral Early Morning MATT Rider     • pravastatin  40 mg Oral Daily With Brenda Conteh MD     • ranolazine  500 mg Oral BID Ángela Meneses MD     • tamsulosin  0 4 mg Oral Daily With Brenda Conteh MD     • zinc sulfate  220 mg Oral Daily Ángela Meneses MD       ALPRAZolam, 0 5 mg, HS PRN  nitroglycerin, 0 4 mg, Q5 Min PRN      Allergies   Allergen Reactions   • Escitalopram Other (See Comments)     Suicidal feelings, sweating   • Oxycodone-Acetaminophen Dizziness and Shortness Of Breath     Other reaction(s): Faints  Reaction Date: [id number]; Category:  Adverse Reaction;    • Sucralfate GI Intolerance     Abdominal pain    • Sulfa Antibiotics Other (See Comments)   • Omeprazole Rash   • Statins Other (See Comments)     Muscle pain       VTE Pharmacologic Prophylaxis:   Enoxaparin (Lovenox)    Labs:   Troponins:  Results from last 7 days   Lab Units 03/15/23  1308 03/15/23  1049   HSTNI D2 ng/L  --  -2   HSTNI D4 ng/L 1  --          CBC with diff:  Results from last 7 days   Lab Units 03/15/23  0906   WBC Thousand/uL 7 14   HEMOGLOBIN g/dL 12 9   HEMATOCRIT % 39 4   MCV fL 93   PLATELETS Thousands/uL 169   MCH pg 30 6   MCHC g/dL 32 7   RDW % 13 2   MPV fL 10 0   NRBC AUTO /100 WBCs 0       CMP:  Results from last 7 days   Lab Units 03/16/23  0447 03/15/23  0906   SODIUM mmol/L 136 137   POTASSIUM mmol/L 4 4 4 3   CHLORIDE mmol/L 100 101   CO2 mmol/L 27 29   ANION GAP mmol/L 9 7   BUN mg/dL 29* 18   CREATININE mg/dL 1 14 1 06   CALCIUM mg/dL 8 3* 9 1   AST U/L  --  7*   ALT U/L  --  8   ALK PHOS U/L  --  85   TOTAL PROTEIN g/dL  --  7 2   ALBUMIN g/dL  --  3 9   TOTAL BILIRUBIN mg/dL  --  0 51   EGFR ml/min/1 73sq m 55 60       Magnesium:  Results from last 7 days   Lab Units 03/15/23  0906   MAGNESIUM mg/dL 2 1     Coags:    TSH:    No components found for: TSH3  Lipid Profile:    Hgb A1c:  Results from last 7 days   Lab Units 03/15/23  0906   HEMOGLOBIN A1C % 6 3*     NT-proBNP: No results for input(s): NTBNP in the last 72 hours  Imaging & Testing   I have personally reviewed pertinent reports  XR chest 1 view portable    Result Date: 3/15/2023  Narrative: CHEST INDICATION:   chest pain  COMPARISON:  2/11/2022 EXAM PERFORMED/VIEWS:  XR CHEST PORTABLE Single view FINDINGS: Midline sternotomy again evident Cardiomediastinal silhouette appears unremarkable  The lungs are clear  No pneumothorax or pleural effusion  Osseous structures appear within normal limits for patient age  Impression: Midline sternotomy Previous bilateral opacities have resolved No acute cardiopulmonary disease  Workstation performed: [id number]       CTA dissection protocol chest/abdomen/pelvis    Result Date: 3/15/2023  Narrative: CTA - CHEST, ABDOMEN AND PELVIS - WITHOUT AND WITH IV CONTRAST INDICATION:   chest pain  COMPARISON: Multiple prior CT scans, most recent a CTA dissection study 11/1/2021   TECHNIQUE: CT examination of the chest, abdomen and pelvis was performed both prior to and after the administration of intravenous contrast   The noncontrast portion of this examination was performed utilizing low radiation dose technique  Thin section angiographic arterial phase post contrast technique was used in order to evaluate for aortic dissection  3D reformatted images and volume rendering were performed on an independent workstation  Additionally, axial, sagittal, and coronal 2D reformatted images were created from the source data and submitted for interpretation  Radiation dose length product (DLP) for this visit:  1099 14 mGy-cm   This examination, like all CT scans performed in the [facility], was performed utilizing techniques to minimize radiation dose exposure, including the use of iterative reconstruction and automated exposure control  IV Contrast:  100 mL of iohexol (OMNIPAQUE) Enteric Contrast:  Enteric contrast was not administered  FINDINGS: AORTA:  There is no aortic dissection or intramural hematoma  There is no aortic aneurysm  Heavy diffuse atherosclerotic calcification of the entire aorta  CHEST LUNGS:  Diffuse interlobular septal thickening and mild groundglass densities bilaterally  There is no tracheal or endobronchial lesion  PLEURA:  Small bilateral effusions  HEART/PULMONARY ARTERIAL TREE:  Normal heart size  Extensive coronary artery calcifications  Status post CABG  Normal pulmonary arteries  MEDIASTINUM AND YARELI:  Calcified mediastinal and hilar lymph nodes consistent with prior granulomatous disease  CHEST WALL AND LOWER NECK:  Status post median sternotomy  ABDOMEN LIVER/BILIARY TREE:  Unremarkable  GALLBLADDER:  No calcified gallstones  No pericholecystic inflammatory change  SPLEEN:  Unremarkable  PANCREAS:  Unchanged 1 0 cm cystic lesion in the pancreatic neck on image 3/101, 1 4 cm cyst in the body on image 3/113, and 1 5 cm cyst in the tail on image 3/111  No significant ductal dilatation  No inflammatory changes  ADRENAL GLANDS:  Unremarkable  KIDNEYS/URETERS:  Unremarkable  No hydronephrosis   STOMACH AND BOWEL:  There is colonic diverticulosis without evidence of acute diverticulitis  APPENDIX:  No findings to suggest appendicitis  ABDOMINOPELVIC CAVITY:  No ascites or free intraperitoneal air  No lymphadenopathy  Stable IVC filter  PELVIS REPRODUCTIVE ORGANS:  Enlarged prostate gland status post TURP  URINARY BLADDER:  Unremarkable  ABDOMINAL WALL/INGUINAL REGIONS:  Unremarkable  OSSEOUS STRUCTURES:  There are age appropriate degenerative changes  No acute fracture or destructive osseous lesion  Unchanged chronic severe compression deformity of T3  Impression: No aortic aneurysm or dissection  Pulmonary interstitial edema with small bilateral pleural effusions  Colonic diverticulosis without diverticulitis  Stable pancreatic cystic lesions  In retrospect, these have shown little to no growth since the CT of 1/7/2017, and are therefore almost certainly benign  Workstation performed: [id number]     EKG / Monitor: Personally reviewed  Telemetry reviewed: currently normal sinus rhythm, rate 86 bpm      Cardiac testing:       Echo complete w/ contrast if indicated    Result Date: 3/16/2023  Narrative: •  Left Ventricle: Left ventricular cavity size is normal  Wall thickness is normal  The left ventricular ejection fraction is 45% by visual estimation  Systolic function is mildly reduced  There is mild global hypokinesis  Diastolic function is moderately abnormal, consistent with grade II (pseudonormal) relaxation  Left atrial filling pressure is normal  •  Right Ventricle: Systolic function is mildly reduced  Abnormal tricuspid annular plane systolic excursion (TAPSE) < 1 7 cm  •  Left Atrium: The atrium is severely dilated (>48 mL/m2)  •  Aortic Valve: The aortic valve is functionally bicuspid  The leaflets are moderately thickened  The leaflets are moderately calcified  There is severely reduced mobility  There is mild regurgitation with a centrally directed jet  There is moderate stenosis   The aortic valve peak gradient is 45 mmHg  The aortic valve mean gradient is 25 mmHg  •  Mitral Valve: There is moderate regurgitation  •  Tricuspid Valve: There is mild regurgitation  The right ventricular systolic pressure is moderately elevated  The estimated right ventricular systolic pressure is 88 37 mmHg  •  Pulmonic Valve: There is mild to moderate regurgitation  •  Compared to previous exam, there is no significant change  NM Myocardial Perfusion Spect (Pharmacological Induced Stress and/or Rest)    Result Date: 3/16/2023  Narrative: •  Stress ECG: No ST deviation is noted  There were no arrhythmias during recovery    The ECG was not diagnostic due to pharmacological (vasodilator) stress  •  Perfusion: There is a left ventricular perfusion defect that is medium in size with severe reduction in uptake present in the entire apex location(s) that is fixed  There is a left ventricular perfusion defect that is small in size with moderate reduction in uptake present in the entire inferior location(s) that is partially reversible  The defect appears to be probable artifact caused by subdiaphragmatic activity  •  Stress Function: Left ventricular function post-stress is abnormal  Global function is moderately reduced  There was a single regional abnormality during stress  Post-stress ejection fraction is 33 %  There is a defect in the apical location(s)  The defect has severely reduced function  •  Stress Combined Conclusion: Left ventricular perfusion is abnormal  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 which may be due to artifact  •  Overall, study appears consistent with previous study        Results for orders placed during the hospital encounter of 07/16/21    Echo complete with contrast if indicated    Narrative  [address]  [address] [facility] [address]  [phone]    Transthoracic Echocardiogram  2D, M-mode, Doppler, and Color Doppler    Study date:  16-Jul-2021    Patient: Abdirashid Bryan  MR number: [id number]  Account number: [de-identified]  : 02-Aug-1930  Age: 80 years  Gender: Male  Status: Outpatient  Location: Echo lab  Height: 65 in  Weight: 151 6 lb  BP: 122/ 58 mmHg    Indications: Aortic Stenosis    Diagnoses: 424 1 - AORTIC VALVE DISORDER    Sonographer:  YURI Saez  Primary Physician:  Samson Castaneda DO  Referring Physician:  Joey Tran MD  Group:  [facility]  Interpreting Physician:  Jacob Renee MD    SUMMARY    LEFT VENTRICLE:  Systolic function was at the lower limits of normal by visual assessment  Ejection fraction was estimated in the range of 45 % to 50 % to be 45 %  There was mild diffuse hypokinesis  Wall thickness was mildly increased  LEFT ATRIUM:  The atrium was mildly dilated  MITRAL VALVE:  There was mild to moderate annular calcification  Transmitral velocity was increased due to increased transvalvular flow  There was moderate to severe regurgitation  Based on elevated E' and central color flow jet  Unable to obtain PISA measurement for effective ERO calculation  Consider WALT for better evaluation    AORTIC VALVE:  The valve was probably trileaflet  Leaflets exhibited mildly increased thickness, mild calcification, moderately reduced cuspal separation, and sclerosis  Transaortic velocity was increased due to increased transvalvular flow  There was moderate stenosis  There was mild to moderate regurgitation  Valve mean gradient was 26 mmHg  HISTORY: PRIOR HISTORY: 3-vessel CAD,Chronic stable Angina,Chronic Diastolic heart failure,HTN,Murmur,DVT,Hyperlipidemia,anemia  PROCEDURE: The procedure was performed in the echo lab  This was a routine study  The transthoracic approach was used  The study included complete 2D imaging, M-mode, complete spectral Doppler, and color Doppler  The heart rate was 61 bpm,  at the start of the study   Images were obtained from the parasternal, apical, subcostal, and suprasternal notch acoustic windows  Image quality was adequate  LEFT VENTRICLE: Size was normal  Systolic function was at the lower limits of normal by visual assessment  Ejection fraction was estimated in the range of 45 % to 50 % to be 45 %  There was mild diffuse hypokinesis  Wall thickness was  mildly increased  No evidence of apical thrombus  DOPPLER: The study was not technically sufficient to allow evaluation of LV diastolic function  RIGHT VENTRICLE: The size was normal  Systolic function was normal  Wall thickness was normal     LEFT ATRIUM: The atrium was mildly dilated  RIGHT ATRIUM: Size was normal     MITRAL VALVE: There was mild to moderate annular calcification  There was mild-moderate calcification  There was mildly reduced leaflet separation  DOPPLER: Transmitral velocity was increased due to increased transvalvular flow  There was  no evidence for stenosis  There was moderate to severe regurgitation  Based on elevated E' and central color flow jet  Unable to obtain PISA measurement for effective ERO calculation  Consider WALT for better evaluation    AORTIC VALVE: The valve was probably trileaflet  Leaflets exhibited mildly increased thickness, mild calcification, moderately reduced cuspal separation, and sclerosis  DOPPLER: Transaortic velocity was increased due to increased  transvalvular flow  There was moderate stenosis  There was mild to moderate regurgitation  TRICUSPID VALVE: The valve structure was normal  There was normal leaflet separation  DOPPLER: The transtricuspid velocity was within the normal range  There was no evidence for stenosis  There was trace regurgitation  PULMONIC VALVE: Leaflets exhibited normal thickness, no calcification, and normal cuspal separation  DOPPLER: The transpulmonic velocity was within the normal range  There was trace regurgitation  PERICARDIUM: There was no pericardial effusion  The pericardium was normal in appearance      AORTA: The root exhibited normal size  SYSTEMIC VEINS: IVC: The inferior vena cava was not well visualized  MEASUREMENT TABLES    DOPPLER MEASUREMENTS  Aortic valve   (Reference normals)  Peak gilda   350 cm/s   (--)  Peak gradient   48 mmHg   (--)  Mean gradient   26 mmHg   (--)  Regurg PHT   460 ms   (--)    SYSTEM MEASUREMENT TABLES    2D  EF (Teich): 49 86 %  %FS: 25 27 %  JUDY Planimetry: 0 86 cm2  Ao Diam: 3 1 cm  EDV(Teich): 104 22 ml  ESV(Teich): 52 25 ml  IVSd: 1 24 cm  LA Area: 22 39 cm2  LA Diam: 4 03 cm  LVEDV MOD A4C: 174 08 ml  LVEF MOD A4C: 50 26 %  LVESV MOD A4C: 86 59 ml  LVIDd: 4 74 cm  LVIDs: 3 54 cm  LVLd A4C: 9 35 cm  LVLs A4C: 8 17 cm  LVOT Diam: 1 96 cm  LVPWd: 1 21 cm  RA Area: 12 71 cm2  RVIDd: 2 45 cm  SV (Teich): 51 97 ml  SV MOD A4C: 87 49 ml    CW  AV Env  Ti: 361 56 ms  AV VTI: 86 51 cm  AV Vmax: 3 48 m/s  AV Vmean: 2 39 m/s  AV maxP 38 mmHg  AV meanP 03 mmHg  TR Vmax: 3 25 m/s  TR maxP 23 mmHg    PW  E' Sept: 0 05 m/s  LVOT Env  Ti: 361 56 ms  LVOT VTI: 27 25 cm  LVOT Vmax: 1 15 m/s  LVOT Vmean: 0 75 m/s  LVOT maxP 29 mmHg  LVOT meanP 61 mmHg    [facility] Accredited Echocardiography Laboratory    Prepared and electronically signed by    Jayde Kim MD  Signed 2021 17:34:59    Results for orders placed during the hospital encounter of 03/15/23    NM Myocardial Perfusion Spect (Pharmacological Induced Stress and/or Rest)    Interpretation Summary  •  Stress ECG: No ST deviation is noted  There were no arrhythmias during recovery    The ECG was not diagnostic due to pharmacological (vasodilator) stress  •  Perfusion: There is a left ventricular perfusion defect that is medium in size with severe reduction in uptake present in the entire apex location(s) that is fixed  There is a left ventricular perfusion defect that is small in size with moderate reduction in uptake present in the entire inferior location(s) that is partially reversible   The defect appears to be probable artifact caused by subdiaphragmatic activity  •  Stress Function: Left ventricular function post-stress is abnormal  Global function is moderately reduced  There was a single regional abnormality during stress  Post-stress ejection fraction is 33 %  There is a defect in the apical location(s)  The defect has severely reduced function  •  Stress Combined Conclusion: Left ventricular perfusion is abnormal  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 which may be due to artifact  •  Overall, study appears consistent with previous study              Denis Mercer PA-C

## 2023-03-17 NOTE — PLAN OF CARE
Problem: MOBILITY - ADULT  Goal: Maintain or return to baseline ADL function  Description: INTERVENTIONS:  -  Assess patient's ability to carry out ADLs; assess patient's baseline for ADL function and identify physical deficits which impact ability to perform ADLs (bathing, care of mouth/teeth, toileting, grooming, dressing, etc )  - Assess/evaluate cause of self-care deficits   - Assess range of motion  - Assess patient's mobility; develop plan if impaired  - Assess patient's need for assistive devices and provide as appropriate  - Encourage maximum independence but intervene and supervise when necessary  - Involve family in performance of ADLs  - Assess for home care needs following discharge   - Consider OT consult to assist with ADL evaluation and planning for discharge  - Provide patient education as appropriate  Outcome: Progressing  Goal: Maintains/Returns to pre admission functional level  Description: INTERVENTIONS:  - Perform BMAT or MOVE assessment daily    - Set and communicate daily mobility goal to care team and patient/family/caregiver  - Collaborate with rehabilitation services on mobility goals if consulted  - Perform Range of Motion 3 times a day  - Reposition patient every 2 hours    - Dangle patient 3 times a day  - Stand patient 3 times a day  - Ambulate patient 3 times a day  - Out of bed to chair 3 times a day   - Out of bed for meals 3 times a day  - Out of bed for toileting  - Record patient progress and toleration of activity level   Outcome: Progressing     Problem: PAIN - ADULT  Goal: Verbalizes/displays adequate comfort level or baseline comfort level  Description: Interventions:  - Encourage patient to monitor pain and request assistance  - Assess pain using appropriate pain scale  - Administer analgesics based on type and severity of pain and evaluate response  - Implement non-pharmacological measures as appropriate and evaluate response  - Consider cultural and social influences on pain and pain management  - Notify physician/advanced practitioner if interventions unsuccessful or patient reports new pain  Outcome: Progressing     Problem: SAFETY ADULT  Goal: Patient will remain free of falls  Description: INTERVENTIONS:  - Educate patient/family on patient safety including physical limitations  - Instruct patient to call for assistance with activity   - Consult OT/PT to assist with strengthening/mobility   - Keep Call bell within reach  - Keep bed low and locked with side rails adjusted as appropriate  - Keep care items and personal belongings within reach  - Initiate and maintain comfort rounds  - Make Fall Risk Sign visible to staff  - Offer Toileting every 2 Hours, in advance of need  - Initiate/Maintain Bed alarm  - Obtain necessary fall risk management equipment: yellow socks  - Apply yellow socks and bracelet for high fall risk patients  - Consider moving patient to room near nurses station  Outcome: Progressing     Problem: CARDIOVASCULAR - ADULT  Goal: Maintains optimal cardiac output and hemodynamic stability  Description: INTERVENTIONS:  - Monitor I/O, vital signs and rhythm  - Monitor for S/S and trends of decreased cardiac output  - Administer and titrate ordered vasoactive medications to optimize hemodynamic stability  - Assess quality of pulses, skin color and temperature  - Assess for signs of decreased coronary artery perfusion  - Instruct patient to report change in severity of symptoms  Outcome: Progressing  Goal: Absence of cardiac dysrhythmias or at baseline rhythm  Description: INTERVENTIONS:  - Continuous cardiac monitoring, vital signs, obtain 12 lead EKG if ordered  - Administer antiarrhythmic and heart rate control medications as ordered  - Monitor electrolytes and administer replacement therapy as ordered  Outcome: Progressing

## 2023-03-17 NOTE — PROGRESS NOTES
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, dysphagia secondary to esophageal dysmotility/tortuous esophagus, esophageal diverticulum who presented with chest pain       1  Chest pain  2  Esophageal dysphagia  Patient presented with chest pain which woke him from sleep at 3 AM unrelieved with nitro  He has a history of partial gastrectomy due to PUD, esophageal dysmotility with tortuous esophagus and esophageal diverticulum  His last EGD was in May 2022 showing status post Billroth II, no ulcerations, friability of gastric mucosa possibly bile related, mild esophagitis with dysmotility and a diverticulum at 30 cm  He has been taking pantoprazole 40 mg in the morning at 3 AM instead of 20 twice daily as was previously recommended and has been unable to afford liquid Carafate  He has been having issues with throat irritation and chronic cough as well as some sticking of foods      -Chew food well, avoid dry foods, alternate liquid/solids     -Pantoprazole 40mg in AM, Pepcid 20mg at bedtime    -Will hold off on EGD as patient had recent exam in May and is on Plavix     -Barium esophagram ordered  Pending results can consider EGD when able to come off Plavix if symptoms persist  Currently symptoms are improved and he denies any further chest pain     -Cont f/u with Dr Mejia Meneses outpatient        ______________________________________________________________________    Subjective:     Pt denies any chest pain  Still with chronic cough which has been ongoing since respiratory infection  Has throat irritation in the back of his throat      Medication Administration - last 24 hours from 03/16/2023 1218 to 03/17/2023 1218       Date/Time Order Dose Route Action Action by     03/16/2023 1352 EDT cholecalciferol (VITAMIN D3) tablet 400 Units 400 Units Oral Given Arlette Stanley, RN     03/16/2023 2131 EDT famotidine (PEPCID) tablet 20 mg 20 mg Oral Given Radha Farmer, RN     03/17/2023 0920 EDT isosorbide mononitrate (IMDUR) 24 hr tablet 30 mg 30 mg Oral Given Flavia Jairon     03/16/2023 1614 EDT pantoprazole (PROTONIX) EC tablet 20 mg 20 mg Oral Given Arlette Stanley, RN     03/16/2023 2033 EDT ranolazine (RANEXA) 12 hr tablet 500 mg 500 mg Oral Given Radha Farmer, RN     03/16/2023 1613 EDT pravastatin (PRAVACHOL) tablet 40 mg 40 mg Oral Given Arlette Stanley, RN     03/16/2023 1614 EDT tamsulosin (FLOMAX) capsule 0 4 mg 0 4 mg Oral Given Arlette Stanley, RN     03/17/2023 1143 EDT insulin lispro (HumaLOG) 100 units/mL subcutaneous injection 1-5 Units 1 Units Subcutaneous Not Given Brendolyn Seeds     03/17/2023 0829 EDT insulin lispro (HumaLOG) 100 units/mL subcutaneous injection 1-5 Units 1 Units Subcutaneous Not Given Flavia Rosata     03/16/2023 1616 EDT insulin lispro (HumaLOG) 100 units/mL subcutaneous injection 1-5 Units 0 Units Subcutaneous Hold Arlette Stanley, RN     03/16/2023 1310 EDT regadenoson (LEXISCAN) injection 0 4 mg 0 4 mg Intravenous Given Markus Mendez, SANTANA     03/16/2023 2033 EDT metoprolol succinate (TOPROL-XL) 24 hr tablet 100 mg 100 mg Oral Given Radha Farmer, RN     03/17/2023 0920 EDT metoprolol succinate (TOPROL-XL) 24 hr tablet 75 mg 75 mg Oral Given Flavia De La O          Objective:     Vitals: Blood pressure 151/78, pulse 87, temperature 97 8 °F (36 6 °C), resp  rate 18, height 5' 4" (1 626 m), weight 65 8 kg (145 lb), SpO2 96 %  ,Body mass index is 24 89 kg/m²        Intake/Output Summary (Last 24 hours) at 3/17/2023 1218  Last data filed at 3/16/2023 2201  Gross per 24 hour   Intake 260 ml   Output --   Net 260 ml       Physical Exam:   General Appearance: Awake and alert, in no acute distress  Chest: clear to auscultation, no rales or rhonchi  Cardiac: S1 & S2 normal, no murmur or gallop  Abdomen: Soft, non-tender, non-distended; bowel sounds normal; no masses or no organomegaly    Invasive Devices     Peripheral Intravenous Line  Duration           Peripheral IV 03/15/23 Left Antecubital 2 days                Lab Results:  Admission on 03/15/2023   Component Date Value   • WBC 03/15/2023 7 14    • RBC 03/15/2023 4 22    • Hemoglobin 03/15/2023 12 9    • Hematocrit 03/15/2023 39 4    • MCV 03/15/2023 93    • MCH 03/15/2023 30 6    • MCHC 03/15/2023 32 7    • RDW 03/15/2023 13 2    • MPV 03/15/2023 10 0    • Platelets 05/59/0076 169    • nRBC 03/15/2023 0    • Neutrophils Relative 03/15/2023 67    • Immat GRANS % 03/15/2023 0    • Lymphocytes Relative 03/15/2023 24    • Monocytes Relative 03/15/2023 8    • Eosinophils Relative 03/15/2023 1    • Basophils Relative 03/15/2023 0    • Neutrophils Absolute 03/15/2023 4 74    • Immature Grans Absolute 03/15/2023 0 03    • Lymphocytes Absolute 03/15/2023 1 68    • Monocytes Absolute 03/15/2023 0 58    • Eosinophils Absolute 03/15/2023 0 09    • Basophils Absolute 03/15/2023 0 02    • Sodium 03/15/2023 137    • Potassium 03/15/2023 4 3    • Chloride 03/15/2023 101    • CO2 03/15/2023 29    • ANION GAP 03/15/2023 7    • BUN 03/15/2023 18    • Creatinine 03/15/2023 1 06    • Glucose 03/15/2023 144 (H)    • Calcium 03/15/2023 9 1    • AST 03/15/2023 7 (L)    • ALT 03/15/2023 8    • Alkaline Phosphatase 03/15/2023 85    • Total Protein 03/15/2023 7 2    • Albumin 03/15/2023 3 9    • Total Bilirubin 03/15/2023 0 51    • eGFR 03/15/2023 60    • Magnesium 03/15/2023 2 1    • hs TnI 0hr 03/15/2023 12    • hs TnI 2hr 03/15/2023 10    • Delta 2hr hsTnI 03/15/2023 -2    • hs TnI 4hr 03/15/2023 13    • Delta 4hr hsTnI 03/15/2023 1    • SARS-CoV-2 03/15/2023 Negative    • INFLUENZA A PCR 03/15/2023 Negative    • INFLUENZA B PCR 03/15/2023 Negative    • RSV PCR 03/15/2023 Negative    • Baseline HR 03/16/2023 109    • Baseline BP 03/16/2023 104/60    • O2 sat rest 03/16/2023 96    • Stress peak HR 03/16/2023 93    • Post peak BP 03/16/2023 98    • Rate Pressure Product 03/16/2023 9,114 0    • O2 sat peak 03/16/2023 98    • Recovery HR 03/16/2023 88    • Recovery BP 03/16/2023 111/53    • O2 sat recovery 03/16/2023 98    • Rest Nuclear Isotope Dose 03/16/2023 10 80    • Stress Nuclear Isotope D* 03/16/2023 31 40    • ST Depression (mm) 03/16/2023 0    • EF (%) 03/16/2023 33    • A4C EF 03/16/2023 55    • LVOT stroke volume 03/16/2023 48 21    • LVOT stroke volume index 03/16/2023 28 10    • LVIDd 03/16/2023 4 50    • LVIDS 03/16/2023 3 60    • IVSd 03/16/2023 0 90    • LVPWd 03/16/2023 1 00    • FS 03/16/2023 20    • MV E' Tissue Velocity Se* 03/16/2023 3    • E wave deceleration time 03/16/2023 222    • MV Peak E Trino 03/16/2023 149    • MV Peak A Trino 03/16/2023 0 97    • AV LVOT peak gradient 03/16/2023 4    • LVOT peak VTI 03/16/2023 21 25    • LVOT peak trino 03/16/2023 0 99    • RVID d 03/16/2023 2 9    • Tricuspid annular plane * 03/16/2023 1 10    • LA size 03/16/2023 4 1    • LA length (A2C) 03/16/2023 5 80    • RAA A4C 03/16/2023 10    • LVOT diameter 03/16/2023 1 7    • Aortic valve peak veloci* 03/16/2023 3 37    • Ao VTI 03/16/2023 71 58    • AV mean gradient 03/16/2023 25    • LVOT mn grad 03/16/2023 2 0    • AV peak gradient 03/16/2023 45    • AV Deceleration Time 03/16/2023 1,410    • AV regurgitation pressur* 03/16/2023 409    • AV area by cont VTI 03/16/2023 0 7    • AV area peak trino 03/16/2023 0 7    • MV mean gradient antegra* 03/16/2023 5    • MV peak gradient antegra* 03/16/2023 16    • MV VTI 03/16/2023 47 38    • MV VTI RETROGRADE 03/16/2023 182 6    • MV stenosis pressure 1/2* 03/16/2023 64    • MV valve area p 1/2 meth* 03/16/2023 3 44    • MV mean gradient retrogr* 03/16/2023 86    • MR PG 03/16/2023 128    • TR Peak Trino 03/16/2023 3 4    • Triscuspid Valve Regurgi* 03/16/2023 46 0    • Ao root 03/16/2023 2 90    • AV peak gradient 03/16/2023 36    • Aortic valve mean veloci* 03/16/2023 23 30    • Mitral regurgitation pea* 03/16/2023 5 65    • Mitral valve mean inflow* 03/16/2023 4 28    • Tricuspid valve peak reg* 03/16/2023 3 40    • Left ventricular stroke * 03/16/2023 38 00    • IVS 03/16/2023 0 9    • LEFT VENTRICLE SYSTOLIC * 82/63/7302 54    • LV DIASTOLIC VOLUME (MOD* 48/38/6896 92    • Left Atrium Area-systoli* 03/16/2023 30 3    • Left Atrium Area-systoli* 03/16/2023 22 3    • LVSV, 2D 03/16/2023 38    • LVOT area 03/16/2023 2 27    • DVI 03/16/2023 0 29    • AV valve area 03/16/2023 0 67    • MV valve area by continu* 03/16/2023 1 02    • Est  RA pres 03/16/2023 8 0    • Right Ventricular Peak S* 03/16/2023 54 00    • LV EF 03/16/2023 45    • POC Glucose 03/15/2023 199 (H)    • Ventricular Rate 03/15/2023 97    • Atrial Rate 03/15/2023 97    • VT Interval 03/15/2023 210    • QRSD Interval 03/15/2023 106    • QT Interval 03/15/2023 344    • QTC Interval 03/15/2023 436    • P Axis 03/15/2023 83    • QRS Axis 03/15/2023 35    • T Wave Axis 03/15/2023 203    • POC Glucose 03/15/2023 263 (H)    • Hemoglobin A1C 03/15/2023 6 3 (H)    • EAG 03/15/2023 134    • Sodium 03/16/2023 136    • Potassium 03/16/2023 4 4    • Chloride 03/16/2023 100    • CO2 03/16/2023 27    • ANION GAP 03/16/2023 9    • BUN 03/16/2023 29 (H)    • Creatinine 03/16/2023 1 14    • Glucose 03/16/2023 153 (H)    • Calcium 03/16/2023 8 3 (L)    • eGFR 03/16/2023 55    • POC Glucose 03/16/2023 135    • POC Glucose 03/16/2023 160 (H)    • Protocol Name 03/16/2023 [facility]    • Time In Exercise Phase 03/16/2023 00:03:00    • MAX   SYSTOLIC BP 34/00/4992 620    • Max Diastolic Bp 52/75/8357 55    • Max Heart Rate 03/16/2023 110    • Max Predicted Heart Rate 03/16/2023 128    • Reason for Termination 03/16/2023 Protocol Complete    • Test Indication 03/16/2023 Chest Discomfort    • Target Hr Formular 03/16/2023 (220 - Age)*100%    • Chest Pain Statement 03/16/2023 none    • POC Glucose 03/16/2023 131    • SARS-CoV-2 03/16/2023 Negative    • Ventricular Rate 03/15/2023 102    • Atrial Rate 03/15/2023 102    • NM Interval 03/15/2023 226    • QRSD Interval 03/15/2023 104    • QT Interval 03/15/2023 344    • QTC Interval 03/15/2023 448    • P Axis 03/15/2023 44    • QRS Axis 03/15/2023 34    • T Wave Axis 03/15/2023 176    • POC Glucose 03/17/2023 124    • POC Glucose 03/17/2023 147 (H)        Imaging Studies: I have personally reviewed pertinent imaging studies

## 2023-03-17 NOTE — PROGRESS NOTES
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, dysphagia secondary to esophageal dysmotility/tortuous esophagus, esophageal diverticulum who presented with chest pain       1  Chest pain  2  Esophageal dysphagia  Patient presented with chest pain which woke him from sleep at 3 AM unrelieved with nitro  He has a history of partial gastrectomy due to PUD, esophageal dysmotility with tortuous esophagus and esophageal diverticulum  He had recent EGD in May 2022 showing status post Billroth II, no ulcerations, friability of gastric mucosa possibly bile related, mild esophagitis with dysmotility and a diverticulum at 30 cm  He has been taking pantoprazole 40 mg in the morning at 3 AM instead of 20 twice daily as was previously recommended and has been unable to afford liquid Carafate  He has been having issues with throat irritation and chronic cough as well as some sticking of foods  -S/p Nuc stress test yesterday consistent with prior study  Troponins negative  -Barium esophagram performed showing stable esophagram since November 2021-redemonstration of multiple diverticula at the mid esophagus and a small hiatal hernia  No stricture identified to limit the passage of contrast     -Chew food well, avoid dry foods, alternate liquid/solids     -Continue pantoprazole 40mg in AM-pt now aware to take 1/2 hour before breakfast, Pepcid 20mg at bedtime  Avoid late night meals      -Ok to discharge from GI standpoint  Cont f/u with Dr Miriam Del Cid outpatient   Can consider EGD when able to come off Plavix if symptoms persist however currently symptoms are improved and he denies any further chest pain          ______________________________________________________________________    Subjective:     Pt seen laying in bed with family at bedside  Denies any further chest pain  Continues with irritating throat discomfort and lingering cough from respiratory infection months ago      Medication Administration - last 24 hours from 03/16/2023 1657 to 03/17/2023 1657       Date/Time Order Dose Route Action Action by     03/17/2023 1656 EDT calcium carbonate-vitamin D 500 mg-5 mcg tablet 1 tablet 1 tablet Oral Refused [facility]     03/17/2023 1655 EDT cholecalciferol (VITAMIN D3) tablet 400 Units 400 Units Oral Refused [facility]     03/17/2023 1654 EDT clopidogrel (PLAVIX) tablet 75 mg 75 mg Oral Given [facility]     03/17/2023 1654 EDT docusate sodium (COLACE) capsule 100 mg 100 mg Oral Given [facility]     03/17/2023 1654 EDT finasteride (PROSCAR) tablet 5 mg 5 mg Oral Given [facility]     03/16/2023 2131 EDT famotidine (PEPCID) tablet 20 mg 20 mg Oral Given [facility], RN     03/17/2023 0920 EDT isosorbide mononitrate (IMDUR) 24 hr tablet 30 mg 30 mg Oral Given [facility]     03/17/2023 1655 EDT magnesium Oxide (MAG-OX) tablet 400 mg 400 mg Oral Refused [facility]     03/17/2023 1655 EDT multivitamin-minerals (CENTRUM) tablet 1 tablet 1 tablet Oral Refused [facility]     03/17/2023 1655 EDT pantoprazole (PROTONIX) EC tablet 20 mg 20 mg Oral Refused [facility]     03/17/2023 1654 EDT ranolazine (RANEXA) 12 hr tablet 500 mg 500 mg Oral Given [facility]     03/16/2023 2033 EDT ranolazine (RANEXA) 12 hr tablet 500 mg 500 mg Oral Given [facility], RN     03/17/2023 1654 EDT pravastatin (PRAVACHOL) tablet 40 mg 40 mg Oral Given [facility] De [facility]     03/17/2023 1654 EDT tamsulosin (FLOMAX) capsule 0 4 mg 0 4 mg Oral Given [facility]     03/17/2023 1655 EDT zinc sulfate (ZINCATE) capsule 220 mg 220 mg Oral Refused [facility]     03/17/2023 1656 EDT enoxaparin (LOVENOX) subcutaneous injection 40 mg 40 mg Subcutaneous Given Flavia Rosata     03/17/2023 1143 EDT insulin lispro (HumaLOG) 100 units/mL subcutaneous injection 1-5 Units 1 Units Subcutaneous Not Given Flavia Rosata     03/17/2023 0829 EDT insulin lispro (HumaLOG) 100 units/mL subcutaneous injection 1-5 Units 1 Units Subcutaneous Not Given Flavia Rosata     03/16/2023 2033 EDT metoprolol succinate (TOPROL-XL) 24 hr tablet 100 mg 100 mg Oral Given Joya Medrano RN     03/17/2023 0920 EDT metoprolol succinate (TOPROL-XL) 24 hr tablet 75 mg 75 mg Oral Given Flavia Rosata     03/17/2023 1431 EDT barium sulfate (LIQUID E-Z-PAQUE) oral suspension 60 mL 60 mL Oral Given David Rankin     03/17/2023 1431 EDT barium sulfate 98 % oral suspension 100 mL 100 mL Oral Given José Edmondson     03/17/2023 1509 EDT polyethylene glycol (MIRALAX) packet 17 g 17 g Oral Given Flavia Sabata     03/17/2023 1509 EDT ondansetron (ZOFRAN) injection 4 mg 4 mg Intravenous Given Flavia Rosata     03/17/2023 1650 EDT promethazine (PHENERGAN) injection 12 5 mg 12 5 mg Intravenous Not Given Flavia De La O          Objective:     Vitals: Blood pressure 151/78, pulse 87, temperature 97 8 °F (36 6 °C), temperature source Oral, resp  rate 18, height 5' 4" (1 626 m), weight 65 8 kg (145 lb), SpO2 96 %  ,Body mass index is 24 89 kg/m²        Intake/Output Summary (Last 24 hours) at 3/17/2023 1657  Last data filed at 3/16/2023 2201  Gross per 24 hour   Intake 260 ml   Output --   Net 260 ml       Physical Exam:   General Appearance: Awake and alert, in no acute distress  Chest: clear to auscultation, no rales or rhonchi  Cardiac: S1 & S2 normal, no murmur or gallop  Abdomen: Soft, non-tender, non-distended; bowel sounds normal; no masses or no organomegaly    Invasive Devices     Peripheral Intravenous Line  Duration           Peripheral IV 03/15/23 Left Antecubital 2 days                Lab Results:  Admission on 03/15/2023   Component Date Value   • WBC 03/15/2023 7 14 • RBC 03/15/2023 4 22    • Hemoglobin 03/15/2023 12 9    • Hematocrit 03/15/2023 39 4    • MCV 03/15/2023 93    • MCH 03/15/2023 30 6    • MCHC 03/15/2023 32 7    • RDW 03/15/2023 13 2    • MPV 03/15/2023 10 0    • Platelets 93/98/5274 169    • nRBC 03/15/2023 0    • Neutrophils Relative 03/15/2023 67    • Immat GRANS % 03/15/2023 0    • Lymphocytes Relative 03/15/2023 24    • Monocytes Relative 03/15/2023 8    • Eosinophils Relative 03/15/2023 1    • Basophils Relative 03/15/2023 0    • Neutrophils Absolute 03/15/2023 4 74    • Immature Grans Absolute 03/15/2023 0 03    • Lymphocytes Absolute 03/15/2023 1 68    • Monocytes Absolute 03/15/2023 0 58    • Eosinophils Absolute 03/15/2023 0 09    • Basophils Absolute 03/15/2023 0 02    • Sodium 03/15/2023 137    • Potassium 03/15/2023 4 3    • Chloride 03/15/2023 101    • CO2 03/15/2023 29    • ANION GAP 03/15/2023 7    • BUN 03/15/2023 18    • Creatinine 03/15/2023 1 06    • Glucose 03/15/2023 144 (H)    • Calcium 03/15/2023 9 1    • AST 03/15/2023 7 (L)    • ALT 03/15/2023 8    • Alkaline Phosphatase 03/15/2023 85    • Total Protein 03/15/2023 7 2    • Albumin 03/15/2023 3 9    • Total Bilirubin 03/15/2023 0 51    • eGFR 03/15/2023 60    • Magnesium 03/15/2023 2 1    • hs TnI 0hr 03/15/2023 12    • hs TnI 2hr 03/15/2023 10    • Delta 2hr hsTnI 03/15/2023 -2    • hs TnI 4hr 03/15/2023 13    • Delta 4hr hsTnI 03/15/2023 1    • SARS-CoV-2 03/15/2023 Negative    • INFLUENZA A PCR 03/15/2023 Negative    • INFLUENZA B PCR 03/15/2023 Negative    • RSV PCR 03/15/2023 Negative    • Baseline HR 03/16/2023 109    • Baseline BP 03/16/2023 104/60    • O2 sat rest 03/16/2023 96    • Stress peak HR 03/16/2023 93    • Post peak BP 03/16/2023 98    • Rate Pressure Product 03/16/2023 9,114 0    • O2 sat peak 03/16/2023 98    • Recovery HR 03/16/2023 88    • Recovery BP 03/16/2023 111/53    • O2 sat recovery 03/16/2023 98    • Rest Nuclear Isotope Dose 03/16/2023 10 80    • Stress Nuclear Isotope D* 03/16/2023 31 40    • ST Depression (mm) 03/16/2023 0    • EF (%) 03/16/2023 33    • A4C EF 03/16/2023 55    • LVOT stroke volume 03/16/2023 48 21    • LVOT stroke volume index 03/16/2023 28 10    • LVIDd 03/16/2023 4 50    • LVIDS 03/16/2023 3 60    • IVSd 03/16/2023 0 90    • LVPWd 03/16/2023 1 00    • FS 03/16/2023 20    • MV E' Tissue Velocity Se* 03/16/2023 3    • E wave deceleration time 03/16/2023 222    • MV Peak E Trino 03/16/2023 149    • MV Peak A Trino 03/16/2023 0 97    • AV LVOT peak gradient 03/16/2023 4    • LVOT peak VTI 03/16/2023 21 25    • LVOT peak trino 03/16/2023 0 99    • RVID d 03/16/2023 2 9    • Tricuspid annular plane * 03/16/2023 1 10    • LA size 03/16/2023 4 1    • LA length (A2C) 03/16/2023 5 80    • RAA A4C 03/16/2023 10    • LVOT diameter 03/16/2023 1 7    • Aortic valve peak veloci* 03/16/2023 3 37    • Ao VTI 03/16/2023 71 58    • AV mean gradient 03/16/2023 25    • LVOT mn grad 03/16/2023 2 0    • AV peak gradient 03/16/2023 45    • AV Deceleration Time 03/16/2023 1,410    • AV regurgitation pressur* 03/16/2023 409    • AV area by cont VTI 03/16/2023 0 7    • AV area peak trino 03/16/2023 0 7    • MV mean gradient antegra* 03/16/2023 5    • MV peak gradient antegra* 03/16/2023 16    • MV VTI 03/16/2023 47 38    • MV VTI RETROGRADE 03/16/2023 182 6    • MV stenosis pressure 1/2* 03/16/2023 64    • MV valve area p 1/2 meth* 03/16/2023 3 44    • MV mean gradient retrogr* 03/16/2023 86    • MR PG 03/16/2023 128    • TR Peak Trino 03/16/2023 3 4    • Triscuspid Valve Regurgi* 03/16/2023 46 0    • Ao root 03/16/2023 2 90    • AV peak gradient 03/16/2023 36    • Aortic valve mean veloci* 03/16/2023 23 30    • Mitral regurgitation pea* 03/16/2023 5 65    • Mitral valve mean inflow* 03/16/2023 4 28    • Tricuspid valve peak reg* 03/16/2023 3 40    • Left ventricular stroke * 03/16/2023 38 00    • IVS 03/16/2023 0 9    • LEFT VENTRICLE SYSTOLIC * 83/91/3458 54    • LV DIASTOLIC VOLUME (MOD* 03/16/2023 92    • Left Atrium Area-systoli* 03/16/2023 30 3    • Left Atrium Area-systoli* 03/16/2023 22 3    • LVSV, 2D 03/16/2023 38    • LVOT area 03/16/2023 2 27    • DVI 03/16/2023 0 29    • AV valve area 03/16/2023 0 67    • MV valve area by continu* 03/16/2023 1 02    • Est  RA pres 03/16/2023 8 0    • Right Ventricular Peak S* 03/16/2023 54 00    • LV EF 03/16/2023 45    • POC Glucose 03/15/2023 199 (H)    • Ventricular Rate 03/15/2023 97    • Atrial Rate 03/15/2023 97    • KS Interval 03/15/2023 210    • QRSD Interval 03/15/2023 106    • QT Interval 03/15/2023 344    • QTC Interval 03/15/2023 436    • P Axis 03/15/2023 83    • QRS Axis 03/15/2023 35    • T Wave Axis 03/15/2023 203    • POC Glucose 03/15/2023 263 (H)    • Hemoglobin A1C 03/15/2023 6 3 (H)    • EAG 03/15/2023 134    • Sodium 03/16/2023 136    • Potassium 03/16/2023 4 4    • Chloride 03/16/2023 100    • CO2 03/16/2023 27    • ANION GAP 03/16/2023 9    • BUN 03/16/2023 29 (H)    • Creatinine 03/16/2023 1 14    • Glucose 03/16/2023 153 (H)    • Calcium 03/16/2023 8 3 (L)    • eGFR 03/16/2023 55    • POC Glucose 03/16/2023 135    • POC Glucose 03/16/2023 160 (H)    • Protocol Name 03/16/2023 LINDA WALK    • Time In Exercise Phase 03/16/2023 00:03:00    • MAX   SYSTOLIC BP 26/70/6561 676    • Max Diastolic Bp 59/97/2059 55    • Max Heart Rate 03/16/2023 110    • Max Predicted Heart Rate 03/16/2023 128    • Reason for Termination 03/16/2023 Protocol Complete    • Test Indication 03/16/2023 Chest Discomfort    • Target Hr Formular 03/16/2023 (220 - Age)*100%    • Chest Pain Statement 03/16/2023 none    • POC Glucose 03/16/2023 131    • SARS-CoV-2 03/16/2023 Negative    • Ventricular Rate 03/15/2023 102    • Atrial Rate 03/15/2023 102    • KS Interval 03/15/2023 226    • QRSD Interval 03/15/2023 104    • QT Interval 03/15/2023 344    • QTC Interval 03/15/2023 448    • P Axis 03/15/2023 44    • QRS Axis 03/15/2023 34    • T Wave Axis 03/15/2023 176    • POC Glucose 03/17/2023 124    • POC Glucose 03/17/2023 147 (H)        Imaging Studies: I have personally reviewed pertinent imaging studies

## 2023-03-17 NOTE — NURSING NOTE
Pt discharged to home  Discharge instructions discussed with patient and family  Patient and family verbalizes understanding  IV discontinued, pressure dressing in place  Pt stable at time of discharge

## 2023-03-27 ENCOUNTER — OFFICE VISIT (OUTPATIENT)
Dept: FAMILY MEDICINE CLINIC | Facility: CLINIC | Age: 88
End: 2023-03-27

## 2023-03-27 VITALS
TEMPERATURE: 97.2 F | RESPIRATION RATE: 18 BRPM | OXYGEN SATURATION: 98 % | HEIGHT: 64 IN | DIASTOLIC BLOOD PRESSURE: 64 MMHG | WEIGHT: 150 LBS | BODY MASS INDEX: 25.61 KG/M2 | SYSTOLIC BLOOD PRESSURE: 116 MMHG | HEART RATE: 72 BPM

## 2023-03-27 DIAGNOSIS — D69.6 PLATELETS DECREASED (HCC): ICD-10-CM

## 2023-03-27 DIAGNOSIS — I10 PRIMARY HYPERTENSION: Primary | Chronic | ICD-10-CM

## 2023-03-27 DIAGNOSIS — R73.01 IMPAIRED FASTING GLUCOSE: ICD-10-CM

## 2023-03-27 DIAGNOSIS — F41.9 ANXIETY: ICD-10-CM

## 2023-03-27 DIAGNOSIS — D63.8 ANEMIA OF CHRONIC DISEASE: ICD-10-CM

## 2023-03-27 DIAGNOSIS — Z23 NEED FOR VACCINATION: ICD-10-CM

## 2023-03-27 DIAGNOSIS — E78.2 MIXED HYPERLIPIDEMIA: Chronic | ICD-10-CM

## 2023-03-27 PROBLEM — K40.30 INCARCERATED RIGHT INGUINAL HERNIA: Status: RESOLVED | Noted: 2017-11-02 | Resolved: 2023-03-27

## 2023-03-27 PROBLEM — K62.5 RECTAL BLEEDING: Status: RESOLVED | Noted: 2022-01-11 | Resolved: 2023-03-27

## 2023-03-27 PROBLEM — R07.9 CHEST PAIN: Status: RESOLVED | Noted: 2017-01-07 | Resolved: 2023-03-27

## 2023-03-27 RX ORDER — ALPRAZOLAM 0.5 MG/1
0.5 TABLET ORAL
Qty: 90 TABLET | Refills: 1 | Status: SHIPPED | OUTPATIENT
Start: 2023-03-27

## 2023-03-27 NOTE — PROGRESS NOTES
Name: Lester Ribeiro      : 1930      MRN: [id number]  Encounter Provider: Ilda Parsons DO  Encounter Date: 3/27/2023   Encounter department: [address] [facility]     1  Primary hypertension  Assessment & Plan:  Stable  Continue metoprolol 25 mg a day    Orders:  -     CBC; Future; Expected date: 06/10/2023  -     Comprehensive metabolic panel; Future; Expected date: 06/10/2023  -     Lipid Panel with Direct LDL reflex; Future; Expected date: 06/10/2023    2  Platelets decreased ([address] )  Assessment & Plan:  Stable       3  Mixed hyperlipidemia  Assessment & Plan:  Stable   Continue rosuvastatin 5 mg a day      4  Anxiety  Assessment & Plan:  Stable  Continue xanax prn     Orders:  -     ALPRAZolam (XANAX) 0 5 mg tablet; Take 1 tablet (0 5 mg total) by mouth daily at bedtime as needed for anxiety    5  Anemia of chronic disease  Assessment & Plan:  stable      6  Impaired fasting glucose  Assessment & Plan:  Stable  Hemoglobin A1C   Date Value Ref Range Status   03/15/2023 6 3 (H) Normal 3 8-5 6%; PreDiabetic 5 7-6 4%; Diabetic >=6 5%; Glycemic control for adults with diabetes <7 0% % Final        Orders:  -     Hemoglobin A1C; Future; Expected date: 06/10/2023    7  Need for vaccination  -     Pneumococcal Conjugate Vaccine 20-valent (Pcv20)    Return in about 3 months (around 2023) for Annual Wellness Visit  [place] prescription monitoring program report reviewed and is appropriate  Subjective      Patient here today to establish for long-term care  His previous provider has moved out of the area  He has been feeling well  He was having episodes of chest pain  He has chronic gastrointestinal issues  He had part of his stomach removed in the 70s due to an ulcer  Follows with gastroenterology and cardiology    His previous doctor was watching his blood sugars but was not concerned due to his age      Review of Systems    Current Outpatient Medications on File Prior to "Visit   Medication Sig   • Calcium Carbonate-Vitamin D (CALCIUM 600+D PO) Take by mouth every morning    • cholecalciferol (VITAMIN D3) 400 units tablet Take 400 Units by mouth daily after lunch    • clopidogrel (PLAVIX) 75 mg tablet TAKE ONE TABLET BY MOUTH EVERY DAY   • Docusate Calcium (STOOL SOFTENER PO) Take by mouth OTC; takes with lunch   • dutasteride (AVODART) 0 5 mg capsule Take 1 capsule (0 5 mg total) by mouth in the morning     • famotidine (PEPCID) 20 mg tablet Take 1 tablet (20 mg total) by mouth daily at bedtime   • isosorbide mononitrate (IMDUR) 30 mg 24 hr tablet Take 1 tablet (30 mg total) by mouth every morning   • Magnesium 250 MG TABS Take 1 tablet by mouth every morning    • metoprolol succinate (TOPROL-XL) 25 mg 24 hr tablet Take 3 tablets (75 mg total) by mouth 2 (two) times a day   • multivitamin-iron-minerals-folic acid (CENTRUM) chewable tablet Chew 1 tablet every morning    • nitroglycerin (NITROSTAT) 0 4 mg SL tablet Place 1 tablet (0 4 mg total) under the tongue every 5 (five) minutes as needed for chest pain   • pantoprazole (PROTONIX) 20 mg tablet Take 1 tablet (20 mg total) by mouth 2 (two) times a day   • polyethylene glycol (MIRALAX) 17 g packet Take 17 g by mouth daily for 10 days   • ranolazine (RANEXA) 500 mg 12 hr tablet Take 1 tablet (500 mg total) by mouth 2 (two) times a day   • rosuvastatin (CRESTOR) 5 mg tablet TAKE ONE TABLET BY MOUTH EVERY DAY AT BEDTIME   • senna (SENOKOT) 8 6 mg Take 1 tablet (8 6 mg total) by mouth daily at bedtime for 10 days   • tamsulosin (Flomax) 0 4 mg Take 1 capsule (0 4 mg total) by mouth daily with dinner   • Zinc 25 MG TABS Take 25 mg by mouth daily at bedtime   • [DISCONTINUED] ALPRAZolam (XANAX) 0 5 mg tablet Take 0 5 mg by mouth daily at bedtime as needed        Objective     /64   Pulse 72   Temp (!) 97 2 °F (36 2 °C)   Resp 18   Ht 5' 4\" (1 626 m)   Wt 68 kg (150 lb)   SpO2 98%   BMI 25 75 kg/m²     Physical Exam  Vitals and " nursing note reviewed  Constitutional:       Appearance: He is well-developed  HENT:      Head: Normocephalic and atraumatic  Right Ear: Tympanic membrane and external ear normal       Left Ear: Tympanic membrane and external ear normal    Cardiovascular:      Rate and Rhythm: Normal rate and regular rhythm  Heart sounds: Normal heart sounds  No murmur heard  Pulmonary:      Effort: Pulmonary effort is normal  No respiratory distress  Breath sounds: Normal breath sounds  No wheezing or rales  Musculoskeletal:      Right lower leg: No edema  Left lower leg: No edema         Chris Bleacher, DO

## 2023-03-27 NOTE — ASSESSMENT & PLAN NOTE
Stable  Hemoglobin A1C   Date Value Ref Range Status   03/15/2023 6 3 (H) Normal 3 8-5 6%; PreDiabetic 5 7-6 4%;  Diabetic >=6 5%; Glycemic control for adults with diabetes <7 0% % Final

## 2023-03-30 ENCOUNTER — TELEPHONE (OUTPATIENT)
Dept: INPATIENT UNIT | Facility: HOSPITAL | Age: 88
End: 2023-03-30

## 2023-05-07 ENCOUNTER — HOSPITAL ENCOUNTER (INPATIENT)
Facility: HOSPITAL | Age: 88
LOS: 4 days | Discharge: HOME | End: 2023-05-11
Attending: EMERGENCY MEDICINE | Admitting: INTERNAL MEDICINE

## 2023-05-07 ENCOUNTER — APPOINTMENT (EMERGENCY)
Dept: RADIOLOGY | Facility: HOSPITAL | Age: 88
End: 2023-05-07

## 2023-05-07 DIAGNOSIS — I50.9 CHF EXACERBATION (HCC): Primary | ICD-10-CM

## 2023-05-07 DIAGNOSIS — I20.8 CHRONIC STABLE ANGINA (HCC): ICD-10-CM

## 2023-05-07 LAB
2HR DELTA HS TROPONIN: 23 NG/L
4HR DELTA HS TROPONIN: 62 NG/L
ALBUMIN SERPL BCP-MCNC: 4.1 G/DL (ref 3.5–5)
ALP SERPL-CCNC: 99 U/L (ref 34–104)
ALT SERPL W P-5'-P-CCNC: 9 U/L (ref 7–52)
ANION GAP SERPL CALCULATED.3IONS-SCNC: 9 MMOL/L (ref 4–13)
AST SERPL W P-5'-P-CCNC: 8 U/L (ref 13–39)
ATRIAL RATE: 94 BPM
BASOPHILS # BLD AUTO: 0.01 THOUSANDS/ÂΜL (ref 0–0.1)
BASOPHILS NFR BLD AUTO: 0 % (ref 0–1)
BILIRUB SERPL-MCNC: 0.64 MG/DL (ref 0.2–1)
BNP SERPL-MCNC: 857 PG/ML (ref 0–100)
BUN SERPL-MCNC: 23 MG/DL (ref 5–25)
CALCIUM SERPL-MCNC: 9.6 MG/DL (ref 8.4–10.2)
CARDIAC TROPONIN I PNL SERPL HS: 120 NG/L
CARDIAC TROPONIN I PNL SERPL HS: 58 NG/L
CARDIAC TROPONIN I PNL SERPL HS: 81 NG/L
CHLORIDE SERPL-SCNC: 102 MMOL/L (ref 96–108)
CO2 SERPL-SCNC: 27 MMOL/L (ref 21–32)
CREAT SERPL-MCNC: 1.07 MG/DL (ref 0.6–1.3)
EOSINOPHIL # BLD AUTO: 0.04 THOUSAND/ÂΜL (ref 0–0.61)
EOSINOPHIL NFR BLD AUTO: 1 % (ref 0–6)
ERYTHROCYTE [DISTWIDTH] IN BLOOD BY AUTOMATED COUNT: 14.2 % (ref 11.6–15.1)
GFR SERPL CREATININE-BSD FRML MDRD: 59 ML/MIN/1.73SQ M
GLUCOSE SERPL-MCNC: 184 MG/DL (ref 65–140)
HCT VFR BLD AUTO: 48.7 % (ref 36.5–49.3)
HGB BLD-MCNC: 15.2 G/DL (ref 12–17)
IMM GRANULOCYTES # BLD AUTO: 0.03 THOUSAND/UL (ref 0–0.2)
IMM GRANULOCYTES NFR BLD AUTO: 0 % (ref 0–2)
LACTATE SERPL-SCNC: 1.9 MMOL/L (ref 0.5–2)
LACTATE SERPL-SCNC: 2.8 MMOL/L (ref 0.5–2)
LYMPHOCYTES # BLD AUTO: 2.06 THOUSANDS/ÂΜL (ref 0.6–4.47)
LYMPHOCYTES NFR BLD AUTO: 24 % (ref 14–44)
MCH RBC QN AUTO: 30.1 PG (ref 26.8–34.3)
MCHC RBC AUTO-ENTMCNC: 31.2 G/DL (ref 31.4–37.4)
MCV RBC AUTO: 96 FL (ref 82–98)
MONOCYTES # BLD AUTO: 0.55 THOUSAND/ÂΜL (ref 0.17–1.22)
MONOCYTES NFR BLD AUTO: 6 % (ref 4–12)
NEUTROPHILS # BLD AUTO: 5.88 THOUSANDS/ÂΜL (ref 1.85–7.62)
NEUTS SEG NFR BLD AUTO: 69 % (ref 43–75)
NRBC BLD AUTO-RTO: 0 /100 WBCS
P AXIS: 80 DEGREES
PLATELET # BLD AUTO: 203 THOUSANDS/UL (ref 149–390)
PMV BLD AUTO: 10.3 FL (ref 8.9–12.7)
POTASSIUM SERPL-SCNC: 4.9 MMOL/L (ref 3.5–5.3)
PR INTERVAL: 194 MS
PROT SERPL-MCNC: 8.1 G/DL (ref 6.4–8.4)
QRS AXIS: 26 DEGREES
QRSD INTERVAL: 116 MS
QT INTERVAL: 374 MS
QTC INTERVAL: 467 MS
RBC # BLD AUTO: 5.05 MILLION/UL (ref 3.88–5.62)
SODIUM SERPL-SCNC: 138 MMOL/L (ref 135–147)
T WAVE AXIS: 197 DEGREES
VENTRICULAR RATE: 94 BPM
WBC # BLD AUTO: 8.57 THOUSAND/UL (ref 4.31–10.16)

## 2023-05-07 RX ORDER — CLOPIDOGREL BISULFATE 75 MG/1
75 TABLET ORAL DAILY
Status: DISCONTINUED | OUTPATIENT
Start: 2023-05-07 | End: 2023-05-11 | Stop reason: HOSPADM

## 2023-05-07 RX ORDER — SODIUM CHLORIDE FOR INHALATION 0.9 %
3 VIAL, NEBULIZER (ML) INHALATION ONCE
Status: COMPLETED | OUTPATIENT
Start: 2023-05-07 | End: 2023-05-07

## 2023-05-07 RX ORDER — POTASSIUM CHLORIDE 20 MEQ/1
20 TABLET, EXTENDED RELEASE ORAL DAILY
Status: DISCONTINUED | OUTPATIENT
Start: 2023-05-07 | End: 2023-05-11 | Stop reason: HOSPADM

## 2023-05-07 RX ORDER — PANTOPRAZOLE SODIUM 20 MG/1
20 TABLET, DELAYED RELEASE ORAL
Status: DISCONTINUED | OUTPATIENT
Start: 2023-05-07 | End: 2023-05-11 | Stop reason: HOSPADM

## 2023-05-07 RX ORDER — FAMOTIDINE 20 MG/1
20 TABLET, FILM COATED ORAL
Status: DISCONTINUED | OUTPATIENT
Start: 2023-05-07 | End: 2023-05-11 | Stop reason: HOSPADM

## 2023-05-07 RX ORDER — ALPRAZOLAM 0.25 MG/1
0.5 TABLET ORAL
Status: DISCONTINUED | OUTPATIENT
Start: 2023-05-07 | End: 2023-05-11 | Stop reason: HOSPADM

## 2023-05-07 RX ORDER — ACETAMINOPHEN 325 MG/1
650 TABLET ORAL EVERY 6 HOURS PRN
Status: DISCONTINUED | OUTPATIENT
Start: 2023-05-07 | End: 2023-05-11 | Stop reason: HOSPADM

## 2023-05-07 RX ORDER — FUROSEMIDE 10 MG/ML
60 INJECTION INTRAMUSCULAR; INTRAVENOUS ONCE
Status: COMPLETED | OUTPATIENT
Start: 2023-05-07 | End: 2023-05-07

## 2023-05-07 RX ORDER — FINASTERIDE 5 MG/1
5 TABLET, FILM COATED ORAL DAILY
Status: DISCONTINUED | OUTPATIENT
Start: 2023-05-07 | End: 2023-05-11 | Stop reason: HOSPADM

## 2023-05-07 RX ORDER — ENOXAPARIN SODIUM 100 MG/ML
40 INJECTION SUBCUTANEOUS DAILY
Status: DISCONTINUED | OUTPATIENT
Start: 2023-05-07 | End: 2023-05-11 | Stop reason: HOSPADM

## 2023-05-07 RX ORDER — ZINC 25 MG
25 TABLET ORAL
Status: DISCONTINUED | OUTPATIENT
Start: 2023-05-07 | End: 2023-05-07 | Stop reason: RX

## 2023-05-07 RX ORDER — ISOSORBIDE MONONITRATE 30 MG/1
30 TABLET, EXTENDED RELEASE ORAL EVERY MORNING
Status: DISCONTINUED | OUTPATIENT
Start: 2023-05-07 | End: 2023-05-08

## 2023-05-07 RX ORDER — PRAVASTATIN SODIUM 40 MG
40 TABLET ORAL
Status: DISCONTINUED | OUTPATIENT
Start: 2023-05-07 | End: 2023-05-11 | Stop reason: HOSPADM

## 2023-05-07 RX ORDER — FUROSEMIDE 10 MG/ML
40 INJECTION INTRAMUSCULAR; INTRAVENOUS
Status: DISCONTINUED | OUTPATIENT
Start: 2023-05-07 | End: 2023-05-10

## 2023-05-07 RX ORDER — MULTIVITAMIN WITH IRON
1 TABLET ORAL EVERY MORNING
Status: DISCONTINUED | OUTPATIENT
Start: 2023-05-07 | End: 2023-05-07 | Stop reason: RX

## 2023-05-07 RX ORDER — DOCUSATE SODIUM 100 MG/1
100 CAPSULE, LIQUID FILLED ORAL
Status: DISCONTINUED | OUTPATIENT
Start: 2023-05-07 | End: 2023-05-11 | Stop reason: HOSPADM

## 2023-05-07 RX ORDER — LANOLIN ALCOHOL/MO/W.PET/CERES
1 CREAM (GRAM) TOPICAL
Status: DISCONTINUED | OUTPATIENT
Start: 2023-05-08 | End: 2023-05-11 | Stop reason: HOSPADM

## 2023-05-07 RX ORDER — TAMSULOSIN HYDROCHLORIDE 0.4 MG/1
0.4 CAPSULE ORAL
Status: DISCONTINUED | OUTPATIENT
Start: 2023-05-07 | End: 2023-05-11 | Stop reason: HOSPADM

## 2023-05-07 RX ORDER — OMEGA-3S/DHA/EPA/FISH OIL/D3 300MG-1000
400 CAPSULE ORAL
Status: DISCONTINUED | OUTPATIENT
Start: 2023-05-07 | End: 2023-05-11 | Stop reason: HOSPADM

## 2023-05-07 RX ORDER — RANOLAZINE 500 MG/1
500 TABLET, EXTENDED RELEASE ORAL EVERY 12 HOURS SCHEDULED
Status: DISCONTINUED | OUTPATIENT
Start: 2023-05-07 | End: 2023-05-11 | Stop reason: HOSPADM

## 2023-05-07 RX ADMIN — CHOLECALCIFEROL TAB 10 MCG (400 UNIT) 400 UNITS: 10 TAB at 14:46

## 2023-05-07 RX ADMIN — ISODIUM CHLORIDE 3 ML: 0.03 SOLUTION RESPIRATORY (INHALATION) at 10:03

## 2023-05-07 RX ADMIN — IPRATROPIUM BROMIDE 1 MG: 0.5 SOLUTION RESPIRATORY (INHALATION) at 10:03

## 2023-05-07 RX ADMIN — FINASTERIDE 5 MG: 5 TABLET, FILM COATED ORAL at 14:47

## 2023-05-07 RX ADMIN — TAMSULOSIN HYDROCHLORIDE 0.4 MG: 0.4 CAPSULE ORAL at 16:59

## 2023-05-07 RX ADMIN — METOPROLOL SUCCINATE 75 MG: 50 TABLET, EXTENDED RELEASE ORAL at 14:47

## 2023-05-07 RX ADMIN — NITROGLYCERIN 1 INCH: 20 OINTMENT TOPICAL at 10:05

## 2023-05-07 RX ADMIN — PRAVASTATIN SODIUM 40 MG: 40 TABLET ORAL at 16:58

## 2023-05-07 RX ADMIN — PANTOPRAZOLE SODIUM 20 MG: 20 TABLET, DELAYED RELEASE ORAL at 16:58

## 2023-05-07 RX ADMIN — DOCUSATE SODIUM 100 MG: 100 CAPSULE, LIQUID FILLED ORAL at 14:47

## 2023-05-07 RX ADMIN — ALBUTEROL SULFATE 10 MG: 2.5 SOLUTION RESPIRATORY (INHALATION) at 10:03

## 2023-05-07 RX ADMIN — ISOSORBIDE MONONITRATE 30 MG: 30 TABLET, EXTENDED RELEASE ORAL at 14:47

## 2023-05-07 RX ADMIN — POTASSIUM CHLORIDE 20 MEQ: 1500 TABLET, EXTENDED RELEASE ORAL at 14:47

## 2023-05-07 RX ADMIN — ENOXAPARIN SODIUM 40 MG: 40 INJECTION SUBCUTANEOUS at 14:48

## 2023-05-07 RX ADMIN — ALPRAZOLAM 0.5 MG: 0.25 TABLET ORAL at 21:31

## 2023-05-07 RX ADMIN — RANOLAZINE 500 MG: 500 TABLET, FILM COATED, EXTENDED RELEASE ORAL at 21:31

## 2023-05-07 RX ADMIN — FAMOTIDINE 20 MG: 20 TABLET, FILM COATED ORAL at 21:32

## 2023-05-07 RX ADMIN — CLOPIDOGREL BISULFATE 75 MG: 75 TABLET ORAL at 14:47

## 2023-05-07 RX ADMIN — FUROSEMIDE 60 MG: 10 INJECTION, SOLUTION INTRAMUSCULAR; INTRAVENOUS at 10:08

## 2023-05-07 RX ADMIN — FUROSEMIDE 40 MG: 10 INJECTION, SOLUTION INTRAMUSCULAR; INTRAVENOUS at 16:58

## 2023-05-07 NOTE — ASSESSMENT & PLAN NOTE
· Has history of dysphagia   · Status post EGD and follows with GI outpatient  · Currently on Pepcid/Protonix  · Continue home meds

## 2023-05-07 NOTE — ACP (ADVANCE CARE PLANNING)
Advanced Care Planning Progress Note    Serious Illness Conversation    1  What is your understanding now of where you are with your illness? Prognostic Understanding: appropriate understanding of prognosis     2  How much information about what is likely to be ahead with your illness would you like to have? Information: patient wants to be fully informed     3  What did you (clinician) communicate to the patient? Prognostic Communication: Function - I hope that this is not the case, but I’m worried that this may be as strong as you will feel, and things are likely to get more difficult  4  If you become sicker, how much are you willing to go through for the possibility of gaining more time? Wants everything done to get better except cardiac resuscitation or intubation with mechanical ventilation  Be in the hospital: Yes Have a feeding tube: Yes   Be in the ICU: Yes Live in a nursing home: No   Be on a ventilator: No Be uncomfortable: No   Be on dialysis: Yes Undergo aggressive test and/or procedures: No   5  How much does your proxy and family know about your priorities and wishes? Discussion Discussion: extensive discussion with family about goals and wishes     Mark Murillo heard you say that being comfortable and able to breathe is really important to you  Keeping that in mind, and what we know about your illness, I recommend that we admit you to the stepdown unit and treated with intravenous diuretics to address volume overload  BiPAP is acceptable but no intubation or mechanical ventilation  This will help us make sure that your treatment plans reflect what’s important to you  How does this plan sound to you?  Patient verbalized understanding of the plan     I have spent >30 minutes speaking with my patient on advanced care planning today or during this visit     Advanced directives         Anne-Marie Beth MD

## 2023-05-07 NOTE — RESPIRATORY THERAPY NOTE
05/07/23 1018   Respiratory Assessment   Assessment Type Pre-treatment   General Appearance Awake; Alert   Respiratory Pattern Tachypneic   Chest Assessment Chest expansion symmetrical   Bilateral Breath Sounds Rhonchi;Coarse   Suction Oral;Nasal   Resp Comments Called by Ed to place patient on bipap  Suctioned patient orally and once nasally  Placed on bipap  PICKERING neb running through bipap scanned by RN adminstered by RT  Bipap order pending     Non-Invasive Information   O2 Interface Device Full face mask   Non-Invasive Ventilation Mode BiPAP   $ Continous NIV Initial   SpO2 99 %   $ Pulse Oximetry Spot Check Charge Completed   Non-Invasive Settings   IPAP (cm) 16 cm   EPAP (cm) 6 cm   Rate (Set) 14   FiO2 (%) 40   Rise Time 3   Inspiratory Time (Set) 1   Non-Invasive Readings   Skin Intervention Skin intact   Total Rate 28   MV (Mech) 18 5   Peak Pressure (Obs) 17   Spontaneous Vt (mL) 652   Leak (lpm) 15   Non-Invasive Alarms   Insp Pressure High (cm H20) 20   Insp Pressure Low (cm H20) 5   Low Insp Pressure Time (sec) 20 sec   MV Low (L/min) 3   Vt High (mL) 1000   Vt Low (mL) 250   High Resp Rate (BPM) 45 BPM   Low Resp Rate (BPM) 8 BPM   Apnea Interval (sec) 20

## 2023-05-07 NOTE — ASSESSMENT & PLAN NOTE
Wt Readings from Last 3 Encounters:   05/07/23 66 9 kg (147 lb 7 8 oz)   04/14/23 65 8 kg (145 lb)   03/27/23 68 kg (150 lb)     · Has a history of chronic diastolic CHF  Presents with progressively worsening shortness of breath  · Most recent echocardiogram on 3/16/2023 showed EF of 84%, grade 2 diastolic dysfunction, moderate MR, moderate AAS, mild to moderate TX and mild TR  · Had a nuclear stress test on 3/16/2023 which showed severely reduced EF of 33%, apical infarction with inferior defect  · Last cardiology note on 4/14/2023 report the patient has slowly stopped taking his diuretics and only takes as needed  Unclear what diuretic he is on  · He will be started on IV Lasix 40 mg twice daily, daily standing weights, I's and O's, 2 g salt diet  · Holding off on repeat echo at this time    Cardiology consulted

## 2023-05-07 NOTE — H&P
[facility]  H&P  Name: Jonathan Hill 80 y o  male I MRN: [id number]  Unit/Bed#: S -01 I Date of Admission: 5/7/2023   Date of Service: 5/7/2023 I Hospital Day: 0      Assessment/Plan   * Acute on chronic combined systolic and diastolic heart failure [facility])  Assessment & Plan  Wt Readings from Last 3 Encounters:   05/07/23 66 9 kg (147 lb 7 8 oz)   04/14/23 65 8 kg (145 lb)   03/27/23 68 kg (150 lb)     · Has a history of chronic diastolic CHF  Presents with progressively worsening shortness of breath  · Most recent echocardiogram on 3/16/2023 showed EF of 36%, grade 2 diastolic dysfunction, moderate MR, moderate AAS, mild to moderate SC and mild TR  · Had a nuclear stress test on 3/16/2023 which showed severely reduced EF of 33%, apical infarction with inferior defect  · Last cardiology note on 4/14/2023 report the patient has slowly stopped taking his diuretics and only takes as needed  Unclear what diuretic he is on  · He will be started on IV Lasix 40 mg twice daily, daily standing weights, I's and O's, 2 g salt diet  · Holding off on repeat echo at this time  Cardiology consulted    Acute respiratory failure with hypoxia ([address] )  Assessment & Plan  · Secondary to CHF exacerbation  · Currently requiring BiPAP  · Admitted to stepdown level 2  · Continue diuresis and wean off BiPAP as tolerated    3-vessel CAD  Assessment & Plan  · He is status post CABG    Currently without chest pain  · Troponin mildly elevated in the setting of CHF, continue to trend  · Has history of chronic stable angina  · Monitor on telemetry, continue beta-blocker, Ranexa, Imdur    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  Assessment & Plan  · Has history of dysphagia   · Status post EGD and follows with GI outpatient  · Currently on Pepcid/Protonix  · Continue home meds    Anxiety  Assessment & Plan  · Continue PRN Xanax         VTE Prophylaxis: Enoxaparin (Lovenox)  / sequential compression "device     Code Status: DNR/DNI    Patient and spouse updated at the bedside    Anticipated Length of Stay:  Patient will be admitted on an Inpatient basis with an anticipated length of stay of  > 2 midnights  Justification for Hospital Stay: Acute on chronic systolic/diastolic CHF    Chief Complaint:     Shortness of breath    History of Present Illness:  Jonathan Hill is a 80 y o  male with PMH significant for CAD, status post CABG, CHF, anxiety, hypertension, BPH, peptic ulcer disease, who presents with progressively worsening shortness of breath over the last couple of days  Patient reports mild chest pain yesterday but this is currently resolved  He reports diaphoresis, generalized fatigue  No palpitations  Also reports bilateral lower extremity swelling which is about the same    Review of Systems   Constitutional: Positive for chills, diaphoresis and fatigue  Negative for fever  HENT: Negative  Eyes: Negative  Respiratory: Positive for shortness of breath and wheezing  Negative for cough  Cardiovascular: Positive for chest pain and leg swelling  Negative for palpitations  Gastrointestinal: Negative  Genitourinary: Negative  Musculoskeletal: Negative  Skin: Negative  Neurological: Negative  Psychiatric/Behavioral: Negative  All other systems reviewed and are negative        Past Medical History:   Diagnosis Date   • Arthritis    • BPH (benign prostatic hyperplasia)    • Cervical spine fracture ([facility] ) 1997    C3   • Chest pain    • Colon polyp    • Coronary artery disease    • Dry eye    • DVT (deep venous thrombosis) ([facility] ) 2015    right leg- passed thru the IVC filter-stopped at the \"patch\" from bypass surgery   • Dysphagia 11/2021    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   • Fracture of thoracic spine ([facility] ) 1997    T3   • Glaucoma    • Hyperlipidemia    • Hypertension    • Myocardial infarction ([facility] )    • Pneumonia    • PUD (peptic ulcer disease)        Past " Surgical History:   Procedure Laterality Date   • ANGIOPLASTY      2 stents   • CHOLECYSTECTOMY     • COLONOSCOPY     • CORONARY ARTERY BYPASS GRAFT      x6   • CORONARY ARTERY BYPASS GRAFT     • CORONARY STENT PLACEMENT     • CYSTOSCOPY     • EYE SURGERY Right    • HERNIA REPAIR Right 11/3/2017    Procedure: REPAIR HERNIA INGUINAL;  Surgeon: Guera Duong MD;  Location: [address] [place] [address];  Service: General   • IVC FILTER INSERTION      IVC filter   • IA CYSTO W/IRRIG & EVAC MULTPLE OBSTRUCTING CLOTS N/A 6/30/2018    Procedure: CYSTOSCOPY EVACUATION OF CLOTS, fulgeration;  Surgeon: Bienvenido Garcia MD;  Location: [facility];  Service: Urology   • STOMACH SURGERY  1973    Billroth 2 gastrectomy-2/3 removal- bleeding ulcer   • TRANSURETHRAL RESECTION OF PROSTATE         Family History:  Family History   Problem Relation Age of Onset   • Coronary artery disease Brother    • Heart disease Father         CAD       Home Meds:  all medications and allergies reviewed    Allergies: Allergies   Allergen Reactions   • Escitalopram Other (See Comments)     Suicidal feelings, sweating   • Oxycodone-Acetaminophen Dizziness and Shortness Of Breath     Other reaction(s): Faints  Reaction Date: [id number]; Category:  Adverse Reaction;    • Sucralfate GI Intolerance     Abdominal pain    • Sulfa Antibiotics Other (See Comments)   • Omeprazole Rash   • Statins Other (See Comments)     Muscle pain       Marital Status: /Civil Union     Social History     Substance and Sexual Activity   Alcohol Use Never     Social History     Tobacco Use   Smoking Status Never   Smokeless Tobacco Never     Social History     Substance and Sexual Activity   Drug Use No       Physical Exam:   Vitals:   Blood Pressure: 110/55 (05/07/23 1300)  Pulse: 89 (05/07/23 1300)  Temperature: 98 1 °F (36 7 °C) (05/07/23 0958)  Temp Source: Oral (05/07/23 0958)  Respirations: 18 (05/07/23 1300)  Weight - Scale: 66 9 kg (147 lb 7 8 oz) (05/07/23 0955)  SpO2: 95 % (23 1300)    Physical Exam  Vitals reviewed  Constitutional:       General: He is not in acute distress  Appearance: He is ill-appearing and diaphoretic  He is not toxic-appearing  HENT:      Head: Normocephalic and atraumatic  Mouth/Throat:      Mouth: Mucous membranes are moist    Eyes:      General:         Right eye: No discharge  Left eye: No discharge  Neck:      Vascular: JVD present  Cardiovascular:      Rate and Rhythm: Tachycardia present  Rhythm irregular  Heart sounds: Murmur heard  Pulmonary:      Effort: No accessory muscle usage  Breath sounds: Examination of the right-lower field reveals decreased breath sounds and rales  Examination of the left-lower field reveals decreased breath sounds and rales  Decreased breath sounds and rales present  No wheezing or rhonchi  Abdominal:      General: There is no distension  Palpations: Abdomen is soft  There is no mass  Tenderness: There is no abdominal tenderness  Musculoskeletal:      Right lower le+ Edema present  Left lower le+ Edema present  Neurological:      General: No focal deficit present  Mental Status: He is alert and oriented to person, place, and time  Mental status is at baseline  Psychiatric:         Mood and Affect: Mood normal          Behavior: Behavior normal          Lab Results: I have personally reviewed pertinent reports  Results from last 7 days   Lab Units 23  0959   WBC Thousand/uL 8 57   HEMOGLOBIN g/dL 15 2   HEMATOCRIT % 48 7   PLATELETS Thousands/uL 203   NEUTROS PCT % 69   LYMPHS PCT % 24   MONOS PCT % 6   EOS PCT % 1     Results from last 7 days   Lab Units 23  0959   POTASSIUM mmol/L 4 9   CHLORIDE mmol/L 102   CO2 mmol/L 27   BUN mg/dL 23   CREATININE mg/dL 1 07   CALCIUM mg/dL 9 6   ALK PHOS U/L 99   ALT U/L 9   AST U/L 8*           Imaging: I have personally reviewed pertinent reports        XR chest 1 view portable    Result Date: 5/7/2023  Narrative: CHEST INDICATION:   sob  COMPARISON: CXR 3/15/2023 and 2/11/2022, chest CT 3/15/2023  EXAM PERFORMED/VIEWS:  XR CHEST PORTABLE  FINDINGS: Normal heart size, CABG, coronary stent  Mild pulmonary venous congestion, small effusions, larger on the left, with left base atelectasis  No pneumothorax  Upper abdomen normal  Cholecystectomy  Bones normal for age  Impression: Mild pulmonary venous congestion with small effusions, larger on the left, and left base atelectasis  Workstation performed: [id number]     ** Please Note: Dragon 360 Dictation voice to text software may have been used in the creation of this document   **

## 2023-05-07 NOTE — ASSESSMENT & PLAN NOTE
· He is status post CABG    Currently without chest pain  · Troponin mildly elevated in the setting of CHF, continue to trend  · Has history of chronic stable angina  · Monitor on telemetry, continue beta-blocker, Ranexa, Imdur

## 2023-05-07 NOTE — PLAN OF CARE
Problem: MOBILITY - ADULT  Goal: Maintain or return to baseline ADL function  Description: INTERVENTIONS:  -  Assess patient's ability to carry out ADLs; assess patient's baseline for ADL function and identify physical deficits which impact ability to perform ADLs (bathing, care of mouth/teeth, toileting, grooming, dressing, etc )  - Assess/evaluate cause of self-care deficits   - Assess range of motion  - Assess patient's mobility; develop plan if impaired  - Assess patient's need for assistive devices and provide as appropriate  - Encourage maximum independence but intervene and supervise when necessary  - Involve family in performance of ADLs  - Assess for home care needs following discharge   - Consider OT consult to assist with ADL evaluation and planning for discharge  - Provide patient education as appropriate  Outcome: Progressing  Goal: Maintains/Returns to pre admission functional level  Description: INTERVENTIONS:  - Perform BMAT or MOVE assessment daily    - Set and communicate daily mobility goal to care team and patient/family/caregiver     - Collaborate with rehabilitation services on mobility goals if consulted  - Out of bed for toileting  - Record patient progress and toleration of activity level   Outcome: Progressing     Problem: DISCHARGE PLANNING  Goal: Discharge to home or other facility with appropriate resources  Description: INTERVENTIONS:  - Identify barriers to discharge w/patient and caregiver  - Arrange for needed discharge resources and transportation as appropriate  - Identify discharge learning needs (meds, wound care, etc )  - Arrange for interpretive services to assist at discharge as needed  - Refer to Case Management Department for coordinating discharge planning if the patient needs post-hospital services based on physician/advanced practitioner order or complex needs related to functional status, cognitive ability, or social support system  Outcome: Progressing     Problem: Knowledge Deficit  Goal: Patient/family/caregiver demonstrates understanding of disease process, treatment plan, medications, and discharge instructions  Description: Complete learning assessment and assess knowledge base    Interventions:  - Provide teaching at level of understanding  - Provide teaching via preferred learning methods  Outcome: Progressing     Problem: RESPIRATORY - ADULT  Goal: Achieves optimal ventilation and oxygenation  Description: INTERVENTIONS:  - Assess for changes in respiratory status  - Assess for changes in mentation and behavior  - Position to facilitate oxygenation and minimize respiratory effort  - Oxygen administered by appropriate delivery if ordered  - Initiate smoking cessation education as indicated  - Encourage broncho-pulmonary hygiene including cough, deep breathe, Incentive Spirometry  - Assess the need for suctioning and aspirate as needed  - Assess and instruct to report SOB or any respiratory difficulty  - Respiratory Therapy support as indicated  Outcome: Progressing

## 2023-05-07 NOTE — ASSESSMENT & PLAN NOTE
· Secondary to CHF exacerbation  · Currently requiring BiPAP  · Admitted to stepdown level 2  · Continue diuresis and wean off BiPAP as tolerated

## 2023-05-07 NOTE — ED ATTENDING ATTESTATION
5/7/2023  IMeche MD, saw and evaluated the patient  I have discussed the patient with the resident/non-physician practitioner and agree with the resident's/non-physician practitioner's findings, Plan of Care, and MDM as documented in the resident's/non-physician practitioner's note, except where noted  All available labs and Radiology studies were reviewed  I was present for key portions of any procedure(s) performed by the resident/non-physician practitioner and I was immediately available to provide assistance  At this point I agree with the current assessment done in the Emergency Department  I have conducted an independent evaluation of this patient a history and physical is as follows:  Patient is a 19-year-old male who presents with severe respiratory distress  No chest pain  No fever  He has a cough  History of coronary artery disease and DVT  Physical exam: Patient is in respiratory distress  Patient has diffuse wheezes  He has rales in the bases  He is tachycardic  No peripheral edema  Assessment/plan: Initiating BiPAP  Suspect congestive heart failure  Rule out infectious causes and/or COPD  Pulmonary embolism would be less likely    ED Course         Critical Care Time  CriticalCare Time    Date/Time: 5/7/2023 10:06 AM  Performed by: Meche Conley MD  Authorized by: Meche Conley MD     Critical care provider statement:     Critical care time (minutes):  60    Critical care time was exclusive of:  Separately billable procedures and treating other patients    Critical care was necessary to treat or prevent imminent or life-threatening deterioration of the following conditions:  Respiratory failure    Critical care was time spent personally by me on the following activities:  Obtaining history from patient or surrogate, development of treatment plan with patient or surrogate, discussions with consultants, evaluation of patient's response to treatment, examination of patient, ordering and performing treatments and interventions, ordering and review of laboratory studies, ordering and review of radiographic studies and re-evaluation of patient's condition    I assumed direction of critical care for this patient from another provider in my specialty: no

## 2023-05-08 LAB
ANION GAP SERPL CALCULATED.3IONS-SCNC: 9 MMOL/L (ref 4–13)
ATRIAL RATE: 122 BPM
BUN SERPL-MCNC: 24 MG/DL (ref 5–25)
CALCIUM SERPL-MCNC: 8.4 MG/DL (ref 8.4–10.2)
CHLORIDE SERPL-SCNC: 103 MMOL/L (ref 96–108)
CO2 SERPL-SCNC: 26 MMOL/L (ref 21–32)
CREAT SERPL-MCNC: 1.11 MG/DL (ref 0.6–1.3)
GFR SERPL CREATININE-BSD FRML MDRD: 57 ML/MIN/1.73SQ M
GLUCOSE SERPL-MCNC: 126 MG/DL (ref 65–140)
P AXIS: 21 DEGREES
POTASSIUM SERPL-SCNC: 4.4 MMOL/L (ref 3.5–5.3)
PR INTERVAL: 194 MS
QRS AXIS: 22 DEGREES
QRSD INTERVAL: 122 MS
QT INTERVAL: 330 MS
QTC INTERVAL: 470 MS
SODIUM SERPL-SCNC: 138 MMOL/L (ref 135–147)
T WAVE AXIS: 213 DEGREES
VENTRICULAR RATE: 122 BPM

## 2023-05-08 RX ORDER — ISOSORBIDE MONONITRATE 60 MG/1
60 TABLET, EXTENDED RELEASE ORAL EVERY MORNING
Status: DISCONTINUED | OUTPATIENT
Start: 2023-05-09 | End: 2023-05-11 | Stop reason: HOSPADM

## 2023-05-08 RX ORDER — METOPROLOL SUCCINATE 50 MG/1
50 TABLET, EXTENDED RELEASE ORAL EVERY 12 HOURS
Status: DISCONTINUED | OUTPATIENT
Start: 2023-05-08 | End: 2023-05-11 | Stop reason: HOSPADM

## 2023-05-08 RX ADMIN — CLOPIDOGREL BISULFATE 75 MG: 75 TABLET ORAL at 09:04

## 2023-05-08 RX ADMIN — POTASSIUM CHLORIDE 20 MEQ: 1500 TABLET, EXTENDED RELEASE ORAL at 09:04

## 2023-05-08 RX ADMIN — PANTOPRAZOLE SODIUM 20 MG: 20 TABLET, DELAYED RELEASE ORAL at 06:03

## 2023-05-08 RX ADMIN — CHOLECALCIFEROL TAB 10 MCG (400 UNIT) 400 UNITS: 10 TAB at 13:12

## 2023-05-08 RX ADMIN — METOPROLOL SUCCINATE 50 MG: 50 TABLET, EXTENDED RELEASE ORAL at 21:28

## 2023-05-08 RX ADMIN — METOPROLOL SUCCINATE 75 MG: 50 TABLET, EXTENDED RELEASE ORAL at 06:03

## 2023-05-08 RX ADMIN — PRAVASTATIN SODIUM 40 MG: 40 TABLET ORAL at 16:00

## 2023-05-08 RX ADMIN — RANOLAZINE 500 MG: 500 TABLET, FILM COATED, EXTENDED RELEASE ORAL at 21:28

## 2023-05-08 RX ADMIN — ISOSORBIDE MONONITRATE 30 MG: 30 TABLET, EXTENDED RELEASE ORAL at 09:04

## 2023-05-08 RX ADMIN — MULTIPLE VITAMINS W/ MINERALS TAB 1 TABLET: TAB ORAL at 09:04

## 2023-05-08 RX ADMIN — TAMSULOSIN HYDROCHLORIDE 0.4 MG: 0.4 CAPSULE ORAL at 15:59

## 2023-05-08 RX ADMIN — FAMOTIDINE 20 MG: 20 TABLET, FILM COATED ORAL at 21:28

## 2023-05-08 RX ADMIN — ENOXAPARIN SODIUM 40 MG: 40 INJECTION SUBCUTANEOUS at 09:04

## 2023-05-08 RX ADMIN — RANOLAZINE 500 MG: 500 TABLET, FILM COATED, EXTENDED RELEASE ORAL at 09:04

## 2023-05-08 RX ADMIN — FUROSEMIDE 40 MG: 10 INJECTION, SOLUTION INTRAMUSCULAR; INTRAVENOUS at 15:59

## 2023-05-08 RX ADMIN — DOCUSATE SODIUM 100 MG: 100 CAPSULE, LIQUID FILLED ORAL at 13:10

## 2023-05-08 RX ADMIN — PANTOPRAZOLE SODIUM 20 MG: 20 TABLET, DELAYED RELEASE ORAL at 15:59

## 2023-05-08 RX ADMIN — Medication 1 TABLET: at 09:22

## 2023-05-08 RX ADMIN — FINASTERIDE 5 MG: 5 TABLET, FILM COATED ORAL at 09:04

## 2023-05-08 RX ADMIN — FUROSEMIDE 40 MG: 10 INJECTION, SOLUTION INTRAMUSCULAR; INTRAVENOUS at 09:10

## 2023-05-08 NOTE — PLAN OF CARE
Problem: PHYSICAL THERAPY ADULT  Goal: Performs mobility at highest level of function for planned discharge setting  See evaluation for individualized goals  Description: Treatment/Interventions: Functional transfer training, LE strengthening/ROM, Therapeutic exercise, Cognitive reorientation, Endurance training, Patient/family training, Equipment eval/education, Bed mobility, Gait training, Spoke to nursing, OT  Equipment Recommended: [place] [place]       See flowsheet documentation for full assessment, interventions and recommendations  Outcome: Progressing  Note: Prognosis: Fair  Problem List: Decreased strength, Decreased endurance, Impaired balance, Decreased mobility, Decreased coordination, Decreased cognition, Impaired judgement, Decreased safety awareness, Decreased skin integrity, Impaired vision  Assessment: Pt is a 80 y o  male seen for PT evaluation s/p admit to [place] [id number]  5/7/2023  Pt was admitted with a primary dx of: acute on chronic combined systolic and diastolic heart failure     PT now consulted for assessment of mobility and d/c needs  Pt with Up with assistance orders  Pts current comorbidities and personal factors effecting treatment include: BPH, anxiety, MI, HTN, HLD    Pts current clinical presentation is Unstable/Unpredictable (high complexity) due to Ongoing medical management for primary dx, Increased reliance on more restrictive AD compared to baseline, Decreased activity tolerance compared to baseline, Fall risk, Increased assistance needed from caregiver at current time, Ongoing telemetry monitoring, Increased O2 via NC from pts baseline  Prior to admission, pt was independent without AD  Upon evaluation, pt currently is requiring Supervision for bed mobility; Supervision for transfers and Supervision for ambulation 15 ft w/ no AD   Pt presents at PT eval functioning below baseline and currently w/ overall mobility deficits 2* to: BLE weakness, impaired balance, decreased endurance, gait deviations, pain, decreased activity tolerance compared to baseline, decreased functional mobility tolerance compared to baseline, decreased safety awareness, impaired judgement, fall risk, SOB upon exertion  Pt currently at a fall risk 2* to impairments listed above  Pt will continue to benefit from skilled acute PT interventions to address stated impairments; to maximize functional mobility; for ongoing pt/ family training; and DME needs  At conclusion of PT session pt returned back in chair, all needs in reach and RN notified of session findings/recommendations with phone and call bell within reach  Pt denies any further questions at this time  Recommend home with OPPT upon hospital D/C  PT Discharge Recommendation: Home with outpatient rehabilitation    See flowsheet documentation for full assessment

## 2023-05-08 NOTE — PLAN OF CARE
Problem: MOBILITY - ADULT  Goal: Maintain or return to baseline ADL function  Description: INTERVENTIONS:  -  Assess patient's ability to carry out ADLs; assess patient's baseline for ADL function and identify physical deficits which impact ability to perform ADLs (bathing, care of mouth/teeth, toileting, grooming, dressing, etc )  - Assess/evaluate cause of self-care deficits   - Assess range of motion  - Assess patient's mobility; develop plan if impaired  - Assess patient's need for assistive devices and provide as appropriate  - Encourage maximum independence but intervene and supervise when necessary  - Involve family in performance of ADLs  - Assess for home care needs following discharge   - Consider OT consult to assist with ADL evaluation and planning for discharge  - Provide patient education as appropriate  Outcome: Progressing  Goal: Maintains/Returns to pre admission functional level  Description: INTERVENTIONS:  - Perform BMAT or MOVE assessment daily    - Set and communicate daily mobility goal to care team and patient/family/caregiver     - Collaborate with rehabilitation services on mobility goals if consulted  - Out of bed for toileting  - Record patient progress and toleration of activity level   Outcome: Progressing     Problem: DISCHARGE PLANNING  Goal: Discharge to home or other facility with appropriate resources  Description: INTERVENTIONS:  - Identify barriers to discharge w/patient and caregiver  - Arrange for needed discharge resources and transportation as appropriate  - Identify discharge learning needs (meds, wound care, etc )  - Arrange for interpretive services to assist at discharge as needed  - Refer to Case Management Department for coordinating discharge planning if the patient needs post-hospital services based on physician/advanced practitioner order or complex needs related to functional status, cognitive ability, or social support system  Outcome: Progressing     Problem: Knowledge Deficit  Goal: Patient/family/caregiver demonstrates understanding of disease process, treatment plan, medications, and discharge instructions  Description: Complete learning assessment and assess knowledge base    Interventions:  - Provide teaching at level of understanding  - Provide teaching via preferred learning methods  Outcome: Progressing     Problem: RESPIRATORY - ADULT  Goal: Achieves optimal ventilation and oxygenation  Description: INTERVENTIONS:  - Assess for changes in respiratory status  - Assess for changes in mentation and behavior  - Position to facilitate oxygenation and minimize respiratory effort  - Oxygen administered by appropriate delivery if ordered  - Initiate smoking cessation education as indicated  - Encourage broncho-pulmonary hygiene including cough, deep breathe, Incentive Spirometry  - Assess the need for suctioning and aspirate as needed  - Assess and instruct to report SOB or any respiratory difficulty  - Respiratory Therapy support as indicated  Outcome: Progressing     Problem: Prexisting or High Potential for Compromised Skin Integrity  Goal: Skin integrity is maintained or improved  Description: INTERVENTIONS:  - Identify patients at risk for skin breakdown  - Assess and monitor skin integrity  - Assess and monitor nutrition and hydration status  - Monitor labs   - Assess for incontinence   - Turn and reposition patient  - Assist with mobility/ambulation  - Relieve pressure over bony prominences  - Avoid friction and shearing  - Provide appropriate hygiene as needed including keeping skin clean and dry  - Evaluate need for skin moisturizer/barrier cream  - Collaborate with interdisciplinary team   - Patient/family teaching  - Consider wound care consult   Outcome: Progressing

## 2023-05-08 NOTE — ED PROVIDER NOTES
History  Chief Complaint   Patient presents with   • Shortness of Breath     Patient is a 51-year-old male with history of DVT, dyslipidemia, hypertension, CAD status post CABG presenting to the emergency department for evaluation of acute onset shortness of breath  Patient presenting in extreme respiratory distress  He was brought in via EMS who states that over the past couple days he has had worsening shortness of breath with cough  Upon arrival patient is dyspneic, in extremis, unable to offer history  Prior to Admission Medications   Prescriptions Last Dose Informant Patient Reported? Taking? ALPRAZolam (XANAX) 0 5 mg tablet   No No   Sig: Take 1 tablet (0 5 mg total) by mouth daily at bedtime as needed for anxiety   Calcium Carbonate-Vitamin D (CALCIUM 600+D PO)   Yes No   Sig: Take by mouth daily with lunch   Docusate Calcium (STOOL SOFTENER PO)   Yes No   Sig: Take by mouth OTC; takes with lunch   Magnesium 250 MG TABS   Yes No   Sig: Take 1 tablet by mouth every morning    Zinc 25 MG TABS   Yes No   Sig: Take 25 mg by mouth daily at bedtime   cholecalciferol (VITAMIN D3) 400 units tablet   Yes No   Sig: Take 400 Units by mouth daily after lunch    clopidogrel (PLAVIX) 75 mg tablet   No No   Sig: TAKE ONE TABLET BY MOUTH EVERY DAY   Patient taking differently: 75 mg daily at bedtime   dutasteride (AVODART) 0 5 mg capsule   No No   Sig: Take 1 capsule (0 5 mg total) by mouth in the morning     Patient taking differently: Take 0 5 mg by mouth daily at bedtime   famotidine (PEPCID) 20 mg tablet   No No   Sig: Take 1 tablet (20 mg total) by mouth daily at bedtime   isosorbide mononitrate (IMDUR) 30 mg 24 hr tablet   No No   Sig: Take 1 tablet (30 mg total) by mouth every morning   metoprolol succinate (TOPROL-XL) 25 mg 24 hr tablet   No No   Sig: Take 3 tablets (75 mg total) by mouth 2 (two) times a day   Patient taking differently: Take 25 mg by mouth 3 (three) times a day "  multivitamin-iron-minerals-folic acid (CENTRUM) chewable tablet   Yes No   Sig: Chew 1 tablet every morning    nitroglycerin (NITROSTAT) 0 4 mg SL tablet   No No   Sig: Place 1 tablet (0 4 mg total) under the tongue every 5 (five) minutes as needed for chest pain   Patient not taking: Reported on 4/14/2023   pantoprazole (PROTONIX) 20 mg tablet   No No   Sig: Take 1 tablet (20 mg total) by mouth 2 (two) times a day   polyethylene glycol (MIRALAX) 17 g packet   No No   Sig: Take 17 g by mouth daily for 10 days   ranolazine (RANEXA) 500 mg 12 hr tablet   No No   Sig: Take 1 tablet (500 mg total) by mouth 2 (two) times a day   Patient taking differently: Take 500 mg by mouth 2 (two) times a day 8am 5pm   rosuvastatin (CRESTOR) 5 mg tablet   No No   Sig: TAKE ONE TABLET BY MOUTH EVERY DAY AT BEDTIME   senna (SENOKOT) 8 6 mg   No No   Sig: Take 1 tablet (8 6 mg total) by mouth daily at bedtime for 10 days   tamsulosin (Flomax) 0 4 mg   No No   Sig: Take 1 capsule (0 4 mg total) by mouth daily with dinner   Patient taking differently: Take 0 4 mg by mouth daily at bedtime      Facility-Administered Medications: None       Past Medical History:   Diagnosis Date   • Arthritis    • BPH (benign prostatic hyperplasia)    • Cervical spine fracture (HCC) 1997    C3   • Chest pain    • Colon polyp    • Coronary artery disease    • Dry eye    • DVT (deep venous thrombosis) ([facility] ) 2015    right leg- passed thru the IVC filter-stopped at the \"patch\" from bypass surgery   • Dysphagia 11/2021    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   • Fracture of thoracic spine ([facility] ) 1997    T3   • Glaucoma    • Hyperlipidemia    • Hypertension    • Myocardial infarction ([facility] )    • Pneumonia    • PUD (peptic ulcer disease)        Past Surgical History:   Procedure Laterality Date   • ANGIOPLASTY      2 stents   • CHOLECYSTECTOMY     • COLONOSCOPY     • CORONARY ARTERY BYPASS GRAFT      x6   • CORONARY ARTERY BYPASS GRAFT     • " CORONARY STENT PLACEMENT     • CYSTOSCOPY     • EYE SURGERY Right    • HERNIA REPAIR Right 11/3/2017    Procedure: REPAIR HERNIA INGUINAL;  Surgeon: Viri Valverde MD;  Location: [address] [place] [address];  Service: General   • IVC FILTER INSERTION      IVC filter   • NC CYSTO W/IRRIG & EVAC MULTPLE OBSTRUCTING CLOTS N/A 6/30/2018    Procedure: CYSTOSCOPY EVACUATION OF CLOTS, fulgeration;  Surgeon: Santos Ruby MD;  [facility];  Service: Urology   • STOMACH SURGERY  1973    Billroth 2 gastrectomy-2/3 removal- bleeding ulcer   • TRANSURETHRAL RESECTION OF PROSTATE         Family History   Problem Relation Age of Onset   • Coronary artery disease Brother    • Heart disease Father         CAD     I have reviewed and agree with the history as documented  E-Cigarette/Vaping   • E-Cigarette Use Never User      E-Cigarette/Vaping Substances   • Nicotine No    • THC No    • CBD No    • Flavoring No    • Other No    • Unknown No      Social History     Tobacco Use   • Smoking status: Never   • Smokeless tobacco: Never   Vaping Use   • Vaping Use: Never used   Substance Use Topics   • Alcohol use: Never   • Drug use: No        Review of Systems   Unable to perform ROS: Acuity of condition       Physical Exam  ED Triage Vitals   Temperature Pulse Respirations Blood Pressure SpO2   05/07/23 0958 05/07/23 0951 05/07/23 0951 05/07/23 0953 05/07/23 0951   98 1 °F (36 7 °C) (!) 117 (!) 28 (!) 169/108 94 %      Temp Source Heart Rate Source Patient Position - Orthostatic VS BP Location FiO2 (%)   05/07/23 0958 05/07/23 0951 05/07/23 0951 05/07/23 1659 05/07/23 1018   Oral Monitor Sitting Left arm 40      Pain Score       05/07/23 1326       No Pain             Orthostatic Vital Signs  Vitals:    05/08/23 0721 05/08/23 0912 05/08/23 1311 05/08/23 1456   BP: 109/62 120/71 105/55 104/60   Pulse: 80 93 88 83   Patient Position - Orthostatic VS:           Physical Exam  Vitals and nursing note reviewed     Constitutional: General: He is in acute distress  Appearance: He is normal weight  He is ill-appearing and diaphoretic  HENT:      Head: Normocephalic and atraumatic  Mouth/Throat:      Mouth: Mucous membranes are moist    Eyes:      General: No scleral icterus  Right eye: No discharge  Left eye: No discharge  Extraocular Movements: Extraocular movements intact  Conjunctiva/sclera: Conjunctivae normal       Pupils: Pupils are equal, round, and reactive to light  Cardiovascular:      Rate and Rhythm: Tachycardia present  Pulses: Normal pulses  Heart sounds: No murmur heard  No friction rub  No gallop  Pulmonary:      Effort: Respiratory distress present  Breath sounds: Wheezing and rhonchi present  Abdominal:      General: Abdomen is flat  Bowel sounds are normal  There is no distension  Palpations: Abdomen is soft  There is no mass  Tenderness: There is no abdominal tenderness  There is no guarding  Hernia: No hernia is present  Musculoskeletal:         General: Normal range of motion  Cervical back: Normal range of motion  Right lower leg: No edema  Left lower leg: No edema  Skin:     General: Skin is warm  Capillary Refill: Capillary refill takes less than 2 seconds  Neurological:      General: No focal deficit present  Mental Status: He is oriented to person, place, and time  Psychiatric:         Mood and Affect: Mood normal          Behavior: Behavior normal          Thought Content:  Thought content normal          Judgment: Judgment normal          ED Medications  Medications   ALPRAZolam (XANAX) tablet 0 5 mg (0 5 mg Oral Given 5/7/23 2131)   calcium carbonate-vitamin D 500 mg-5 mcg tablet 1 tablet (1 tablet Oral Given 5/8/23 0922)   cholecalciferol (VITAMIN D3) tablet 400 Units (400 Units Oral Given 5/8/23 1312)   clopidogrel (PLAVIX) tablet 75 mg (75 mg Oral Given 5/8/23 0904)   docusate sodium (COLACE) capsule 100 mg (100 mg Oral Given 5/8/23 1310)   finasteride (PROSCAR) tablet 5 mg (5 mg Oral Given 5/8/23 0904)   famotidine (PEPCID) tablet 20 mg (20 mg Oral Given 5/7/23 2132)   multivitamin-minerals (CENTRUM) tablet 1 tablet (1 tablet Oral Given 5/8/23 0904)   pantoprazole (PROTONIX) EC tablet 20 mg (20 mg Oral Given 5/8/23 0603)   ranolazine (RANEXA) 12 hr tablet 500 mg (500 mg Oral Given 5/8/23 0904)   pravastatin (PRAVACHOL) tablet 40 mg (40 mg Oral Given 5/7/23 1658)   tamsulosin (FLOMAX) capsule 0 4 mg (0 4 mg Oral Given 5/7/23 1659)   furosemide (LASIX) injection 40 mg (40 mg Intravenous Given 5/8/23 0910)   enoxaparin (LOVENOX) subcutaneous injection 40 mg (40 mg Subcutaneous Given 5/8/23 0904)   potassium chloride (K-DUR,KLOR-CON) CR tablet 20 mEq (20 mEq Oral Given 5/8/23 0904)   acetaminophen (TYLENOL) tablet 650 mg (has no administration in time range)   isosorbide mononitrate (IMDUR) 24 hr tablet 60 mg (has no administration in time range)   metoprolol succinate (TOPROL-XL) 24 hr tablet 50 mg (has no administration in time range)   albuterol inhalation solution 10 mg (10 mg Nebulization Given 5/7/23 1003)     And   ipratropium (ATROVENT) 0 02 % inhalation solution 1 mg (1 mg Nebulization Given 5/7/23 1003)     And   sodium chloride 0 9 % inhalation solution 3 mL (3 mL Nebulization Given 5/7/23 1003)   furosemide (LASIX) injection 60 mg (60 mg Intravenous Given 5/7/23 1008)   nitroglycerin (NITRO-BID) 2 % TD ointment 1 inch (1 inch Topical Given 5/7/23 1005)       Diagnostic Studies  Results Reviewed     Procedure Component Value Units Date/Time    Blood culture #1 [[id number]] Collected: 05/07/23 0959    Lab Status: Preliminary result Specimen: Blood from Arm, Right Updated: 05/08/23 1401     Blood Culture No Growth at 24 hrs  Blood culture #2 [[id number]] Collected: 05/07/23 0959    Lab Status: Preliminary result Specimen: Blood from Arm, Left Updated: 05/08/23 1401     Blood Culture No Growth at 24 hrs  Basic metabolic panel [[id number]] Collected: 05/08/23 0551    Lab Status: Final result Specimen: Blood from Hand, Right Updated: 05/08/23 3847     Sodium 138 mmol/L      Potassium 4 4 mmol/L      Chloride 103 mmol/L      CO2 26 mmol/L      ANION GAP 9 mmol/L      BUN 24 mg/dL      Creatinine 1 11 mg/dL      Glucose 126 mg/dL      Calcium 8 4 mg/dL      eGFR 57 ml/min/1 73sq m     Narrative:      [facility] guidelines for Chronic Kidney Disease (CKD):   •  Stage 1 with normal or high GFR (GFR > 90 mL/min/1 73 square meters)  •  Stage 2 Mild CKD (GFR = 60-89 mL/min/1 73 square meters)  •  Stage 3A Moderate CKD (GFR = 45-59 mL/min/1 73 square meters)  •  Stage 3B Moderate CKD (GFR = 30-44 mL/min/1 73 square meters)  •  Stage 4 Severe CKD (GFR = 15-29 mL/min/1 73 square meters)  •  Stage 5 End Stage CKD (GFR <15 mL/min/1 73 square meters)  Note: GFR calculation is accurate only with a steady state creatinine    HS Troponin I 4hr [[id number]]  (Abnormal) Collected: 05/07/23 1533    Lab Status: Final result Specimen: Blood from Hand, Right Updated: 05/07/23 1606     hs TnI 4hr 120 ng/L      Delta 4hr hsTnI 62 ng/L     Lactic acid 2 Hours [[id number]]  (Normal) Collected: 05/07/23 1313    Lab Status: Final result Specimen: Blood Updated: 05/07/23 1313     LACTIC ACID 1 9 mmol/L     Narrative:      Result may be elevated if tourniquet was used during collection  HS Troponin I 2hr [[id number]]  (Abnormal) Collected: 05/07/23 1148    Lab Status: Final result Specimen: Blood Updated: 05/07/23 1223     hs TnI 2hr 81 ng/L      Delta 2hr hsTnI 23 ng/L     Lactic acid, plasma (w/reflex if result > 2 0) [[id number]]  (Abnormal) Collected: 05/07/23 0959    Lab Status: Final result Specimen: Blood from Arm, Right Updated: 05/07/23 1106     LACTIC ACID 2 8 mmol/L     Narrative:      Result may be elevated if tourniquet was used during collection      HS Troponin 0hr (reflex protocol) [[id number]]  (Abnormal) Collected: 05/07/23 0959    Lab Status: Final result Specimen: Blood from Arm, Right Updated: 05/07/23 1039     hs TnI 0hr 58 ng/L     B-Type Natriuretic Peptide(BNP) [[id number]]  (Abnormal) Collected: 05/07/23 0959    Lab Status: Final result Specimen: Blood from Arm, Right Updated: 05/07/23 1038      pg/mL     Comprehensive metabolic panel [[id number]]  (Abnormal) Collected: 05/07/23 0959    Lab Status: Final result Specimen: Blood from Arm, Right Updated: 05/07/23 1032     Sodium 138 mmol/L      Potassium 4 9 mmol/L      Chloride 102 mmol/L      CO2 27 mmol/L      ANION GAP 9 mmol/L      BUN 23 mg/dL      Creatinine 1 07 mg/dL      Glucose 184 mg/dL      Calcium 9 6 mg/dL      AST 8 U/L      ALT 9 U/L      Alkaline Phosphatase 99 U/L      Total Protein 8 1 g/dL      Albumin 4 1 g/dL      Total Bilirubin 0 64 mg/dL      eGFR 59 ml/min/1 73sq m     Narrative:      [facility] guidelines for Chronic Kidney Disease (CKD):   •  Stage 1 with normal or high GFR (GFR > 90 mL/min/1 73 square meters)  •  Stage 2 Mild CKD (GFR = 60-89 mL/min/1 73 square meters)  •  Stage 3A Moderate CKD (GFR = 45-59 mL/min/1 73 square meters)  •  Stage 3B Moderate CKD (GFR = 30-44 mL/min/1 73 square meters)  •  Stage 4 Severe CKD (GFR = 15-29 mL/min/1 73 square meters)  •  Stage 5 End Stage CKD (GFR <15 mL/min/1 73 square meters)  Note: GFR calculation is accurate only with a steady state creatinine    CBC and differential [[id number]]  (Abnormal) Collected: 05/07/23 0959    Lab Status: Final result Specimen: Blood from Arm, Right Updated: 05/07/23 1008     WBC 8 57 Thousand/uL      RBC 5 05 Million/uL      Hemoglobin 15 2 g/dL      Hematocrit 48 7 %      MCV 96 fL      MCH 30 1 pg      MCHC 31 2 g/dL      RDW 14 2 %      MPV 10 3 fL      Platelets 319 Thousands/uL      nRBC 0 /100 WBCs      Neutrophils Relative 69 %      Immat GRANS % 0 %      Lymphocytes Relative 24 %      Monocytes Relative 6 % Eosinophils Relative 1 %      Basophils Relative 0 %      Neutrophils Absolute 5 88 Thousands/µL      Immature Grans Absolute 0 03 Thousand/uL      Lymphocytes Absolute 2 06 Thousands/µL      Monocytes Absolute 0 55 Thousand/µL      Eosinophils Absolute 0 04 Thousand/µL      Basophils Absolute 0 01 Thousands/µL                  XR chest 1 view portable   Final Result by Dea Perkins MD (05/07 1040)      Mild pulmonary venous congestion with small effusions, larger on the left, and left base atelectasis  Workstation performed: [id number]               Procedures  ECG 12 Lead Documentation Only    Date/Time: 5/8/2023 12:59 PM  Performed by: Walter Mustafa DO  Authorized by: Walter Mustafa DO     Indications / Diagnosis:  Sob  Patient location:  ED  Interpretation:     Interpretation: abnormal    Rate:     ECG rate:  94    ECG rate assessment: normal    Rhythm:     Rhythm: sinus rhythm            ED Course                                       Medical Decision Making  Patient is 41-year-old male who presents to the emergency department in extremis  Patient has diffuse wheezing on exam   Patient placed on BiPAP with reduction of work of breathing, improvement in oxygen saturation  I had extensive discussion with patient regarding CODE STATUS  He agrees to be a level 3 at this time  Patient persistently on BiPAP, believe patient's work of breathing precludes him from stopping BiPAP at this time  Will admit to stepdown to under Slim  Amount and/or Complexity of Data Reviewed  Labs: ordered  Radiology: ordered  Risk  Prescription drug management  Decision regarding hospitalization              Disposition  Final diagnoses:   CHF exacerbation ([address] )     Time reflects when diagnosis was documented in both MDM as applicable and the Disposition within this note     Time User Action Codes Description Comment    5/7/2023 12:10 PM Edel Screen Add [I50 9] CHF exacerbation [facility])       ED Disposition     ED Disposition   Admit    Condition   Stable    Date/Time   Sun May 7, 2023 12:09 PM    Comment   Case was discussed with gui and the patient's admission status was agreed to be Admission Status: inpatient status to the service of Dr Columba Venegas   Follow-up Information    None         Current Discharge Medication List      CONTINUE these medications which have NOT CHANGED    Details   ALPRAZolam (XANAX) 0 5 mg tablet Take 1 tablet (0 5 mg total) by mouth daily at bedtime as needed for anxiety  Qty: 90 tablet, Refills: 1    Associated Diagnoses: Anxiety      Calcium Carbonate-Vitamin D (CALCIUM 600+D PO) Take by mouth daily with lunch      cholecalciferol (VITAMIN D3) 400 units tablet Take 400 Units by mouth daily after lunch       clopidogrel (PLAVIX) 75 mg tablet TAKE ONE TABLET BY MOUTH EVERY DAY  Qty: 90 tablet, Refills: 3    Associated Diagnoses: CAD S/P percutaneous coronary angioplasty      Docusate Calcium (STOOL SOFTENER PO) Take by mouth OTC; takes with lunch      dutasteride (AVODART) 0 5 mg capsule Take 1 capsule (0 5 mg total) by mouth in the morning    Qty: 90 capsule, Refills: 3    Associated Diagnoses: Benign prostatic hyperplasia with urinary retention      famotidine (PEPCID) 20 mg tablet Take 1 tablet (20 mg total) by mouth daily at bedtime  Qty: 90 tablet, Refills: 3    Associated Diagnoses: Gastroesophageal reflux disease without esophagitis      isosorbide mononitrate (IMDUR) 30 mg 24 hr tablet Take 1 tablet (30 mg total) by mouth every morning  Qty: 90 tablet, Refills: 3    Associated Diagnoses: Chronic stable angina (HCC)      Magnesium 250 MG TABS Take 1 tablet by mouth every morning       metoprolol succinate (TOPROL-XL) 25 mg 24 hr tablet Take 3 tablets (75 mg total) by mouth 2 (two) times a day  Qty: 60 tablet, Refills: 0    Associated Diagnoses: Chest pain      multivitamin-iron-minerals-folic acid (CENTRUM) chewable tablet Chew 1 tablet every morning       nitroglycerin (NITROSTAT) 0 4 mg SL tablet Place 1 tablet (0 4 mg total) under the tongue every 5 (five) minutes as needed for chest pain  Qty: 30 tablet, Refills: 1    Associated Diagnoses: 3-vessel CAD; Chronic stable angina (HCC)      pantoprazole (PROTONIX) 20 mg tablet Take 1 tablet (20 mg total) by mouth 2 (two) times a day  Qty: 180 tablet, Refills: 1    Associated Diagnoses: PUD (peptic ulcer disease); Acute superficial gastritis without hemorrhage      polyethylene glycol (MIRALAX) 17 g packet Take 17 g by mouth daily for 10 days  Qty: 170 g, Refills: 0    Associated Diagnoses: Constipation      ranolazine (RANEXA) 500 mg 12 hr tablet Take 1 tablet (500 mg total) by mouth 2 (two) times a day  Qty: 180 tablet, Refills: 3    Associated Diagnoses: Chronic stable angina (HCC)      rosuvastatin (CRESTOR) 5 mg tablet TAKE ONE TABLET BY MOUTH EVERY DAY AT BEDTIME  Qty: 90 tablet, Refills: 3    Associated Diagnoses: Chest pain      senna (SENOKOT) 8 6 mg Take 1 tablet (8 6 mg total) by mouth daily at bedtime for 10 days  Qty: 10 tablet, Refills: 0    Associated Diagnoses: Constipation      tamsulosin (Flomax) 0 4 mg Take 1 capsule (0 4 mg total) by mouth daily with dinner  Qty: 90 capsule, Refills: 3    Associated Diagnoses: Benign prostatic hyperplasia, unspecified whether lower urinary tract symptoms present      Zinc 25 MG TABS Take 25 mg by mouth daily at bedtime           No discharge procedures on file  PDMP Review       Value Time User    PDMP Reviewed  Yes 3/27/2023  2:44 PM Chidi Ceron DO           ED Provider  Attending physically available and evaluated Mando Lopez  I managed the patient along with the ED Attending      Electronically Signed by         Lalito Orellana DO  05/08/23 6078

## 2023-05-08 NOTE — ASSESSMENT & PLAN NOTE
· Secondary to CHF exacerbation  · Required continuous BiPAP on admission, now significantly improved on nasal cannula oxygen  On 2 L/min presently per  · Continue to wean as tolerated, not on home O2 prior to hospitalization

## 2023-05-08 NOTE — PHYSICAL THERAPY NOTE
"   Physical Therapy Evaluation    Patient's Name: Yasemin Sarah    Admitting Diagnosis  SOB (shortness of breath) [R06 02]  CHF exacerbation ([facility] ) [I50 9]    Problem List  Patient Active Problem List   Diagnosis    Acute respiratory failure with hypoxia (HCC)    History of DVT (deep vein thrombosis)    PUD (peptic ulcer disease)    Hypertension    Hyperlipidemia    Acute on chronic combined systolic and diastolic heart failure (HCC)    Benign prostatic hyperplasia    Anxiety    Bladder mass    Anemia of chronic disease    Depression    Dry eye    3-vessel CAD    Cardiac murmur    CAD (coronary artery disease)    Hx of CABG    Dysphagia    Status post gastric surgery    Platelets decreased ([facility] )    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    Impaired fasting glucose       Past Medical History  Past Medical History:   Diagnosis Date    Arthritis     BPH (benign prostatic hyperplasia)     Cervical spine fracture ([facility] ) 1997    C3    Chest pain     Colon polyp     Coronary artery disease     Dry eye     DVT (deep venous thrombosis) ([facility] [address] ) 2015    right leg- passed thru the IVC filter-stopped at the \"patch\" from bypass surgery    Dysphagia 11/2021    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    Fracture of thoracic spine ([facility] [address] ) 1997    T3    Glaucoma     Hyperlipidemia     Hypertension     Myocardial infarction ([facility] [address] )     Pneumonia     PUD (peptic ulcer disease)        Past Surgical History  Past Surgical History:   Procedure Laterality Date    ANGIOPLASTY      2 stents    CHOLECYSTECTOMY      COLONOSCOPY      CORONARY ARTERY BYPASS GRAFT      x6    CORONARY ARTERY BYPASS GRAFT      CORONARY STENT PLACEMENT      CYSTOSCOPY      EYE SURGERY Right     HERNIA REPAIR Right 11/3/2017    Procedure: REPAIR HERNIA INGUINAL;  Surgeon: Lan Iniguez MD;  [facility]: [facility];  Service: General    IVC FILTER INSERTION      IVC filter    TN CYSTO W/IRRIG & EVAC MULTPLE OBSTRUCTING CLOTS N/A 6/30/2018    " Procedure: CYSTOSCOPY EVACUATION OF CLOTS, fulgeration;  Surgeon: Guanakito Honeycutt MD;  Location: [facility];  Service: Urology    [address]    Bill[place] 2 gastrectomy-2/3 removal- bleeding ulcer    TRANSURETHRAL RESECTION OF PROSTATE        23 0815   PT Last Visit   PT Visit Date 23   Note Type   Note type Evaluation   Pain Assessment   Pain Assessment Tool 0-10   Pain Score No Pain   Restrictions/Precautions   Weight Bearing Precautions Per Order No   Other Precautions Chair Alarm; Bed Alarm;O2;Fall Risk  (3 L O2 via NC)   Home Living   Type of [address] [place] [address] One level; Laundry in basement  (3 ZONIA, full flight to basement)   Bathroom Shower/Tub Tub/shower unit   [employer] Other (Comment)  (pt wife has cane)   Prior Function   Level of [place] Independent with functional mobility; Independent with ADLs   Lives With Spouse   Receives Help From [facility] in the last 6 months 0   Vocational Retired   General   Family/Caregiver Present No   Cognition   Orientation Level Oriented X4   Following Commands Follows one step commands without difficulty   Comments pt ID by wristband, name and    Subjective   Subjective pt agreeable to PT eval   RLE Assessment   RLE Assessment WFL   Strength RLE   RLE Overall Strength 4-/5   LLE Assessment   LLE Assessment WFL   Strength LLE   LLE Overall Strength 4-/5   Vision-Basic Assessment   Current Vision Wears glasses all the time   Bed Mobility   Supine to Sit 5  Supervision   Additional items Assist x 1; Increased time required;HOB elevated   Sit to Supine Unable to assess   Additional Comments pt up in chair at end of PT eval and treat   Transfers   Sit to Stand 5  Supervision  (CGA)   Additional items Increased time required;Verbal cues; Assist x 1   Stand to Sit 5  Supervision   Additional items Increased time required;Armrests   Ambulation/Elevation   Gait pattern Decreased R stance;Narrow SAGAR; Short stride   Gait Assistance 5  Supervision   Additional items Assist x 1  (CGA)   Assistive Device None   Distance 15' x 1   Ambulation/Elevation Additional Comments pt states feeling slightly SOB, rates ambulation as a 6/10 on modified RPE scale   Balance   Static Sitting Fair +   Dynamic Sitting Fair   Static Standing Fair -   Dynamic Standing Fair -   Ambulatory Poor +   Activity Tolerance   Activity Tolerance Patient limited by fatigue   Medical Staff Made Aware SARAH Geller   Nurse Made Aware RN pre/post   Assessment   Prognosis Fair   Problem List Decreased strength;Decreased endurance; Impaired balance;Decreased mobility; Decreased coordination;Decreased cognition; Impaired judgement;Decreased safety awareness;Decreased skin integrity; Impaired vision   Assessment Pt is a 80 y o  male seen for PT evaluation s/p admit to [facility] 5/7/2023  Pt was admitted with a primary dx of: acute on chronic combined systolic and diastolic heart failure     PT now consulted for assessment of mobility and d/c needs  Pt with Up with assistance orders  Pts current comorbidities and personal factors effecting treatment include: BPH, anxiety, MI, HTN, HLD    Pts current clinical presentation is Unstable/Unpredictable (high complexity) due to Ongoing medical management for primary dx, Increased reliance on more restrictive AD compared to baseline, Decreased activity tolerance compared to baseline, Fall risk, Increased assistance needed from caregiver at current time, Ongoing telemetry monitoring, Increased O2 via NC from pts baseline  Prior to admission, pt was independent without AD  Upon evaluation, pt currently is requiring Supervision for bed mobility; Supervision for transfers and Supervision for ambulation 15 ft w/ no AD   Pt presents at PT eval functioning below baseline and currently w/ overall mobility deficits 2* to: BLE weakness, impaired balance, decreased endurance, gait deviations, pain, decreased activity tolerance compared to baseline, decreased functional mobility tolerance compared to baseline, decreased safety awareness, impaired judgement, fall risk, SOB upon exertion  Pt currently at a fall risk 2* to impairments listed above  Pt will continue to benefit from skilled acute PT interventions to address stated impairments; to maximize functional mobility; for ongoing pt/ family training; and DME needs  At conclusion of PT session pt returned back in chair, all needs in reach and RN notified of session findings/recommendations with phone and call bell within reach  Pt denies any further questions at this time  Recommend home with OPPT upon hospital D/C  Goals   Patient Goals to get out of here and go home   STG Expiration Date 05/18/23   Short Term Goal #1 In 10 days pt will be able to: 1  Demonstrate ability to perform all aspects of bed mobility independently to improve functional safety  2  Perform functional transfers independently to facilitate safe return to previous living environment  3   Ambulate 150 ft with LRAD independently with stable vitals to improve safety with household distances and reduce fall risk  4  Improve LE strength grades by 1 to increase ease of functional mobility with transfers and gait  5  Pt will demonstrate improved balance by one grade in order to decrease risk of falls  6  Climb 3 steps with 1 HR with LRAD and supervision to simulate entrance to home  PT Treatment Day 1   Plan   Treatment/Interventions Functional transfer training;LE strengthening/ROM; Therapeutic exercise;Cognitive reorientation; Endurance training;Patient/family training;Equipment eval/education; Bed mobility;Gait training;Spoke to nursing;OT   PT Frequency 3-5x/wk   Recommendation   PT Discharge Recommendation Home with outpatient rehabilitation   Equipment Recommended Walker   Additional Comments could potentially benefit from rollator for energy conservation   AM-PAC Basic Mobility Inpatient   Turning in Flat Bed Without Bedrails 3   Lying on Back to Sitting on Edge of Flat Bed Without Bedrails 3   Moving Bed to Chair 4   Standing Up From Chair Using Arms 4   Walk in Room 3   Climb 3-5 Stairs With Railing 2   Basic Mobility Inpatient Raw Score 19   Basic Mobility Standardized Score 42 48   Highest Level Of Mobility   JH-HLM Goal 6: Walk 10 steps or more   JH-HLM Achieved 6: Walk 10 steps or more   Additional Treatment Session   Start Time 0805   End Time 0815   Treatment Assessment PT introduced RW for energy conservation  Educated patient over pacing, use of AD to help with energy conservation  Stated understanding but expressed reluctance over use of AD  Pt ambulated 25' x 2 in room with RW, noted steadier gait, still decreased R stance  With ambulation, patient rates RPE of 2/10 as compared to without AD  He also notes slightly less SOB with ambulation  Encouraged patient to mobilize with AD given positive results  Continue to reccomend home with OPPT  Equipment Use RW   Additional Treatment Day 1   End of Consult   Patient Position at End of Consult Bedside chair; All needs within reach   The patient's AM-PAC Basic Mobility Inpatient Short Form Raw Score is 19  A Raw score of less than or equal to 16 suggests the patient may benefit from discharge to home  Please also refer to the recommendation of the Physical Therapist for safe discharge planning      Radha Marie, PT

## 2023-05-08 NOTE — CONSULTS
Consultation - Cardiology Team 1  Garrick Jacobo 80 y o  male MRN: [id number]  Unit/Bed#: S -01 Encounter: [id number]    Assessment/Plan     Principal Problem:    Acute on chronic combined systolic and diastolic heart failure ([facility] [address] )  Active Problems:    Acute respiratory failure with hypoxia ([address] )    Anxiety    3-vessel CAD    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      Assessment/Plan    1  Acute on chronic diastolic heart failure  Chest x-ray-mild pulmonary vascular congestion with small effusions   (2015- 69)  Given 60 mg IV Lasix followed by 40 mg IV twice daily (first dose this morning)- will continue today  Dyspnea significantly improved  Down to 2 L of oxygen  Was requiring BiPAP in ED  I&O incomplete-no intake recorded/580 out  Patient describes good diuresis  No weight today  Yesterday 147  1 month ago 145  PTA- pts wife to bring in list but appears not to be on diuretic as outpatient    TTE 3/2023-LVEF 45% with grade 2 diastolic dysfunction  RV systolic dysfunction mild  Additionally he has moderate aortic stenosis and moderate MR     2   Chest pain-right-sided with known history of stable angina  Radiates from abdomen  Relieved with SL nitroglycerin X1  Associated with dyspnea  Episodes are being more frequent  Generally relieves with 1 SL NTG  HS troponin- 58, 81 with delta 23, 120 with delta 62  ECG-tachycardia LVH with QRS widening with repolarization abnormalities    Pharmacological Myoview stress test 3/16/2023-fixed defect apex, inferior defect which may be artifact  Has been treated medically    Will continue current CAD medical regimen but increase Imdur to 60 mg daily    2  Moderate AS/moderate MR  TTE 3/2023    3  Essential hypertension-presented hypertensive and respiratory distress  BP quickly improved   PTA-Imdur 30 mg daily, Toprol 75 mg 3 times daily which he is currently on as well as IV diuretics    4    HLD  Intolerant to  multiple statins  Has been tolerating crestor 5 mg daily  Cholesterol 154/triglyceride 169/HDL 38/LDL 82    5  CAD with history of remote CABG with subsequent LIMA to LAD stent 2015  AP-on clopidogrel, intolerant to aspirin  Nitrate-Imdur 30 mg daily  BB-Toprol 75 mg every 8 hours  Ranexa-500 mg twice daily  Statin-pravastatin 40 mg daily  Home takes crestor 5mg daily  See #2    Recent pharmacologic Myoview stress test no ischemia  Addendum:  Pts wife brought list of medications to confirm  He is on no diuretic at home  He is on toprol 25mg TID  Thus will change toprol to 50mg bid and imdur 60mg daily      History of Present Illness   Physician Requesting Consult: Remigio Pratt MD  Reason for Consult / Principal Problem: Acute on chronic combined systolic and diastolic heart failure    HPI: Kristi Rueda is a 80y o  year old male with DVT with IVC filter, CAD with history of CABG X6 with subsequent stent LIMA to LAD and SVG circ occluded with native disease ( medical management), chronic diastolic heart failure, essential hypertension, HLD who presents with respiratory distress  He is noted progressive HARMON over the past week  He typically can walk about 30 feet without shortness of breath  Yesterday he became acutely more short of breath associated with right-sided chest heaviness , abdominal pain   He took a nitroglycerin with relief of the chest pain but had severe shortness of breath   He called EMS   He reports being given 4 baby aspirin and nitroglycerin sublingual   He was placed on BiPAP and received a PICKERING neb in ED  Arrival /108  Pulse ox 94% on 6 L  Was given Lasix 60 mg IV x1 followed by 40 mg IV twice daily  NTP placed  And is unable to confirm in his medications and is unclear if he is taking any diuretic  This morning he is painfree  Dyspnea improved  On 2 liter n/c  Incomplete I O    Chest x-ray-mild pulmonary vascular congestion with small effusions    He is followed by Dr Remigio Pratt    Last seen 4/14/2023  He mentions chronic stable angina  Beta-blocker increased and taking Ranexa and Imdur  He is intolerant to multiple statins but tolerating Crestor 5 mg daily and zetia   Per note [facility] ( presumably 2015) LIMA to LAD with stent patent, SVG to circumflex occluded and severe disease of native artery  Was hospitalized 3/15 3/17 at the [facility] with chest pain not relieved with nitroglycerin  Toprol was increased to 75 mg twice a day, last Office note 4/2023 at Dr Tam Headley 75mg every 8 hrs) Nuclear stress test with infarct and no ischemia  Weight 145 pounds  No diuretic noted on discharge however note states continue Lasix 40 mg daily  Patient having issues with dysphagia, barium esophagogram no changes  Pharmacologic Myoview stress test 3/2023-infarction of the apex area with inferior defect possibly artifact    TTE 3/2023-LVEF 45%  Mild global hypokinesis  Grade 2 diastolic dysfunction  RV systolic function mildly reduced  Severe left atrial dilatation  Aortic valve functionally bicuspid  Moderately thickened leaflets with moderate calcification  Mild AI with a centrally directed jet  Moderate AAS  Peak gradient 45 mmHg with a mean gradient 25 mmHg  Moderate MR  Mild TR  Inpatient consult to Cardiology  Consult performed by: MATT Candelaria  Consult ordered by: Steven Mendoza MD          Review of Systems   Constitutional: Positive for activity change  HENT: Negative  Respiratory: Positive for shortness of breath  Cardiovascular: Positive for chest pain  Gastrointestinal: Negative  Endocrine: Negative  Genitourinary: Negative  Musculoskeletal: Negative  Skin: Negative  Allergic/Immunologic: Negative  Neurological: Negative  Hematological: Negative  Psychiatric/Behavioral: Negative  All other systems reviewed and are negative        Historical Information   Past Medical History:   Diagnosis Date   • Arthritis    • BPH "(benign prostatic hyperplasia)    • Cervical spine fracture ([facility]) 1997    C3   • Chest pain    • Colon polyp    • Coronary artery disease    • Dry eye    • D[facility]deep [facility] thrombosis) [facility] 2015    right leg- passed thru the IVC filter-stopped at the \"patch\" from bypass surgery   • Dysphagia 11/2021    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   • Fracture of thoracic spine ([facility] ) 1997    T3   • Glaucoma    • Hyperlipidemia    • Hypertension    • Myocardial infarction ([email or web address] )    • Pneumonia    • PUD (peptic ulcer disease)      Past Surgical History:   Procedure Laterality Date   • ANGIOPLASTY      2 stents   • CHOLECYSTECTOMY     • COLONOSCOPY     • CORONARY ARTERY BYPASS GRAFT      x6   • CORONARY ARTERY BYPASS GRAFT     • CORONARY STENT PLACEMENT     • CYSTOSCOPY     • EYE SURGERY Right    • HERNIA REPAIR Right 11/3/2017    Procedure: REPAIR HERNIA INGUINAL;  Surgeon: Laura Stanton MD;  Location: [facility];  Service: General   • IVC FILTER INSERTION      IVC filter   • MT CYSTO W/IRRIG & EVAC MULTPLE OBSTRUCTING CLOTS N/A 6/30/2018    Procedure: CYSTOSCOPY EVACUATION OF CLOTS, fulgeration;  Surgeon: Ronnie Ovalle MD;  Location:  Main OR;  Service: Urology   • STOMACH SURGERY  1973    Billroth 2 gastrectomy-2/3 removal- bleeding ulcer   • TRANSURETHRAL RESECTION OF PROSTATE       Social History     Substance and Sexual Activity   Alcohol Use Never     Social History     Substance and Sexual Activity   Drug Use No     Social History     Tobacco Use   Smoking Status Never   Smokeless Tobacco Never     Family History:   Family History   Problem Relation Age of Onset   • Coronary artery disease Brother    • Heart disease Father         CAD       Meds/Allergies   current meds:   Current Facility-Administered Medications   Medication Dose Route Frequency   • acetaminophen (TYLENOL) tablet 650 mg  650 mg Oral Q6H PRN   • ALPRAZolam (XANAX) tablet 0 5 mg  0 5 mg Oral HS PRN   • calcium " carbonate-vitamin D 500 mg-5 mcg tablet 1 tablet  1 tablet Oral Daily With Breakfast   • cholecalciferol (VITAMIN D3) tablet 400 Units  400 Units Oral After Lunch   • clopidogrel (PLAVIX) tablet 75 mg  75 mg Oral Daily   • docusate sodium (COLACE) capsule 100 mg  100 mg Oral Daily With Lunch   • enoxaparin (LOVENOX) subcutaneous injection 40 mg  40 mg Subcutaneous Daily   • famotidine (PEPCID) tablet 20 mg  20 mg Oral HS   • finasteride (PROSCAR) tablet 5 mg  5 mg Oral Daily   • furosemide (LASIX) injection 40 mg  40 mg Intravenous BID (diuretic)   • isosorbide mononitrate (IMDUR) 24 hr tablet 30 mg  30 mg Oral QAM   • metoprolol succinate (TOPROL-XL) 24 hr tablet 75 mg  75 mg Oral Q8H [address]   • multivitamin-minerals (CENTRUM) tablet 1 tablet  1 tablet Oral QAM   • pantoprazole (PROTONIX) EC tablet 20 mg  20 mg Oral BID AC   • potassium chloride (K-DUR,KLOR-CON) CR tablet 20 mEq  20 mEq Oral Daily   • pravastatin (PRAVACHOL) tablet 40 mg  40 mg Oral Daily With Dinner   • ranolazine (RANEXA) 12 hr tablet 500 mg  500 mg Oral Q12H RITCHIE   • tamsulosin (FLOMAX) capsule 0 4 mg  0 4 mg Oral Daily With Dinner    and PTA meds:    Medications Prior to Admission   Medication   • ALPRAZolam (XANAX) 0 5 mg tablet   • Calcium Carbonate-Vitamin D (CALCIUM 600+D PO)   • cholecalciferol (VITAMIN D3) 400 units tablet   • clopidogrel (PLAVIX) 75 mg tablet   • Docusate Calcium (STOOL SOFTENER PO)   • dutasteride (AVODART) 0 5 mg capsule   • famotidine (PEPCID) 20 mg tablet   • isosorbide mononitrate (IMDUR) 30 mg 24 hr tablet   • Magnesium 250 MG TABS   • metoprolol succinate (TOPROL-XL) 25 mg 24 hr tablet   • multivitamin-iron-minerals-folic acid (CENTRUM) chewable tablet   • nitroglycerin (NITROSTAT) 0 4 mg SL tablet   • pantoprazole (PROTONIX) 20 mg tablet   • polyethylene glycol (MIRALAX) 17 g packet   • ranolazine (RANEXA) 500 mg 12 hr tablet   • rosuvastatin (CRESTOR) 5 mg tablet   • senna (SENOKOT) 8 6 mg   • tamsulosin (Flomax) "0 4 mg   • Zinc 25 MG TABS     Allergies   Allergen Reactions   • Escitalopram Other (See Comments)     Suicidal feelings, sweating   • Oxycodone-Acetaminophen Dizziness and Shortness Of Breath     Other reaction(s): Faints  Reaction Date: [id number]; Category: Adverse Reaction;    • Sucralfate GI Intolerance     Abdominal pain    • Sulfa Antibiotics Other (See Comments)   • Omeprazole Rash   • Statins Other (See Comments)     Muscle pain       Objective   Vitals: Blood pressure 109/62, pulse 80, temperature 97 5 °F (36 4 °C), resp  rate 18, height 5' 4\" (1 626 m), weight 66 9 kg (147 lb 7 8 oz), SpO2 98 %    Orthostatic Blood Pressures    Flowsheet Row Most Recent Value   Blood Pressure 109/62 filed at 05/08/2023 0721   Patient Position - Orthostatic VS Sitting filed at 05/07/2023 0957            Intake/Output Summary (Last 24 hours) at 5/8/2023 0830  Last data filed at 5/7/2023 2001  Gross per 24 hour   Intake --   Output 580 ml   Net -580 ml       Invasive Devices     Peripheral Intravenous Line  Duration           Peripheral IV 03/15/23 Left Antecubital 53 days    Peripheral IV 05/07/23 Left Antecubital 1 day    Peripheral IV 05/07/23 Left Forearm <1 day    Peripheral IV 05/07/23 Right Antecubital <1 day                Physical Exam: /62   Pulse 80   Temp 97 5 °F (36 4 °C)   Resp 18   Ht 5' 4\" (1 626 m)   Wt 66 9 kg (147 lb 7 8 oz)   SpO2 98%   BMI 25 32 kg/m²   General Appearance:    Alert, cooperative, no distress, appears stated age   Head:    Normocephalic, no scleral icterus   Eyes:    PERRL   Nose:   Nares normal, septum midline, mucosa normal, no drainage    Throat:   Lips, mucosa, and tongue normal   Neck:   Supple, symmetrical, trachea midline          Lungs:     Crackles bases  to auscultation bilaterally, respirations unlabored   Chest Wall:    No tenderness or deformity    Heart:    Regular rate and rhythm, S1 and S2 normal, no murmur, rub   or gallop   Abdomen:     Soft, non-tender, bowel " sounds active all four quadrants,     no masses, no organomegaly   Extremities:   Extremities normal, atraumatic, no cyanosis or edema   Pulses:   2+ and symmetric all extremities   Skin:   Skin color, texture, turgor normal, no rashes or lesions   Neurologic:   Alert and oriented to person place and time   No focal deficits       Lab Results:   Recent Results (from the past 72 hour(s))   ECG 12 lead    Collection Time: 05/07/23  9:52 AM   Result Value Ref Range    Ventricular Rate 122 BPM    Atrial Rate 122 BPM    NE Interval 194 ms    QRSD Interval 122 ms    QT Interval 330 ms    QTC Interval 470 ms    P Axis 21 degrees    QRS Axis 22 degrees    T Wave Axis 213 degrees   CBC and differential    Collection Time: 05/07/23  9:59 AM   Result Value Ref Range    WBC 8 57 4 31 - 10 16 Thousand/uL    RBC 5 05 3 88 - 5 62 Million/uL    Hemoglobin 15 2 12 0 - 17 0 g/dL    Hematocrit 48 7 36 5 - 49 3 %    MCV 96 82 - 98 fL    MCH 30 1 26 8 - 34 3 pg    MCHC 31 2 (L) 31 4 - 37 4 g/dL    RDW 14 2 11 6 - 15 1 %    MPV 10 3 8 9 - 12 7 fL    Platelets 482 094 - 925 Thousands/uL    nRBC 0 /100 WBCs    Neutrophils Relative 69 43 - 75 %    Immat GRANS % 0 0 - 2 %    Lymphocytes Relative 24 14 - 44 %    Monocytes Relative 6 4 - 12 %    Eosinophils Relative 1 0 - 6 %    Basophils Relative 0 0 - 1 %    Neutrophils Absolute 5 88 1 85 - 7 62 Thousands/µL    Immature Grans Absolute 0 03 0 00 - 0 20 Thousand/uL    Lymphocytes Absolute 2 06 0 60 - 4 47 Thousands/µL    Monocytes Absolute 0 55 0 17 - 1 22 Thousand/µL    Eosinophils Absolute 0 04 0 00 - 0 61 Thousand/µL    Basophils Absolute 0 01 0 00 - 0 10 Thousands/µL   Comprehensive metabolic panel    Collection Time: 05/07/23  9:59 AM   Result Value Ref Range    Sodium 138 135 - 147 mmol/L    Potassium 4 9 3 5 - 5 3 mmol/L    Chloride 102 96 - 108 mmol/L    CO2 27 21 - 32 mmol/L    ANION GAP 9 4 - 13 mmol/L    BUN 23 5 - 25 mg/dL    Creatinine 1 07 0 60 - 1 30 mg/dL    Glucose 184 (H) 65 - "140 mg/dL    Calcium 9 6 8 4 - 10 2 mg/dL    AST 8 (L) 13 - 39 U/L    ALT 9 7 - 52 U/L    Alkaline Phosphatase 99 34 - 104 U/L    Total Protein 8 1 6 4 - 8 4 g/dL    Albumin 4 1 3 5 - 5 0 g/dL    Total Bilirubin 0 64 0 20 - 1 00 mg/dL    eGFR 59 ml/min/1 73sq m   HS Troponin 0hr (reflex protocol)    Collection Time: 05/07/23  9:59 AM   Result Value Ref Range    hs TnI 0hr 58 (H) \"Refer to ACS Flowchart\"- see link ng/L   B-Type Natriuretic Peptide(BNP)    Collection Time: 05/07/23  9:59 AM   Result Value Ref Range     (H) 0 - 100 pg/mL   Blood culture #1    Collection Time: 05/07/23  9:59 AM    Specimen: Arm, Right; Blood   Result Value Ref Range    Blood Culture Received in Microbiology Lab  Culture in Progress  Blood culture #2    Collection Time: 05/07/23  9:59 AM    Specimen: Arm, Left; Blood   Result Value Ref Range    Blood Culture Received in Microbiology Lab  Culture in Progress      Lactic acid, plasma (w/reflex if result > 2 0)    Collection Time: 05/07/23  9:59 AM   Result Value Ref Range    LACTIC ACID 2 8 (HH) 0 5 - 2 0 mmol/L   ECG 12 lead    Collection Time: 05/07/23 11:39 AM   Result Value Ref Range    Ventricular Rate 94 BPM    Atrial Rate 94 BPM    UT Interval 194 ms    QRSD Interval 116 ms    QT Interval 374 ms    QTC Interval 467 ms    P Axis 80 degrees    QRS Axis 26 degrees    T Wave Huntly 197 degrees   HS Troponin I 2hr    Collection Time: 05/07/23 11:48 AM   Result Value Ref Range    hs TnI 2hr 81 (H) \"Refer to ACS Flowchart\"- see link ng/L    Delta 2hr hsTnI 23 (H) <20 ng/L   Lactic acid 2 Hours    Collection Time: 05/07/23  1:13 PM   Result Value Ref Range    LACTIC ACID 1 9 0 5 - 2 0 mmol/L   HS Troponin I 4hr    Collection Time: 05/07/23  3:33 PM   Result Value Ref Range    hs TnI 4hr 120 (H) \"Refer to ACS Flowchart\"- see link ng/L    Delta 4hr hsTnI 62 (H) <20 ng/L   Basic metabolic panel    Collection Time: 05/08/23  5:51 AM   Result Value Ref Range    Sodium 138 135 - 147 mmol/L " Potassium 4 4 3 5 - 5 3 mmol/L    Chloride 103 96 - 108 mmol/L    CO2 26 21 - 32 mmol/L    ANION GAP 9 4 - 13 mmol/L    BUN 24 5 - 25 mg/dL    Creatinine 1 11 0 60 - 1 30 mg/dL    Glucose 126 65 - 140 mg/dL    Calcium 8 4 8 4 - 10 2 mg/dL    eGFR 57 ml/min/1 73sq m     Imaging: I have personally reviewed pertinent reports  EKG: NSR LVH      Code Status: Level 3 - DNAR and DNI  Advance Directive and Living Will:      Power of :    POLST:      Counseling / Coordination of Care  Total floor / unit time spent today 60 minutes  Greater than 50% of total time was spent with the patient and / or family counseling and / or coordination of care

## 2023-05-08 NOTE — PROGRESS NOTES
[facility]  Progress Note  Name: Kael Richardson  MRN: [id number]  Unit/Bed#: S -01 I Date of Admission: 5/7/2023   Date of Service: 5/8/2023 I Hospital Day: 1    Assessment/Plan   * Acute on chronic combined systolic and diastolic heart failure [facility])  Assessment & Plan  Wt Readings from Last 3 Encounters:   05/07/23 66 9 kg (147 lb 7 8 oz)   04/14/23 65 8 kg (145 lb)   03/27/23 68 kg (150 lb)     · Has a history of chronic diastolic CHF  Presents with progressively worsening shortness of breath  · Most recent echocardiogram on 3/16/2023 showed EF of 65%, grade 2 diastolic dysfunction, moderate MR, moderate AAS, mild to moderate [place] and mild TR  · Had a nuclear stress test on 3/16/2023 which showed severely reduced EF of 33%, apical infarction with inferior defect  · Last cardiology note on 4/14/2023 report the patient has slowly stopped taking his diuretics and only takes as needed  Unclear what diuretic he is on  · Continue IV Lasix 40 mg twice daily, daily standing weights, I's and O's, 2 g salt diet  · Holding off on repeat echo at this time  Cardiology consulted    Acute respiratory failure with hypoxia ([address] )  Assessment & Plan  · Secondary to CHF exacerbation  · Required continuous BiPAP on admission, now significantly improved on nasal cannula oxygen  On 2 L/min presently per  · Continue to wean as tolerated, not on home O2 prior to hospitalization  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  Assessment & Plan  · Has history of dysphagia   · Status post EGD and follows with GI outpatient  · Currently on Pepcid/Protonix  · Continue home meds    3-vessel CAD  Assessment & Plan  · He is status post CABG    Currently without chest pain  · Troponin mildly elevated in the setting of CHF, continue to trend  · Has history of chronic stable angina  · Monitor on telemetry, continue beta-blocker, Ranexa, Imdur    Anxiety  Assessment & Plan  · Continue PRN Xanax VTE Pharmacologic Prophylaxis: VTE Score: 4 Moderate Risk (Score 3-4) - Pharmacological DVT Prophylaxis Ordered: enoxaparin (Lovenox)  Patient Centered Rounds: I performed bedside rounds with nursing staff today  Discussions with Specialists or Other Care Team Provider: venessa, rn    Education and Discussions with Family / Patient: Attempted to update  (wife) via phone  Left voicemail  Time Spent for Care: 45 minutes  More than 50% of total time spent on counseling and coordination of care as described above  Current Length of Stay: 1 day(s)  Current Patient Status: Inpatient   Certification Statement: The patient will continue to require additional inpatient hospital stay due to HF exacerbation on IV Lasix  Discharge Plan: Anticipate discharge in 24-48 hrs to discharge location to be determined pending rehab evaluations  Code Status: Level 3 - DNAR and DNI    Subjective:   No overnight events reported by nursing  Patient feels significantly better with IV diuretics  He is on nasal cannula oxygen presently and off of BiPAP  Objective:     Vitals:   Temp (24hrs), Av 9 °F (36 6 °C), Min:97 5 °F (36 4 °C), Max:98 2 °F (36 8 °C)    Temp:  [97 5 °F (36 4 °C)-98 2 °F (36 8 °C)] 97 5 °F (36 4 °C)  HR:  [] 93  Resp:  [18-28] 18  BP: ()/() 120/71  SpO2:  [94 %-99 %] 95 %  Body mass index is 25 32 kg/m²  Input and Output Summary (last 24 hours): Intake/Output Summary (Last 24 hours) at 2023 0940  Last data filed at 2023  Gross per 24 hour   Intake --   Output 580 ml   Net -580 ml       Physical Exam:   Physical Exam  Vitals and nursing note reviewed  Constitutional:       General: He is not in acute distress  Appearance: Normal appearance  Interventions: Nasal cannula in place  HENT:      Head: Normocephalic  Mouth/Throat:      Mouth: Mucous membranes are moist    Eyes:      Pupils: Pupils are equal, round, and reactive to light  Cardiovascular:      Rate and Rhythm: Normal rate and regular rhythm  Heart sounds: No murmur heard  Pulmonary:      Effort: Pulmonary effort is normal  No respiratory distress  Breath sounds: Normal breath sounds  No wheezing, rhonchi or rales  Abdominal:      General: Bowel sounds are normal  There is no distension  Palpations: Abdomen is soft  Tenderness: There is no abdominal tenderness  There is no guarding  Musculoskeletal:         General: No deformity  Cervical back: Normal range of motion  Right lower leg: No edema  Left lower leg: No edema  Skin:     Capillary Refill: Capillary refill takes less than 2 seconds  Neurological:      General: No focal deficit present  Mental Status: He is alert and oriented to person, place, and time  Mental status is at baseline            Additional Data:     Labs:  Results from last 7 days   Lab Units 05/07/23  0959   WBC Thousand/uL 8 57   HEMOGLOBIN g/dL 15 2   HEMATOCRIT % 48 7   PLATELETS Thousands/uL 203   NEUTROS PCT % 69   LYMPHS PCT % 24   MONOS PCT % 6   EOS PCT % 1     Results from last 7 days   Lab Units 05/08/23  0551 05/07/23  0959   SODIUM mmol/L 138 138   POTASSIUM mmol/L 4 4 4 9   CHLORIDE mmol/L 103 102   CO2 mmol/L 26 27   BUN mg/dL 24 23   CREATININE mg/dL 1 11 1 07   ANION GAP mmol/L 9 9   CALCIUM mg/dL 8 4 9 6   ALBUMIN g/dL  --  4 1   TOTAL BILIRUBIN mg/dL  --  0 64   ALK PHOS U/L  --  99   ALT U/L  --  9   AST U/L  --  8*   GLUCOSE RANDOM mg/dL 126 184*                 Results from last 7 days   Lab Units 05/07/23  1313 05/07/23  0959   LACTIC ACID mmol/L 1 9 2 8*       Lines/Drains:  Invasive Devices     Peripheral Intravenous Line  Duration           Peripheral IV 03/15/23 Left Antecubital 54 days    Peripheral IV 05/07/23 Left Antecubital 1 day    Peripheral IV 05/07/23 Left Forearm <1 day    Peripheral IV 05/07/23 Right Antecubital <1 day                  Telemetry:  Telemetry Orders (From admission, onward)             48 Hour Telemetry Monitoring  Continuous x 48 hours        References:    Telemetry Guidelines   Question:  Reason for 48 Hour Telemetry  Answer:  Acute Decompensated CHF (continuous diuretic infusion or total diuretic dose > 200 mg daily, associated electrolyte derangement, ionotropic drip, history of ventricular arrhythmia, or new EF <35%)                 Telemetry Reviewed: Normal Sinus Rhythm  Indication for Continued Telemetry Use: No indication for continued use  Will discontinue  Imaging: No pertinent imaging reviewed  Recent Cultures (last 7 days):   Results from last 7 days   Lab Units 05/07/23  0959   BLOOD CULTURE  Received in Microbiology Lab  Culture in Progress  Received in Microbiology Lab  Culture in Progress         Last 24 Hours Medication List:   Current Facility-Administered Medications   Medication Dose Route Frequency Provider Last Rate   • acetaminophen  650 mg Oral Q6H PRN Karin Bolton PA-C     • ALPRAZolam  0 5 mg Oral HS PRN Natalie Diop MD     • calcium carbonate-vitamin D  1 tablet Oral Daily With Breakfast Natalie Diop MD     • cholecalciferol  400 Units Oral After Les Song MD     • clopidogrel  75 mg Oral Daily Natalie Diop MD     • docusate sodium  100 mg Oral Daily With Lunch Natalie Diop MD     • enoxaparin  40 mg Subcutaneous Daily Natalie Diop MD     • famotidine  20 mg Oral HS Natalie Diop MD     • finasteride  5 mg Oral Daily Natalie Diop MD     • furosemide  40 mg Intravenous BID (diuretic) Milton Motley MD     • isosorbide mononitrate  30 mg Oral QAM Natalie Diop MD     • metoprolol succinate  75 mg Oral Q8H [address] Natalie Diop MD     • multivitamin-minerals  1 tablet Oral QAM Natalie Diop MD     • pantoprazole  20 mg Oral BID AC Natalie Diop MD     • potassium chloride  20 mEq Oral Daily Natalie Scott Almaraz MD     • pravastatin  40 mg Oral Daily With Kit Vaughn MD     • ranolazine  500 mg Oral Q12H [facility] Scott Almaraz MD     • tamsulosin  0 4 mg Oral Daily With Kit Vaughn MD          Today, Patient Was Seen By: Luis Fernando Linda PA-C    **Please Note: This note may have been constructed using a voice recognition system  **

## 2023-05-08 NOTE — ASSESSMENT & PLAN NOTE
Wt Readings from Last 3 Encounters:   05/07/23 66 9 kg (147 lb 7 8 oz)   04/14/23 65 8 kg (145 lb)   03/27/23 68 kg (150 lb)     · Has a history of chronic diastolic CHF  Presents with progressively worsening shortness of breath  · Most recent echocardiogram on 3/16/2023 showed EF of 76%, grade 2 diastolic dysfunction, moderate MR, moderate AAS, mild to moderate [place] and mild TR  · Had a nuclear stress test on 3/16/2023 which showed severely reduced EF of 33%, apical infarction with inferior defect  · Last cardiology note on 4/14/2023 report the patient has slowly stopped taking his diuretics and only takes as needed  Unclear what diuretic he is on  · Continue IV Lasix 40 mg twice daily, daily standing weights, I's and O's, 2 g salt diet  · Holding off on repeat echo at this time    Cardiology consulted

## 2023-05-09 RX ADMIN — PANTOPRAZOLE SODIUM 20 MG: 20 TABLET, DELAYED RELEASE ORAL at 06:01

## 2023-05-09 RX ADMIN — PRAVASTATIN SODIUM 40 MG: 40 TABLET ORAL at 17:18

## 2023-05-09 RX ADMIN — FINASTERIDE 5 MG: 5 TABLET, FILM COATED ORAL at 08:51

## 2023-05-09 RX ADMIN — METOPROLOL SUCCINATE 50 MG: 50 TABLET, EXTENDED RELEASE ORAL at 10:05

## 2023-05-09 RX ADMIN — CLOPIDOGREL BISULFATE 75 MG: 75 TABLET ORAL at 08:51

## 2023-05-09 RX ADMIN — CHOLECALCIFEROL TAB 10 MCG (400 UNIT) 400 UNITS: 10 TAB at 13:04

## 2023-05-09 RX ADMIN — ISOSORBIDE MONONITRATE 60 MG: 60 TABLET, EXTENDED RELEASE ORAL at 08:50

## 2023-05-09 RX ADMIN — FAMOTIDINE 20 MG: 20 TABLET, FILM COATED ORAL at 21:06

## 2023-05-09 RX ADMIN — MULTIPLE VITAMINS W/ MINERALS TAB 1 TABLET: TAB ORAL at 08:50

## 2023-05-09 RX ADMIN — ALPRAZOLAM 0.5 MG: 0.25 TABLET ORAL at 00:34

## 2023-05-09 RX ADMIN — DOCUSATE SODIUM 100 MG: 100 CAPSULE, LIQUID FILLED ORAL at 13:04

## 2023-05-09 RX ADMIN — FUROSEMIDE 40 MG: 10 INJECTION, SOLUTION INTRAMUSCULAR; INTRAVENOUS at 17:19

## 2023-05-09 RX ADMIN — RANOLAZINE 500 MG: 500 TABLET, FILM COATED, EXTENDED RELEASE ORAL at 21:06

## 2023-05-09 RX ADMIN — RANOLAZINE 500 MG: 500 TABLET, FILM COATED, EXTENDED RELEASE ORAL at 08:50

## 2023-05-09 RX ADMIN — TAMSULOSIN HYDROCHLORIDE 0.4 MG: 0.4 CAPSULE ORAL at 17:19

## 2023-05-09 RX ADMIN — Medication 1 TABLET: at 08:54

## 2023-05-09 RX ADMIN — FUROSEMIDE 40 MG: 10 INJECTION, SOLUTION INTRAMUSCULAR; INTRAVENOUS at 08:51

## 2023-05-09 RX ADMIN — PANTOPRAZOLE SODIUM 20 MG: 20 TABLET, DELAYED RELEASE ORAL at 17:19

## 2023-05-09 RX ADMIN — ENOXAPARIN SODIUM 40 MG: 40 INJECTION SUBCUTANEOUS at 08:50

## 2023-05-09 RX ADMIN — POTASSIUM CHLORIDE 20 MEQ: 1500 TABLET, EXTENDED RELEASE ORAL at 08:50

## 2023-05-09 RX ADMIN — ALPRAZOLAM 0.5 MG: 0.25 TABLET ORAL at 21:05

## 2023-05-09 NOTE — ASSESSMENT & PLAN NOTE
Wt Readings from Last 3 Encounters:   05/09/23 63 7 kg (140 lb 6 4 oz)   04/14/23 65 8 kg (145 lb)   03/27/23 68 kg (150 lb)     · Has a history of chronic diastolic CHF  Presents with progressively worsening shortness of breath  · Most recent echocardiogram on 3/16/2023 showed EF of 05%, grade 2 diastolic dysfunction, moderate MR, moderate AAS, mild to moderate [place] and mild TR  · Had a nuclear stress test on 3/16/2023 which showed severely reduced EF of 33%, apical infarction with inferior defect  · Last cardiology note on 4/14/2023 report the patient has slowly stopped taking his diuretics and only takes as needed  Unclear what diuretic he is on  · Continue IV Lasix 40 mg twice daily, daily standing weights, I's and O's, 2 g salt diet  · Holding off on repeat echo at this time  · Cardiology following

## 2023-05-09 NOTE — PLAN OF CARE
Problem: MOBILITY - ADULT  Goal: Maintain or return to baseline ADL function  Description: INTERVENTIONS:  -  Assess patient's ability to carry out ADLs; assess patient's baseline for ADL function and identify physical deficits which impact ability to perform ADLs (bathing, care of mouth/teeth, toileting, grooming, dressing, etc )  - Assess/evaluate cause of self-care deficits   - Assess range of motion  - Assess patient's mobility; develop plan if impaired  - Assess patient's need for assistive devices and provide as appropriate  - Encourage maximum independence but intervene and supervise when necessary  - Involve family in performance of ADLs  - Assess for home care needs following discharge   - Consider OT consult to assist with ADL evaluation and planning for discharge  - Provide patient education as appropriate  Outcome: Progressing     Problem: DISCHARGE PLANNING  Goal: Discharge to home or other facility with appropriate resources  Description: INTERVENTIONS:  - Identify barriers to discharge w/patient and caregiver  - Arrange for needed discharge resources and transportation as appropriate  - Identify discharge learning needs (meds, wound care, etc )  - Arrange for interpretive services to assist at discharge as needed  - Refer to Case Management Department for coordinating discharge planning if the patient needs post-hospital services based on physician/advanced practitioner order or complex needs related to functional status, cognitive ability, or social support system  Outcome: Progressing     Problem: Knowledge Deficit  Goal: Patient/family/caregiver demonstrates understanding of disease process, treatment plan, medications, and discharge instructions  Description: Complete learning assessment and assess knowledge base    Interventions:  - Provide teaching at level of understanding  - Provide teaching via preferred learning methods  Outcome: Progressing     Problem: RESPIRATORY - ADULT  Goal: Achieves optimal ventilation and oxygenation  Description: INTERVENTIONS:  - Assess for changes in respiratory status  - Assess for changes in mentation and behavior  - Position to facilitate oxygenation and minimize respiratory effort  - Oxygen administered by appropriate delivery if ordered  - Initiate smoking cessation education as indicated  - Encourage broncho-pulmonary hygiene including cough, deep breathe, Incentive Spirometry  - Assess the need for suctioning and aspirate as needed  - Assess and instruct to report SOB or any respiratory difficulty  - Respiratory Therapy support as indicated  Outcome: Progressing     Problem: Prexisting or High Potential for Compromised Skin Integrity  Goal: Skin integrity is maintained or improved  Description: INTERVENTIONS:  - Identify patients at risk for skin breakdown  - Assess and monitor skin integrity  - Assess and monitor nutrition and hydration status  - Monitor labs   - Assess for incontinence   - Turn and reposition patient  - Assist with mobility/ambulation  - Relieve pressure over bony prominences  - Avoid friction and shearing  - Provide appropriate hygiene as needed including keeping skin clean and dry  - Evaluate need for skin moisturizer/barrier cream  - Collaborate with interdisciplinary team   - Patient/family teaching  - Consider wound care consult   Outcome: Progressing     Problem: Nutrition/Hydration-ADULT  Goal: Nutrient/Hydration intake appropriate for improving, restoring or maintaining nutritional needs  Description: Monitor and assess patient's nutrition/hydration status for malnutrition  Collaborate with interdisciplinary team and initiate plan and interventions as ordered  Monitor patient's weight and dietary intake as ordered or per policy  Utilize nutrition screening tool and intervene as necessary  Determine patient's food preferences and provide high-protein, high-caloric foods as appropriate       INTERVENTIONS:  - Monitor oral intake, urinary output, labs, and treatment plans  - Assess nutrition and hydration status and recommend course of action  - Evaluate amount of meals eaten  - Assist patient with eating if necessary   - Allow adequate time for meals  - Recommend/ encourage appropriate diets, oral nutritional supplements, and vitamin/mineral supplements  - Order, calculate, and assess calorie counts as needed  - Recommend, monitor, and adjust tube feedings and TPN/PPN based on assessed needs  - Assess need for intravenous fluids  - Provide specific nutrition/hydration education as appropriate  - Include patient/family/caregiver in decisions related to nutrition  Outcome: Progressing

## 2023-05-09 NOTE — ASSESSMENT & PLAN NOTE
· Secondary to CHF exacerbation  · Required continuous BiPAP on admission, now significantly improved on nasal cannula oxygen  On 1 5 L/min presently  · Continue to wean as tolerated, not on home O2 prior to hospitalization    · Home O2 eval prior to dc

## 2023-05-09 NOTE — PROGRESS NOTES
[facility]  Progress Note  Name: Lefty Sandoval  MRN: [id number]  Unit/Bed#: S -01 I Date of Admission: 5/7/2023   Date of Service: 5/9/2023 I Hospital Day: 2    Assessment/Plan   * Acute on chronic combined systolic and diastolic heart failure [facility])  Assessment & Plan  Wt Readings from Last 3 Encounters:   05/09/23 63 7 kg (140 lb 6 4 oz)   04/14/23 65 8 kg (145 lb)   03/27/23 68 kg (150 lb)     · Has a history of chronic diastolic CHF  Presents with progressively worsening shortness of breath  · Most recent echocardiogram on 3/16/2023 showed EF of 99%, grade 2 diastolic dysfunction, moderate MR, moderate AAS, mild to moderate [place] and mild TR  · Had a nuclear stress test on 3/16/2023 which showed severely reduced EF of 33%, apical infarction with inferior defect  · Last cardiology note on 4/14/2023 report the patient has slowly stopped taking his diuretics and only takes as needed  Unclear what diuretic he is on  · Continue IV Lasix 40 mg twice daily, daily standing weights, I's and O's, 2 g salt diet  · Holding off on repeat echo at this time  · Cardiology following  Acute respiratory failure with hypoxia (HCC)  Assessment & Plan  · Secondary to CHF exacerbation  · Required continuous BiPAP on admission, now significantly improved on nasal cannula oxygen  On 1 5 L/min presently  · Continue to wean as tolerated, not on home O2 prior to hospitalization  · Home O2 eval prior to dc    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  Assessment & Plan  · Has history of dysphagia   · Status post EGD and follows with GI outpatient  · Currently on Pepcid/Protonix  · Continue home meds    3-vessel CAD  Assessment & Plan  · He is status post CABG    Currently without chest pain  · Troponin mildly elevated in the setting of CHF, continue to trend  · Has history of chronic stable angina  · Monitor on telemetry, continue beta-blocker, Ranexa, Imdur    Anxiety  Assessment & Plan  · Continue PRN Xanax         VTE Pharmacologic Prophylaxis: VTE Score: 4 Moderate Risk (Score 3-4) - Pharmacological DVT Prophylaxis Ordered: enoxaparin (Lovenox)  Patient Centered Rounds: I performed bedside rounds with nursing staff today  Discussions with Specialists or Other Care Team Provider: venessa, rn    Education and Discussions with Family / Patient: Attempted to update  (wife) via phone  Left voicemail  Time Spent for Care: 45 minutes  More than 50% of total time spent on counseling and coordination of care as described above  Current Length of Stay: 2 day(s)  Current Patient Status: Inpatient   Certification Statement: The patient will continue to require additional inpatient hospital stay due to CHF exacerbation on IV diuretics with acute respiratory failure  Discharge Plan: Anticipate discharge in 24-48 hrs to home  Code Status: Level 3 - DNAR and DNI    Subjective:   Patient reported that today evening and overnight he felt really well  He felt a little short of breath this morning and needed O2 again  Feels a little bit better at this point not back to his baseline    Objective:     Vitals:   Temp (24hrs), Av 6 °F (36 4 °C), Min:97 2 °F (36 2 °C), Max:97 9 °F (36 6 °C)    Temp:  [97 2 °F (36 2 °C)-97 9 °F (36 6 °C)] 97 2 °F (36 2 °C)  HR:  [83-97] 97  Resp:  [18] 18  BP: (104-130)/(55-80) 112/73  SpO2:  [91 %-94 %] 92 %  Body mass index is 24 1 kg/m²  Input and Output Summary (last 24 hours): Intake/Output Summary (Last 24 hours) at 2023 0937  Last data filed at 2023 2133  Gross per 24 hour   Intake 240 ml   Output 1100 ml   Net -860 ml       Physical Exam:   Physical Exam  Vitals and nursing note reviewed  Constitutional:       General: He is not in acute distress  Appearance: Normal appearance  Interventions: Nasal cannula in place  HENT:      Head: Normocephalic        Mouth/Throat:      Mouth: Mucous membranes are moist    Eyes: Pupils: Pupils are equal, round, and reactive to light  Cardiovascular:      Rate and Rhythm: Normal rate and regular rhythm  Heart sounds: No murmur heard  Pulmonary:      Effort: Pulmonary effort is normal  No respiratory distress  Breath sounds: Normal breath sounds  No wheezing, rhonchi or rales  Abdominal:      General: Bowel sounds are normal  There is no distension  Palpations: Abdomen is soft  Tenderness: There is no abdominal tenderness  There is no guarding  Musculoskeletal:         General: No deformity  Cervical back: Normal range of motion  Right lower leg: No edema  Left lower leg: No edema  Skin:     Capillary Refill: Capillary refill takes less than 2 seconds  Neurological:      General: No focal deficit present  Mental Status: He is alert and oriented to person, place, and time  Mental status is at baseline            Additional Data:     Labs:  Results from last 7 days   Lab Units 05/07/23  0959   WBC Thousand/uL 8 57   HEMOGLOBIN g/dL 15 2   HEMATOCRIT % 48 7   PLATELETS Thousands/uL 203   NEUTROS PCT % 69   LYMPHS PCT % 24   MONOS PCT % 6   EOS PCT % 1     Results from last 7 days   Lab Units 05/08/23  0551 05/07/23  0959   SODIUM mmol/L 138 138   POTASSIUM mmol/L 4 4 4 9   CHLORIDE mmol/L 103 102   CO2 mmol/L 26 27   BUN mg/dL 24 23   CREATININE mg/dL 1 11 1 07   ANION GAP mmol/L 9 9   CALCIUM mg/dL 8 4 9 6   ALBUMIN g/dL  --  4 1   TOTAL BILIRUBIN mg/dL  --  0 64   ALK PHOS U/L  --  99   ALT U/L  --  9   AST U/L  --  8*   GLUCOSE RANDOM mg/dL 126 184*                 Results from last 7 days   Lab Units 05/07/23  1313 05/07/23  0959   LACTIC ACID mmol/L 1 9 2 8*       Lines/Drains:  Invasive Devices     Peripheral Intravenous Line  Duration           Peripheral IV 05/07/23 Left Antecubital 2 days    Peripheral IV 05/07/23 Left Forearm 1 day    Peripheral IV 05/07/23 Right Antecubital 1 day                      Imaging: No pertinent imaging reviewed  Recent Cultures (last 7 days):   Results from last 7 days   Lab Units 05/07/23  0959   BLOOD CULTURE  No Growth at 24 hrs  No Growth at 24 hrs  Last 24 Hours Medication List:   Current Facility-Administered Medications   Medication Dose Route Frequency Provider Last Rate   • acetaminophen  650 mg Oral Q6H PRN Kayden Bolton PA-C     • ALPRAZolam  0 5 mg Oral HS PRN Natalie Zuluaga MD     • calcium carbonate-vitamin D  1 tablet Oral Daily With Breakfast Natalie Zuluaga MD     • cholecalciferol  400 Units Oral After Lunch Natalie Zuluaga MD     • clopidogrel  75 mg Oral Daily Ntaalie Zuluaga MD     • docusate sodium  100 mg Oral Daily With Lunch Natalie Zuluaga MD     • enoxaparin  40 mg Subcutaneous Daily Natalie Zuluaga MD     • famotidine  20 mg Oral HS Natalie Zuluaga MD     • finasteride  5 mg Oral Daily Natalie Zuluaga MD     • furosemide  40 mg Intravenous BID (diuretic) Quincy Rhoades MD     • isosorbide mononitrate  60 mg Oral QAM MATT Berg     • metoprolol succinate  50 mg Oral Q12H MATT Berg     • multivitamin-minerals  1 tablet Oral QAM Natalie Zuluaga MD     • pantoprazole  20 mg Oral BID AC Natalie Zuluaga MD     • potassium chloride  20 mEq Oral Daily Natalie Zuluaga MD     • pravastatin  40 mg Oral Daily With David Ferrer MD     • ranolazine  500 mg Oral Q12H [facility] Natalie Zuluaga MD     • tamsulosin  0 4 mg Oral Daily With David Ferrer MD          Today, Patient Was Seen By: Tab Pedraza PA-C    **Please Note: This note may have been constructed using a voice recognition system  **

## 2023-05-09 NOTE — PROGRESS NOTES
Progress Note - Cardiology Team 1  Ted Leal 80 y o  male MRN: [id number]  Unit/Bed#: S -01 Encounter: [id number]        Principal Problem:    Acute on chronic combined systolic and diastolic heart failure (HCC)  Active Problems:    Acute respiratory failure with hypoxia (HCC)    Anxiety    3-vessel CAD    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        Assessment/Plan     1  Acute on chronic diastolic heart failure  Chest x-ray-mild pulmonary vascular congestion with small effusions   (2015- 69)  Given 60 mg IV Lasix followed by 40 mg IV twice daily   Dyspnea was better yesterday  More SOB today with cough  Down to 2 L of oxygen  Was requiring BiPAP in ED  I&O incomplete-240/1100  Patient describes good diuresis yesterday  Weight 140 bs  Yesterday 147  1 month ago 145  PTA- on no diuretic     TTE 3/2023-LVEF 45% with grade 2 diastolic dysfunction  RV systolic dysfunction mild  Additionally he has moderate aortic stenosis and moderate MR  Exam today- he appears euvolemic  Still requiring oxygen  States more SOB today as well as cough  Will attempt to wean off oxygen this afternoon  Received IV lasix this am    ? Transition to oral diuretic later today vs tomorrow       2  Chest pain-right-sided with known history of stable angina  Radiates from abdomen  Relieved with SL nitroglycerin X1  Associated with dyspnea  Episodes are being more frequent  Generally relieves with 1 SL NTG  HS troponin- 58, 81 with delta 23, 120 with delta 62  ECG-tachycardia LVH with QRS widening with repolarization abnormalities     Pharmacological Myoview stress test 3/16/2023-fixed defect apex, inferior defect which may be artifact  Has been treated medically  will continue current CAD medical regimen but increased Imdur to 60 mg daily  No recurrent CP     2  Moderate AS/moderate MR  TTE 3/2023     3    Essential hypertension-presented hypertensive and respiratory distress  BP quickly "improved   PTA-Imdur 30 mg daily, Toprol 25 mg 3 times daily  Currently on imdur 60mg, toprol 50mg bid and iv diuresis     4  HLD  Intolerant to  multiple statins  Has been tolerating crestor 5 mg daily  Cholesterol 154/triglyceride 169/HDL 38/LDL 82     5  CAD with history of remote CABG with subsequent LIMA to LAD stent 2015  AP-on clopidogrel, intolerant to aspirin  Nitrate-Imdur 30 mg daily  BB-Toprol 50mg BID  Ranexa-500 mg twice daily  Statin-pravastatin 40 mg daily  Home takes crestor 5mg daily  See #2     Recent pharmacologic Myoview stress test no ischemia         Subjective/Objective   Chief Complaint/Subjective  No significant events this am  Reports cough - new  Reports worsening holder    No cp        Vitals: /66   Pulse 86   Temp (!) 97 2 °F (36 2 °C)   Resp 18   Ht 5' 4\" (1 626 m)   Wt 63 7 kg (140 lb 6 4 oz)   SpO2 96%   BMI 24 10 kg/m²     Vitals:    05/07/23 0955 05/09/23 0600   Weight: 66 9 kg (147 lb 7 8 oz) 63 7 kg (140 lb 6 4 oz)     Orthostatic Blood Pressures    Flowsheet Row Most Recent Value   Blood Pressure 111/66 filed at 05/09/2023 1006   Patient Position - Orthostatic VS Sitting filed at 05/07/2023 0957            Intake/Output Summary (Last 24 hours) at 5/9/2023 1125  Last data filed at 5/8/2023 2133  Gross per 24 hour   Intake 240 ml   Output 1100 ml   Net -860 ml       Invasive Devices     Peripheral Intravenous Line  Duration           Peripheral IV 05/07/23 Left Antecubital 2 days    Peripheral IV 05/07/23 Left Forearm 2 days    Peripheral IV 05/07/23 Right Antecubital 2 days                Current Facility-Administered Medications   Medication Dose Route Frequency   • acetaminophen (TYLENOL) tablet 650 mg  650 mg Oral Q6H PRN   • ALPRAZolam (XANAX) tablet 0 5 mg  0 5 mg Oral HS PRN   • calcium carbonate-vitamin D 500 mg-5 mcg tablet 1 tablet  1 tablet Oral Daily With Breakfast   • cholecalciferol (VITAMIN D3) tablet 400 Units  400 Units Oral After Lunch   • clopidogrel " "(PLAVIX) tablet 75 mg  75 mg Oral Daily   • docusate sodium (COLACE) capsule 100 mg  100 mg Oral Daily With Lunch   • enoxaparin (LOVENOX) subcutaneous injection 40 mg  40 mg Subcutaneous Daily   • famotidine (PEPCID) tablet 20 mg  20 mg Oral HS   • finasteride (PROSCAR) tablet 5 mg  5 mg Oral Daily   • furosemide (LASIX) injection 40 mg  40 mg Intravenous BID (diuretic)   • isosorbide mononitrate (IMDUR) 24 hr tablet 60 mg  60 mg Oral QAM   • metoprolol succinate (TOPROL-XL) 24 hr tablet 50 mg  50 mg Oral Q12H   • multivitamin-minerals (CENTRUM) tablet 1 tablet  1 tablet Oral QAM   • pantoprazole (PROTONIX) EC tablet 20 mg  20 mg Oral BID AC   • potassium chloride (K-DUR,KLOR-CON) CR tablet 20 mEq  20 mEq Oral Daily   • pravastatin (PRAVACHOL) tablet 40 mg  40 mg Oral Daily With Dinner   • ranolazine (RANEXA) 12 hr tablet 500 mg  500 mg Oral Q12H RITCHIE   • tamsulosin (FLOMAX) capsule 0 4 mg  0 4 mg Oral Daily With Dinner         Physical Exam: /66   Pulse 86   Temp (!) 97 2 °F (36 2 °C)   Resp 18   Ht 5' 4\" (1 626 m)   Wt 63 7 kg (140 lb 6 4 oz)   SpO2 96%   BMI 24 10 kg/m²     General Appearance:    Alert, cooperative, no distress, appears stated age   Head:    Normocephalic, no scleral icterus   Eyes:    PERRL   Nose:   Nares normal, septum midline, no drainage    Throat:   Lips, mucosa, and tongue normal   Neck:   Supple, symmetrical, trachea midline,              Lungs:     Clear to auscultation bilaterally, respirations unlabored   Chest Wall:    No tenderness or deformity    Heart:    Regular rate and rhythm, S1 and S2 normal, YARITZA   Abdomen:     Soft, non-tender   Extremities:   Extremities normal, atraumatic, no cyanosis or edema       Skin:   Skin warm   Neurologic:   Alert and oriented to person place and time, no focal deficits                 Lab Results:   Recent Results (from the past 72 hour(s))   ECG 12 lead    Collection Time: 05/07/23  9:52 AM   Result Value Ref Range    Ventricular Rate " "122 BPM    Atrial Rate 122 BPM    OK Interval 194 ms    QRSD Interval 122 ms    QT Interval 330 ms    QTC Interval 470 ms    P Axis 21 degrees    QRS Axis 22 degrees    T Wave Axis 213 degrees   CBC and differential    Collection Time: 05/07/23  9:59 AM   Result Value Ref Range    WBC 8 57 4 31 - 10 16 Thousand/uL    RBC 5 05 3 88 - 5 62 Million/uL    Hemoglobin 15 2 12 0 - 17 0 g/dL    Hematocrit 48 7 36 5 - 49 3 %    MCV 96 82 - 98 fL    MCH 30 1 26 8 - 34 3 pg    MCHC 31 2 (L) 31 4 - 37 4 g/dL    RDW 14 2 11 6 - 15 1 %    MPV 10 3 8 9 - 12 7 fL    Platelets 067 139 - 203 Thousands/uL    nRBC 0 /100 WBCs    Neutrophils Relative 69 43 - 75 %    Immat GRANS % 0 0 - 2 %    Lymphocytes Relative 24 14 - 44 %    Monocytes Relative 6 4 - 12 %    Eosinophils Relative 1 0 - 6 %    Basophils Relative 0 0 - 1 %    Neutrophils Absolute 5 88 1 85 - 7 62 Thousands/µL    Immature Grans Absolute 0 03 0 00 - 0 20 Thousand/uL    Lymphocytes Absolute 2 06 0 60 - 4 47 Thousands/µL    Monocytes Absolute 0 55 0 17 - 1 22 Thousand/µL    Eosinophils Absolute 0 04 0 00 - 0 61 Thousand/µL    Basophils Absolute 0 01 0 00 - 0 10 Thousands/µL   Comprehensive metabolic panel    Collection Time: 05/07/23  9:59 AM   Result Value Ref Range    Sodium 138 135 - 147 mmol/L    Potassium 4 9 3 5 - 5 3 mmol/L    Chloride 102 96 - 108 mmol/L    CO2 27 21 - 32 mmol/L    ANION GAP 9 4 - 13 mmol/L    BUN 23 5 - 25 mg/dL    Creatinine 1 07 0 60 - 1 30 mg/dL    Glucose 184 (H) 65 - 140 mg/dL    Calcium 9 6 8 4 - 10 2 mg/dL    AST 8 (L) 13 - 39 U/L    ALT 9 7 - 52 U/L    Alkaline Phosphatase 99 34 - 104 U/L    Total Protein 8 1 6 4 - 8 4 g/dL    Albumin 4 1 3 5 - 5 0 g/dL    Total Bilirubin 0 64 0 20 - 1 00 mg/dL    eGFR 59 ml/min/1 73sq m   HS Troponin 0hr (reflex protocol)    Collection Time: 05/07/23  9:59 AM   Result Value Ref Range    hs TnI 0hr 58 (H) \"Refer to ACS Flowchart\"- see link ng/L   B-Type Natriuretic Peptide(BNP)    Collection Time: 05/07/23  " "9:59 AM   Result Value Ref Range     (H) 0 - 100 pg/mL   Blood culture #1    Collection Time: 05/07/23  9:59 AM    Specimen: Arm, Right; Blood   Result Value Ref Range    Blood Culture No Growth at 24 hrs  Blood culture #2    Collection Time: 05/07/23  9:59 AM    Specimen: Arm, Left; Blood   Result Value Ref Range    Blood Culture No Growth at 24 hrs  Lactic acid, plasma (w/reflex if result > 2 0)    Collection Time: 05/07/23  9:59 AM   Result Value Ref Range    LACTIC ACID 2 8 (HH) 0 5 - 2 0 mmol/L   ECG 12 lead    Collection Time: 05/07/23 11:39 AM   Result Value Ref Range    Ventricular Rate 94 BPM    Atrial Rate 94 BPM    AR Interval 194 ms    QRSD Interval 116 ms    QT Interval 374 ms    QTC Interval 467 ms    P Axis 80 degrees    QRS Axis 26 degrees    T Wave [place] 197 degrees   HS Troponin I 2hr    Collection Time: 05/07/23 11:48 AM   Result Value Ref Range    hs TnI 2hr 81 (H) \"Refer to ACS Flowchart\"- see link ng/L    Delta 2hr hsTnI 23 (H) <20 ng/L   Lactic acid 2 Hours    Collection Time: 05/07/23  1:13 PM   Result Value Ref Range    LACTIC ACID 1 9 0 5 - 2 0 mmol/L   HS Troponin I 4hr    Collection Time: 05/07/23  3:33 PM   Result Value Ref Range    hs TnI 4hr 120 (H) \"Refer to ACS Flowchart\"- see link ng/L    Delta 4hr hsTnI 62 (H) <20 ng/L   Basic metabolic panel    Collection Time: 05/08/23  5:51 AM   Result Value Ref Range    Sodium 138 135 - 147 mmol/L    Potassium 4 4 3 5 - 5 3 mmol/L    Chloride 103 96 - 108 mmol/L    CO2 26 21 - 32 mmol/L    ANION GAP 9 4 - 13 mmol/L    BUN 24 5 - 25 mg/dL    Creatinine 1 11 0 60 - 1 30 mg/dL    Glucose 126 65 - 140 mg/dL    Calcium 8 4 8 4 - 10 2 mg/dL    eGFR 57 ml/min/1 73sq m     Imaging: I have personally reviewed pertinent reports  Tele- n/a      Counseling / Coordination of Care  Total time spent today 20 minutes  Greater than 50% of total time was spent with the patient and / or family counseling and / or coordination of care      "

## 2023-05-10 LAB
ANION GAP SERPL CALCULATED.3IONS-SCNC: 11 MMOL/L (ref 4–13)
BUN SERPL-MCNC: 32 MG/DL (ref 5–25)
CALCIUM SERPL-MCNC: 8.6 MG/DL (ref 8.4–10.2)
CHLORIDE SERPL-SCNC: 99 MMOL/L (ref 96–108)
CO2 SERPL-SCNC: 26 MMOL/L (ref 21–32)
CREAT SERPL-MCNC: 1.15 MG/DL (ref 0.6–1.3)
GFR SERPL CREATININE-BSD FRML MDRD: 54 ML/MIN/1.73SQ M
GLUCOSE SERPL-MCNC: 179 MG/DL (ref 65–140)
POTASSIUM SERPL-SCNC: 4.2 MMOL/L (ref 3.5–5.3)
SODIUM SERPL-SCNC: 136 MMOL/L (ref 135–147)

## 2023-05-10 RX ORDER — FUROSEMIDE 40 MG/1
40 TABLET ORAL DAILY
Status: DISCONTINUED | OUTPATIENT
Start: 2023-05-10 | End: 2023-05-11 | Stop reason: HOSPADM

## 2023-05-10 RX ORDER — FUROSEMIDE 40 MG/1
40 TABLET ORAL DAILY
Status: DISCONTINUED | OUTPATIENT
Start: 2023-05-10 | End: 2023-05-10

## 2023-05-10 RX ADMIN — PANTOPRAZOLE SODIUM 20 MG: 20 TABLET, DELAYED RELEASE ORAL at 05:24

## 2023-05-10 RX ADMIN — FUROSEMIDE 40 MG: 40 TABLET ORAL at 15:15

## 2023-05-10 RX ADMIN — FINASTERIDE 5 MG: 5 TABLET, FILM COATED ORAL at 08:41

## 2023-05-10 RX ADMIN — RANOLAZINE 500 MG: 500 TABLET, FILM COATED, EXTENDED RELEASE ORAL at 08:42

## 2023-05-10 RX ADMIN — DOCUSATE SODIUM 100 MG: 100 CAPSULE, LIQUID FILLED ORAL at 12:41

## 2023-05-10 RX ADMIN — ALPRAZOLAM 0.5 MG: 0.25 TABLET ORAL at 21:10

## 2023-05-10 RX ADMIN — TAMSULOSIN HYDROCHLORIDE 0.4 MG: 0.4 CAPSULE ORAL at 15:39

## 2023-05-10 RX ADMIN — Medication 1 TABLET: at 08:42

## 2023-05-10 RX ADMIN — CHOLECALCIFEROL TAB 10 MCG (400 UNIT) 400 UNITS: 10 TAB at 14:04

## 2023-05-10 RX ADMIN — ENOXAPARIN SODIUM 40 MG: 40 INJECTION SUBCUTANEOUS at 08:42

## 2023-05-10 RX ADMIN — METOPROLOL SUCCINATE 50 MG: 50 TABLET, EXTENDED RELEASE ORAL at 21:05

## 2023-05-10 RX ADMIN — FAMOTIDINE 20 MG: 20 TABLET, FILM COATED ORAL at 21:05

## 2023-05-10 RX ADMIN — ISOSORBIDE MONONITRATE 60 MG: 60 TABLET, EXTENDED RELEASE ORAL at 08:42

## 2023-05-10 RX ADMIN — PRAVASTATIN SODIUM 40 MG: 40 TABLET ORAL at 15:39

## 2023-05-10 RX ADMIN — CLOPIDOGREL BISULFATE 75 MG: 75 TABLET ORAL at 08:42

## 2023-05-10 RX ADMIN — MULTIPLE VITAMINS W/ MINERALS TAB 1 TABLET: TAB ORAL at 08:42

## 2023-05-10 RX ADMIN — POTASSIUM CHLORIDE 20 MEQ: 1500 TABLET, EXTENDED RELEASE ORAL at 08:42

## 2023-05-10 RX ADMIN — FUROSEMIDE 40 MG: 10 INJECTION, SOLUTION INTRAMUSCULAR; INTRAVENOUS at 08:42

## 2023-05-10 RX ADMIN — PANTOPRAZOLE SODIUM 20 MG: 20 TABLET, DELAYED RELEASE ORAL at 15:15

## 2023-05-10 RX ADMIN — RANOLAZINE 500 MG: 500 TABLET, FILM COATED, EXTENDED RELEASE ORAL at 21:05

## 2023-05-10 NOTE — PLAN OF CARE
Problem: PHYSICAL THERAPY ADULT  Goal: Performs mobility at highest level of function for planned discharge setting  See evaluation for individualized goals  Description: Treatment/Interventions: Functional transfer training, LE strengthening/ROM, Therapeutic exercise, Cognitive reorientation, Endurance training, Patient/family training, Equipment eval/education, Bed mobility, Gait training, Spoke to nursing, OT  Equipment Recommended: Perry Soliz       See flowsheet documentation for full assessment, interventions and recommendations  Outcome: Progressing  Note: Prognosis: Fair  Problem List: Decreased strength, Decreased endurance, Impaired balance, Decreased mobility, Decreased cognition, Decreased coordination, Impaired judgement, Decreased safety awareness, Impaired vision, Decreased skin integrity  Assessment: pt began tx session seated in recliner and was agreeable to participarte in PT intervention  Progress was noted this tx session as pt was able to perform multiple STS w/ and w/o an AD and /s, no LOB  pt was able to increase activity tolerance, functional mobility and ambulation distance  pt ambulated 100'x1 RW and 100'x1 no AD all required s/, no LOB in todays ambulation trial  pt was able to participate in TE activities in order to strengthen LE and increase endurance and safety with all funcitonal transfers  pt educated on amy safe stair trials prior to initiating steps  pt completed 13 steps with L sided hand rail and /s no LOB  pt tolerated tx session very well  PT Discharge Recommendation: Home with outpatient rehabilitation    See flowsheet documentation for full assessment

## 2023-05-10 NOTE — ASSESSMENT & PLAN NOTE
· Secondary to CHF exacerbation  · Required continuous BiPAP on admission, now significantly improved on nasal cannula oxygen  · Now on room air   · Continue to wean as tolerated, not on home O2 prior to hospitalization    · Consider home O2 eval prior to discharge, but may not need given now on room air

## 2023-05-10 NOTE — PROGRESS NOTES
"General Cardiology   Progress Note -  [facility] One   Emily Chin 80 y o  male MRN: [id number]    Unit/Bed#: S -01 Encounter: [id number]    Assessment/ Plan:    1   Acute on chronic diastolic heart failure  \"Chest x-ray-mild pulmonary vascular congestion with small effusions   (2015- 69) Required Bipap in ED  Given 60 mg IV Lasix followed by 40 mg IV twice daily\"  I/os: overall negative 2 8L; 1 6L in past 24 hours  Wt: down to 139lbs today from 147lbs on admission  Cr 1 11 on 5/8; no lbs past 2 days; will check BMP this AM  He feels well today  Able to ambulate in room without any dyspnea  Euvolemic on exam today; on RA  Will transition to lasix 40mg daily; give 1st dose this afternoon as along as Cr stable on labs today  Likely discharge home tomorrow 5/11; BMP in AM as well  Asked pt to ambulate in halls to assess dyspnea  We discussed low Na diet, daily wts and s/s of volume overload to monitor for as OP       TTE 3/2023-LVEF 45% with grade 2 diastolic dysfunction   RV systolic dysfunction mild  Additionally he has moderate aortic stenosis and moderate MR      2   Chest pain-right-sided with known history of stable angina; no further chest pain  Pharmacological Myoview stress test 3/16/2023-fixed defect apex, inferior defect which may be artifact  Has been treated medically  will continue current CAD medical regimen but increased Imdur to 60 mg daily; BP tolerating  No recurrent CP     2   Moderate AS/moderate MR  TTE 3/2023; follow up OP  3   Essential hypertension-presented hypertensive and respiratory distress  BP quickly improved   PTA-Imdur 30 mg daily, Toprol 25 mg 3 times daily  Currently on imdur 60mg, toprol 50mg bid and iv diuresis; BP has been tolerating medication changes  4   HLD  Intolerant to  multiple statins  Has been tolerating crestor 5 mg daily  Cholesterol 154/triglyceride 169/HDL 38/LDL 82  5    CAD with history of remote CABG with subsequent LIMA to LAD stent 2015  AP-on " "clopidogrel, intolerant to aspirin  Nitrate-Imdur 60 mg daily  BB-Toprol 50mg BID  Ranexa-500 mg twice daily  Statin-pravastatin 40 mg daily  Home takes crestor 5mg daily      Subjective: Pt seen for follow up; no events overnight  He reports feeling well today  He has been ambulating in his room without any exertional CP or SOB  No chest pain or SOB or LE swelling  Review of Systems   Constitutional: Negative for decreased appetite and fever  Cardiovascular: Negative for chest pain, dyspnea on exertion, leg swelling, orthopnea and palpitations  Respiratory: Negative for cough and shortness of breath  Gastrointestinal: Negative for nausea and vomiting  Genitourinary: Negative for dysuria  Neurological: Negative for dizziness and light-headedness  Psychiatric/Behavioral: Negative for altered mental status  All other systems reviewed and are negative  Objective:   Vitals: Blood pressure 131/75, pulse 95, temperature 97 9 °F (36 6 °C), resp  rate 18, height 5' 4\" (1 626 m), weight 63 1 kg (139 lb 3 2 oz), SpO2 96 %  ,     Body mass index is 23 89 kg/m²  ,     Systolic (56AML), CIJ:505 , Min:96 , IFB:843     Diastolic (89VPB), BCQ:40, Min:51, Max:75      Intake/Output Summary (Last 24 hours) at 5/10/2023 1023  Last data filed at 5/10/2023 0501  Gross per 24 hour   Intake --   Output 1650 ml   Net -1650 ml     Weight (last 2 days)     Date/Time Weight    05/10/23 0500 63 1 (139 2)    05/09/23 0600 63 7 (140 4)        Telemetry Review: No telemetry    Physical Exam  Vitals reviewed  Constitutional:       General: He is not in acute distress  Appearance: Normal appearance  Comments: Patient sitting up in chair in NAD alert pleasant and cooperative with exam   HENT:      Head: Normocephalic  Mouth/Throat:      Mouth: Mucous membranes are moist    Cardiovascular:      Rate and Rhythm: Normal rate and regular rhythm  Heart sounds: S1 normal and S2 normal  No murmur heard    Pulmonary: " Effort: Pulmonary effort is normal       Breath sounds: Normal breath sounds  No wheezing or rales  Comments: Lung sounds clear to auscultation, on room air  Abdominal:      Palpations: Abdomen is soft  Musculoskeletal:      Right lower leg: No edema  Left lower leg: No edema  Skin:     General: Skin is warm  Neurological:      Mental Status: He is alert and oriented to person, place, and time     Psychiatric:         Mood and Affect: Mood normal        LABORATORY RESULTS      CBC with diff:   Results from last 7 days   Lab Units 05/07/23  0959   WBC Thousand/uL 8 57   HEMOGLOBIN g/dL 15 2   HEMATOCRIT % 48 7   MCV fL 96   PLATELETS Thousands/uL 203   MCH pg 30 1   MCHC g/dL 31 2*   RDW % 14 2   MPV fL 10 3   NRBC AUTO /100 WBCs 0     CMP:  Results from last 7 days   Lab Units 05/08/23  0551 05/07/23  0959   POTASSIUM mmol/L 4 4 4 9   CHLORIDE mmol/L 103 102   CO2 mmol/L 26 27   BUN mg/dL 24 23   CREATININE mg/dL 1 11 1 07   CALCIUM mg/dL 8 4 9 6   AST U/L  --  8*   ALT U/L  --  9   ALK PHOS U/L  --  99   EGFR ml/min/1 73sq m 57 59     BMP:  Results from last 7 days   Lab Units 05/08/23  0551 05/07/23  0959   POTASSIUM mmol/L 4 4 4 9   CHLORIDE mmol/L 103 102   CO2 mmol/L 26 27   BUN mg/dL 24 23   CREATININE mg/dL 1 11 1 07   CALCIUM mg/dL 8 4 9 6     Lab Results   Component Value Date    NTBNP 2,264 (H) 02/12/2022    NTBNP 1,126 (H) 02/11/2022    NTBNP 818 (H) 10/31/2021     Lipid Profile:   Lab Results   Component Value Date    CHOL 192 10/29/2015    CHOL 168 09/25/2015     Lab Results   Component Value Date    HDL 38 (L) 11/29/2022    HDL 41 11/02/2021    HDL 38 (L) 07/20/2021     Lab Results   Component Value Date    LDLCALC 82 11/29/2022    LDLCALC 80 11/02/2021    LDLCALC 78 07/20/2021     Lab Results   Component Value Date    TRIG 169 (H) 11/29/2022    TRIG 115 11/02/2021    TRIG 109 07/20/2021     Cardiac testing:   Results for orders placed during the hospital encounter of 21    Echo complete with contrast if indicated    Narrative  [address]  [address] [facility] [address] [phone]    Transthoracic Echocardiogram  2D, M-mode, Doppler, and Color Doppler    Study date:  2021    Patient: Mandi Ramirez  MR number: [id number]  Account number: [de-identified]  : 02-Aug-1930  Age: 80 years  Gender: Male  Status: Outpatient  Location: Echo lab  Height: 65 in  Weight: 151 6 lb  BP: 122/ 58 mmHg    Indications: Aortic Stenosis    Diagnoses: 424 1 - AORTIC VALVE DISORDER    Sonographer:  YURI Guerra  Primary Physician:  Azalea Lara DO  Referring Physician:  Elli Clark MD  Group:  [facility]  Interpreting Physician:  Jordyn Baca MD    SUMMARY    LEFT VENTRICLE:  Systolic function was at the lower limits of normal by visual assessment  Ejection fraction was estimated in the range of 45 % to 50 % to be 45 %  There was mild diffuse hypokinesis  Wall thickness was mildly increased  LEFT ATRIUM:  The atrium was mildly dilated  MITRAL VALVE:  There was mild to moderate annular calcification  Transmitral velocity was increased due to increased transvalvular flow  There was moderate to severe regurgitation  Based on elevated E' and central color flow jet  Unable to obtain PISA measurement for effective ERO calculation  Consider WALT for better evaluation    AORTIC VALVE:  The valve was probably trileaflet  Leaflets exhibited mildly increased thickness, mild calcification, moderately reduced cuspal separation, and sclerosis  Transaortic velocity was increased due to increased transvalvular flow  There was moderate stenosis  There was mild to moderate regurgitation  Valve mean gradient was 26 mmHg  HISTORY: PRIOR HISTORY: 3-vessel CAD,Chronic stable Angina,Chronic Diastolic heart failure,HTN,Murmur,DVT,Hyperlipidemia,anemia  PROCEDURE: The procedure was performed in the echo lab   This was a routine study  The transthoracic approach was used  The study included complete 2D imaging, M-mode, complete spectral Doppler, and color Doppler  The heart rate was 61 bpm,  at the start of the study  Images were obtained from the parasternal, apical, subcostal, and suprasternal notch acoustic windows  Image quality was adequate  LEFT VENTRICLE: Size was normal  Systolic function was at the lower limits of normal by visual assessment  Ejection fraction was estimated in the range of 45 % to 50 % to be 45 %  There was mild diffuse hypokinesis  Wall thickness was  mildly increased  No evidence of apical thrombus  DOPPLER: The study was not technically sufficient to allow evaluation of LV diastolic function  RIGHT VENTRICLE: The size was normal  Systolic function was normal  Wall thickness was normal     LEFT ATRIUM: The atrium was mildly dilated  RIGHT ATRIUM: Size was normal     MITRAL VALVE: There was mild to moderate annular calcification  There was mild-moderate calcification  There was mildly reduced leaflet separation  DOPPLER: Transmitral velocity was increased due to increased transvalvular flow  There was  no evidence for stenosis  There was moderate to severe regurgitation  Based on elevated E' and central color flow jet  Unable to obtain PISA measurement for effective ERO calculation  Consider WALT for better evaluation    AORTIC VALVE: The valve was probably trileaflet  Leaflets exhibited mildly increased thickness, mild calcification, moderately reduced cuspal separation, and sclerosis  DOPPLER: Transaortic velocity was increased due to increased  transvalvular flow  There was moderate stenosis  There was mild to moderate regurgitation  TRICUSPID VALVE: The valve structure was normal  There was normal leaflet separation  DOPPLER: The transtricuspid velocity was within the normal range  There was no evidence for stenosis  There was trace regurgitation      PULMONIC VALVE: Leaflets exhibited normal thickness, no calcification, and normal cuspal separation  DOPPLER: The transpulmonic velocity was within the normal range  There was trace regurgitation  PERICARDIUM: There was no pericardial effusion  The pericardium was normal in appearance  AORTA: The root exhibited normal size  SYSTEMIC VEINS: IVC: The inferior vena cava was not well visualized  MEASUREMENT TABLES    DOPPLER MEASUREMENTS  Aortic valve   (Reference normals)  Peak gilda   350 cm/s   (--)  Peak gradient   48 mmHg   (--)  Mean gradient   26 mmHg   (--)  Regurg PHT   460 ms   (--)    SYSTEM MEASUREMENT TABLES    2D  EF (Teich): 49 86 %  %FS: 25 27 %  JUDY Planimetry: 0 86 cm2  Ao Diam: 3 1 cm  EDV(Teich): 104 22 ml  ESV(Teich): 52 25 ml  IVSd: 1 24 cm  LA Area: 22 39 cm2  LA Diam: 4 03 cm  LVEDV MOD A4C: 174 08 ml  LVEF MOD A4C: 50 26 %  LVESV MOD A4C: 86 59 ml  LVIDd: 4 74 cm  LVIDs: 3 54 cm  LVLd A4C: 9 35 cm  LVLs A4C: 8 17 cm  LVOT Diam: 1 96 cm  LVPWd: 1 21 cm  RA Area: 12 71 cm2  RVIDd: 2 45 cm  SV (Teich): 51 97 ml  SV MOD A4C: 87 49 ml    CW  AV Env  Ti: 361 56 ms  AV VTI: 86 51 cm  AV Vmax: 3 48 m/s  AV Vmean: 2 39 m/s  AV maxP 38 mmHg  AV meanP 03 mmHg  TR Vmax: 3 25 m/s  TR maxP 23 mmHg    PW  E' Sept: 0 05 m/s  LVOT Env  Ti: 361 56 ms  LVOT VTI: 27 25 cm  LVOT Vmax: 1 15 m/s  LVOT Vmean: 0 75 m/s  LVOT maxP 29 mmHg  LVOT meanP 61 mmHg    [facility] Accredited [facility]ch[facility]    Prepared and electronically signed by    Ashvin Garnett MD  Signed 2021 17:34:59    No results found for this or any previous visit  No results found for this or any previous visit  No valid procedures specified  No results found for this or any previous visit      Meds/Allergies   current meds:   Current Facility-Administered Medications   Medication Dose Route Frequency   • acetaminophen (TYLENOL) tablet 650 mg  650 mg Oral Q6H PRN   • ALPRAZolam (XANAX) tablet 0 5 mg  0 5 mg Oral HS PRN   • calcium carbonate-vitamin D 500 mg-5 mcg tablet 1 tablet  1 tablet Oral Daily With Breakfast   • cholecalciferol (VITAMIN D3) tablet 400 Units  400 Units Oral After Lunch   • clopidogrel (PLAVIX) tablet 75 mg  75 mg Oral Daily   • docusate sodium (COLACE) capsule 100 mg  100 mg Oral Daily With Lunch   • enoxaparin (LOVENOX) subcutaneous injection 40 mg  40 mg Subcutaneous Daily   • famotidine (PEPCID) tablet 20 mg  20 mg Oral HS   • finasteride (PROSCAR) tablet 5 mg  5 mg Oral Daily   • furosemide (LASIX) injection 40 mg  40 mg Intravenous BID (diuretic)   • isosorbide mononitrate (IMDUR) 24 hr tablet 60 mg  60 mg Oral QAM   • metoprolol succinate (TOPROL-XL) 24 hr tablet 50 mg  50 mg Oral Q12H   • multivitamin-minerals (CENTRUM) tablet 1 tablet  1 tablet Oral QAM   • pantoprazole (PROTONIX) EC tablet 20 mg  20 mg Oral BID AC   • potassium chloride (K-DUR,KLOR-CON) CR tablet 20 mEq  20 mEq Oral Daily   • pravastatin (PRAVACHOL) tablet 40 mg  40 mg Oral Daily With Dinner   • ranolazine (RANEXA) 12 hr tablet 500 mg  500 mg Oral Q12H RITCHIE   • tamsulosin (FLOMAX) capsule 0 4 mg  0 4 mg Oral Daily With Dinner     Medications Prior to Admission   Medication   • ALPRAZolam (XANAX) 0 5 mg tablet   • Calcium Carbonate-Vitamin D (CALCIUM 600+D PO)   • cholecalciferol (VITAMIN D3) 400 units tablet   • clopidogrel (PLAVIX) 75 mg tablet   • Docusate Calcium (STOOL SOFTENER PO)   • dutasteride (AVODART) 0 5 mg capsule   • famotidine (PEPCID) 20 mg tablet   • isosorbide mononitrate (IMDUR) 30 mg 24 hr tablet   • Magnesium 250 MG TABS   • metoprolol succinate (TOPROL-XL) 25 mg 24 hr tablet   • multivitamin-iron-minerals-folic acid (CENTRUM) chewable tablet   • nitroglycerin (NITROSTAT) 0 4 mg SL tablet   • pantoprazole (PROTONIX) 20 mg tablet   • polyethylene glycol (MIRALAX) 17 g packet   • ranolazine (RANEXA) 500 mg 12 hr tablet   • rosuvastatin (CRESTOR) 5 mg tablet   • senna (SENOKOT) 8 6 mg   • tamsulosin (Flomax) 0 4 mg   • Zinc 25 MG TABS     Assessment:  Principal Problem:    Acute on chronic combined systolic and diastolic heart failure (HCC)  Active Problems:    Acute respiratory failure with hypoxia (HCC)    Anxiety    3-vessel CAD    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    Counseling / Coordination of Care  Total floor / unit time spent today 20 minutes  Greater than 50% of total time was spent with the patient and / or family counseling and / or coordination of care  ** Please Note: Dragon 360 Dictation voice to text software may have been used in the creation of this document   **

## 2023-05-10 NOTE — ASSESSMENT & PLAN NOTE
· He is status post CABG    Currently without chest pain  · Troponin mildly elevated in the setting of CHF  · Has history of chronic stable angina  · Monitor on telemetry  · Continue beta-blocker, Ranexa, Imdur

## 2023-05-10 NOTE — ASSESSMENT & PLAN NOTE
Wt Readings from Last 3 Encounters:   05/10/23 63 1 kg (139 lb 3 2 oz)   04/14/23 65 8 kg (145 lb)   03/27/23 68 kg (150 lb)     · Has a history of chronic diastolic CHF  Presents with progressively worsening shortness of breath  · Most recent echocardiogram on 3/16/2023 showed EF of 24%, grade 2 diastolic dysfunction, moderate MR, moderate AAS, mild to moderate MN and mild TR  · Had a nuclear stress test on 3/16/2023 which showed severely reduced EF of 33%, apical infarction with inferior defect  · Last cardiology note on 4/14/2023 report the patient has slowly stopped taking his diuretics and only takes as needed  Unclear what diuretic he is on  Patient reports that he was never told to take a diuretic   · He is improving  · Change to Lasix 40 mg QD  · Daily standing weights, I's and O's, 2 g salt diet  · Holding off on repeat echo at this time    · Cardiology following  · BMP tomorrow

## 2023-05-10 NOTE — DISCHARGE INSTR - AVS FIRST PAGE
Take your medications as directed, and keep your follow up appointments  Adhere to a heart healthy lifestyle, maintaining a low sodium diet  Daily weight and record  If your weight increases 2-3 lbs in one day, or 5 lbs in 2 days, you are short of breath or have lower extremity swelling, please call Nurse Cheryl Atkins at  [place] [address] office  at [phone]     Dear Emily Chin,     It was our pleasure to care for you here at [facility]  It is our hope that we were always able to exceed the expected standards for your care during your stay  You were hospitalized due to congestive heart failure  You were cared for on the 2nd floor by Gela Matthews PA-C under the service of Ash Hernandez MD with the [facility] who covers for your primary care physician (PCP), Randolph Hodgkins, DO, while you were hospitalized  If you have any questions or concerns related to this hospitalization, you may contact us at [phone]  For follow up as well as any medication refills, we recommend that you follow up with your primary care physician  A registered nurse will reach out to you by phone within a few days after your discharge to answer any additional questions that you may have after going home    However, at this time we provide for you here, the most important instructions / recommendations at discharge:     Notable Medication Adjustments -   START the following medications:  Furosemide (Lasix) 40 mg Tablets - Take 1 tablet by mouth once daily   Potassium 20 mEq talbets - Take 1 tablet by mouth once daily   CHANGE how you take these medications you were already taking:  Metoprolol Succinate - increase from 25 mg three times daily to 50 mg twice daily   Isosorbide - increase from 30 mg once daily to 60 mg once daily   Continue the rest of your home medications   Testing Required after Discharge -   None  ** Please contact your PCP to request testing orders for any of the testing recommended here **  Important follow up information -   Follow up with family doctor in 1-2 weeks   You have follow up with cardiology nurse practitioner on 5/17 and a referral was placed to Dr Mari Armenta at your request like we spoke about   Other Instructions -   Call you doctor if you gain 3 pounds in 1 day or 5 pounds in 3 days   Weigh yourself daily in the morning   Follow a low salt diet (less than 2000 mg per day) and drink less than 2 liters of fluid per day   Please review this entire after visit summary as additional general instructions including medication list, appointments, activity, diet, any pertinent wound care, and other additional recommendations from your care team that may be provided for you        Sincerely,     Santhosh Zacarias PA-C

## 2023-05-10 NOTE — PHYSICAL THERAPY NOTE
PHYSICAL THERAPY NOTE          Patient Name: Jonathan Hill  VQTUE'Y Date: 5/10/2023           05/10/23 1354   PT Last Visit   PT Visit Date 05/10/23   Note Type   Note Type Treatment   Pain Assessment   Pain Assessment Tool 0-10   Pain Score No Pain   Pain Location/Orientation Location: Chest   Pain Radiating Towards none   Patient's Stated Pain Goal No pain   Hospital Pain Intervention(s) Ambulation/increased activity   Multiple Pain Sites No   Restrictions/Precautions   Weight Bearing Precautions Per Order No   Other Precautions Bed Alarm; Chair Alarm; Fall Risk;Pain   General   Chart Reviewed Yes   Response to Previous Treatment Patient with no complaints from previous session  Family/Caregiver Present Yes   Cognition   Overall Cognitive Status WFL   Arousal/Participation Alert; Responsive; Cooperative   Attention Within functional limits   Orientation Level Oriented X4   Memory Within functional limits   Following Commands Follows multistep commands with increased time or repetition   Comments pt identified by name and    Subjective   Subjective pt was agreeable to participate in PT intervention   Bed Mobility   Rolling R Unable to assess   Rolling L Unable to assess   Supine to Sit Unable to assess   Sit to Supine Unable to assess   Additional Comments pt seated OOB in the recliner   Transfers   Sit to Stand 6  Modified independent   Additional items Assist x 1; Armrests; Increased time required   Stand to Sit 6  Modified independent   Additional items Assist x 1; Armrests   Stand pivot 6  Modified independent   Additional items Assist x 1; Armrests; Increased time required   Additional Comments pt was able to perform multiple functional transfers with and w/o an AD   Ambulation/Elevation   Gait pattern Decreased foot clearance; Short stride; Excessively slow   Gait Assistance 5  Supervision   Additional items Assist x 1;Verbal cues Assistive Device Rolling walker;None   Distance 100'x1 'x1 no AD   Stair Management Assistance 5  Supervision   Additional items Assist x 1;Verbal cues; Increased time required   Stair Management Technique One rail L;Step to pattern; Foreward;Backward   Number of Stairs 13   Balance   Static Sitting Fair +   Dynamic Sitting Fair   Static Standing Fair   Dynamic Standing [address], & [address] -   Activity Tolerance   Activity Tolerance Patient tolerated treatment well   Nurse Made Aware Spoke to RN   Exercises   Hip Abduction Sitting;20 reps;AROM; Bilateral   Hip Adduction Sitting;20 reps;AROM; Bilateral  (pillow squeezes)   Knee AROM Long Arc Quad Sitting;15 reps;AROM; Bilateral   Ankle Pumps Sitting;20 reps;AROM; Bilateral   Marching Sitting;15 reps;AROM; Bilateral   Assessment   Prognosis Fair   Problem List Decreased strength;Decreased endurance; Impaired balance;Decreased mobility; Decreased cognition;Decreased coordination; Impaired judgement;Decreased safety awareness; Impaired vision;Decreased skin integrity   Assessment pt began tx session seated in recliner and was agreeable to participarte in PT intervention  Progress was noted this tx session as pt was able to perform multiple STS w/ and w/o an AD and /s, no LOB  pt was able to increase activity tolerance, functional mobility and ambulation distance  pt ambulated 100'x1 RW and 100'x1 no AD all required s/, no LOB in todays ambulation trial  pt was able to participate in TE activities in order to strengthen LE and increase endurance and safety with all funcitonal transfers  pt educated on amy safe stair trials prior to initiating steps  pt completed 13 steps with L sided hand rail and /s no LOB  pt tolerated tx session very well  Goals   Patient Goals to go home   STG Expiration Date 05/18/23   PT Treatment Day 2   Plan   Treatment/Interventions Functional transfer training;LE strengthening/ROM; Therapeutic exercise;Elevations; Endurance training;Cognitive reorientation;Patient/family training;Equipment eval/education; Bed mobility;Gait training   Progress Progressing toward goals   PT Frequency 3-5x/wk   Recommendation   PT Discharge Recommendation Home with outpatient rehabilitation   Equipment Recommended Walker   AM-PAC Basic Mobility Inpatient   Turning in Flat Bed Without Bedrails 3   Lying on Back to Sitting on Edge of Flat Bed Without Bedrails 3   Moving Bed to Chair 4   Standing Up From Chair Using Arms 4   Walk in Room 3   Climb 3-5 Stairs With Railing 3   Basic Mobility Inpatient Raw Score 20   Basic Mobility Standardized Score 43 99   Highest Level Of Mobility   JH-HLM Goal 6: Walk 10 steps or more   JH-HLM Achieved 7: Walk 25 feet or more   Education   Education Provided Mobility training;Assistive device   Patient Demonstrates acceptance/verbal understanding   End of Consult   Patient Position at End of Consult Bedside chair;Bed/Chair alarm activated; All needs within reach   The patient's AM-PAC Basic Mobility Inpatient Short Form Raw Score is 20  A Raw score of greater than 16 suggests the patient may benefit from discharge to home  Please also refer to the recommendation of the Physical Therapist for safe discharge planning       Radha Calderon

## 2023-05-10 NOTE — PLAN OF CARE
Problem: OCCUPATIONAL THERAPY ADULT  Goal: Performs self-care activities at highest level of function for planned discharge setting  See evaluation for individualized goals  Description: Treatment Interventions: ADL retraining, Functional transfer training, UE strengthening/ROM, Patient/family training, Equipment evaluation/education, Compensatory technique education, Energy conservation, Activityengagement          See flowsheet documentation for full assessment, interventions and recommendations  Note: Limitation: Decreased ADL status, Decreased UE strength, Decreased endurance, Decreased self-care trans, Decreased high-level ADLs  Prognosis: Good  Assessment: Patient is a 80 y o  male seen for OT evaluation and treatment  at [address] [place] [address] following admission on 5/7/2023  s/p Acute on chronic combined systolic and diastolic heart failure ([facility] [address] )  Comorbidities and significant PMHx impacting functional performance include: CAD, status post CABG, CHF, anxiety, hypertension, BPH, peptic ulcer disease  Patient presents with active orders for OT eval and treat  Prior to admission, patient was independent with functional mobility without assistive device, independent with ADLS and requiring assist for IADLS  Upon initial evaluation, patient requires no assistance for UB ADLs, supervision for LB ADLs, and supervision for transfers and  supervision for functional mobility household distance with no AD  Based on functional eval, pt presents with intact  attention, intact  safety awareness, intact  problem solving skills, and intact  memory  Occupational performance is affected by the following deficits: endurance ,  decreased muscular strength  and decreased activity tolerance  The AM-PAC & Barthel Index outcome tools were used to assist in determining pt safety w/self care /mobility and appropriate d/c recommendations, see above for score   Personal factors impacting performance include: decreased steps to enter home, decreased ADL independence , decreased IADL independence and High fall risk   Patient would benefit from OT services within the acute care setting to maximize level of functional independence in the following occupational areas bathing/showering, toileting, dressing , personal hygiene/grooming , activity engagement , safety procedures , fall prevention , energy conservation , self-care transfers, bed mobility  and functional mobility   From OT standpoint, recommendation at time of D/C would be return to previous environment with outpatient rehabilitation     OT Discharge Recommendation: Home with outpatient rehabilitation     Daniela Mesa Yoni 87 OTR/L   Licensure# [id number]

## 2023-05-10 NOTE — PROGRESS NOTES
[facility]  Progress Note  Name: Betsy Soriano  MRN: [id number]  Unit/Bed#: S -01 I Date of Admission: 5/7/2023   Date of Service: 5/10/2023 I Hospital Day: 3    Assessment/Plan   * Acute on chronic combined systolic and diastolic heart failure [facility])  Assessment & Plan  Wt Readings from Last 3 Encounters:   05/10/23 63 1 kg (139 lb 3 2 oz)   04/14/23 65 8 kg (145 lb)   03/27/23 68 kg (150 lb)     · Has a history of chronic diastolic CHF  Presents with progressively worsening shortness of breath  · Most recent echocardiogram on 3/16/2023 showed EF of 40%, grade 2 diastolic dysfunction, moderate MR, moderate AAS, mild to moderate RI and mild TR  · Had a nuclear stress test on 3/16/2023 which showed severely reduced EF of 33%, apical infarction with inferior defect  · Last cardiology note on 4/14/2023 report the patient has slowly stopped taking his diuretics and only takes as needed  Unclear what diuretic he is on  Patient reports that he was never told to take a diuretic   · He is improving  · Change to Lasix 40 mg QD  · Daily standing weights, I's and O's, 2 g salt diet  · Holding off on repeat echo at this time  · Cardiology following  · BMP tomorrow     Acute respiratory failure with hypoxia (HCC)  Assessment & Plan  · Secondary to CHF exacerbation  · Required continuous BiPAP on admission, now significantly improved on nasal cannula oxygen  · Now on room air   · Continue to wean as tolerated, not on home O2 prior to hospitalization  · Consider home O2 eval prior to discharge, but may not need given now on room air     3-vessel CAD  Assessment & Plan  · He is status post CABG    Currently without chest pain  · Troponin mildly elevated in the setting of CHF  · Has history of chronic stable angina  · Monitor on telemetry  · Continue beta-blocker, Ranexa, Imdur    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  Assessment & Plan  · Has history of dysphagia   · Status post EGD and follows with GI outpatient  · Currently on Pepcid/Protonix  · Continue home meds    Anxiety  Assessment & Plan  · Stable   · Continue PRN Xanax             VTE Pharmacologic Prophylaxis: VTE Score: 4 Moderate Risk (Score 3-4) - Pharmacological DVT Prophylaxis Ordered: enoxaparin (Lovenox)  Patient Centered Rounds: I performed bedside rounds with nursing staff today  Discussions with Specialists or Other Care Team Provider: Discussed with Cardiology, RN, CM    Education and Discussions with Family / Patient: Updated  (son) at bedside  Total Time Spent on Date of Encounter in care of patient: 35 minutes This time was spent on one or more of the following: performing physical exam; counseling and coordination of care; obtaining or reviewing history; documenting in the medical record; reviewing/ordering tests, medications or procedures; communicating with other healthcare professionals and discussing with patient's family/caregivers  Current Length of Stay: 3 day(s)  Current Patient Status: Inpatient   Certification Statement: The patient will continue to require additional inpatient hospital stay due to monitoring on PO Lasix   Discharge Plan: Anticipate discharge tomorrow to home  Code Status: Level 3 - DNAR and DNI    Subjective:   Patient reports feeling much better  Denies SOB or chest pain  Denies leg swelling  Hopeful for discharge home tomorrow  Objective:     Vitals:   Temp (24hrs), Av 8 °F (36 6 °C), Min:97 4 °F (36 3 °C), Max:98 2 °F (36 8 °C)    Temp:  [97 4 °F (36 3 °C)-98 2 °F (36 8 °C)] 97 9 °F (36 6 °C)  HR:  [] 95  Resp:  [18] 18  BP: ()/(51-75) 131/75  SpO2:  [92 %-100 %] 96 %  Body mass index is 23 89 kg/m²  Input and Output Summary (last 24 hours):      Intake/Output Summary (Last 24 hours) at 5/10/2023 1148  Last data filed at 5/10/2023 0501  Gross per 24 hour   Intake --   Output 1650 ml   Net -1650 ml       Physical Exam:   Physical Exam  Constitutional:       General: He is not in acute distress  Appearance: Normal appearance  He is normal weight  He is not ill-appearing or diaphoretic  HENT:      Head: Normocephalic and atraumatic  Mouth/Throat:      Mouth: Mucous membranes are moist    Eyes:      General: No scleral icterus  Pupils: Pupils are equal, round, and reactive to light  Cardiovascular:      Rate and Rhythm: Normal rate and regular rhythm  Pulses: Normal pulses  Heart sounds: Normal heart sounds, S1 normal and S2 normal  No murmur heard  No systolic murmur is present  No diastolic murmur is present  No gallop  No S3 or S4 sounds  Pulmonary:      Effort: Pulmonary effort is normal  No accessory muscle usage or respiratory distress  Breath sounds: Normal breath sounds  No stridor  No decreased breath sounds, wheezing, rhonchi or rales  Chest:      Chest wall: No tenderness  Abdominal:      General: Bowel sounds are normal  There is no distension  Palpations: Abdomen is soft  Tenderness: There is no abdominal tenderness  There is no guarding  Musculoskeletal:      Right lower leg: No edema  Left lower leg: No edema  Skin:     General: Skin is warm and dry  Coloration: Skin is not jaundiced  Neurological:      General: No focal deficit present  Mental Status: He is alert  Mental status is at baseline  Motor: No tremor or seizure activity  Psychiatric:         Behavior: Behavior is cooperative            Additional Data:     Labs:  Results from last 7 days   Lab Units 05/07/23  0959   WBC Thousand/uL 8 57   HEMOGLOBIN g/dL 15 2   HEMATOCRIT % 48 7   PLATELETS Thousands/uL 203   NEUTROS PCT % 69   LYMPHS PCT % 24   MONOS PCT % 6   EOS PCT % 1     Results from last 7 days   Lab Units 05/08/23  0551 05/07/23  0959   SODIUM mmol/L 138 138   POTASSIUM mmol/L 4 4 4 9   CHLORIDE mmol/L 103 102   CO2 mmol/L 26 27   BUN mg/dL 24 23   CREATININE mg/dL 1 11 1 07 ANION GAP mmol/L 9 9   CALCIUM mg/dL 8 4 9 6   ALBUMIN g/dL  --  4 1   TOTAL BILIRUBIN mg/dL  --  0 64   ALK PHOS U/L  --  99   ALT U/L  --  9   AST U/L  --  8*   GLUCOSE RANDOM mg/dL 126 184*                 Results from last 7 days   Lab Units 05/07/23  1313 05/07/23  0959   LACTIC ACID mmol/L 1 9 2 8*       Lines/Drains:  Invasive Devices     Peripheral Intravenous Line  Duration           Peripheral IV 05/07/23 Left Antecubital 3 days    Peripheral IV 05/07/23 Left Forearm 3 days    Peripheral IV 05/07/23 Right Antecubital 3 days                      Imaging: No pertinent imaging reviewed  Recent Cultures (last 7 days):   Results from last 7 days   Lab Units 05/07/23  0959   BLOOD CULTURE  No Growth at 48 hrs  No Growth at 48 hrs         Last 24 Hours Medication List:   Current Facility-Administered Medications   Medication Dose Route Frequency Provider Last Rate   • acetaminophen  650 mg Oral Q6H PRN Madiha Bolton PA-C     • ALPRAZolam  0 5 mg Oral HS PRN Natalie Manriquez MD     • calcium carbonate-vitamin D  1 tablet Oral Daily With Breakfast Natalie Manriquez MD     • cholecalciferol  400 Units Oral After Lunch Natalie Manriquez MD     • clopidogrel  75 mg Oral Daily Natalie Manriquez MD     • docusate sodium  100 mg Oral Daily With Lunch Natalie Manriquez MD     • enoxaparin  40 mg Subcutaneous Daily Natalie Manriquez MD     • famotidine  20 mg Oral HS Natalie Manriquez MD     • finasteride  5 mg Oral Daily Natalie Manriquez MD     • furosemide  40 mg Oral Daily MATT John     • isosorbide mononitrate  60 mg Oral QAM MATT Hood     • metoprolol succinate  50 mg Oral Q12H MATT Hood     • multivitamin-minerals  1 tablet Oral QAM Natalie Manriquez MD     • pantoprazole  20 mg Oral BID AC Natalie Manriquez MD     • potassium chloride  20 mEq Oral Daily Natalie Manriquez MD     • pravastatin  40 mg Oral Daily With Kit Vaughn MD     • ranolazine  500 mg Oral Q12H [address] Natalie Almaraz MD     • tamsulosin  0 4 mg Oral Daily With Kit Vaughn MD          Today, Patient Was Seen By: Melissa Ma PA-C    **Please Note: This note may have been constructed using a voice recognition system  **

## 2023-05-10 NOTE — PLAN OF CARE
Problem: Nutrition/Hydration-ADULT  Goal: Nutrient/Hydration intake appropriate for improving, restoring or maintaining nutritional needs  Description: Monitor and assess patient's nutrition/hydration status for malnutrition  Collaborate with interdisciplinary team and initiate plan and interventions as ordered  Monitor patient's weight and dietary intake as ordered or per policy  Utilize nutrition screening tool and intervene as necessary  Determine patient's food preferences and provide high-protein, high-caloric foods as appropriate  INTERVENTIONS:  - Monitor oral intake, urinary output, labs, and treatment plans  - Assess nutrition and hydration status and recommend course of action  - Evaluate amount of meals eaten  - Assist patient with eating if necessary   - Allow adequate time for meals  - Recommend/ encourage appropriate diets, oral nutritional supplements, and vitamin/mineral supplements  - Order, calculate, and assess calorie counts as needed  - Recommend, monitor, and adjust tube feedings and TPN/PPN based on assessed needs  - Assess need for intravenous fluids  - Provide specific nutrition/hydration education as appropriate  - Include patient/family/caregiver in decisions related to nutrition  Outcome: Progressing     Problem: Knowledge Deficit  Goal: Patient/family/caregiver demonstrates understanding of disease process, treatment plan, medications, and discharge instructions  Description: Complete learning assessment and assess knowledge base    Interventions:  - Provide teaching at level of understanding  - Provide teaching via preferred learning methods  Outcome: Progressing

## 2023-05-10 NOTE — OCCUPATIONAL THERAPY NOTE
"    Occupational Therapy Evaluation/Treatment     Patient Name: Mando Lopez  MLWFY'C Date: 5/10/2023  Problem List  Principal Problem:    Acute on chronic combined systolic and diastolic heart failure ([facility] )  Active Problems:    Acute respiratory failure with hypoxia (HCC)    Anxiety    3-vessel CAD    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    Past Medical History  Past Medical History:   Diagnosis Date    Arthritis     BPH (benign prostatic hyperplasia)     Cervical spine fracture ([facility] ) 1997    C3    Chest pain     Colon polyp     Coronary artery disease     Dry eye     DVT (deep venous thrombosis) ([facility] ) 2015    right leg- passed thru the IVC filter-stopped at the \"patch\" from bypass surgery    Dysphagia 11/2021    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    Fracture of thoracic spine ([place] [address] ) 1997    T3    Glaucoma     Hyperlipidemia     Hypertension     Myocardial infarction ([place] [address] )     Pneumonia     PUD (peptic ulcer disease)      Past Surgical History  Past Surgical History:   Procedure Laterality Date    ANGIOPLASTY      2 stents    CHOLECYSTECTOMY      COLONOSCOPY      CORONARY ARTERY BYPASS GRAFT      x6    CORONARY ARTERY BYPASS GRAFT      CORONARY STENT PLACEMENT      CYSTOSCOPY      EYE SURGERY Right     HERNIA REPAIR Right 11/3/2017    Procedure: REPAIR HERNIA INGUINAL;  Surgeon: Ping Humphries MD;  Location: [address] [place] [address];  Service: General    IVC FILTER INSERTION      IVC filter    ID CYSTO W/IRRIG & EVAC MULTPLE OBSTRUCTING CLOTS N/A 6/30/2018    Procedure: CYSTOSCOPY EVACUATION OF CLOTS, fulgeration;  Surgeon: Clarice Granado MD;  Location: [facility];  Service: Urology    STOMACH SURGERY  1973    Billroth 2 gastrectomy-2/3 removal- bleeding ulcer    TRANSURETHRAL RESECTION OF PROSTATE             05/10/23 1007   OT Last Visit   OT Visit Date 05/10/23   Note Type   Note type Evaluation  +treatment   Pain Assessment   Pain Assessment Tool 0-10   Pain Score No Pain " "  Effect of Pain on Daily Activities None   Patient's Stated Pain Goal No pain   Hospital Pain Intervention(s) Repositioned; Ambulation/increased activity   Multiple Pain Sites No   Restrictions/Precautions   Weight Bearing Precautions Per Order No   Other Precautions Chair Alarm; Bed Alarm; Fall Risk   Home Living   Type of [address] One level;Performs ADLs on one level; Able to live on main level with bedroom/bathroom;Stairs to enter with rails  (3 ZONIA; FFOS into basement which pt reports accessing 2-3x/week for exercise)   Bathroom Shower/Tub Tub/shower unit   Bathroom Toilet Standard   Bathroom Equipment Other (Comment)  (No DME)   [address] Other (Comment)  (None)   Prior Function   Level of [place] Independent with ADLs; Independent with functional mobility; Needs assistance with IADLS   Lives With Spouse   Receives Help From Family   IADLs Independent with driving;Family/Friend/Other provides meals; Independent with medication management  (wife cooks, cleans and does laundry; pt manages meds with use of pillbox and drives to/from community outtings)   Falls in the last 6 months 0  (Pt denies)   Vocational Retired   Comments At baseline pt reports being independent with all aspects of ADLs and functional mobility w/o AD  Splits home managment tasks with his wife and reports having 5 local children who would go over and beyond to assist with needs  Lifestyle   Autonomy At baseline pt is (I) independent with ADLs and functional mobility with no AD  Assist with IADLs   Lives with spouse and has local, supportive children   Reciprocal Relationships Spouse//Family   Service to Others Retired   General   Additional Pertinent History Pt admitted 5/7/2023 presenting to the emergency department for evaluation of acute onset shortness of breath   Family/Caregiver Present Yes  (oldest son at the beginning of the session)   Subjective   Subjective \"I am doing SO much " "better\"   ADL   Eating Assistance 7  Independent   Grooming Assistance 7  Independent   UB Bathing Assistance 7  Independent   LB Bathing Assistance 5  Supervision/Setup   UB Dressing Assistance 7  Independent   LB Dressing Assistance 5  Supervision/Setup   LB Dressing Deficit   (supervision to doff/luis B socks)   Toileting Assistance  5  Supervision/Setup   Additional Comments Pt is limited by decreased activity tolerance and generalized weakness   Bed Mobility   Additional Comments DNT bed mobility; Pt OOB in recliner chair at the start/end of the session with chair alarm donned and all needs in reach   Transfers   Sit to Stand 5  Supervision   Additional items Assist x 1; Increased time required;Armrests; Verbal cues   Stand to Sit 5  Supervision   Additional items Assist x 1; Armrests; Increased time required;Verbal cues   Additional Comments Verbal cues for optimal hand placement and body mechanics for safe transfers, pt with good application of cues  Functional Mobility   Functional Mobility 5  Supervision   Additional Comments assist x 1 for functional household distance to the bathroom with no AD  SpO2 satturating in the mid to high 90s during functional mobility     Balance   Static Sitting Fair +   Dynamic Sitting Fair   Static Standing Fair   Dynamic Standing Fair   Activity Tolerance   Activity Tolerance Patient tolerated treatment well;Patient limited by fatigue   Medical Staff Made Aware Spoke with care coodination, SLIM   Nurse Made Aware Spoke with SANTANA Guevara   RUE Assessment   RUE Assessment WFL  (MMT 4/5 grossly)   LUE Assessment   LUE Assessment WFL  (MMT 4/5 grossly)   Hand Function   Gross Motor Coordination Functional   Fine Motor Coordination Functional   Sensation   Light Touch Partial deficits in the RUE;Partial deficits in the LUE;Partial deficits in the RLE;Partial deficits in the LLE  (Pt reports intermittent numbness and tingling in BUE/BLE)   Vision-Basic Assessment   Current Vision " Wears glasses all the time   Cognition   Overall Cognitive Status [facility]   Arousal/Participation Alert; Responsive; Cooperative   Attention Within functional limits   Orientation Level Oriented X4   Memory Within functional limits   Following Commands Follows multistep commands with increased time or repetition   Assessment   Limitation Decreased ADL status; Decreased UE strength;Decreased endurance;Decreased self-care trans;Decreased high-level ADLs   Prognosis Good   Assessment Patient is a 80 y o  male seen for OT evaluation and treatment  at [place] [address] following admission on 5/7/2023  s/p Acute on chronic combined systolic and diastolic heart failure ([facility] )  Comorbidities and significant PMHx impacting functional performance include: CAD, status post CABG, CHF, anxiety, hypertension, BPH, peptic ulcer disease  Patient presents with active orders for OT eval and treat  Prior to admission, patient was independent with functional mobility without assistive device, independent with ADLS and requiring assist for IADLS  Upon initial evaluation, patient requires no assistance for UB ADLs, supervision for LB ADLs, and supervision for transfers and  supervision for functional mobility household distance with no AD  Based on functional eval, pt presents with intact  attention, intact  safety awareness, intact  problem solving skills, and intact  memory  Occupational performance is affected by the following deficits: endurance ,  decreased muscular strength  and decreased activity tolerance  The AM-PAC & Barthel Index outcome tools were used to assist in determining pt safety w/self care /mobility and appropriate d/c recommendations, see above for score  Personal factors impacting performance include: decreased steps to enter home, decreased ADL independence , decreased IADL independence and High fall risk    Patient would benefit from OT services within the acute care setting to maximize level of functional independence in the following occupational areas bathing/showering, toileting, dressing , personal hygiene/grooming , activity engagement , safety procedures , fall prevention , energy conservation , self-care transfers, bed mobility  and functional mobility  From OT standpoint, recommendation at time of D/C would be return to previous environment with outpatient rehabilitation   Goals   Patient Goals to go home today   LTG Time Frame 10-14   Long Term Goal See below   Plan   Treatment Interventions ADL retraining;Functional transfer training;UE strengthening/ROM; Patient/family training;Equipment evaluation/education; Compensatory technique education; Energy conservation; Activityengagement   Goal Expiration Date 05/20/23   OT Treatment Day 1   OT Frequency 1-2x/wk   Recommendation   OT Discharge Recommendation Home with outpatient rehabilitation   Additional Comments  The patient's raw score on the AM-PAC Daily Activity Inpatient Short Form is 22  A raw score of greater than or equal to 19 suggests the patient may benefit from discharge to home  Please refer to the recommendation of the Occupational Therapist for safe discharge planning  AM-PAC Daily Activity Inpatient   Lower Body Dressing 3   Bathing 3   Toileting 4   Upper Body Dressing 4   Grooming 4   Eating 4   Daily Activity Raw Score 22   Daily Activity Standardized Score (Calc for Raw Score >=11) 47  1   AM-[facility] Applied Cognition Inpatient   Following a Speech/Presentation 4   Understanding Ordinary Conversation 4   Taking Medications 4   Remembering Where Things Are Placed or Put Away 4   Remembering List of 4-5 Errands 3   Taking Care of Complicated Tasks 3   Applied Cognition Raw Score 22   Applied Cognition Standardized Score 47 83   Barthel Index   Feeding 10   Bathing 5   Grooming Score 5   Dressing Score 10   Bladder Score 10   Bowels Score 10   Toilet Use Score 10   Transfers (Bed/Chair) Score 15   Mobility (Level Surface) Score 10   Stairs Score 0 Barthel Index Score 85   Additional Treatment Session   Start Time 1000   End Time 1008   Treatment Assessment Pt participated in tx session following IE for ADL training and to further challenge activity tolerance  Standing at toilet with supervision assist pt completed urination and mt care  Supervision to ambulate to the sink to complete hand washing hygiene with no AD  Pt tolerated standing for a total of 2-3 mins with supervision and no use of AD for steadying  Pt with good activity tolerance with no LOB or fatigue noted  Pt is continuing to perform below baseline due to the following deficits: endurance ,  decreased muscular strength , decreased dynamic balance impacting functional reach and decreased activity tolerance   From OT standpoint, patient would benefit from skilled intervention to maximize independence with ADLs and functional mobility  Goals remain appropriate, continue POC  At this time, recommending D/C to: return to previous environment with outpatient rehabilitation   End of Consult   Education Provided Yes   Patient Position at End of Consult Bedside chair;Bed/Chair alarm activated; All needs within reach   Nurse Communication Nurse aware of consult       Pt will complete LB ADLs Independent   with use of LHAE as needed for increased ADL independence within 10 days  Pt will verbalize and demonstrate understanding in use of compensatory techniques and use of long handled AE for increased safety and independence during LB ADL tasks  Pt will complete toileting Independent   with use of DME for increased ADL independence within 10 days  Pt will demonstrate proper body mechanics to complete self-care transfers and functional mobility with Independent  and use of AD PRN for increased safety and functional independence within 10 days       Pt will demonstrate standing tolerance of 5 min with Independent  and use of AD PRN for increased activity tolerance during ADL/IADL tasks within 10 days      Pt will demonstrate proper body mechanics and fall prevention strategies during 100% of tx sessions for increased safety awareness during ADL/IADLs    Pt will demonstrate OOB sitting tolerance of 2-4 hr/day for increased activity tolerance and engagement in self care tasks within 10 days  Pt will verbalize and demonstrate understanding of energy conservation/deep breathing techniques for increased activity tolerance and endurance during meaningful activities  Pt will verbalize and demonstrate understanding of B UE HEP program increase strength and endurance during self care transfers within 10 days       Porfirio Moore MS OTR/L   Licensure# [id number]

## 2023-05-11 ENCOUNTER — TRANSITIONAL CARE MANAGEMENT (OUTPATIENT)
Dept: FAMILY MEDICINE CLINIC | Facility: CLINIC | Age: 88
End: 2023-05-11

## 2023-05-11 VITALS
DIASTOLIC BLOOD PRESSURE: 67 MMHG | HEART RATE: 93 BPM | SYSTOLIC BLOOD PRESSURE: 119 MMHG | WEIGHT: 139 LBS | OXYGEN SATURATION: 97 % | BODY MASS INDEX: 23.73 KG/M2 | HEIGHT: 64 IN | RESPIRATION RATE: 19 BRPM | TEMPERATURE: 97.9 F

## 2023-05-11 PROBLEM — J96.01 ACUTE RESPIRATORY FAILURE WITH HYPOXIA (HCC): Status: RESOLVED | Noted: 2017-01-07 | Resolved: 2023-05-11

## 2023-05-11 LAB
ANION GAP SERPL CALCULATED.3IONS-SCNC: 8 MMOL/L (ref 4–13)
BUN SERPL-MCNC: 34 MG/DL (ref 5–25)
CALCIUM SERPL-MCNC: 8.5 MG/DL (ref 8.4–10.2)
CHLORIDE SERPL-SCNC: 99 MMOL/L (ref 96–108)
CO2 SERPL-SCNC: 29 MMOL/L (ref 21–32)
CREAT SERPL-MCNC: 1.11 MG/DL (ref 0.6–1.3)
GFR SERPL CREATININE-BSD FRML MDRD: 57 ML/MIN/1.73SQ M
GLUCOSE SERPL-MCNC: 135 MG/DL (ref 65–140)
POTASSIUM SERPL-SCNC: 3.5 MMOL/L (ref 3.5–5.3)
SODIUM SERPL-SCNC: 136 MMOL/L (ref 135–147)

## 2023-05-11 RX ORDER — ISOSORBIDE MONONITRATE 60 MG/1
60 TABLET, EXTENDED RELEASE ORAL EVERY MORNING
Qty: 30 TABLET | Refills: 0 | Status: SHIPPED | OUTPATIENT
Start: 2023-05-12 | End: 2023-05-24 | Stop reason: SDUPTHER

## 2023-05-11 RX ORDER — METOPROLOL SUCCINATE 50 MG/1
50 TABLET, EXTENDED RELEASE ORAL EVERY 12 HOURS
Qty: 60 TABLET | Refills: 0 | Status: SHIPPED | OUTPATIENT
Start: 2023-05-11 | End: 2023-05-24 | Stop reason: SDUPTHER

## 2023-05-11 RX ORDER — POTASSIUM CHLORIDE 20 MEQ/1
20 TABLET, EXTENDED RELEASE ORAL DAILY
Qty: 30 TABLET | Refills: 0 | Status: SHIPPED | OUTPATIENT
Start: 2023-05-12 | End: 2023-05-17 | Stop reason: ALTCHOICE

## 2023-05-11 RX ORDER — FUROSEMIDE 40 MG/1
40 TABLET ORAL DAILY
Qty: 30 TABLET | Refills: 0 | Status: SHIPPED | OUTPATIENT
Start: 2023-05-12 | End: 2023-05-24 | Stop reason: SDUPTHER

## 2023-05-11 RX ADMIN — FINASTERIDE 5 MG: 5 TABLET, FILM COATED ORAL at 09:12

## 2023-05-11 RX ADMIN — METOPROLOL SUCCINATE 50 MG: 50 TABLET, EXTENDED RELEASE ORAL at 09:13

## 2023-05-11 RX ADMIN — Medication 1 TABLET: at 09:13

## 2023-05-11 RX ADMIN — POTASSIUM CHLORIDE 20 MEQ: 1500 TABLET, EXTENDED RELEASE ORAL at 09:13

## 2023-05-11 RX ADMIN — PANTOPRAZOLE SODIUM 20 MG: 20 TABLET, DELAYED RELEASE ORAL at 05:34

## 2023-05-11 RX ADMIN — CLOPIDOGREL BISULFATE 75 MG: 75 TABLET ORAL at 09:13

## 2023-05-11 RX ADMIN — FUROSEMIDE 40 MG: 40 TABLET ORAL at 09:18

## 2023-05-11 RX ADMIN — ISOSORBIDE MONONITRATE 60 MG: 60 TABLET, EXTENDED RELEASE ORAL at 09:13

## 2023-05-11 RX ADMIN — RANOLAZINE 500 MG: 500 TABLET, FILM COATED, EXTENDED RELEASE ORAL at 09:13

## 2023-05-11 RX ADMIN — ENOXAPARIN SODIUM 40 MG: 40 INJECTION SUBCUTANEOUS at 09:13

## 2023-05-11 RX ADMIN — MULTIPLE VITAMINS W/ MINERALS TAB 1 TABLET: TAB ORAL at 09:13

## 2023-05-11 NOTE — PROGRESS NOTES
"General Cardiology   Progress Note -  [employer]   Kristi Rueda 80 y o  male MRN: [id number]    Unit/Bed#: S -01 Encounter: [id number]    Assessment/ Plan:    1   Acute on chronic diastolic heart failure  \"Chest x-ray-mild pulmonary vascular congestion with small effusions   (2015- 69) Required Bipap in ED  Given 60 mg IV Lasix followed by 40 mg IV twice daily\"  I/os: overall negative 2 8L; 1 6L in past 24 hours  Wt: down to 139lbs today from 147lbs on admission; stable to past 24 hours  Cr 1 11 today  He feels well today  Breathing is back to baseline  Was able to ambulate in the halls over 100 feet yesterday without any dyspnea  Euvolemic on exam today; on RA  Transitioned to oral Lasix 40 mg daily yesterday  He is stable from a cardiac standpoint for discharge home today  Would continue Lasix 40 mg daily  He has close follow-up scheduled with our nurse practitioner at the [facility] office on 5/17/23  He is agreeable to go to [facility] office for close follow-up  He also has an appointment scheduled with Dr Loni Posey in July but he is considering switching cardiologist to Dr Michael Flowers  We again reviewed daily weights, low-sodium diet and signs and symptoms of fluid overload that he should report to our office  Phone number provided      TTE 3/2023-LVEF 45% with grade 2 diastolic dysfunction   RV systolic dysfunction mild  Additionally he has moderate aortic stenosis and moderate MR      2   Chest pain-right-sided with known history of stable angina; no further chest pain  Pharmacological Myoview stress test 3/16/2023-fixed defect apex, inferior defect which may be artifact  Has been treated medically  will continue current CAD medical regimen but increased Imdur to 60 mg daily; BP tolerating     No recurrent CP     2   Moderate AS/moderate MR  TTE 3/2023; follow up OP  3   Essential hypertension-presented hypertensive and respiratory distress  BP quickly improved   PTA-Imdur 30 mg daily, " "Toprol 25 mg 3 times daily  Currently on imdur 60mg, toprol 50mg bid and iv diuresis; BP has been tolerating medication changes  4   HLD  Intolerant to  multiple statins  Has been tolerating crestor 5 mg daily  Cholesterol 154/triglyceride 169/HDL 38/LDL 82  5   CAD with history of remote CABG with subsequent LIMA to LAD stent 2015  AP-on clopidogrel, intolerant to aspirin  Nitrate-Imdur 60 mg daily  BB-Toprol 50mg BID  Ranexa-500 mg twice daily  Statin-pravastatin 40 mg daily  Home takes crestor 5mg daily      Subjective: Patient seen for follow-up  He reports feeling well today  Breathing is back to his baseline  He was able to ambulate over 100 feet in the hallways yesterday without any dyspnea  No chest pain, palpitations dizziness or lightheadedness  Review of Systems   Constitutional: Negative for decreased appetite and fever  Cardiovascular: Negative for chest pain, dyspnea on exertion, leg swelling, orthopnea and palpitations  Respiratory: Negative for cough and shortness of breath  Gastrointestinal: Negative for nausea and vomiting  Genitourinary: Negative for dysuria  Neurological: Negative for dizziness and light-headedness  Psychiatric/Behavioral: Negative for altered mental status  All other systems reviewed and are negative  Objective:   Vitals: Blood pressure 119/67, pulse 93, temperature 97 9 °F (36 6 °C), resp  rate 19, height 5' 4\" (1 626 m), weight 63 kg (139 lb), SpO2 97 %  ,     Body mass index is 23 86 kg/m²  ,     Systolic (52GSW), VNH:643 , Min:88 , VKI:764     Diastolic (18PBZ), KSN:56, Min:54, Max:67      Intake/Output Summary (Last 24 hours) at 5/11/2023 0932  Last data filed at 5/11/2023 0501  Gross per 24 hour   Intake --   Output 1325 ml   Net -1325 ml     Weight (last 2 days)     Date/Time Weight    05/11/23 0542 63 (139)    05/10/23 0500 63 1 (139 2)    05/09/23 0600 63 7 (140 4)        Telemetry Review: No telemetry    Physical Exam  Vitals reviewed   " Constitutional:       General: He is not in acute distress  Appearance: Normal appearance  HENT:      Head: Normocephalic  Mouth/Throat:      Mouth: Mucous membranes are moist    Cardiovascular:      Rate and Rhythm: Normal rate and regular rhythm  Heart sounds: S1 normal and S2 normal    Pulmonary:      Effort: Pulmonary effort is normal       Breath sounds: Normal breath sounds  No wheezing or rales  Comments: On room air  Abdominal:      Palpations: Abdomen is soft  Musculoskeletal:      Right lower leg: No edema  Left lower leg: No edema  Skin:     General: Skin is warm  Neurological:      Mental Status: He is alert and oriented to person, place, and time     Psychiatric:         Mood and Affect: Mood normal        LABORATORY RESULTS      CBC with diff:   Results from last 7 days   Lab Units 05/07/23  0959   WBC Thousand/uL 8 57   HEMOGLOBIN g/dL 15 2   HEMATOCRIT % 48 7   MCV fL 96   PLATELETS Thousands/uL 203   MCH pg 30 1   MCHC g/dL 31 2*   RDW % 14 2   MPV fL 10 3   NRBC AUTO /100 WBCs 0     CMP:  Results from last 7 days   Lab Units 05/11/23  0536 05/10/23  1417 05/08/23  0551 05/07/23  0959   POTASSIUM mmol/L 3 5 4 2 4 4 4 9   CHLORIDE mmol/L 99 99 103 102   CO2 mmol/L 29 26 26 27   BUN mg/dL 34* 32* 24 23   CREATININE mg/dL 1 11 1 15 1 11 1 07   CALCIUM mg/dL 8 5 8 6 8 4 9 6   AST U/L  --   --   --  8*   ALT U/L  --   --   --  9   ALK PHOS U/L  --   --   --  99   EGFR ml/min/1 73sq m 57 54 57 59     BMP:  Results from last 7 days   Lab Units 05/11/23  0536 05/10/23  1417 05/08/23  0551 05/07/23  0959   POTASSIUM mmol/L 3 5 4 2 4 4 4 9   CHLORIDE mmol/L 99 99 103 102   CO2 mmol/L 29 26 26 27   BUN mg/dL 34* 32* 24 23   CREATININE mg/dL 1 11 1 15 1 11 1 07   CALCIUM mg/dL 8 5 8 6 8 4 9 6     Lab Results   Component Value Date    NTBNP 2,264 (H) 02/12/2022    NTBNP 1,126 (H) 02/11/2022    NTBNP 818 (H) 10/31/2021     Lipid Profile:   Lab Results   Component Value Date CHOL 192 10/29/2015    CHOL 168 2015     Lab Results   Component Value Date    HDL 38 (L) 2022    HDL 41 2021    HDL 38 (L) 2021     Lab Results   Component Value Date    LDLCALC 82 2022    LDLCALC 80 2021    LDLCALC 78 2021     Lab Results   Component Value Date    TRIG 169 (H) 2022    TRIG 115 2021    TRIG 109 2021     Cardiac testing:   Results for orders placed during the hospital encounter of 21    Echo complete with contrast if indicated    Narrative  [address]  [address] [facility], [address]  [phone]    Transthoracic Echocardiogram  2D, M-mode, Doppler, and Color Doppler    Study date:  2021    Patient: Glenda Galeas  MR number: [id number]  Account number: [de-identified]  : 02-Aug-1930  Age: 80 years  Gender: Male  Status: Outpatient  Location: Echo lab  Height: 65 in  Weight: 151 6 lb  BP: 122/ 58 mmHg    Indications: Aortic Stenosis    Diagnoses: 424 1 - AORTIC VALVE DISORDER    Sonographer:  YURI Curtis  Primary Physician:  Aubrey Cruz DO  Referring Physician:  Mauri Dorsey MD  Group:  [facility]  Interpreting Physician:  Elijah Urbina MD    SUMMARY    LEFT VENTRICLE:  Systolic function was at the lower limits of normal by visual assessment  Ejection fraction was estimated in the range of 45 % to 50 % to be 45 %  There was mild diffuse hypokinesis  Wall thickness was mildly increased  LEFT ATRIUM:  The atrium was mildly dilated  MITRAL VALVE:  There was mild to moderate annular calcification  Transmitral velocity was increased due to increased transvalvular flow  There was moderate to severe regurgitation  Based on elevated E' and central color flow jet  Unable to obtain PISA measurement for effective ERO calculation  Consider WALT for better evaluation    AORTIC VALVE:  The valve was probably trileaflet   Leaflets exhibited mildly increased thickness, mild calcification, moderately reduced cuspal separation, and sclerosis  Transaortic velocity was increased due to increased transvalvular flow  There was moderate stenosis  There was mild to moderate regurgitation  Valve mean gradient was 26 mmHg  HISTORY: PRIOR HISTORY: 3-vessel CAD,Chronic stable Angina,Chronic Diastolic heart failure,HTN,Murmur,DVT,Hyperlipidemia,anemia  PROCEDURE: The procedure was performed in the echo lab  This was a routine study  The transthoracic approach was used  The study included complete 2D imaging, M-mode, complete spectral Doppler, and color Doppler  The heart rate was 61 bpm,  at the start of the study  Images were obtained from the parasternal, apical, subcostal, and suprasternal notch acoustic windows  Image quality was adequate  LEFT VENTRICLE: Size was normal  Systolic function was at the lower limits of normal by visual assessment  Ejection fraction was estimated in the range of 45 % to 50 % to be 45 %  There was mild diffuse hypokinesis  Wall thickness was  mildly increased  No evidence of apical thrombus  DOPPLER: The study was not technically sufficient to allow evaluation of LV diastolic function  RIGHT VENTRICLE: The size was normal  Systolic function was normal  Wall thickness was normal     LEFT ATRIUM: The atrium was mildly dilated  RIGHT ATRIUM: Size was normal     MITRAL VALVE: There was mild to moderate annular calcification  There was mild-moderate calcification  There was mildly reduced leaflet separation  DOPPLER: Transmitral velocity was increased due to increased transvalvular flow  There was  no evidence for stenosis  There was moderate to severe regurgitation  Based on elevated E' and central color flow jet  Unable to obtain PISA measurement for effective ERO calculation  Consider WALT for better evaluation    AORTIC VALVE: The valve was probably trileaflet   Leaflets exhibited mildly increased thickness, mild calcification, moderately reduced cuspal separation, and sclerosis  DOPPLER: Transaortic velocity was increased due to increased  transvalvular flow  There was moderate stenosis  There was mild to moderate regurgitation  TRICUSPID VALVE: The valve structure was normal  There was normal leaflet separation  DOPPLER: The transtricuspid velocity was within the normal range  There was no evidence for stenosis  There was trace regurgitation  PULMONIC VALVE: Leaflets exhibited normal thickness, no calcification, and normal cuspal separation  DOPPLER: The transpulmonic velocity was within the normal range  There was trace regurgitation  PERICARDIUM: There was no pericardial effusion  The pericardium was normal in appearance  AORTA: The root exhibited normal size  SYSTEMIC VEINS: IVC: The inferior vena cava was not well visualized  MEASUREMENT TABLES    DOPPLER MEASUREMENTS  Aortic valve   (Reference normals)  Peak gilda   350 cm/s   (--)  Peak gradient   48 mmHg   (--)  Mean gradient   26 mmHg   (--)  Regurg PHT   460 ms   (--)    SYSTEM MEASUREMENT TABLES    2D  EF (Teich): 49 86 %  %FS: 25 27 %  JUDY Planimetry: 0 86 cm2  Ao Diam: 3 1 cm  EDV(Teich): 104 22 ml  ESV(Teich): 52 25 ml  IVSd: 1 24 cm  LA Area: 22 39 cm2  LA Diam: 4 03 cm  LVEDV MOD A4C: 174 08 ml  LVEF MOD A4C: 50 26 %  LVESV MOD A4C: 86 59 ml  LVIDd: 4 74 cm  LVIDs: 3 54 cm  LVLd A4C: 9 35 cm  LVLs A4C: 8 17 cm  LVOT Diam: 1 96 cm  LVPWd: 1 21 cm  RA Area: 12 71 cm2  RVIDd: 2 45 cm  SV (Teich): 51 97 ml  SV MOD A4C: 87 49 ml    CW  AV Env  Ti: 361 56 ms  AV VTI: 86 51 cm  AV Vmax: 3 48 m/s  AV Vmean: 2 39 m/s  AV maxP 38 mmHg  AV meanP 03 mmHg  TR Vmax: 3 25 m/s  TR maxP 23 mmHg    PW  E' Sept: 0 05 m/s  LVOT Env  Ti: 361 56 ms  LVOT VTI: 27 25 cm  LVOT Vmax: 1 15 m/s  LVOT Vmean: 0 75 m/s  LVOT maxP 29 mmHg  LVOT meanP 61 mmHg    [facility] Accredited Echocardi[facility] Laboratory    Prepared and electronically signed by    [employer] Mimi Forde MD  Signed 16-Jul-2021 17:34:59    No results found for this or any previous visit  No results found for this or any previous visit  No valid procedures specified  No results found for this or any previous visit      Meds/Allergies   current meds:   Current Facility-Administered Medications   Medication Dose Route Frequency   • acetaminophen (TYLENOL) tablet 650 mg  650 mg Oral Q6H PRN   • ALPRAZolam (XANAX) tablet 0 5 mg  0 5 mg Oral HS PRN   • calcium carbonate-vitamin D 500 mg-5 mcg tablet 1 tablet  1 tablet Oral Daily With Breakfast   • cholecalciferol (VITAMIN D3) tablet 400 Units  400 Units Oral After Lunch   • clopidogrel (PLAVIX) tablet 75 mg  75 mg Oral Daily   • docusate sodium (COLACE) capsule 100 mg  100 mg Oral Daily With Lunch   • enoxaparin (LOVENOX) subcutaneous injection 40 mg  40 mg Subcutaneous Daily   • famotidine (PEPCID) tablet 20 mg  20 mg Oral HS   • finasteride (PROSCAR) tablet 5 mg  5 mg Oral Daily   • furosemide (LASIX) tablet 40 mg  40 mg Oral Daily   • isosorbide mononitrate (IMDUR) 24 hr tablet 60 mg  60 mg Oral QAM   • metoprolol succinate (TOPROL-XL) 24 hr tablet 50 mg  50 mg Oral Q12H   • multivitamin-minerals (CENTRUM) tablet 1 tablet  1 tablet Oral QAM   • pantoprazole (PROTONIX) EC tablet 20 mg  20 mg Oral BID AC   • potassium chloride (K-DUR,KLOR-CON) CR tablet 20 mEq  20 mEq Oral Daily   • pravastatin (PRAVACHOL) tablet 40 mg  40 mg Oral Daily With Dinner   • ranolazine (RANEXA) 12 hr tablet 500 mg  500 mg Oral Q12H RITCHIE   • tamsulosin (FLOMAX) capsule 0 4 mg  0 4 mg Oral Daily With Dinner     Medications Prior to Admission   Medication   • ALPRAZolam (XANAX) 0 5 mg tablet   • Calcium Carbonate-Vitamin D (CALCIUM 600+D PO)   • cholecalciferol (VITAMIN D3) 400 units tablet   • clopidogrel (PLAVIX) 75 mg tablet   • Docusate Calcium (STOOL SOFTENER PO)   • dutasteride (AVODART) 0 5 mg capsule   • famotidine (PEPCID) 20 mg tablet   • isosorbide mononitrate (IMDUR) 30 mg 24 hr tablet   • Magnesium 250 MG TABS   • metoprolol succinate (TOPROL-XL) 25 mg 24 hr tablet   • multivitamin-iron-minerals-folic acid (CENTRUM) chewable tablet   • nitroglycerin (NITROSTAT) 0 4 mg SL tablet   • pantoprazole (PROTONIX) 20 mg tablet   • polyethylene glycol (MIRALAX) 17 g packet   • ranolazine (RANEXA) 500 mg 12 hr tablet   • rosuvastatin (CRESTOR) 5 mg tablet   • senna (SENOKOT) 8 6 mg   • tamsulosin (Flomax) 0 4 mg   • Zinc 25 MG TABS     Assessment:  Principal Problem:    Acute on chronic combined systolic and diastolic heart failure (HCC)  Active Problems:    Acute respiratory failure with hypoxia (HCC)    Anxiety    3-vessel CAD    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    Counseling / Coordination of Care  Total floor / unit time spent today 20 minutes  Greater than 50% of total time was spent with the patient and / or family counseling and / or coordination of care  ** Please Note: Dragon 360 Dictation voice to text software may have been used in the creation of this document   **

## 2023-05-11 NOTE — ASSESSMENT & PLAN NOTE
· Secondary to CHF exacerbation  · Required continuous BiPAP on admission, now significantly improved on nasal cannula oxygen    · Now on room air   · Resolved

## 2023-05-11 NOTE — ASSESSMENT & PLAN NOTE
Wt Readings from Last 3 Encounters:   05/11/23 63 kg (139 lb)   04/14/23 65 8 kg (145 lb)   03/27/23 68 kg (150 lb)     · Has a history of chronic diastolic CHF  Presents with progressively worsening shortness of breath  · Most recent echocardiogram on 3/16/2023 showed EF of 26%, grade 2 diastolic dysfunction, moderate MR, moderate AAS, mild to moderate IN and mild TR  · Had a nuclear stress test on 3/16/2023 which showed severely reduced EF of 33%, apical infarction with inferior defect  · Last cardiology note on 4/14/2023 report the patient has slowly stopped taking his diuretics and only takes as needed  Unclear what diuretic he is on   Patient reports that he was never told to take a diuretic   · He is improving  · Stable for discharge   · Change to Lasix 40 mg QD  · Potassium QD   · Cardiology following  · [facility]

## 2023-05-11 NOTE — DISCHARGE SUMMARY
[facility]  Discharge- [facility] 8/2/1930, 80 y o  male MRN: [id number]  Unit/Bed#: S -01 Encounter: [id number]  Primary Care Provider: Essence Lemus DO   Date and time admitted to hospital: 5/7/2023  9:48 AM    * Acute on chronic combined systolic and diastolic heart failure [facility])  Assessment & Plan  Wt Readings from Last 3 Encounters:   05/11/23 63 kg (139 lb)   04/14/23 65 8 kg (145 lb)   03/27/23 68 kg (150 lb)     · Has a history of chronic diastolic CHF  Presents with progressively worsening shortness of breath  · Most recent echocardiogram on 3/16/2023 showed EF of 34%, grade 2 diastolic dysfunction, moderate MR, moderate AAS, mild to moderate NE and mild TR  · Had a nuclear stress test on 3/16/2023 which showed severely reduced EF of 33%, apical infarction with inferior defect  · Last cardiology note on 4/14/2023 report the patient has slowly stopped taking his diuretics and only takes as needed  Unclear what diuretic he is on  Patient reports that he was never told to take a diuretic   · He is improving  · Stable for discharge   · Change to Lasix 40 mg QD  · Potassium QD   · Cardiology following  · Has follow up on 5/17, [facility]     Acute respiratory failure with hypoxia (HCC)-resolved as of 5/11/2023  Assessment & Plan  · Secondary to CHF exacerbation  · Required continuous BiPAP on admission, now significantly improved on nasal cannula oxygen  · Now on room air   · Resolved    3-vessel CAD  Assessment & Plan  · He is status post CABG    Currently without chest pain  · Troponin mildly elevated in the setting of CHF  · Has history of chronic stable angina  · Monitor on telemetry  · Continue beta-blocker, Ranexa, Imdur  · Toprol increased from 25 mg TID to 50 mg BID  · Imdur increased from 30 mg QD to 60 mg QD     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  Assessment & Plan  · Has history of dysphagia   · Status post EGD and follows with GI outpatient  · Currently on Pepcid/Protonix  · Continue home meds    Anxiety  Assessment & Plan  · Stable   · Continue PRN Xanax      Medical Problems     Resolved Problems  Date Reviewed: 5/11/2023          Resolved    Acute respiratory failure with hypoxia ([address] ) 5/11/2023     Resolved by  Olesya Mireles PA-C        Discharging Physician / Practitioner: Olesya Mireles PA-C  PCP: Amna Headley DO  Admission Date:   Admission Orders (From admission, onward)     Ordered        05/07/23 1210  INPATIENT ADMISSION  Once                      Discharge Date: 05/11/23    Consultations During Hospital Stay:  · Cardiology - Dr Rip José     Procedures Performed:   · CXR    Significant Findings / Test Results:   · CXR: Mild pulmonary venous congestion with small effusions, larger on the left, and left base atelectasis     Incidental Findings:   · None     Test Results Pending at Discharge (will require follow up): · None     Outpatient Tests Requested:  · None    Complications:  None    Reason for Admission: Acute on chronic congestive heart failure     Hospital Course:   Evelia Arriaga is a 80 y o  male patient who originally presented to the hospital on 5/7/2023 due to shortness of breath  Work up in the ER revealed pulmonary venous congestion and elevated BNP suggestive of CHF  Patient was noted to be hypoxic on room air  He was given supplementary O2, started on diuresis, and admitted  Cardiology was consulted  He was continued on IV diuresis and his volume status and oxygen demand improved  Cardiology additionally up-titrated his anti-anginal regimen in the form of his Toprol-XL and Imdur  He was transitioned to PO Lasix once volume status improved and continued to have good response on this  He was able to be discharged home off oxygen on oral Lasix and Potassium  Patient requested to change his cardiologist to [facility] so referral was placed           Please see above list of diagnoses and related plan for "additional information  Condition at Discharge: stable    Discharge Day Visit / Exam:   Subjective:  Patient reports feeling well today  Able to walk much longer distances than he could prior to admission  Would [facility] at discharge  Excited to go home  Vitals: Blood Pressure: 119/67 (05/11/23 0918)  Pulse: 93 (05/11/23 0918)  Temperature: 97 9 °F (36 6 °C) (05/11/23 0713)  Temp Source: Oral (05/07/23 0958)  Respirations: 19 (05/11/23 0713)  Height: 5' 4\" (162 6 cm) (05/07/23 1451)  Weight - Scale: 63 kg (139 lb) (05/11/23 0542)  SpO2: 97 % (05/11/23 0918)  Exam:   Physical Exam  Constitutional:       General: He is not in acute distress  Appearance: Normal appearance  He is normal weight  He is not ill-appearing or diaphoretic  HENT:      Head: Normocephalic and atraumatic  Mouth/Throat:      Mouth: Mucous membranes are moist    Eyes:      General: No scleral icterus  Pupils: Pupils are equal, round, and reactive to light  Cardiovascular:      Rate and Rhythm: Normal rate and regular rhythm  Pulses: Normal pulses  Heart sounds: Normal heart sounds, S1 normal and S2 normal  No murmur heard  No systolic murmur is present  No diastolic murmur is present  No gallop  No S3 or S4 sounds  Pulmonary:      Effort: Pulmonary effort is normal  No accessory muscle usage or respiratory distress  Breath sounds: Normal breath sounds  No stridor  No decreased breath sounds, wheezing, rhonchi or rales  Chest:      Chest wall: No tenderness  Abdominal:      General: Bowel sounds are normal  There is no distension  Palpations: Abdomen is soft  Tenderness: There is no abdominal tenderness  There is no guarding  Musculoskeletal:      Right lower leg: No edema  Left lower leg: No edema  Skin:     General: Skin is warm and dry  Coloration: Skin is not jaundiced  Neurological:      General: No focal deficit present        " Mental Status: He is alert  Mental status is at baseline  Motor: No tremor or seizure activity  Psychiatric:         Behavior: Behavior is cooperative  Discussion with Family: Updated  (wife) via phone  Discharge instructions/Information to patient and family:   See after visit summary for information provided to patient and family  Provisions for Follow-Up Care:  See after visit summary for information related to follow-up care and any pertinent home health orders  Disposition:   Home    Planned Readmission: None     Discharge Statement:  I spent 45 minutes discharging the patient  This time was spent on the day of discharge  I had direct contact with the patient on the day of discharge  Greater than 50% of the total time was spent examining patient, answering all patient questions, arranging and discussing plan of care with patient as well as directly providing post-discharge instructions  Additional time then spent on discharge activities  Discharge Medications:  See after visit summary for reconciled discharge medications provided to patient and/or family        **Please Note: This note may have been constructed using a voice recognition system**

## 2023-05-11 NOTE — CASE MANAGEMENT
Case Management Discharge Planning Note    Patient name Yane Arias  Location S /S [address] [id number] MRN [id number]  : 1930 Date 2023       Current Admission Date: 2023  Current Admission Diagnosis:Acute on chronic combined systolic and diastolic heart failure [facility])   Patient Active Problem List    Diagnosis Date Noted   • Impaired fasting glucose 2023   • Status post gastric surgery 2022   • Platelets decreased ([facility] ) 2022   • 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 2022   • Dysphagia 2021   • Hx of CABG 2021   • CAD (coronary artery disease) 2021   • Cardiac murmur 2020   • 3-vessel CAD 2018   • Dry eye 2018   • Anemia of chronic disease 2018   • Depression 2018   • Bladder mass 2018   • Anxiety 2018   • Acute respiratory failure with hypoxia ([facility] ) 2017   • History of DVT (deep vein thrombosis) 2017   • PUD (peptic ulcer disease) 2017   • Hypertension 2017   • Hyperlipidemia 2017   • Acute on chronic combined systolic and diastolic heart failure ([facility] ) 2017   • Benign prostatic hyperplasia 10/22/2015      LOS (days): 4  Geometric Mean LOS (GMLOS) (days): 3 90  Days to GMLOS:0     OBJECTIVE:  Risk of Unplanned Readmission Score: 15 75         Current admission status: Inpatient   Preferred Pharmacy:   [id number] Cor[place]king - [address]  [address] [place] [address]  Phone: [phone] Fax: [phone]    Primary Care Provider: Essence Lemus DO    Primary Insurance: MEDICARE  Secondary Insurance: AARP    DISCHARGE DETAILS:    Discharge planning discussed with[de-identified] Patient  Freedom of Choice: Yes  Comments - Freedom of Choice: CM discussed discharge plans per care team recommendations  List of out-patient therapy facilities provided  Patient requesting to inquire on cost for Rollator - [facility]  CM will follow up with cost   CM contacted family/caregiver?: Yes  Were Treatment Team discharge recommendations reviewed with patient/caregiver?: Yes  Did patient/caregiver verbalize understanding of patient care needs?: Yes  Were patient/caregiver advised of the risks associated with not following Treatment Team discharge recommendations?: Yes    Contacts  Patient Contacts: Steve Lisa (wife)/ Katlyn Amador (dtr)  Relationship to Patient[de-identified] Family  Contact Method: Phone  Phone Number: See face sheet    Requested [address]         Is the patient interested in [facility] at discharge?: No    DME Referral Provided  Referral made for DME?: Yes  DME referral completed for the following items[de-identified] Rollator  DME Supplier Name[de-identified] [employer]    Other Referral/Resources/Interventions Provided:  Interventions: DME  Referral Comments: CM discussed discharge plans per care team recommendations  List of out-patient therapy facilities provided  Patient requesting to inquire on cost for Rollator - order sent via [facility]  CM will follow up with cost     Would you like to participate in our [address] service program?  : No - Declined    Treatment Team Recommendation: Home  Discharge Destination Plan[de-identified] Home           IMM Given (Date):: 05/10/23  IMM Given to[de-identified] Patient        Kim Hernandez IMM reviewed with patient, patient agrees with discharge determination

## 2023-05-11 NOTE — ASSESSMENT & PLAN NOTE
· Has history of dysphagia   · Status post EGD and follows with GI outpatient  · Currently on Pepcid/Protonix  · Continue home meds information sheet given/Pre-printed instructions given for other (specify)

## 2023-05-11 NOTE — ASSESSMENT & PLAN NOTE
· He is status post CABG    Currently without chest pain  · Troponin mildly elevated in the setting of CHF  · Has history of chronic stable angina  · Monitor on telemetry  · Continue beta-blocker, Ranexa, Imdur  · Toprol increased from 25 mg TID to 50 mg BID  · Imdur increased from 30 mg QD to 60 mg QD

## 2023-05-12 LAB
BACTERIA BLD CULT: NORMAL
BACTERIA BLD CULT: NORMAL

## 2023-05-15 LAB
DME PARACHUTE DELIVERY DATE ACTUAL: NORMAL
DME PARACHUTE DELIVERY DATE REQUESTED: NORMAL
DME PARACHUTE ITEM DESCRIPTION: NORMAL
DME PARACHUTE ITEM DESCRIPTION: NORMAL
DME PARACHUTE ORDER STATUS: NORMAL
DME PARACHUTE SUPPLIER NAME: NORMAL
DME PARACHUTE SUPPLIER PHONE: NORMAL

## 2023-05-16 PROBLEM — I50.42 CHRONIC COMBINED SYSTOLIC AND DIASTOLIC HF (HEART FAILURE) (HCC): Status: ACTIVE | Noted: 2017-01-07

## 2023-05-16 NOTE — PROGRESS NOTES
Cardiology  Heart Failure   Follow Up Office Visit Note     Ted Leal   80 y o    male   MRN: [id number]  [address]  [address]  [address] [place] [address] [address]  [phone]    PCP: Kaushik Hoskins DO  Cardiologist : will be Dr Allegra Arizmendi              Summary of Recommendations  Low-sodium diet, Heart failure education as below reviewed the importance of this  We will switch his potassium to liquid given his GI issues   Kcl  20 meq/15 mL daily  Nonfasting BMP, magnesium level today  F/U with me 2 weeks  Follow up will be scheduled with Dr Allerga Arizmendi 4-6 weeks, prt request      Impression/plan  Chronic combined heart failure, EF 45% with adm 5/7-5/11/23  -Discharge weight 139 lb  Wt Readings from Last 3 Encounters:   05/17/23 64 kg (141 lb)   05/11/23 63 kg (139 lb)   04/14/23 65 8 kg (145 lb)   --beta-blocker:   Toprol 50 mg every 12  --Diuretic:   Lasix 40 mg daily this is new  He is also on potassium 20 meq/daily  We will switch this to liquid, given he has significant swallowing issues  --ACE/ARB/ARNI:    --MRA:   --SLGT2i  --2 g sodium diet, 1800 cc fluid restriction  Daily weights, call weight gain 2-3 lb in 1 day or 5 lb in 5 days  valvular heart disease  The patient is aware of the disease  · Moderate mitral regurgitation  · Aortic stenosis PG-45,  MG- 25   Medical management  3VCAD, S/P CABG x 6 8736 (Angelico)  with chronic stable angina  Prior multivessel stenting  He is being medically managed  · On Plavix 75 mg daily, statin, beta-blocker, ranolazine  · Nuclear stress test March 2023  Same as  prior  Hypertension, essential  BP  116/72  On loop diuretic, Imdur 60 mg daily, Toprol 50 mg every 12h; also on Flomax  Hyperlipidemia, mixed  On rosuvastatin 5 mg/d Zetia 10 mg daily  11/22: calculated LDL 82  Moderate to high-dose statin     Latest Reference Range & Units 11/30/19 05:16 01/09/20 08:40 02/26/21 05:52 07/20/21 10:31 11/02/21 04:33 11/29/22 08:39 Cholesterol See Comment mg/dL 191 159 125 138 144 154   Triglycerides See Comment mg/dL 98 126 113 109 115 169 (H)   HDL >=40 mg/dL 33 (L) 39 (L) 34 (L) 38 (L) 41 38 (L)   Non-HDL Cholesterol mg/dl 158 120  100  116   LDL Calculated 0 - 100 mg/dL 138 (H) 95 68 78 80 82   Peptic ulcer disease/cephalgia  Follow-up with GI  Hx DVT,S/p IVC filter  Anxiety  Cardiac testing  · WALT 2/14/22  EF 45%, no PFO, left atrial appendage has normal function, aortic valve thickened moderately calcified and moderately reduced with mild-to-moderate AI and moderate aortic stenosis, moderate to severe mitral regurgitation  • TTE 3/16/23  Gina Anyi EF 45%  Grade 2 DD  Mild global hypokinesis  RV systolic function mildly reduced  Severe LAE  There is a functionally bicuspid aortic valve with severe reduced mobility  Mild AI with a centrally directed jet  There is moderate aortic stenosis  Peak gradient 45, mean gradient 25  Moderate MR  Mild TR  PASP 54 mmHg  Mild to moderate pulmonic valve regurgitation  •  Compared to previous exam, there is no significant change  · Pharmacological SPECT 3/16/2023  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 which may be due to artifact              HPI:  Fina Godoy is a 81 yo male with CAD, S/P CABG x 6,, 2015 with previous multivessel stenting  He has been medically managed  He has chronc HFpEF, HTN, HL, prior DVT,S/P IVC filter  He follows with Dr Klein Alu 3/15- 3/17 [facility] [address]  chest pain  Toprol was increased to 75 mg twice a day,     4/2023 OV Dr Zakiya Grullon-  Chronic stable angina  BBlocker increased  Also taking Ranexa and Imdur  taking Toprol  75mg every 8 hrs  Weight 145 pounds  Adm 5/7-5/11/23  CC: Some HARMON x1 week  Chest discomfort   called EMS  Volume overloaded     On no maintenance diuretic pre hospital    He required treatment with BiPAP on admission  No significant troponin elevation  Followed by cardiology  BNP elevated "857  X-ray: Mild pulmonary vascular congestion with small effusions  Diuresed  Discharge weight :139 pounds  Discharge creatinine: 1 11  Discharge diuretics: Lasix 40 mg daily  Toprol changed to 50 mg BID  Imdur increased from 30 mg QD to 60 mg QD       5/17/23  Hosp follow-up after an admission for heart failure  ROS: He denies chest pain  He denies shortness of breath  He has trouble swallowing potassium  I reviewed his hospital course  He is aware of his valvular heart disease  This is being medically managed  He knows he needs to avoid dietary sodium  I reviewed how to read food labels  wt 141 pounds  /72  Eats out at restaurants  I encouraged him to adhere to a salt restricted diet  Nonfasting BMP/ return to see me in a short interval      I have spent 25 minutes with Patient and family today in which greater than 50% of this time was spent in counseling/coordination of care regarding Instructions for management, Patient and family education, Importance of tx compliance, Risk factor reductions, Documenting in the medical record, Reviewing / ordering tests, medicine, procedures   and Obtaining or reviewing history      Assessment  Diagnoses and all orders for this visit:    Hospital discharge follow-up    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) ([facility] )    Coronary artery disease involving native heart without angina pectoris, unspecified vessel or lesion type    Hx of CABG    Primary hypertension    Mixed hyperlipidemia    Impaired fasting glucose        Past Medical History:   Diagnosis Date   • Arthritis    • BPH (benign prostatic hyperplasia)    • Cervical spine fracture ([facility] ) 1997    C3   • Chest pain    • Colon polyp    • Coronary artery disease    • Dry eye    • DVT (deep venous thrombosis) ([place] [address] ) 2015    right leg- passed thru the IVC filter-stopped at the \"patch\" from bypass surgery   • Dysphagia 11/2021    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   • Fracture " of thoracic spine ([facility] ) 1997    T3   • Glaucoma    • Hyperlipidemia    • Hypertension    • Myocardial infarction ([facility] )    • Pneumonia    • PUD (peptic ulcer disease)        Review of Systems   Constitutional: Negative for chills  Cardiovascular: Negative for chest pain, claudication, cyanosis, dyspnea on exertion, irregular heartbeat, leg swelling, near-syncope, orthopnea, palpitations, paroxysmal nocturnal dyspnea and syncope  Respiratory: Negative for cough and shortness of breath  Gastrointestinal: Negative for heartburn and nausea  Chronic swallowing problems   Neurological: Negative for dizziness, focal weakness, headaches, light-headedness and weakness  All other systems reviewed and are negative  Allergies   Allergen Reactions   • Escitalopram Other (See Comments)     Suicidal feelings, sweating   • Oxycodone-Acetaminophen Dizziness and Shortness Of Breath     Other reaction(s): Faints  Reaction Date: [id number]; Category: Adverse Reaction;    • Sucralfate GI Intolerance     Abdominal pain    • Sulfa Antibiotics Other (See Comments)   • Omeprazole Rash   • Statins Other (See Comments)     Muscle pain           Current Outpatient Medications:   •  ALPRAZolam (XANAX) 0 5 mg tablet, Take 1 tablet (0 5 mg total) by mouth daily at bedtime as needed for anxiety, Disp: 90 tablet, Rfl: 1  •  Calcium Carbonate-Vitamin D (CALCIUM 600+D PO), Take by mouth daily with lunch, Disp: , Rfl:   •  cholecalciferol (VITAMIN D3) 400 units tablet, Take 400 Units by mouth daily after lunch , Disp: , Rfl:   •  clopidogrel (PLAVIX) 75 mg tablet, TAKE ONE TABLET BY MOUTH EVERY DAY (Patient taking differently: 75 mg daily at bedtime), Disp: 90 tablet, Rfl: 3  •  Docusate Calcium (STOOL SOFTENER PO), Take by mouth OTC; takes with lunch, Disp: , Rfl:   •  dutasteride (AVODART) 0 5 mg capsule, Take 1 capsule (0 5 mg total) by mouth in the morning , Disp: 90 capsule, Rfl: 3  •  famotidine (PEPCID) 20 mg tablet, Take 1 tablet (20 mg total) by mouth daily at bedtime, Disp: 90 tablet, Rfl: 3  •  furosemide (LASIX) 40 mg tablet, Take 1 tablet (40 mg total) by mouth daily Do not start before May 12, 2023 , Disp: 30 tablet, Rfl: 0  •  isosorbide mononitrate (IMDUR) 60 mg 24 hr tablet, Take 1 tablet (60 mg total) by mouth every morning Do not start before May 12, 2023 , Disp: 30 tablet, Rfl: 0  •  metoprolol succinate (TOPROL-XL) 50 mg 24 hr tablet, Take 1 tablet (50 mg total) by mouth every 12 (twelve) hours, Disp: 60 tablet, Rfl: 0  •  multivitamin-iron-minerals-folic acid (CENTRUM) chewable tablet, Chew 1 tablet every morning , Disp: , Rfl:   •  pantoprazole (PROTONIX) 20 mg tablet, Take 1 tablet (20 mg total) by mouth 2 (two) times a day, Disp: 180 tablet, Rfl: 1  •  polyethylene glycol (MIRALAX) 17 g packet, Take 17 g by mouth daily for 10 days, Disp: 170 g, Rfl: 0  •  potassium chloride (K-DUR,KLOR-CON) 20 mEq tablet, Take 1 tablet (20 mEq total) by mouth daily Do not start before May 12, 2023 , Disp: 30 tablet, Rfl: 0  •  ranolazine (RANEXA) 500 mg 12 hr tablet, Take 1 tablet (500 mg total) by mouth 2 (two) times a day, Disp: 180 tablet, Rfl: 3  •  rosuvastatin (CRESTOR) 5 mg tablet, TAKE ONE TABLET BY MOUTH EVERY DAY AT BEDTIME, Disp: 90 tablet, Rfl: 3  •  senna (SENOKOT) 8 6 mg, Take 1 tablet (8 6 mg total) by mouth daily at bedtime for 10 days (Patient not taking: Reported on 5/17/2023), Disp: 10 tablet, Rfl: 0  •  tamsulosin (Flomax) 0 4 mg, Take 1 capsule (0 4 mg total) by mouth daily with dinner, Disp: 90 capsule, Rfl: 3  •  Zinc 25 MG TABS, Take 25 mg by mouth daily at bedtime, Disp: , Rfl:     Social History     Socioeconomic History   • Marital status: /Civil Union     Spouse name: Not on file   • Number of children: Not on file   • Years of education: Not on file   • Highest education level: Not on file   Occupational History   • Not on file   Tobacco Use   • Smoking status: Never   • Smokeless tobacco: Never Vaping Use   • Vaping Use: Never used   Substance and Sexual Activity   • Alcohol use: Never   • Drug use: No   • Sexual activity: Not on file   Other Topics Concern   • Not on file   Social History Narrative   • Not on file     Social Determinants of Health     Financial Resource Strain: Not on file   Food Insecurity: No Food Insecurity   • Worried About Running Out of Food in the Last Year: Never true   • Ran Out of Food in the Last Year: Never true   Transportation Needs: No Transportation Needs   • Lack of Transportation (Medical): No   • Lack of Transportation (Non-Medical): No   Physical Activity: Not on file   Stress: Not on file   Social Connections: Not on file   Intimate Partner Violence: Not on file   Housing Stability: Low Risk    • Unable to Pay for Housing in the Last Year: No   • Number of Places Lived in the Last Year: 1   • Unstable Housing in the Last Year: No       Family History   Problem Relation Age of Onset   • Coronary artery disease Brother    • Heart disease Father         CAD       Physical Exam  Vitals and nursing note reviewed  Constitutional:       General: He is not in acute distress  HENT:      Head: Normocephalic and atraumatic  Eyes:      Conjunctiva/sclera: Conjunctivae normal    Cardiovascular:      Rate and Rhythm: Normal rate and regular rhythm  Pulses: Intact distal pulses  Heart sounds: Murmur heard  Harsh midsystolic murmur is present with a grade of 3/6 at the upper right sternal border radiating to the neck  Pulmonary:      Effort: Pulmonary effort is normal       Breath sounds: Normal breath sounds  Abdominal:      General: Bowel sounds are normal       Palpations: Abdomen is soft  Musculoskeletal:         General: Normal range of motion  Cervical back: Normal range of motion and neck supple  Skin:     General: Skin is warm and dry  Neurological:      Mental Status: He is alert and oriented to person, place, and time  Vitals:  There were no vitals taken for this visit  Wt Readings from Last 3 Encounters:   23 64 kg (141 lb)   23 63 kg (139 lb)   23 65 8 kg (145 lb)         Labs & Results:  Lab Results   Component Value Date    WBC 8 57 2023    HGB 15 2 2023    HCT 48 7 2023    MCV 96 2023     2023     BNP   Date Value Ref Range Status   2023 857 (H) 0 - 100 pg/mL Final   2015 69 0 - 100 pg/mL Final     Comment:     The above 1 analytes were performed by [place] [id number]       No components found for: CHEM    Results for orders placed during the hospital encounter of 21    Echo complete with contrast if indicated    Narrative  [address]  [address] [facility] [address]  [phone]    Transthoracic Echocardiogram  2D, M-mode, Doppler, and Color Doppler    Study date:  2021    Patient: Parish Arizmendi  MR number: [id number]  Account number: [de-identified]  : 02-Aug-1930  Age: 80 years  Gender: Male  Status: Outpatient  Location: Echo lab  Height: 65 in  Weight: 151 6 lb  BP: 122/ 58 mmHg    Indications: Aortic Stenosis    Diagnoses: 424 1 - AORTIC VALVE DISORDER    Sonographer:  YURI Aguilar  Primary Physician:  Lewis Wilder DO  Referring Physician:  Claudean Clark, MD  Group:  [facility]  Interpreting Physician:  Winsome Mclaughlin MD    SUMMARY    LEFT VENTRICLE:  Systolic function was at the lower limits of normal by visual assessment  Ejection fraction was estimated in the range of 45 % to 50 % to be 45 %  There was mild diffuse hypokinesis  Wall thickness was mildly increased  LEFT ATRIUM:  The atrium was mildly dilated  MITRAL VALVE:  There was mild to moderate annular calcification  Transmitral velocity was increased due to increased transvalvular flow  There was moderate to severe regurgitation  Based on elevated E' and central color flow jet   Unable to obtain PISA measurement for effective ERO calculation  Consider WALT for better evaluation    AORTIC VALVE:  The valve was probably trileaflet  Leaflets exhibited mildly increased thickness, mild calcification, moderately reduced cuspal separation, and sclerosis  Transaortic velocity was increased due to increased transvalvular flow  There was moderate stenosis  There was mild to moderate regurgitation  Valve mean gradient was 26 mmHg  HISTORY: PRIOR HISTORY: 3-vessel CAD,Chronic stable Angina,Chronic Diastolic heart failure,HTN,Murmur,DVT,Hyperlipidemia,anemia  PROCEDURE: The procedure was performed in the echo lab  This was a routine study  The transthoracic approach was used  The study included complete 2D imaging, M-mode, complete spectral Doppler, and color Doppler  The heart rate was 61 bpm,  at the start of the study  Images were obtained from the parasternal, apical, subcostal, and suprasternal notch acoustic windows  Image quality was adequate  LEFT VENTRICLE: Size was normal  Systolic function was at the lower limits of normal by visual assessment  Ejection fraction was estimated in the range of 45 % to 50 % to be 45 %  There was mild diffuse hypokinesis  Wall thickness was  mildly increased  No evidence of apical thrombus  DOPPLER: The study was not technically sufficient to allow evaluation of LV diastolic function  RIGHT VENTRICLE: The size was normal  Systolic function was normal  Wall thickness was normal     LEFT ATRIUM: The atrium was mildly dilated  RIGHT ATRIUM: Size was normal     MITRAL VALVE: There was mild to moderate annular calcification  There was mild-moderate calcification  There was mildly reduced leaflet separation  DOPPLER: Transmitral velocity was increased due to increased transvalvular flow  There was  no evidence for stenosis  There was moderate to severe regurgitation  Based on elevated E' and central color flow jet   Unable to obtain PISA measurement for effective ERO calculation  Consider WALT for better evaluation    AORTIC VALVE: The valve was probably trileaflet  Leaflets exhibited mildly increased thickness, mild calcification, moderately reduced cuspal separation, and sclerosis  DOPPLER: Transaortic velocity was increased due to increased  transvalvular flow  There was moderate stenosis  There was mild to moderate regurgitation  TRICUSPID VALVE: The valve structure was normal  There was normal leaflet separation  DOPPLER: The transtricuspid velocity was within the normal range  There was no evidence for stenosis  There was trace regurgitation  PULMONIC VALVE: Leaflets exhibited normal thickness, no calcification, and normal cuspal separation  DOPPLER: The transpulmonic velocity was within the normal range  There was trace regurgitation  PERICARDIUM: There was no pericardial effusion  The pericardium was normal in appearance  AORTA: The root exhibited normal size  SYSTEMIC VEINS: IVC: The inferior vena cava was not well visualized  MEASUREMENT TABLES    DOPPLER MEASUREMENTS  Aortic valve   (Reference normals)  Peak gilda   350 cm/s   (--)  Peak gradient   48 mmHg   (--)  Mean gradient   26 mmHg   (--)  Regurg PHT   460 ms   (--)    SYSTEM MEASUREMENT TABLES    2D  EF (Teich): 49 86 %  %FS: 25 27 %  JUDY Planimetry: 0 86 cm2  Ao Diam: 3 1 cm  EDV(Teich): 104 22 ml  ESV(Teich): 52 25 ml  IVSd: 1 24 cm  LA Area: 22 39 cm2  LA Diam: 4 03 cm  LVEDV MOD A4C: 174 08 ml  LVEF MOD A4C: 50 26 %  LVESV MOD A4C: 86 59 ml  LVIDd: 4 74 cm  LVIDs: 3 54 cm  LVLd A4C: 9 35 cm  LVLs A4C: 8 17 cm  LVOT Diam: 1 96 cm  LVPWd: 1 21 cm  RA Area: 12 71 cm2  RVIDd: 2 45 cm  SV (Teich): 51 97 ml  SV MOD A4C: 87 49 ml    CW  AV Env  Ti: 361 56 ms  AV VTI: 86 51 cm  AV Vmax: 3 48 m/s  AV Vmean: 2 39 m/s  AV maxP 38 mmHg  AV meanP 03 mmHg  TR Vmax: 3 25 m/s  TR maxP 23 mmHg    PW  E' Sept: 0 05 m/s  LVOT Env  Ti: 361 56 ms  LVOT VTI: 27 25 cm  LVOT Vmax: 1 15 m/s  LVOT Vmean: 0 75 m/s  LVOT maxP 29 mmHg  LVOT meanP 61 mmHg    [facility] Accredited Echocardiography Laboratory    Prepared and electronically signed by    Alon Bartlett MD  Signed 2021 17:34:59    No results found for this or any previous visit  This note was completed in part utilizing [email or web address] direct voice recognition software  Grammatical errors, random word insertion, spelling mistakes, and incomplete sentences may be an occasional consequence of the system secondary to software limitations, ambient noise and hardware issues  At the time of dictation, efforts were made to edit, clarify and /or correct errors  Please read the chart carefully and recognize, using context, where substitutions have occurred    If you have any questions or concerns about the context, text or information contained within the body of this dictation, please contact myself, the provider, for further clarification

## 2023-05-17 ENCOUNTER — TELEPHONE (OUTPATIENT)
Dept: GASTROENTEROLOGY | Facility: CLINIC | Age: 88
End: 2023-05-17

## 2023-05-17 ENCOUNTER — TELEPHONE (OUTPATIENT)
Dept: CARDIOLOGY CLINIC | Facility: CLINIC | Age: 88
End: 2023-05-17

## 2023-05-17 ENCOUNTER — OFFICE VISIT (OUTPATIENT)
Dept: CARDIOLOGY CLINIC | Facility: CLINIC | Age: 88
End: 2023-05-17

## 2023-05-17 ENCOUNTER — APPOINTMENT (OUTPATIENT)
Dept: LAB | Facility: CLINIC | Age: 88
End: 2023-05-17

## 2023-05-17 ENCOUNTER — OFFICE VISIT (OUTPATIENT)
Dept: GASTROENTEROLOGY | Facility: CLINIC | Age: 88
End: 2023-05-17

## 2023-05-17 VITALS
HEIGHT: 64 IN | DIASTOLIC BLOOD PRESSURE: 72 MMHG | SYSTOLIC BLOOD PRESSURE: 116 MMHG | HEART RATE: 90 BPM | WEIGHT: 141 LBS | BODY MASS INDEX: 24.07 KG/M2 | OXYGEN SATURATION: 98 %

## 2023-05-17 VITALS
DIASTOLIC BLOOD PRESSURE: 61 MMHG | HEIGHT: 64 IN | SYSTOLIC BLOOD PRESSURE: 114 MMHG | HEART RATE: 85 BPM | WEIGHT: 141 LBS | BODY MASS INDEX: 24.07 KG/M2

## 2023-05-17 DIAGNOSIS — R13.19 ESOPHAGEAL DYSPHAGIA: Primary | ICD-10-CM

## 2023-05-17 DIAGNOSIS — Z98.61 CAD S/P PERCUTANEOUS CORONARY ANGIOPLASTY: ICD-10-CM

## 2023-05-17 DIAGNOSIS — K31.A12 INTESTINAL METAPLASIA OF BODY OF STOMACH WITHOUT DYSPLASIA: ICD-10-CM

## 2023-05-17 DIAGNOSIS — I20.8 CHRONIC STABLE ANGINA (HCC): ICD-10-CM

## 2023-05-17 DIAGNOSIS — Z95.1 HX OF CABG: ICD-10-CM

## 2023-05-17 DIAGNOSIS — I10 PRIMARY HYPERTENSION: Chronic | ICD-10-CM

## 2023-05-17 DIAGNOSIS — R73.01 IMPAIRED FASTING GLUCOSE: ICD-10-CM

## 2023-05-17 DIAGNOSIS — K22.4 ESOPHAGEAL DYSMOTILITY: ICD-10-CM

## 2023-05-17 DIAGNOSIS — E78.2 MIXED HYPERLIPIDEMIA: Chronic | ICD-10-CM

## 2023-05-17 DIAGNOSIS — I50.9 CHF EXACERBATION (HCC): ICD-10-CM

## 2023-05-17 DIAGNOSIS — I25.10 CAD S/P PERCUTANEOUS CORONARY ANGIOPLASTY: ICD-10-CM

## 2023-05-17 DIAGNOSIS — I50.42 CHRONIC COMBINED SYSTOLIC AND DIASTOLIC HF (HEART FAILURE) (HCC): ICD-10-CM

## 2023-05-17 DIAGNOSIS — Q39.6 ESOPHAGEAL DIVERTICULUM: ICD-10-CM

## 2023-05-17 DIAGNOSIS — Z09 HOSPITAL DISCHARGE FOLLOW-UP: Primary | ICD-10-CM

## 2023-05-17 DIAGNOSIS — K21.9 GASTROESOPHAGEAL REFLUX DISEASE WITHOUT ESOPHAGITIS: ICD-10-CM

## 2023-05-17 DIAGNOSIS — I25.10 CORONARY ARTERY DISEASE INVOLVING NATIVE HEART WITHOUT ANGINA PECTORIS, UNSPECIFIED VESSEL OR LESION TYPE: ICD-10-CM

## 2023-05-17 DIAGNOSIS — Z98.890 STATUS POST GASTRIC SURGERY: ICD-10-CM

## 2023-05-17 DIAGNOSIS — R07.9 CHEST PAIN: ICD-10-CM

## 2023-05-17 LAB
ANION GAP SERPL CALCULATED.3IONS-SCNC: 9 MMOL/L (ref 4–13)
BUN SERPL-MCNC: 26 MG/DL (ref 5–25)
CALCIUM SERPL-MCNC: 9.2 MG/DL (ref 8.4–10.2)
CHLORIDE SERPL-SCNC: 96 MMOL/L (ref 96–108)
CO2 SERPL-SCNC: 31 MMOL/L (ref 21–32)
CREAT SERPL-MCNC: 1.21 MG/DL (ref 0.6–1.3)
GFR SERPL CREATININE-BSD FRML MDRD: 51 ML/MIN/1.73SQ M
GLUCOSE SERPL-MCNC: 120 MG/DL (ref 65–140)
POTASSIUM SERPL-SCNC: 4.4 MMOL/L (ref 3.5–5.3)
SODIUM SERPL-SCNC: 136 MMOL/L (ref 135–147)

## 2023-05-17 RX ORDER — POTASSIUM CHLORIDE 20MEQ/15ML
20 LIQUID (ML) ORAL DAILY
Qty: 450 ML | Refills: 3 | Status: SHIPPED | OUTPATIENT
Start: 2023-05-17

## 2023-05-17 NOTE — PATIENT INSTRUCTIONS

## 2023-05-17 NOTE — LETTER
May 17, 2023     Junito Schneider DO  [address] [place] [address]   [address] [facility] [address]    Patient: Kristi Rueda   YOB: 1930   Date of Visit: 5/17/2023       Dear Dr Rivka Langford: Thank you for referring Matias Ruelas to me for evaluation  Below are my notes for this consultation  If you have questions, please do not hesitate to call me  I look forward to following your patient along with you  Sincerely,        MATT Tang        CC: Angelique Bence, MD Merilynn Carnes, [facility]  5/17/2023  2:24 PM  Sign when Signing Visit  Cardiology  Heart Failure   Follow Up Office Visit Note     Kristi Rueda   80 y o    male   MRN: [id number]  [address]  [address] [place] [address]  [phone]    PCP: Junito Schneider DO  Cardiologist : will be Dr Michael Flowers              Summary of Recommendations  Low-sodium diet, Heart failure education as below reviewed the importance of this  We will switch his potassium to liquid given his GI issues   Kcl  20 meq/15 mL daily  Nonfasting BMP, magnesium level today  F/U with me 2 weeks  Follow up will be scheduled with Dr Michael Flowers 4-6 weeks, prt request      Impression/plan  Chronic combined heart failure, EF 45% with adm 5/7-5/11/23  -Discharge weight 139 lb  Wt Readings from Last 3 Encounters:   05/17/23 64 kg (141 lb)   05/11/23 63 kg (139 lb)   04/14/23 65 8 kg (145 lb)   --beta-blocker:   Toprol 50 mg every 12  --Diuretic:   Lasix 40 mg daily this is new  He is also on potassium 20 meq/daily  We will switch this to liquid, given he has significant swallowing issues  --ACE/ARB/ARNI:    --MRA:   --SLGT2i  --2 g sodium diet, 1800 cc fluid restriction  Daily weights, call weight gain 2-3 lb in 1 day or 5 lb in 5 days  valvular heart disease    The patient is aware of the disease  · Moderate mitral regurgitation  · Aortic stenosis PG-45,  MG- 25   Medical management  3VCAD, S/P CABG x 6 5065 (Angelico)  with chronic stable angina  Prior multivessel stenting  He is being medically managed  · On Plavix 75 mg daily, statin, beta-blocker, ranolazine  · Nuclear stress test March 2023  Same as  prior  Hypertension, essential  BP  116/72  On loop diuretic, Imdur 60 mg daily, Toprol 50 mg every 12h; also on Flomax  Hyperlipidemia, mixed  On rosuvastatin 5 mg/d Zetia 10 mg daily  11/22: calculated LDL 82  Moderate to high-dose statin  Latest Reference Range & Units 11/30/19 05:16 01/09/20 08:40 02/26/21 05:52 07/20/21 10:31 11/02/21 04:33 11/29/22 08:39   Cholesterol See Comment mg/dL 191 159 125 138 144 154   Triglycerides See Comment mg/dL 98 126 113 109 115 169 (H)   HDL >=40 mg/dL 33 (L) 39 (L) 34 (L) 38 (L) 41 38 (L)   Non-HDL Cholesterol mg/dl 158 120  100  116   LDL Calculated 0 - 100 mg/dL 138 (H) 95 68 78 80 82   Peptic ulcer disease/cephalgia  Follow-up with GI  Hx DVT,S/p IVC filter  Anxiety  Cardiac testing  · WALT 2/14/22  EF 45%, no PFO, left atrial appendage has normal function, aortic valve thickened moderately calcified and moderately reduced with mild-to-moderate AI and moderate aortic stenosis, moderate to severe mitral regurgitation  • TTE 3/16/23  Kayy Hy EF 45%  Grade 2 DD  Mild global hypokinesis  RV systolic function mildly reduced  Severe LAE  There is a functionally bicuspid aortic valve with severe reduced mobility  Mild AI with a centrally directed jet  There is moderate aortic stenosis  Peak gradient 45, mean gradient 25  Moderate MR  Mild TR  PASP 54 mmHg  Mild to moderate pulmonic valve regurgitation  •  Compared to previous exam, there is no significant change  · Pharmacological SPECT 3/16/2023  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 which may be due to artifact              HPI:  Quincy Collins is a 81 yo male with CAD, S/P CABG x 6,, 2015 with previous multivessel stenting  He has been medically managed    He has chronc HFpEF, HTN, HL, prior DVT,S/P IVC filter  He follows with Dr Jacob Hanson 3/15- 3/17 [facility] [address]  chest pain  Toprol was increased to 75 mg twice a day,     4/2023 OV Dr Hilton Abts-  Chronic stable angina  BBlocker increased  Also taking Ranexa and Imdur  taking Toprol  75mg every 8 hrs  Weight 145 pounds  Adm 5/7-5/11/23  CC: Some HARMON x1 week  Chest discomfort   called EMS  Volume overloaded  On no maintenance diuretic pre hospital    He required treatment with BiPAP on admission  No significant troponin elevation  Followed by cardiology  BNP elevated  857  X-ray: Mild pulmonary vascular congestion with small effusions  Diuresed  Discharge weight :139 pounds  Discharge creatinine: 1 11  Discharge diuretics: Lasix 40 mg daily  Toprol changed to 50 mg BID  Imdur increased from 30 mg QD to 60 mg QD       5/17/23  Hosp follow-up after an admission for heart failure  ROS: He denies chest pain  He denies shortness of breath  He has trouble swallowing potassium  I reviewed his hospital course  He is aware of his valvular heart disease  This is being medically managed  He knows he needs to avoid dietary sodium  I reviewed how to read food labels  wt 141 pounds  /72  Eats out at restaurants  I encouraged him to adhere to a salt restricted diet  Nonfasting BMP/ return to see me in a short interval      I have spent 25 minutes with Patient and family today in which greater than 50% of this time was spent in counseling/coordination of care regarding Instructions for management, Patient and family education, Importance of tx compliance, Risk factor reductions, Documenting in the medical record, Reviewing / ordering tests, medicine, procedures   and Obtaining or reviewing history      Assessment  Diagnoses and all orders for this visit:    Hospital discharge follow-up    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) ([facility] [address] )    Coronary artery disease involving native heart without angina pectoris, unspecified vessel or "lesion type    Hx of CABG    Primary hypertension    Mixed hyperlipidemia    Impaired fasting glucose        Past Medical History:   Diagnosis Date   • Arthritis    • BPH (benign prostatic hyperplasia)    • Cervical spine fracture (HCC) 1997    C3   • Chest pain    • Colon polyp    • Coronary artery disease    • Dry eye    • DVT (deep venous thrombosis) ([facility] ) 2015    right leg- passed thru the IVC filter-stopped at the \"patch\" from bypass surgery   • Dysphagia 11/2021    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   • Fracture of thoracic spine ([facility] ) 1997    T3   • Glaucoma    • Hyperlipidemia    • Hypertension    • Myocardial infarction ([facility] )    • Pneumonia    • PUD (peptic ulcer disease)        Review of Systems   Constitutional: Negative for chills  Cardiovascular: Negative for chest pain, claudication, cyanosis, dyspnea on exertion, irregular heartbeat, leg swelling, near-syncope, orthopnea, palpitations, paroxysmal nocturnal dyspnea and syncope  Respiratory: Negative for cough and shortness of breath  Gastrointestinal: Negative for heartburn and nausea  Chronic swallowing problems   Neurological: Negative for dizziness, focal weakness, headaches, light-headedness and weakness  All other systems reviewed and are negative  Allergies   Allergen Reactions   • Escitalopram Other (See Comments)     Suicidal feelings, sweating   • Oxycodone-Acetaminophen Dizziness and Shortness Of Breath     Other reaction(s): Faints  Reaction Date: [id number]; Category: Adverse Reaction;    • Sucralfate GI Intolerance     Abdominal pain    • Sulfa Antibiotics Other (See Comments)   • Omeprazole Rash   • Statins Other (See Comments)     Muscle pain           Current Outpatient Medications:   •  ALPRAZolam (XANAX) 0 5 mg tablet, Take 1 tablet (0 5 mg total) by mouth daily at bedtime as needed for anxiety, Disp: 90 tablet, Rfl: 1  •  Calcium Carbonate-Vitamin D (CALCIUM 600+D PO), Take by mouth daily with " lunch, Disp: , Rfl:   •  cholecalciferol (VITAMIN D3) 400 units tablet, Take 400 Units by mouth daily after lunch , Disp: , Rfl:   •  clopidogrel (PLAVIX) 75 mg tablet, TAKE ONE TABLET BY MOUTH EVERY DAY (Patient taking differently: 75 mg daily at bedtime), Disp: 90 tablet, Rfl: 3  •  Docusate Calcium (STOOL SOFTENER PO), Take by mouth OTC; takes with lunch, Disp: , Rfl:   •  dutasteride (AVODART) 0 5 mg capsule, Take 1 capsule (0 5 mg total) by mouth in the morning , Disp: 90 capsule, Rfl: 3  •  famotidine (PEPCID) 20 mg tablet, Take 1 tablet (20 mg total) by mouth daily at bedtime, Disp: 90 tablet, Rfl: 3  •  furosemide (LASIX) 40 mg tablet, Take 1 tablet (40 mg total) by mouth daily Do not start before May 12, 2023 , Disp: 30 tablet, Rfl: 0  •  isosorbide mononitrate (IMDUR) 60 mg 24 hr tablet, Take 1 tablet (60 mg total) by mouth every morning Do not start before May 12, 2023 , Disp: 30 tablet, Rfl: 0  •  metoprolol succinate (TOPROL-XL) 50 mg 24 hr tablet, Take 1 tablet (50 mg total) by mouth every 12 (twelve) hours, Disp: 60 tablet, Rfl: 0  •  multivitamin-iron-minerals-folic acid (CENTRUM) chewable tablet, Chew 1 tablet every morning , Disp: , Rfl:   •  pantoprazole (PROTONIX) 20 mg tablet, Take 1 tablet (20 mg total) by mouth 2 (two) times a day, Disp: 180 tablet, Rfl: 1  •  polyethylene glycol (MIRALAX) 17 g packet, Take 17 g by mouth daily for 10 days, Disp: 170 g, Rfl: 0  •  potassium chloride (K-DUR,KLOR-CON) 20 mEq tablet, Take 1 tablet (20 mEq total) by mouth daily Do not start before May 12, 2023 , Disp: 30 tablet, Rfl: 0  •  ranolazine (RANEXA) 500 mg 12 hr tablet, Take 1 tablet (500 mg total) by mouth 2 (two) times a day, Disp: 180 tablet, Rfl: 3  •  rosuvastatin (CRESTOR) 5 mg tablet, TAKE ONE TABLET BY MOUTH EVERY DAY AT BEDTIME, Disp: 90 tablet, Rfl: 3  •  senna (SENOKOT) 8 6 mg, Take 1 tablet (8 6 mg total) by mouth daily at bedtime for 10 days (Patient not taking: Reported on 5/17/2023), Disp: 10 tablet, Rfl: 0  •  tamsulosin (Flomax) 0 4 mg, Take 1 capsule (0 4 mg total) by mouth daily with dinner, Disp: 90 capsule, Rfl: 3  •  Zinc 25 MG TABS, Take 25 mg by mouth daily at bedtime, Disp: , Rfl:     Social History     Socioeconomic History   • Marital status: /Civil Union     Spouse name: Not on file   • Number of children: Not on file   • Years of education: Not on file   • Highest education level: Not on file   Occupational History   • Not on file   Tobacco Use   • Smoking status: Never   • Smokeless tobacco: Never   Vaping Use   • Vaping Use: Never used   Substance and Sexual Activity   • Alcohol use: Never   • Drug use: No   • Sexual activity: Not on file   Other Topics Concern   • Not on file   Social History Narrative   • Not on file     Social Determinants of Health     Financial Resource Strain: Not on file   Food Insecurity: No Food Insecurity   • Worried About Running Out of Food in the Last Year: Never true   • Ran Out of Food in the Last Year: Never true   Transportation Needs: No Transportation Needs   • Lack of Transportation (Medical): No   • Lack of Transportation (Non-Medical): No   Physical Activity: Not on file   Stress: Not on file   Social Connections: Not on file   Intimate Partner Violence: Not on file   Housing Stability: Low Risk    • Unable to Pay for Housing in the Last Year: No   • Number of Places Lived in the Last Year: 1   • Unstable Housing in the Last Year: No       Family History   Problem Relation Age of Onset   • Coronary artery disease Brother    • Heart disease Father         CAD       Physical Exam  Vitals and nursing note reviewed  Constitutional:       General: He is not in acute distress  HENT:      Head: Normocephalic and atraumatic  Eyes:      Conjunctiva/sclera: Conjunctivae normal    Cardiovascular:      Rate and Rhythm: Normal rate and regular rhythm  Pulses: Intact distal pulses  Heart sounds: Murmur heard      Harsh midsystolic murmur is present with a grade of 3/6 at the upper right sternal border radiating to the neck  Pulmonary:      Effort: Pulmonary effort is normal       Breath sounds: Normal breath sounds  Abdominal:      General: Bowel sounds are normal       Palpations: Abdomen is soft  Musculoskeletal:         General: Normal range of motion  Cervical back: Normal range of motion and neck supple  Skin:     General: Skin is warm and dry  Neurological:      Mental Status: He is alert and oriented to person, place, and time  Vitals: There were no vitals taken for this visit  Wt Readings from Last 3 Encounters:   23 64 kg (141 lb)   23 63 kg (139 lb)   23 65 8 kg (145 lb)         Labs & Results:  Lab Results   Component Value Date    WBC 8 57 2023    HGB 15 2 2023    HCT 48 7 2023    MCV 96 2023     2023     BNP   Date Value Ref Range Status   2023 857 (H) 0 - 100 pg/mL Final   2015 69 0 - 100 pg/mL Final     Comment:     The above 1 analytes were performed by [place] [id number]       No components found for: CHEM    Results for orders placed during the hospital encounter of 21    Echo complete with contrast if indicated    Narrative  [address]  [address] [facility] [address]  [phone]    Transthoracic Echocardiogram  2D, M-mode, Doppler, and Color Doppler    Study date:  2021    Patient: Parish Arizmendi  MR number: [id number]  Account number: [de-identified]  : 02-Aug-1930  Age: 80 years  Gender: Male  Status: Outpatient  Location: Echo lab  Height: 65 in  Weight: 151 6 lb  BP: 122/ 58 mmHg    Indications:  Aortic Stenosis    Diagnoses: 424 1 - AORTIC VALVE DISORDER    Sonographer:  YURI Aguilar  Primary Physician:  Lewis Wilder DO  Referring Physician:  Claudean Clark, MD  Group:  [facility]  Interpreting Physician:  Winsome Mclaughlin MD    SUMMARY    LEFT VENTRICLE:  Systolic function was at the lower limits of normal by visual assessment  Ejection fraction was estimated in the range of 45 % to 50 % to be 45 %  There was mild diffuse hypokinesis  Wall thickness was mildly increased  LEFT ATRIUM:  The atrium was mildly dilated  MITRAL VALVE:  There was mild to moderate annular calcification  Transmitral velocity was increased due to increased transvalvular flow  There was moderate to severe regurgitation  Based on elevated E' and central color flow jet  Unable to obtain PISA measurement for effective ERO calculation  Consider WALT for better evaluation    AORTIC VALVE:  The valve was probably trileaflet  Leaflets exhibited mildly increased thickness, mild calcification, moderately reduced cuspal separation, and sclerosis  Transaortic velocity was increased due to increased transvalvular flow  There was moderate stenosis  There was mild to moderate regurgitation  Valve mean gradient was 26 mmHg  HISTORY: PRIOR HISTORY: 3-vessel CAD,Chronic stable Angina,Chronic Diastolic heart failure,HTN,Murmur,DVT,Hyperlipidemia,anemia  PROCEDURE: The procedure was performed in the echo lab  This was a routine study  The transthoracic approach was used  The study included complete 2D imaging, M-mode, complete spectral Doppler, and color Doppler  The heart rate was 61 bpm,  at the start of the study  Images were obtained from the parasternal, apical, subcostal, and suprasternal notch acoustic windows  Image quality was adequate  LEFT VENTRICLE: Size was normal  Systolic function was at the lower limits of normal by visual assessment  Ejection fraction was estimated in the range of 45 % to 50 % to be 45 %  There was mild diffuse hypokinesis  Wall thickness was  mildly increased  No evidence of apical thrombus  DOPPLER: The study was not technically sufficient to allow evaluation of LV diastolic function      RIGHT VENTRICLE: The size was normal  Systolic function was normal  Wall thickness was normal     LEFT ATRIUM: The atrium was mildly dilated  RIGHT ATRIUM: Size was normal     MITRAL VALVE: There was mild to moderate annular calcification  There was mild-moderate calcification  There was mildly reduced leaflet separation  DOPPLER: Transmitral velocity was increased due to increased transvalvular flow  There was  no evidence for stenosis  There was moderate to severe regurgitation  Based on elevated E' and central color flow jet  Unable to obtain PISA measurement for effective ERO calculation  Consider WALT for better evaluation    AORTIC VALVE: The valve was probably trileaflet  Leaflets exhibited mildly increased thickness, mild calcification, moderately reduced cuspal separation, and sclerosis  DOPPLER: Transaortic velocity was increased due to increased  transvalvular flow  There was moderate stenosis  There was mild to moderate regurgitation  TRICUSPID VALVE: The valve structure was normal  There was normal leaflet separation  DOPPLER: The transtricuspid velocity was within the normal range  There was no evidence for stenosis  There was trace regurgitation  PULMONIC VALVE: Leaflets exhibited normal thickness, no calcification, and normal cuspal separation  DOPPLER: The transpulmonic velocity was within the normal range  There was trace regurgitation  PERICARDIUM: There was no pericardial effusion  The pericardium was normal in appearance  AORTA: The root exhibited normal size  SYSTEMIC VEINS: IVC: The inferior vena cava was not well visualized      MEASUREMENT TABLES    DOPPLER MEASUREMENTS  Aortic valve   (Reference normals)  Peak gilda   350 cm/s   (--)  Peak gradient   48 mmHg   (--)  Mean gradient   26 mmHg   (--)  Regurg PHT   460 ms   (--)    SYSTEM MEASUREMENT TABLES    2D  EF (Teich): 49 86 %  %FS: 25 27 %  JUDY Planimetry: 0 86 cm2  Ao Diam: 3 1 cm  EDV(Teich): 104 22 ml  ESV(Teich): 52 25 ml  IVSd: 1 24 cm  LA Area: 22 39 cm2  LA Diam: 4 03 cm  LVEDV MOD A4C: 174 08 ml  LVEF MOD A4C: 50 26 %  LVESV MOD A4C: 86 59 ml  LVIDd: 4 74 cm  LVIDs: 3 54 cm  LVLd A4C: 9 35 cm  LVLs A4C: 8 17 cm  LVOT Diam: 1 96 cm  LVPWd: 1 21 cm  RA Area: 12 71 cm2  RVIDd: 2 45 cm  SV (Teich): 51 97 ml  SV MOD A4C: 87 49 ml    CW  AV Env  Ti: 361 56 ms  AV VTI: 86 51 cm  AV Vmax: 3 48 m/s  AV Vmean: 2 39 m/s  AV maxP 38 mmHg  AV meanP 03 mmHg  TR Vmax: 3 25 m/s  TR maxP 23 mmHg    PW  E' Sept: 0 05 m/s  LVOT Env  Ti: 361 56 ms  LVOT VTI: 27 25 cm  LVOT Vmax: 1 15 m/s  LVOT Vmean: 0 75 m/s  LVOT maxP 29 mmHg  LVOT meanP 61 mmHg    [employer] Accredited Echocardiography Laboratory    Prepared and electronically signed by    Shari Yost MD  Signed 2021 17:34:59    No results found for this or any previous visit  This note was completed in part utilizing m-WISHI fluency direct voice recognition software  Grammatical errors, random word insertion, spelling mistakes, and incomplete sentences may be an occasional consequence of the system secondary to software limitations, ambient noise and hardware issues  At the time of dictation, efforts were made to edit, clarify and /or correct errors  Please read the chart carefully and recognize, using context, where substitutions have occurred    If you have any questions or concerns about the context, text or information contained within the body of this dictation, please contact myself, the provider, for further clarification

## 2023-05-17 NOTE — TELEPHONE ENCOUNTER
Kia Mendez 4 minutes ago (12:45 PM)     TI  Our mutual patient is scheduled for procedure: Endoscopy     On: __06__/_  27  _/_ 23   _       With: Dr Ayala_________     He/She is taking the following blood thinner:  Plavix                                               Can this be stopped ___5___ days prior to the procedure?

## 2023-05-17 NOTE — PROGRESS NOTES
"[facility] - Outpatient Follow-up Note  Ted Leal 80 y o  male MRN: [id number]  Encounter: [id number]          ASSESSMENT AND PLAN:      1  Esophageal dysphagia  To liquids and solids  Does have a history of reflux, diverticulum and a dysmotility disorder of the esophagus  Suggested avoiding large potassium pills discussed at length with patient and wife  - EGD; Future    2  Esophageal diverticulum  At 30 cm  - EGD; Future    3  Esophageal dysmotility    - EGD; Future    4  Gastroesophageal reflux disease without esophagitis  Controlled with pantoprazole 20 mg twice a day and famotidine 20 mg at night  - EGD; Future    5  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  Due for surveillance biopsy  - EGD; Future    6  Status post gastric surgery  Patient will otherwise follow-up in 4 months   - EGD; Future    ______________________________________________________________________    SUBJECTIVE: Very pleasant 77-year-old gentleman long history of dysphagia  Likewise intestinal metaplasia of the gastric body  He is status post partial gastrectomy  Recently hospitalized for CHF  He is due for endoscopy  He tells me he is taking large potassium pills  We discussed avoidance of that if possible perhaps alternate means in the sense of liquid etc   He will touch base with his cardiologist   No melena or bright red blood per rectum  Reflux is well controlled  REVIEW OF SYSTEMS IS OTHERWISE NEGATIVE        Historical Information   Past Medical History:   Diagnosis Date   • Arthritis    • BPH (benign prostatic hyperplasia)    • Cervical spine fracture ([facility] ) 1997    C3   • Chest pain    • Colon polyp    • Coronary artery disease    • Dry eye    • DVT (deep venous thrombosis) ([facility] ) 2015    right leg- passed thru the IVC filter-stopped at the \"patch\" from bypass surgery   • Dysphagia 11/2021    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   • Fracture of thoracic " spine ([facility] ) 1997    T3   • Glaucoma    • Hyperlipidemia    • Hypertension    • Myocardial infarction ([facility] )    • Pneumonia    • PUD (peptic ulcer disease)      Past Surgical History:   Procedure Laterality Date   • ANGIOPLASTY      2 stents   • CHOLECYSTECTOMY     • COLONOSCOPY     • CORONARY ARTERY BYPASS GRAFT      x6   • CORONARY ARTERY BYPASS GRAFT     • CORONARY STENT PLACEMENT     • CYSTOSCOPY     • EYE SURGERY Right    • HERNIA REPAIR Right 11/3/2017    Procedure: REPAIR HERNIA INGUINAL;  Surgeon: Melisa Petty MD;  Location: [facility];  Service: General   • IVC FILTER INSERTION      IVC filter   • MN CYSTO W/IRRIG & EVAC MULTPLE OBSTRUCTING CLOTS N/A 6/30/2018    Procedure: CYSTOSCOPY EVACUATION OF CLOTS, fulgeration;  Surgeon: Anna Slade MD;  Location:  Main OR;  Service: Urology   • [address]    Billroth 2 gastrectomy-2/3 removal- bleeding ulcer   • TRANSURETHRAL RESECTION OF PROSTATE       Social History   Social History     Substance and Sexual Activity   Alcohol Use Never     Social History     Substance and Sexual Activity   Drug Use No     Social History     Tobacco Use   Smoking Status Never   Smokeless Tobacco Never     Family History   Problem Relation Age of Onset   • Coronary artery disease Brother    • Heart disease Father         CAD       Meds/Allergies       Current Outpatient Medications:   •  ALPRAZolam (XANAX) 0 5 mg tablet  •  Calcium Carbonate-Vitamin D (CALCIUM 600+D PO)  •  cholecalciferol (VITAMIN D3) 400 units tablet  •  clopidogrel (PLAVIX) 75 mg tablet  •  Docusate Calcium (STOOL SOFTENER PO)  •  dutasteride (AVODART) 0 5 mg capsule  •  furosemide (LASIX) 40 mg tablet  •  isosorbide mononitrate (IMDUR) 60 mg 24 hr tablet  •  metoprolol succinate (TOPROL-XL) 50 mg 24 hr tablet  •  multivitamin-iron-minerals-folic acid (CENTRUM) chewable tablet  •  pantoprazole (PROTONIX) 20 mg tablet  •  potassium chloride (K-DUR,KLOR-CON) 20 mEq tablet  •  ranolazine "(RANEXA) 500 mg 12 hr tablet  •  rosuvastatin (CRESTOR) 5 mg tablet  •  tamsulosin (Flomax) 0 4 mg  •  Zinc 25 MG TABS  •  famotidine (PEPCID) 20 mg tablet  •  polyethylene glycol (MIRALAX) 17 g packet  •  senna (SENOKOT) 8 6 mg    Allergies   Allergen Reactions   • Escitalopram Other (See Comments)     Suicidal feelings, sweating   • Oxycodone-Acetaminophen Dizziness and Shortness Of Breath     Other reaction(s): Faints  Reaction Date: [id number]; Category: Adverse Reaction;    • Sucralfate GI Intolerance     Abdominal pain    • Sulfa Antibiotics Other (See Comments)   • Omeprazole Rash   • Statins Other (See Comments)     Muscle pain           Objective     Blood pressure 114/61, pulse 85, height 5' 4\" (1 626 m), weight 64 kg (141 lb)  Body mass index is 24 2 kg/m²  PHYSICAL EXAM:      General Appearance:   Alert, cooperative, no distress   HEENT:   Normocephalic, atraumatic, anicteric      Neck:  Supple, symmetrical, trachea midline   Lungs:   Clear to auscultation bilaterally; no rales, rhonchi or wheezing; respirations unlabored    Heart[de-identified]   Regular rate and rhythm; no murmur, rub, or gallop  Abdomen:   Soft, non-tender, non-distended; normal bowel sounds; no masses, no organomegaly    Genitalia:   Deferred    Rectal:   Deferred    Extremities:  No cyanosis, clubbing or edema    Pulses:  2+ and symmetric    Skin:  No jaundice, rashes, or lesions    Lymph nodes:  No palpable cervical lymphadenopathy        Lab Results:   No visits with results within 1 Day(s) from this visit     Latest known visit with results is:   Admission on 05/07/2023, Discharged on 05/11/2023   Component Date Value   • Ventricular Rate 05/07/2023 122    • Atrial Rate 05/07/2023 122    • NV Interval 05/07/2023 194    • QRSD Interval 05/07/2023 122    • QT Interval 05/07/2023 330    • QTC Interval 05/07/2023 470    • P Axis 05/07/2023 21    • QRS Axis 05/07/2023 22    • T Wave Axis 05/07/2023 213    • WBC 05/07/2023 8 57    • RBC " 05/07/2023 5 05    • Hemoglobin 05/07/2023 15 2    • Hematocrit 05/07/2023 48 7    • MCV 05/07/2023 96    • MCH 05/07/2023 30 1    • MCHC 05/07/2023 31 2 (L)    • RDW 05/07/2023 14 2    • MPV 05/07/2023 10 3    • Platelets 60/60/3583 203    • nRBC 05/07/2023 0    • Neutrophils Relative 05/07/2023 69    • Immat GRANS % 05/07/2023 0    • Lymphocytes Relative 05/07/2023 24    • Monocytes Relative 05/07/2023 6    • Eosinophils Relative 05/07/2023 1    • Basophils Relative 05/07/2023 0    • Neutrophils Absolute 05/07/2023 5 88    • Immature Grans Absolute 05/07/2023 0 03    • Lymphocytes Absolute 05/07/2023 2 06    • Monocytes Absolute 05/07/2023 0 55    • Eosinophils Absolute 05/07/2023 0 04    • Basophils Absolute 05/07/2023 0 01    • Sodium 05/07/2023 138    • Potassium 05/07/2023 4 9    • Chloride 05/07/2023 102    • CO2 05/07/2023 27    • ANION GAP 05/07/2023 9    • BUN 05/07/2023 23    • Creatinine 05/07/2023 1 07    • Glucose 05/07/2023 184 (H)    • Calcium 05/07/2023 9 6    • AST 05/07/2023 8 (L)    • ALT 05/07/2023 9    • Alkaline Phosphatase 05/07/2023 99    • Total Protein 05/07/2023 8 1    • Albumin 05/07/2023 4 1    • Total Bilirubin 05/07/2023 0 64    • eGFR 05/07/2023 59    • hs TnI 0hr 05/07/2023 58 (H)    • BNP 05/07/2023 857 (H)    • Blood Culture 05/07/2023 No Growth After 5 Days  • Blood Culture 05/07/2023 No Growth After 5 Days      • LACTIC ACID 05/07/2023 2 8 (HH)    • hs TnI 2hr 05/07/2023 81 (H)    • Delta 2hr hsTnI 05/07/2023 23 (H)    • LACTIC ACID 05/07/2023 1 9    • Ventricular Rate 05/07/2023 94    • Atrial Rate 05/07/2023 94    • ND Interval 05/07/2023 194    • QRSD Interval 05/07/2023 116    • QT Interval 05/07/2023 374    • QTC Interval 05/07/2023 467    • P Axis 05/07/2023 80    • QRS Axis 05/07/2023 26    • T Wave Axis 05/07/2023 197    • hs TnI 4hr 05/07/2023 120 (H)    • Delta 4hr hsTnI 05/07/2023 62 (H)    • Sodium 05/08/2023 138    • Potassium 05/08/2023 4 4    • Chloride 05/08/2023 103    • CO2 05/08/2023 26    • ANION GAP 05/08/2023 9    • BUN 05/08/2023 24    • Creatinine 05/08/2023 1 11    • Glucose 05/08/2023 126    • Calcium 05/08/2023 8 4    • eGFR 05/08/2023 57    • Sodium 05/10/2023 136    • Potassium 05/10/2023 4 2    • Chloride 05/10/2023 99    • CO2 05/10/2023 26    • ANION GAP 05/10/2023 11    • BUN 05/10/2023 32 (H)    • Creatinine 05/10/2023 1 15    • Glucose 05/10/2023 179 (H)    • Calcium 05/10/2023 8 6    • eGFR 05/10/2023 54    • Sodium 05/11/2023 136    • Potassium 05/11/2023 3 5    • Chloride 05/11/2023 99    • CO2 05/11/2023 29    • ANION GAP 05/11/2023 8    • BUN 05/11/2023 34 (H)    • Creatinine 05/11/2023 1 11    • Glucose 05/11/2023 135    • Calcium 05/11/2023 8 5    • eGFR 05/11/2023 57    • Supplier Name 05/11/2023 [employer]    • Supplier Phone Number 05/11/2023 [phone]    • Order Status 05/11/2023 Delivery Successful    • Delivery Request Date 05/11/2023 05/11/2023    • Date Delivered  05/11/2023 05/12/2023    • Item Description 05/11/2023 Rollator Not Specific Color    • Item Description 05/11/2023 Seat Attachment          Radiology Results:   XR chest 1 view portable    Result Date: 5/7/2023  Narrative: CHEST INDICATION:   sob  COMPARISON: CXR 3/15/2023 and 2/11/2022, chest CT 3/15/2023  EXAM PERFORMED/VIEWS:  XR CHEST PORTABLE  FINDINGS: Normal heart size, CABG, coronary stent  Mild pulmonary venous congestion, small effusions, larger on the left, with left base atelectasis  No pneumothorax  Upper abdomen normal  Cholecystectomy  Bones normal for age  Impression: Mild pulmonary venous congestion with small effusions, larger on the left, and left base atelectasis   Workstation performed: [id number]

## 2023-05-17 NOTE — TELEPHONE ENCOUNTER
Procedure: EGD  Scheduled date of procedure (as of today):06/27/23  Physician performing procedure:Jamie  Location of procedure:[facility]  Instructions reviewed with patient by: ti  Clearances: Dr Ramón Diaz, pt on plavix, will check in a few weeks if not received

## 2023-05-17 NOTE — TELEPHONE ENCOUNTER
Our mutual patient is scheduled for procedure: Endoscopy    On: __06__/_  27  _/_ 21   _      With: Dr Ayala_________    He/She is taking the following blood thinner:  Plavix        Can this be stopped ___5___ days prior to the procedure? Physician Approving clearance:   ________________________        Thank you!

## 2023-05-18 ENCOUNTER — TELEPHONE (OUTPATIENT)
Dept: CARDIOLOGY CLINIC | Facility: CLINIC | Age: 88
End: 2023-05-18

## 2023-05-18 ENCOUNTER — OFFICE VISIT (OUTPATIENT)
Dept: FAMILY MEDICINE CLINIC | Facility: CLINIC | Age: 88
End: 2023-05-18

## 2023-05-18 VITALS
DIASTOLIC BLOOD PRESSURE: 60 MMHG | OXYGEN SATURATION: 99 % | WEIGHT: 142 LBS | BODY MASS INDEX: 24.24 KG/M2 | HEART RATE: 78 BPM | TEMPERATURE: 97.3 F | HEIGHT: 64 IN | RESPIRATION RATE: 16 BRPM | SYSTOLIC BLOOD PRESSURE: 90 MMHG

## 2023-05-18 DIAGNOSIS — N18.31 STAGE 3A CHRONIC KIDNEY DISEASE (HCC): ICD-10-CM

## 2023-05-18 DIAGNOSIS — I10 PRIMARY HYPERTENSION: Chronic | ICD-10-CM

## 2023-05-18 DIAGNOSIS — I50.42 CHRONIC COMBINED SYSTOLIC AND DIASTOLIC HF (HEART FAILURE) (HCC): Primary | ICD-10-CM

## 2023-05-18 NOTE — ASSESSMENT & PLAN NOTE
Wt Readings from Last 3 Encounters:   05/18/23 64 4 kg (142 lb)   05/17/23 64 kg (141 lb)   05/17/23 64 kg (141 lb)       he is following with cardiology regularly  Discussed importance of avoiding high salt foods and weighing himself daily    He is also going to monitor himself for swelling in his abdomen since that is where his fluid goes

## 2023-05-18 NOTE — ASSESSMENT & PLAN NOTE
Lab Results   Component Value Date    EGFR 51 05/17/2023    EGFR 57 05/11/2023    EGFR 54 05/10/2023    CREATININE 1 21 05/17/2023    CREATININE 1 11 05/11/2023    CREATININE 1 15 05/10/2023   Due to his worsening kidney function over the last couple years with him  Discussed that his regular use of diuretics does not appear to be affecting this  If he gets worse we will send him to nephrology

## 2023-05-18 NOTE — ASSESSMENT & PLAN NOTE
Blood pressure is slightly low but patient tolerating it well    Needs to be maximized on therapy due to ongoing heart issues

## 2023-05-18 NOTE — PROGRESS NOTES
Assessment & Plan     1  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)  Assessment & Plan:  Wt Readings from Last 3 Encounters:   05/18/23 64 4 kg (142 lb)   05/17/23 64 kg (141 lb)   05/17/23 64 kg (141 lb)       he is following with cardiology regularly  Discussed importance of avoiding high salt foods and weighing himself daily  He is also going to monitor himself for swelling in his abdomen since that is where his fluid goes          2  Primary hypertension  Assessment & Plan:  Blood pressure is slightly low but patient tolerating it well  Needs to be maximized on therapy due to ongoing heart issues      3  Stage 3a chronic kidney disease [facility])  Assessment & Plan:  Lab Results   Component Value Date    EGFR 51 05/17/2023    EGFR 57 05/11/2023    EGFR 54 05/10/2023    CREATININE 1 21 05/17/2023    CREATININE 1 11 05/11/2023    CREATININE 1 15 05/10/2023   Due to his worsening kidney function over the last couple years with him  Discussed that his regular use of diuretics does not appear to be affecting this  If he gets worse we will send him to nephrology  Subjective     Transitional Care Management Review:   Sherman Hinds is a 80 y o  male here for TCM follow up  During the TCM phone call patient stated:  TCM Call     Date and time call was made  5/11/2023 12:58 PM    Hospital care reviewed  Records reviewed    Patient was hospitialized at  [address] [place] [address]    Date of Admission  05/07/23    Date of discharge  05/11/23    Diagnosis  Acute on chronic combined systolic and diastolic heart failure    Disposition  Home    Were the patients medications reviewed and updated  Yes    Current Symptoms  None      TCM Call     Post hospital issues  None    Should patient be enrolled in anticoag monitoring? No    Scheduled for follow up?   Yes    Patients specialists  Cardiologist    Cardiologist name  MATT Samuels (Cardiology)    Did you obtain your prescribed medications  Yes "   Do you need help managing your prescriptions or medications  No    Is transportation to your appointment needed  No    I have advised the patient to call PCP with any new or worsening symptoms  [facility] [address] [place] [address] [facility]    Support System  Family    The type of support provided  Physical; Emotional    Do you have social support  Yes, as much as I need    Are you recieving any outpatient services  No    Are you recieving home care services  No    Have you fallen in the last 12 months  No    Interperter language line needed  No    Counseling  Patient    Counseling topics  Activities of daily living; patient and family education; instructions for management; Risk factor reduction; Importance of RX compliance    Comments  I spoke with Karlie Lane who states that he is doing very well  He knows to monitor his weights, follow a low sodium diet and follow fluid restriction, etc and he knows to go to the ER if any chest pain, dyspnea, weakness, etc Manuel Paul        Patient ports he is feeling much better since he has been in the hospital   He is been watching his diet carefully and his fluid intake carefully  He is no longer having shortness of breath  Before he went to the hospital he knows that his belly is getting bigger  He thinks that is where his fluid goes  He did not have any leg swelling  He is happy with his new cardiologist     Review of Systems   Respiratory: Negative for shortness of breath  Cardiovascular: Negative for leg swelling  Objective     BP 90/60   Pulse 78   Temp (!) 97 3 °F (36 3 °C)   Resp 16   Ht 5' 4\" (1 626 m)   Wt 64 4 kg (142 lb)   SpO2 99%   BMI 24 37 kg/m²      Physical Exam  Vitals reviewed  Constitutional:       Appearance: He is well-developed  HENT:      Head: Normocephalic and atraumatic        Right Ear: External ear normal       Left Ear: External ear normal    Cardiovascular:      Rate and Rhythm: Normal rate and " regular rhythm  Heart sounds: Murmur heard  Pulmonary:      Effort: Pulmonary effort is normal  No respiratory distress  Breath sounds: Normal breath sounds  No wheezing  Musculoskeletal:         General: Normal range of motion  Skin:     General: Skin is warm and dry  Neurological:      Mental Status: He is alert and oriented to person, place, and time         Medications have been reviewed by provider in current encounter    Pao Lloyd DO

## 2023-05-19 NOTE — TELEPHONE ENCOUNTER
Patient wanted instructions on how to take liquid potassium  I called the pharmacist at [facility]  He is to mix the solutions with 4oz of cold water  Patient is aware

## 2023-05-22 DIAGNOSIS — R33.8 BENIGN PROSTATIC HYPERPLASIA WITH URINARY RETENTION: ICD-10-CM

## 2023-05-22 DIAGNOSIS — N40.1 BENIGN PROSTATIC HYPERPLASIA WITH URINARY RETENTION: ICD-10-CM

## 2023-05-22 RX ORDER — DUTASTERIDE 0.5 MG/1
CAPSULE, LIQUID FILLED ORAL
Qty: 90 CAPSULE | Refills: 3 | Status: SHIPPED | OUTPATIENT
Start: 2023-05-22

## 2023-05-23 NOTE — PROGRESS NOTES
Cardiology  Heart Failure   Follow Up Office Visit Note     Reji Restrepo   80 y o    male   MRN: [id number]  [address]  [address] [place] [address] [place]  [id number] [phone]    PCP: Jeff Duron DO  Cardiologist : will be Dr Hero Pitts              Summary of Recommendations  Low-sodium diet, Heart failure education as below  He has been is staying away from restaurants  He had trouble swallowing the potassium  I prescribed liquid potassium  Today he tells me he went back to the tablet, and is cutting it in quarters and it seems to be working for him  He has an EGD scheduled in the future  Follow up will be scheduled with Dr Hero Pitts  6/13, prt request      Impression/plan  Fatigue  His beta blocker was changed in January from 25 TID to 50 BID, given chronic angina  After shared decisoin making, will keep it as is  Chronic combined heart failure, 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% with adm 5/7-5/11/23 for decompensation, likely related to dietary sodium, and the lack of a diuretic  Wt Readings from Last 3 Encounters:   05/18/23 64 4 kg (142 lb)   05/17/23 64 kg (141 lb)   05/17/23 64 kg (141 lb)   --beta-blocker:   Toprol 50 mg every 12  This was increased at a January office visit, given anginal  --Diuretic:   Lasix 40 mg daily this is new  He is also on potassium 20 meq/daily  He is trying to decide what is best for him liquid or an oral tablet  So far he has not been using the liquid  --ACE/ARB/ARNI:    --MRA:   --SLGT2i  --2 g sodium diet, 1800 cc fluid restriction  Daily weights, call weight gain 2-3 lb in 1 day or 5 lb in 5 days  valvular heart disease  The patient is aware of the disease  · Moderate mitral regurgitation  · Aortic stenosis PG-45,  MG- 25   Medical management  3VCAD, S/P CABG x 6 0810 ([facility])  with chronic stable angina  Prior multivessel stenting    He is being medically managed  · On Plavix 75 mg daily, statin, beta-blocker, ranolazine  · Nuclear stress test March 2023  Same as  prior  Hypertension, essential  /58  On loop diuretic, Imdur 60 mg daily, Toprol 50 mg every 12h; also on Flomax  Hyperlipidemia, mixed  On rosuvastatin 5 mg/d Zetia 10 mg daily  11/22: calculated LDL 82  Moderate to high-dose statin  Latest Reference Range & Units 11/30/19 05:16 01/09/20 08:40 02/26/21 05:52 07/20/21 10:31 11/02/21 04:33 11/29/22 08:39   Cholesterol See Comment mg/dL 191 159 125 138 144 154   Triglycerides See Comment mg/dL 98 126 113 109 115 169 (H)   HDL >=40 mg/dL 33 (L) 39 (L) 34 (L) 38 (L) 41 38 (L)   Non-HDL Cholesterol mg/dl 158 120  100  116   LDL Calculated 0 - 100 mg/dL 138 (H) 95 68 78 80 82   Peptic ulcer disease/esophageal dysphagia follow-up with GI  He has a history of a partial gastrectomy due to PUD, and esophageal dysmotility with esophageal diverticulum  Hx DVT,S/p IVC filter  Anxiety  Cardiac testing  · WALT 2/14/22  EF 45%, no PFO, left atrial appendage has normal function, aortic valve thickened moderately calcified and moderately reduced with mild-to-moderate AI and moderate aortic stenosis, moderate to severe mitral regurgitation  • TTE 3/16/23  Lion Chin EF 45%  Grade 2 DD  Mild global hypokinesis  RV systolic function mildly reduced  Severe LAE  There is a functionally bicuspid aortic valve with severe reduced mobility  Mild AI with a centrally directed jet  There is moderate aortic stenosis  Peak gradient 45, mean gradient 25  Moderate MR  Mild TR  PASP 54 mmHg  Mild to moderate pulmonic valve regurgitation  •  Compared to previous exam, there is no significant change  · Pharmacological SPECT 3/16/2023  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 which may be due to artifact              HPI:  Rebecca Pirere is a 79 yo male with CAD, S/P CABG x 6,, 2015 with previous multivessel stenting  He has been medically managed  He has chronc HFpEF, HTN, HL, prior DVT,S/P IVC filter   He follows with Dr Alva Prior Homer    Adm 3/15- 3/17 [facility] [address]  chest pain  Toprol was increased to 75 mg twice a day,     4/2023 OV Dr Jonathon Thorpe-  Chronic stable angina  BBlocker increased  Also taking Ranexa and Imdur  taking Toprol  75mg every 8 hrs  Weight 145 pounds  Adm 5/7-5/11/23  CC: Some HARMON x1 week  Chest discomfort   called EMS  Volume overloaded  On no maintenance diuretic pre hospital    He required treatment with BiPAP on admission  No significant troponin elevation  Followed by cardiology  BNP elevated  857  X-ray: Mild pulmonary vascular congestion with small effusions  Diuresed  Discharge weight :139 pounds  Discharge creatinine: 1 11  Discharge diuretics: Lasix 40 mg daily  Toprol changed to 50 mg BID  Imdur increased from 30 mg QD to 60 mg QD       5/17/23  Hosp follow-up after an admission for heart failure  ROS: He denies chest pain  He denies shortness of breath  He has trouble swallowing potassium  I reviewed his hospital course  He is aware of his valvular heart disease  This is being medically managed  He knows he needs to avoid dietary sodium  I reviewed how to read food labels  wt 141 pounds  /72  Eats out at restaurants  I encouraged him to adhere to a salt restricted diet  Nonfasting BMP/ return to see me in a short interval      5/24/23  Close follow-up heart failure  He is staying at [facility]  His weight is stable  No further chest pain or shortness of breath  He is tolerating the potassium pill, cutting it in squares as opposed to the liquid which is quite costly  Home weight this morning 137 pounds without close down from 139 pounds when discharged  He is adhering to low-sodium diet  He is taking the diuretic as recommended  He is going to be having endoscopy 6/27 by GI Dr Austyn Lord  There is been a request to hold Plavix for 5 days, this has been sent to his prior cardiologist Dr Jonathon Thorpe  Today we reviewed his echocardiogram findings    His valve disease is quite "similar to 2022  I refilled his cardiac medications for 90 days with the exception of his potassium  He is going to decide which he would like filled, the tablet/pill or the oral liquid      I have spent 25 minutes with Patient and family today in which greater than 50% of this time was spent in counseling/coordination of care regarding Instructions for management, Patient and family education, Importance of tx compliance, Risk factor reductions, Documenting in the medical record, Reviewing / ordering tests, medicine, procedures   and Obtaining or reviewing history    Assessment  Diagnoses and all orders for this visit:    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) ([facility] )    Coronary artery disease involving native heart without angina pectoris, unspecified vessel or lesion type    Hx of CABG    Primary hypertension    Mixed hyperlipidemia        Past Medical History:   Diagnosis Date   • Arthritis    • BPH (benign prostatic hyperplasia)    • Cervical spine fracture ([facility] ) 1997    C3   • Chest pain    • Colon polyp    • Coronary artery disease    • Dry eye    • DVT (deep venous thrombosis) ([place] [address] ) 2015    right leg- passed thru the IVC filter-stopped at the \"patch\" from bypass surgery   • Dysphagia 11/2021    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   • Fracture of thoracic spine ([facility] ) 1997    T3   • Glaucoma    • Hyperlipidemia    • Hypertension    • Myocardial infarction ([facility] )    • Pneumonia    • PUD (peptic ulcer disease)        Review of Systems   Constitutional: Negative for chills  Cardiovascular: Negative for chest pain, claudication, cyanosis, dyspnea on exertion, irregular heartbeat, leg swelling, near-syncope, orthopnea, palpitations, paroxysmal nocturnal dyspnea and syncope  Respiratory: Negative for cough and shortness of breath  Gastrointestinal: Negative for heartburn and nausea          Chronic swallowing problems   Neurological: Negative for dizziness, focal weakness, " headaches, light-headedness and weakness  All other systems reviewed and are negative  Allergies   Allergen Reactions   • Escitalopram Other (See Comments)     Suicidal feelings, sweating   • Oxycodone-Acetaminophen Dizziness and Shortness Of Breath     Other reaction(s): Faints  Reaction Date: [id number]; Category: Adverse Reaction;    • Sucralfate GI Intolerance     Abdominal pain    • Sulfa Antibiotics Other (See Comments)   • Omeprazole Rash   • Statins Other (See Comments)     Muscle pain           Current Outpatient Medications:   •  ALPRAZolam (XANAX) 0 5 mg tablet, Take 1 tablet (0 5 mg total) by mouth daily at bedtime as needed for anxiety, Disp: 90 tablet, Rfl: 1  •  Calcium Carbonate-Vitamin D (CALCIUM 600+D PO), Take by mouth daily with lunch, Disp: , Rfl:   •  cholecalciferol (VITAMIN D3) 400 units tablet, Take 400 Units by mouth daily after lunch , Disp: , Rfl:   •  clopidogrel (PLAVIX) 75 mg tablet, TAKE ONE TABLET BY MOUTH EVERY DAY (Patient taking differently: 75 mg daily at bedtime), Disp: 90 tablet, Rfl: 3  •  Docusate Calcium (STOOL SOFTENER PO), Take by mouth OTC; takes with lunch, Disp: , Rfl:   •  dutasteride (AVODART) 0 5 mg capsule, TAKE ONE CAPSULE BY MOUTH EVERY DAY IN THE MORNING, Disp: 90 capsule, Rfl: 3  •  famotidine (PEPCID) 20 mg tablet, Take 1 tablet (20 mg total) by mouth daily at bedtime, Disp: 90 tablet, Rfl: 3  •  furosemide (LASIX) 40 mg tablet, Take 1 tablet (40 mg total) by mouth daily Do not start before May 12, 2023 , Disp: 30 tablet, Rfl: 0  •  isosorbide mononitrate (IMDUR) 60 mg 24 hr tablet, Take 1 tablet (60 mg total) by mouth every morning Do not start before May 12, 2023 , Disp: 30 tablet, Rfl: 0  •  metoprolol succinate (TOPROL-XL) 50 mg 24 hr tablet, Take 1 tablet (50 mg total) by mouth every 12 (twelve) hours, Disp: 60 tablet, Rfl: 0  •  multivitamin-iron-minerals-folic acid (CENTRUM) chewable tablet, Chew 1 tablet every morning , Disp: , Rfl:   • pantoprazole (PROTONIX) 20 mg tablet, Take 1 tablet (20 mg total) by mouth 2 (two) times a day, Disp: 180 tablet, Rfl: 1  •  polyethylene glycol (MIRALAX) 17 g packet, Take 17 g by mouth daily for 10 days (Patient not taking: Reported on 5/17/2023), Disp: 170 g, Rfl: 0  •  potassium chloride 10% oral solution, Take 15 mL (20 mEq total) by mouth daily, Disp: 450 mL, Rfl: 3  •  ranolazine (RANEXA) 500 mg 12 hr tablet, Take 1 tablet (500 mg total) by mouth 2 (two) times a day, Disp: 180 tablet, Rfl: 3  •  rosuvastatin (CRESTOR) 5 mg tablet, TAKE ONE TABLET BY MOUTH EVERY DAY AT BEDTIME, Disp: 90 tablet, Rfl: 3  •  tamsulosin (Flomax) 0 4 mg, Take 1 capsule (0 4 mg total) by mouth daily with dinner, Disp: 90 capsule, Rfl: 3  •  Zinc 25 MG TABS, Take 25 mg by mouth daily at bedtime, Disp: , Rfl:     Social History     Socioeconomic History   • Marital status: /Civil Union     Spouse name: Not on file   • Number of children: Not on file   • Years of education: Not on file   • Highest education level: Not on file   Occupational History   • Not on file   Tobacco Use   • Smoking status: Never   • Smokeless tobacco: Never   Vaping Use   • Vaping Use: Never used   Substance and Sexual Activity   • Alcohol use: Never   • Drug use: No   • Sexual activity: Not on file   Other Topics Concern   • Not on file   Social History Narrative   • Not on file     Social Determinants of Health     Financial Resource Strain: Not on file   Food Insecurity: No Food Insecurity   • Worried About Running Out of Food in the Last Year: Never true   • Ran Out of Food in the Last Year: Never true   Transportation Needs: No Transportation Needs   • Lack of Transportation (Medical): No   • Lack of Transportation (Non-Medical):  No   Physical Activity: Not on file   Stress: Not on file   Social Connections: Not on file   Intimate Partner Violence: Not on file   Housing Stability: Low Risk    • Unable to Pay for Housing in the Last Year: No   • Number of Places Lived in the Last Year: 1   • Unstable Housing in the Last Year: No       Family History   Problem Relation Age of Onset   • Coronary artery disease Brother    • Heart disease Father         CAD       Physical Exam  Vitals and nursing note reviewed  Constitutional:       General: He is not in acute distress  HENT:      Head: Normocephalic and atraumatic  Eyes:      Conjunctiva/sclera: Conjunctivae normal    Cardiovascular:      Rate and Rhythm: Normal rate and regular rhythm  Pulses: Intact distal pulses  Heart sounds: Murmur heard  Harsh midsystolic murmur is present with a grade of 3/6 at the upper right sternal border radiating to the neck  Pulmonary:      Effort: Pulmonary effort is normal       Breath sounds: Normal breath sounds  Abdominal:      General: Bowel sounds are normal       Palpations: Abdomen is soft  Musculoskeletal:         General: Normal range of motion  Cervical back: Normal range of motion and neck supple  Skin:     General: Skin is warm and dry  Neurological:      Mental Status: He is alert and oriented to person, place, and time  Vitals: There were no vitals taken for this visit     Wt Readings from Last 3 Encounters:   05/18/23 64 4 kg (142 lb)   05/17/23 64 kg (141 lb)   05/17/23 64 kg (141 lb)         Labs & Results:  Lab Results   Component Value Date    WBC 8 57 05/07/2023    HGB 15 2 05/07/2023    HCT 48 7 05/07/2023    MCV 96 05/07/2023     05/07/2023     BNP   Date Value Ref Range Status   05/07/2023 857 (H) 0 - 100 pg/mL Final   09/25/2015 69 0 - 100 pg/mL Final     Comment:     The above 1 analytes were performed by [facility] [id number]       No components found for: CHEM    Results for orders placed during the hospital encounter of 07/16/21    Echo complete with contrast if indicated    Narrative  [address]  [address] [facility]  [place] [address]  [phone]    Transthoracic Echocardiogram  2D, M-mode, Doppler, and Color Doppler    Study date:  2021    Patient: Doris Beckford  MR number: [id number]  Account number: [de-identified]  : 02-Aug-1930  Age: 80 years  Gender: Male  Status: Outpatient  Location: Echo lab  Height: 65 in  Weight: 151 6 lb  BP: 122/ 58 mmHg    Indications: Aortic Stenosis    Diagnoses: 424 1 - AORTIC VALVE DISORDER    Sonographer:  YURI Damon  Primary Physician:  Ally Cisneros DO  Referring Physician:  Yvonne Meraz MD  Group:  [facility]  Interpreting Physician:  Lalito Guerra MD    SUMMARY    LEFT VENTRICLE:  Systolic function was at the lower limits of normal by visual assessment  Ejection fraction was estimated in the range of 45 % to 50 % to be 45 %  There was mild diffuse hypokinesis  Wall thickness was mildly increased  LEFT ATRIUM:  The atrium was mildly dilated  MITRAL VALVE:  There was mild to moderate annular calcification  Transmitral velocity was increased due to increased transvalvular flow  There was moderate to severe regurgitation  Based on elevated E' and central color flow jet  Unable to obtain PISA measurement for effective ERO calculation  Consider WALT for better evaluation    AORTIC VALVE:  The valve was probably trileaflet  Leaflets exhibited mildly increased thickness, mild calcification, moderately reduced cuspal separation, and sclerosis  Transaortic velocity was increased due to increased transvalvular flow  There was moderate stenosis  There was mild to moderate regurgitation  Valve mean gradient was 26 mmHg  HISTORY: PRIOR HISTORY: 3-vessel CAD,Chronic stable Angina,Chronic Diastolic heart failure,HTN,Murmur,DVT,Hyperlipidemia,anemia  PROCEDURE: The procedure was performed in the echo lab  This was a routine study  The transthoracic approach was used   The study included complete 2D imaging, M-mode, complete spectral Doppler, and color Doppler  The heart rate was 61 bpm,  at the start of the study  Images were obtained from the parasternal, apical, subcostal, and suprasternal notch acoustic windows  Image quality was adequate  LEFT VENTRICLE: Size was normal  Systolic function was at the lower limits of normal by visual assessment  Ejection fraction was estimated in the range of 45 % to 50 % to be 45 %  There was mild diffuse hypokinesis  Wall thickness was  mildly increased  No evidence of apical thrombus  DOPPLER: The study was not technically sufficient to allow evaluation of LV diastolic function  RIGHT VENTRICLE: The size was normal  Systolic function was normal  Wall thickness was normal     LEFT ATRIUM: The atrium was mildly dilated  RIGHT ATRIUM: Size was normal     MITRAL VALVE: There was mild to moderate annular calcification  There was mild-moderate calcification  There was mildly reduced leaflet separation  DOPPLER: Transmitral velocity was increased due to increased transvalvular flow  There was  no evidence for stenosis  There was moderate to severe regurgitation  Based on elevated E' and central color flow jet  Unable to obtain PISA measurement for effective ERO calculation  Consider WALT for better evaluation    AORTIC VALVE: The valve was probably trileaflet  Leaflets exhibited mildly increased thickness, mild calcification, moderately reduced cuspal separation, and sclerosis  DOPPLER: Transaortic velocity was increased due to increased  transvalvular flow  There was moderate stenosis  There was mild to moderate regurgitation  TRICUSPID VALVE: The valve structure was normal  There was normal leaflet separation  DOPPLER: The transtricuspid velocity was within the normal range  There was no evidence for stenosis  There was trace regurgitation  PULMONIC VALVE: Leaflets exhibited normal thickness, no calcification, and normal cuspal separation  DOPPLER: The transpulmonic velocity was within the normal range   There was trace regurgitation  PERICARDIUM: There was no pericardial effusion  The pericardium was normal in appearance  AORTA: The root exhibited normal size  SYSTEMIC VEINS: IVC: The inferior vena cava was not well visualized  MEASUREMENT TABLES    DOPPLER MEASUREMENTS  Aortic valve   (Reference normals)  Peak gilda   350 cm/s   (--)  Peak gradient   48 mmHg   (--)  Mean gradient   26 mmHg   (--)  Regurg PHT   460 ms   (--)    SYSTEM MEASUREMENT TABLES    2D  EF (Teich): 49 86 %  %FS: 25 27 %  UJDY Planimetry: 0 86 cm2  Ao Diam: 3 1 cm  EDV(Teich): 104 22 ml  ESV(Teich): 52 25 ml  IVSd: 1 24 cm  LA Area: 22 39 cm2  LA Diam: 4 03 cm  LVEDV MOD A4C: 174 08 ml  LVEF MOD A4C: 50 26 %  LVESV MOD A4C: 86 59 ml  LVIDd: 4 74 cm  LVIDs: 3 54 cm  LVLd A4C: 9 35 cm  LVLs A4C: 8 17 cm  LVOT Diam: 1 96 cm  LVPWd: 1 21 cm  RA Area: 12 71 cm2  RVIDd: 2 45 cm  SV (Teich): 51 97 ml  SV MOD A4C: 87 49 ml    CW  AV Env  Ti: 361 56 ms  AV VTI: 86 51 cm  AV Vmax: 3 48 m/s  AV Vmean: 2 39 m/s  AV maxP 38 mmHg  AV meanP 03 mmHg  TR Vmax: 3 25 m/s  TR maxP 23 mmHg    PW  E' Sept: 0 05 m/s  LVOT Env  Ti: 361 56 ms  LVOT VTI: 27 25 cm  LVOT Vmax: 1 15 m/s  LVOT Vmean: 0 75 m/s  LVOT maxP 29 mmHg  LVOT meanP 61 mmHg    [employer] Accredited Echocardi[facility]    Prepared and electronically signed by    Obdulio Dave MD  Signed 2021 17:34:59    No results found for this or any previous visit  This note was completed in part utilizing [employer] direct voice recognition software  Grammatical errors, random word insertion, spelling mistakes, and incomplete sentences may be an occasional consequence of the system secondary to software limitations, ambient noise and hardware issues  At the time of dictation, efforts were made to edit, clarify and /or correct errors  Please read the chart carefully and recognize, using context, where substitutions have occurred    If you have any questions or concerns about the context, text or information contained within the body of this dictation, please contact myself, the provider, for further clarification

## 2023-05-24 ENCOUNTER — OFFICE VISIT (OUTPATIENT)
Dept: CARDIOLOGY CLINIC | Facility: CLINIC | Age: 88
End: 2023-05-24

## 2023-05-24 VITALS
HEART RATE: 76 BPM | HEIGHT: 64 IN | DIASTOLIC BLOOD PRESSURE: 58 MMHG | OXYGEN SATURATION: 98 % | SYSTOLIC BLOOD PRESSURE: 102 MMHG | WEIGHT: 143.2 LBS | BODY MASS INDEX: 24.45 KG/M2

## 2023-05-24 DIAGNOSIS — E78.2 MIXED HYPERLIPIDEMIA: Chronic | ICD-10-CM

## 2023-05-24 DIAGNOSIS — I50.42 CHRONIC COMBINED SYSTOLIC AND DIASTOLIC HF (HEART FAILURE) (HCC): Primary | ICD-10-CM

## 2023-05-24 DIAGNOSIS — I10 PRIMARY HYPERTENSION: Chronic | ICD-10-CM

## 2023-05-24 DIAGNOSIS — I25.10 CORONARY ARTERY DISEASE INVOLVING NATIVE HEART WITHOUT ANGINA PECTORIS, UNSPECIFIED VESSEL OR LESION TYPE: ICD-10-CM

## 2023-05-24 DIAGNOSIS — I50.9 CHF EXACERBATION (HCC): ICD-10-CM

## 2023-05-24 DIAGNOSIS — Z95.1 HX OF CABG: ICD-10-CM

## 2023-05-24 DIAGNOSIS — I20.8 CHRONIC STABLE ANGINA (HCC): ICD-10-CM

## 2023-05-24 RX ORDER — METOPROLOL SUCCINATE 50 MG/1
50 TABLET, EXTENDED RELEASE ORAL EVERY 12 HOURS
Qty: 180 TABLET | Refills: 3 | Status: SHIPPED | OUTPATIENT
Start: 2023-05-24 | End: 2024-05-18

## 2023-05-24 RX ORDER — ISOSORBIDE MONONITRATE 60 MG/1
60 TABLET, EXTENDED RELEASE ORAL EVERY MORNING
Qty: 90 TABLET | Refills: 3 | Status: SHIPPED | OUTPATIENT
Start: 2023-05-24 | End: 2024-05-18

## 2023-05-24 RX ORDER — FUROSEMIDE 40 MG/1
40 TABLET ORAL DAILY
Qty: 90 TABLET | Refills: 3 | Status: SHIPPED | OUTPATIENT
Start: 2023-05-24

## 2023-05-24 NOTE — TELEPHONE ENCOUNTER
Pre  Op  Clearance note- Cardiology    Ny Juarezgarretthuong   80 y o   male  1930      DO Ny Kim :     Patient's chart was reviewed for preop clearance  Patient was seen in our office on ***  Patient has past medical history significant for ***  Patient is now scheduled for ***  Patient has no clinical evidence of  active heart failure or  active ischemia or active arrhythmia  Patient's last cardiac workup including *** reports were reviewed and it shows***  In my opinion patient is in optimum condition for the procedure as planned  Patient is *** for the surgery as planned from cardiac point of view  Continue current cardiac medications  Patient can hold  Aspirin for  5-7 days as required for surgery  Patient can hold Plavix for 5 days  Patient can hold Eliquis/Xarelto/Pradaxa for 3 days before the procedure  Please restart after the procedure  immediately or next day if no contraindication form surgical point of view and advise patient to contact our office  If you have any question please do not hesitate to call us at our office of [facility]    Phone # [phone]        Lab Results   Component Value Date    HCT 48 7 2023    HGB 15 2 2023    MCV 96 2023     2023    WBC 8 57 2023     Lab Results   Component Value Date    CREATININE 1 21 2023     Lab Results   Component Value Date    GLUF 137 (H) 2022       Cardiac testing:   Results for orders placed during the hospital encounter of 21    Echo complete with contrast if indicated    Narrative  [address] [facility] [phone]    Transthoracic Echocardiogram  2D, M-mode, Doppler, and Color Doppler    Study date:  2021    Patient: Sonali El  MR number: [id number]  Account number: [de-identified]  : 02-Aug-1930  Age: 80 years  Gender: Male  Status: Outpatient  Location: Echo lab  Height: 65 in  Weight: 151 6 lb  BP: 122/ 58 mmHg    Indications: Aortic Stenosis    Diagnoses: 424 1 - AORTIC VALVE DISORDER    Sonographer:  YURI Granados  Primary Physician:  Riki Sanchez DO  Referring Physician:  Chinyere Valenzuela MD  Group:  [facility]  Interpreting Physician:  Ezra Thomas MD    SUMMARY    LEFT VENTRICLE:  Systolic function was at the lower limits of normal by visual assessment  Ejection fraction was estimated in the range of 45 % to 50 % to be 45 %  There was mild diffuse hypokinesis  Wall thickness was mildly increased  LEFT ATRIUM:  The atrium was mildly dilated  MITRAL VALVE:  There was mild to moderate annular calcification  Transmitral velocity was increased due to increased transvalvular flow  There was moderate to severe regurgitation  Based on elevated E' and central color flow jet  Unable to obtain PISA measurement for effective ERO calculation  Consider WALT for better evaluation    AORTIC VALVE:  The valve was probably trileaflet  Leaflets exhibited mildly increased thickness, mild calcification, moderately reduced cuspal separation, and sclerosis  Transaortic velocity was increased due to increased transvalvular flow  There was moderate stenosis  There was mild to moderate regurgitation  Valve mean gradient was 26 mmHg  HISTORY: PRIOR HISTORY: 3-vessel CAD,Chronic stable Angina,Chronic Diastolic heart failure,HTN,Murmur,DVT,Hyperlipidemia,anemia  PROCEDURE: The procedure was performed in the echo lab  This was a routine study  The transthoracic approach was used  The study included complete 2D imaging, M-mode, complete spectral Doppler, and color Doppler  The heart rate was 61 bpm,  at the start of the study  Images were obtained from the parasternal, apical, subcostal, and suprasternal notch acoustic windows  Image quality was adequate      LEFT VENTRICLE: Size was normal  Systolic function was at the lower limits of normal by visual assessment  Ejection fraction was estimated in the range of 45 % to 50 % to be 45 %  There was mild diffuse hypokinesis  Wall thickness was  mildly increased  No evidence of apical thrombus  DOPPLER: The study was not technically sufficient to allow evaluation of LV diastolic function  RIGHT VENTRICLE: The size was normal  Systolic function was normal  Wall thickness was normal     LEFT ATRIUM: The atrium was mildly dilated  RIGHT ATRIUM: Size was normal     MITRAL VALVE: There was mild to moderate annular calcification  There was mild-moderate calcification  There was mildly reduced leaflet separation  DOPPLER: Transmitral velocity was increased due to increased transvalvular flow  There was  no evidence for stenosis  There was moderate to severe regurgitation  Based on elevated E' and central color flow jet  Unable to obtain PISA measurement for effective ERO calculation  Consider WALT for better evaluation    AORTIC VALVE: The valve was probably trileaflet  Leaflets exhibited mildly increased thickness, mild calcification, moderately reduced cuspal separation, and sclerosis  DOPPLER: Transaortic velocity was increased due to increased  transvalvular flow  There was moderate stenosis  There was mild to moderate regurgitation  TRICUSPID VALVE: The valve structure was normal  There was normal leaflet separation  DOPPLER: The transtricuspid velocity was within the normal range  There was no evidence for stenosis  There was trace regurgitation  PULMONIC VALVE: Leaflets exhibited normal thickness, no calcification, and normal cuspal separation  DOPPLER: The transpulmonic velocity was within the normal range  There was trace regurgitation  PERICARDIUM: There was no pericardial effusion  The pericardium was normal in appearance  AORTA: The root exhibited normal size  SYSTEMIC VEINS: IVC: The inferior vena cava was not well visualized      MEASUREMENT TABLES    DOPPLER MEASUREMENTS  Aortic valve (Reference normals)  Peak gilda   350 cm/s   (--)  Peak gradient   48 mmHg   (--)  Mean gradient   26 mmHg   (--)  Regurg PHT   460 ms   (--)    SYSTEM MEASUREMENT TABLES    2D  EF (Teich): 49 86 %  %FS: 25 27 %  JUDY Planimetry: 0 86 cm2  Ao Diam: 3 1 cm  EDV(Teich): 104 22 ml  ESV(Teich): 52 25 ml  IVSd: 1 24 cm  LA Area: 22 39 cm2  LA Diam: 4 03 cm  LVEDV MOD A4C: 174 08 ml  LVEF MOD A4C: 50 26 %  LVESV MOD A4C: 86 59 ml  LVIDd: 4 74 cm  LVIDs: 3 54 cm  LVLd A4C: 9 35 cm  LVLs A4C: 8 17 cm  LVOT Diam: 1 96 cm  LVPWd: 1 21 cm  RA Area: 12 71 cm2  RVIDd: 2 45 cm  SV (Teich): 51 97 ml  SV MOD A4C: 87 49 ml    CW  AV Env  Ti: 361 56 ms  AV VTI: 86 51 cm  AV Vmax: 3 48 m/s  AV Vmean: 2 39 m/s  AV maxP 38 mmHg  AV meanP 03 mmHg  TR Vmax: 3 25 m/s  TR maxP 23 mmHg    PW  E' Sept: 0 05 m/s  LVOT Env  Ti: 361 56 ms  LVOT VTI: 27 25 cm  LVOT Vmax: 1 15 m/s  LVOT Vmean: 0 75 m/s  LVOT maxP 29 mmHg  LVOT meanP 61 mmHg    [facility] Accredited Echocardiography Laboratory    Prepared and electronically signed by    Sadia Nair MD  Signed 2021 17:34:59    No results found for this or any previous visit  No results found for this or any previous visit  No results found for this or any previous visit  No results found for this or any previous visit  No results found for this or any previous visit  Results for orders placed during the hospital encounter of 03/15/23    NM Myocardial Perfusion Spect (Pharmacological Induced Stress and/or Rest)    Interpretation Summary  •  Stress ECG: No ST deviation is noted  There were no arrhythmias during recovery    The ECG was not diagnostic due to pharmacological (vasodilator) stress  •  Perfusion: There is a left ventricular perfusion defect that is medium in size with severe reduction in uptake present in the entire apex location(s) that is fixed   There is a left ventricular perfusion defect that is small in size with moderate "reduction in uptake present in the entire inferior location(s) that is partially reversible  The defect appears to be probable artifact caused by subdiaphragmatic activity  •  Stress Function: Left ventricular function post-stress is abnormal  Global function is moderately reduced  There was a single regional abnormality during stress  Post-stress ejection fraction is 33 %  There is a defect in the apical location(s)  The defect has severely reduced function  •  Stress Combined Conclusion: Left ventricular perfusion is abnormal  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 which may be due to artifact  •  Overall, study appears consistent with previous study  Sherrell Lucero MD [facility]  5/24/2023  9:05 AM      \"This note was completed in part utilizing m-modal fluency direct voice recognition software  Grammatical errors, random word insertion, spelling mistakes, and incomplete sentences may be an occasional consequence of the system secondary to software limitations, ambient noise and hardware issues  Please read the chart carefully and recognize, using context, where substitutions have occurred  If you have any questions or concerns about the context, text or information contained within the body of this dictation, please contact myself, the provider, for further clarification  \"  "

## 2023-06-08 ENCOUNTER — APPOINTMENT (OUTPATIENT)
Dept: LAB | Facility: CLINIC | Age: 88
End: 2023-06-08
Payer: MEDICARE

## 2023-06-08 DIAGNOSIS — R73.01 IMPAIRED FASTING GLUCOSE: ICD-10-CM

## 2023-06-08 DIAGNOSIS — I10 PRIMARY HYPERTENSION: Chronic | ICD-10-CM

## 2023-06-08 LAB
ALBUMIN SERPL BCP-MCNC: 3.3 G/DL (ref 3.5–5)
ALP SERPL-CCNC: 100 U/L (ref 46–116)
ALT SERPL W P-5'-P-CCNC: 13 U/L (ref 12–78)
ANION GAP SERPL CALCULATED.3IONS-SCNC: 3 MMOL/L (ref 4–13)
AST SERPL W P-5'-P-CCNC: 5 U/L (ref 5–45)
BILIRUB SERPL-MCNC: 0.46 MG/DL (ref 0.2–1)
BUN SERPL-MCNC: 27 MG/DL (ref 5–25)
CALCIUM ALBUM COR SERPL-MCNC: 9.9 MG/DL (ref 8.3–10.1)
CALCIUM SERPL-MCNC: 9.3 MG/DL (ref 8.3–10.1)
CHLORIDE SERPL-SCNC: 104 MMOL/L (ref 96–108)
CHOLEST SERPL-MCNC: 157 MG/DL
CO2 SERPL-SCNC: 28 MMOL/L (ref 21–32)
CREAT SERPL-MCNC: 1.32 MG/DL (ref 0.6–1.3)
ERYTHROCYTE [DISTWIDTH] IN BLOOD BY AUTOMATED COUNT: 13.4 % (ref 11.6–15.1)
EST. AVERAGE GLUCOSE BLD GHB EST-MCNC: 143 MG/DL
GFR SERPL CREATININE-BSD FRML MDRD: 46 ML/MIN/1.73SQ M
GLUCOSE P FAST SERPL-MCNC: 174 MG/DL (ref 65–99)
HBA1C MFR BLD: 6.6 %
HCT VFR BLD AUTO: 42.2 % (ref 36.5–49.3)
HDLC SERPL-MCNC: 37 MG/DL
HGB BLD-MCNC: 14 G/DL (ref 12–17)
LDLC SERPL CALC-MCNC: 84 MG/DL (ref 0–100)
MCH RBC QN AUTO: 31.5 PG (ref 26.8–34.3)
MCHC RBC AUTO-ENTMCNC: 33.2 G/DL (ref 31.4–37.4)
MCV RBC AUTO: 95 FL (ref 82–98)
PLATELET # BLD AUTO: 166 THOUSANDS/UL (ref 149–390)
PMV BLD AUTO: 11.1 FL (ref 8.9–12.7)
POTASSIUM SERPL-SCNC: 4.6 MMOL/L (ref 3.5–5.3)
PROT SERPL-MCNC: 7.5 G/DL (ref 6.4–8.4)
RBC # BLD AUTO: 4.45 MILLION/UL (ref 3.88–5.62)
SODIUM SERPL-SCNC: 135 MMOL/L (ref 135–147)
TRIGL SERPL-MCNC: 178 MG/DL
WBC # BLD AUTO: 5.91 THOUSAND/UL (ref 4.31–10.16)

## 2023-06-08 PROCEDURE — 80061 LIPID PANEL: CPT

## 2023-06-08 PROCEDURE — 85027 COMPLETE CBC AUTOMATED: CPT

## 2023-06-08 PROCEDURE — 36415 COLL VENOUS BLD VENIPUNCTURE: CPT

## 2023-06-08 PROCEDURE — 80053 COMPREHEN METABOLIC PANEL: CPT

## 2023-06-08 PROCEDURE — 83036 HEMOGLOBIN GLYCOSYLATED A1C: CPT

## 2023-06-09 ENCOUNTER — TELEPHONE (OUTPATIENT)
Dept: FAMILY MEDICINE CLINIC | Facility: CLINIC | Age: 88
End: 2023-06-09

## 2023-06-09 DIAGNOSIS — R73.01 IMPAIRED FASTING GLUCOSE: ICD-10-CM

## 2023-06-09 DIAGNOSIS — I10 PRIMARY HYPERTENSION: Primary | Chronic | ICD-10-CM

## 2023-06-09 NOTE — TELEPHONE ENCOUNTER
6/9/2023 3:00 PM Called Geena Almaguer regarding his lab results  We discussed his worsening kidney function and blood sugar    Will recheck labs before his appointment with me in September   Derek Amos DO

## 2023-06-12 ENCOUNTER — TELEPHONE (OUTPATIENT)
Dept: GASTROENTEROLOGY | Facility: CLINIC | Age: 88
End: 2023-06-12

## 2023-06-12 NOTE — TELEPHONE ENCOUNTER
Left message reminding pt of his EGD 6/27 with Dr Hayward [place]  He will be called day prior with arrival time, will need a  and he is to hold his Plavix 5 days prior  If he has any questions about his instructions or anything else, he is to call

## 2023-06-13 ENCOUNTER — OFFICE VISIT (OUTPATIENT)
Dept: CARDIOLOGY CLINIC | Facility: CLINIC | Age: 88
End: 2023-06-13
Payer: MEDICARE

## 2023-06-13 VITALS
WEIGHT: 143.6 LBS | BODY MASS INDEX: 24.52 KG/M2 | SYSTOLIC BLOOD PRESSURE: 122 MMHG | OXYGEN SATURATION: 96 % | HEART RATE: 95 BPM | DIASTOLIC BLOOD PRESSURE: 68 MMHG | HEIGHT: 64 IN

## 2023-06-13 DIAGNOSIS — I25.118 CORONARY ARTERY DISEASE OF NATIVE HEART WITH STABLE ANGINA PECTORIS, UNSPECIFIED VESSEL OR LESION TYPE (HCC): ICD-10-CM

## 2023-06-13 DIAGNOSIS — Z95.1 HX OF CABG: ICD-10-CM

## 2023-06-13 DIAGNOSIS — I10 PRIMARY HYPERTENSION: Chronic | ICD-10-CM

## 2023-06-13 DIAGNOSIS — I50.42 CHRONIC COMBINED SYSTOLIC AND DIASTOLIC HF (HEART FAILURE) (HCC): ICD-10-CM

## 2023-06-13 DIAGNOSIS — N18.31 STAGE 3A CHRONIC KIDNEY DISEASE (HCC): ICD-10-CM

## 2023-06-13 DIAGNOSIS — I25.10 3-VESSEL CAD: ICD-10-CM

## 2023-06-13 DIAGNOSIS — E78.2 MIXED HYPERLIPIDEMIA: Primary | Chronic | ICD-10-CM

## 2023-06-13 PROBLEM — R01.1 CARDIAC MURMUR: Status: RESOLVED | Noted: 2020-06-24 | Resolved: 2023-06-13

## 2023-06-13 PROCEDURE — 99214 OFFICE O/P EST MOD 30 MIN: CPT | Performed by: INTERNAL MEDICINE

## 2023-06-13 RX ORDER — SPIRONOLACTONE 25 MG/1
25 TABLET ORAL
Qty: 90 TABLET | Refills: 3 | Status: SHIPPED | OUTPATIENT
Start: 2023-06-13

## 2023-06-13 NOTE — PATIENT INSTRUCTIONS
Please stop taking any potassium products, including the tablets that you are cutting or the liquid  Please start spironolactone, this is a special kind of water pill that will help get rid of a little extra water out of your lungs, hopefully decrease your shortness of breath, but it also has a special quality and that is able to hold onto potassium so that you do not need to take a potassium pill  Please have blood work done in 2 weeks so we can ensure the meds are being given safely  I will have you come back and see Laura Moore in about 2 weeks to review how this medicine is working and see if you are feeling better

## 2023-06-16 NOTE — PROGRESS NOTES
[address] [facility] Cardiology  Follow up note  Lesli Delgadillo 80 y o  male MRN: [id number]        Problems    1  Mixed hyperlipidemia        2  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)  spironolactone (ALDACTONE) 25 mg tablet    Basic metabolic panel      3  3-vessel CAD        4  Coronary artery disease of native heart with stable angina pectoris, unspecified vessel or lesion type ([address] )        5  Primary hypertension        6  Hx of CABG        7  Stage 3a chronic kidney disease (HCC)            Impression:    Chronic combined systolic/diastolic CHF  Ischemic cardiomyopathy with LVEF 45 to 50%  Recently hospitalized 5/23 with decompensation  Weight is stable 143 pounds today, albeit with slightly increased edema  Having a lot of difficulty with potassium supplementation, taking the pills, needing to cut them in quarters, and the potassium liquid is prohibitively expensive  Coronary artery disease  Remote multivessel CABG  Last catheterization per his primary cardiologist showed no significant target for intervention  Recent up titration of Imdur, beta-blocker, and he is on Ranexa  Hypertension  Well-controlled  Mixed hyperlipidemia  Well-controlled  History of partial gastrectomy due to peptic ulcer disease  DVT with IVC filter  Valvular heart disease  Moderate mitral regurgitation, moderate to severe aortic stenosis  He is not a candidate for intervention at 80years of age    Plan:    Continue potassium  Start spironolactone  BMP follow-up      HPI:   Lesli Delgadillo is a 80y o  year old male with an extensive cardiac history, CAD, CABG, valvular heart disease, DVT status post filter, hypertension, chronic combined CHF, was following with Dr Gabrielle Chacon, recently hospitalized at [facility] when I met him, and he requested to follow-up in my office  He is a very pleasant elderly man, small stature, did well during his hospitalization with diuresis for acute on chronic heart failure    His antianginal regimen "was also optimized, Imdur was increased  He has established a plan of medical therapy for his CAD per his prior cardiologist, with his last catheterization showing poor overall targets  He comes in today feeling quite well, although has required quite a bit of potassium supplementation, and still notices a little bit of leg edema  Review of Systems   Constitutional: Negative for appetite change, diaphoresis, fatigue and fever  Respiratory: Negative for chest tightness, shortness of breath and wheezing  Cardiovascular: Positive for leg swelling  Negative for chest pain and palpitations  Gastrointestinal: Negative for abdominal pain and blood in stool  Musculoskeletal: Negative for arthralgias and joint swelling  Skin: Negative for rash  Neurological: Negative for dizziness, syncope and light-headedness  Past Medical History:   Diagnosis Date   • Arthritis    • BPH (benign prostatic hyperplasia)    • Cervical spine fracture ([facility] ) 1997    C3   • Chest pain    • Colon polyp    • Coronary artery disease    • Dry eye    • DVT (deep venous thrombosis) ([place] [address] ) 2015    right leg- passed thru the IVC filter-stopped at the \"patch\" from bypass surgery   • Dysphagia 11/2021    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   • [facility] ) 1997    T3   • Glaucoma    • Hyperlipidemia    • Hypertension    • Myocardial infarction ([facility] )    • Pneumonia    • PUD (peptic ulcer disease)      Social History     Substance and Sexual Activity   Alcohol Use Never     Social History     Substance and Sexual Activity   Drug Use No     Social History     Tobacco Use   Smoking Status Never   Smokeless Tobacco Never       Allergies: Allergies   Allergen Reactions   • Escitalopram Other (See Comments)     Suicidal feelings, sweating   • Oxycodone-Acetaminophen Dizziness and Shortness Of Breath     Other reaction(s): Faints  Reaction Date: [id number]; Category:  Adverse Reaction;    • Sucralfate GI " Intolerance     Abdominal pain    • Sulfa Antibiotics Other (See Comments)   • Omeprazole Rash   • Statins Other (See Comments)     Muscle pain       Medications:     Current Outpatient Medications:   •  ALPRAZolam (XANAX) 0 5 mg tablet, Take 1 tablet (0 5 mg total) by mouth daily at bedtime as needed for anxiety, Disp: 90 tablet, Rfl: 1  •  Calcium Carbonate-Vitamin D (CALCIUM 600+D PO), Take by mouth daily with lunch, Disp: , Rfl:   •  cholecalciferol (VITAMIN D3) 400 units tablet, Take 400 Units by mouth daily after lunch , Disp: , Rfl:   •  clopidogrel (PLAVIX) 75 mg tablet, TAKE ONE TABLET BY MOUTH EVERY DAY (Patient taking differently: 75 mg daily at bedtime), Disp: 90 tablet, Rfl: 3  •  Docusate Calcium (STOOL SOFTENER PO), Take by mouth OTC; takes with lunch, Disp: , Rfl:   •  dutasteride (AVODART) 0 5 mg capsule, TAKE ONE CAPSULE BY MOUTH EVERY DAY IN THE MORNING, Disp: 90 capsule, Rfl: 3  •  famotidine (PEPCID) 20 mg tablet, Take 1 tablet (20 mg total) by mouth daily at bedtime, Disp: 90 tablet, Rfl: 3  •  furosemide (LASIX) 40 mg tablet, Take 1 tablet (40 mg total) by mouth daily, Disp: 90 tablet, Rfl: 3  •  isosorbide mononitrate (IMDUR) 60 mg 24 hr tablet, Take 1 tablet (60 mg total) by mouth every morning, Disp: 90 tablet, Rfl: 3  •  metoprolol succinate (TOPROL-XL) 50 mg 24 hr tablet, Take 1 tablet (50 mg total) by mouth every 12 (twelve) hours, Disp: 180 tablet, Rfl: 3  •  multivitamin-iron-minerals-folic acid (CENTRUM) chewable tablet, Chew 1 tablet every morning , Disp: , Rfl:   •  pantoprazole (PROTONIX) 20 mg tablet, Take 1 tablet (20 mg total) by mouth 2 (two) times a day, Disp: 180 tablet, Rfl: 1  •  ranolazine (RANEXA) 500 mg 12 hr tablet, Take 1 tablet (500 mg total) by mouth 2 (two) times a day, Disp: 180 tablet, Rfl: 3  •  rosuvastatin (CRESTOR) 5 mg tablet, TAKE ONE TABLET BY MOUTH EVERY DAY AT BEDTIME, Disp: 90 tablet, Rfl: 3  •  spironolactone (ALDACTONE) 25 mg tablet, Take 1 tablet (25 mg total) by mouth daily after breakfast, Disp: 90 tablet, Rfl: 3  •  tamsulosin (Flomax) 0 4 mg, Take 1 capsule (0 4 mg total) by mouth daily with dinner, Disp: 90 capsule, Rfl: 3  •  Zinc 25 MG TABS, Take 25 mg by mouth daily at bedtime, Disp: , Rfl:   •  polyethylene glycol (MIRALAX) 17 g packet, Take 17 g by mouth daily for 10 days (Patient not taking: Reported on 5/17/2023), Disp: 170 g, Rfl: 0      Vitals:    06/13/23 1357   BP: 122/68   Pulse: 95   SpO2: 96%     Weight (last 2 days)     None        Physical Exam  Constitutional:       General: He is not in acute distress  Appearance: Normal appearance  He is well-developed  He is not diaphoretic  HENT:      Head: Normocephalic and atraumatic  Eyes:      General: No scleral icterus  Conjunctiva/sclera: Conjunctivae normal       Pupils: Pupils are equal, round, and reactive to light  Neck:      Thyroid: No thyromegaly  Vascular: No JVD  Cardiovascular:      Rate and Rhythm: Normal rate and regular rhythm  Heart sounds: Normal heart sounds  No murmur heard  No friction rub  No gallop  Pulmonary:      Effort: Pulmonary effort is normal  No respiratory distress  Breath sounds: Normal breath sounds  No wheezing or rales  Abdominal:      General: Bowel sounds are normal  There is no distension  Palpations: Abdomen is soft  Tenderness: There is no abdominal tenderness  Musculoskeletal:         General: No deformity  Normal range of motion  Cervical back: Normal range of motion and neck supple  Right lower leg: No edema  Left lower leg: No edema  Skin:     General: Skin is warm and dry  Findings: No erythema or rash  Neurological:      General: No focal deficit present  Mental Status: He is alert and oriented to person, place, and time  Cranial Nerves: No cranial nerve deficit  Motor: No weakness           Laboratory Studies:    Laboratory studies personally reviewed    Cardiac "testing:     EKG reviewed personally:   No results found for this visit on 06/13/23  Echocardiogram:      Stress tests:      Catheterization:      Holter:         Joe Glaser MD    Portions of the record may have been created with voice recognition software  Occasional wrong word or \"sound a like\" substitutions may have occurred due to the inherent limitations of voice recognition software  Read the chart carefully and recognize, using context, where substitutions have occurred    "

## 2023-06-18 ENCOUNTER — APPOINTMENT (EMERGENCY)
Dept: CT IMAGING | Facility: HOSPITAL | Age: 88
DRG: 306 | End: 2023-06-18
Payer: MEDICARE

## 2023-06-18 ENCOUNTER — APPOINTMENT (EMERGENCY)
Dept: RADIOLOGY | Facility: HOSPITAL | Age: 88
DRG: 306 | End: 2023-06-18
Payer: MEDICARE

## 2023-06-18 ENCOUNTER — HOSPITAL ENCOUNTER (INPATIENT)
Facility: HOSPITAL | Age: 88
LOS: 3 days | Discharge: HOME | DRG: 306 | End: 2023-06-21
Attending: EMERGENCY MEDICINE | Admitting: INTERNAL MEDICINE
Payer: MEDICARE

## 2023-06-18 ENCOUNTER — NURSE TRIAGE (OUTPATIENT)
Dept: OTHER | Facility: OTHER | Age: 88
End: 2023-06-18

## 2023-06-18 DIAGNOSIS — R06.02 SOB (SHORTNESS OF BREATH): ICD-10-CM

## 2023-06-18 DIAGNOSIS — I50.33 ACUTE ON CHRONIC DIASTOLIC (CONGESTIVE) HEART FAILURE (HCC): ICD-10-CM

## 2023-06-18 DIAGNOSIS — R06.01 ORTHOPNEA: ICD-10-CM

## 2023-06-18 DIAGNOSIS — J90 PLEURAL EFFUSION: ICD-10-CM

## 2023-06-18 DIAGNOSIS — E87.1 HYPONATREMIA: ICD-10-CM

## 2023-06-18 DIAGNOSIS — I50.9 CHF EXACERBATION (HCC): Primary | ICD-10-CM

## 2023-06-18 LAB
2HR DELTA HS TROPONIN: -14 NG/L
4HR DELTA HS TROPONIN: 10 NG/L
ALBUMIN SERPL BCP-MCNC: 3.6 G/DL (ref 3.5–5)
ALP SERPL-CCNC: 81 U/L (ref 34–104)
ALT SERPL W P-5'-P-CCNC: 9 U/L (ref 7–52)
ANION GAP SERPL CALCULATED.3IONS-SCNC: 10 MMOL/L (ref 4–13)
AST SERPL W P-5'-P-CCNC: 7 U/L (ref 13–39)
ATRIAL RATE: 94 BPM
BASOPHILS # BLD AUTO: 0.02 THOUSANDS/ÂΜL (ref 0–0.1)
BASOPHILS NFR BLD AUTO: 0 % (ref 0–1)
BILIRUB SERPL-MCNC: 0.94 MG/DL (ref 0.2–1)
BNP SERPL-MCNC: 1313 PG/ML (ref 0–100)
BUN SERPL-MCNC: 25 MG/DL (ref 5–25)
CALCIUM SERPL-MCNC: 8.9 MG/DL (ref 8.4–10.2)
CARDIAC TROPONIN I PNL SERPL HS: 126 NG/L
CARDIAC TROPONIN I PNL SERPL HS: 140 NG/L
CARDIAC TROPONIN I PNL SERPL HS: 150 NG/L
CHLORIDE SERPL-SCNC: 92 MMOL/L (ref 96–108)
CO2 SERPL-SCNC: 28 MMOL/L (ref 21–32)
CREAT SERPL-MCNC: 1.32 MG/DL (ref 0.6–1.3)
EOSINOPHIL # BLD AUTO: 0.03 THOUSAND/ÂΜL (ref 0–0.61)
EOSINOPHIL NFR BLD AUTO: 1 % (ref 0–6)
ERYTHROCYTE [DISTWIDTH] IN BLOOD BY AUTOMATED COUNT: 13.8 % (ref 11.6–15.1)
GFR SERPL CREATININE-BSD FRML MDRD: 46 ML/MIN/1.73SQ M
GLUCOSE SERPL-MCNC: 179 MG/DL (ref 65–140)
HCT VFR BLD AUTO: 40 % (ref 36.5–49.3)
HGB BLD-MCNC: 13 G/DL (ref 12–17)
IMM GRANULOCYTES # BLD AUTO: 0.03 THOUSAND/UL (ref 0–0.2)
IMM GRANULOCYTES NFR BLD AUTO: 1 % (ref 0–2)
LYMPHOCYTES # BLD AUTO: 0.69 THOUSANDS/ÂΜL (ref 0.6–4.47)
LYMPHOCYTES NFR BLD AUTO: 11 % (ref 14–44)
MCH RBC QN AUTO: 30.2 PG (ref 26.8–34.3)
MCHC RBC AUTO-ENTMCNC: 32.5 G/DL (ref 31.4–37.4)
MCV RBC AUTO: 93 FL (ref 82–98)
MONOCYTES # BLD AUTO: 0.57 THOUSAND/ÂΜL (ref 0.17–1.22)
MONOCYTES NFR BLD AUTO: 9 % (ref 4–12)
NEUTROPHILS # BLD AUTO: 5.25 THOUSANDS/ÂΜL (ref 1.85–7.62)
NEUTS SEG NFR BLD AUTO: 78 % (ref 43–75)
NRBC BLD AUTO-RTO: 0 /100 WBCS
P AXIS: 105 DEGREES
PLATELET # BLD AUTO: 211 THOUSANDS/UL (ref 149–390)
PMV BLD AUTO: 10.2 FL (ref 8.9–12.7)
POTASSIUM SERPL-SCNC: 4.4 MMOL/L (ref 3.5–5.3)
PR INTERVAL: 206 MS
PROT SERPL-MCNC: 7.3 G/DL (ref 6.4–8.4)
QRS AXIS: 22 DEGREES
QRSD INTERVAL: 116 MS
QT INTERVAL: 370 MS
QTC INTERVAL: 462 MS
RBC # BLD AUTO: 4.3 MILLION/UL (ref 3.88–5.62)
SODIUM SERPL-SCNC: 130 MMOL/L (ref 135–147)
T WAVE AXIS: 197 DEGREES
VENTRICULAR RATE: 94 BPM
WBC # BLD AUTO: 6.59 THOUSAND/UL (ref 4.31–10.16)

## 2023-06-18 PROCEDURE — 36415 COLL VENOUS BLD VENIPUNCTURE: CPT | Performed by: EMERGENCY MEDICINE

## 2023-06-18 PROCEDURE — 80053 COMPREHEN METABOLIC PANEL: CPT | Performed by: EMERGENCY MEDICINE

## 2023-06-18 PROCEDURE — 84484 ASSAY OF TROPONIN QUANT: CPT

## 2023-06-18 PROCEDURE — 71045 X-RAY EXAM CHEST 1 VIEW: CPT

## 2023-06-18 PROCEDURE — 71275 CT ANGIOGRAPHY CHEST: CPT

## 2023-06-18 PROCEDURE — 93010 ELECTROCARDIOGRAM REPORT: CPT | Performed by: INTERNAL MEDICINE

## 2023-06-18 PROCEDURE — 83880 ASSAY OF NATRIURETIC PEPTIDE: CPT | Performed by: EMERGENCY MEDICINE

## 2023-06-18 PROCEDURE — 96374 THER/PROPH/DIAG INJ IV PUSH: CPT

## 2023-06-18 PROCEDURE — 84484 ASSAY OF TROPONIN QUANT: CPT | Performed by: EMERGENCY MEDICINE

## 2023-06-18 PROCEDURE — 99285 EMERGENCY DEPT VISIT HI MDM: CPT

## 2023-06-18 PROCEDURE — 99223 1ST HOSP IP/OBS HIGH 75: CPT | Performed by: INTERNAL MEDICINE

## 2023-06-18 PROCEDURE — G1004 CDSM NDSC: HCPCS

## 2023-06-18 PROCEDURE — 85025 COMPLETE CBC W/AUTO DIFF WBC: CPT | Performed by: EMERGENCY MEDICINE

## 2023-06-18 PROCEDURE — 93005 ELECTROCARDIOGRAM TRACING: CPT

## 2023-06-18 RX ORDER — RANOLAZINE 500 MG/1
500 TABLET, EXTENDED RELEASE ORAL 2 TIMES DAILY
Status: DISCONTINUED | OUTPATIENT
Start: 2023-06-18 | End: 2023-06-21 | Stop reason: HOSPADM

## 2023-06-18 RX ORDER — FUROSEMIDE 10 MG/ML
40 INJECTION INTRAMUSCULAR; INTRAVENOUS 2 TIMES DAILY
Status: DISCONTINUED | OUTPATIENT
Start: 2023-06-18 | End: 2023-06-21

## 2023-06-18 RX ORDER — ATORVASTATIN CALCIUM 10 MG/1
10 TABLET, FILM COATED ORAL
Status: DISCONTINUED | OUTPATIENT
Start: 2023-06-18 | End: 2023-06-21 | Stop reason: HOSPADM

## 2023-06-18 RX ORDER — FAMOTIDINE 20 MG/1
20 TABLET, FILM COATED ORAL
Status: DISCONTINUED | OUTPATIENT
Start: 2023-06-18 | End: 2023-06-21 | Stop reason: HOSPADM

## 2023-06-18 RX ORDER — SPIRONOLACTONE 25 MG/1
25 TABLET ORAL
Status: DISCONTINUED | OUTPATIENT
Start: 2023-06-19 | End: 2023-06-21 | Stop reason: HOSPADM

## 2023-06-18 RX ORDER — ENOXAPARIN SODIUM 100 MG/ML
30 INJECTION SUBCUTANEOUS
Status: DISCONTINUED | OUTPATIENT
Start: 2023-06-19 | End: 2023-06-20 | Stop reason: DRUGHIGH

## 2023-06-18 RX ORDER — FUROSEMIDE 10 MG/ML
40 INJECTION INTRAMUSCULAR; INTRAVENOUS ONCE
Status: COMPLETED | OUTPATIENT
Start: 2023-06-18 | End: 2023-06-18

## 2023-06-18 RX ORDER — ISOSORBIDE MONONITRATE 60 MG/1
60 TABLET, EXTENDED RELEASE ORAL EVERY MORNING
Status: DISCONTINUED | OUTPATIENT
Start: 2023-06-19 | End: 2023-06-18

## 2023-06-18 RX ORDER — TAMSULOSIN HYDROCHLORIDE 0.4 MG/1
0.4 CAPSULE ORAL
Status: DISCONTINUED | OUTPATIENT
Start: 2023-06-18 | End: 2023-06-21 | Stop reason: HOSPADM

## 2023-06-18 RX ORDER — ALPRAZOLAM 0.5 MG/1
0.5 TABLET ORAL
Status: DISCONTINUED | OUTPATIENT
Start: 2023-06-18 | End: 2023-06-21 | Stop reason: HOSPADM

## 2023-06-18 RX ORDER — PANTOPRAZOLE SODIUM 20 MG/1
20 TABLET, DELAYED RELEASE ORAL
Status: DISCONTINUED | OUTPATIENT
Start: 2023-06-19 | End: 2023-06-21 | Stop reason: HOSPADM

## 2023-06-18 RX ORDER — METOPROLOL SUCCINATE 50 MG/1
50 TABLET, EXTENDED RELEASE ORAL EVERY 12 HOURS
Status: DISCONTINUED | OUTPATIENT
Start: 2023-06-18 | End: 2023-06-21 | Stop reason: HOSPADM

## 2023-06-18 RX ORDER — FINASTERIDE 5 MG/1
5 TABLET, FILM COATED ORAL DAILY
Status: DISCONTINUED | OUTPATIENT
Start: 2023-06-19 | End: 2023-06-21 | Stop reason: HOSPADM

## 2023-06-18 RX ORDER — DOCUSATE SODIUM 100 MG/1
100 CAPSULE, LIQUID FILLED ORAL DAILY PRN
Status: DISCONTINUED | OUTPATIENT
Start: 2023-06-18 | End: 2023-06-21 | Stop reason: HOSPADM

## 2023-06-18 RX ORDER — OMEGA-3S/DHA/EPA/FISH OIL/D3 300MG-1000
400 CAPSULE ORAL
Status: DISCONTINUED | OUTPATIENT
Start: 2023-06-19 | End: 2023-06-21 | Stop reason: HOSPADM

## 2023-06-18 RX ORDER — CLOPIDOGREL BISULFATE 75 MG/1
75 TABLET ORAL DAILY
Status: DISCONTINUED | OUTPATIENT
Start: 2023-06-19 | End: 2023-06-21 | Stop reason: HOSPADM

## 2023-06-18 RX ORDER — CALCIUM CARBONATE 500(1250)
1 TABLET ORAL
Status: DISCONTINUED | OUTPATIENT
Start: 2023-06-19 | End: 2023-06-21 | Stop reason: HOSPADM

## 2023-06-18 RX ADMIN — METOPROLOL SUCCINATE 50 MG: 50 TABLET, EXTENDED RELEASE ORAL at 19:36

## 2023-06-18 RX ADMIN — FAMOTIDINE 20 MG: 20 TABLET ORAL at 22:00

## 2023-06-18 RX ADMIN — FUROSEMIDE 40 MG: 10 INJECTION, SOLUTION INTRAMUSCULAR; INTRAVENOUS at 16:48

## 2023-06-18 RX ADMIN — TAMSULOSIN HYDROCHLORIDE 0.4 MG: 0.4 CAPSULE ORAL at 19:36

## 2023-06-18 RX ADMIN — RANOLAZINE 500 MG: 500 TABLET, FILM COATED, EXTENDED RELEASE ORAL at 19:36

## 2023-06-18 RX ADMIN — ATORVASTATIN CALCIUM 10 MG: 10 TABLET, FILM COATED ORAL at 19:36

## 2023-06-18 RX ADMIN — IOHEXOL 50 ML: 350 INJECTION, SOLUTION INTRAVENOUS at 15:52

## 2023-06-18 NOTE — ASSESSMENT & PLAN NOTE
Wt Readings from Last 3 Encounters:   06/18/23 65 1 kg (143 lb 8 3 oz)   06/13/23 65 1 kg (143 lb 9 6 oz)   05/24/23 65 kg (143 lb 3 2 oz)     Patient presents is a 59-year-old male with a past medical history significant for chronic diastolic congestive heart failure, peptic ulcer disease, hyperlipidemia, BPH, and anxiety presents with several days worsening shortness of breath with orthopnea and nonproductive cough  In the ED patient found to have elevated BNP, chest x-ray and CTA both consistent with volume overloaded status  Given 1 dose of IV Lasix 40 in the ED with mild resolution of symptoms  Last known ejection fraction of 45% on 3/16/2023, follows up outpatient with Noe Walsh MD   Patient previously taking Lasix 40 p o  daily, spironolactone 25 mg daily added on last visit    Patient dry weight recorded to be around 64 kg in outpatient cardiology office, found to be 65 kg in hospital     Plan:  Lasix 40 mg IV twice daily, first dose 10 PM tonight  Echocardiogram  Consult cardiology  Incentive spirometry  Continue spironolactone  Hold Imdur to allow for better blood pressure range while actively diuresing  Morning BNP, BMP, CBC  Telemetry  Daily weights  Ins and outs  Cardiac/sodium restricted diet

## 2023-06-18 NOTE — ED PROVIDER NOTES
History  Chief Complaint   Patient presents with   • Shortness of Breath     Dx CHF, weight gain, SOB and chest pain, can't lay flat     51-year-old male with history of DVT, PVD, HTN, HLD, CAD, CABG, CKD presents with shortness of breath  Patient states that he was recently admitted on June 8 for similar symptoms of shortness of breath and was noted to be fluid overloaded, required thoracentesis and paracentesis at that time for drainage of fluid  He was discharged on June 14 and states outpatient follow-up and started on spironolactone  Advised to return for reevaluation if greater than or equal to 3 pound weight gain  Patient states continued shortness of breath that has not worsened with exertion and laying down  States it feels like fluid is pushing up into his head when he lays down and he has issues thinking  States he has not been right since previous admission  States history of valve disease and heart failure  Denies fevers, chills, chest pain, abdominal pain, changes in urinary or bowel habits, leg pain, recent long travel, recent surgery, malignancy  Prior to Admission Medications   Prescriptions Last Dose Informant Patient Reported? Taking?    ALPRAZolam (XANAX) 0 5 mg tablet 6/17/2023 Self No Yes   Sig: Take 1 tablet (0 5 mg total) by mouth daily at bedtime as needed for anxiety   Calcium Carbonate-Vitamin D (CALCIUM 600+D PO) 6/17/2023 Self Yes Yes   Sig: Take by mouth daily with lunch   Docusate Calcium (STOOL SOFTENER PO) 6/17/2023 Self Yes Yes   Sig: Take by mouth OTC; takes with lunch   Zinc 25 MG TABS 6/17/2023 Self Yes Yes   Sig: Take 25 mg by mouth daily at bedtime   cholecalciferol (VITAMIN D3) 400 units tablet 6/17/2023 Self Yes Yes   Sig: Take 400 Units by mouth daily after lunch    clopidogrel (PLAVIX) 75 mg tablet 6/17/2023 Self No Yes   Sig: TAKE ONE TABLET BY MOUTH EVERY DAY   Patient taking differently: 75 mg daily at bedtime   dutasteride (AVODART) 0 5 mg capsule "6/17/2023 Self No Yes   Sig: TAKE ONE CAPSULE BY MOUTH EVERY DAY IN THE MORNING   famotidine (PEPCID) 20 mg tablet  Self No No   Sig: Take 1 tablet (20 mg total) by mouth daily at bedtime   furosemide (LASIX) 40 mg tablet 6/17/2023 Self No Yes   Sig: Take 1 tablet (40 mg total) by mouth daily   isosorbide mononitrate (IMDUR) 60 mg 24 hr tablet 6/17/2023 Self No Yes   Sig: Take 1 tablet (60 mg total) by mouth every morning   metoprolol succinate (TOPROL-XL) 50 mg 24 hr tablet 6/17/2023 Self No Yes   Sig: Take 1 tablet (50 mg total) by mouth every 12 (twelve) hours   multivitamin-iron-minerals-folic acid (CENTRUM) chewable tablet 6/17/2023 Self Yes Yes   Sig: Chew 1 tablet every morning    pantoprazole (PROTONIX) 20 mg tablet 6/17/2023 Self No Yes   Sig: Take 1 tablet (20 mg total) by mouth 2 (two) times a day   polyethylene glycol (MIRALAX) 17 g packet   No No   Sig: Take 17 g by mouth daily for 10 days   Patient not taking: Reported on 5/17/2023   ranolazine (RANEXA) 500 mg 12 hr tablet 6/17/2023 Self No Yes   Sig: Take 1 tablet (500 mg total) by mouth 2 (two) times a day   rosuvastatin (CRESTOR) 5 mg tablet 6/17/2023 Self No Yes   Sig: TAKE ONE TABLET BY MOUTH EVERY DAY AT BEDTIME   spironolactone (ALDACTONE) 25 mg tablet 6/17/2023  No Yes   Sig: Take 1 tablet (25 mg total) by mouth daily after breakfast   tamsulosin (Flomax) 0 4 mg 6/17/2023 Self No Yes   Sig: Take 1 capsule (0 4 mg total) by mouth daily with dinner      Facility-Administered Medications: None       Past Medical History:   Diagnosis Date   • Arthritis    • BPH (benign prostatic hyperplasia)    • Cervical spine fracture (HCC) 1997    C3   • Chest pain    • Colon polyp    • Coronary artery disease    • Dry eye    • DVT (deep venous thrombosis) ([facility] [address] ) 2015    right leg- passed thru the IVC filter-stopped at the \"patch\" from bypass surgery   • Dysphagia 11/2021    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   • Fracture of thoracic spine ([facility] [address] ) " 1997    T3   • Glaucoma    • Hyperlipidemia    • Hypertension    • Myocardial infarction ([facility] [address] )    • Pneumonia    • PUD (peptic ulcer disease)        Past Surgical History:   Procedure Laterality Date   • ANGIOPLASTY      2 stents   • CHOLECYSTECTOMY     • COLONOSCOPY     • CORONARY ARTERY BYPASS GRAFT      x6   • CORONARY ARTERY BYPASS GRAFT     • CORONARY STENT PLACEMENT     • CYSTOSCOPY     • EYE SURGERY Right    • HERNIA REPAIR Right 11/3/2017    Procedure: REPAIR HERNIA INGUINAL;  Surgeon: Rene Griffith MD;  Location: [facility];  Service: General   • IVC FILTER INSERTION      IVC filter   • MD CYSTO W/IRRIG & EVAC MULTPLE OBSTRUCTING CLOTS N/A 6/30/2018    Procedure: CYSTOSCOPY EVACUATION OF CLOTS, fulgeration;  Surgeon: Blane Hancock MD;  Location:  Main OR;  Service: Urology   • STOMACH SURGERY  1973    Billroth 2 gastrectomy-2/3 removal- bleeding ulcer   • TRANSURETHRAL RESECTION OF PROSTATE         Family History   Problem Relation Age of Onset   • Coronary artery disease Brother    • Heart disease Father         CAD     I have reviewed and agree with the history as documented  E-Cigarette/Vaping   • E-Cigarette Use Never User      E-Cigarette/Vaping Substances   • Nicotine No    • THC No    • CBD No    • Flavoring No    • Other No    • Unknown No      Social History     Tobacco Use   • Smoking status: Never   • Smokeless tobacco: Never   Vaping Use   • Vaping Use: Never used   Substance Use Topics   • Alcohol use: Never   • Drug use: No        Review of Systems   All other systems reviewed and are negative        Physical Exam  ED Triage Vitals   Temperature Pulse Respirations Blood Pressure SpO2   06/18/23 1429 06/18/23 1429 06/18/23 1429 06/18/23 1429 06/18/23 1429   98 2 °F (36 8 °C) 99 19 111/56 92 %      Temp Source Heart Rate Source Patient Position - Orthostatic VS BP Location FiO2 (%)   06/18/23 1919 06/18/23 1919 06/18/23 1919 06/18/23 1429 --   Oral Monitor Lying Right arm       Pain Score       06/18/23 1900       No Pain             Orthostatic Vital Signs  Vitals:    06/18/23 1700 06/18/23 1730 06/18/23 1919 06/18/23 2100   BP: 97/61 119/62 111/55 102/55   Pulse: 91 98 93 99   Patient Position - Orthostatic VS:   Lying Lying       Physical Exam  Vitals and nursing note reviewed  Constitutional:       General: He is not in acute distress  Appearance: He is well-developed and normal weight  He is ill-appearing  He is not toxic-appearing or diaphoretic  Interventions: He is not intubated  HENT:      Head: Normocephalic and atraumatic  Mouth/Throat:      Mouth: Mucous membranes are moist    Eyes:      Extraocular Movements: Extraocular movements intact  Pupils: Pupils are equal, round, and reactive to light  Neck:      Vascular: No JVD  Cardiovascular:      Rate and Rhythm: Regular rhythm  Pulses: Normal pulses  Heart sounds: Normal heart sounds  No murmur heard  No friction rub  No gallop  Pulmonary:      Effort: Respiratory distress present  No tachypnea, bradypnea or accessory muscle usage  He is not intubated  Breath sounds: Normal breath sounds  No stridor  Comments: On 2 L nasal cannula and satting approximately 98%  Speaking in 3 word sentences  Chest:      Chest wall: No mass, deformity, tenderness, crepitus or edema  Abdominal:      Palpations: Abdomen is soft  There is no hepatomegaly, splenomegaly or mass  Tenderness: There is no abdominal tenderness  There is no guarding or rebound  Musculoskeletal:         General: Normal range of motion  Cervical back: Normal range of motion and neck supple  Right lower leg: No tenderness  No edema  Left lower leg: No tenderness  No edema  Skin:     General: Skin is warm  Capillary Refill: Capillary refill takes less than 2 seconds  Coloration: Skin is not cyanotic or pale  Findings: No ecchymosis, erythema or rash  Nails: There is no clubbing  Neurological:      General: No focal deficit present  Mental Status: He is alert and oriented to person, place, and time     Psychiatric:         Mood and Affect: Mood normal          Behavior: Behavior normal          ED Medications  Medications   ALPRAZolam (XANAX) tablet 0 5 mg (has no administration in time range)   calcium carbonate (OYSTER SHELL,OSCAL) 500 mg tablet 1 tablet (has no administration in time range)   cholecalciferol (VITAMIN D3) tablet 400 Units (has no administration in time range)   clopidogrel (PLAVIX) tablet 75 mg (has no administration in time range)   docusate sodium (COLACE) capsule 100 mg (has no administration in time range)   finasteride (PROSCAR) tablet 5 mg (has no administration in time range)   famotidine (PEPCID) tablet 20 mg (20 mg Oral Given 6/18/23 2200)   metoprolol succinate (TOPROL-XL) 24 hr tablet 50 mg (50 mg Oral Given 6/18/23 1936)   multivitamin-minerals (CENTRUM) tablet 1 tablet (has no administration in time range)   pantoprazole (PROTONIX) EC tablet 20 mg (has no administration in time range)   ranolazine (RANEXA) 12 hr tablet 500 mg (500 mg Oral Given 6/18/23 1936)   spironolactone (ALDACTONE) tablet 25 mg (has no administration in time range)   tamsulosin (FLOMAX) capsule 0 4 mg (0 4 mg Oral Given 6/18/23 1936)   atorvastatin (LIPITOR) tablet 10 mg (10 mg Oral Given 6/18/23 1936)   furosemide (LASIX) injection 40 mg (40 mg Intravenous Not Given 6/18/23 2159)   enoxaparin (LOVENOX) subcutaneous injection 30 mg (has no administration in time range)   iohexol (OMNIPAQUE) 350 MG/ML injection (SINGLE-DOSE) 50 mL (50 mL Intravenous Given 6/18/23 1552)   furosemide (LASIX) injection 40 mg (40 mg Intravenous Given 6/18/23 1648)       Diagnostic Studies  Results Reviewed     Procedure Component Value Units Date/Time    HS Troponin I 4hr [[id number]]  (Abnormal) Collected: 06/18/23 1905    Lab Status: Final result Specimen: Blood from Arm, Right Updated: 06/1934 hs TnI 4hr 150 ng/L      Delta 4hr hsTnI 10 ng/L     HS Troponin I 2hr [[id number]]  (Abnormal) Collected: 06/18/23 1647    Lab Status: Final result Specimen: Blood from Arm, Left Updated: 06/18/23 1718     hs TnI 2hr 126 ng/L      Delta 2hr hsTnI -14 ng/L     HS Troponin 0hr (reflex protocol) [[id number]]  (Abnormal) Collected: 06/18/23 1441    Lab Status: Final result Specimen: Blood from Arm, Left Updated: 06/18/23 1519     hs TnI 0hr 140 ng/L     B-Type Natriuretic Peptide(BNP) [[id number]]  (Abnormal) Collected: 06/18/23 1441    Lab Status: Final result Specimen: Blood from Arm, Left Updated: 06/18/23 1518     BNP 1,313 pg/mL     Comprehensive metabolic panel [[id number]]  (Abnormal) Collected: 06/18/23 1441    Lab Status: Final result Specimen: Blood from Arm, Left Updated: 06/18/23 1515     Sodium 130 mmol/L      Potassium 4 4 mmol/L      Chloride 92 mmol/L      CO2 28 mmol/L      ANION GAP 10 mmol/L      BUN 25 mg/dL      Creatinine 1 32 mg/dL      Glucose 179 mg/dL      Calcium 8 9 mg/dL      AST 7 U/L      ALT 9 U/L      Alkaline Phosphatase 81 U/L      Total Protein 7 3 g/dL      Albumin 3 6 g/dL      Total Bilirubin 0 94 mg/dL      eGFR 46 ml/min/1 73sq m     Narrative:      [facility] guidelines for Chronic Kidney Disease (CKD):   •  Stage 1 with normal or high GFR (GFR > 90 mL/min/1 73 square meters)  •  Stage 2 Mild CKD (GFR = 60-89 mL/min/1 73 square meters)  •  Stage 3A Moderate CKD (GFR = 45-59 mL/min/1 73 square meters)  •  Stage 3B Moderate CKD (GFR = 30-44 mL/min/1 73 square meters)  •  Stage 4 Severe CKD (GFR = 15-29 mL/min/1 73 square meters)  •  Stage 5 End Stage CKD (GFR <15 mL/min/1 73 square meters)  Note: GFR calculation is accurate only with a steady state creatinine    CBC and differential [[id number]]  (Abnormal) Collected: 06/18/23 1441    Lab Status: Final result Specimen: Blood from Arm, Left Updated: 06/18/23 1450     WBC 6 59 Thousand/uL      RBC 4 30 Million/uL      Hemoglobin 13 0 g/dL      Hematocrit 40 0 %      MCV 93 fL      MCH 30 2 pg      MCHC 32 5 g/dL      RDW 13 8 %      MPV 10 2 fL      Platelets 861 Thousands/uL      nRBC 0 /100 WBCs      Neutrophils Relative 78 %      Immat GRANS % 1 %      Lymphocytes Relative 11 %      Monocytes Relative 9 %      Eosinophils Relative 1 %      Basophils Relative 0 %      Neutrophils Absolute 5 25 Thousands/µL      Immature Grans Absolute 0 03 Thousand/uL      Lymphocytes Absolute 0 69 Thousands/µL      Monocytes Absolute 0 57 Thousand/µL      Eosinophils Absolute 0 03 Thousand/µL      Basophils Absolute 0 02 Thousands/µL                  CTA ED chest PE study   Final Result by Dayana Potts MD (06/18 1717)      No CT evidence of pulmonary embolism  Cardiomegaly with moderate bilateral pleural effusions and hazy groundglass densities with interlobular septal thickening suspicious for pulmonary edema/CHF  Borderline prominent mediastinal and hilar lymph nodes  Small hiatal hernia  Punctate nonobstructing left renal calculus  Evidence of prior granulomatous disease  Workstation performed: [id number]         XR chest 1 view portable   ED Interpretation by Suleiman Gamboa MD (06/18 5843)   New right-sided pleural effusion with stable left-sided pleural effusion  Pericardiac alveolar infiltrates likely secondary to pulmonary edema  Procedures  ECG 12 Lead Documentation Only    Date/Time: 6/18/2023 4:05 PM    Performed by: Suleiman Gamboa MD  Authorized by: Suleiman Gamboa MD    Interpretation:     Interpretation comment:  Denies any ED course  ED Course  ED Course as of 06/19/23 0036   Sun Jun 18, 2023   1442 History of DVT, PUD, HTN, HLD, CAD, CABG, CKD    1549 BNP(!): 1,313  Increased from baseline   1550 Comprehensive metabolic panel(!)  New hyponatremia and increase in Cr   Likely 2/2 fluid overload   1551 hs TnI 0hr(!): 140  Elevated from baseline   1605 EKG normal sinus rhythm rate of 94, first-degree AV block, normal QRS interval, QTc 462, normal axis, ST depression and V5, V6, 1, 2, aVL, ST elevation in aVR, V1, V2, T wave inversion in aVL, T wave flattening in V4  When compared to previous EKG no significant changes noted  1721 hs TnI 2hr(!): 126   1722 IMPRESSION:     No CT evidence of pulmonary embolism      Cardiomegaly with moderate bilateral pleural effusions and hazy groundglass densities with interlobular septal thickening suspicious for pulmonary edema/CHF      Borderline prominent mediastinal and hilar lymph nodes      Small hiatal hernia      Punctate nonobstructing left renal calculus      Evidence of prior granulomatous disease                        Workstation performed: [id number]             HEART Risk Score    Flowsheet Row Most Recent Value   Heart Score Risk Calculator    History 1 Filed at: 06/19/2023 0032   ECG 1 Filed at: 06/19/2023 0032   Age 2 Filed at: 06/19/2023 0032   Risk Factors 2 Filed at: 06/19/2023 0032   Troponin 2 Filed at: 06/19/2023 0032   HEART Score 8 Filed at: 06/19/2023 0032                          Wells' Criteria for PE    Flowsheet Row Most Recent Value   Wells' Criteria for PE    Clinical signs and symptoms of DVT 0 Filed at: 06/18/2023 1514   PE is primary diagnosis or equally likely 3 Filed at: 06/18/2023 1514   HR >100 0 Filed at: 06/18/2023 1514   Immobilization at least 3 days or Surgery in the previous 4 weeks 1 5 Filed at: 06/18/2023 1514   Previous, objectively diagnosed PE or DVT 1 5 Filed at: 06/18/2023 1514   Hemoptysis 0 Filed at: 06/18/2023 1514   Malignancy with treatment within 6 months or palliative 0 Filed at: 06/18/2023 1514   Wells' Criteria Total 6 Filed at: 06/18/2023 1514            Medical Decision Making  66-year-old male with significant past medical history presents with shortness of breath, orthopnea, exertional dyspnea  Has not felt right since previous admission    Was noted to have heart failure, pleural effusion, ascites on previous admission with drainage of pleural effusion and ascites per patient  Patient states that he was recently placed on spironolactone after discharge  Hemodynamically stable with soft blood pressures  Minimal oxygen requirements, 2 L nasal cannula, denies home oxygen therapy  Differential includes but not limited to ACS, pneumonia, pleural effusion, PE, CHF exacerbation  Chest x-ray shows new evolving right-sided pleural effusion with similar looking left-sided pleural effusion and pericardiac vascular congestion  Elevated BNP, elevated troponin compared to baseline from previous, new hyponatremia  CT PE study negative for PE but noted pleural effusion  Patient given 40 mg of Lasix and admitted to internal medicine team for further management of acute CHF exacerbation, hyponatremia, pleural effusions  Amount and/or Complexity of Data Reviewed  Labs: ordered  Decision-making details documented in ED Course  Radiology: ordered and independent interpretation performed  Risk  Prescription drug management  Decision regarding hospitalization              Disposition  Final diagnoses:   [facility] exacerbation ([facility] )   SOB (shortness of breath)   Orthopnea   Hyponatremia   Pleural effusion     Time reflects when diagnosis was documented in both MDM as applicable and the Disposition within this note     Time User Action Codes Description Comment    6/18/2023  5:28 PM Shanta Márquez Add [I50 9] CHF exacerbation ([facility] )     6/18/2023  5:28 PM Fadi Márquez Add [R06 02] SOB (shortness of breath)     6/18/2023  5:28 PM Shanta Márquez Add [R06 01] Orthopnea     6/18/2023  5:28 PM Fadi Márquez Add [E87 1] Hyponatremia     6/18/2023  5:28 PM Jonatan Márquez Add [J90] Pleural effusion       ED Disposition     ED Disposition   Admit    Condition   Stable    Date/Time   Sun Jun 18, 2023  5:27 PM    Comment   Case was discussed with Emilia Holt and the patient's admission status was agreed to be Admission Status: inpatient status to the service of Dr Vincent Vasquez   Follow-up Information    None         Current Discharge Medication List      CONTINUE these medications which have NOT CHANGED    Details   ALPRAZolam (XANAX) 0 5 mg tablet Take 1 tablet (0 5 mg total) by mouth daily at bedtime as needed for anxiety  Qty: 90 tablet, Refills: 1    Associated Diagnoses: Anxiety      Calcium Carbonate-Vitamin D (CALCIUM 600+D PO) Take by mouth daily with lunch      cholecalciferol (VITAMIN D3) 400 units tablet Take 400 Units by mouth daily after lunch       clopidogrel (PLAVIX) 75 mg tablet TAKE ONE TABLET BY MOUTH EVERY DAY  Qty: 90 tablet, Refills: 3    Associated Diagnoses: CAD S/P percutaneous coronary angioplasty      Docusate Calcium (STOOL SOFTENER PO) Take by mouth OTC; takes with lunch      dutasteride (AVODART) 0 5 mg capsule TAKE ONE CAPSULE BY MOUTH EVERY DAY IN THE MORNING  Qty: 90 capsule, Refills: 3    Associated Diagnoses: Benign prostatic hyperplasia with urinary retention      furosemide (LASIX) 40 mg tablet Take 1 tablet (40 mg total) by mouth daily  Qty: 90 tablet, Refills: 3    Associated Diagnoses: CHF exacerbation (HCC)      isosorbide mononitrate (IMDUR) 60 mg 24 hr tablet Take 1 tablet (60 mg total) by mouth every morning  Qty: 90 tablet, Refills: 3    Associated Diagnoses: Chronic stable angina (HCC)      metoprolol succinate (TOPROL-XL) 50 mg 24 hr tablet Take 1 tablet (50 mg total) by mouth every 12 (twelve) hours  Qty: 180 tablet, Refills: 3    Associated Diagnoses: Chronic stable angina (HCC)      multivitamin-iron-minerals-folic acid (CENTRUM) chewable tablet Chew 1 tablet every morning       pantoprazole (PROTONIX) 20 mg tablet Take 1 tablet (20 mg total) by mouth 2 (two) times a day  Qty: 180 tablet, Refills: 1    Associated Diagnoses: PUD (peptic ulcer disease);  Acute superficial gastritis without hemorrhage      ranolazine (RANEXA) 500 mg 12 hr tablet Take 1 tablet (500 mg total) by mouth 2 (two) times a day  Qty: 180 tablet, Refills: 3    Associated Diagnoses: Chronic stable angina (HCC)      rosuvastatin (CRESTOR) 5 mg tablet TAKE ONE TABLET BY MOUTH EVERY DAY AT BEDTIME  Qty: 90 tablet, Refills: 3    Associated Diagnoses: Chest pain      spironolactone (ALDACTONE) 25 mg tablet Take 1 tablet (25 mg total) by mouth daily after breakfast  Qty: 90 tablet, Refills: 3    Associated Diagnoses: 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)      tamsulosin (Flomax) 0 4 mg Take 1 capsule (0 4 mg total) by mouth daily with dinner  Qty: 90 capsule, Refills: 3    Associated Diagnoses: Benign prostatic hyperplasia, unspecified whether lower urinary tract symptoms present      Zinc 25 MG TABS Take 25 mg by mouth daily at bedtime      famotidine (PEPCID) 20 mg tablet Take 1 tablet (20 mg total) by mouth daily at bedtime  Qty: 90 tablet, Refills: 3    Associated Diagnoses: Gastroesophageal reflux disease without esophagitis      polyethylene glycol (MIRALAX) 17 g packet Take 17 g by mouth daily for 10 days  Qty: 170 g, Refills: 0    Associated Diagnoses: Constipation           No discharge procedures on file  PDMP Review       Value Time User    PDMP Reviewed  Yes 6/18/2023  6:22 PM Stephanie Maldonado DO           ED Provider  Attending physically available and evaluated Brenda Chávez I managed the patient along with the ED Attending      Electronically Signed by         Severo Pier, MD  06/19/23 8832

## 2023-06-18 NOTE — ASSESSMENT & PLAN NOTE
Plan:  Continue active diuresis  Continue spironolactone 25 mg daily  Hold Imdur for continued diuresis  Continue metoprolol

## 2023-06-18 NOTE — ASSESSMENT & PLAN NOTE
Patient takes Crestor, nonformulary    Plan:  Substitute with atorvastatin, cleared with patient and family despite allergy listed in chart

## 2023-06-18 NOTE — ED ATTENDING ATTESTATION
6/18/2023  IZechariah DO, saw and evaluated the patient  I have discussed the patient with the resident/non-physician practitioner and agree with the resident's/non-physician practitioner's findings, Plan of Care, and MDM as documented in the resident's/non-physician practitioner's note, except where noted  All available labs and Radiology studies were reviewed  I was present for key portions of any procedure(s) performed by the resident/non-physician practitioner and I was immediately available to provide assistance  At this point I agree with the current assessment done in the Emergency Department  I have conducted an independent evaluation of this patient a history and physical is as follows:    79 yo M coming into the ED for eval of shortness of breath, particularly with any exertion, for example walking 4-5 steps  He is not on oxygen normally, but is now on 2L nasal cannula  Hx of CHF, and this feels similar  PE:  The patient is well appearing, non-toxic, in NAD  Head: normocephalic, atraumatic  HEENT: mucous membranes moist   Lungs: CTA b/l, no resp distress  Decreased breath sounds at the bases  Heart: RRR  No M/R/G  Mild JVD  Abdomen: NT, ND, no R/R/G  Neuro: CN2-12 intact, GCS 15  Normal strength and sensation, normal speech and gait  Cap refill < 2 sec, skin warm and dry  No rashes or lesions  No peripheral edema or calf tenderness  ED workup: no PE on CT, but shows moderate b/l pleural effusions  Admit for diuresis, acute CHF exacerbation        ED Course         Critical Care Time  Procedures

## 2023-06-18 NOTE — TELEPHONE ENCOUNTER
"Regarding: SOB  ----- Message from Martita Rosenberg sent at 6/18/2023 11:54 AM EDT -----  Micky Bartlett was discharged from the hospital on 6/8  I'm having shortness of breath  \"    "

## 2023-06-18 NOTE — TELEPHONE ENCOUNTER
Patient calling in today stating he has been experiencing worsening shortness of breath over yesterday and today  Patient stated he was just admitted to the hospital a couple weeks ago for fluid around his heart and lungs  He stated he was instructed to call for any weight gain, this AM the patient noted his weight to be 140lbs and he stated he normally weighs about 136lbs  Denies any swelling of his legs or feet  He stated last night he trouble laying down due to the shortness of breath and would be forced to spend some time sitting up  He also stated he cannot walk more than 10 feet without feeling short of breath  This morning he mentioned he did experience some chest pain he rated 4-5/10 and stated it lasted for about 10 minutes and it currently is not present now  He checked his vitals this morning- BP 90/62 which he stated is lower for him and his HR was 102  He has also been experiencing a slight cough over the last 10 days  Denies any dizziness, fever or runny nose  Recommended patient go to the ED for evaluation at this time, patient stated he will be going to the [address] [place] [address] within the hour

## 2023-06-18 NOTE — ASSESSMENT & PLAN NOTE
Patient takes dutasteride at home, nonformulary      Plan:  Continue with finasteride substitute per formulary

## 2023-06-18 NOTE — TELEPHONE ENCOUNTER
"  Reason for Disposition  • [1] MODERATE difficulty breathing (e g , speaks in phrases, SOB even at rest, pulse 100-120) AND [2] NEW-onset or WORSE than normal    Answer Assessment - Initial Assessment Questions  1  RESPIRATORY STATUS: \"Describe your breathing? \" (e g , wheezing, shortness of breath, unable to speak, severe coughing)       SOB with exertion and at rest at times     2  ONSET: \"When did this breathing problem begin? \"       Last night but has gotten worse today     3  PATTERN \"Does the difficult breathing come and go, or has it been constant since it started? \"       Constant     4  SEVERITY: \"How bad is your breathing? \" (e g , mild, moderate, severe)     - MILD: No SOB at rest, mild SOB with walking, speaks normally in sentences, can lay down, no retractions, pulse < 100      - MODERATE: SOB at rest, SOB with minimal exertion and prefers to sit, cannot lie down flat, speaks in phrases, mild retractions, audible wheezing, pulse 100-120      - SEVERE: Very SOB at rest, speaks in single words, struggling to breathe, sitting hunched forward, retractions, pulse > 120       Moderate- can't walk more than 10 feet without getting SOB, has trouble breathing when laying down, pulse 102     5  RECURRENT SYMPTOM: \"Have you had difficulty breathing before? \" If Yes, ask: \"When was the last time? \" and \"What happened that time? \"       Yes- was recently admitted to the hospital a couple weeks ago for fluid around heart and lungs     6  CARDIAC HISTORY: \"Do you have any history of heart disease? \" (e g , heart attack, angina, bypass surgery, angioplasty)       Hypertension, CHF, CABG, CAD    7  LUNG HISTORY: \"Do you have any history of lung disease? \"  (e g , pulmonary embolus, asthma, emphysema)      Hx of DVT    8  CAUSE: \"What do you think is causing the breathing problem? \"       Fluid retention- weighed 140lbs this AM previously was 136lbs     9   OTHER SYMPTOMS: \"Do you have any other symptoms? (e g , dizziness, runny " nose, cough, chest pain, fever)     Slight cough that has been present over last 10 days    Protocols used: BREATHING DIFFICULTY-ADULT-AH

## 2023-06-18 NOTE — H&P
[facility]  H&P  Name: Steffen Frank 80 y o  male I MRN: [id number]  Unit/Bed#: S -01 I Date of Admission: 6/18/2023   Date of Service: 6/18/2023 I Hospital Day: 0      Assessment/Plan   * Acute on chronic diastolic (congestive) heart failure (HCC)  Assessment & Plan  Wt Readings from Last 3 Encounters:   06/18/23 65 1 kg (143 lb 8 3 oz)   06/13/23 65 1 kg (143 lb 9 6 oz)   05/24/23 65 kg (143 lb 3 2 oz)     Patient presents is a 17-year-old male with a past medical history significant for chronic diastolic congestive heart failure, peptic ulcer disease, hyperlipidemia, BPH, and anxiety presents with several days worsening shortness of breath with orthopnea and nonproductive cough  In the ED patient found to have elevated BNP, chest x-ray and CTA both consistent with volume overloaded status  Given 1 dose of IV Lasix 40 in the ED with mild resolution of symptoms  Last known ejection fraction of 45% on 3/16/2023, follows up outpatient with Yoselin Mariano MD   Patient previously taking Lasix 40 p o  daily, spironolactone 25 mg daily added on last visit  Patient dry weight recorded to be around 64 kg in outpatient cardiology office, found to be 65 kg in hospital     Plan:  Lasix 40 mg IV twice daily, first dose 10 PM tonight  Echocardiogram  Consult cardiology  Incentive spirometry  Continue spironolactone  Hold Imdur to allow for better blood pressure range while actively diuresing  Morning BNP, BMP, CBC  Telemetry  Daily weights  Ins and outs  Cardiac/sodium restricted diet      Benign prostatic hyperplasia  Assessment & Plan  Patient takes dutasteride at home, nonformulary      Plan:  Continue with finasteride substitute per formulary    Hyperlipidemia  Assessment & Plan  Patient takes Crestor, nonformulary    Plan:  Substitute with atorvastatin, cleared with patient and family despite allergy listed in chart    Hypertension  Assessment & Plan  Plan:  Continue active "diuresis  Continue spironolactone 25 mg daily  Hold Imdur for continued diuresis  Continue metoprolol    PUD (peptic ulcer disease)  Assessment & Plan  Plan:  Protonix 20 mg early morning         VTE Pharmacologic Prophylaxis: VTE Score: 7 High Risk (Score >/= 5) - Pharmacological DVT Prophylaxis Ordered: enoxaparin (Lovenox)  Sequential Compression Devices Ordered  Code Status: Code level 1 -discussed with resident physician, patient, patient daughter, patient wife  Discussion with family: Updated  (wife and daughter) at bedside  Anticipated Length of Stay: Patient will be admitted on an inpatient basis with an anticipated length of stay of greater than 2 midnights secondary to Acute on chronic heart failure  Chief Complaint: \" I was getting more more short of breath for the past few days  \"    History of Present Illness:  Telma James is a pleasant 80 y o  male with a PMH of chronic diastolic heart failure, hyperlipidemia, GERD, hypertension, BPH, CAD status post CABG and anxiety who presents with worsening shortness of breath, orthopnea, and nonproductive cough over the last several days  The patient states this feels similar to his previous presentation of acute on chronic heart failure at which time he was diuresed in the hospital with IV Lasix with resolution of symptoms  The patient endorses significant orthopnea  His last known echo showed an ejection fraction of 45%  He regularly follows up with outpatient cardiology and sees Dr Richard Murguia  He states he was just recently started on spironolactone 25 mg daily in addition to standing Lasix 40 mg p o  The patient endorses he attempts to stick to his cardiac and sodium restricted diet is much as possible  The patient is feeling mildly better after receiving Lasix 40 IV in the ED, and is amenable to continued diuresis as well as consultation to cardiology  He and his family have no questions, comments, or concerns at this time      Review " "of Systems:  Review of Systems   Constitutional: Negative for activity change, chills, diaphoresis, fatigue, fever and unexpected weight change  HENT: Negative for ear pain, sore throat and tinnitus  Eyes: Negative for pain and visual disturbance  Respiratory: Positive for cough, chest tightness and shortness of breath  Negative for wheezing  Cardiovascular: Negative for chest pain, palpitations and leg swelling  Gastrointestinal: Negative for abdominal pain, constipation, diarrhea, nausea and vomiting  Genitourinary: Positive for difficulty urinating  Negative for dysuria and hematuria  Musculoskeletal: Positive for back pain  Negative for arthralgias  Skin: Negative for color change and rash  Neurological: Negative for dizziness, seizures, syncope, weakness, light-headedness and headaches  Psychiatric/Behavioral: Negative for confusion  All other systems reviewed and are negative        Past Medical and Surgical History:   Past Medical History:   Diagnosis Date   • Arthritis    • BPH (benign prostatic hyperplasia)    • Cervical spine fracture ([facility] ) 1997    C3   • Chest pain    • Colon polyp    • Coronary artery disease    • Dry eye    • DVT (deep venous thrombosis) ([facility] ) 2015    right leg- passed thru the IVC filter-stopped at the \"patch\" from bypass surgery   • Dysphagia 11/2021    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   • Fracture of thoracic spine ([facility] ) 1997    T3   • Glaucoma    • Hyperlipidemia    • Hypertension    • Myocardial infarction ([facility] )    • Pneumonia    • PUD (peptic ulcer disease)        Past Surgical History:   Procedure Laterality Date   • ANGIOPLASTY      2 stents   • CHOLECYSTECTOMY     • COLONOSCOPY     • CORONARY ARTERY BYPASS GRAFT      x6   • CORONARY ARTERY BYPASS GRAFT     • CORONARY STENT PLACEMENT     • CYSTOSCOPY     • EYE SURGERY Right    • HERNIA REPAIR Right 11/3/2017    Procedure: REPAIR HERNIA INGUINAL;  Surgeon: Arnulfo Sinclair MD;  Location: " [facility];  Service: General   • IVC FILTER INSERTION      IVC filter   • ID CYSTO W/IRRIG & EVAC MULTPLE OBSTRUCTING CLOTS N/A 6/30/2018    Procedure: CYSTOSCOPY EVACUATION OF CLOTS, fulgeration;  Surgeon: Robert Medel MD;  Location: [facility];  Service: Urology   • [address]    [place] 2 gastrectomy-2/3 removal- bleeding ulcer   • TRANSURETHRAL RESECTION OF PROSTATE         Meds/Allergies:  Prior to Admission medications    Medication Sig Start Date End Date Taking?  Authorizing Provider   ALPRAZolam Raenell Revels) 0 5 mg tablet Take 1 tablet (0 5 mg total) by mouth daily at bedtime as needed for anxiety 3/27/23  Yes Lulu Weems,    Calcium Carbonate-Vitamin D (CALCIUM 600+D PO) Take by mouth daily with lunch   Yes Historical Provider, MD   cholecalciferol (VITAMIN D3) 400 units tablet Take 400 Units by mouth daily after lunch    Yes Historical Provider, MD   clopidogrel (PLAVIX) 75 mg tablet TAKE ONE TABLET BY MOUTH EVERY DAY  Patient taking differently: 75 mg daily at bedtime 6/17/22  Yes Yaw Cortes MD   Docusate Calcium (STOOL SOFTENER PO) Take by mouth OTC; takes with lunch   Yes Historical Provider, MD   dutasteride (AVODART) 0 5 mg capsule TAKE ONE CAPSULE BY MOUTH EVERY DAY IN THE MORNING 5/22/23  Yes Elena Benitez PA-C   furosemide (LASIX) 40 mg tablet Take 1 tablet (40 mg total) by mouth daily 5/24/23  Yes MATT Whelan   isosorbide mononitrate (IMDUR) 60 mg 24 hr tablet Take 1 tablet (60 mg total) by mouth every morning 5/24/23 5/18/24 Yes MATT Whelan   metoprolol succinate (TOPROL-XL) 50 mg 24 hr tablet Take 1 tablet (50 mg total) by mouth every 12 (twelve) hours 5/24/23 5/18/24 Yes MATT Whelan   multivitamin-iron-minerals-folic acid (CENTRUM) chewable tablet Chew 1 tablet every morning    Yes Historical Provider, MD   pantoprazole (PROTONIX) 20 mg tablet Take 1 tablet (20 mg total) by mouth 2 (two) times a day 3/9/23  Yes Milagros Roper PA-C ranolazine (RANEXA) 500 mg 12 hr tablet Take 1 tablet (500 mg total) by mouth 2 (two) times a day 7/11/22  Yes Janet Pastor MD   rosuvastatin (CRESTOR) 5 mg tablet TAKE ONE TABLET BY MOUTH EVERY DAY AT BEDTIME 1/18/23  Yes Janet Pastor MD   spironolactone (ALDACTONE) 25 mg tablet Take 1 tablet (25 mg total) by mouth daily after breakfast 6/13/23  Yes Nery Collier MD   tamsulosin (Flomax) 0 4 mg Take 1 capsule (0 4 mg total) by mouth daily with dinner 6/16/22  Yes Julisa Timmons PA-C   Zinc 25 MG TABS Take 25 mg by mouth daily at bedtime   Yes Historical Provider, MD   famotidine (PEPCID) 20 mg tablet Take 1 tablet (20 mg total) by mouth daily at bedtime 7/5/22 6/13/23  MATT Tamayo   polyethylene glycol (MIRALAX) 17 g packet Take 17 g by mouth daily for 10 days  Patient not taking: Reported on 5/17/2023 3/17/23 3/27/23  Ariana Jones MD     I have reviewed home medications with patient personally  Allergies: Allergies   Allergen Reactions   • Escitalopram Other (See Comments)     Suicidal feelings, sweating   • Oxycodone-Acetaminophen Dizziness and Shortness Of Breath     Other reaction(s): Faints  Reaction Date: [id number]; Category:  Adverse Reaction;    • Sucralfate GI Intolerance     Abdominal pain    • Sulfa Antibiotics Other (See Comments)   • Omeprazole Rash   • Statins Other (See Comments)     Muscle pain       Social History:  Marital Status: /Civil Union   Occupation: Retired  Patient Pre-hospital Living Situation: Home  Patient Pre-hospital Level of Mobility: walks  Patient Pre-hospital Diet Restrictions: Cardiac/sodium restricted diet  Substance Use History:   Social History     Substance and Sexual Activity   Alcohol Use Never     Social History     Tobacco Use   Smoking Status Never   Smokeless Tobacco Never     Social History     Substance and Sexual Activity   Drug Use No       Family History:  Family History   Problem Relation Age of Onset   • Coronary "artery disease Brother    • Heart disease Father         CAD       Physical Exam:     Vitals:   Blood Pressure: 119/62 (06/18/23 1730)  Pulse: 98 (06/18/23 1730)  Temperature: 98 2 °F (36 8 °C) (06/18/23 1429)  Respirations: 18 (06/18/23 1730)  Height: 5' 4\" (162 6 cm) (06/18/23 1429)  Weight - Scale: 65 1 kg (143 lb 8 3 oz) (06/18/23 1429)  SpO2: 92 % (06/18/23 1730)    Physical Exam  Vitals and nursing note reviewed  Constitutional:       General: He is not in acute distress  Appearance: Normal appearance  He is well-developed  He is not ill-appearing, toxic-appearing or diaphoretic  Comments: Reportedly 1 kg heavier than last recorded dry weight in outpatient cardiology office   HENT:      Head: Normocephalic and atraumatic  Mouth/Throat:      Mouth: Mucous membranes are moist       Pharynx: Oropharynx is clear  Eyes:      Extraocular Movements: Extraocular movements intact  Conjunctiva/sclera: Conjunctivae normal    Cardiovascular:      Rate and Rhythm: Normal rate and regular rhythm  Pulses: Normal pulses  Heart sounds: No murmur heard  Pulmonary:      Effort: Pulmonary effort is normal  No respiratory distress  Breath sounds: No stridor  Rales present  No wheezing or rhonchi  Abdominal:      General: Abdomen is flat  Bowel sounds are normal  There is no distension  Palpations: Abdomen is soft  Tenderness: There is no abdominal tenderness  There is no guarding  Musculoskeletal:         General: No swelling  Cervical back: Neck supple  Right lower leg: Edema present  Left lower leg: Edema present  Skin:     General: Skin is warm and dry  Capillary Refill: Capillary refill takes less than 2 seconds  Coloration: Skin is not jaundiced or pale  Findings: No rash  Neurological:      General: No focal deficit present  Mental Status: He is alert and oriented to person, place, and time  Mental status is at baseline   " Psychiatric:         Mood and Affect: Mood normal          Behavior: Behavior normal          Thought Content: Thought content normal          Judgment: Judgment normal           Additional Data:     Lab Results:  Results from last 7 days   Lab Units 06/18/23  1441   WBC Thousand/uL 6 59   HEMOGLOBIN g/dL 13 0   HEMATOCRIT % 40 0   PLATELETS Thousands/uL 211   NEUTROS PCT % 78*   LYMPHS PCT % 11*   MONOS PCT % 9   EOS PCT % 1     Results from last 7 days   Lab Units 06/18/23  1441   SODIUM mmol/L 130*   POTASSIUM mmol/L 4 4   CHLORIDE mmol/L 92*   CO2 mmol/L 28   BUN mg/dL 25   CREATININE mg/dL 1 32*   ANION GAP mmol/L 10   CALCIUM mg/dL 8 9   ALBUMIN g/dL 3 6   TOTAL BILIRUBIN mg/dL 0 94   ALK PHOS U/L 81   ALT U/L 9   AST U/L 7*   GLUCOSE RANDOM mg/dL 179*                       Lines/Drains:  Invasive Devices     Peripheral Intravenous Line  Duration           Peripheral IV 06/18/23 Left;Proximal;Ventral (anterior) Forearm <1 day                    Imaging: Reviewed radiology reports from this admission including: chest xray and chest CT scan  CTA ED chest PE study   Final Result by Diana Garrido MD (06/18 1717)      No CT evidence of pulmonary embolism  Cardiomegaly with moderate bilateral pleural effusions and hazy groundglass densities with interlobular septal thickening suspicious for pulmonary edema/CHF  Borderline prominent mediastinal and hilar lymph nodes  Small hiatal hernia  Punctate nonobstructing left renal calculus  Evidence of prior granulomatous disease  Workstation performed: [id number]         XR chest 1 view portable   ED Interpretation by Tyrone Gilliam MD (06/18 0625)   New right-sided pleural effusion with stable left-sided pleural effusion  Pericardiac alveolar infiltrates likely secondary to pulmonary edema  EKG and Other Studies Reviewed on Admission:   · EKG: NSR   HR 94     ** Please Note: This note has been constructed using a voice recognition system   **

## 2023-06-18 NOTE — TELEPHONE ENCOUNTER
"Regarding: SOB  ----- Message from Carol Chappell sent at 6/18/2023 12:37 PM EDT -----  \"I was discharged from the hospital on 6/8  I'm having shortness of breath  \"    "

## 2023-06-19 LAB
ANION GAP SERPL CALCULATED.3IONS-SCNC: 8 MMOL/L (ref 4–13)
BASOPHILS # BLD AUTO: 0.02 THOUSANDS/ÂΜL (ref 0–0.1)
BASOPHILS NFR BLD AUTO: 0 % (ref 0–1)
BNP SERPL-MCNC: 1113 PG/ML (ref 0–100)
BUN SERPL-MCNC: 26 MG/DL (ref 5–25)
CALCIUM SERPL-MCNC: 8.6 MG/DL (ref 8.4–10.2)
CHLORIDE SERPL-SCNC: 93 MMOL/L (ref 96–108)
CO2 SERPL-SCNC: 30 MMOL/L (ref 21–32)
CREAT SERPL-MCNC: 1.36 MG/DL (ref 0.6–1.3)
EOSINOPHIL # BLD AUTO: 0.11 THOUSAND/ÂΜL (ref 0–0.61)
EOSINOPHIL NFR BLD AUTO: 2 % (ref 0–6)
ERYTHROCYTE [DISTWIDTH] IN BLOOD BY AUTOMATED COUNT: 13.7 % (ref 11.6–15.1)
GFR SERPL CREATININE-BSD FRML MDRD: 44 ML/MIN/1.73SQ M
GLUCOSE SERPL-MCNC: 148 MG/DL (ref 65–140)
HCT VFR BLD AUTO: 37.1 % (ref 36.5–49.3)
HGB BLD-MCNC: 12 G/DL (ref 12–17)
IMM GRANULOCYTES # BLD AUTO: 0.04 THOUSAND/UL (ref 0–0.2)
IMM GRANULOCYTES NFR BLD AUTO: 1 % (ref 0–2)
LYMPHOCYTES # BLD AUTO: 0.92 THOUSANDS/ÂΜL (ref 0.6–4.47)
LYMPHOCYTES NFR BLD AUTO: 16 % (ref 14–44)
MCH RBC QN AUTO: 30.1 PG (ref 26.8–34.3)
MCHC RBC AUTO-ENTMCNC: 32.3 G/DL (ref 31.4–37.4)
MCV RBC AUTO: 93 FL (ref 82–98)
MONOCYTES # BLD AUTO: 0.62 THOUSAND/ÂΜL (ref 0.17–1.22)
MONOCYTES NFR BLD AUTO: 11 % (ref 4–12)
NEUTROPHILS # BLD AUTO: 4.18 THOUSANDS/ÂΜL (ref 1.85–7.62)
NEUTS SEG NFR BLD AUTO: 70 % (ref 43–75)
NRBC BLD AUTO-RTO: 0 /100 WBCS
PLATELET # BLD AUTO: 210 THOUSANDS/UL (ref 149–390)
PMV BLD AUTO: 10.2 FL (ref 8.9–12.7)
POTASSIUM SERPL-SCNC: 4.1 MMOL/L (ref 3.5–5.3)
RBC # BLD AUTO: 3.99 MILLION/UL (ref 3.88–5.62)
SODIUM SERPL-SCNC: 131 MMOL/L (ref 135–147)
WBC # BLD AUTO: 5.89 THOUSAND/UL (ref 4.31–10.16)

## 2023-06-19 PROCEDURE — 80048 BASIC METABOLIC PNL TOTAL CA: CPT

## 2023-06-19 PROCEDURE — 99223 1ST HOSP IP/OBS HIGH 75: CPT | Performed by: INTERNAL MEDICINE

## 2023-06-19 PROCEDURE — 83880 ASSAY OF NATRIURETIC PEPTIDE: CPT

## 2023-06-19 PROCEDURE — 85025 COMPLETE CBC W/AUTO DIFF WBC: CPT

## 2023-06-19 PROCEDURE — 99232 SBSQ HOSP IP/OBS MODERATE 35: CPT | Performed by: HOSPITALIST

## 2023-06-19 RX ADMIN — MULTIPLE VITAMINS W/ MINERALS TAB 1 TABLET: TAB ORAL at 09:28

## 2023-06-19 RX ADMIN — FUROSEMIDE 40 MG: 10 INJECTION, SOLUTION INTRAMUSCULAR; INTRAVENOUS at 09:28

## 2023-06-19 RX ADMIN — ATORVASTATIN CALCIUM 10 MG: 10 TABLET, FILM COATED ORAL at 17:02

## 2023-06-19 RX ADMIN — ENOXAPARIN SODIUM 30 MG: 30 INJECTION SUBCUTANEOUS at 09:27

## 2023-06-19 RX ADMIN — CHOLECALCIFEROL TAB 10 MCG (400 UNIT) 400 UNITS: 10 TAB at 14:52

## 2023-06-19 RX ADMIN — Medication 1 TABLET: at 09:27

## 2023-06-19 RX ADMIN — RANOLAZINE 500 MG: 500 TABLET, FILM COATED, EXTENDED RELEASE ORAL at 17:02

## 2023-06-19 RX ADMIN — DOCUSATE SODIUM 100 MG: 100 CAPSULE, LIQUID FILLED ORAL at 18:44

## 2023-06-19 RX ADMIN — FAMOTIDINE 20 MG: 20 TABLET ORAL at 21:25

## 2023-06-19 RX ADMIN — SPIRONOLACTONE 25 MG: 25 TABLET ORAL at 09:28

## 2023-06-19 RX ADMIN — PANTOPRAZOLE SODIUM 20 MG: 20 TABLET, DELAYED RELEASE ORAL at 05:26

## 2023-06-19 RX ADMIN — FINASTERIDE 5 MG: 5 TABLET, FILM COATED ORAL at 09:28

## 2023-06-19 RX ADMIN — RANOLAZINE 500 MG: 500 TABLET, FILM COATED, EXTENDED RELEASE ORAL at 09:27

## 2023-06-19 RX ADMIN — TAMSULOSIN HYDROCHLORIDE 0.4 MG: 0.4 CAPSULE ORAL at 17:02

## 2023-06-19 RX ADMIN — METOPROLOL SUCCINATE 50 MG: 50 TABLET, EXTENDED RELEASE ORAL at 09:27

## 2023-06-19 RX ADMIN — CLOPIDOGREL BISULFATE 75 MG: 75 TABLET ORAL at 09:27

## 2023-06-19 RX ADMIN — ALPRAZOLAM 0.5 MG: 0.5 TABLET ORAL at 21:27

## 2023-06-19 RX ADMIN — METOPROLOL SUCCINATE 50 MG: 50 TABLET, EXTENDED RELEASE ORAL at 18:40

## 2023-06-19 NOTE — TELEPHONE ENCOUNTER
CHUCK:Followed up with pt this morning- spoke with his wife  He is currently admitted into [address]  His wife stated he is doing better

## 2023-06-19 NOTE — TELEPHONE ENCOUNTER
Spoke with pt's wife- he is currently admitted into [address] [facility]

## 2023-06-19 NOTE — PLAN OF CARE
Problem: PAIN - ADULT  Goal: Verbalizes/displays adequate comfort level or baseline comfort level  Description: Interventions:  - Encourage patient to monitor pain and request assistance  - Assess pain using appropriate pain scale  - Administer analgesics based on type and severity of pain and evaluate response  - Implement non-pharmacological measures as appropriate and evaluate response  - Consider cultural and social influences on pain and pain management  - Notify physician/advanced practitioner if interventions unsuccessful or patient reports new pain  Outcome: Progressing     Problem: INFECTION - ADULT  Goal: Absence or prevention of progression during hospitalization  Description: INTERVENTIONS:  - Assess and monitor for signs and symptoms of infection  - Monitor lab/diagnostic results  - Monitor all insertion sites, i e  indwelling lines, tubes, and drains  - Monitor endotracheal if appropriate and nasal secretions for changes in amount and color  - [place] appropriate cooling/warming therapies per order  - Administer medications as ordered  - Instruct and encourage patient and family to use good hand hygiene technique  - Identify and instruct in appropriate isolation precautions for identified infection/condition  Outcome: Progressing  Goal: Absence of fever/infection during neutropenic period  Description: INTERVENTIONS:  - Monitor WBC    Outcome: Progressing     Problem: SAFETY ADULT  Goal: Patient will remain free of falls  Description: INTERVENTIONS:  - Educate patient/family on patient safety including physical limitations  - Instruct patient to call for assistance with activity   - Consult OT/PT to assist with strengthening/mobility   - Keep Call bell within reach  - Keep bed low and locked with side rails adjusted as appropriate  - Keep care items and personal belongings within reach  - Initiate and maintain comfort rounds  - Make Fall Risk Sign visible to staff  - Offer Toileting every 2 Hours, in advance of need  - Initiate/Maintain alarm  - Obtain necessary fall risk management equipment:   - Apply yellow socks and bracelet for high fall risk patients  - Consider moving patient to room near nurses station  Outcome: Progressing  Goal: Maintain or return to baseline ADL function  Description: INTERVENTIONS:  -  Assess patient's ability to carry out ADLs; assess patient's baseline for ADL function and identify physical deficits which impact ability to perform ADLs (bathing, care of mouth/teeth, toileting, grooming, dressing, etc )  - Assess/evaluate cause of self-care deficits   - Assess range of motion  - Assess patient's mobility; develop plan if impaired  - Assess patient's need for assistive devices and provide as appropriate  - Encourage maximum independence but intervene and supervise when necessary  - Involve family in performance of ADLs  - Assess for home care needs following discharge   - Consider OT consult to assist with ADL evaluation and planning for discharge  - Provide patient education as appropriate  Outcome: Progressing  Goal: Maintains/Returns to pre admission functional level  Description: INTERVENTIONS:  - Perform BMAT or MOVE assessment daily    - Set and communicate daily mobility goal to care team and patient/family/caregiver  - Collaborate with rehabilitation services on mobility goals if consulted  - Perform Range of Motion 2 times a day  - Reposition patient every 2 hours    - Dangle patient 2 times a day  - Stand patient 2 times a day  - Ambulate patient 2 times a day  - Out of bed to chair 2 times a day   - Out of bed for meals 2 times a day  - Out of bed for toileting  - Record patient progress and toleration of activity level   Outcome: Progressing     Problem: DISCHARGE PLANNING  Goal: Discharge to home or other facility with appropriate resources  Description: INTERVENTIONS:  - Identify barriers to discharge w/patient and caregiver  - Arrange for needed discharge resources and transportation as appropriate  - Identify discharge learning needs (meds, wound care, etc )  - Arrange for interpretive services to assist at discharge as needed  - Refer to Case Management Department for coordinating discharge planning if the patient needs post-hospital services based on physician/advanced practitioner order or complex needs related to functional status, cognitive ability, or social support system  Outcome: Progressing     Problem: Knowledge Deficit  Goal: Patient/family/caregiver demonstrates understanding of disease process, treatment plan, medications, and discharge instructions  Description: Complete learning assessment and assess knowledge base    Interventions:  - Provide teaching at level of understanding  - Provide teaching via preferred learning methods  Outcome: Progressing     Problem: CARDIOVASCULAR - ADULT  Goal: Maintains optimal cardiac output and hemodynamic stability  Description: INTERVENTIONS:  - Monitor I/O, vital signs and rhythm  - Monitor for S/S and trends of decreased cardiac output  - Administer and titrate ordered vasoactive medications to optimize hemodynamic stability  - Assess quality of pulses, skin color and temperature  - Assess for signs of decreased coronary artery perfusion  - Instruct patient to report change in severity of symptoms  Outcome: Progressing  Goal: Absence of cardiac dysrhythmias or at baseline rhythm  Description: INTERVENTIONS:  - Continuous cardiac monitoring, vital signs, obtain 12 lead EKG if ordered  - Administer antiarrhythmic and heart rate control medications as ordered  - Monitor electrolytes and administer replacement therapy as ordered  Outcome: Progressing

## 2023-06-19 NOTE — PROGRESS NOTES
[facility]  Progress Note  Name: Amaury Dominguez  MRN: [id number]  Unit/Bed#: S -01 I Date of Admission: 6/18/2023   Date of Service: 6/19/2023 I Hospital Day: 1    Assessment/Plan   * Acute on chronic diastolic (congestive) heart failure (HCC)  Assessment & Plan  Wt Readings from Last 3 Encounters:   06/19/23 62 7 kg (138 lb 3 7 oz)   06/13/23 65 1 kg (143 lb 9 6 oz)   05/24/23 65 kg (143 lb 3 2 oz)     Patient presents is a 80-year-old male with a past medical history significant for chronic diastolic congestive heart failure, peptic ulcer disease, hyperlipidemia, BPH, and anxiety presents with several days worsening shortness of breath with orthopnea and nonproductive cough  In the ED patient found to have elevated BNP, chest x-ray and CTA both consistent with volume overloaded status  Given 1 dose of IV Lasix 40 in the ED with mild resolution of symptoms  Last known ejection fraction of 45% on 3/16/2023, follows up outpatient with Luna Anna MD   Patient previously taking Lasix 40 p o  daily, spironolactone 25 mg daily added on last visit  Patient dry weight recorded to be around 64 kg in outpatient cardiology office, found to be 65 kg in hospital     6/19/2023 - patient down to 62 kg compared to 64 kg dry weight in the outpatient cardiology office, continues to have some crackles on exam but no lower extremity edema, likely still retaining fluid particularly given imaging, will continue to diurese, likely outpatient dry weight not accurate secondary to fluid restriction/sodium restriction nonadherence and/or medication nonadherence      Plan:  Lasix 40 mg IV twice daily, first dose 10 PM tonight  Echocardiogram  Consult following - recommending continued diuresis  Incentive spirometry  Continue spironolactone  Hold Imdur to allow for better blood pressure range while actively diuresing  BNP, BMP, CBC  Telemetry  Daily weights  Ins and outs  Cardiac/sodium restricted diet      Benign prostatic hyperplasia  Assessment & Plan  Patient takes dutasteride at home, nonformulary  Plan:  Continue with finasteride substitute per formulary    Hyperlipidemia  Assessment & Plan  Patient takes Crestor, nonformulary    Plan:  Substitute with atorvastatin, cleared with patient and family despite allergy listed in chart    Hypertension  Assessment & Plan  Plan:  Continue active diuresis  Continue spironolactone 25 mg daily  Hold Imdur for continued diuresis  Continue metoprolol    PUD (peptic ulcer disease)  Assessment & Plan  Plan:  Protonix 20 mg early morning           VTE Pharmacologic Prophylaxis: VTE Score: 7 High Risk (Score >/= 5) - Pharmacological DVT Prophylaxis Ordered: enoxaparin (Lovenox)  Sequential Compression Devices Ordered  Patient Centered Rounds: I performed bedside rounds with nursing staff today  Discussions with Specialists or Other Care Team Provider: Nursing, case management, cardiology    Education and Discussions with Family / Patient: Updated  (wife) at bedside  Current Length of Stay: 1 day(s)  Current Patient Status: Inpatient   Discharge Plan: Anticipate discharge in 24-48 hrs to home  Code Status: Level 3 - DNAR and DNI    Subjective:   Patient examined at the bedside, in no acute distress  He is a very spry 80year-old and looking forward to meeting with cardiology to discuss potential aortic valve replacement  He relays that he was evaluated for this once in the past but at that time it was believed that he could be maintained on medication alone  He would like to be reevaluated for TAVR  He is aware and amenable to continued diuresis  He offers no questions, comments, or concerns at this time      Objective:     Vitals:   Temp (24hrs), Av 4 °F (36 9 °C), Min:98 2 °F (36 8 °C), Max:98 9 °F (37 2 °C)    Temp:  [98 2 °F (36 8 °C)-98 9 °F (37 2 °C)] 98 2 °F (36 8 °C)  HR:  [88-99] 96  Resp:  [18-19] 18  BP: ()/(55-69) 119/69  SpO2:  [90 %-98 %] 94 %  Body mass index is 23 73 kg/m²  Input and Output Summary (last 24 hours): Intake/Output Summary (Last 24 hours) at 6/19/2023 1419  Last data filed at 6/19/2023 1335  Gross per 24 hour   Intake --   Output 500 ml   Net -500 ml       Physical Exam:   Physical Exam  Vitals and nursing note reviewed  Constitutional:       General: He is not in acute distress  Appearance: Normal appearance  He is well-developed and normal weight  He is not ill-appearing, toxic-appearing or diaphoretic  HENT:      Head: Normocephalic and atraumatic  Mouth/Throat:      Mouth: Mucous membranes are moist       Pharynx: Oropharynx is clear  Eyes:      Extraocular Movements: Extraocular movements intact  Conjunctiva/sclera: Conjunctivae normal    Cardiovascular:      Rate and Rhythm: Normal rate and regular rhythm  Pulses: Normal pulses  Heart sounds: Normal heart sounds  No murmur heard  Pulmonary:      Effort: Pulmonary effort is normal  No respiratory distress  Breath sounds: Normal breath sounds  No wheezing, rhonchi or rales  Abdominal:      General: Abdomen is flat  Bowel sounds are normal  There is no distension  Palpations: Abdomen is soft  Tenderness: There is no abdominal tenderness  There is no guarding  Musculoskeletal:         General: No swelling  Cervical back: Neck supple  Right lower leg: No edema  Left lower leg: No edema  Skin:     General: Skin is warm and dry  Capillary Refill: Capillary refill takes less than 2 seconds  Coloration: Skin is not jaundiced or pale  Findings: No lesion or rash  Neurological:      General: No focal deficit present  Mental Status: He is alert and oriented to person, place, and time  Mental status is at baseline  Psychiatric:         Mood and Affect: Mood normal          Behavior: Behavior normal          Thought Content:  Thought content normal          Judgment: Judgment normal           Additional Data:     Labs:  Results from last 7 days   Lab Units 06/19/23  0431   WBC Thousand/uL 5 89   HEMOGLOBIN g/dL 12 0   HEMATOCRIT % 37 1   PLATELETS Thousands/uL 210   NEUTROS PCT % 70   LYMPHS PCT % 16   MONOS PCT % 11   EOS PCT % 2     Results from last 7 days   Lab Units 06/19/23  0431 06/18/23  1441   SODIUM mmol/L 131* 130*   POTASSIUM mmol/L 4 1 4 4   CHLORIDE mmol/L 93* 92*   CO2 mmol/L 30 28   BUN mg/dL 26* 25   CREATININE mg/dL 1 36* 1 32*   ANION GAP mmol/L 8 10   CALCIUM mg/dL 8 6 8 9   ALBUMIN g/dL  --  3 6   TOTAL BILIRUBIN mg/dL  --  0 94   ALK PHOS U/L  --  81   ALT U/L  --  9   AST U/L  --  7*   GLUCOSE RANDOM mg/dL 148* 179*                       Lines/Drains:  Invasive Devices     Peripheral Intravenous Line  Duration           Peripheral IV 06/18/23 Left;Proximal;Ventral (anterior) Forearm <1 day                  Telemetry:  Telemetry Orders (From admission, onward)             24 Hour Telemetry Monitoring  Continuous x 24 Hours (Telem)        Expiring   Question:  Reason for 24 Hour Telemetry  Answer:  Decompensated CHF- and any one of the following: continuous diuretic infusion or total diuretic dose >200 mg daily, associated electrolyte derangement (I e  K < 3 0), ionotropic drip (continuous infusion), hx of ventricular arrhythmia, or new EF < 35%                 Telemetry Reviewed: Normal Sinus Rhythm  Indication for Continued Telemetry Use: Acute CHF on >200 mg lasix/day or equivalent dose or with new reduced EF  Imaging: Reviewed radiology reports from this admission including: chest CT scan   XR chest 1 view portable    Result Date: 6/19/2023  Impression: Pulmonary edema with small pleural effusions  Workstation performed: [id number]     CTA ED chest PE study    Result Date: 6/18/2023  Impression: No CT evidence of pulmonary embolism   Cardiomegaly with moderate bilateral pleural effusions and hazy groundglass densities with interlobular septal thickening suspicious for pulmonary edema/CHF  Borderline prominent mediastinal and hilar lymph nodes  Small hiatal hernia  Punctate nonobstructing left renal calculus  Evidence of prior granulomatous disease  Workstation performed: [id number]     Recent Cultures (last 7 days):         Last 24 Hours Medication List:   Current Facility-Administered Medications   Medication Dose Route Frequency Provider Last Rate   • ALPRAZolam  0 5 mg Oral HS PRN Stephanie Maldonado, DO     • atorvastatin  10 mg Oral Daily With Ramin Renteria, DO     • calcium carbonate  1 tablet Oral Daily With [address], DO     • cholecalciferol  400 Units Oral After Lunch Hannah Fruit [place], DO     • clopidogrel  75 mg Oral Daily Hannah Fruit [place], DO     • docusate sodium  100 mg Oral Daily PRN Stephanie Maldonado DO     • enoxaparin  30 mg Subcutaneous Q24H [facility] Stephanei Maldonado, DO     • famotidine  20 mg Oral HS Stephanie Maldonado, DO     • finasteride  5 mg Oral Daily Stephanie Maldonado, DO     • furosemide  40 mg Intravenous BID Stephanie Maldonado DO     • metoprolol succinate  50 mg Oral Q12H Stephanie Maldonado DO     • multivitamin-minerals  1 tablet Oral QAM Hannah Fruit [place], DO     • pantoprazole  20 mg Oral Early Morning Stephanie Maldonado, DO     • ranolazine  500 mg Oral BID Stephanie Maldonado DO     • spironolactone  25 mg Oral After Breakfast Stephanie Maldonado, DO     • tamsulosin  0 4 mg Oral Daily With Ramin Renteria, DO          Today, Patient Was Seen By: Stephanie Maldonado DO    **Please Note: This note may have been constructed using a voice recognition system  **

## 2023-06-19 NOTE — ASSESSMENT & PLAN NOTE
Wt Readings from Last 3 Encounters:   06/19/23 62 7 kg (138 lb 3 7 oz)   06/13/23 65 1 kg (143 lb 9 6 oz)   05/24/23 65 kg (143 lb 3 2 oz)     Patient presents is a 25-year-old male with a past medical history significant for chronic diastolic congestive heart failure, peptic ulcer disease, hyperlipidemia, BPH, and anxiety presents with several days worsening shortness of breath with orthopnea and nonproductive cough  In the ED patient found to have elevated BNP, chest x-ray and CTA both consistent with volume overloaded status  Given 1 dose of IV Lasix 40 in the ED with mild resolution of symptoms  Last known ejection fraction of 45% on 3/16/2023, follows up outpatient with Luna Anna MD   Patient previously taking Lasix 40 p o  daily, spironolactone 25 mg daily added on last visit  Patient dry weight recorded to be around 64 kg in outpatient cardiology office, found to be 65 kg in hospital     6/19/2023 - patient down to 62 kg compared to 64 kg dry weight in the outpatient cardiology office, continues to have some crackles on exam but no lower extremity edema, likely still retaining fluid particularly given imaging, will continue to diurese, likely outpatient dry weight not accurate secondary to fluid restriction/sodium restriction nonadherence and/or medication nonadherence      Plan:  Lasix 40 mg IV twice daily, first dose 10 PM tonight  Echocardiogram  Consult following - recommending continued diuresis  Incentive spirometry  Continue spironolactone  Hold Imdur to allow for better blood pressure range while actively diuresing  BNP, BMP, CBC  Telemetry  Daily weights  Ins and outs  Cardiac/sodium restricted diet

## 2023-06-19 NOTE — CONSULTS
Consultation - Cardiology Team One  Rosie James 80 y o  male MRN: [id number]  Unit/Bed#: S -01 Encounter: [id number]    Inpatient consult to Cardiology  Consult performed by: MATT Clark  Consult ordered by: Joao Rojas DO          Physician Requesting Consult: Uri Mabry MD  Reason for Consult / Principal Problem: CHF       Assessment/ Plan    1  Acute on chronic combined systolic and diastolic heart failure  BNP 1,113   CT chest showed moderate bilateral pleural effusions, pulmonary edema   On furosemide 40 mg IV BID   He takes furosemide 40 mg PO daily and spirolactone 25 mg PO daily at home prior to admission   Monitor I/Os  Daily weights   Continue 2 gram Na diet     2  Moderate to severe bicuspid aortic stenosis   Reviewed on echocardiogram   Followed as outpatient     3  CAD s/p remote CABG   Nuclear stress test 3/2023 showed findings 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  Study was consistent with prior   On plavix, atorvastatin, ranexa and metoprolol   He also takes imdur at home  Resume prior to discharge     4  Hypertension  BP stable: 119/69    5  Hyperlipidemia   LDL 84  On atorvastatin 10 mg PO daily     History of Present Illness   HPI: Rosie James is a 80y o  year old male who has chronic combined systolic and diastolic heart failure, CAD s/p CABG, severe Aortic stenosis, hypertension, hyperlipidemia, DVT with IVC filter,  h/o partial gastrectomy d/t PUD and BPH  He follows with cardiologist Dr Tone Franklin  He presents to [facility] ER 6/18/2023 with complaint of SOB with exertion and abdominal bloating  He was recently admitted 5/7/2023 for acute on chronic HF  He was diuresed with furosemide 40 mg IV and discharged home on furosemide 40 mg PO daily  He followed up with cardiology in office and was stable from CHF standpoint  He was started on spirolactone as outpatient  Patient reports SOB with exertion worsening over the past 2-3 weeks   He denies "lower extremity edema but does report abdominal bloating  BNP on admission was 1, 113 and CT chest showed moderate bilateral pleural effusions, pulmonary edema  He was started on furosemide IV  He reports he follows a low salt diet at home  He denies weight gain and checks his weight on daily basis  He lives at home with his wife  He denies tobacco or alcohol use  EKG reviewed personally:   Normal sinus rhythm with ST and T wave abnormalities   Ventricular rate 94 bpm  IA interval 206 ms  QRSD 116 ms  QT interval 370 ms  QTc interval 462 ms     Telemetry reviewed personally:   Normal sinus rhythm     Review of Systems   Constitutional: Negative for chills and fever  HENT: Negative for congestion  Cardiovascular: Positive for dyspnea on exertion  Negative for chest pain, leg swelling, orthopnea and palpitations  Respiratory: Negative for shortness of breath  Musculoskeletal: Negative for falls  Gastrointestinal: Positive for bloating  Negative for nausea and vomiting  Neurological: Negative for dizziness and light-headedness  Psychiatric/Behavioral: Negative for altered mental status  All other systems reviewed and are negative      Historical Information   Past Medical History:   Diagnosis Date   • Arthritis    • BPH (benign prostatic hyperplasia)    • Cervical spine fracture ([place] [address] ) 1997    C3   • Chest pain    • Colon polyp    • Coronary artery disease    • Dry eye    • DVT (deep venous thrombosis) ([place] [address] ) 2015    right leg- passed thru the IVC filter-stopped at the \"patch\" from bypass surgery   • Dysphagia 11/2021    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   • Fract[facility] of [facility] ([facility] ) 1997    T3   • Glaucoma    • Hyperlipidemia    • Hypertension    • Myocardial infarction ([facility] )    • Pneumonia    • PUD (peptic ulcer disease)      Past Surgical History:   Procedure Laterality Date   • ANGIOPLASTY      2 stents   • CHOLECYSTECTOMY     • COLONOSCOPY     • CORONARY ARTERY " BYPASS GRAFT      x6   • CORONARY ARTERY BYPASS GRAFT     • CORONARY STENT PLACEMENT     • CYSTOSCOPY     • EYE SURGERY Right    • HERNIA REPAIR Right 11/3/2017    Procedure: REPAIR HERNIA INGUINAL;  Surgeon: Fernando Sanches MD;  Location: [facility];  Service: General   • IVC FILTER INSERTION      IVC filter   • NY CYSTO W/IRRIG & EVAC MULTPLE OBSTRUCTING CLOTS N/A 6/30/2018    Procedure: CYSTOSCOPY EVACUATION OF CLOTS, fulgeration;  Surgeon: Brodie Whiting MD;  Location: [facility];  Service: Urology   • [address]    [place] 2 gastrectomy-2/3 removal- bleeding ulcer   • TRANSURETHRAL RESECTION OF PROSTATE       Social History     Substance and Sexual Activity   Alcohol Use Never     Social History     Substance and Sexual Activity   Drug Use No     Social History     Tobacco Use   Smoking Status Never   Smokeless Tobacco Never     Family History:   Family History   Problem Relation Age of Onset   • Coronary artery disease Brother    • Heart disease Father         CAD       Meds/Allergies   all current active meds have been reviewed and current meds:   Current Facility-Administered Medications   Medication Dose Route Frequency   • ALPRAZolam (XANAX) tablet 0 5 mg  0 5 mg Oral HS PRN   • atorvastatin (LIPITOR) tablet 10 mg  10 mg Oral Daily With Dinner   • calcium carbonate (OYSTER SHELL,OSCAL) 500 mg tablet 1 tablet  1 tablet Oral Daily With Breakfast   • cholecalciferol (VITAMIN D3) tablet 400 Units  400 Units Oral After Lunch   • clopidogrel (PLAVIX) tablet 75 mg  75 mg Oral Daily   • docusate sodium (COLACE) capsule 100 mg  100 mg Oral Daily PRN   • enoxaparin (LOVENOX) subcutaneous injection 30 mg  30 mg Subcutaneous Q24H RITCHIE   • famotidine (PEPCID) tablet 20 mg  20 mg Oral HS   • finasteride (PROSCAR) tablet 5 mg  5 mg Oral Daily   • furosemide (LASIX) injection 40 mg  40 mg Intravenous BID   • metoprolol succinate (TOPROL-XL) 24 hr tablet 50 mg  50 mg Oral Q12H   • multivitamin-minerals "(CENTRUM) tablet 1 tablet  1 tablet Oral QAM   • pantoprazole (PROTONIX) EC tablet 20 mg  20 mg Oral Early Morning   • ranolazine (RANEXA) 12 hr tablet 500 mg  500 mg Oral BID   • spironolactone (ALDACTONE) tablet 25 mg  25 mg Oral After Breakfast   • tamsulosin (FLOMAX) capsule 0 4 mg  0 4 mg Oral Daily With Dinner          Allergies   Allergen Reactions   • Escitalopram Other (See Comments)     Suicidal feelings, sweating   • Oxycodone-Acetaminophen Dizziness and Shortness Of Breath     Other reaction(s): Faints  Reaction Date: [id number]; Category: Adverse Reaction;    • Sucralfate GI Intolerance     Abdominal pain    • Sulfa Antibiotics Other (See Comments)   • Omeprazole Rash   • Statins Other (See Comments)     Muscle pain       Objective   Vitals: Blood pressure 119/69, pulse 96, temperature 98 2 °F (36 8 °C), temperature source Oral, resp  rate 18, height 5' 4\" (1 626 m), weight 62 7 kg (138 lb 3 7 oz), SpO2 94 %  ,     Body mass index is 23 73 kg/m²  ,     Systolic (14PUA), BHL:819 , Min:97 , LYO:613     Diastolic (61PKV), JACQUI:14, Min:55, Max:69          No intake or output data in the 24 hours ending 06/19/23 0954  Weight (last 2 days)     Date/Time Weight    06/19/23 0600 62 7 (138 23)    06/19/23 0300 62 7 (138 23)    06/18/23 1429 65 1 (143 52)        Invasive Devices     Peripheral Intravenous Line  Duration           Peripheral IV 06/18/23 Left;Proximal;Ventral (anterior) Forearm <1 day                  Physical Exam  Constitutional:       General: He is not in acute distress  HENT:      Head: Normocephalic  Mouth/Throat:      Mouth: Mucous membranes are moist    Cardiovascular:      Rate and Rhythm: Normal rate and regular rhythm  Pulses: Normal pulses  Heart sounds: Murmur heard  Pulmonary:      Effort: Pulmonary effort is normal  No respiratory distress  Comments: Decreased in bases  RA  Abdominal:      General: Bowel sounds are normal  There is distension        Palpations: " Abdomen is soft  Musculoskeletal:         General: No swelling  Normal range of motion  Cervical back: Neck supple  Skin:     General: Skin is warm and dry  Capillary Refill: Capillary refill takes less than 2 seconds  Neurological:      General: No focal deficit present  Mental Status: He is alert and oriented to person, place, and time     Psychiatric:         Mood and Affect: Mood normal            LABORATORY RESULTS:      CBC with diff:   Results from last 7 days   Lab Units 06/19/23  0431 06/18/23  1441   WBC Thousand/uL 5 89 6 59   HEMOGLOBIN g/dL 12 0 13 0   HEMATOCRIT % 37 1 40 0   MCV fL 93 93   PLATELETS Thousands/uL 210 211   RBC Million/uL 3 99 4 30   MCH pg 30 1 30 2   MCHC g/dL 32 3 32 5   RDW % 13 7 13 8   MPV fL 10 2 10 2   NRBC AUTO /100 WBCs 0 0       CMP:  Results from last 7 days   Lab Units 06/19/23  0431 06/18/23  1441   POTASSIUM mmol/L 4 1 4 4   CHLORIDE mmol/L 93* 92*   CO2 mmol/L 30 28   BUN mg/dL 26* 25   CREATININE mg/dL 1 36* 1 32*   CALCIUM mg/dL 8 6 8 9   AST U/L  --  7*   ALT U/L  --  9   ALK PHOS U/L  --  81   EGFR ml/min/1 73sq m 44 46       BMP:  Results from last 7 days   Lab Units 06/19/23  0431 06/18/23  1441   POTASSIUM mmol/L 4 1 4 4   CHLORIDE mmol/L 93* 92*   CO2 mmol/L 30 28   BUN mg/dL 26* 25   CREATININE mg/dL 1 36* 1 32*   CALCIUM mg/dL 8 6 8 9          Lab Results   Component Value Date    NTBNP 2,264 (H) 02/12/2022    NTBNP 1,126 (H) 02/11/2022    NTBNP 818 (H) 10/31/2021                                  Lipid Profile:   Lab Results   Component Value Date    CHOL 192 10/29/2015    CHOL 168 09/25/2015     Lab Results   Component Value Date    HDL 37 (L) 06/08/2023    HDL 38 (L) 11/29/2022    HDL 41 11/02/2021     Lab Results   Component Value Date    LDLCALC 84 06/08/2023    LDLCALC 82 11/29/2022    LDLCALC 80 11/02/2021     Lab Results   Component Value Date    TRIG 178 (H) 06/08/2023    TRIG 169 (H) 11/29/2022    TRIG 115 11/02/2021         Cardiac testing:   Results for orders placed during the hospital encounter of 21    Echo complete with contrast if indicated    Narrative  [address]  [address] [facility]  [place] [address]  [phone]    Transthoracic Echocardiogram  2D, M-mode, Doppler, and Color Doppler    Study date:  2021    Patient: Debby Bailey  MR number: [id number]  Account number: [de-identified]  : 02-Aug-1930  Age: 80 years  Gender: Male  Status: Outpatient  Location: Echo lab  Height: 65 in  Weight: 151 6 lb  BP: 122/ 58 mmHg    Indications: Aortic Stenosis    Diagnoses: 424 1 - AORTIC VALVE DISORDER    Sonographer:  YURI Rogers  Primary Physician:  Vincent Post DO  Referring Physician:  Curtis Cavanaugh MD  Group:  [facility]  Interpreting Physician:  Wendi Morataya MD    SUMMARY    LEFT VENTRICLE:  Systolic function was at the lower limits of normal by visual assessment  Ejection fraction was estimated in the range of 45 % to 50 % to be 45 %  There was mild diffuse hypokinesis  Wall thickness was mildly increased  LEFT ATRIUM:  The atrium was mildly dilated  MITRAL VALVE:  There was mild to moderate annular calcification  Transmitral velocity was increased due to increased transvalvular flow  There was moderate to severe regurgitation  Based on elevated E' and central color flow jet  Unable to obtain PISA measurement for effective ERO calculation  Consider WALT for better evaluation    AORTIC VALVE:  The valve was probably trileaflet  Leaflets exhibited mildly increased thickness, mild calcification, moderately reduced cuspal separation, and sclerosis  Transaortic velocity was increased due to increased transvalvular flow  There was moderate stenosis  There was mild to moderate regurgitation  Valve mean gradient was 26 mmHg      HISTORY: PRIOR HISTORY: 3-vessel CAD,Chronic stable Angina,Chronic Diastolic heart failure,HTN,Murmur,DVT,Hyperlipidemia,anemia  PROCEDURE: The procedure was performed in the echo lab  This was a routine study  The transthoracic approach was used  The study included complete 2D imaging, M-mode, complete spectral Doppler, and color Doppler  The heart rate was 61 bpm,  at the start of the study  Images were obtained from the parasternal, apical, subcostal, and suprasternal notch acoustic windows  Image quality was adequate  LEFT VENTRICLE: Size was normal  Systolic function was at the lower limits of normal by visual assessment  Ejection fraction was estimated in the range of 45 % to 50 % to be 45 %  There was mild diffuse hypokinesis  Wall thickness was  mildly increased  No evidence of apical thrombus  DOPPLER: The study was not technically sufficient to allow evaluation of LV diastolic function  RIGHT VENTRICLE: The size was normal  Systolic function was normal  Wall thickness was normal     LEFT ATRIUM: The atrium was mildly dilated  RIGHT ATRIUM: Size was normal     MITRAL VALVE: There was mild to moderate annular calcification  There was mild-moderate calcification  There was mildly reduced leaflet separation  DOPPLER: Transmitral velocity was increased due to increased transvalvular flow  There was  no evidence for stenosis  There was moderate to severe regurgitation  Based on elevated E' and central color flow jet  Unable to obtain PISA measurement for effective ERO calculation  Consider WALT for better evaluation    AORTIC VALVE: The valve was probably trileaflet  Leaflets exhibited mildly increased thickness, mild calcification, moderately reduced cuspal separation, and sclerosis  DOPPLER: Transaortic velocity was increased due to increased  transvalvular flow  There was moderate stenosis  There was mild to moderate regurgitation  TRICUSPID VALVE: The valve structure was normal  There was normal leaflet separation   DOPPLER: The transtricuspid velocity was within the normal range  There was no evidence for stenosis  There was trace regurgitation  PULMONIC VALVE: Leaflets exhibited normal thickness, no calcification, and normal cuspal separation  DOPPLER: The transpulmonic velocity was within the normal range  There was trace regurgitation  PERICARDIUM: There was no pericardial effusion  The pericardium was normal in appearance  AORTA: The root exhibited normal size  SYSTEMIC VEINS: IVC: The inferior vena cava was not well visualized  MEASUREMENT TABLES    DOPPLER MEASUREMENTS  Aortic valve   (Reference normals)  Peak gilda   350 cm/s   (--)  Peak gradient   48 mmHg   (--)  Mean gradient   26 mmHg   (--)  Regurg PHT   460 ms   (--)    SYSTEM MEASUREMENT TABLES    2D  EF (Teich): 49 86 %  %FS: 25 27 %  JUDY Planimetry: 0 86 cm2  Ao Diam: 3 1 cm  EDV(Teich): 104 22 ml  ESV(Teich): 52 25 ml  IVSd: 1 24 cm  LA Area: 22 39 cm2  LA Diam: 4 03 cm  LVEDV MOD A4C: 174 08 ml  LVEF MOD A4C: 50 26 %  LVESV MOD A4C: 86 59 ml  LVIDd: 4 74 cm  LVIDs: 3 54 cm  LVLd A4C: 9 35 cm  LVLs A4C: 8 17 cm  LVOT Diam: 1 96 cm  LVPWd: 1 21 cm  RA Area: 12 71 cm2  RVIDd: 2 45 cm  SV (Teich): 51 97 ml  SV MOD A4C: 87 49 ml    CW  AV Env  Ti: 361 56 ms  AV VTI: 86 51 cm  AV Vmax: 3 48 m/s  AV Vmean: 2 39 m/s  AV maxP 38 mmHg  AV meanP 03 mmHg  TR Vmax: 3 25 m/s  TR maxP 23 mmHg    PW  E' Sept: 0 05 m/s  LVOT Env  Ti: 361 56 ms  LVOT VTI: 27 25 cm  LVOT Vmax: 1 15 m/s  LVOT Vmean: 0 75 m/s  LVOT maxP 29 mmHg  LVOT meanP 61 mmHg    [employer] Accredited Echocardiography Laboratory    Prepared and electronically signed by    Vinnie Phillips MD  Signed 2021 17:34:59    No results found for this or any previous visit  No valid procedures specified  No results found for this or any previous visit  Imaging:   XR chest 1 view portable    Result Date: 2023  Narrative: CHEST INDICATION:  Shortness of breath   COMPARISON: 2023, CT chest, abdomen and pelvis 3/15/2023 EXAM PERFORMED/VIEWS:  XR CHEST PORTABLE FINDINGS: Cardiomediastinal silhouette appears stable  Status post median sternotomy  Pulmonary venous congestion with bilateral groundglass opacities and small pleural effusions, suggesting pulmonary edema  No pneumothorax  Suggestion of old healed right clavicle fracture  Cholecystectomy clips  Impression: Pulmonary edema with small pleural effusions  Workstation performed: [id number]     CTA ED chest PE study    Result Date: 6/18/2023  Narrative: CTA - CHEST WITH IV CONTRAST - PULMONARY ANGIOGRAM INDICATION:   SOB out of proportion to exam, acute onset PE, hx DVT  COMPARISON: 3/15/2023  TECHNIQUE: CTA examination of the chest was performed using angiographic technique according to a protocol specifically tailored to evaluate for pulmonary embolism  Multiplanar 2D reformatted images were created from the source data  In addition, coronal 3D MIP postprocessing was performed on the acquisition scanner  Radiation dose length product (DLP) for this visit:  400 mGy-cm   This examination, like all CT scans performed in the [facility], was performed utilizing techniques to minimize radiation dose exposure, including the use of iterative reconstruction and automated exposure control  IV Contrast:  50 mL of iohexol (OMNIPAQUE) FINDINGS: PULMONARY ARTERIAL TREE:  No pulmonary embolus is seen  LUNGS: Hazy groundglass consolidation with intralobular septal thickening noted in the upper and lower lobes suspicious for pulmonary edema  No obvious endobronchial lesion  There is passive atelectasis in the lower lobes  PLEURA: Moderate bilateral pleural effusions  HEART/GREAT VESSELS: Cardiomegaly  No thoracic aortic aneurysm  Prominent coronary artery calcifications  No pericardial effusion  MEDIASTINUM AND YARELI: There are small borderline prominent mediastinal lymph nodes  There are borderline prominent bilateral hilar lymph nodes   No significant axillary lymphadenopathy  CHEST WALL AND LOWER NECK:   Unremarkable  VISUALIZED STRUCTURES IN THE UPPER ABDOMEN: Small hiatal hernia  Calcified granulomas in the spleen  Status post cholecystectomy  A calcified granuloma also seen in the left hepatic lobe  Punctate nonobstructing left renal calculus  OSSEOUS STRUCTURES:  No acute fracture or destructive osseous lesion  Degenerative changes in the thoracic spine  Median sternotomy wires  Impression: No CT evidence of pulmonary embolism  Cardiomegaly with moderate bilateral pleural effusions and hazy groundglass densities with interlobular septal thickening suspicious for pulmonary edema/CHF  Borderline prominent mediastinal and hilar lymph nodes  Small hiatal hernia  Punctate nonobstructing left renal calculus  Evidence of prior granulomatous disease  Workstation performed: [id number]     Thank you for allowing us to participate in this patient's care  Counseling / Coordination of Care  Total floor / unit time spent today 45 minutes  Greater than 50% of total time was spent with the patient and / or family counseling and / or coordination of care  A description of the counseling / coordination of care: Review of history, current assessment, development of a plan  Code Status: Level 3 - DNAR and DNI    ** Please Note: Dragon 360 Dictation voice to text software may have been used in the creation of this document   **

## 2023-06-20 LAB
ANION GAP SERPL CALCULATED.3IONS-SCNC: 10 MMOL/L (ref 4–13)
BACTERIA UR QL AUTO: ABNORMAL /HPF
BASOPHILS # BLD AUTO: 0.03 THOUSANDS/ÂΜL (ref 0–0.1)
BASOPHILS NFR BLD AUTO: 1 % (ref 0–1)
BILIRUB UR QL STRIP: NEGATIVE
BNP SERPL-MCNC: 1033 PG/ML (ref 0–100)
BUN SERPL-MCNC: 29 MG/DL (ref 5–25)
CALCIUM SERPL-MCNC: 9 MG/DL (ref 8.4–10.2)
CHLORIDE SERPL-SCNC: 93 MMOL/L (ref 96–108)
CLARITY UR: CLEAR
CO2 SERPL-SCNC: 30 MMOL/L (ref 21–32)
COLOR UR: YELLOW
CREAT SERPL-MCNC: 1.16 MG/DL (ref 0.6–1.3)
EOSINOPHIL # BLD AUTO: 0.14 THOUSAND/ÂΜL (ref 0–0.61)
EOSINOPHIL NFR BLD AUTO: 2 % (ref 0–6)
ERYTHROCYTE [DISTWIDTH] IN BLOOD BY AUTOMATED COUNT: 13.8 % (ref 11.6–15.1)
GFR SERPL CREATININE-BSD FRML MDRD: 54 ML/MIN/1.73SQ M
GLUCOSE SERPL-MCNC: 162 MG/DL (ref 65–140)
GLUCOSE UR STRIP-MCNC: NEGATIVE MG/DL
HCT VFR BLD AUTO: 41.1 % (ref 36.5–49.3)
HGB BLD-MCNC: 13.3 G/DL (ref 12–17)
HGB UR QL STRIP.AUTO: NEGATIVE
HYALINE CASTS #/AREA URNS LPF: ABNORMAL /LPF
IMM GRANULOCYTES # BLD AUTO: 0.04 THOUSAND/UL (ref 0–0.2)
IMM GRANULOCYTES NFR BLD AUTO: 1 % (ref 0–2)
KETONES UR STRIP-MCNC: ABNORMAL MG/DL
LEUKOCYTE ESTERASE UR QL STRIP: ABNORMAL
LYMPHOCYTES # BLD AUTO: 0.94 THOUSANDS/ÂΜL (ref 0.6–4.47)
LYMPHOCYTES NFR BLD AUTO: 14 % (ref 14–44)
MCH RBC QN AUTO: 30.2 PG (ref 26.8–34.3)
MCHC RBC AUTO-ENTMCNC: 32.4 G/DL (ref 31.4–37.4)
MCV RBC AUTO: 93 FL (ref 82–98)
MONOCYTES # BLD AUTO: 0.54 THOUSAND/ÂΜL (ref 0.17–1.22)
MONOCYTES NFR BLD AUTO: 8 % (ref 4–12)
NEUTROPHILS # BLD AUTO: 4.86 THOUSANDS/ÂΜL (ref 1.85–7.62)
NEUTS SEG NFR BLD AUTO: 74 % (ref 43–75)
NITRITE UR QL STRIP: NEGATIVE
NON-SQ EPI CELLS URNS QL MICRO: ABNORMAL /HPF
NRBC BLD AUTO-RTO: 0 /100 WBCS
PH UR STRIP.AUTO: 5 [PH]
PLATELET # BLD AUTO: 221 THOUSANDS/UL (ref 149–390)
PMV BLD AUTO: 10.4 FL (ref 8.9–12.7)
POTASSIUM SERPL-SCNC: 3.7 MMOL/L (ref 3.5–5.3)
PROT UR STRIP-MCNC: ABNORMAL MG/DL
RBC # BLD AUTO: 4.41 MILLION/UL (ref 3.88–5.62)
RBC #/AREA URNS AUTO: ABNORMAL /HPF
SODIUM SERPL-SCNC: 133 MMOL/L (ref 135–147)
SP GR UR STRIP.AUTO: 1.02 (ref 1–1.03)
UROBILINOGEN UR STRIP-ACNC: 2 MG/DL
WBC # BLD AUTO: 6.55 THOUSAND/UL (ref 4.31–10.16)
WBC #/AREA URNS AUTO: ABNORMAL /HPF

## 2023-06-20 PROCEDURE — 81001 URINALYSIS AUTO W/SCOPE: CPT

## 2023-06-20 PROCEDURE — 99232 SBSQ HOSP IP/OBS MODERATE 35: CPT | Performed by: INTERNAL MEDICINE

## 2023-06-20 PROCEDURE — 85025 COMPLETE CBC W/AUTO DIFF WBC: CPT

## 2023-06-20 PROCEDURE — 80048 BASIC METABOLIC PNL TOTAL CA: CPT

## 2023-06-20 PROCEDURE — 83880 ASSAY OF NATRIURETIC PEPTIDE: CPT

## 2023-06-20 RX ORDER — ENOXAPARIN SODIUM 100 MG/ML
40 INJECTION SUBCUTANEOUS
Status: DISCONTINUED | OUTPATIENT
Start: 2023-06-20 | End: 2023-06-21 | Stop reason: HOSPADM

## 2023-06-20 RX ADMIN — PANTOPRAZOLE SODIUM 20 MG: 20 TABLET, DELAYED RELEASE ORAL at 04:58

## 2023-06-20 RX ADMIN — RANOLAZINE 500 MG: 500 TABLET, FILM COATED, EXTENDED RELEASE ORAL at 08:48

## 2023-06-20 RX ADMIN — Medication 1 TABLET: at 08:49

## 2023-06-20 RX ADMIN — FUROSEMIDE 40 MG: 10 INJECTION, SOLUTION INTRAMUSCULAR; INTRAVENOUS at 17:19

## 2023-06-20 RX ADMIN — ALPRAZOLAM 0.5 MG: 0.5 TABLET ORAL at 21:36

## 2023-06-20 RX ADMIN — FUROSEMIDE 40 MG: 10 INJECTION, SOLUTION INTRAMUSCULAR; INTRAVENOUS at 08:50

## 2023-06-20 RX ADMIN — ENOXAPARIN SODIUM 40 MG: 40 INJECTION SUBCUTANEOUS at 08:48

## 2023-06-20 RX ADMIN — ATORVASTATIN CALCIUM 10 MG: 10 TABLET, FILM COATED ORAL at 17:19

## 2023-06-20 RX ADMIN — CHOLECALCIFEROL TAB 10 MCG (400 UNIT) 400 UNITS: 10 TAB at 15:46

## 2023-06-20 RX ADMIN — RANOLAZINE 500 MG: 500 TABLET, FILM COATED, EXTENDED RELEASE ORAL at 17:19

## 2023-06-20 RX ADMIN — CLOPIDOGREL BISULFATE 75 MG: 75 TABLET ORAL at 08:49

## 2023-06-20 RX ADMIN — METOPROLOL SUCCINATE 50 MG: 50 TABLET, EXTENDED RELEASE ORAL at 08:48

## 2023-06-20 RX ADMIN — SPIRONOLACTONE 25 MG: 25 TABLET ORAL at 08:49

## 2023-06-20 RX ADMIN — TAMSULOSIN HYDROCHLORIDE 0.4 MG: 0.4 CAPSULE ORAL at 17:19

## 2023-06-20 RX ADMIN — METOPROLOL SUCCINATE 50 MG: 50 TABLET, EXTENDED RELEASE ORAL at 18:13

## 2023-06-20 RX ADMIN — FINASTERIDE 5 MG: 5 TABLET, FILM COATED ORAL at 08:49

## 2023-06-20 RX ADMIN — DOCUSATE SODIUM 100 MG: 100 CAPSULE, LIQUID FILLED ORAL at 08:50

## 2023-06-20 RX ADMIN — MULTIPLE VITAMINS W/ MINERALS TAB 1 TABLET: TAB ORAL at 08:49

## 2023-06-20 NOTE — PLAN OF CARE
Problem: PAIN - ADULT  Goal: Verbalizes/displays adequate comfort level or baseline comfort level  Description: Interventions:  - Encourage patient to monitor pain and request assistance  - Assess pain using appropriate pain scale  - Administer analgesics based on type and severity of pain and evaluate response  - Implement non-pharmacological measures as appropriate and evaluate response  - Consider cultural and social influences on pain and pain management  - Notify physician/advanced practitioner if interventions unsuccessful or patient reports new pain  Outcome: Progressing     Problem: INFECTION - ADULT  Goal: Absence or prevention of progression during hospitalization  Description: INTERVENTIONS:  - Assess and monitor for signs and symptoms of infection  - Monitor lab/diagnostic results  - Monitor all insertion sites, i e  indwelling lines, tubes, and drains  - Monitor endotracheal if appropriate and nasal secretions for changes in amount and color  - [place] appropriate cooling/warming therapies per order  - Administer medications as ordered  - Instruct and encourage patient and family to use good hand hygiene technique  - Identify and instruct in appropriate isolation precautions for identified infection/condition  Outcome: Progressing  Goal: Absence of fever/infection during neutropenic period  Description: INTERVENTIONS:  - Monitor WBC    Outcome: Progressing     Problem: SAFETY ADULT  Goal: Patient will remain free of falls  Description: INTERVENTIONS:  - Educate patient/family on patient safety including physical limitations  - Instruct patient to call for assistance with activity   - Consult OT/PT to assist with strengthening/mobility   - Keep Call bell within reach  - Keep bed low and locked with side rails adjusted as appropriate  - Keep care items and personal belongings within reach  - Initiate and maintain comfort rounds  - Make Fall Risk Sign visible to staff  - Offer Toileting every 2 Hours, in advance of need  - Initiate/Maintain alarm  - Obtain necessary fall risk management equipment:   - Apply yellow socks and bracelet for high fall risk patients  - Consider moving patient to room near nurses station  Outcome: Progressing  Goal: Maintain or return to baseline ADL function  Description: INTERVENTIONS:  -  Assess patient's ability to carry out ADLs; assess patient's baseline for ADL function and identify physical deficits which impact ability to perform ADLs (bathing, care of mouth/teeth, toileting, grooming, dressing, etc )  - Assess/evaluate cause of self-care deficits   - Assess range of motion  - Assess patient's mobility; develop plan if impaired  - Assess patient's need for assistive devices and provide as appropriate  - Encourage maximum independence but intervene and supervise when necessary  - Involve family in performance of ADLs  - Assess for home care needs following discharge   - Consider OT consult to assist with ADL evaluation and planning for discharge  - Provide patient education as appropriate  Outcome: Progressing  Goal: Maintains/Returns to pre admission functional level  Description: INTERVENTIONS:  - Perform BMAT or MOVE assessment daily    - Set and communicate daily mobility goal to care team and patient/family/caregiver  - Collaborate with rehabilitation services on mobility goals if consulted  - Perform Range of Motion 2 times a day  - Reposition patient every 2 hours    - Dangle patient 2 times a day  - Stand patient 2 times a day  - Ambulate patient 2 times a day  - Out of bed to chair 2 times a day   - Out of bed for meals 2 times a day  - Out of bed for toileting  - Record patient progress and toleration of activity level   Outcome: Progressing     Problem: DISCHARGE PLANNING  Goal: Discharge to home or other facility with appropriate resources  Description: INTERVENTIONS:  - Identify barriers to discharge w/patient and caregiver  - Arrange for needed discharge resources and transportation as appropriate  - Identify discharge learning needs (meds, wound care, etc )  - Arrange for interpretive services to assist at discharge as needed  - Refer to Case Management Department for coordinating discharge planning if the patient needs post-hospital services based on physician/advanced practitioner order or complex needs related to functional status, cognitive ability, or social support system  Outcome: Progressing     Problem: Knowledge Deficit  Goal: Patient/family/caregiver demonstrates understanding of disease process, treatment plan, medications, and discharge instructions  Description: Complete learning assessment and assess knowledge base    Interventions:  - Provide teaching at level of understanding  - Provide teaching via preferred learning methods  Outcome: Progressing     Problem: CARDIOVASCULAR - ADULT  Goal: Maintains optimal cardiac output and hemodynamic stability  Description: INTERVENTIONS:  - Monitor I/O, vital signs and rhythm  - Monitor for S/S and trends of decreased cardiac output  - Administer and titrate ordered vasoactive medications to optimize hemodynamic stability  - Assess quality of pulses, skin color and temperature  - Assess for signs of decreased coronary artery perfusion  - Instruct patient to report change in severity of symptoms  Outcome: Progressing  Goal: Absence of cardiac dysrhythmias or at baseline rhythm  Description: INTERVENTIONS:  - Continuous cardiac monitoring, vital signs, obtain 12 lead EKG if ordered  - Administer antiarrhythmic and heart rate control medications as ordered  - Monitor electrolytes and administer replacement therapy as ordered  Outcome: Progressing

## 2023-06-20 NOTE — CONSULTS
General Cardiology   Progress Note -  [facility] One   Dori Walker 80 y o  male MRN: [id number]    Unit/Bed#: S -01 Encounter: [id number]    Assessment/ Plan    1  Acute on chronic combined systolic and diastolic heart failure  BNP 1,113 on admission   CT chest showed moderate bilateral pleural effusions, pulmonary edema   On furosemide 40 mg IV BID, will continue today and likely transition to oral tomorrow   creatinine stable: 1 16  He takes furosemide 40 mg PO daily and spirolactone 25 mg PO daily at home prior to admission  Will need higher dose of diuretic at discharge   Monitor I/Os inaccurate   Daily weights 138 lbs   Continue 2 gram Na diet      2  Moderate to severe bicuspid aortic stenosis   Reviewed on echocardiogram   Followed as outpatient with CT surgery for TAVR evaluation      3  CAD s/p remote CABG   Nuclear stress test 3/2023 showed findings 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  Study was consistent with prior   On plavix, atorvastatin, ranexa and metoprolol   He also takes imdur at home  Resume prior to discharge      4  Hypertension  BP stable: 107/57     5  Hyperlipidemia   LDL 84  On atorvastatin 10 mg PO daily      Subjective  Patient resting in chair with wife at bedside  He reports his breathing feels better today  He continues to cough  No fever or chills  Review of Systems   Constitutional: Negative for chills and fever  HENT: Negative for congestion  Cardiovascular: Positive for dyspnea on exertion  Negative for chest pain, leg swelling and orthopnea  Respiratory: Positive for cough  Negative for shortness of breath  Musculoskeletal: Negative for falls  Gastrointestinal: Positive for bloating  Negative for nausea and vomiting  Neurological: Negative for dizziness and light-headedness  Psychiatric/Behavioral: Negative for altered mental status  All other systems reviewed and are negative        Objective:   Vitals: Blood pressure 111/58, pulse "91, temperature 97 9 °F (36 6 °C), temperature source Oral, resp  rate 20, height 5' 4\" (1 626 m), weight 62 6 kg (138 lb 0 1 oz), SpO2 95 %  ,       Body mass index is 23 69 kg/m²  ,     Systolic (36CTX), HUD:273 , Min:102 , OMV:873     Diastolic (01NTU), JM, Min:55, Max:58      Intake/Output Summary (Last 24 hours) at 2023 1227  Last data filed at 2023 1013  Gross per 24 hour   Intake 240 ml   Output 700 ml   Net -460 ml     Weight (last 2 days)     Date/Time Weight    23 0557 62 6 (138 01)    23 0447 62 6 (138 01)    23 0600 62 7 (138 23)    23 0300 62 7 (138 23)    23 1429 65 1 (143 52)        Telemetry Review: Normal sinus rhythm with episodes of brief atrial tachycardia     Physical Exam  Constitutional:       General: He is not in acute distress  HENT:      Head: Normocephalic  Mouth/Throat:      Mouth: Mucous membranes are moist    Cardiovascular:      Rate and Rhythm: Normal rate and regular rhythm  Pulses: Normal pulses  Pulmonary:      Effort: Pulmonary effort is normal  No respiratory distress  Breath sounds: Normal breath sounds  Comments: RA  Decreased in bases   Abdominal:      General: Bowel sounds are normal       Palpations: Abdomen is soft  Musculoskeletal:         General: No swelling  Normal range of motion  Cervical back: Neck supple  Skin:     General: Skin is warm and dry  Neurological:      Mental Status: He is alert and oriented to person, place, and time     Psychiatric:         Mood and Affect: Mood normal          LABORATORY RESULTS      CBC with diff:   Results from last 7 days   Lab Units 23  0459 23  0431 23  1441   WBC Thousand/uL 6 55 5 89 6 59   HEMOGLOBIN g/dL 13 3 12 0 13 0   HEMATOCRIT % 41 1 37 1 40 0   MCV fL 93 93 93   PLATELETS Thousands/uL 221 210 211   RBC Million/uL 4 41 3 99 4 30   MCH pg 30 2 30 1 30 2   MCHC g/dL 32 4 32 3 32 5   RDW % 13 8 13 7 13 8   MPV fL 10 4 10 2 10 2 " NRBC AUTO /100 WBCs 0 0 0       CMP:  Results from last 7 days   Lab Units 23  0459 23  0431 23  1441   POTASSIUM mmol/L 3 7 4 1 4 4   CHLORIDE mmol/L 93* 93* 92*   CO2 mmol/L 30 30 28   BUN mg/dL 29* 26* 25   CREATININE mg/dL 1 16 1 36* 1 32*   CALCIUM mg/dL 9 0 8 6 8 9   AST U/L  --   --  7*   ALT U/L  --   --  9   ALK PHOS U/L  --   --  81   EGFR ml/min/1 73sq m 54 44 46       BMP:  Results from last 7 days   Lab Units 23  0459 23  0431 23  1441   POTASSIUM mmol/L 3 7 4 1 4 4   CHLORIDE mmol/L 93* 93* 92*   CO2 mmol/L 30 30 28   BUN mg/dL 29* 26* 25   CREATININE mg/dL 1 16 1 36* 1 32*   CALCIUM mg/dL 9 0 8 6 8 9       Lab Results   Component Value Date    NTBNP 2,264 (H) 2022    NTBNP 1,126 (H) 2022    NTBNP 818 (H) 10/31/2021     Lipid Profile:   Lab Results   Component Value Date    CHOL 192 10/29/2015    CHOL 168 2015     Lab Results   Component Value Date    HDL 37 (L) 2023    HDL 38 (L) 2022    HDL 41 2021     Lab Results   Component Value Date    LDLCALC 84 2023    LDLCALC 82 2022    LDLCALC 80 2021     Lab Results   Component Value Date    TRIG 178 (H) 2023    TRIG 169 (H) 2022    TRIG 115 2021       Cardiac testing:   Results for orders placed during the hospital encounter of 21    Echo complete with contrast if indicated    [address]  [address] [facility]  [place] [address]  [phone]    Transthoracic Echocardiogram  2D, M-mode, Doppler, and Color Doppler    Study date:  2021    Patient: Angelina Half  MR number: [id number]  Account number: [de-identified]  : 02-Aug-1930  Age: 80 years  Gender: Male  Status: Outpatient  Location: Echo lab  Height: 65 in  Weight: 151 6 lb  BP: 122/ 58 mmHg    Indications:  Aortic Stenosis    Diagnoses: 424 1 - AORTIC VALVE DISORDER    Sonographer:  YURI Shrestha  Primary Physician:  Letha Griffin, DO  Referring Physician:  Steffanie Tirado MD  Group:  [facility]  Interpreting Physician:  Geeta Sumner MD    SUMMARY    LEFT VENTRICLE:  Systolic function was at the lower limits of normal by visual assessment  Ejection fraction was estimated in the range of 45 % to 50 % to be 45 %  There was mild diffuse hypokinesis  Wall thickness was mildly increased  LEFT ATRIUM:  The atrium was mildly dilated  MITRAL VALVE:  There was mild to moderate annular calcification  Transmitral velocity was increased due to increased transvalvular flow  There was moderate to severe regurgitation  Based on elevated E' and central color flow jet  Unable to obtain PISA measurement for effective ERO calculation  Consider WALT for better evaluation    AORTIC VALVE:  The valve was probably trileaflet  Leaflets exhibited mildly increased thickness, mild calcification, moderately reduced cuspal separation, and sclerosis  Transaortic velocity was increased due to increased transvalvular flow  There was moderate stenosis  There was mild to moderate regurgitation  Valve mean gradient was 26 mmHg  HISTORY: PRIOR HISTORY: 3-vessel CAD,Chronic stable Angina,Chronic Diastolic heart failure,HTN,Murmur,DVT,Hyperlipidemia,anemia  PROCEDURE: The procedure was performed in the echo lab  This was a routine study  The transthoracic approach was used  The study included complete 2D imaging, M-mode, complete spectral Doppler, and color Doppler  The heart rate was 61 bpm,  at the start of the study  Images were obtained from the parasternal, apical, subcostal, and suprasternal notch acoustic windows  Image quality was adequate  LEFT VENTRICLE: Size was normal  Systolic function was at the lower limits of normal by visual assessment  Ejection fraction was estimated in the range of 45 % to 50 % to be 45 %  There was mild diffuse hypokinesis  Wall thickness was  mildly increased  No evidence of apical thrombus  DOPPLER: The study was not technically sufficient to allow evaluation of LV diastolic function  RIGHT VENTRICLE: The size was normal  Systolic function was normal  Wall thickness was normal     LEFT ATRIUM: The atrium was mildly dilated  RIGHT ATRIUM: Size was normal     MITRAL VALVE: There was mild to moderate annular calcification  There was mild-moderate calcification  There was mildly reduced leaflet separation  DOPPLER: Transmitral velocity was increased due to increased transvalvular flow  There was  no evidence for stenosis  There was moderate to severe regurgitation  Based on elevated E' and central color flow jet  Unable to obtain PISA measurement for effective ERO calculation  Consider WALT for better evaluation    AORTIC VALVE: The valve was probably trileaflet  Leaflets exhibited mildly increased thickness, mild calcification, moderately reduced cuspal separation, and sclerosis  DOPPLER: Transaortic velocity was increased due to increased  transvalvular flow  There was moderate stenosis  There was mild to moderate regurgitation  TRICUSPID VALVE: The valve structure was normal  There was normal leaflet separation  DOPPLER: The transtricuspid velocity was within the normal range  There was no evidence for stenosis  There was trace regurgitation  PULMONIC VALVE: Leaflets exhibited normal thickness, no calcification, and normal cuspal separation  DOPPLER: The transpulmonic velocity was within the normal range  There was trace regurgitation  PERICARDIUM: There was no pericardial effusion  The pericardium was normal in appearance  AORTA: The root exhibited normal size  SYSTEMIC VEINS: IVC: The inferior vena cava was not well visualized      MEASUREMENT TABLES    DOPPLER MEASUREMENTS  Aortic valve   (Reference normals)  Peak gilda   350 cm/s   (--)  Peak gradient   48 mmHg   (--)  Mean gradient   26 mmHg   (--)  Regurg PHT   460 ms   (--)    SYSTEM MEASUREMENT TABLES    2D  EF (Teich): 49 86 %  %FS: 25 27 %  JUDY Planimetry: 0 86 cm2  Ao Diam: 3 1 cm  EDV(Teich): 104 22 ml  ESV(Teich): 52 25 ml  IVSd: 1 24 cm  LA Area: 22 39 cm2  LA Diam: 4 03 cm  LVEDV MOD A4C: 174 08 ml  LVEF MOD A4C: 50 26 %  LVESV MOD A4C: 86 59 ml  LVIDd: 4 74 cm  LVIDs: 3 54 cm  LVLd A4C: 9 35 cm  LVLs A4C: 8 17 cm  LVOT Diam: 1 96 cm  LVPWd: 1 21 cm  RA Area: 12 71 cm2  RVIDd: 2 45 cm  SV (Teich): 51 97 ml  SV MOD A4C: 87 49 ml    CW  AV Env  Ti: 361 56 ms  AV VTI: 86 51 cm  AV Vmax: 3 48 m/s  AV Vmean: 2 39 m/s  AV maxP 38 mmHg  AV meanP 03 mmHg  TR Vmax: 3 25 m/s  TR maxP 23 mmHg    PW  E' Sept: 0 05 m/s  LVOT Env  Ti: 361 56 ms  LVOT VTI: 27 25 cm  LVOT Vmax: 1 15 m/s  LVOT Vmean: 0 75 m/s  LVOT maxP 29 mmHg  LVOT meanP 61 mmHg    [facility]    Prepared and electronically signed by    Jone Demarco MD  Signed 2021 17:34:59    No results found for this or any previous visit  No results found for this or any previous visit  No valid procedures specified  No results found for this or any previous visit      Meds/Allergies   all current active meds have been reviewed and current meds:   Current Facility-Administered Medications   Medication Dose Route Frequency   • ALPRAZolam (XANAX) tablet 0 5 mg  0 5 mg Oral HS PRN   • atorvastatin (LIPITOR) tablet 10 mg  10 mg Oral Daily With Dinner   • calcium carbonate (OYSTER SHELL,OSCAL) 500 mg tablet 1 tablet  1 tablet Oral Daily With Breakfast   • cholecalciferol (VITAMIN D3) tablet 400 Units  400 Units Oral After Lunch   • clopidogrel (PLAVIX) tablet 75 mg  75 mg Oral Daily   • docusate sodium (COLACE) capsule 100 mg  100 mg Oral Daily PRN   • enoxaparin (LOVENOX) subcutaneous injection 40 mg  40 mg Subcutaneous Q24H RITCHIE   • famotidine (PEPCID) tablet 20 mg  20 mg Oral HS   • finasteride (PROSCAR) tablet 5 mg  5 mg Oral Daily   • furosemide (LASIX) injection 40 mg  40 mg Intravenous BID   • metoprolol succinate (TOPROL-XL) 24 hr tablet 50 mg  50 mg Oral Q12H   • multivitamin-minerals (CENTRUM) tablet 1 tablet  1 tablet Oral QAM   • pantoprazole (PROTONIX) EC tablet 20 mg  20 mg Oral Early Morning   • ranolazine (RANEXA) 12 hr tablet 500 mg  500 mg Oral BID   • spironolactone (ALDACTONE) tablet 25 mg  25 mg Oral After Breakfast   • tamsulosin (FLOMAX) capsule 0 4 mg  0 4 mg Oral Daily With Dinner     Medications Prior to Admission   Medication   • ALPRAZolam (XANAX) 0 5 mg tablet   • Calcium Carbonate-Vitamin D (CALCIUM 600+D PO)   • cholecalciferol (VITAMIN D3) 400 units tablet   • clopidogrel (PLAVIX) 75 mg tablet   • Docusate Calcium (STOOL SOFTENER PO)   • dutasteride (AVODART) 0 5 mg capsule   • furosemide (LASIX) 40 mg tablet   • isosorbide mononitrate (IMDUR) 60 mg 24 hr tablet   • metoprolol succinate (TOPROL-XL) 50 mg 24 hr tablet   • multivitamin-iron-minerals-folic acid (CENTRUM) chewable tablet   • pantoprazole (PROTONIX) 20 mg tablet   • ranolazine (RANEXA) 500 mg 12 hr tablet   • rosuvastatin (CRESTOR) 5 mg tablet   • spironolactone (ALDACTONE) 25 mg tablet   • tamsulosin (Flomax) 0 4 mg   • Zinc 25 MG TABS   • famotidine (PEPCID) 20 mg tablet   • polyethylene glycol (MIRALAX) 17 g packet     Counseling / Coordination of Care  Total floor / unit time spent today 20 minutes  Greater than 50% of total time was spent with the patient and / or family counseling and / or coordination of care  ** Please Note: Dragon 360 Dictation voice to text software may have been used in the creation of this document   **

## 2023-06-20 NOTE — PROGRESS NOTES
[facility]  Progress Note  Name: Nitza Coulter  MRN: [id number]  Unit/Bed#: S -01 I Date of Admission: 6/18/2023   Date of Service: 6/20/2023 I Hospital Day: 2    Assessment/Plan   * Acute on chronic diastolic (congestive) heart failure (HCC)  Assessment & Plan  Wt Readings from Last 3 Encounters:   06/20/23 62 6 kg (138 lb 0 1 oz)   06/13/23 65 1 kg (143 lb 9 6 oz)   05/24/23 65 kg (143 lb 3 2 oz)     Patient presents is a 71-year-old male with a past medical history significant for chronic diastolic congestive heart failure, peptic ulcer disease, hyperlipidemia, BPH, and anxiety presents with several days worsening shortness of breath with orthopnea and nonproductive cough  In the ED patient found to have elevated BNP, chest x-ray and CTA both consistent with volume overloaded status  Given 1 dose of IV Lasix 40 in the ED with mild resolution of symptoms  Last known ejection fraction of 45% on 3/16/2023, follows up outpatient with Hussein Dunbar MD   Patient previously taking Lasix 40 p o  daily, spironolactone 25 mg daily added on last visit  Patient dry weight recorded to be around 64 kg in outpatient cardiology office, found to be 65 kg in hospital     6/19/2023 - patient down to 62 kg compared to 64 kg dry weight in the outpatient cardiology office, continues to have some crackles on exam but no lower extremity edema, likely still retaining fluid particularly given imaging, will continue to diurese, likely outpatient dry weight not accurate secondary to fluid restriction/sodium restriction nonadherence and/or medication nonadherence      Plan:  Lasix 40 mg IV twice daily  Cardiology following - recommending continued diuresis IV today, transition to po tomorrow   TAVR evaluation upon DC with OP CTS  Incentive spirometry  Continue spironolactone  Hold Imdur to allow for better blood pressure range while actively diuresing  BNP, BMP, CBC  Telemetry  Daily weights  Ins and outs  Cardiac/sodium restricted diet      Benign prostatic hyperplasia  Assessment & Plan  Patient takes dutasteride at home, nonformulary  Plan:  Continue with finasteride substitute per formulary    Hyperlipidemia  Assessment & Plan  Patient takes Crestor, nonformulary    Plan:  Substitute with atorvastatin, cleared with patient and family despite allergy listed in chart    Hypertension  Assessment & Plan  Plan:  Continue active diuresis - transition from IV to po tomorrow   Continue spironolactone 25 mg daily  Hold Imdur for continued diuresis  Continue metoprolol    PUD (peptic ulcer disease)  Assessment & Plan  Plan:  Protonix 20 mg early morning           VTE Pharmacologic Prophylaxis: VTE Score: 7 High Risk (Score >/= 5) - Pharmacological DVT Prophylaxis Ordered: enoxaparin (Lovenox)  Sequential Compression Devices Ordered  Patient Centered Rounds: I performed bedside rounds with nursing staff today  Discussions with Specialists or Other Care Team Provider: Nursing - Cardio    Education and Discussions with Family / Patient: Updated  (wife) at bedside  Current Length of Stay: 2 day(s)  Current Patient Status: Inpatient   Discharge Plan: Anticipate discharge in 24-48 hrs to home  Code Status: Level 3 - DNAR and DNI    Subjective:   Patient examined at the bedside, appears to be in no acute distress  He understands and is amenable to the plan of continued diuresis, and he is looking forward to OP evaluation for TAVR given his spry habitus  He thanks this writer and offers no questions, comments, or concerns at this time  Objective:     Vitals:   Temp (24hrs), Av 2 °F (36 8 °C), Min:97 9 °F (36 6 °C), Max:98 5 °F (36 9 °C)    Temp:  [97 9 °F (36 6 °C)-98 5 °F (36 9 °C)] 98 5 °F (36 9 °C)  HR:  [91-99] 92  Resp:  [16-20] 16  BP: (102-126)/(55-58) 126/56  SpO2:  [94 %-98 %] 98 %  Body mass index is 23 69 kg/m²  Input and Output Summary (last 24 hours):      Intake/Output Summary (Last 24 hours) at 6/20/2023 1749  Last data filed at 6/20/2023 1013  Gross per 24 hour   Intake 240 ml   Output 200 ml   Net 40 ml       Physical Exam:   Physical Exam  Vitals and nursing note reviewed  Constitutional:       General: He is not in acute distress  Appearance: Normal appearance  He is well-developed and normal weight  He is not ill-appearing, toxic-appearing or diaphoretic  HENT:      Head: Normocephalic and atraumatic  Mouth/Throat:      Mouth: Mucous membranes are moist       Pharynx: Oropharynx is clear  Eyes:      Extraocular Movements: Extraocular movements intact  Conjunctiva/sclera: Conjunctivae normal    Cardiovascular:      Rate and Rhythm: Normal rate and regular rhythm  Pulses: Normal pulses  Heart sounds: Normal heart sounds  No murmur heard  Pulmonary:      Effort: Pulmonary effort is normal  No respiratory distress  Breath sounds: Rales (BL bases) present  No wheezing or rhonchi  Abdominal:      General: Abdomen is flat  Bowel sounds are normal       Palpations: Abdomen is soft  Tenderness: There is no abdominal tenderness  Musculoskeletal:         General: No swelling  Cervical back: Neck supple  Skin:     General: Skin is warm and dry  Capillary Refill: Capillary refill takes less than 2 seconds  Coloration: Skin is not jaundiced or pale  Findings: No lesion or rash  Neurological:      General: No focal deficit present  Mental Status: He is alert and oriented to person, place, and time  Mental status is at baseline  Psychiatric:         Mood and Affect: Mood normal          Behavior: Behavior normal          Thought Content:  Thought content normal          Judgment: Judgment normal           Additional Data:     Labs:  Results from last 7 days   Lab Units 06/20/23  0459   WBC Thousand/uL 6 55   HEMOGLOBIN g/dL 13 3   HEMATOCRIT % 41 1   PLATELETS Thousands/uL 221   NEUTROS PCT % 74   LYMPHS PCT % 14 MONOS PCT % 8   EOS PCT % 2     Results from last 7 days   Lab Units 06/20/23  0459 06/19/23  0431 06/18/23  1441   SODIUM mmol/L 133*   < > 130*   POTASSIUM mmol/L 3 7   < > 4 4   CHLORIDE mmol/L 93*   < > 92*   CO2 mmol/L 30   < > 28   BUN mg/dL 29*   < > 25   CREATININE mg/dL 1 16   < > 1 32*   ANION GAP mmol/L 10   < > 10   CALCIUM mg/dL 9 0   < > 8 9   ALBUMIN g/dL  --   --  3 6   TOTAL BILIRUBIN mg/dL  --   --  0 94   ALK PHOS U/L  --   --  81   ALT U/L  --   --  9   AST U/L  --   --  7*   GLUCOSE RANDOM mg/dL 162*   < > 179*    < > = values in this interval not displayed  Lines/Drains:  Invasive Devices     Peripheral Intravenous Line  Duration           Peripheral IV 06/18/23 Left;Proximal;Ventral (anterior) Forearm 2 days                      Imaging: Reviewed radiology reports from this admission including: chest xray and chest CT scan   XR chest 1 view portable    Result Date: 6/19/2023  Impression: Pulmonary edema with small pleural effusions  Workstation performed: [id number]     CTA ED chest PE study    Result Date: 6/18/2023  Impression: No CT evidence of pulmonary embolism  Cardiomegaly with moderate bilateral pleural effusions and hazy groundglass densities with interlobular septal thickening suspicious for pulmonary edema/CHF  Borderline prominent mediastinal and hilar lymph nodes  Small hiatal hernia  Punctate nonobstructing left renal calculus  Evidence of prior granulomatous disease   Workstation performed: [id number]       Recent Cultures (last 7 days):         Last 24 Hours Medication List:   Current Facility-Administered Medications   Medication Dose Route Frequency Provider Last Rate   • ALPRAZolam  0 5 mg Oral HS PRN Garfield Almodovar, DO     • atorvastatin  10 mg Oral Daily With Jess Matt, DO     • calcium carbonate  1 tablet Oral Daily With [address], DO     • cholecalciferol  400 Units Oral After Lunch Moses Quinteros, DO     • clopidogrel  75 mg Oral Daily Jaison Quinteros, DO     • docusate sodium  100 mg Oral Daily PRN Bárbara Jimenez, DO     • enoxaparin  40 mg Subcutaneous Q24H [address] Bárbara Jimenez, DO     • famotidine  20 mg Oral HS Bárbara Jimenez, DO     • finasteride  5 mg Oral Daily Jaison Lazaro, DO     • furosemide  40 mg Intravenous BID Bárbara Jimenez, DO     • metoprolol succinate  50 mg Oral Q12H Bárbara Jimenez, DO     • multivitamin-minerals  1 tablet Oral QAM Jaison Quinteros, DO     • pantoprazole  20 mg Oral Early Morning Bárbara Jimenez, DO     • ranolazine  500 mg Oral BID Bárbara Jimenez, DO     • spironolactone  25 mg Oral After Breakfast Bárbara Jimenez, DO     • tamsulosin  0 4 mg Oral Daily With [address] [facility], DO          Today, Patient Was Seen By: Bárbara Jimenez DO    **Please Note: This note may have been constructed using a voice recognition system  **

## 2023-06-20 NOTE — ASSESSMENT & PLAN NOTE
Plan:  Continue active diuresis - transition from IV to po tomorrow   Continue spironolactone 25 mg daily  Hold Imdur for continued diuresis  Continue metoprolol

## 2023-06-21 ENCOUNTER — TRANSITIONAL CARE MANAGEMENT (OUTPATIENT)
Dept: FAMILY MEDICINE CLINIC | Facility: CLINIC | Age: 88
End: 2023-06-21

## 2023-06-21 VITALS
DIASTOLIC BLOOD PRESSURE: 70 MMHG | TEMPERATURE: 98.2 F | OXYGEN SATURATION: 98 % | WEIGHT: 135.8 LBS | SYSTOLIC BLOOD PRESSURE: 131 MMHG | HEART RATE: 103 BPM | RESPIRATION RATE: 16 BRPM | HEIGHT: 64 IN | BODY MASS INDEX: 23.18 KG/M2

## 2023-06-21 PROBLEM — I50.33 ACUTE ON CHRONIC DIASTOLIC (CONGESTIVE) HEART FAILURE (HCC): Status: RESOLVED | Noted: 2023-06-18 | Resolved: 2023-06-21

## 2023-06-21 LAB
ANION GAP SERPL CALCULATED.3IONS-SCNC: 11 MMOL/L
BUN SERPL-MCNC: 27 MG/DL (ref 5–25)
CALCIUM SERPL-MCNC: 8.8 MG/DL (ref 8.4–10.2)
CHLORIDE SERPL-SCNC: 91 MMOL/L (ref 96–108)
CO2 SERPL-SCNC: 30 MMOL/L (ref 21–32)
CREAT SERPL-MCNC: 1.25 MG/DL (ref 0.6–1.3)
GFR SERPL CREATININE-BSD FRML MDRD: 49 ML/MIN/1.73SQ M
GLUCOSE SERPL-MCNC: 164 MG/DL (ref 65–140)
POTASSIUM SERPL-SCNC: 3.7 MMOL/L (ref 3.5–5.3)
SODIUM SERPL-SCNC: 132 MMOL/L (ref 135–147)

## 2023-06-21 PROCEDURE — 80048 BASIC METABOLIC PNL TOTAL CA: CPT

## 2023-06-21 PROCEDURE — 99232 SBSQ HOSP IP/OBS MODERATE 35: CPT | Performed by: INTERNAL MEDICINE

## 2023-06-21 PROCEDURE — 99239 HOSP IP/OBS DSCHRG MGMT >30: CPT | Performed by: INTERNAL MEDICINE

## 2023-06-21 RX ORDER — FUROSEMIDE 40 MG/1
40 TABLET ORAL
Status: DISCONTINUED | OUTPATIENT
Start: 2023-06-21 | End: 2023-06-21 | Stop reason: HOSPADM

## 2023-06-21 RX ORDER — FUROSEMIDE 40 MG/1
40 TABLET ORAL
Status: DISCONTINUED | OUTPATIENT
Start: 2023-06-21 | End: 2023-06-21

## 2023-06-21 RX ORDER — FUROSEMIDE 40 MG/1
40 TABLET ORAL 2 TIMES DAILY
Qty: 60 TABLET | Refills: 0 | Status: SHIPPED | OUTPATIENT
Start: 2023-06-21 | End: 2023-06-30

## 2023-06-21 RX ADMIN — FINASTERIDE 5 MG: 5 TABLET, FILM COATED ORAL at 08:19

## 2023-06-21 RX ADMIN — SPIRONOLACTONE 25 MG: 25 TABLET ORAL at 08:19

## 2023-06-21 RX ADMIN — CHOLECALCIFEROL TAB 10 MCG (400 UNIT) 400 UNITS: 10 TAB at 13:25

## 2023-06-21 RX ADMIN — RANOLAZINE 500 MG: 500 TABLET, FILM COATED, EXTENDED RELEASE ORAL at 08:19

## 2023-06-21 RX ADMIN — FUROSEMIDE 40 MG: 40 TABLET ORAL at 10:18

## 2023-06-21 RX ADMIN — METOPROLOL SUCCINATE 50 MG: 50 TABLET, EXTENDED RELEASE ORAL at 08:19

## 2023-06-21 RX ADMIN — Medication 1 TABLET: at 08:19

## 2023-06-21 RX ADMIN — ENOXAPARIN SODIUM 40 MG: 40 INJECTION SUBCUTANEOUS at 08:19

## 2023-06-21 RX ADMIN — MULTIPLE VITAMINS W/ MINERALS TAB 1 TABLET: TAB ORAL at 08:19

## 2023-06-21 NOTE — PLAN OF CARE
Problem: PAIN - ADULT  Goal: Verbalizes/displays adequate comfort level or baseline comfort level  Description: Interventions:  - Encourage patient to monitor pain and request assistance  - Assess pain using appropriate pain scale  - Administer analgesics based on type and severity of pain and evaluate response  - Implement non-pharmacological measures as appropriate and evaluate response  - Consider cultural and social influences on pain and pain management  - Notify physician/advanced practitioner if interventions unsuccessful or patient reports new pain  Outcome: Progressing     Problem: INFECTION - ADULT  Goal: Absence or prevention of progression during hospitalization  Description: INTERVENTIONS:  - Assess and monitor for signs and symptoms of infection  - Monitor lab/diagnostic results  - Monitor all insertion sites, i e  indwelling lines, tubes, and drains  - Monitor endotracheal if appropriate and nasal secretions for changes in amount and color  - [place] appropriate cooling/warming therapies per order  - Administer medications as ordered  - Instruct and encourage patient and family to use good hand hygiene technique  - Identify and instruct in appropriate isolation precautions for identified infection/condition  Outcome: Progressing  Goal: Absence of fever/infection during neutropenic period  Description: INTERVENTIONS:  - Monitor WBC    Outcome: Progressing     Problem: SAFETY ADULT  Goal: Patient will remain free of falls  Description: INTERVENTIONS:  - Educate patient/family on patient safety including physical limitations  - Instruct patient to call for assistance with activity   - Consult OT/PT to assist with strengthening/mobility   - Keep Call bell within reach  - Keep bed low and locked with side rails adjusted as appropriate  - Keep care items and personal belongings within reach  - Initiate and maintain comfort rounds  - Make Fall Risk Sign visible to staff  - Offer Toileting every  Hours, in advance of need  - Initiate/Maintain alarm  - Obtain necessary fall risk management equipment:   - Apply yellow socks and bracelet for high fall risk patients  - Consider moving patient to room near nurses station  Outcome: Progressing  Goal: Maintain or return to baseline ADL function  Description: INTERVENTIONS:  -  Assess patient's ability to carry out ADLs; assess patient's baseline for ADL function and identify physical deficits which impact ability to perform ADLs (bathing, care of mouth/teeth, toileting, grooming, dressing, etc )  - Assess/evaluate cause of self-care deficits   - Assess range of motion  - Assess patient's mobility; develop plan if impaired  - Assess patient's need for assistive devices and provide as appropriate  - Encourage maximum independence but intervene and supervise when necessary  - Involve family in performance of ADLs  - Assess for home care needs following discharge   - Consider OT consult to assist with ADL evaluation and planning for discharge  - Provide patient education as appropriate  Outcome: Progressing  Goal: Maintains/Returns to pre admission functional level  Description: INTERVENTIONS:  - Perform BMAT or MOVE assessment daily    - Set and communicate daily mobility goal to care team and patient/family/caregiver  - Collaborate with rehabilitation services on mobility goals if consulted  - Perform Range of Motion  times a day  - Reposition patient every  hours    - Dangle patient  times a day  - Stand patient  times a day  - Ambulate patient  times a day  - Out of bed to chair  times a day   - Out of bed for meals times a day  - Out of bed for toileting  - Record patient progress and toleration of activity level   Outcome: Progressing     Problem: DISCHARGE PLANNING  Goal: Discharge to home or other facility with appropriate resources  Description: INTERVENTIONS:  - Identify barriers to discharge w/patient and caregiver  - Arrange for needed discharge resources and transportation as appropriate  - Identify discharge learning needs (meds, wound care, etc )  - Arrange for interpretive services to assist at discharge as needed  - Refer to Case Management Department for coordinating discharge planning if the patient needs post-hospital services based on physician/advanced practitioner order or complex needs related to functional status, cognitive ability, or social support system  Outcome: Progressing     Problem: Knowledge Deficit  Goal: Patient/family/caregiver demonstrates understanding of disease process, treatment plan, medications, and discharge instructions  Description: Complete learning assessment and assess knowledge base    Interventions:  - Provide teaching at level of understanding  - Provide teaching via preferred learning methods  Outcome: Progressing     Problem: CARDIOVASCULAR - ADULT  Goal: Maintains optimal cardiac output and hemodynamic stability  Description: INTERVENTIONS:  - Monitor I/O, vital signs and rhythm  - Monitor for S/S and trends of decreased cardiac output  - Administer and titrate ordered vasoactive medications to optimize hemodynamic stability  - Assess quality of pulses, skin color and temperature  - Assess for signs of decreased coronary artery perfusion  - Instruct patient to report change in severity of symptoms  Outcome: Progressing  Goal: Absence of cardiac dysrhythmias or at baseline rhythm  Description: INTERVENTIONS:  - Continuous cardiac monitoring, vital signs, obtain 12 lead EKG if ordered  - Administer antiarrhythmic and heart rate control medications as ordered  - Monitor electrolytes and administer replacement therapy as ordered  Outcome: Progressing

## 2023-06-21 NOTE — PROGRESS NOTES
General Cardiology   Progress Note -  Team One   Keith Marina 80 y o  male MRN: [id number]    Unit/Bed#: S -01 Encounter: [id number]    Assessment/ Plan    1  Acute on chronic combined systolic and diastolic heart failure  Diuresed with furosemide 40 mg IV BID, will change to furosemide 40 mg PO BID and continue spirolactone 25 mg PO daily  creatinine stable: 1 25  He took furosemide 40 mg PO daily and spirolactone 25 mg PO daily at home prior to admission  Monitor I/Os inaccurate   Daily weights 135 lbs today (appears dry weight)   Continue 2 gram Na diet   Reviewed HF education with patient      2  Moderate to severe bicuspid aortic stenosis   Reviewed on echocardiogram   Followed as outpatient with CT surgery for TAVR evaluation      3  CAD s/p remote CABG   Nuclear stress test 3/2023 showed findings 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  Study was consistent with prior   On plavix, atorvastatin, ranexa and metoprolol   He also takes imdur at home  Resume prior to discharge      4  Hypertension  BP stable: 123/63     5  Hyperlipidemia   LDL 84  On atorvastatin 10 mg PO daily     Patient will follow up with Miguel GUTIERREZ 6/26/2023         Subjective  Patient resting in bed  No complaint of chest pain, SOB or palpitations  No fever or chills  Review of Systems   Constitutional: Negative for chills and fever  HENT: Negative for congestion  Cardiovascular: Negative for chest pain, dyspnea on exertion, leg swelling, orthopnea and palpitations  Respiratory: Negative for shortness of breath  Musculoskeletal: Negative for falls  Gastrointestinal: Negative for bloating, nausea and vomiting  Neurological: Negative for dizziness and light-headedness  Psychiatric/Behavioral: Negative for altered mental status  All other systems reviewed and are negative        Objective:   Vitals: Blood pressure 131/70, pulse 103, temperature 98 2 °F (36 8 °C), temperature source "Oral, resp  rate 16, height 5' 4\" (1 626 m), weight 61 6 kg (135 lb 12 9 oz), SpO2 98 %  ,       Body mass index is 23 31 kg/m²  ,     Systolic (67XBT), WWZ:816 , Min:111 , VBO:515     Diastolic (26XFY), LQB:80, Min:56, Max:70      Intake/Output Summary (Last 24 hours) at 6/21/2023 0919  Last data filed at 6/21/2023 7170  Gross per 24 hour   Intake 240 ml   Output 450 ml   Net -210 ml     Weight (last 2 days)     Date/Time Weight    06/21/23 0600 61 6 (135 8)    06/20/23 0557 62 6 (138 01)    06/20/23 0447 62 6 (138 01)    06/19/23 0600 62 7 (138 23)    06/19/23 0300 62 7 (138 23)        Telemetry Review: No telemetry     Physical Exam  Constitutional:       General: He is not in acute distress  HENT:      Head: Normocephalic  Mouth/Throat:      Mouth: Mucous membranes are moist    Cardiovascular:      Rate and Rhythm: Normal rate and regular rhythm  Pulmonary:      Effort: Pulmonary effort is normal  No respiratory distress  Breath sounds: Normal breath sounds  Comments: RA  Abdominal:      General: Bowel sounds are normal       Palpations: Abdomen is soft  Musculoskeletal:         General: No swelling  Normal range of motion  Cervical back: Neck supple  Skin:     General: Skin is warm and dry  Capillary Refill: Capillary refill takes less than 2 seconds  Neurological:      General: No focal deficit present  Mental Status: He is alert and oriented to person, place, and time           LABORATORY RESULTS      CBC with diff:   Results from last 7 days   Lab Units 06/20/23  0459 06/19/23  0431 06/18/23  1441   WBC Thousand/uL 6 55 5 89 6 59   HEMOGLOBIN g/dL 13 3 12 0 13 0   HEMATOCRIT % 41 1 37 1 40 0   MCV fL 93 93 93   PLATELETS Thousands/uL 221 210 211   RBC Million/uL 4 41 3 99 4 30   MCH pg 30 2 30 1 30 2   MCHC g/dL 32 4 32 3 32 5   RDW % 13 8 13 7 13 8   MPV fL 10 4 10 2 10 2   NRBC AUTO /100 WBCs 0 0 0       CMP:  Results from last 7 days   Lab Units 06/21/23  0518 " 23  0459 23  0431 23  1441   POTASSIUM mmol/L 3 7 3 7 4 1 4 4   CHLORIDE mmol/L 91* 93* 93* 92*   CO2 mmol/L 30 30 30 28   BUN mg/dL 27* 29* 26* 25   CREATININE mg/dL 1 25 1 16 1 36* 1 32*   CALCIUM mg/dL 8 8 9 0 8 6 8 9   AST U/L  --   --   --  7*   ALT U/L  --   --   --  9   ALK PHOS U/L  --   --   --  81   EGFR ml/min/1 73sq m 49 54 44 46       BMP:  Results from last 7 days   Lab Units 23  0518 23  0459 23  04323  1441   POTASSIUM mmol/L 3 7 3 7 4 1 4 4   CHLORIDE mmol/L 91* 93* 93* 92*   CO2 mmol/L 30 30 30 28   BUN mg/dL 27* 29* 26* 25   CREATININE mg/dL 1 25 1 16 1 36* 1 32*   CALCIUM mg/dL 8 8 9 0 8 6 8 9       Lab Results   Component Value Date    NTBNP 2,264 (H) 2022    NTBNP 1,126 (H) 2022    NTBNP 818 (H) 10/31/2021                                     Lipid Profile:   Lab Results   Component Value Date    CHOL 192 10/29/2015    CHOL 168 2015     Lab Results   Component Value Date    HDL 37 (L) 2023    HDL 38 (L) 2022    HDL 41 2021     Lab Results   Component Value Date    LDLCALC 84 2023    LDLCALC 82 2022    LDLCALC 80 2021     Lab Results   Component Value Date    TRIG 178 (H) 2023    TRIG 169 (H) 2022    TRIG 115 2021       Cardiac testing:   Results for orders placed during the hospital encounter of 21    Echo complete with contrast if indicated    [address]  [address] [facility]  [phone]    Transthoracic Echocardiogram  2D, M-mode, Doppler, and Color Doppler    Study date:  2021    Patient: Nelson Vicente  MR number: [id number]  Account number: [de-identified]  : 02-Aug-1930  Age: 80 years  Gender: Male  Status: Outpatient  Location: Echo lab  Height: 65 in  Weight: 151 6 lb  BP: 122/ 58 mmHg    Indications:  Aortic Stenosis    Diagnoses: 424 1 - AORTIC VALVE DISORDER    Sonographer:  YURI Renee Physician:  David Daniels DO  Referring Physician:  Bright Michael MD  Group:  [facility]  Interpreting Physician:  Héctor Albrecht MD    SUMMARY    LEFT VENTRICLE:  Systolic function was at the lower limits of normal by visual assessment  Ejection fraction was estimated in the range of 45 % to 50 % to be 45 %  There was mild diffuse hypokinesis  Wall thickness was mildly increased  LEFT ATRIUM:  The atrium was mildly dilated  MITRAL VALVE:  There was mild to moderate annular calcification  Transmitral velocity was increased due to increased transvalvular flow  There was moderate to severe regurgitation  Based on elevated E' and central color flow jet  Unable to obtain PISA measurement for effective ERO calculation  Consider WALT for better evaluation    AORTIC VALVE:  The valve was probably trileaflet  Leaflets exhibited mildly increased thickness, mild calcification, moderately reduced cuspal separation, and sclerosis  Transaortic velocity was increased due to increased transvalvular flow  There was moderate stenosis  There was mild to moderate regurgitation  Valve mean gradient was 26 mmHg  HISTORY: PRIOR HISTORY: 3-vessel CAD,Chronic stable Angina,Chronic Diastolic heart failure,HTN,Murmur,DVT,Hyperlipidemia,anemia  PROCEDURE: The procedure was performed in the echo lab  This was a routine study  The transthoracic approach was used  The study included complete 2D imaging, M-mode, complete spectral Doppler, and color Doppler  The heart rate was 61 bpm,  at the start of the study  Images were obtained from the parasternal, apical, subcostal, and suprasternal notch acoustic windows  Image quality was adequate  LEFT VENTRICLE: Size was normal  Systolic function was at the lower limits of normal by visual assessment  Ejection fraction was estimated in the range of 45 % to 50 % to be 45 %  There was mild diffuse hypokinesis  Wall thickness was  mildly increased   No evidence of apical thrombus  DOPPLER: The study was not technically sufficient to allow evaluation of LV diastolic function  RIGHT VENTRICLE: The size was normal  Systolic function was normal  Wall thickness was normal     LEFT ATRIUM: The atrium was mildly dilated  RIGHT ATRIUM: Size was normal     MITRAL VALVE: There was mild to moderate annular calcification  There was mild-moderate calcification  There was mildly reduced leaflet separation  DOPPLER: Transmitral velocity was increased due to increased transvalvular flow  There was  no evidence for stenosis  There was moderate to severe regurgitation  Based on elevated E' and central color flow jet  Unable to obtain PISA measurement for effective ERO calculation  Consider WALT for better evaluation    AORTIC VALVE: The valve was probably trileaflet  Leaflets exhibited mildly increased thickness, mild calcification, moderately reduced cuspal separation, and sclerosis  DOPPLER: Transaortic velocity was increased due to increased  transvalvular flow  There was moderate stenosis  There was mild to moderate regurgitation  TRICUSPID VALVE: The valve structure was normal  There was normal leaflet separation  DOPPLER: The transtricuspid velocity was within the normal range  There was no evidence for stenosis  There was trace regurgitation  PULMONIC VALVE: Leaflets exhibited normal thickness, no calcification, and normal cuspal separation  DOPPLER: The transpulmonic velocity was within the normal range  There was trace regurgitation  PERICARDIUM: There was no pericardial effusion  The pericardium was normal in appearance  AORTA: The root exhibited normal size  SYSTEMIC VEINS: IVC: The inferior vena cava was not well visualized      MEASUREMENT TABLES    DOPPLER MEASUREMENTS  Aortic valve   (Reference normals)  Peak gilda   350 cm/s   (--)  Peak gradient   48 mmHg   (--)  Mean gradient   26 mmHg   (--)  Regurg PHT   460 ms   (--)    SYSTEM MEASUREMENT TABLES    2D  EF (Teich): 49 86 %  %FS: 25 27 %  JUDY Planimetry: 0 86 cm2  Ao Diam: 3 1 cm  EDV(Teich): 104 22 ml  ESV(Teich): 52 25 ml  IVSd: 1 24 cm  LA Area: 22 39 cm2  LA Diam: 4 03 cm  LVEDV MOD A4C: 174 08 ml  LVEF MOD A4C: 50 26 %  LVESV MOD A4C: 86 59 ml  LVIDd: 4 74 cm  LVIDs: 3 54 cm  LVLd A4C: 9 35 cm  LVLs A4C: 8 17 cm  LVOT Diam: 1 96 cm  LVPWd: 1 21 cm  RA Area: 12 71 cm2  RVIDd: 2 45 cm  SV (Teich): 51 97 ml  SV MOD A4C: 87 49 ml    CW  AV Env  Ti: 361 56 ms  AV VTI: 86 51 cm  AV Vmax: 3 48 m/s  AV Vmean: 2 39 m/s  AV maxP 38 mmHg  AV meanP 03 mmHg  TR Vmax: 3 25 m/s  TR maxP 23 mmHg    PW  E' Sept: 0 05 m/s  LVOT Env  Ti: 361 56 ms  LVOT VTI: 27 25 cm  LVOT Vmax: 1 15 m/s  LVOT Vmean: 0 75 m/s  LVOT maxP 29 mmHg  LVOT meanP 61 mmHg    [facility] Accredited Echocardiography Laboratory    Prepared and electronically signed by    Pinky Harrison MD  Signed 2021 17:34:59    No results found for this or any previous visit  No results found for this or any previous visit  No valid procedures specified  No results found for this or any previous visit      Meds/Allergies   all current active meds have been reviewed and current meds:   Current Facility-Administered Medications   Medication Dose Route Frequency   • ALPRAZolam (XANAX) tablet 0 5 mg  0 5 mg Oral HS PRN   • atorvastatin (LIPITOR) tablet 10 mg  10 mg Oral Daily With Dinner   • calcium carbonate (OYSTER SHELL,OSCAL) 500 mg tablet 1 tablet  1 tablet Oral Daily With Breakfast   • cholecalciferol (VITAMIN D3) tablet 400 Units  400 Units Oral After Lunch   • clopidogrel (PLAVIX) tablet 75 mg  75 mg Oral Daily   • docusate sodium (COLACE) capsule 100 mg  100 mg Oral Daily PRN   • enoxaparin (LOVENOX) subcutaneous injection 40 mg  40 mg Subcutaneous Q24H RITCHIE   • famotidine (PEPCID) tablet 20 mg  20 mg Oral HS   • finasteride (PROSCAR) tablet 5 mg  5 mg Oral Daily   • metoprolol succinate (TOPROL-XL) 24 hr tablet 50 mg  50 mg Oral Q12H   • multivitamin-minerals (CENTRUM) tablet 1 tablet  1 tablet Oral QAM   • pantoprazole (PROTONIX) EC tablet 20 mg  20 mg Oral Early Morning   • ranolazine (RANEXA) 12 hr tablet 500 mg  500 mg Oral BID   • spironolactone (ALDACTONE) tablet 25 mg  25 mg Oral After Breakfast   • tamsulosin (FLOMAX) capsule 0 4 mg  0 4 mg Oral Daily With Dinner     Medications Prior to Admission   Medication   • ALPRAZolam (XANAX) 0 5 mg tablet   • Calcium Carbonate-Vitamin D (CALCIUM 600+D PO)   • cholecalciferol (VITAMIN D3) 400 units tablet   • clopidogrel (PLAVIX) 75 mg tablet   • Docusate Calcium (STOOL SOFTENER PO)   • dutasteride (AVODART) 0 5 mg capsule   • furosemide (LASIX) 40 mg tablet   • isosorbide mononitrate (IMDUR) 60 mg 24 hr tablet   • metoprolol succinate (TOPROL-XL) 50 mg 24 hr tablet   • multivitamin-iron-minerals-folic acid (CENTRUM) chewable tablet   • pantoprazole (PROTONIX) 20 mg tablet   • ranolazine (RANEXA) 500 mg 12 hr tablet   • rosuvastatin (CRESTOR) 5 mg tablet   • spironolactone (ALDACTONE) 25 mg tablet   • tamsulosin (Flomax) 0 4 mg   • Zinc 25 MG TABS   • famotidine (PEPCID) 20 mg tablet   • polyethylene glycol (MIRALAX) 17 g packet       Counseling / Coordination of Care  Total floor / unit time spent today 20 minutes  Greater than 50% of total time was spent with the patient and / or family counseling and / or coordination of care  ** Please Note: Dragon 360 Dictation voice to text software may have been used in the creation of this document   **

## 2023-06-21 NOTE — DISCHARGE INSTR - AVS FIRST PAGE
Dear Lurdes Harris,     It was our pleasure to care for you here at [facility]  It is our hope that we were always able to exceed the expected standards for your care during your stay  You were hospitalized due to acute on chronic heart failure  You were cared for on the [facility] 68 third floor by Jere Giron DO under the service of Sheralyn Ganser, MD with the [address] [place] [address] [facility] who covers for your primary care physician (PCP), Marcelino Mason DO, while you were hospitalized  If you have any questions or concerns related to this hospitalization, you may contact us at [phone]  For follow up as well as any medication refills, we recommend that you follow up with your primary care physician  A registered nurse will reach out to you by phone within a few days after your discharge to answer any additional questions that you may have after going home  However, at this time we provide for you here, the most important instructions / recommendations at discharge:     Notable Medication Adjustments -   Please start taking Lasix 40 mg by mouth twice daily, this is one of your water pills that was increased during her hospital stay  Testing Required after Discharge -   None  ** Please contact your PCP to request testing orders for any of the testing recommended here **  Important follow up information -   Please follow-up with your PCP within 1 week of discharge  Please follow-up with your cardiologist within 2 weeks of discharge  Please follow-up with cardiothoracic surgery for evaluation of TAVR within 1 month of discharge  Other Instructions -   We hope you feel better soon  If your symptoms worsen, please call 911 immediately or go to the nearest Emergency Department    Please review this entire after visit summary as additional general instructions including medication list, appointments, activity, diet, any pertinent wound care, and other additional recommendations from your care team that may be provided for you        Sincerely,     Katty Tenorio, DO

## 2023-06-21 NOTE — ASSESSMENT & PLAN NOTE
Wt Readings from Last 3 Encounters:   06/21/23 61 6 kg (135 lb 12 9 oz)   06/13/23 65 1 kg (143 lb 9 6 oz)   05/24/23 65 kg (143 lb 3 2 oz)     Patient presents is a 66-year-old male with a past medical history significant for chronic diastolic congestive heart failure, peptic ulcer disease, hyperlipidemia, BPH, and anxiety presents with several days worsening shortness of breath with orthopnea and nonproductive cough  In the ED patient found to have elevated BNP, chest x-ray and CTA both consistent with volume overloaded status  Given 1 dose of IV Lasix 40 in the ED with mild resolution of symptoms  Last known ejection fraction of 45% on 3/16/2023, follows up outpatient with Nery Collier MD   Patient previously taking Lasix 40 p o  daily, spironolactone 25 mg daily added on last visit  Patient dry weight recorded to be around 64 kg in outpatient cardiology office, found to be 65 kg in hospital     6/19/2023 - patient down to 62 kg compared to 64 kg dry weight in the outpatient cardiology office, continues to have some crackles on exam but no lower extremity edema, likely still retaining fluid particularly given imaging, will continue to diurese, likely outpatient dry weight not accurate secondary to fluid restriction/sodium restriction nonadherence and/or medication nonadherence      Plan:  Lasix 40 mg IV twice daily  Cardiology following - recommending continued diuresis IV today, transition to po tomorrow   TAVR evaluation upon DC with OP CTS  Incentive spirometry  Continue spironolactone  Hold Imdur to allow for better blood pressure range while actively diuresing  BNP, BMP, CBC  Telemetry  Daily weights  Ins and outs  Cardiac/sodium restricted diet

## 2023-06-21 NOTE — PROGRESS NOTES
General Cardiology   Progress Note -  Team One   Keith Marina 80 y o  male MRN: [id number]    Unit/Bed#: S -01 Encounter: [id number]    Assessment/ Plan      1  Acute on chronic combined systolic and diastolic heart failure  BNP 1,113 on admission   CT chest showed moderate bilateral pleural effusions, pulmonary edema   On furosemide 40 mg IV BID, will continue today and likely transition to oral tomorrow   creatinine stable: 1 16  He takes furosemide 40 mg PO daily and spirolactone 25 mg PO daily at home prior to admission  Will need higher dose of diuretic at discharge   Monitor I/Os inaccurate   Daily weights 138 lbs   Continue 2 gram Na diet      2  Moderate to severe bicuspid aortic stenosis   Reviewed on echocardiogram   Followed as outpatient with CT surgery for TAVR evaluation      3  CAD s/p remote CABG   Nuclear stress test 3/2023 showed findings 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  Study was consistent with prior   On plavix, atorvastatin, ranexa and metoprolol   He also takes imdur at home  Resume prior to discharge      4  Hypertension  BP stable: 107/57     5  Hyperlipidemia   LDL 84  On atorvastatin 10 mg PO daily      Subjective  Patient resting in chair with wife at bedside  He reports his breathing feels better today  He continues to cough  No fever or chills       Review of Systems   Constitutional: Negative for chills and fever  HENT: Negative for congestion  Cardiovascular: Positive for dyspnea on exertion  Negative for chest pain, leg swelling and orthopnea  Respiratory: Positive for cough  Negative for shortness of breath  Musculoskeletal: Negative for falls  Gastrointestinal: Positive for bloating  Negative for nausea and vomiting  Neurological: Negative for dizziness and light-headedness  Psychiatric/Behavioral: Negative for altered mental status  All other systems reviewed and are negative    Objective:   Vitals: Blood pressure 126/56, pulse "92, temperature 98 5 °F (36 9 °C), temperature source Oral, resp  rate 16, height 5' 4\" (1 626 m), weight 62 6 kg (138 lb 0 1 oz), SpO2 98 %  ,       Body mass index is 23 69 kg/m²  ,     Systolic (93MAA), QWJ:768 , Min:111 , BNN:505     Diastolic (40BYC), VJP:86, Min:56, Max:58          Intake/Output Summary (Last 24 hours) at 6/20/2023 2204  Last data filed at 6/20/2023 1013  Gross per 24 hour   Intake 240 ml   Output 200 ml   Net 40 ml     Weight (last 2 days)     Date/Time Weight    06/20/23 0557 62 6 (138 01)    06/20/23 0447 62 6 (138 01)    06/19/23 0600 62 7 (138 23)    06/19/23 0300 62 7 (138 23)    06/18/23 1429 65 1 (143 52)        Telemetry Review: Normal sinus rhythm with episodes of brief atrial tachycardia      Physical Exam  Constitutional:       General: He is not in acute distress  HENT:      Head: Normocephalic  Mouth/Throat:      Mouth: Mucous membranes are moist    Cardiovascular:      Rate and Rhythm: Normal rate and regular rhythm  Pulses: Normal pulses  Pulmonary:      Effort: Pulmonary effort is normal  No respiratory distress  Breath sounds: Normal breath sounds  Comments: RA  Decreased in bases   Abdominal:      General: Bowel sounds are normal       Palpations: Abdomen is soft  Musculoskeletal:         General: No swelling  Normal range of motion  Cervical back: Neck supple  Skin:     General: Skin is warm and dry  Neurological:      Mental Status: He is alert and oriented to person, place, and time     Psychiatric:         Mood and Affect: Mood normal        LABORATORY RESULTS      CBC with diff:   Results from last 7 days   Lab Units 06/20/23  0459 06/19/23  0431 06/18/23  1441   WBC Thousand/uL 6 55 5 89 6 59   HEMOGLOBIN g/dL 13 3 12 0 13 0   HEMATOCRIT % 41 1 37 1 40 0   MCV fL 93 93 93   PLATELETS Thousands/uL 221 210 211   RBC Million/uL 4 41 3 99 4 30   MCH pg 30 2 30 1 30 2   MCHC g/dL 32 4 32 3 32 5   RDW % 13 8 13 7 13 8   MPV fL 10 4 10 2 10 2 " NRBC AUTO /100 WBCs 0 0 0       CMP:  Results from last 7 days   Lab Units 23  0459 23  0431 23  1441   POTASSIUM mmol/L 3 7 4 1 4 4   CHLORIDE mmol/L 93* 93* 92*   CO2 mmol/L 30 30 28   BUN mg/dL 29* 26* 25   CREATININE mg/dL 1 16 1 36* 1 32*   CALCIUM mg/dL 9 0 8 6 8 9   AST U/L  --   --  7*   ALT U/L  --   --  9   ALK PHOS U/L  --   --  81   EGFR ml/min/1 73sq m 54 44 46       BMP:  Results from last 7 days   Lab Units 23  0459 23  0431 23  1441   POTASSIUM mmol/L 3 7 4 1 4 4   CHLORIDE mmol/L 93* 93* 92*   CO2 mmol/L 30 30 28   BUN mg/dL 29* 26* 25   CREATININE mg/dL 1 16 1 36* 1 32*   CALCIUM mg/dL 9 0 8 6 8 9       Lab Results   Component Value Date    NTBNP 2,264 (H) 2022    NTBNP 1,126 (H) 2022    NTBNP 818 (H) 10/31/2021              Lipid Profile:   Lab Results   Component Value Date    CHOL 192 10/29/2015    CHOL 168 2015     Lab Results   Component Value Date    HDL 37 (L) 2023    HDL 38 (L) 2022    HDL 41 2021     Lab Results   Component Value Date    LDLCALC 84 2023    LDLCALC 82 2022    LDLCALC 80 2021     Lab Results   Component Value Date    TRIG 178 (H) 2023    TRIG 169 (H) 2022    TRIG 115 2021       Cardiac testing:   Results for orders placed during the hospital encounter of 21    Echo complete with contrast if indicated    [address]  [address] [facility]  [address]  [phone]    Transthoracic Echocardiogram  2D, M-mode, Doppler, and Color Doppler    Study date:  2021    Patient: Angelina Shepard  MR number: [id number]  Account number: [de-identified]  : 02-Aug-1930  Age: 80 years  Gender: Male  Status: Outpatient  Location: Echo lab  Height: 65 in  Weight: 151 6 lb  BP: 122/ 58 mmHg    Indications:  Aortic Stenosis    Diagnoses: 424 1 - AORTIC VALVE DISORDER    Sonographer:  YURI Shrestha  Primary Physician:  Letha Griffin, DO  Referring Physician:  Renée Orozco MD  Group:  [facility]  Interpreting Physician:  Yisel Nuñez MD    SUMMARY    LEFT VENTRICLE:  Systolic function was at the lower limits of normal by visual assessment  Ejection fraction was estimated in the range of 45 % to 50 % to be 45 %  There was mild diffuse hypokinesis  Wall thickness was mildly increased  LEFT ATRIUM:  The atrium was mildly dilated  MITRAL VALVE:  There was mild to moderate annular calcification  Transmitral velocity was increased due to increased transvalvular flow  There was moderate to severe regurgitation  Based on elevated E' and central color flow jet  Unable to obtain PISA measurement for effective ERO calculation  Consider WALT for better evaluation    AORTIC VALVE:  The valve was probably trileaflet  Leaflets exhibited mildly increased thickness, mild calcification, moderately reduced cuspal separation, and sclerosis  Transaortic velocity was increased due to increased transvalvular flow  There was moderate stenosis  There was mild to moderate regurgitation  Valve mean gradient was 26 mmHg  HISTORY: PRIOR HISTORY: 3-vessel CAD,Chronic stable Angina,Chronic Diastolic heart failure,HTN,Murmur,DVT,Hyperlipidemia,anemia  PROCEDURE: The procedure was performed in the echo lab  This was a routine study  The transthoracic approach was used  The study included complete 2D imaging, M-mode, complete spectral Doppler, and color Doppler  The heart rate was 61 bpm,  at the start of the study  Images were obtained from the parasternal, apical, subcostal, and suprasternal notch acoustic windows  Image quality was adequate  LEFT VENTRICLE: Size was normal  Systolic function was at the lower limits of normal by visual assessment  Ejection fraction was estimated in the range of 45 % to 50 % to be 45 %  There was mild diffuse hypokinesis  Wall thickness was  mildly increased  No evidence of apical thrombus  DOPPLER: The study was not technically sufficient to allow evaluation of LV diastolic function  RIGHT VENTRICLE: The size was normal  Systolic function was normal  Wall thickness was normal     LEFT ATRIUM: The atrium was mildly dilated  RIGHT ATRIUM: Size was normal     MITRAL VALVE: There was mild to moderate annular calcification  There was mild-moderate calcification  There was mildly reduced leaflet separation  DOPPLER: Transmitral velocity was increased due to increased transvalvular flow  There was  no evidence for stenosis  There was moderate to severe regurgitation  Based on elevated E' and central color flow jet  Unable to obtain PISA measurement for effective ERO calculation  Consider WALT for better evaluation    AORTIC VALVE: The valve was probably trileaflet  Leaflets exhibited mildly increased thickness, mild calcification, moderately reduced cuspal separation, and sclerosis  DOPPLER: Transaortic velocity was increased due to increased  transvalvular flow  There was moderate stenosis  There was mild to moderate regurgitation  TRICUSPID VALVE: The valve structure was normal  There was normal leaflet separation  DOPPLER: The transtricuspid velocity was within the normal range  There was no evidence for stenosis  There was trace regurgitation  PULMONIC VALVE: Leaflets exhibited normal thickness, no calcification, and normal cuspal separation  DOPPLER: The transpulmonic velocity was within the normal range  There was trace regurgitation  PERICARDIUM: There was no pericardial effusion  The pericardium was normal in appearance  AORTA: The root exhibited normal size  SYSTEMIC VEINS: IVC: The inferior vena cava was not well visualized      MEASUREMENT TABLES    DOPPLER MEASUREMENTS  Aortic valve   (Reference normals)  Peak gilda   350 cm/s   (--)  Peak gradient   48 mmHg   (--)  Mean gradient   26 mmHg   (--)  Regurg PHT   460 ms   (--)    SYSTEM MEASUREMENT TABLES    2D  EF (Teich): 49 86 %  %FS: 25 27 %  JUDY Planimetry: 0 86 cm2  Ao Diam: 3 1 cm  EDV(Teich): 104 22 ml  ESV(Teich): 52 25 ml  IVSd: 1 24 cm  LA Area: 22 39 cm2  LA Diam: 4 03 cm  LVEDV MOD A4C: 174 08 ml  LVEF MOD A4C: 50 26 %  LVESV MOD A4C: 86 59 ml  LVIDd: 4 74 cm  LVIDs: 3 54 cm  LVLd A4C: 9 35 cm  LVLs A4C: 8 17 cm  LVOT Diam: 1 96 cm  LVPWd: 1 21 cm  RA Area: 12 71 cm2  RVIDd: 2 45 cm  SV (Teich): 51 97 ml  SV MOD A4C: 87 49 ml    CW  AV Env  Ti: 361 56 ms  AV VTI: 86 51 cm  AV Vmax: 3 48 m/s  AV Vmean: 2 39 m/s  AV maxP 38 mmHg  AV meanP 03 mmHg  TR Vmax: 3 25 m/s  TR maxP 23 mmHg    PW  E' Sept: 0 05 m/s  LVOT Env  Ti: 361 56 ms  LVOT VTI: 27 25 cm  LVOT Vmax: 1 15 m/s  LVOT Vmean: 0 75 m/s  LVOT maxP 29 mmHg  LVOT meanP 61 mmHg    [facility] Accredited Ech[facility] Laboratory    Prepared and electronically signed by    Kalyan Maurice MD  Signed 2021 17:34:59    No results found for this or any previous visit  No results found for this or any previous visit  No valid procedures specified  No results found for this or any previous visit          Meds/Allergies   all current active meds have been reviewed  Medications Prior to Admission   Medication   • ALPRAZolam (XANAX) 0 5 mg tablet   • Calcium Carbonate-Vitamin D (CALCIUM 600+D PO)   • cholecalciferol (VITAMIN D3) 400 units tablet   • clopidogrel (PLAVIX) 75 mg tablet   • Docusate Calcium (STOOL SOFTENER PO)   • dutasteride (AVODART) 0 5 mg capsule   • furosemide (LASIX) 40 mg tablet   • isosorbide mononitrate (IMDUR) 60 mg 24 hr tablet   • metoprolol succinate (TOPROL-XL) 50 mg 24 hr tablet   • multivitamin-iron-minerals-folic acid (CENTRUM) chewable tablet   • pantoprazole (PROTONIX) 20 mg tablet   • ranolazine (RANEXA) 500 mg 12 hr tablet   • rosuvastatin (CRESTOR) 5 mg tablet   • spironolactone (ALDACTONE) 25 mg tablet   • tamsulosin (Flomax) 0 4 mg   • Zinc 25 MG TABS   • famotidine (PEPCID) 20 mg tablet   • polyethylene glycol (MIRALAX) 17 g packet                Counseling / Coordination of Care  Total floor / unit time spent today 20 minutes  Greater than 50% of total time was spent with the patient and / or family counseling and / or coordination of care  ** Please Note: Dragon 360 Dictation voice to text software may have been used in the creation of this document   **

## 2023-06-21 NOTE — PLAN OF CARE
Problem: PAIN - ADULT  Goal: Verbalizes/displays adequate comfort level or baseline comfort level  Description: Interventions:  - Encourage patient to monitor pain and request assistance  - Assess pain using appropriate pain scale  - Administer analgesics based on type and severity of pain and evaluate response  - Implement non-pharmacological measures as appropriate and evaluate response  - Consider cultural and social influences on pain and pain management  - Notify physician/advanced practitioner if interventions unsuccessful or patient reports new pain  6/21/2023 1322 by Lang Saint, RN  Outcome: Completed  6/21/2023 1101 by Lang Saint, RN  Outcome: Progressing     Problem: INFECTION - ADULT  Goal: Absence or prevention of progression during hospitalization  Description: INTERVENTIONS:  - Assess and monitor for signs and symptoms of infection  - Monitor lab/diagnostic results  - Monitor all insertion sites, i e  indwelling lines, tubes, and drains  - Monitor endotracheal if appropriate and nasal secretions for changes in amount and color  - [place] appropriate cooling/warming therapies per order  - Administer medications as ordered  - Instruct and encourage patient and family to use good hand hygiene technique  - Identify and instruct in appropriate isolation precautions for identified infection/condition  6/21/2023 1322 by Lang Saint, RN  Outcome: Completed  6/21/2023 1101 by Lang Saint, RN  Outcome: Progressing  Goal: Absence of fever/infection during neutropenic period  Description: INTERVENTIONS:  - Monitor WBC    6/21/2023 1322 by Lang Saint, RN  Outcome: Completed  6/21/2023 1101 by Lang Saint, RN  Outcome: Progressing     Problem: SAFETY ADULT  Goal: Patient will remain free of falls  Description: INTERVENTIONS:  - Educate patient/family on patient safety including physical limitations  - Instruct patient to call for assistance with activity   - Consult OT/PT to assist with strengthening/mobility   - Keep Call bell within reach  - Keep bed low and locked with side rails adjusted as appropriate  - Keep care items and personal belongings within reach  - Initiate and maintain comfort rounds  - Make Fall Risk Sign visible to staff  - Offer Toileting every  Hours, in advance of need  - Initiate/Maintain alarm  - Obtain necessary fall risk management equipment:   - Apply yellow socks and bracelet for high fall risk patients  - Consider moving patient to room near nurses station  6/21/2023 1322 by Laura Zepeda RN  Outcome: Completed  6/21/2023 1101 by Laura Zepeda RN  Outcome: Progressing  Goal: Maintain or return to baseline ADL function  Description: INTERVENTIONS:  -  Assess patient's ability to carry out ADLs; assess patient's baseline for ADL function and identify physical deficits which impact ability to perform ADLs (bathing, care of mouth/teeth, toileting, grooming, dressing, etc )  - Assess/evaluate cause of self-care deficits   - Assess range of motion  - Assess patient's mobility; develop plan if impaired  - Assess patient's need for assistive devices and provide as appropriate  - Encourage maximum independence but intervene and supervise when necessary  - Involve family in performance of ADLs  - Assess for home care needs following discharge   - Consider OT consult to assist with ADL evaluation and planning for discharge  - Provide patient education as appropriate  6/21/2023 1322 by Laura Zepeda RN  Outcome: Completed  6/21/2023 1101 by Laura Zepeda RN  Outcome: Progressing  Goal: Maintains/Returns to pre admission functional level  Description: INTERVENTIONS:  - Perform BMAT or MOVE assessment daily    - Set and communicate daily mobility goal to care team and patient/family/caregiver  - Collaborate with rehabilitation services on mobility goals if consulted  - Perform Range of Motion  times a day  - Reposition patient every  hours    - Dangle patient  times a day  - Stand patient  times a day  - Ambulate patient  times a day  - Out of bed to chair  times a day   - Out of bed for meals times a day  - Out of bed for toileting  - Record patient progress and toleration of activity level   6/21/2023 1322 by Vito Glez RN  Outcome: Completed  6/21/2023 1101 by Vito Glez RN  Outcome: Progressing     Problem: DISCHARGE PLANNING  Goal: Discharge to home or other facility with appropriate resources  Description: INTERVENTIONS:  - Identify barriers to discharge w/patient and caregiver  - Arrange for needed discharge resources and transportation as appropriate  - Identify discharge learning needs (meds, wound care, etc )  - Arrange for interpretive services to assist at discharge as needed  - Refer to Case Management Department for coordinating discharge planning if the patient needs post-hospital services based on physician/advanced practitioner order or complex needs related to functional status, cognitive ability, or social support system  6/21/2023 1322 by Vito Glez RN  Outcome: Completed  6/21/2023 1101 by Vito Glez RN  Outcome: Progressing     Problem: Knowledge Deficit  Goal: Patient/family/caregiver demonstrates understanding of disease process, treatment plan, medications, and discharge instructions  Description: Complete learning assessment and assess knowledge base    Interventions:  - Provide teaching at level of understanding  - Provide teaching via preferred learning methods  6/21/2023 1322 by Vito Glez RN  Outcome: Completed  6/21/2023 1101 by Vito Glez RN  Outcome: Progressing     Problem: CARDIOVASCULAR - ADULT  Goal: Maintains optimal cardiac output and hemodynamic stability  Description: INTERVENTIONS:  - Monitor I/O, vital signs and rhythm  - Monitor for S/S and trends of decreased cardiac output  - Administer and titrate ordered vasoactive medications to optimize hemodynamic stability  - Assess quality of pulses, skin color and temperature  - Assess for signs of decreased coronary artery perfusion  - Instruct patient to report change in severity of symptoms  6/21/2023 1322 by Tatyana Briscoe RN  Outcome: Completed  6/21/2023 1101 by Tatyana Briscoe RN  Outcome: Progressing  Goal: Absence of cardiac dysrhythmias or at baseline rhythm  Description: INTERVENTIONS:  - Continuous cardiac monitoring, vital signs, obtain 12 lead EKG if ordered  - Administer antiarrhythmic and heart rate control medications as ordered  - Monitor electrolytes and administer replacement therapy as ordered  6/21/2023 1322 by Tatyana Briscoe RN  Outcome: Completed  6/21/2023 1101 by Tatyana Briscoe RN  Outcome: Progressing

## 2023-06-21 NOTE — CASE MANAGEMENT
Case Management Assessment & Discharge Planning Note    Patient name Macie MULLIGAN /S [id number] [id number] MRN [id number]  : 1930 Date 2023       Current Admission Date: 2023  Current Admission Diagnosis:PUD (peptic ulcer disease)   Patient Active Problem List    Diagnosis Date Noted   • Stage 3a chronic kidney disease ([facility] ) 2023   • Impaired fasting glucose 2023   • Status post gastric surgery 2022   • Platelets decreased ([facility] ) 2022   • 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 2022   • Dysphagia 2021   • Hx of CABG 2021   • CAD (coronary artery disease) 2021   • 3-vessel CAD 2018   • Dry eye 2018   • Anemia of chronic disease 2018   • Depression 2018   • Bladder mass 2018   • Anxiety 2018   • History of DVT (deep vein thrombosis) 2017   • PUD (peptic ulcer disease) 2017   • Hypertension 2017   • Hyperlipidemia 2017   • 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) ([facility] ) 2017   • Benign prostatic hyperplasia 10/22/2015      LOS (days): 3  Geometric Mean LOS (GMLOS) (days): 3 80  Days to GMLOS:1 1     OBJECTIVE:    Risk of Unplanned Readmission Score: 20 22         Current admission status: Inpatient       Preferred Pharmacy:   [place] [address] [address]  [address] [place] [address]  Phone: [phone] Fax: [phone]    Primary Care Provider: Chanelle Pena DO    Primary Insurance: MEDICARE  Secondary Insurance: AARP    ASSESSMENT:  Omar Rogers Proxies    There are no active Health Care Proxies on file                        Patient Information  Admitted from[de-identified] Home  Mental Status: Alert  During Assessment patient was accompanied by: Not accompanied during assessment  Assessment information provided by[de-identified] Patient  Primary Caregiver: Self  Support Systems: Self, Spouse/significant other, Family members  Home entry access options   Select all that apply : Stairs  Number of steps to enter home : 4  Do the steps have railings?: No  Type of Current Residence: [place]  In the last 12 months, was there a time when you were not able to pay the mortgage or rent on time?: No  In the last 12 months, how many places have you lived?: 1  In the last 12 months, was there a time when you did not have a steady place to sleep or slept in a shelter (including now)?: No  Homeless/housing insecurity resource given?: N/A  Living Arrangements: Lives w/ Spouse/significant other    Activities of Daily Living Prior to Admission  Functional Status: Independent  Completes ADLs independently?: Yes  Ambulates independently?: Yes  Does patient use assisted devices?: No  Does patient have a history of HHC?: Yes (many years ago following surgery)  Does patient currently have [facility]?: No         Patient Information Continued  Does patient have prescription coverage?: Yes  Within the past 12 months, you worried that your food would run out before you got the money to buy more : Never true  Within the past 12 months, the food you bought just didn't last and you didn't have money to get more : Never true  Food insecurity resource given?: N/A  Does patient receive dialysis treatments?: No  Does patient have a history of substance abuse?: No  Does patient have a history of Mental Health Diagnosis?: No    PHQ 2/9 Screening   Reviewed PHQ 2/9 Depression Screening Score?: No    Means of Transportation  Means of Transport to Appts[de-identified] Drives Self  In the past 12 months, has lack of transportation kept you from medical appointments or from getting medications?: No  In the past 12 months, has lack of transportation kept you from meetings, work, or from getting things needed for daily living?: No  Was application for public transport provided?: N/A        DISCHARGE DETAILS:    Discharge planning discussed with[de-identified] patient at bedside Were Treatment Team discharge recommendations reviewed with patient/caregiver?: Yes  Did patient/caregiver verbalize understanding of patient care needs?: Yes  Were patient/caregiver advised of the risks associated with not following Treatment Team discharge recommendations?: Yes         [address]         Is the patient interested in [facility] at discharge?: No    DME Referral Provided  Referral made for DME?: No    Other Referral/Resources/Interventions Provided:  Interventions: None Indicated  Referral Comments: Patient admitted due to hyponatremia, SOB, CHF  Met with patient at bedside to complete assessment  Patient reports that he lives at home with his spouse; [email or web address] has 4 steps to enter  Reports they both drive, but spouse will be the one to pick him up at d/c  Reports he has five children; no POA, but spouse and daughter would be decision-maker if one is needed  Patient reports history of home care services following surgery in the past; no current services in place  Reports that he's normally independent with ADLs and ambulation  Denies using any DME regularly  Confirmed he follows with his PCP and has Rx coverage - denies any issues affording medications  No d/c needs identified at this time, but will continue to follow      Would you like to participate in our [address] service program?  : No - Declined    Treatment Team Recommendation: Home  Discharge Destination Plan[de-identified] Home  Transport at Discharge : Family

## 2023-06-21 NOTE — ASSESSMENT & PLAN NOTE
Wt Readings from Last 3 Encounters:   06/21/23 61 6 kg (135 lb 12 9 oz)   06/13/23 65 1 kg (143 lb 9 6 oz)   05/24/23 65 kg (143 lb 3 2 oz)     Patient presents is a 75-year-old male with a past medical history significant for chronic diastolic congestive heart failure, peptic ulcer disease, hyperlipidemia, BPH, and anxiety presents with several days worsening shortness of breath with orthopnea and nonproductive cough  In the ED patient found to have elevated BNP, chest x-ray and CTA both consistent with volume overloaded status  Given 1 dose of IV Lasix 40 in the ED with mild resolution of symptoms  Last known ejection fraction of 45% on 3/16/2023, follows up outpatient with Kathy Heart MD   Patient previously taking Lasix 40 p o  daily, spironolactone 25 mg daily added on last visit  Patient dry weight recorded to be around 64 kg in outpatient cardiology office, found to be 65 kg in hospital     6/19/2023 - patient down to 62 kg compared to 64 kg dry weight in the outpatient cardiology office, continues to have some crackles on exam but no lower extremity edema, likely still retaining fluid particularly given imaging, will continue to diurese, likely outpatient dry weight not accurate secondary to fluid restriction/sodium restriction nonadherence and/or medication nonadherence  Plan:  Patient cleared by cardiology to follow-up as an outpatient  Follow-up outpatient cardiothoracic surgery for evaluation of TAVR  Increase home furosemide to 40 mg p o  twice daily  Continue home dose of spironolactone 25 mg daily  Resume Imdur  Daily weights  Dry weight upon discharge at hospital recorded to be approximately 61 6 kg

## 2023-06-21 NOTE — DISCHARGE SUMMARY
[facility]  Discharge- Jennifer Baez 8/2/1930, 80 y o  male MRN: [id number]  Unit/Bed#: S -01 Encounter: [id number]  Primary Care Provider: Ga Gordon DO   Date and time admitted to hospital: 6/18/2023  2:22 PM    * Acute on chronic diastolic (congestive) heart failure (HCC)-resolved as of 6/21/2023  Assessment & Plan  Wt Readings from Last 3 Encounters:   06/21/23 61 6 kg (135 lb 12 9 oz)   06/13/23 65 1 kg (143 lb 9 6 oz)   05/24/23 65 kg (143 lb 3 2 oz)     Patient presents is a 70-year-old male with a past medical history significant for chronic diastolic congestive heart failure, peptic ulcer disease, hyperlipidemia, BPH, and anxiety presents with several days worsening shortness of breath with orthopnea and nonproductive cough  In the ED patient found to have elevated BNP, chest x-ray and CTA both consistent with volume overloaded status  Given 1 dose of IV Lasix 40 in the ED with mild resolution of symptoms  Last known ejection fraction of 45% on 3/16/2023, follows up outpatient with Xander Franz MD   Patient previously taking Lasix 40 p o  daily, spironolactone 25 mg daily added on last visit  Patient dry weight recorded to be around 64 kg in outpatient cardiology office, found to be 65 kg in hospital     6/19/2023 - patient down to 62 kg compared to 64 kg dry weight in the outpatient cardiology office, continues to have some crackles on exam but no lower extremity edema, likely still retaining fluid particularly given imaging, will continue to diurese, likely outpatient dry weight not accurate secondary to fluid restriction/sodium restriction nonadherence and/or medication nonadherence      Plan:  Patient cleared by cardiology to follow-up as an outpatient  Follow-up outpatient cardiothoracic surgery for evaluation of TAVR  Increase home furosemide to 40 mg p o  twice daily  Continue home dose of spironolactone 25 mg daily  Resume Imdur  Daily weights  Dry weight upon discharge at hospital recorded to be approximately 61 6 kg  Benign prostatic hyperplasia  Assessment & Plan  Patient takes dutasteride at home, nonformulary  Plan:  Continue with finasteride substitute per formulary    Hyperlipidemia  Assessment & Plan  Patient takes Crestor, nonformulary    Plan:  Substitute with atorvastatin, cleared with patient and family despite allergy listed in chart    Hypertension  Assessment & Plan  Plan:  Continue active diuresis - transition from IV to po tomorrow   Continue spironolactone 25 mg daily  Hold Imdur for continued diuresis  Continue metoprolol    PUD (peptic ulcer disease)  Assessment & Plan  Plan:  Protonix 20 mg early morning    Medical Problems     Resolved Problems  Date Reviewed: 6/21/2023          Resolved    * (Principal) Acute on chronic diastolic (congestive) heart failure ([facility] [address] ) 6/21/2023     Resolved by  Charley Gaxiola DO        Discharging Resident: Charley Gaxiola DO  Discharging Attending: Zo Sandra MD  PCP: Marisel Ardon DO  Admission Date: 6/18/2023  Admission Orders (From admission, onward)     Ordered        06/18/23 1729  INPATIENT ADMISSION  Once                      Discharge Date: 06/21/23    Consultations During Hospital Stay:  · Cardiology    Procedures Performed:   · None    Significant Findings / Test Results:   XR chest 1 view portable    Result Date: 6/19/2023  Impression: Pulmonary edema with small pleural effusions  Workstation performed: [id number]     CTA ED chest PE study    Result Date: 6/18/2023  · Impression: No CT evidence of pulmonary embolism  Cardiomegaly with moderate bilateral pleural effusions and hazy groundglass densities with interlobular septal thickening suspicious for pulmonary edema/CHF  Borderline prominent mediastinal and hilar lymph nodes  Small hiatal hernia  Punctate nonobstructing left renal calculus  Evidence of prior granulomatous disease   Workstation performed: [id number]     Incidental Findings: · None  · I reviewed the above mentioned incidental findings with the patient and/or family and they expressed understanding  Test Results Pending at Discharge (will require follow up): · None     Outpatient Tests Requested:  · None    Complications: None    Reason for Admission: Acute on chronic heart failure    Hospital Course:   Emi Valle is a very pleasant 80 y o  male patient with a past medical history significant for chronic diastolic heart failure with a last known ejection fraction of 45% as of 3/16/2023, peptic ulcer disease, hyperlipidemia, BPH, and anxiety who originally presented to the hospital on 6/18/2023 due to several days to weeks of worsening orthopnea, shortness of breath, and increasing fatigue  During the patient's hospital course imaging was obtained at which point he was found to be in a volume overloaded state, he was given 1 dose of IV Lasix 40 in the ED  The patient had some minor resolution of symptoms with IV Lasix, EKG was found to be unchanged at this time, patient was admitted on telemetry, cardiology was consulted  Throughout the hospital stay the patient was given IV diuresis with resolution of symptoms, resolution of orthopnea within 24 hours, and cardiology increased the patient's home diuretic dosing and cleared him for discharge for outpatient follow-up with cardiology and outpatient evaluation for TAVR  The patient was subsequently cleared by the primary team for discharge  Please see above list of diagnoses and related plan for additional information  Condition at Discharge: good    Discharge Day Visit / Exam:   Subjective: Patient examined at the bedside, in no acute distress, he states he had the best sleep over the past few nights that he had in a long time due to his decreased orthopnea    The patient has no questions, comments, or concerns at this time but is looking forward to outpatient follow-up with cardiology and cardiothoracic surgery for "evaluation of TAVR  Vitals: Blood Pressure: 131/70 (06/21/23 0755)  Pulse: 103 (06/21/23 0755)  Temperature: 98 2 °F (36 8 °C) (06/21/23 0755)  Temp Source: Oral (06/21/23 0755)  Respirations: 16 (06/21/23 0755)  Height: 5' 4\" (162 6 cm) (06/18/23 1429)  Weight - Scale: 61 6 kg (135 lb 12 9 oz) (06/21/23 0600)  SpO2: 98 % (06/21/23 0755)  Exam:   Physical Exam  Vitals and nursing note reviewed  Constitutional:       General: He is not in acute distress  Appearance: Normal appearance  He is well-developed and normal weight  He is not ill-appearing, toxic-appearing or diaphoretic  HENT:      Head: Normocephalic and atraumatic  Mouth/Throat:      Mouth: Mucous membranes are moist       Pharynx: Oropharynx is clear  Eyes:      Extraocular Movements: Extraocular movements intact  Conjunctiva/sclera: Conjunctivae normal    Cardiovascular:      Rate and Rhythm: Normal rate and regular rhythm  Pulses: Normal pulses  Heart sounds: Normal heart sounds  No murmur heard  Pulmonary:      Effort: Pulmonary effort is normal  No respiratory distress  Breath sounds: Normal breath sounds  No wheezing, rhonchi or rales  Abdominal:      General: Abdomen is flat  There is no distension  Palpations: Abdomen is soft  Tenderness: There is no abdominal tenderness  Musculoskeletal:         General: No swelling  Cervical back: Neck supple  Skin:     General: Skin is warm and dry  Capillary Refill: Capillary refill takes less than 2 seconds  Coloration: Skin is not jaundiced or pale  Findings: No lesion or rash  Neurological:      General: No focal deficit present  Mental Status: He is alert and oriented to person, place, and time  Mental status is at baseline  Psychiatric:         Mood and Affect: Mood normal          Behavior: Behavior normal          Thought Content:  Thought content normal          Judgment: Judgment normal           Discussion with Family: " Attempted to update  (wife) via phone  Left voicemail  Discharge instructions/Information to patient and family:   See after visit summary for information provided to patient and family  Provisions for Follow-Up Care:  See after visit summary for information related to follow-up care and any pertinent home health orders  Disposition:   Home    Planned Readmission: None    Discharge Medications:  See after visit summary for reconciled discharge medications provided to patient and/or family        **Please Note: This note may have been constructed using a voice recognition system**

## 2023-06-26 ENCOUNTER — APPOINTMENT (EMERGENCY)
Dept: RADIOLOGY | Facility: HOSPITAL | Age: 88
DRG: 306 | End: 2023-06-26
Payer: MEDICARE

## 2023-06-26 ENCOUNTER — HOSPITAL ENCOUNTER (INPATIENT)
Facility: HOSPITAL | Age: 88
LOS: 4 days | Discharge: HOME HEALTH | DRG: 306 | End: 2023-06-30
Attending: EMERGENCY MEDICINE | Admitting: INTERNAL MEDICINE
Payer: MEDICARE

## 2023-06-26 ENCOUNTER — RA CDI HCC (OUTPATIENT)
Dept: OTHER | Facility: HOSPITAL | Age: 88
End: 2023-06-26

## 2023-06-26 DIAGNOSIS — I20.8 CHRONIC STABLE ANGINA (HCC): ICD-10-CM

## 2023-06-26 DIAGNOSIS — I50.42 CHRONIC COMBINED SYSTOLIC AND DIASTOLIC HF (HEART FAILURE) (HCC): ICD-10-CM

## 2023-06-26 DIAGNOSIS — I50.23 ACUTE ON CHRONIC HFREF (HEART FAILURE WITH REDUCED EJECTION FRACTION) (HCC): ICD-10-CM

## 2023-06-26 DIAGNOSIS — R06.09 DYSPNEA ON EXERTION: Primary | ICD-10-CM

## 2023-06-26 DIAGNOSIS — R09.02 HYPOXIA: ICD-10-CM

## 2023-06-26 DIAGNOSIS — Z51.5 HOSPICE CARE: ICD-10-CM

## 2023-06-26 DIAGNOSIS — I50.9 CONGESTIVE HEART FAILURE, UNSPECIFIED HF CHRONICITY, UNSPECIFIED HEART FAILURE TYPE (HCC): ICD-10-CM

## 2023-06-26 PROBLEM — E11.9 DIABETES (HCC): Status: ACTIVE | Noted: 2023-03-27

## 2023-06-26 PROBLEM — E87.1 HYPONATREMIA: Status: ACTIVE | Noted: 2023-01-01

## 2023-06-26 LAB
2HR DELTA HS TROPONIN: 10 NG/L
4HR DELTA HS TROPONIN: 20 NG/L
ALBUMIN SERPL BCP-MCNC: 3.3 G/DL (ref 3.5–5)
ALP SERPL-CCNC: 68 U/L (ref 34–104)
ALT SERPL W P-5'-P-CCNC: 13 U/L (ref 7–52)
ANION GAP SERPL CALCULATED.3IONS-SCNC: 10 MMOL/L
AST SERPL W P-5'-P-CCNC: 13 U/L (ref 13–39)
ATRIAL RATE: 92 BPM
BASOPHILS # BLD AUTO: 0.02 THOUSANDS/ÂΜL (ref 0–0.1)
BASOPHILS NFR BLD AUTO: 0 % (ref 0–1)
BILIRUB SERPL-MCNC: 0.76 MG/DL (ref 0.2–1)
BNP SERPL-MCNC: 1246 PG/ML (ref 0–100)
BUN SERPL-MCNC: 27 MG/DL (ref 5–25)
CALCIUM ALBUM COR SERPL-MCNC: 9.3 MG/DL (ref 8.3–10.1)
CALCIUM SERPL-MCNC: 8.7 MG/DL (ref 8.4–10.2)
CARDIAC TROPONIN I PNL SERPL HS: 58 NG/L
CARDIAC TROPONIN I PNL SERPL HS: 68 NG/L
CARDIAC TROPONIN I PNL SERPL HS: 78 NG/L
CHLORIDE SERPL-SCNC: 87 MMOL/L (ref 96–108)
CO2 SERPL-SCNC: 30 MMOL/L (ref 21–32)
CORTIS SERPL-MCNC: 21.9 UG/DL
CREAT SERPL-MCNC: 1.21 MG/DL (ref 0.6–1.3)
EOSINOPHIL # BLD AUTO: 0.05 THOUSAND/ÂΜL (ref 0–0.61)
EOSINOPHIL NFR BLD AUTO: 1 % (ref 0–6)
ERYTHROCYTE [DISTWIDTH] IN BLOOD BY AUTOMATED COUNT: 13.6 % (ref 11.6–15.1)
GFR SERPL CREATININE-BSD FRML MDRD: 51 ML/MIN/1.73SQ M
GLUCOSE SERPL-MCNC: 196 MG/DL (ref 65–140)
GLUCOSE SERPL-MCNC: 275 MG/DL (ref 65–140)
HCT VFR BLD AUTO: 35.5 % (ref 36.5–49.3)
HGB BLD-MCNC: 11.9 G/DL (ref 12–17)
IMM GRANULOCYTES # BLD AUTO: 0.04 THOUSAND/UL (ref 0–0.2)
IMM GRANULOCYTES NFR BLD AUTO: 1 % (ref 0–2)
LYMPHOCYTES # BLD AUTO: 0.45 THOUSANDS/ÂΜL (ref 0.6–4.47)
LYMPHOCYTES NFR BLD AUTO: 7 % (ref 14–44)
MCH RBC QN AUTO: 30.5 PG (ref 26.8–34.3)
MCHC RBC AUTO-ENTMCNC: 33.5 G/DL (ref 31.4–37.4)
MCV RBC AUTO: 91 FL (ref 82–98)
MONOCYTES # BLD AUTO: 0.53 THOUSAND/ÂΜL (ref 0.17–1.22)
MONOCYTES NFR BLD AUTO: 8 % (ref 4–12)
NEUTROPHILS # BLD AUTO: 5.88 THOUSANDS/ÂΜL (ref 1.85–7.62)
NEUTS SEG NFR BLD AUTO: 83 % (ref 43–75)
NRBC BLD AUTO-RTO: 0 /100 WBCS
OSMOLALITY UR/SERPL-RTO: 285 MMOL/KG (ref 282–298)
P AXIS: 94 DEGREES
PLATELET # BLD AUTO: 247 THOUSANDS/UL (ref 149–390)
PMV BLD AUTO: 9.7 FL (ref 8.9–12.7)
POTASSIUM SERPL-SCNC: 4.7 MMOL/L (ref 3.5–5.3)
PR INTERVAL: 200 MS
PROT SERPL-MCNC: 7.1 G/DL (ref 6.4–8.4)
QRS AXIS: 38 DEGREES
QRSD INTERVAL: 112 MS
QT INTERVAL: 376 MS
QTC INTERVAL: 464 MS
RBC # BLD AUTO: 3.9 MILLION/UL (ref 3.88–5.62)
SODIUM SERPL-SCNC: 127 MMOL/L (ref 135–147)
T WAVE AXIS: 223 DEGREES
TSH SERPL DL<=0.05 MIU/L-ACNC: 2.51 UIU/ML (ref 0.45–4.5)
URATE SERPL-MCNC: 9.1 MG/DL (ref 3.5–8.5)
VENTRICULAR RATE: 92 BPM
WBC # BLD AUTO: 6.97 THOUSAND/UL (ref 4.31–10.16)

## 2023-06-26 PROCEDURE — 93010 ELECTROCARDIOGRAM REPORT: CPT | Performed by: INTERNAL MEDICINE

## 2023-06-26 PROCEDURE — 80053 COMPREHEN METABOLIC PANEL: CPT | Performed by: PHYSICIAN ASSISTANT

## 2023-06-26 PROCEDURE — 93005 ELECTROCARDIOGRAM TRACING: CPT

## 2023-06-26 PROCEDURE — 84550 ASSAY OF BLOOD/URIC ACID: CPT

## 2023-06-26 PROCEDURE — 83880 ASSAY OF NATRIURETIC PEPTIDE: CPT | Performed by: PHYSICIAN ASSISTANT

## 2023-06-26 PROCEDURE — 99285 EMERGENCY DEPT VISIT HI MDM: CPT | Performed by: PHYSICIAN ASSISTANT

## 2023-06-26 PROCEDURE — 99285 EMERGENCY DEPT VISIT HI MDM: CPT

## 2023-06-26 PROCEDURE — 84484 ASSAY OF TROPONIN QUANT: CPT | Performed by: PHYSICIAN ASSISTANT

## 2023-06-26 PROCEDURE — 71046 X-RAY EXAM CHEST 2 VIEWS: CPT

## 2023-06-26 PROCEDURE — 99223 1ST HOSP IP/OBS HIGH 75: CPT | Performed by: INTERNAL MEDICINE

## 2023-06-26 PROCEDURE — 85025 COMPLETE CBC W/AUTO DIFF WBC: CPT | Performed by: PHYSICIAN ASSISTANT

## 2023-06-26 PROCEDURE — 84443 ASSAY THYROID STIM HORMONE: CPT

## 2023-06-26 PROCEDURE — 82948 REAGENT STRIP/BLOOD GLUCOSE: CPT

## 2023-06-26 PROCEDURE — 82533 TOTAL CORTISOL: CPT

## 2023-06-26 PROCEDURE — 83930 ASSAY OF BLOOD OSMOLALITY: CPT

## 2023-06-26 PROCEDURE — 36415 COLL VENOUS BLD VENIPUNCTURE: CPT | Performed by: PHYSICIAN ASSISTANT

## 2023-06-26 RX ORDER — FUROSEMIDE 10 MG/ML
40 INJECTION INTRAMUSCULAR; INTRAVENOUS 2 TIMES DAILY
Status: DISCONTINUED | OUTPATIENT
Start: 2023-06-26 | End: 2023-06-26

## 2023-06-26 RX ORDER — LANOLIN ALCOHOL/MO/W.PET/CERES
1 CREAM (GRAM) TOPICAL
Status: DISCONTINUED | OUTPATIENT
Start: 2023-06-26 | End: 2023-06-30 | Stop reason: HOSPADM

## 2023-06-26 RX ORDER — AMOXICILLIN 250 MG
2 CAPSULE ORAL 2 TIMES DAILY
Status: DISCONTINUED | OUTPATIENT
Start: 2023-06-26 | End: 2023-06-30 | Stop reason: HOSPADM

## 2023-06-26 RX ORDER — OMEGA-3S/DHA/EPA/FISH OIL/D3 300MG-1000
400 CAPSULE ORAL
Status: DISCONTINUED | OUTPATIENT
Start: 2023-06-26 | End: 2023-06-30 | Stop reason: HOSPADM

## 2023-06-26 RX ORDER — CLOPIDOGREL BISULFATE 75 MG/1
75 TABLET ORAL DAILY
Status: DISCONTINUED | OUTPATIENT
Start: 2023-06-26 | End: 2023-06-30 | Stop reason: HOSPADM

## 2023-06-26 RX ORDER — FINASTERIDE 5 MG/1
5 TABLET, FILM COATED ORAL DAILY
Status: DISCONTINUED | OUTPATIENT
Start: 2023-06-27 | End: 2023-06-30 | Stop reason: HOSPADM

## 2023-06-26 RX ORDER — ATORVASTATIN CALCIUM 10 MG/1
10 TABLET, FILM COATED ORAL
Status: DISCONTINUED | OUTPATIENT
Start: 2023-06-26 | End: 2023-06-30 | Stop reason: HOSPADM

## 2023-06-26 RX ORDER — ALPRAZOLAM 0.5 MG/1
0.5 TABLET ORAL
Status: DISCONTINUED | OUTPATIENT
Start: 2023-06-26 | End: 2023-06-30 | Stop reason: HOSPADM

## 2023-06-26 RX ORDER — ENOXAPARIN SODIUM 100 MG/ML
40 INJECTION SUBCUTANEOUS DAILY
Status: DISCONTINUED | OUTPATIENT
Start: 2023-06-26 | End: 2023-06-30 | Stop reason: HOSPADM

## 2023-06-26 RX ORDER — FUROSEMIDE 10 MG/ML
40 INJECTION INTRAMUSCULAR; INTRAVENOUS
Status: DISCONTINUED | OUTPATIENT
Start: 2023-06-26 | End: 2023-06-27

## 2023-06-26 RX ORDER — ISOSORBIDE MONONITRATE 60 MG/1
60 TABLET, EXTENDED RELEASE ORAL EVERY MORNING
Status: DISCONTINUED | OUTPATIENT
Start: 2023-06-27 | End: 2023-06-26

## 2023-06-26 RX ORDER — TAMSULOSIN HYDROCHLORIDE 0.4 MG/1
0.4 CAPSULE ORAL
Status: DISCONTINUED | OUTPATIENT
Start: 2023-06-26 | End: 2023-06-30 | Stop reason: HOSPADM

## 2023-06-26 RX ORDER — PANTOPRAZOLE SODIUM 20 MG/1
20 TABLET, DELAYED RELEASE ORAL
Status: DISCONTINUED | OUTPATIENT
Start: 2023-06-26 | End: 2023-06-30 | Stop reason: HOSPADM

## 2023-06-26 RX ORDER — ZINC SULFATE 50(220)MG
220 CAPSULE ORAL
Status: DISCONTINUED | OUTPATIENT
Start: 2023-06-26 | End: 2023-06-30 | Stop reason: HOSPADM

## 2023-06-26 RX ORDER — POLYETHYLENE GLYCOL 3350 17 G/17G
17 POWDER, FOR SOLUTION ORAL DAILY PRN
Status: DISCONTINUED | OUTPATIENT
Start: 2023-06-26 | End: 2023-06-30 | Stop reason: HOSPADM

## 2023-06-26 RX ORDER — RANOLAZINE 500 MG/1
500 TABLET, EXTENDED RELEASE ORAL 2 TIMES DAILY
Status: DISCONTINUED | OUTPATIENT
Start: 2023-06-26 | End: 2023-06-30 | Stop reason: HOSPADM

## 2023-06-26 RX ORDER — INSULIN LISPRO 100 [IU]/ML
1-5 INJECTION, SOLUTION INTRAVENOUS; SUBCUTANEOUS
Status: DISCONTINUED | OUTPATIENT
Start: 2023-06-26 | End: 2023-06-27

## 2023-06-26 RX ORDER — METOPROLOL SUCCINATE 50 MG/1
50 TABLET, EXTENDED RELEASE ORAL EVERY 12 HOURS
Status: DISCONTINUED | OUTPATIENT
Start: 2023-06-26 | End: 2023-06-30 | Stop reason: HOSPADM

## 2023-06-26 RX ORDER — BUMETANIDE 0.25 MG/ML
1 INJECTION INTRAMUSCULAR; INTRAVENOUS 2 TIMES DAILY
Status: DISCONTINUED | OUTPATIENT
Start: 2023-06-26 | End: 2023-06-26

## 2023-06-26 RX ORDER — FAMOTIDINE 20 MG/1
20 TABLET, FILM COATED ORAL
Status: DISCONTINUED | OUTPATIENT
Start: 2023-06-26 | End: 2023-06-30 | Stop reason: HOSPADM

## 2023-06-26 RX ADMIN — TAMSULOSIN HYDROCHLORIDE 0.4 MG: 0.4 CAPSULE ORAL at 15:52

## 2023-06-26 RX ADMIN — CLOPIDOGREL BISULFATE 75 MG: 75 TABLET ORAL at 15:52

## 2023-06-26 RX ADMIN — ZINC SULFATE 220 MG (50 MG) CAPSULE 220 MG: CAPSULE at 18:13

## 2023-06-26 RX ADMIN — FUROSEMIDE 40 MG: 10 INJECTION, SOLUTION INTRAMUSCULAR; INTRAVENOUS at 18:13

## 2023-06-26 RX ADMIN — PANTOPRAZOLE SODIUM 20 MG: 20 TABLET, DELAYED RELEASE ORAL at 15:52

## 2023-06-26 RX ADMIN — ALPRAZOLAM 0.5 MG: 0.5 TABLET ORAL at 21:25

## 2023-06-26 RX ADMIN — SENNOSIDES AND DOCUSATE SODIUM 2 TABLET: 50; 8.6 TABLET ORAL at 18:13

## 2023-06-26 RX ADMIN — FAMOTIDINE 20 MG: 20 TABLET ORAL at 21:20

## 2023-06-26 RX ADMIN — Medication 1 TABLET: at 15:52

## 2023-06-26 RX ADMIN — RANOLAZINE 500 MG: 500 TABLET, FILM COATED, EXTENDED RELEASE ORAL at 18:13

## 2023-06-26 RX ADMIN — ENOXAPARIN SODIUM 40 MG: 40 INJECTION SUBCUTANEOUS at 18:15

## 2023-06-26 RX ADMIN — ATORVASTATIN CALCIUM 10 MG: 10 TABLET, FILM COATED ORAL at 15:52

## 2023-06-26 RX ADMIN — CHOLECALCIFEROL TAB 10 MCG (400 UNIT) 400 UNITS: 10 TAB at 15:51

## 2023-06-26 NOTE — ASSESSMENT & PLAN NOTE
· History of hypertension  · Home meds Imdur 60 mg once a day, Toprol-XL 50 mg twice daily, spironolactone 25 mg once a day  · Have been noted low blood pressures at home high 90s to low 100s  Blood Pressure: 101/69  ·   · Acceptable at this time    · Plan  · Continue Imdur   · Decrease Toprol-XL 25 mg twice daily at this time in the setting of IV diuretic  · Holding spironolactone  · Cardiology consulted appreciate recommendations

## 2023-06-26 NOTE — ASSESSMENT & PLAN NOTE
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

## 2023-06-26 NOTE — ASSESSMENT & PLAN NOTE
Wt Readings from Last 3 Encounters:   06/26/23 63 3 kg (139 lb 8 8 oz)   06/21/23 61 6 kg (135 lb 12 9 oz)   06/13/23 65 1 kg (143 lb 9 6 oz)   · Last March 2023, ECHO LVEF 45% G2 DD, moderate to severe aortic stenosis   · Recently discharge due to the same admission from (06/18/2023-06/21/2023)  · Discharged on Lasix 40 mg twice daily, spironolactone 25 mg once a day    Endorse compliant with medication  · Discharge weight recorded as a dry weight 135 pounds  · Today weight 139lbs  · BNP today 1246  · Chest x-ray pending final read, pulmonary evaluation increased pulmonary vascular congestion did not appreciate significant effusion compared to the one on 06/18    · Plan  · Continue Lasix 40 mg twice daily with adjusted hold parameters SBP <100 blood pressures barely in the 100s given patient to his morning medications  · Continue home Imdur hold parameters SBP less 110  · Holding spironolactone at this time  · Decreased ecrease Toprol-XL 25 mg twice daily  · Cardiology consulted patient recommendation  · Sodium restriction 2 g, fluid restriction 1500 cc  · Daily weight preferably standing  · Strict I's and O's

## 2023-06-26 NOTE — ASSESSMENT & PLAN NOTE
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

## 2023-06-26 NOTE — ED PROVIDER NOTES
History  Chief Complaint   Patient presents with   • Shortness of Breath     Pt took bp/hr this morning and BP was low HR was high and was told by his cardiologist to come to ED   Pt complains of shortness of breath and inability to walk more than 20 feet without stopping  wife states he has been saying things that aren't making sense  Recently dx Thursday and states still didn't feel good on discharge      51-year-old male presents to the emergency department with complaints of shortness of breath  States that he has had some shortness of breath with exertion over the past 1 to 2 days  Recently discharged from the hospital after being found to have new onset CHF with small pleural effusions  States that since being home he has been compliant with his diuretics but is now short of breath when walking as little as 20 feet or speaking in prolonged sentences  He denies chest discomfort  Does state that he finds it hard to take a deep breath  States that he had a follow-up appointment with his cardiologist later today but when he called the office due to symptoms along with low blood pressure and elevated heart rate he was sent to the emergency department for evaluation  History provided by:  Patient   used: No        Prior to Admission Medications   Prescriptions Last Dose Informant Patient Reported? Taking?    ALPRAZolam (XANAX) 0 5 mg tablet 6/25/2023 Self No Yes   Sig: Take 1 tablet (0 5 mg total) by mouth daily at bedtime as needed for anxiety   Calcium Carbonate-Vitamin D (CALCIUM 600+D PO) Past Week Self Yes Yes   Sig: Take by mouth daily with lunch   Docusate Calcium (STOOL SOFTENER PO) 6/25/2023 Self Yes Yes   Sig: Take by mouth OTC; takes with lunch   Zinc 25 MG TABS 6/25/2023 Self Yes Yes   Sig: Take 25 mg by mouth daily at bedtime   cholecalciferol (VITAMIN D3) 400 units tablet 6/25/2023 Self Yes Yes   Sig: Take 400 Units by mouth daily after lunch    clopidogrel (PLAVIX) 75 mg "tablet 6/20/2023 Self No No   Sig: TAKE ONE TABLET BY MOUTH EVERY DAY   dutasteride (AVODART) 0 5 mg capsule 6/26/2023 Self No Yes   Sig: TAKE ONE CAPSULE BY MOUTH EVERY DAY IN THE MORNING   famotidine (PEPCID) 20 mg tablet 6/25/2023 Self No Yes   Sig: Take 1 tablet (20 mg total) by mouth daily at bedtime   furosemide (LASIX) 40 mg tablet 6/26/2023  No Yes   Sig: Take 1 tablet (40 mg total) by mouth 2 (two) times a day   isosorbide mononitrate (IMDUR) 60 mg 24 hr tablet 6/26/2023 Self No Yes   Sig: Take 1 tablet (60 mg total) by mouth every morning   metoprolol succinate (TOPROL-XL) 50 mg 24 hr tablet 6/26/2023 Self No Yes   Sig: Take 1 tablet (50 mg total) by mouth every 12 (twelve) hours   multivitamin-iron-minerals-folic acid (CENTRUM) chewable tablet 6/26/2023 Self Yes Yes   Sig: Chew 1 tablet every morning    pantoprazole (PROTONIX) 20 mg tablet 6/26/2023 Self No Yes   Sig: Take 1 tablet (20 mg total) by mouth 2 (two) times a day   ranolazine (RANEXA) 500 mg 12 hr tablet 6/26/2023 Self No Yes   Sig: Take 1 tablet (500 mg total) by mouth 2 (two) times a day   rosuvastatin (CRESTOR) 5 mg tablet 6/25/2023 Self No Yes   Sig: TAKE ONE TABLET BY MOUTH EVERY DAY AT BEDTIME   spironolactone (ALDACTONE) 25 mg tablet 6/25/2023  No Yes   Sig: Take 1 tablet (25 mg total) by mouth daily after breakfast   tamsulosin (Flomax) 0 4 mg 6/25/2023 Self No Yes   Sig: Take 1 capsule (0 4 mg total) by mouth daily with dinner      Facility-Administered Medications: None       Past Medical History:   Diagnosis Date   • Arthritis    • BPH (benign prostatic hyperplasia)    • Cervical spine fracture (HCC) 1997    C3   • Chest pain    • Colon polyp    • Coronary artery disease    • Dry eye    • DVT (deep venous thrombosis) ([facility] ) 2015    right leg- passed thru the IVC filter-stopped at the \"patch\" from bypass surgery   • Dysphagia 11/2021    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   • Fracture of thoracic spine ([facility] ) 1997    T3 " • Glaucoma    • Hyperlipidemia    • Hypertension    • Myocardial infarction ([facility] )    • PUD (peptic ulcer disease)        Past Surgical History:   Procedure Laterality Date   • ANGIOPLASTY      2 stents   • CHOLECYSTECTOMY     • COLONOSCOPY     • CORONARY ARTERY BYPASS GRAFT      x6   • CORONARY ARTERY BYPASS GRAFT     • CORONARY STENT PLACEMENT     • CYSTOSCOPY     • EYE SURGERY Right    • HERNIA REPAIR Right 11/3/2017    Procedure: REPAIR HERNIA INGUINAL;  Surgeon: Anabel Allen MD;  Location: [facility];  Service: General   • IVC FILTER INSERTION      IVC filter   • OH CYSTO W/IRRIG & EVAC MULTPLE OBSTRUCTING CLOTS N/A 6/30/2018    Procedure: CYSTOSCOPY EVACUATION OF CLOTS, fulgeration;  Surgeon: Lizette Holter, MD;  Location:  Main OR;  Service: Urology   • STOMACH SURGERY  1973    Billroth 2 gastrectomy-2/3 removal- bleeding ulcer   • TRANSURETHRAL RESECTION OF PROSTATE         Family History   Problem Relation Age of Onset   • Coronary artery disease Brother    • Heart disease Father         CAD     I have reviewed and agree with the history as documented  E-Cigarette/Vaping   • E-Cigarette Use Never User      E-Cigarette/Vaping Substances   • Nicotine No    • THC No    • CBD No    • Flavoring No    • Other No    • Unknown No      Social History     Tobacco Use   • Smoking status: Never   • Smokeless tobacco: Never   Vaping Use   • Vaping Use: Never used   Substance Use Topics   • Alcohol use: Never   • Drug use: No       Review of Systems   Constitutional: Negative for activity change, appetite change, chills and fever  HENT: Negative for congestion, dental problem, drooling, ear discharge, ear pain, mouth sores, nosebleeds, rhinorrhea, sore throat and trouble swallowing  Eyes: Negative for pain, discharge and itching  Respiratory: Positive for shortness of breath  Negative for cough, chest tightness and wheezing  Cardiovascular: Negative for chest pain and palpitations  Gastrointestinal: Negative for abdominal pain, blood in stool, constipation, diarrhea, nausea and vomiting  Endocrine: Negative for cold intolerance and heat intolerance  Genitourinary: Negative for difficulty urinating, dysuria, flank pain, frequency and urgency  Skin: Negative for rash and wound  Allergic/Immunologic: Negative for food allergies and immunocompromised state  Neurological: Negative for dizziness, seizures, syncope, weakness, numbness and headaches  Psychiatric/Behavioral: Negative for agitation, behavioral problems and confusion  Physical Exam  Physical Exam  Vitals and nursing note reviewed  Constitutional:       General: He is not in acute distress  Appearance: He is not diaphoretic  HENT:      Head: Normocephalic and atraumatic  Right Ear: External ear normal       Left Ear: External ear normal       Mouth/Throat:      Pharynx: No oropharyngeal exudate  Eyes:      Conjunctiva/sclera: Conjunctivae normal    Neck:      Vascular: No JVD  Trachea: No tracheal deviation  Cardiovascular:      Rate and Rhythm: Regular rhythm  Tachycardia present  Heart sounds: Murmur heard  No friction rub  No gallop  Pulmonary:      Effort: No respiratory distress  Breath sounds: Rales present  No wheezing  Comments: Breaths are short and shallow  Chest:      Chest wall: No tenderness  Abdominal:      General: Bowel sounds are normal  There is no distension  Palpations: Abdomen is soft  Tenderness: There is no abdominal tenderness  There is no guarding  Musculoskeletal:         General: No tenderness or deformity  Normal range of motion  Lymphadenopathy:      Cervical: No cervical adenopathy  Skin:     General: Skin is warm and dry  Findings: No erythema or rash  Neurological:      Mental Status: He is alert and oriented to person, place, and time     Psychiatric:         Mood and Affect: Mood normal          Behavior: Behavior normal          Vital Signs  ED Triage Vitals   Temperature Pulse Respirations Blood Pressure SpO2   06/26/23 1003 06/26/23 1003 06/26/23 1003 06/26/23 1003 06/26/23 1003   98 2 °F (36 8 °C) 93 20 99/56 93 %      Temp Source Heart Rate Source Patient Position - Orthostatic VS BP Location FiO2 (%)   06/26/23 1003 06/26/23 1003 06/26/23 1230 06/26/23 1003 --   Oral Monitor Sitting Right arm       Pain Score       06/26/23 1200       No Pain           Vitals:    06/26/23 1130 06/26/23 1200 06/26/23 1230 06/26/23 1335   BP: 100/58 99/57 99/57 101/69   Pulse: 87 84 87    Patient Position - Orthostatic VS:   Sitting          Visual Acuity      ED Medications  Medications   insulin lispro (HumaLOG) 100 units/mL subcutaneous injection 1-5 Units (has no administration in time range)       Diagnostic Studies  Results Reviewed     Procedure Component Value Units Date/Time    HS Troponin I 2hr [[id number]]  (Abnormal) Collected: 06/26/23 1229    Lab Status: Final result Specimen: Blood from Arm, Left Updated: 06/26/23 1307     hs TnI 2hr 68 ng/L      Delta 2hr hsTnI 10 ng/L     HS Troponin I 4hr [[id number]]     Lab Status: No result Specimen: Blood     Comprehensive metabolic panel [[id number]]  (Abnormal) Collected: 06/26/23 1034    Lab Status: Final result Specimen: Blood from Arm, Left Updated: 06/26/23 1105     Sodium 127 mmol/L      Potassium 4 7 mmol/L      Chloride 87 mmol/L      CO2 30 mmol/L      ANION GAP 10 mmol/L      BUN 27 mg/dL      Creatinine 1 21 mg/dL      Glucose 275 mg/dL      Calcium 8 7 mg/dL      Corrected Calcium 9 3 mg/dL      AST 13 U/L      ALT 13 U/L      Alkaline Phosphatase 68 U/L      Total Protein 7 1 g/dL      Albumin 3 3 g/dL      Total Bilirubin 0 76 mg/dL      eGFR 51 ml/min/1 73sq m     Narrative:      Andreia guidelines for Chronic Kidney Disease (CKD):   •  Stage 1 with normal or high GFR (GFR > 90 mL/min/1 73 square meters)  •  Stage 2 Mild CKD (GFR = 60-89 mL/min/1 73 square meters)  •  Stage 3A Moderate CKD (GFR = 45-59 mL/min/1 73 square meters)  •  Stage 3B Moderate CKD (GFR = 30-44 mL/min/1 73 square meters)  •  Stage 4 Severe CKD (GFR = 15-29 mL/min/1 73 square meters)  •  Stage 5 End Stage CKD (GFR <15 mL/min/1 73 square meters)  Note: GFR calculation is accurate only with a steady state creatinine    HS Troponin 0hr (reflex protocol) [[id number]]  (Abnormal) Collected: 06/26/23 1034    Lab Status: Final result Specimen: Blood from Arm, Left Updated: 06/26/23 1105     hs TnI 0hr 58 ng/L     B-Type Natriuretic Peptide(BNP) [[id number]]  (Abnormal) Collected: 06/26/23 1034    Lab Status: Final result Specimen: Blood from Arm, Left Updated: 06/26/23 1102     BNP 1,246 pg/mL     CBC and differential [[id number]]  (Abnormal) Collected: 06/26/23 1034    Lab Status: Final result Specimen: Blood from Arm, Left Updated: 06/26/23 1039     WBC 6 97 Thousand/uL      RBC 3 90 Million/uL      Hemoglobin 11 9 g/dL      Hematocrit 35 5 %      MCV 91 fL      MCH 30 5 pg      MCHC 33 5 g/dL      RDW 13 6 %      MPV 9 7 fL      Platelets 967 Thousands/uL      nRBC 0 /100 WBCs      Neutrophils Relative 83 %      Immat GRANS % 1 %      Lymphocytes Relative 7 %      Monocytes Relative 8 %      Eosinophils Relative 1 %      Basophils Relative 0 %      Neutrophils Absolute 5 88 Thousands/µL      Immature Grans Absolute 0 04 Thousand/uL      Lymphocytes Absolute 0 45 Thousands/µL      Monocytes Absolute 0 53 Thousand/µL      Eosinophils Absolute 0 05 Thousand/µL      Basophils Absolute 0 02 Thousands/µL                  XR chest 2 views   ED Interpretation by Lee Marks PA-C (06/26 1104)   Vascular congestion    Small pleural effusions                 Procedures  ECG 12 Lead Documentation Only    Date/Time: 6/26/2023 11:29 AM    Performed by: Lee Marks PA-C  Authorized by: Lee Marks PA-C    Indications / Diagnosis:  SOB  ECG reviewed by me, the ED Provider: yes    Patient location:  ED  Previous ECG:     Previous ECG:  Compared to current    Comparison ECG info:  6/18/23    Similarity:  Changes noted  Interpretation:     Interpretation: abnormal    Rate:     ECG rate:  92    ECG rate assessment: tachycardic    Rhythm:     Rhythm: sinus tachycardia    Ectopy:     Ectopy: none    QRS:     QRS axis:  Normal  Conduction:     Conduction: normal    ST segments:     ST segments:  Non-specific  T waves:     T waves: non-specific               ED Course             HEART Risk Score    Flowsheet Row Most Recent Value   Heart Score Risk Calculator    History 1 Filed at: 06/26/2023 1349   ECG 0 Filed at: 06/26/2023 1349   Age 2 Filed at: 06/26/2023 1349   Risk Factors 2 Filed at: 06/26/2023 1349   Troponin 2 Filed at: 06/26/2023 1349   HEART Score 7 Filed at: 06/26/2023 1349                        SBIRT 22yo+    Flowsheet Row Most Recent Value   Initial Alcohol Screen: US AUDIT-C     1  How often do you have a drink containing alcohol? 0 Filed at: 06/26/2023 1012   2  How many drinks containing alcohol do you have on a typical day you are drinking? 0 Filed at: 06/26/2023 1012   3a  Male UNDER 65: How often do you have five or more drinks on one occasion? 0 Filed at: 06/26/2023 1012   3b  FEMALE Any Age, or MALE 65+: How often do you have 4 or more drinks on one occassion? 0 Filed at: 06/26/2023 1012   Audit-C Score 0 Filed at: 06/26/2023 1012   STIVEN: How many times in the past year have you    Used an illegal drug or used a prescription medication for non-medical reasons?  Never Filed at: 06/26/2023 1012                    Medical Decision Making  Differential diagnosis includes but not limited to: CHF, pleural effusion, pericardial effusion, ACS    Congestive heart failure, unspecified HF chronicity, unspecified heart failure type [facility]): acute illness or injury  Dyspnea on exertion: acute illness or injury  Hypoxia: acute illness or injury  Amount and/or Complexity of Data Reviewed  Labs: ordered  Decision-making details documented in ED Course  Radiology: ordered and independent interpretation performed  Decision-making details documented in ED Course  Risk  Decision regarding hospitalization  Disposition  Final diagnoses:   Dyspnea on exertion   Hypoxia   Congestive heart failure, unspecified HF chronicity, unspecified heart failure type ([facility] )     Time reflects when diagnosis was documented in both MDM as applicable and the Disposition within this note     Time User Action Codes Description Comment    6/26/2023 11:57 AM Anne Rosales Prmartíneznt Add [R06 09] Dyspnea on exertion     6/26/2023 11:57 AM Anne Rosales Prudent Add [R09 02] Hypoxia     6/26/2023 11:57 AM Anne Rosales Prudent Add [I50 9] Congestive heart failure, unspecified HF chronicity, unspecified heart failure type [facility])       ED Disposition     ED Disposition   Admit    Condition   Stable    Date/Time   Mon Jun 26, 2023 11:57 AM    Comment   Case was discussed with AARON and the patient's admission status was agreed to be Admission Status: inpatient status to the service of Dr Barker Early              Follow-up Information    None         Current Discharge Medication List      CONTINUE these medications which have NOT CHANGED    Details   ALPRAZolam (XANAX) 0 5 mg tablet Take 1 tablet (0 5 mg total) by mouth daily at bedtime as needed for anxiety  Qty: 90 tablet, Refills: 1    Associated Diagnoses: Anxiety      Calcium Carbonate-Vitamin D (CALCIUM 600+D PO) Take by mouth daily with lunch      cholecalciferol (VITAMIN D3) 400 units tablet Take 400 Units by mouth daily after lunch       Docusate Calcium (STOOL SOFTENER PO) Take by mouth OTC; takes with lunch      dutasteride (AVODART) 0 5 mg capsule TAKE ONE CAPSULE BY MOUTH EVERY DAY IN THE MORNING  Qty: 90 capsule, Refills: 3    Associated Diagnoses: Benign prostatic hyperplasia with urinary retention      famotidine (PEPCID) 20 mg tablet Take 1 tablet (20 mg total) by mouth daily at bedtime  Qty: 90 tablet, Refills: 3    Associated Diagnoses: Gastroesophageal reflux disease without esophagitis      furosemide (LASIX) 40 mg tablet Take 1 tablet (40 mg total) by mouth 2 (two) times a day  Qty: 60 tablet, Refills: 0    Associated Diagnoses: Acute on chronic diastolic (congestive) heart failure (HCC)      isosorbide mononitrate (IMDUR) 60 mg 24 hr tablet Take 1 tablet (60 mg total) by mouth every morning  Qty: 90 tablet, Refills: 3    Associated Diagnoses: Chronic stable angina (HCC)      metoprolol succinate (TOPROL-XL) 50 mg 24 hr tablet Take 1 tablet (50 mg total) by mouth every 12 (twelve) hours  Qty: 180 tablet, Refills: 3    Associated Diagnoses: Chronic stable angina (HCC)      multivitamin-iron-minerals-folic acid (CENTRUM) chewable tablet Chew 1 tablet every morning       pantoprazole (PROTONIX) 20 mg tablet Take 1 tablet (20 mg total) by mouth 2 (two) times a day  Qty: 180 tablet, Refills: 1    Associated Diagnoses: PUD (peptic ulcer disease);  Acute superficial gastritis without hemorrhage      ranolazine (RANEXA) 500 mg 12 hr tablet Take 1 tablet (500 mg total) by mouth 2 (two) times a day  Qty: 180 tablet, Refills: 3    Associated Diagnoses: Chronic stable angina (HCC)      rosuvastatin (CRESTOR) 5 mg tablet TAKE ONE TABLET BY MOUTH EVERY DAY AT BEDTIME  Qty: 90 tablet, Refills: 3    Associated Diagnoses: Chest pain      spironolactone (ALDACTONE) 25 mg tablet Take 1 tablet (25 mg total) by mouth daily after breakfast  Qty: 90 tablet, Refills: 3    Associated Diagnoses: 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)      tamsulosin (Flomax) 0 4 mg Take 1 capsule (0 4 mg total) by mouth daily with dinner  Qty: 90 capsule, Refills: 3    Associated Diagnoses: Benign prostatic hyperplasia, unspecified whether lower urinary tract symptoms present      Zinc 25 MG TABS Take 25 mg by mouth daily at bedtime      clopidogrel (PLAVIX) 75 mg tablet TAKE ONE TABLET BY MOUTH EVERY DAY  Qty: 90 tablet, Refills: 3    Associated Diagnoses: CAD S/P percutaneous coronary angioplasty             No discharge procedures on file      PDMP Review       Value Time User    PDMP Reviewed  Yes 6/21/2023  9:24 AM Segundo Horton DO          ED Provider  Electronically Signed by           Anupam Mejia PA-C  06/26/23 115 Chiquita Ayers PA-C  06/26/23 115 Chiquita Ayers PA-C  06/26/23 2578

## 2023-06-26 NOTE — ASSESSMENT & PLAN NOTE
· History of CAD status post remote CABG 3 vessels  · Currently on Plavix 75 mg once a day recently stopped due to expected procedure tomorrow by ENT  · Continue continue atorvastatin 10 mg once a day per formulary for Crestor 5 mg once a day    · Continue home Ranexa 500 mg twice daily

## 2023-06-26 NOTE — ASSESSMENT & PLAN NOTE
· History iron deficiency anemia and remote history of acute blood loss secondary to PUD for which she got a Paster gastrectomy  · POA hemoglobin 11 9 baseline seems to be 13-15  · No overt bleeding, no changes in bowel movements although has not had BMs over 5 days  Currently on PPIs and H2 blockers no NSAID use  · Patient also endorses worsening fatigue  · CBC a m      Lab Results   Component Value Date    HGB 12 7 06/27/2023    HGB 11 9 (L) 06/26/2023    HGB 13 3 06/20/2023

## 2023-06-26 NOTE — ASSESSMENT & PLAN NOTE
"Lab Results   Component Value Date    HGBA1C 6 6 (H) 06/08/2023       No results for input(s): \"POCGLU\" in the last 72 hours      Blood Sugar Average: Last 72 hrs:  · History of impaired blood glucose  · Most recent hemoglobin A1c 6 6  · Fasting glucose noted elevated 150s to 160s  · Carbohydrate controlled diet  · SSI  · Can consider metformin upon discharge  "

## 2023-06-26 NOTE — PROGRESS NOTES
I13 0   [facility]  coding opportunities          Chart Reviewed number of suggestions sent to Provider: 1     Patients Insurance     Medicare Insurance: [employer]

## 2023-06-26 NOTE — ASSESSMENT & PLAN NOTE
Wt Readings from Last 3 Encounters:   06/26/23 63 3 kg (139 lb 8 8 oz)   06/21/23 61 6 kg (135 lb 12 9 oz)   06/13/23 65 1 kg (143 lb 9 6 oz)   · Last March 2023, ECHO LVEF 45% G2 DD, moderate to severe aortic stenosis   · Recently discharge due to the same admission from (06/18/2023-06/21/2023)  · Discharged on Lasix 40 mg twice daily, spironolactone 25 mg once a day  Endorse compliant with medication  · Discharge weight recorded as a dry weight 135 pounds  · POA: Weight 139lbs BNP 1246  · Chest x-ray pending final read, pulmonary evaluation increased pulmonary vascular congestion did not appreciate significant effusion compared to the one on 06/18  · At baseline dry weight today although did not seem to respond well to Lasix 40 mg x 1 received yesterday with only about 500mL UOP overnight  BP running on the lower and limiting further diuresis in the a [facility] · Suspect moderate to severe AS contributing to presenting symptoms  ECHO 6/27/23: The left ventricular ejection fraction is 25%  Systolic function is severely reduced  There is severe global hypokinesis      Plan:  · Monitor strict I/O and daily weight with standing scale  · Monitor volume status  · PTA Imdur and spironolactone on hold   · Continue PTA BB  · Cardiology following - appreciate input:  · Continue IV Bumex 2 mg BID  · Patient not a candidate for TAVR due to low EF of ECHO from 6/27  · Patient and family would like a Hospice consultation inpatient

## 2023-06-26 NOTE — CONSULTS
Consultation - Cardiology Team One  Natasha Alfred 80 y o  male MRN: [id number]  Unit/Bed#: S -01 Encounter: [id number]    Inpatient consult to Cardiology  Consult performed by: MATT Garcia  Consult ordered by: Alexey Falcon MD          Physician Requesting Consult: Cindy Potts DO     Reason for Consult / Principal Problem: CHF      Assessment/ Plan:    1  Acute on chronic combined systolic and diastolic heart failure  BNP 1246 (higher than 6 days ago when he was here)  CT chest showed pulmonary edema and small improvement in effusions  Recently discharged on lasix 40mg BID; reports compliance with this and Low Na diet; has felt sob since discharge; worsening; labile BPS at home  Agree with IV lasix 40mg BID; give first dose now; monitor response overnight  Will try to get him appt with CT surgery for TAVR eval since he continues to have HR admissions  No need to repeat echo     2  Moderate to severe functionally bicuspid aortic stenosis   Reviewed on echocardiogram; recommended TAVR eval last hospitalization; returns with HR; reached out to TAVr coordinator to try to get OP appt set        3  CAD s/p remote CABG   Nuclear stress test 3/2023 showed findings 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  Study was consistent with prior   On plavix, atorvastatin, ranexa and metoprolol, imdur; hold imdur for now with labile Bps     4  Hypertension  BP labile but stable here     5  Hyperlipidemia   LDL 84  On atorvastatin 10 mg PO daily          History of Present Illness      HPI: Natasha Alfred is a 80y o  year old male who has a history of chronic HRmrEF, ischemic cardiomyopathy, CAD status post CABG, moderate to severe aortic stenosis, hypertension, dyslipidemia, history of DVT with IVC filter, history of partial gastrectomy due to PAD and BPH  He follows with cardiologist Dr Thais Beauchamp      Patient above history who returns to the hospital with worsening shortness of "breath  He has had 2 previous admissions for heart failure in the past 2 months  Most recently he was hospitalized 6/18- 6/21 with acute heart failure  He was diuresed with IV Lasix and his home Lasix 40 mg daily was increased to 40 mg twice daily with addition of 25 mg of spironolactone  Return to the emergency department today 6/26 with complaints of exertional dyspnea, orthopnea and abdominal distention  Cxray + vascular congestion  BNP 1200 ( was 1000 6 days ago)  Cr at baseline 1 2  Na low 125  He is being started on lasix 40mg IV BID; has not received any diuretics yet  At time of my exam he reports feeling a bit better wearing O2 via NC  He has been compliant with his Lasix 40 mg twice daily  Feels weak, tired with abdominal bloating and SOB  Tells me he didn't really feel better at the time of his discharge last week and got progressively worse  His wife is very mindful of salt intake and no dietary discretions  He carries past hx of CAD with prior CABG; with recent  Pharm nuclear ST 3/2023  \"Left ventricular perfusion is abnormal  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 which may be due to artifact  Overall, study appears consistent with previous study  \"    Echo 3/2023 showed mildly reduced EF 45%, global hypokinesis; grade 2 DD, functionally bicuspid AV with moderate stenosis, MG 25mmHg with JUDY 0 7cm2  He is in process of getting set up to see cardiac surgery group to be evaluated for possible TAVR; does not have appt to date  Prior to recent hospitalizations he remained failry active at home  Does not typically use assistive devices for ambulation  EKG reviewed personally:   Sinus rhythm with non-specific twave abnormalities    Telemetry reviewed personally:   Sinus rhythm, no events    Review of Systems   Constitutional: Negative for decreased appetite and fever  Cardiovascular: Positive for dyspnea on exertion and orthopnea   Negative for chest " "pain, leg swelling, palpitations and syncope  Respiratory: Positive for shortness of breath and sleep disturbances due to breathing  Negative for cough  Gastrointestinal: Negative for nausea and vomiting  Genitourinary: Negative for dysuria  Neurological: Negative for dizziness and light-headedness  Psychiatric/Behavioral: Negative for altered mental status  All other systems reviewed and are negative       Historical Information   Past Medical History:   Diagnosis Date   • Arthritis    • BPH (benign prostatic hyperplasia)    • Cervical spine fracture ([facility] ) 1997    C3   • Chest pain    • Colon polyp    • Coronary artery disease    • Dry eye    • DVT (deep venous thrombosis) ([place] [address] ) 2015    right leg- passed thru the IVC filter-stopped at the \"patch\" from bypass surgery   • Dysphagia 11/2021    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   • Fracture of thoracic spine ([facility] ) 1997    T3   • Glaucoma    • Hyperlipidemia    • Hypertension    • Myocardial infarction ([place] [address] )    • PUD (peptic ulcer disease)      Past Surgical History:   Procedure Laterality Date   • ANGIOPLASTY      2 stents   • CHOLECYSTECTOMY     • COLONOSCOPY     • CORONARY ARTERY BYPASS GRAFT      x6   • CORONARY ARTERY BYPASS GRAFT     • CORONARY STENT PLACEMENT     • CYSTOSCOPY     • EYE SURGERY Right    • HERNIA REPAIR Right 11/3/2017    Procedure: REPAIR HERNIA INGUINAL;  Surgeon: Amanda Yost MD;  Location: [address] [place] [address];  Service: General   • IVC FILTER INSERTION      IVC filter   • WY CYSTO W/IRRIG & EVAC MULTPLE OBSTRUCTING CLOTS N/A 6/30/2018    Procedure: CYSTOSCOPY EVACUATION OF CLOTS, fulgeration;  Surgeon: Luisana Chávez MD;  [facility];  Service: Urology   • STOMACH SURGERY  1973    Billroth 2 gastrectomy-2/3 removal- bleeding ulcer   • TRANSURETHRAL RESECTION OF PROSTATE       Social History     Substance and Sexual Activity   Alcohol Use Never     Social History     Substance and Sexual Activity   Drug " Use No     Social History     Tobacco Use   Smoking Status Never   Smokeless Tobacco Never     Family History:   Family History   Problem Relation Age of Onset   • Coronary artery disease Brother    • Heart disease Father         CAD     Meds/Allergies   current meds:   Current Facility-Administered Medications   Medication Dose Route Frequency   • ALPRAZolam (XANAX) tablet 0 5 mg  0 5 mg Oral HS PRN   • atorvastatin (LIPITOR) tablet 10 mg  10 mg Oral Daily With Dinner   • calcium carbonate-vitamin D 500 mg-5 mcg tablet 1 tablet  1 tablet Oral Daily With Lunch   • cholecalciferol (VITAMIN D3) tablet 400 Units  400 Units Oral After Lunch   • clopidogrel (PLAVIX) tablet 75 mg  75 mg Oral Daily   • enoxaparin (LOVENOX) subcutaneous injection 40 mg  40 mg Subcutaneous Daily   • famotidine (PEPCID) tablet 20 mg  20 mg Oral HS   • [START ON 6/27/2023] finasteride (PROSCAR) tablet 5 mg  5 mg Oral Daily   • furosemide (LASIX) injection 40 mg  40 mg Intravenous BID   • insulin lispro (HumaLOG) 100 units/mL subcutaneous injection 1-5 Units  1-5 Units Subcutaneous TID AC   • [START ON 6/27/2023] isosorbide mononitrate (IMDUR) 24 hr tablet 60 mg  60 mg Oral QAM   • metoprolol succinate (TOPROL-XL) 24 hr tablet 50 mg  50 mg Oral Q12H   • [START ON 6/27/2023] multivitamin-minerals (CENTRUM) tablet 1 tablet  1 tablet Oral Daily   • pantoprazole (PROTONIX) EC tablet 20 mg  20 mg Oral BID AC   • polyethylene glycol (MIRALAX) packet 17 g  17 g Oral Daily PRN   • ranolazine (RANEXA) 12 hr tablet 500 mg  500 mg Oral BID   • senna-docusate sodium (SENOKOT S) 8 6-50 mg per tablet 2 tablet  2 tablet Oral BID   • tamsulosin (FLOMAX) capsule 0 4 mg  0 4 mg Oral Daily With Dinner   • zinc sulfate (ZINCATE) capsule 220 mg  220 mg Oral Daily With Lunch     Allergies   Allergen Reactions   • Escitalopram Other (See Comments)     Suicidal feelings, sweating   • Oxycodone-Acetaminophen Dizziness and Shortness Of Breath     Other reaction(s): "Faints  Reaction Date: [id number]; Category: Adverse Reaction;    • Sucralfate GI Intolerance     Abdominal pain    • Sulfa Antibiotics Other (See Comments)     unknown   • Omeprazole Rash   • Statins Other (See Comments)     Muscle pain     Objective   Vitals: Blood pressure 101/69, pulse 87, temperature 97 6 °F (36 4 °C), resp  rate 14, height 5' 4\" (1 626 m), weight 63 3 kg (139 lb 8 8 oz), SpO2 95 %  ,     Body mass index is 23 95 kg/m²  ,     Systolic (34LAJ), ODETTE:130 , Min:99 , TOR:565     Diastolic (08TQO), UIM:74, Min:56, Max:69    No intake or output data in the 24 hours ending 06/26/23 1424  Weight (last 2 days)     Date/Time Weight    06/26/23 1057 63 3 (139 55)        Invasive Devices     Peripheral Intravenous Line  Duration           Peripheral IV 06/26/23 Left Antecubital <1 day              Physical Exam  Vitals reviewed  Constitutional:       General: He is not in acute distress  Appearance: Normal appearance  Comments: Pt sitting up in bed in NAD; alert and cooperative; family at bedside  HENT:      Head: Normocephalic  Mouth/Throat:      Mouth: Mucous membranes are moist    Cardiovascular:      Rate and Rhythm: Normal rate and regular rhythm  Heart sounds: S1 normal and S2 normal  Murmur heard  Systolic murmur is present with a grade of 2/6  Pulmonary:      Breath sounds: Rales present  Comments: Faint bibasilar rales; on o2 via NC; no distress  Abdominal:      Palpations: Abdomen is soft  Musculoskeletal:      Right lower leg: No edema  Left lower leg: No edema  Skin:     General: Skin is warm  Neurological:      Mental Status: He is alert and oriented to person, place, and time     Psychiatric:         Mood and Affect: Mood normal        LABORATORY RESULTS:      CBC with diff:   Results from last 7 days   Lab Units 06/26/23  1034 06/20/23  0459   WBC Thousand/uL 6 97 6 55   HEMOGLOBIN g/dL 11 9* 13 3   HEMATOCRIT % 35 5* 41 1   MCV fL 91 93   PLATELETS " Thousands/uL 247 221   RBC Million/uL 3 90 4 41   MCH pg 30 5 30 2   MCHC g/dL 33 5 32 4   RDW % 13 6 13 8   MPV fL 9 7 10 4   NRBC AUTO /100 WBCs 0 0     CMP:  Results from last 7 days   Lab Units 23  1034 23  0518 23  0459   POTASSIUM mmol/L 4 7 3 7 3 7   CHLORIDE mmol/L 87* 91* 93*   CO2 mmol/L 30 30 30   BUN mg/dL 27* 27* 29*   CREATININE mg/dL 1 21 1 25 1 16   CALCIUM mg/dL 8 7 8 8 9 0   AST U/L 13  --   --    ALT U/L 13  --   --    ALK PHOS U/L 68  --   --    EGFR ml/min/1 73sq m 51 49 54     BMP:  Results from last 7 days   Lab Units 23  1034 23  0518 23  0459   POTASSIUM mmol/L 4 7 3 7 3 7   CHLORIDE mmol/L 87* 91* 93*   CO2 mmol/L 30 30 30   BUN mg/dL 27* 27* 29*   CREATININE mg/dL 1 21 1 25 1 16   CALCIUM mg/dL 8 7 8 8 9 0     Lab Results   Component Value Date    NTBNP 2,264 (H) 2022    NTBNP 1,126 (H) 2022    NTBNP 818 (H) 10/31/2021         Lipid Profile:   Lab Results   Component Value Date    CHOL 192 10/29/2015    CHOL 168 2015     Lab Results   Component Value Date    HDL 37 (L) 2023    HDL 38 (L) 2022    HDL 41 2021     Lab Results   Component Value Date    LDLCALC 84 2023    LDLCALC 82 2022    LDLCALC 80 2021     Lab Results   Component Value Date    TRIG 178 (H) 2023    TRIG 169 (H) 2022    TRIG 115 2021     Cardiac testing:   Results for orders placed during the hospital encounter of 21    Echo complete with contrast if indicated    Narrative  [address]  [address] [facility]   [phone]    Transthoracic Echocardiogram  2D, M-mode, Doppler, and Color Doppler    Study date:  2021    Patient: Adi Barragan  MR number: [id number]  Account number: [de-identified]  : 02-Aug-1930  Age: 80 years  Gender: Male  Status: Outpatient  Location: Echo lab  Height: 65 in  Weight: 151 6 lb  BP: 122/ 58 mmHg    Indications:  Aortic Stenosis    Diagnoses: 424 1 - AORTIC VALVE DISORDER    Sonographer:  YURI Orozco  Primary Physician:  Lashell Virgen DO  Referring Physician:  Grzegorz Carney MD  Group:  [facility]  Interpreting Physician:  Sunny Jc MD    SUMMARY    LEFT VENTRICLE:  Systolic function was at the lower limits of normal by visual assessment  Ejection fraction was estimated in the range of 45 % to 50 % to be 45 %  There was mild diffuse hypokinesis  Wall thickness was mildly increased  LEFT ATRIUM:  The atrium was mildly dilated  MITRAL VALVE:  There was mild to moderate annular calcification  Transmitral velocity was increased due to increased transvalvular flow  There was moderate to severe regurgitation  Based on elevated E' and central color flow jet  Unable to obtain PISA measurement for effective ERO calculation  Consider WALT for better evaluation    AORTIC VALVE:  The valve was probably trileaflet  Leaflets exhibited mildly increased thickness, mild calcification, moderately reduced cuspal separation, and sclerosis  Transaortic velocity was increased due to increased transvalvular flow  There was moderate stenosis  There was mild to moderate regurgitation  Valve mean gradient was 26 mmHg  HISTORY: PRIOR HISTORY: 3-vessel CAD,Chronic stable Angina,Chronic Diastolic heart failure,HTN,Murmur,DVT,Hyperlipidemia,anemia  PROCEDURE: The procedure was performed in the echo lab  This was a routine study  The transthoracic approach was used  The study included complete 2D imaging, M-mode, complete spectral Doppler, and color Doppler  The heart rate was 61 bpm,  at the start of the study  Images were obtained from the parasternal, apical, subcostal, and suprasternal notch acoustic windows  Image quality was adequate  LEFT VENTRICLE: Size was normal  Systolic function was at the lower limits of normal by visual assessment   Ejection fraction was estimated in the range of 45 % to 50 % to be 45 %  There was mild diffuse hypokinesis  Wall thickness was  mildly increased  No evidence of apical thrombus  DOPPLER: The study was not technically sufficient to allow evaluation of LV diastolic function  RIGHT VENTRICLE: The size was normal  Systolic function was normal  Wall thickness was normal     LEFT ATRIUM: The atrium was mildly dilated  RIGHT ATRIUM: Size was normal     MITRAL VALVE: There was mild to moderate annular calcification  There was mild-moderate calcification  There was mildly reduced leaflet separation  DOPPLER: Transmitral velocity was increased due to increased transvalvular flow  There was  no evidence for stenosis  There was moderate to severe regurgitation  Based on elevated E' and central color flow jet  Unable to obtain PISA measurement for effective ERO calculation  Consider WALT for better evaluation    AORTIC VALVE: The valve was probably trileaflet  Leaflets exhibited mildly increased thickness, mild calcification, moderately reduced cuspal separation, and sclerosis  DOPPLER: Transaortic velocity was increased due to increased  transvalvular flow  There was moderate stenosis  There was mild to moderate regurgitation  TRICUSPID VALVE: The valve structure was normal  There was normal leaflet separation  DOPPLER: The transtricuspid velocity was within the normal range  There was no evidence for stenosis  There was trace regurgitation  PULMONIC VALVE: Leaflets exhibited normal thickness, no calcification, and normal cuspal separation  DOPPLER: The transpulmonic velocity was within the normal range  There was trace regurgitation  PERICARDIUM: There was no pericardial effusion  The pericardium was normal in appearance  AORTA: The root exhibited normal size  SYSTEMIC VEINS: IVC: The inferior vena cava was not well visualized      MEASUREMENT TABLES    DOPPLER MEASUREMENTS  Aortic valve   (Reference normals)  Peak gilda   350 cm/s   (--)  Peak gradient   48 mmHg (--)  Mean gradient   26 mmHg   (--)  Regurg PHT   460 ms   (--)    SYSTEM MEASUREMENT TABLES    2D  EF (Teich): 49 86 %  %FS: 25 27 %  JUDY Planimetry: 0 86 cm2  Ao Diam: 3 1 cm  EDV(Teich): 104 22 ml  ESV(Teich): 52 25 ml  IVSd: 1 24 cm  LA Area: 22 39 cm2  LA Diam: 4 03 cm  LVEDV MOD A4C: 174 08 ml  LVEF MOD A4C: 50 26 %  LVESV MOD A4C: 86 59 ml  LVIDd: 4 74 cm  LVIDs: 3 54 cm  LVLd A4C: 9 35 cm  LVLs A4C: 8 17 cm  LVOT Diam: 1 96 cm  LVPWd: 1 21 cm  RA Area: 12 71 cm2  RVIDd: 2 45 cm  SV (Teich): 51 97 ml  SV MOD A4C: 87 49 ml    CW  AV Env  Ti: 361 56 ms  AV VTI: 86 51 cm  AV Vmax: 3 48 m/s  AV Vmean: 2 39 m/s  AV maxP 38 mmHg  AV meanP 03 mmHg  TR Vmax: 3 25 m/s  TR maxP 23 mmHg    PW  E' Sept: 0 05 m/s  LVOT Env  Ti: 361 56 ms  LVOT VTI: 27 25 cm  LVOT Vmax: 1 15 m/s  LVOT Vmean: 0 75 m/s  LVOT maxP 29 mmHg  LVOT meanP 61 mmHg    [facility] Accredited Echocardiography Laboratory    Prepared and electronically signed by    Lynda [place] MD  Signed 2021 17:34:59    Imaging: I have personally reviewed pertinent reports  and I have personally reviewed pertinent films in PACS  XR chest 1 view portable    Result Date: 2023  Narrative: CHEST INDICATION:  Shortness of breath  COMPARISON: 2023, CT chest, abdomen and pelvis 3/15/2023 EXAM PERFORMED/VIEWS:  XR CHEST PORTABLE FINDINGS: Cardiomediastinal silhouette appears stable  Status post median sternotomy  Pulmonary venous congestion with bilateral groundglass opacities and small pleural effusions, suggesting pulmonary edema  No pneumothorax  Suggestion of old healed right clavicle fracture  Cholecystectomy clips  Impression: Pulmonary edema with small pleural effusions  Workstation performed: [id number]     CTA ED chest PE study    Result Date: 2023  Narrative: CTA - CHEST WITH IV CONTRAST - PULMONARY ANGIOGRAM INDICATION:   SOB out of proportion to exam, acute onset PE, hx DVT  COMPARISON: 3/15/2023  TECHNIQUE: CTA examination of the chest was performed using angiographic technique according to a protocol specifically tailored to evaluate for pulmonary embolism  Multiplanar 2D reformatted images were created from the source data  In addition, coronal 3D MIP postprocessing was performed on the acquisition scanner  Radiation dose length product (DLP) for this visit:  400 mGy-cm   This examination, like all CT scans performed in the [facility], was performed utilizing techniques to minimize radiation dose exposure, including the use of iterative reconstruction and automated exposure control  IV Contrast:  50 mL of iohexol (OMNIPAQUE) FINDINGS: PULMONARY ARTERIAL TREE:  No pulmonary embolus is seen  LUNGS: Hazy groundglass consolidation with intralobular septal thickening noted in the upper and lower lobes suspicious for pulmonary edema  No obvious endobronchial lesion  There is passive atelectasis in the lower lobes  PLEURA: Moderate bilateral pleural effusions  HEART/GREAT VESSELS: Cardiomegaly  No thoracic aortic aneurysm  Prominent coronary artery calcifications  No pericardial effusion  MEDIASTINUM AND YARELI: There are small borderline prominent mediastinal lymph nodes  There are borderline prominent bilateral hilar lymph nodes  No significant axillary lymphadenopathy  CHEST WALL AND LOWER NECK:   Unremarkable  VISUALIZED STRUCTURES IN THE UPPER ABDOMEN: Small hiatal hernia  Calcified granulomas in the spleen  Status post cholecystectomy  A calcified granuloma also seen in the left hepatic lobe  Punctate nonobstructing left renal calculus  OSSEOUS STRUCTURES:  No acute fracture or destructive osseous lesion  Degenerative changes in the thoracic spine  Median sternotomy wires  Impression: No CT evidence of pulmonary embolism  Cardiomegaly with moderate bilateral pleural effusions and hazy groundglass densities with interlobular septal thickening suspicious for pulmonary edema/CHF  Borderline prominent mediastinal and hilar lymph nodes  Small hiatal hernia  Punctate nonobstructing left renal calculus  Evidence of prior granulomatous disease  Workstation performed: [id number]     Assessment  Principal Problem:    Acute on chronic combined systolic and diastolic HF (heart failure) ([facility])  Active Problems:    Hypertension    Anemia of chronic disease    CAD (coronary artery disease)    Diabetes ([facility] )    Thank you for allowing us to participate in this patient's care  This pt will follow up with Dr Medina Zendejas once discharged  Counseling / Coordination of Care  Total floor / unit time spent today 45 minutes  Greater than 50% of total time was spent with the patient and / or family counseling and / or coordination of care  A description of the counseling / coordination of care: Review of history, current assessment, development of a plan  Code Status: Level 3 - DNAR and DNI    ** Please Note: Dragon 360 Dictation voice to text software may have been used in the creation of this document   **

## 2023-06-26 NOTE — PLAN OF CARE
Problem: MOBILITY - ADULT  Goal: Maintain or return to baseline ADL function  Description: INTERVENTIONS:  -  Assess patient's ability to carry out ADLs; assess patient's baseline for ADL function and identify physical deficits which impact ability to perform ADLs (bathing, care of mouth/teeth, toileting, grooming, dressing, etc )  - Assess/evaluate cause of self-care deficits   - Assess range of motion  - Assess patient's mobility; develop plan if impaired  - Assess patient's need for assistive devices and provide as appropriate  - Encourage maximum independence but intervene and supervise when necessary  - Involve family in performance of ADLs  - Assess for home care needs following discharge   - Consider OT consult to assist with ADL evaluation and planning for discharge  - Provide patient education as appropriate  Outcome: Progressing     Problem: Potential for Falls  Goal: Patient will remain free of falls  Description: INTERVENTIONS:  - Educate patient/family on patient safety including physical limitations  - Instruct patient to call for assistance with activity   - Consult OT/PT to assist with strengthening/mobility   - Keep Call bell within reach  - Keep bed low and locked with side rails adjusted as appropriate  - Keep care items and personal belongings within reach  - Initiate and maintain comfort rounds  - Make Fall Risk Sign visible to staff  - Offer Toileting every 2 Hours, in advance of need  - Initiate/Maintain bed alarm  - Obtain necessary fall risk management equipment: Alarms  - Apply yellow socks and bracelet for high fall risk patients  - Consider moving patient to room near nurses station  Outcome: Progressing     Problem: Prexisting or High Potential for Compromised Skin Integrity  Goal: Skin integrity is maintained or improved  Description: INTERVENTIONS:  - Identify patients at risk for skin breakdown  - Assess and monitor skin integrity  - Assess and monitor nutrition and hydration status  - Monitor labs   - Assess for incontinence   - Turn and reposition patient  - Assist with mobility/ambulation  - Relieve pressure over bony prominences  - Avoid friction and shearing  - Provide appropriate hygiene as needed including keeping skin clean and dry  - Evaluate need for skin moisturizer/barrier cream  - Collaborate with interdisciplinary team   - Patient/family teaching  - Consider wound care consult   Outcome: Progressing     Problem: PAIN - ADULT  Goal: Verbalizes/displays adequate comfort level or baseline comfort level  Description: Interventions:  - Encourage patient to monitor pain and request assistance  - Assess pain using appropriate pain scale  - Administer analgesics based on type and severity of pain and evaluate response  - Implement non-pharmacological measures as appropriate and evaluate response  - Consider cultural and social influences on pain and pain management  - Notify physician/advanced practitioner if interventions unsuccessful or patient reports new pain  Outcome: Progressing     Problem: INFECTION - ADULT  Goal: Absence or prevention of progression during hospitalization  Description: INTERVENTIONS:  - Assess and monitor for signs and symptoms of infection  - Monitor lab/diagnostic results  - Monitor all insertion sites, i e  indwelling lines, tubes, and drains  - Monitor endotracheal if appropriate and nasal secretions for changes in amount and color  - [place] appropriate cooling/warming therapies per order  - Administer medications as ordered  - Instruct and encourage patient and family to use good hand hygiene technique  - Identify and instruct in appropriate isolation precautions for identified infection/condition  Outcome: Progressing     Problem: DISCHARGE PLANNING  Goal: Discharge to home or other facility with appropriate resources  Description: INTERVENTIONS:  - Identify barriers to discharge w/patient and caregiver  - Arrange for needed discharge resources and transportation as appropriate  - Identify discharge learning needs (meds, wound care, etc )  - Arrange for interpretive services to assist at discharge as needed  - Refer to Case Management Department for coordinating discharge planning if the patient needs post-hospital services based on physician/advanced practitioner order or complex needs related to functional status, cognitive ability, or social support system  Outcome: Progressing     Problem: Knowledge Deficit  Goal: Patient/family/caregiver demonstrates understanding of disease process, treatment plan, medications, and discharge instructions  Description: Complete learning assessment and assess knowledge base    Interventions:  - Provide teaching at level of understanding  - Provide teaching via preferred learning methods  Outcome: Progressing

## 2023-06-26 NOTE — ED PROCEDURE NOTE
Procedure  POC Cardiac US    Date/Time: 6/26/2023 12:59 PM    Performed by: Loly Figueredo DO  Authorized by: Loly Figueredo DO    Patient location:  ED  Procedure performed by consultant: Shea Dawson MD     Procedure details:     Exam Type:  Educational    Indications: dyspnea      Assessment / Evaluation for: cardiac function      Exam Type: initial exam      Image quality: limited diagnostic      Image availability:  Video obtained and still images obtained  Patient Details:     Cardiac Rhythm:  Regular  Cardiac findings:     Echo technique: limited 2D      Views obtained: parasternal long axis, parasternal short axis, subcostal and apical      Pericardial effusion: absent      Tamponade physiology: absent      Wall motion: hypodynamic      LV systolic function: depressed      RV dilation: none    Pulmonary findings:     Left Lung Findings: left pleural effusion present      Right lung findings: right pleural effusion present    Interpretation:     Fluid Status:  Hypervolemic                     Loly Figueredo DO  06/26/23 1501

## 2023-06-26 NOTE — ASSESSMENT & PLAN NOTE
Lab Results   Component Value Date    HGBA1C 6 6 (H) 06/08/2023       Recent Labs     06/26/23  1539   POCGLU 196*       Blood Sugar Average: Last 72 hrs:  · (P) 196History of impaired blood glucose  · Most recent hemoglobin A1c 6 6  · Fasting glucose noted elevated 150s to 160s  · Carbohydrate controlled diet  · Patient refused scheduled Accu-Cheks and SSI -A1c reasonably controlled given patient's age will discontinue fingerstick glucose checks, monitor daily fasting glucose with BMP, and encourage lifestyle modifications

## 2023-06-26 NOTE — ASSESSMENT & PLAN NOTE
· History of hypertension  · Home meds Imdur 60 mg once a day, Toprol-XL 50 mg twice daily, spironolactone 25 mg once a day  · Have been noted low blood pressures at home high 90s to low 100s  Blood Pressure: 102/68 limiting further IV diuresis      Plan  · Continue holding PTA Imdur and spironolactone  · Continue Toprol XL 50 mg twice daily  · Continue IV diuresis as per Cardiology when BP permits

## 2023-06-26 NOTE — H&P
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) [facility])  Assessment & Plan  Wt Readings from Last 3 Encounters:   06/26/23 63 3 kg (139 lb 8 8 oz)   06/21/23 61 6 kg (135 lb 12 9 oz)   06/13/23 65 1 kg (143 lb 9 6 oz)   · Last March 2023, ECHO LVEF 45% G2 DD, moderate to severe aortic stenosis   · Recently discharge due to the same admission from (06/18/2023-06/21/2023)  · Discharged on Lasix 40 mg twice daily, spironolactone 25 mg once a day  Endorse compliant with medication  · Discharge weight recorded as a dry weight 135 pounds  · Today weight 139lbs  · BNP today 1246  · Chest x-ray pending final read, pulmonary evaluation increased pulmonary vascular congestion did not appreciate significant effusion compared to the one on 06/18    · Plan  · Continue Lasix 40 mg twice daily with adjusted hold parameters SBP <100 blood pressures barely in the 100s given patient to his morning medications  · Continue home Imdur hold parameters SBP less 110  · Holding spironolactone at this time  · Decreased ecrease Toprol-XL 25 mg twice daily  · Cardiology consulted patient recommendation  · Sodium restriction 2 g, fluid restriction 1500 cc  · Daily weight preferably standing  · Strict I's and O's            CAD (coronary artery disease)  Assessment & Plan  · History of CAD status post remote CABG 3 vessels  · Currently on Plavix 75 mg once a day recently stopped due to expected procedure tomorrow by ENT  · Continue continue atorvastatin 10 mg once a day per formulary for Crestor 5 mg once a day    · Continue home Ranexa 500 mg twice daily    Hypertension  Assessment & Plan  · History of hypertension  · Home meds Imdur 60 mg once a day, Toprol-XL 50 mg twice daily, spironolactone 25 mg once a day  · Have been noted low blood pressures at home high 90s to low 100s  Blood Pressure: 114/68  ·   · Acceptable at this time    · Plan  · Continue Imdur   · Decrease Toprol-XL 25 mg twice daily at this time in the setting of IV diuretic  · Holding spironolactone  · Cardiology consulted appreciate recommendations    Diabetes [facility])  Assessment & Plan  Lab Results   Component Value Date    HGBA1C 6 6 (H) 06/08/2023       Recent Labs     06/26/23  1539   POCGLU 196*       Blood Sugar Average: Last 72 hrs:  · (P) 196History of impaired blood glucose  · Most recent hemoglobin A1c 6 6  · Fasting glucose noted elevated 150s to 160s  · Carbohydrate controlled diet  · SSI  · Can consider metformin upon discharge    Anemia of chronic disease  Assessment & Plan  · History iron deficiency anemia and remote history of acute blood loss secondary to PUD for which she got a Paster gastrectomy  · POA hemoglobin 11 9 baseline seems to be 13-15  · No overt bleeding, no changes in bowel movements although has not had BMs over 5 days  Currently on PPIs and H2 blockers no NSAID use  · Patient also endorses worsening fatigue  · Check iron panel  · CBC a m  Hyponatremia  Assessment & Plan  · POA Na 127 corrected for glucose 133  · Likely exacerbated by volume overload in the setting of acute on chronic CHF  · Plan  · Sodium 2 g daily, fluid restriction 1500 cc  · BMP a m  ·        VTE Pharmacologic Prophylaxis: VTE Score: 7 High Risk (Score >/= 5) - Pharmacological DVT Prophylaxis Ordered: enoxaparin (Lovenox)  Sequential Compression Devices Ordered  Code Status: Level 3 - DNAR and DNI and spouse  Discussion with family: Updated  (wife) at bedside  Anticipated Length of Stay: Patient will be admitted on an inpatient basis with an anticipated length of stay of greater than 2 midnights secondary to Acute on chronic CHF exacerbation      Chief Complaint: Worsening shortness of breath, fatigue    History of Present Illness:  Yassine Martin is a 80 y o  male with a PMH of combined diastolic and systolic heart failure, hypertension, hyperlipidemia, CAD status post remote CABG, BPH, remote history of PUD with partial gastrectomy, moderate to severe aortic stenosis, who presents with worsening shortness of breath upon exertion walking a few feet without associated orthopnea, and abdominal distention  Has a recent admission from 06/18/2023-06/21/2023 due to the same Discharge with increase Lasix to 40 mg twice daily however does endorse that feels that he is not urinating significantly with that increase  Endorsed being compliant with medications at home with no dietary indiscretions, does not weigh himself daily however denies any worsening lower extremity edema  Patient also endorsed lightheadedness or dizziness with harsh turns upon walking with a rollator walker however no lightheadedness or dizziness upon standing  Denies any fevers, chills, chest pain, palpitations, abdominal pain or urinary symptoms  Endorsed poor oral intake, fatigue and constipation for 4 days  Review of Systems:  Review of Systems   Constitutional: Positive for activity change, appetite change and fatigue  Negative for chills, fever and unexpected weight change  HENT: Negative for ear pain and sore throat  Eyes: Negative for pain and visual disturbance  Respiratory: Positive for shortness of breath  Negative for cough and wheezing  Cardiovascular: Negative for chest pain, palpitations and leg swelling  Gastrointestinal: Positive for abdominal distention and constipation  Negative for abdominal pain, diarrhea, nausea and vomiting  Genitourinary: Negative for difficulty urinating, dysuria and hematuria  Musculoskeletal: Negative for arthralgias and back pain  Skin: Negative for color change and rash  Neurological: Positive for light-headedness  Negative for dizziness, seizures and syncope          Upon sudden turns during ambulation "  All other systems reviewed and are negative  Past Medical and Surgical History:   Past Medical History:   Diagnosis Date   • Arthritis    • BPH (benign prostatic hyperplasia)    • Cervical spine [facility] ([facility] ) 1997    C3   • Chest pain    • Colon polyp    • Coronary artery disease    • Dry eye    • DVT (deep venous thrombosis) ([facility] ) 2015    right leg- passed thru the IVC filter-stopped at the \"patch\" from bypass surgery   • Dysphagia 11/2021    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   • Fracture of thoracic spine ([facility] ) 1997    T3   • Glaucoma    • Hyperlipidemia    • Hypertension    • Myocardial infarction ([place] [address] )    • PUD (peptic ulcer disease)        Past Surgical History:   Procedure Laterality Date   • ANGIOPLASTY      2 stents   • CHOLECYSTECTOMY     • COLONOSCOPY     • CORONARY ARTERY BYPASS GRAFT      x6   • CORONARY ARTERY BYPASS GRAFT     • CORONARY STENT PLACEMENT     • CYSTOSCOPY     • EYE SURGERY Right    • HERNIA REPAIR Right 11/3/2017    Procedure: REPAIR HERNIA INGUINAL;  Surgeon: Amanda Yost MD;  Location: [address] [place] [address];  Service: General   • IVC FILTER INSERTION      IVC filter   • WY CYSTO W/IRRIG & EVAC MULTPLE OBSTRUCTING CLOTS N/A 6/30/2018    Procedure: CYSTOSCOPY EVACUATION OF CLOTS, fulgeration;  Surgeon: Luisana Chávez MD;  [facility];  Service: Urology   • STOMACH SURGERY  1973    Billroth 2 gastrectomy-2/3 removal- bleeding ulcer   • TRANSURETHRAL RESECTION OF PROSTATE         Meds/Allergies:  Prior to Admission medications    Medication Sig Start Date End Date Taking?  Authorizing Provider   ALPRAZolam Tacy Mixer) 0 5 mg tablet Take 1 tablet (0 5 mg total) by mouth daily at bedtime as needed for anxiety 3/27/23  Yes Marisel Ardon DO   Calcium Carbonate-Vitamin D (CALCIUM 600+D PO) Take by mouth daily with lunch   Yes Historical Provider, MD   cholecalciferol (VITAMIN D3) 400 units tablet Take 400 Units by mouth daily after lunch    Yes Historical Provider, MD " Docusate Calcium (STOOL SOFTENER PO) Take by mouth OTC; takes with lunch   Yes Historical Provider, MD   dutasteride (AVODART) 0 5 mg capsule TAKE ONE CAPSULE BY MOUTH EVERY DAY IN THE MORNING 5/22/23  Yes Jeanette Nix PA-C   famotidine (PEPCID) 20 mg tablet Take 1 tablet (20 mg total) by mouth daily at bedtime 7/5/22 6/26/23 Yes MATT Salas   furosemide (LASIX) 40 mg tablet Take 1 tablet (40 mg total) by mouth 2 (two) times a day 6/21/23 7/21/23 Yes José Quinteros DO   isosorbide mononitrate (IMDUR) 60 mg 24 hr tablet Take 1 tablet (60 mg total) by mouth every morning 5/24/23 5/18/24 Yes MATT Ryan   metoprolol succinate (TOPROL-XL) 50 mg 24 hr tablet Take 1 tablet (50 mg total) by mouth every 12 (twelve) hours 5/24/23 5/18/24 Yes MATT Ryan   multivitamin-iron-minerals-folic acid (CENTRUM) chewable tablet Chew 1 tablet every morning    Yes Historical Provider, MD   pantoprazole (PROTONIX) 20 mg tablet Take 1 tablet (20 mg total) by mouth 2 (two) times a day 3/9/23  Yes Ashleigh Keita PA-C   ranolazine (RANEXA) 500 mg 12 hr tablet Take 1 tablet (500 mg total) by mouth 2 (two) times a day 7/11/22  Yes Joe Soliz MD   rosuvastatin (CRESTOR) 5 mg tablet TAKE ONE TABLET BY MOUTH EVERY DAY AT BEDTIME 1/18/23  Yes Joe Soliz MD   spironolactone (ALDACTONE) 25 mg tablet Take 1 tablet (25 mg total) by mouth daily after breakfast 6/13/23  Yes Muriel Goodwin MD   tamsulosin (Flomax) 0 4 mg Take 1 capsule (0 4 mg total) by mouth daily with dinner 6/16/22  Yes Alycia Timmons PA-C   Zinc 25 MG TABS Take 25 mg by mouth daily at bedtime   Yes Historical Provider, MD   clopidogrel (PLAVIX) 75 mg tablet TAKE ONE TABLET BY MOUTH EVERY DAY 6/17/22   Joe Soliz MD     I have reviewed home medications with patient family member  Allergies:    Allergies   Allergen Reactions   • Escitalopram Other (See Comments)     Suicidal feelings, sweating   • "Oxycodone-Acetaminophen Dizziness and Shortness Of Breath     Other reaction(s): Faints  Reaction Date: [id number]; Category: Adverse Reaction;    • Sucralfate GI Intolerance     Abdominal pain    • Sulfa Antibiotics Other (See Comments)     unknown   • Omeprazole Rash   • Statins Other (See Comments)     Muscle pain       Social History:  Marital Status: /Civil Union   Occupation: Retired  Patient Pre-hospital Living Situation: With spouse  Patient Pre-hospital Level of Mobility: walks with walker  Patient Pre-hospital Diet Restrictions: None  Substance Use History:   Social History     Substance and Sexual Activity   Alcohol Use Never     Social History     Tobacco Use   Smoking Status Never   Smokeless Tobacco Never     Social History     Substance and Sexual Activity   Drug Use No       Family History:  Family History   Problem Relation Age of Onset   • Coronary artery disease Brother    • Heart disease Father         CAD       Physical Exam:     Vitals:   Blood Pressure: 114/68 (06/26/23 1811)  Pulse: 96 (06/26/23 1811)  Temperature: (!) 97 3 °F (36 3 °C) (06/26/23 1500)  Temp Source: Oral (06/26/23 1500)  Respirations: 16 (06/26/23 1500)  Height: 5' 4\" (162 6 cm) (06/26/23 1200)  Weight - Scale: 63 3 kg (139 lb 8 8 oz) (06/26/23 1057)  SpO2: 96 % (06/26/23 1500)    Physical Exam  Vitals and nursing note reviewed  Constitutional:       General: He is not in acute distress  Appearance: He is well-developed  He is not ill-appearing  HENT:      Head: Normocephalic and atraumatic  Right Ear: External ear normal       Left Ear: External ear normal       Nose: Nose normal       Mouth/Throat:      Mouth: Mucous membranes are moist    Eyes:      General: No scleral icterus  Extraocular Movements: Extraocular movements intact  Conjunctiva/sclera: Conjunctivae normal       Pupils: Pupils are equal, round, and reactive to light  Neck:      Vascular: JVD present     Cardiovascular:      Rate " and Rhythm: Normal rate and regular rhythm  Pulses: Normal pulses  Heart sounds: Murmur heard  Pulmonary:      Effort: Pulmonary effort is normal  No respiratory distress  Breath sounds: Normal breath sounds  No rhonchi  Chest:      Chest wall: No tenderness  Abdominal:      General: Bowel sounds are normal  There is distension  Palpations: Abdomen is soft  Tenderness: There is no abdominal tenderness  Musculoskeletal:         General: No swelling  Cervical back: Normal range of motion and neck supple  Right lower leg: No edema  Left lower leg: No edema  Skin:     General: Skin is warm and dry  Capillary Refill: Capillary refill takes less than 2 seconds  Neurological:      Mental Status: He is alert  Psychiatric:         Mood and Affect: Mood normal           Additional Data:     Lab Results:  Results from last 7 days   Lab Units 06/26/23  1034   WBC Thousand/uL 6 97   HEMOGLOBIN g/dL 11 9*   HEMATOCRIT % 35 5*   PLATELETS Thousands/uL 247   NEUTROS PCT % 83*   LYMPHS PCT % 7*   MONOS PCT % 8   EOS PCT % 1     Results from last 7 days   Lab Units 06/26/23  1034   SODIUM mmol/L 127*   POTASSIUM mmol/L 4 7   CHLORIDE mmol/L 87*   CO2 mmol/L 30   BUN mg/dL 27*   CREATININE mg/dL 1 21   ANION GAP mmol/L 10   CALCIUM mg/dL 8 7   ALBUMIN g/dL 3 3*   TOTAL BILIRUBIN mg/dL 0 76   ALK PHOS U/L 68   ALT U/L 13   AST U/L 13   GLUCOSE RANDOM mg/dL 275*         Results from last 7 days   Lab Units 06/26/23  1539   POC GLUCOSE mg/dl 196*               Lines/Drains:  Invasive Devices     Peripheral Intravenous Line  Duration           Peripheral IV 06/26/23 Left Antecubital <1 day                    Imaging: Reviewed radiology reports from this admission including: chest xray  XR chest 2 views   ED Interpretation by Flores Fabian PA-C (06/26 8028)   Vascular congestion    Small pleural effusions      Final Result by Jennifer Wyman MD (06/26 6466)      [facility] airspace opacities throughout the lungs most consistent with pulmonary vascular congestive change, similar from prior examination  Slightly improving basilar pleural effusions  Workstation performed: [id number]             EKG and Other Studies Reviewed on Admission:   · EKG: Sinus Tachycardia  HR 90     ** Please Note: This note has been constructed using a voice recognition system   **

## 2023-06-27 ENCOUNTER — APPOINTMENT (INPATIENT)
Dept: NON INVASIVE DIAGNOSTICS | Facility: HOSPITAL | Age: 88
DRG: 306 | End: 2023-06-27
Payer: MEDICARE

## 2023-06-27 PROBLEM — D63.8 ANEMIA OF CHRONIC DISEASE: Status: RESOLVED | Noted: 2018-06-30 | Resolved: 2023-06-27

## 2023-06-27 LAB
ANION GAP SERPL CALCULATED.3IONS-SCNC: 11 MMOL/L
AORTIC ROOT: 3 CM
AORTIC VALVE MEAN VELOCITY: 18.5 M/S
APICAL FOUR CHAMBER EJECTION FRACTION: 24 %
AV AREA BY CONTINUOUS VTI: 0.8 CM2
AV AREA PEAK VELOCITY: 0.8 CM2
AV LVOT MEAN GRADIENT: 1 MMHG
AV LVOT PEAK GRADIENT: 3 MMHG
AV MEAN GRADIENT: 16 MMHG
AV PEAK GRADIENT: 31 MMHG
AV REGURGITATION PRESSURE HALF TIME: 281 MS
AV VALVE AREA: 0.82 CM2
AV VELOCITY RATIO: 0.3
BUN SERPL-MCNC: 23 MG/DL (ref 5–25)
CALCIUM SERPL-MCNC: 8.5 MG/DL (ref 8.4–10.2)
CHLORIDE SERPL-SCNC: 90 MMOL/L (ref 96–108)
CO2 SERPL-SCNC: 28 MMOL/L (ref 21–32)
CREAT SERPL-MCNC: 1.07 MG/DL (ref 0.6–1.3)
DOP CALC AO PEAK VEL: 2.77 M/S
DOP CALC AO VTI: 53.81 CM
DOP CALC LVOT AREA: 2.54 CM2
DOP CALC LVOT CARDIAC INDEX: 2.51 L/MIN/M2
DOP CALC LVOT CARDIAC OUTPUT: 4.14 L/MIN
DOP CALC LVOT DIAMETER: 1.8 CM
DOP CALC LVOT PEAK VEL VTI: 17.26 CM
DOP CALC LVOT PEAK VEL: 0.82 M/S
DOP CALC LVOT STROKE INDEX: 26.7 ML/M2
DOP CALC LVOT STROKE VOLUME: 43.9 CM3
ERYTHROCYTE [DISTWIDTH] IN BLOOD BY AUTOMATED COUNT: 13.8 % (ref 11.6–15.1)
FRACTIONAL SHORTENING: 4 % (ref 28–44)
GFR SERPL CREATININE-BSD FRML MDRD: 59 ML/MIN/1.73SQ M
GLUCOSE SERPL-MCNC: 162 MG/DL (ref 65–140)
HCT VFR BLD AUTO: 38.9 % (ref 36.5–49.3)
HGB BLD-MCNC: 12.7 G/DL (ref 12–17)
INTERVENTRICULAR SEPTUM IN DIASTOLE (PARASTERNAL SHORT AXIS VIEW): 0.7 CM
INTERVENTRICULAR SEPTUM: 0.7 CM (ref 0.6–1.1)
LAAS-AP4: 18.9 CM2
LEFT ATRIUM SIZE: 4.6 CM
LEFT INTERNAL DIMENSION IN SYSTOLE: 4.3 CM (ref 2.1–4)
LEFT VENTRICLE DIASTOLIC VOLUME (MOD BIPLANE): 185 ML
LEFT VENTRICLE SYSTOLIC VOLUME (MOD BIPLANE): 138 ML
LEFT VENTRICULAR INTERNAL DIMENSION IN DIASTOLE: 4.5 CM (ref 3.5–6)
LEFT VENTRICULAR POSTERIOR WALL IN END DIASTOLE: 0.8 CM
LEFT VENTRICULAR STROKE VOLUME: 11 ML
LV EF: 26 %
LVSV (TEICH): 11 ML
MCH RBC QN AUTO: 30.5 PG (ref 26.8–34.3)
MCHC RBC AUTO-ENTMCNC: 32.6 G/DL (ref 31.4–37.4)
MCV RBC AUTO: 93 FL (ref 82–98)
MITRAL REGURGITATION PEAK VELOCITY: 4.9 M/S
MITRAL VALVE MEAN INFLOW VELOCITY: 3.58 M/S
MITRAL VALVE REGURGITANT PEAK GRADIENT: 96 MMHG
PLATELET # BLD AUTO: 236 THOUSANDS/UL (ref 149–390)
PMV BLD AUTO: 10.1 FL (ref 8.9–12.7)
POTASSIUM SERPL-SCNC: 3.7 MMOL/L (ref 3.5–5.3)
RA PRESSURE ESTIMATED: 5 MMHG
RBC # BLD AUTO: 4.17 MILLION/UL (ref 3.88–5.62)
RIGHT ATRIUM AREA SYSTOLE A4C: 15.4 CM2
RIGHT VENTRICLE ID DIMENSION: 3.7 CM
RV PSP: 73 MMHG
SL CV AV DECELERATION TIME RETROGRADE: 969 MS
SL CV AV PEAK GRADIENT RETROGRADE: 29 MMHG
SL CV DOP CALC MV VTI RETROGRADE: 136.3 CM
SL CV LV EF: 25
SL CV MV MEAN GRADIENT RETROGRADE: 59 MMHG
SL CV PED ECHO LEFT VENTRICLE DIASTOLIC VOLUME (MOD BIPLANE) 2D: 93 ML
SL CV PED ECHO LEFT VENTRICLE SYSTOLIC VOLUME (MOD BIPLANE) 2D: 81 ML
SODIUM SERPL-SCNC: 129 MMOL/L (ref 135–147)
TR MAX PG: 68 MMHG
TR PEAK VELOCITY: 4.1 M/S
TRICUSPID VALVE PEAK REGURGITATION VELOCITY: 4.12 M/S
WBC # BLD AUTO: 5.55 THOUSAND/UL (ref 4.31–10.16)

## 2023-06-27 PROCEDURE — 93321 DOPPLER ECHO F-UP/LMTD STD: CPT

## 2023-06-27 PROCEDURE — C8924 2D TTE W OR W/O FOL W/CON,FU: HCPCS

## 2023-06-27 PROCEDURE — 99232 SBSQ HOSP IP/OBS MODERATE 35: CPT | Performed by: INTERNAL MEDICINE

## 2023-06-27 PROCEDURE — 80048 BASIC METABOLIC PNL TOTAL CA: CPT

## 2023-06-27 PROCEDURE — 93308 TTE F-UP OR LMTD: CPT | Performed by: INTERNAL MEDICINE

## 2023-06-27 PROCEDURE — 93321 DOPPLER ECHO F-UP/LMTD STD: CPT | Performed by: INTERNAL MEDICINE

## 2023-06-27 PROCEDURE — 85027 COMPLETE CBC AUTOMATED: CPT

## 2023-06-27 PROCEDURE — 93325 DOPPLER ECHO COLOR FLOW MAPG: CPT

## 2023-06-27 PROCEDURE — 93325 DOPPLER ECHO COLOR FLOW MAPG: CPT | Performed by: INTERNAL MEDICINE

## 2023-06-27 RX ORDER — BUMETANIDE 0.25 MG/ML
2 INJECTION INTRAMUSCULAR; INTRAVENOUS 2 TIMES DAILY
Status: DISCONTINUED | OUTPATIENT
Start: 2023-06-27 | End: 2023-06-29

## 2023-06-27 RX ADMIN — MULTIPLE VITAMINS W/ MINERALS TAB 1 TABLET: TAB ORAL at 09:08

## 2023-06-27 RX ADMIN — METOPROLOL SUCCINATE 50 MG: 50 TABLET, EXTENDED RELEASE ORAL at 17:35

## 2023-06-27 RX ADMIN — CLOPIDOGREL BISULFATE 75 MG: 75 TABLET ORAL at 09:08

## 2023-06-27 RX ADMIN — RANOLAZINE 500 MG: 500 TABLET, FILM COATED, EXTENDED RELEASE ORAL at 17:35

## 2023-06-27 RX ADMIN — TAMSULOSIN HYDROCHLORIDE 0.4 MG: 0.4 CAPSULE ORAL at 17:35

## 2023-06-27 RX ADMIN — Medication 1 TABLET: at 12:21

## 2023-06-27 RX ADMIN — SENNOSIDES AND DOCUSATE SODIUM 2 TABLET: 50; 8.6 TABLET ORAL at 17:35

## 2023-06-27 RX ADMIN — PANTOPRAZOLE SODIUM 20 MG: 20 TABLET, DELAYED RELEASE ORAL at 09:08

## 2023-06-27 RX ADMIN — ENOXAPARIN SODIUM 40 MG: 40 INJECTION SUBCUTANEOUS at 09:07

## 2023-06-27 RX ADMIN — FINASTERIDE 5 MG: 5 TABLET, FILM COATED ORAL at 09:08

## 2023-06-27 RX ADMIN — ZINC SULFATE 220 MG (50 MG) CAPSULE 220 MG: CAPSULE at 12:21

## 2023-06-27 RX ADMIN — BUMETANIDE 2 MG: 0.25 INJECTION INTRAMUSCULAR; INTRAVENOUS at 12:23

## 2023-06-27 RX ADMIN — RANOLAZINE 500 MG: 500 TABLET, FILM COATED, EXTENDED RELEASE ORAL at 09:08

## 2023-06-27 RX ADMIN — CHOLECALCIFEROL TAB 10 MCG (400 UNIT) 400 UNITS: 10 TAB at 17:35

## 2023-06-27 RX ADMIN — PANTOPRAZOLE SODIUM 20 MG: 20 TABLET, DELAYED RELEASE ORAL at 17:35

## 2023-06-27 RX ADMIN — SENNOSIDES AND DOCUSATE SODIUM 2 TABLET: 50; 8.6 TABLET ORAL at 09:08

## 2023-06-27 RX ADMIN — BUMETANIDE 2 MG: 0.25 INJECTION INTRAMUSCULAR; INTRAVENOUS at 17:35

## 2023-06-27 RX ADMIN — ATORVASTATIN CALCIUM 10 MG: 10 TABLET, FILM COATED ORAL at 17:39

## 2023-06-27 RX ADMIN — PERFLUTREN 0.4 ML/MIN: 6.52 INJECTION, SUSPENSION INTRAVENOUS at 15:27

## 2023-06-27 NOTE — ASSESSMENT & PLAN NOTE
Lab Results   Component Value Date    HGBA1C 6 6 (H) 06/08/2023       Recent Labs     06/26/23  1539   POCGLU 196*       Blood Sugar Average: Last 72 hrs:  (P) 196

## 2023-06-27 NOTE — ASSESSMENT & PLAN NOTE
POA: Requiring 2 L O2 via nasal cannula with SPO2 in low 90s  Likely in the setting of mild vascular congestion and small pleural effusions as seen on CXR  Not on O2 at home      Plan:  · Continue IV diuresis as per Cardiology when BP permits  · Patient on room air at this time  · Monitor O2 saturations

## 2023-06-27 NOTE — PROGRESS NOTES
[facility], 8/2/1930, 80 y o  male MRN: [id number]   Unit/Bed#: S /S -01 Encounter: [id number]   Primary Care Physician: Venecia Quijano DO   Date and Time Admitted to Hospital: 6/26/2023 10:06 AM     Assessment/Plan:   * Acute on chronic HFrEF (heart failure with reduced ejection fraction) [facility])  Assessment & Plan  Wt Readings from Last 3 Encounters:   06/26/23 63 3 kg (139 lb 8 8 oz)   06/21/23 61 6 kg (135 lb 12 9 oz)   06/13/23 65 1 kg (143 lb 9 6 oz)   · Last March 2023, ECHO LVEF 45% G2 DD, moderate to severe aortic stenosis   · Recently discharge due to the same admission from (06/18/2023-06/21/2023)  · Discharged on Lasix 40 mg twice daily, spironolactone 25 mg once a day  Endorse compliant with medication  · Discharge weight recorded as a dry weight 135 pounds  · POA: Weight 139lbs BNP 1246  · Chest x-ray pending final read, pulmonary evaluation increased pulmonary vascular congestion did not appreciate significant effusion compared to the one on 06/18  · At baseline dry weight today although did not seem to respond well to Lasix 40 mg x 1 received yesterday with only about 500mL UOP overnight  BP running on the lower and limiting further diuresis in the a m  Samson Charmaine · Suspect moderate to severe AS contributing to presenting symptoms  Plan:  · Monitor strict I/O and daily weight with standing scale  · Monitor volume status  · PTA Imdur and spironolactone on hold   · Continue PTA BB  · Cardiology following - appreciate input:  · Switched to IV Bumex 2 mg daily given poor response to IV Lasix did not receive a m  dose due to low BP  · F/u Echo  · Outpatient f/u for TAVR planning     Acute respiratory failure with hypoxia (HCC)  Assessment & Plan  POA: Requiring 2 L O2 via nasal cannula with SPO2 in low 90s  Likely in the setting of mild vascular congestion and small pleural effusions as seen on CXR  Not on O2 at home      Plan:  · Continue IV diuresis as per Cardiology when BP permits  · Continue to wean O2 as able maintain SpO2 > 90%    Hyponatremia  Assessment & Plan  · POA Na 127 corrected for glucose 133  · Likely exacerbated by volume overload in the setting of acute on chronic CHF and 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  · Trending up with fluid restriction and Lasix 40 mg x 1    Recent Labs     06/26/23  1034 06/27/23  0509   SODIUM 127* 129*     Plan  · Continue Sodium 2 g daily, fluid restriction 1500 cc  · Monitor serum sodium with daily BMP  · Follow-up lab work-up for hyponatremia    Diabetes [facility])  Assessment & Plan  Lab Results   Component Value Date    HGBA1C 6 6 (H) 06/08/2023       Recent Labs     06/26/23  1539   POCGLU 196*       Blood Sugar Average: Last 72 hrs:  · (P) 196History of impaired blood glucose  · Most recent hemoglobin A1c 6 6  · Fasting glucose noted elevated 150s to 160s  · Carbohydrate controlled diet  · Patient refused scheduled Accu-Cheks and SSI -A1c reasonably controlled given patient's age will discontinue fingerstick glucose checks, monitor daily fasting glucose with BMP, and encourage lifestyle modifications    CAD (coronary artery disease)  Assessment & Plan  · History of CAD status post remote CABG 3 vessels  · Currently on Plavix 75 mg once a day recently stopped due to expected procedure tomorrow by ENT  · Continue continue atorvastatin 10 mg once a day per formulary for Crestor 5 mg once a day  · Continue home Ranexa 500 mg twice daily    Hypertension  Assessment & Plan  · History of hypertension  · Home meds Imdur 60 mg once a day, Toprol-XL 50 mg twice daily, spironolactone 25 mg once a day  · Have been noted low blood pressures at home high 90s to low 100s  Blood Pressure: (!) 89/62 limiting further IV diuresis      Plan  · Continue holding PTA Imdur and spironolactone  · Continue Toprol XL 50 mg twice daily  · Continue IV diuresis as per Cardiology when BP permits    Anemia of chronic disease-resolved as of 2023  Assessment & Plan  · History iron deficiency anemia and remote history of acute blood loss secondary to PUD for which she got a Paster gastrectomy  · POA hemoglobin 11 9 baseline seems to be 13-15  · No overt bleeding, no changes in bowel movements although has not had BMs over 5 days  Currently on PPIs and H2 blockers no NSAID use  · Patient also endorses worsening fatigue  · CBC a m  Lab Results   Component Value Date    HGB 12 7 2023    HGB 11 9 (L) 2023    HGB 13 3 2023        VTE Pharmacologic Prophylaxis: VTE Score: 7 High Risk (Score >/= 5) - Pharmacological DVT Prophylaxis Ordered: enoxaparin (Lovenox)  Sequential Compression Devices Ordered  Patient Centered Rounds:  Performed bedside rounds with nursing staff  Discussions with Specialists or Other Care Team Provider: Cardiology  Education and Discussions with Family / Patient: Updated  (friend) at bedside  Current Length of Stay: 1 day(s)  Current Patient Status: Inpatient   Discharge Plan: Anticipate discharge in 24-48 hrs to home  Code Status: Level 3 - DNAR and DNI    Subjective:   Patient was seen at bedside this a m  in no acute distress  Patient reports feeling slightly better today and slept well last night  Denies fever, chills, dizziness/lightheadedness, headache, N/V, chest pain, palpitations, SOB at rest, diarrhea, abdominal pain, or urinary symptoms  His BP has been running on the lower end but otherwise medically stable with no significant events overnight and has no acute concerns at this time  Objective:     Vitals:   Temp (24hrs), Av 9 °F (36 6 °C), Min:97 3 °F (36 3 °C), Max:98 3 °F (36 8 °C)    Temp:  [97 3 °F (36 3 °C)-98 3 °F (36 8 °C)] 98 3 °F (36 8 °C)  HR:  [91-96] 94  Resp:  [16-20] 20  BP: ()/(61-68) 89/62  SpO2:  [94 %-96 %] 94 %  Body mass index is 23 12 kg/m²  Input and Output Summary (last 24 hours):      Intake/Output Summary (Last 24 hours) at 6/27/2023 1338  Last data filed at 6/27/2023 1222  Gross per 24 hour   Intake 900 ml   Output 525 ml   Net 375 ml       Physical Exam  Constitutional:       General: He is not in acute distress  Appearance: He is not toxic-appearing  HENT:      Head: Normocephalic and atraumatic  Mouth/Throat:      Mouth: Mucous membranes are moist    Cardiovascular:      Rate and Rhythm: Normal rate and regular rhythm  Pulses: Normal pulses  Heart sounds: Normal heart sounds  Pulmonary:      Effort: Pulmonary effort is normal       Breath sounds: Decreased breath sounds present  No wheezing, rhonchi or rales  Abdominal:      General: Bowel sounds are normal       Palpations: Abdomen is soft  Tenderness: There is no abdominal tenderness  Musculoskeletal:         General: Normal range of motion  Cervical back: Normal range of motion and neck supple  Right lower leg: No edema  Left lower leg: No edema  Skin:     General: Skin is warm and dry  Findings: No lesion or rash  Neurological:      General: No focal deficit present  Mental Status: He is alert and oriented to person, place, and time     Psychiatric:         Behavior: Behavior normal          Judgment: Judgment normal           Additional Data:     Labs:  Results from last 7 days   Lab Units 06/27/23  0509 06/26/23  1034   WBC Thousand/uL 5 55 6 97   HEMOGLOBIN g/dL 12 7 11 9*   HEMATOCRIT % 38 9 35 5*   PLATELETS Thousands/uL 236 247   NEUTROS PCT %  --  83*   LYMPHS PCT %  --  7*   MONOS PCT %  --  8   EOS PCT %  --  1     Results from last 7 days   Lab Units 06/27/23  0509 06/26/23  1034   SODIUM mmol/L 129* 127*   POTASSIUM mmol/L 3 7 4 7   CHLORIDE mmol/L 90* 87*   CO2 mmol/L 28 30   BUN mg/dL 23 27*   CREATININE mg/dL 1 07 1 21   ANION GAP mmol/L 11 10   CALCIUM mg/dL 8 5 8 7   ALBUMIN g/dL  --  3 3*   TOTAL BILIRUBIN mg/dL  --  0 76   ALK PHOS U/L  --  68   ALT U/L  --  13   AST U/L  --  13   GLUCOSE RANDOM mg/dL 162* 275*         Results from last 7 days   Lab Units 23  1539   POC GLUCOSE mg/dl 196*               Lines/Drains:  Invasive Devices     Peripheral Intravenous Line  Duration           Peripheral IV 23 Left Antecubital 1 day                  Telemetry:  Telemetry Orders (From admission, onward)             24 Hour Telemetry Monitoring  (ED Bridging Orders Panel)  Continuous x 24 Hours (Telem)           Question:  Reason for 24 Hour Telemetry  Answer:  Decompensated CHF- and any one of the following: continuous diuretic infusion or total diuretic dose >200 mg daily, associated electrolyte derangement (I e  K < 3 0), ionotropic drip (continuous infusion), hx of ventricular arrhythmia, or new EF < 35%                 Telemetry Reviewed: Normal Sinus Rhythm  Indication for Continued Telemetry Use: No indication for continued use  Will discontinue  Imaging: Reviewed pertinent radiology reports from this admission     Recent Cultures (last 7 days):         Last 24 Hours Medication List:   Current Facility-Administered Medications   Medication Dose Route Frequency Provider Last Rate   • ALPRAZolam  0 5 mg Oral HS PRN Suni Valencia MD     • atorvastatin  10 mg Oral Daily With Molly De La Garza MD     • bumetanide  2 mg Intravenous BID Lyndsay Reynoso MD     • calcium carbonate-vitamin D  1 tablet Oral Daily With Alec Torres MD     • cholecalciferol  400 Units Oral After Alec Torres MD     • clopidogrel  75 mg Oral Daily Suni Valencia MD     • enoxaparin  40 mg Subcutaneous Daily Suni Valencia MD     • famotidine  20 mg Oral HS Suni Valencia MD     • finasteride  5 mg Oral Daily Suni Valencia MD     • insulin lispro  1-5 Units Subcutaneous TID [address] Dada Torre MD     • metoprolol succinate  50 mg Oral Q12H Suni Valencia MD     • multivitamin-minerals  1 tablet Oral Daily Berta Machuca MD     • pantoprazole  20 mg Oral BID AC Berta Machuca MD     • polyethylene glycol  17 g Oral Daily PRN Berta Machuca MD     • ranolazine  500 mg Oral BID Berta Machuca MD     • senna-docusate sodium  2 tablet Oral BID Berta Machuca MD     • tamsulosin  0 4 mg Oral Daily With [address], MD     • zinc sulfate  220 mg Oral Daily With Denny Gongora MD          Today, Patient Was Seen By: Ronel Murillo MD    **Please Note: This note may have been constructed using a voice recognition system  **

## 2023-06-27 NOTE — PLAN OF CARE
Problem: MOBILITY - ADULT  Goal: Maintain or return to baseline ADL function  Description: INTERVENTIONS:  -  Assess patient's ability to carry out ADLs; assess patient's baseline for ADL function and identify physical deficits which impact ability to perform ADLs (bathing, care of mouth/teeth, toileting, grooming, dressing, etc )  - Assess/evaluate cause of self-care deficits   - Assess range of motion  - Assess patient's mobility; develop plan if impaired  - Assess patient's need for assistive devices and provide as appropriate  - Encourage maximum independence but intervene and supervise when necessary  - Involve family in performance of ADLs  - Assess for home care needs following discharge   - Consider OT consult to assist with ADL evaluation and planning for discharge  - Provide patient education as appropriate  Outcome: Progressing     Problem: Potential for Falls  Goal: Patient will remain free of falls  Description: INTERVENTIONS:  - Educate patient/family on patient safety including physical limitations  - Instruct patient to call for assistance with activity   - Consult OT/PT to assist with strengthening/mobility   - Keep Call bell within reach  - Keep bed low and locked with side rails adjusted as appropriate  - Keep care items and personal belongings within reach  - Initiate and maintain comfort rounds  - Make Fall Risk Sign visible to staff  - Offer Toileting every 2 Hours, in advance of need  - Initiate/Maintain alarm  - Obtain necessary fall risk management equipment:   - Apply yellow socks and bracelet for high fall risk patients  - Consider moving patient to room near nurses station  Outcome: Progressing     Problem: Prexisting or High Potential for Compromised Skin Integrity  Goal: Skin integrity is maintained or improved  Description: INTERVENTIONS:  - Identify patients at risk for skin breakdown  - Assess and monitor skin integrity  - Assess and monitor nutrition and hydration status  - Monitor labs   - Assess for incontinence   - Turn and reposition patient  - Assist with mobility/ambulation  - Relieve pressure over bony prominences  - Avoid friction and shearing  - Provide appropriate hygiene as needed including keeping skin clean and dry  - Evaluate need for skin moisturizer/barrier cream  - Collaborate with interdisciplinary team   - Patient/family teaching  - Consider wound care consult   Outcome: Progressing     Problem: PAIN - ADULT  Goal: Verbalizes/displays adequate comfort level or baseline comfort level  Description: Interventions:  - Encourage patient to monitor pain and request assistance  - Assess pain using appropriate pain scale  - Administer analgesics based on type and severity of pain and evaluate response  - Implement non-pharmacological measures as appropriate and evaluate response  - Consider cultural and social influences on pain and pain management  - Notify physician/advanced practitioner if interventions unsuccessful or patient reports new pain  Outcome: Progressing     Problem: INFECTION - ADULT  Goal: Absence or prevention of progression during hospitalization  Description: INTERVENTIONS:  - Assess and monitor for signs and symptoms of infection  - Monitor lab/diagnostic results  - Monitor all insertion sites, i e  indwelling lines, tubes, and drains  - Monitor endotracheal if appropriate and nasal secretions for changes in amount and color  - [place] appropriate cooling/warming therapies per order  - Administer medications as ordered  - Instruct and encourage patient and family to use good hand hygiene technique  - Identify and instruct in appropriate isolation precautions for identified infection/condition  Outcome: Progressing     Problem: DISCHARGE PLANNING  Goal: Discharge to home or other facility with appropriate resources  Description: INTERVENTIONS:  - Identify barriers to discharge w/patient and caregiver  - Arrange for needed discharge resources and transportation as appropriate  - Identify discharge learning needs (meds, wound care, etc )  - Arrange for interpretive services to assist at discharge as needed  - Refer to Case Management Department for coordinating discharge planning if the patient needs post-hospital services based on physician/advanced practitioner order or complex needs related to functional status, cognitive ability, or social support system  Outcome: Progressing     Problem: Knowledge Deficit  Goal: Patient/family/caregiver demonstrates understanding of disease process, treatment plan, medications, and discharge instructions  Description: Complete learning assessment and assess knowledge base    Interventions:  - Provide teaching at level of understanding  - Provide teaching via preferred learning methods  Outcome: Progressing

## 2023-06-28 PROBLEM — J96.01 ACUTE RESPIRATORY FAILURE WITH HYPOXIA (HCC): Status: RESOLVED | Noted: 2017-01-07 | Resolved: 2023-06-28

## 2023-06-28 PROBLEM — I10 HYPERTENSION: Status: ACTIVE | Noted: 2017-01-07

## 2023-06-28 PROBLEM — I50.42 CHRONIC COMBINED SYSTOLIC AND DIASTOLIC HF (HEART FAILURE) (HCC): Status: ACTIVE | Noted: 2023-01-01

## 2023-06-28 PROBLEM — Z51.5 HOSPICE CARE: Status: ACTIVE | Noted: 2023-01-01

## 2023-06-28 PROCEDURE — 99232 SBSQ HOSP IP/OBS MODERATE 35: CPT | Performed by: INTERNAL MEDICINE

## 2023-06-28 RX ORDER — POTASSIUM CHLORIDE 20 MEQ/1
40 TABLET, EXTENDED RELEASE ORAL ONCE
Status: COMPLETED | OUTPATIENT
Start: 2023-06-28 | End: 2023-06-28

## 2023-06-28 RX ADMIN — ATORVASTATIN CALCIUM 10 MG: 10 TABLET, FILM COATED ORAL at 17:29

## 2023-06-28 RX ADMIN — ENOXAPARIN SODIUM 40 MG: 40 INJECTION SUBCUTANEOUS at 09:37

## 2023-06-28 RX ADMIN — POTASSIUM CHLORIDE 40 MEQ: 1500 TABLET, EXTENDED RELEASE ORAL at 09:37

## 2023-06-28 RX ADMIN — ALPRAZOLAM 0.5 MG: 0.5 TABLET ORAL at 22:22

## 2023-06-28 RX ADMIN — BUMETANIDE 2 MG: 0.25 INJECTION INTRAMUSCULAR; INTRAVENOUS at 17:29

## 2023-06-28 RX ADMIN — FINASTERIDE 5 MG: 5 TABLET, FILM COATED ORAL at 09:37

## 2023-06-28 RX ADMIN — BUMETANIDE 2 MG: 0.25 INJECTION INTRAMUSCULAR; INTRAVENOUS at 09:43

## 2023-06-28 RX ADMIN — METOPROLOL SUCCINATE 50 MG: 50 TABLET, EXTENDED RELEASE ORAL at 17:29

## 2023-06-28 RX ADMIN — SENNOSIDES AND DOCUSATE SODIUM 2 TABLET: 50; 8.6 TABLET ORAL at 09:37

## 2023-06-28 RX ADMIN — CLOPIDOGREL BISULFATE 75 MG: 75 TABLET ORAL at 09:37

## 2023-06-28 RX ADMIN — TAMSULOSIN HYDROCHLORIDE 0.4 MG: 0.4 CAPSULE ORAL at 17:29

## 2023-06-28 RX ADMIN — FAMOTIDINE 20 MG: 20 TABLET ORAL at 22:22

## 2023-06-28 RX ADMIN — RANOLAZINE 500 MG: 500 TABLET, FILM COATED, EXTENDED RELEASE ORAL at 09:37

## 2023-06-28 RX ADMIN — SENNOSIDES AND DOCUSATE SODIUM 2 TABLET: 50; 8.6 TABLET ORAL at 17:29

## 2023-06-28 RX ADMIN — RANOLAZINE 500 MG: 500 TABLET, FILM COATED, EXTENDED RELEASE ORAL at 17:29

## 2023-06-28 NOTE — PROGRESS NOTES
[facility]  Progress Note  Name: Alondra Park  MRN: [id number]  Unit/Bed#: S -08 I Date of Admission: 6/26/2023   Date of Service: 6/28/2023 I Hospital Day: 2    Assessment/Plan   * Acute on chronic HFrEF (heart failure with reduced ejection fraction) [facility])  Assessment & Plan  Wt Readings from Last 3 Encounters:   06/26/23 63 3 kg (139 lb 8 8 oz)   06/21/23 61 6 kg (135 lb 12 9 oz)   06/13/23 65 1 kg (143 lb 9 6 oz)   · Last March 2023, ECHO LVEF 45% G2 DD, moderate to severe aortic stenosis   · Recently discharge due to the same admission from (06/18/2023-06/21/2023)  · Discharged on Lasix 40 mg twice daily, spironolactone 25 mg once a day  Endorse compliant with medication  · Discharge weight recorded as a dry weight 135 pounds  · POA: Weight 139lbs BNP 1246  · Chest x-ray pending final read, pulmonary evaluation increased pulmonary vascular congestion did not appreciate significant effusion compared to the one on 06/18  · At baseline dry weight today although did not seem to respond well to Lasix 40 mg x 1 received yesterday with only about 500mL UOP overnight  BP running on the lower and limiting further diuresis in the a m  Cindy Sings · Suspect moderate to severe AS contributing to presenting symptoms  ECHO 6/27/23: The left ventricular ejection fraction is 25%  Systolic function is severely reduced  There is severe global hypokinesis  Plan:  · Monitor strict I/O and daily weight with standing scale  · Monitor volume status  · PTA Imdur and spironolactone on hold   · Continue PTA BB  · Cardiology following - appreciate input:  · Continue IV Bumex 2 mg BID  · Patient not a candidate for TAVR due to low EF of ECHO from 6/27  · Patient and family would like a Hospice consultation inpatient    Acute respiratory failure with hypoxia (HCC)-resolved as of 6/28/2023  Assessment & Plan  POA: Requiring 2 L O2 via nasal cannula with SPO2 in low 90s   Likely in the setting of mild vascular congestion and small pleural effusions as seen on CXR  Not on O2 at home  Plan:  · Continue IV diuresis as per Cardiology when BP permits  · Patient on room air at this time  · Monitor O2 saturations    Hypertension  Assessment & Plan  · History of hypertension  · Home meds Imdur 60 mg once a day, Toprol-XL 50 mg twice daily, spironolactone 25 mg once a day  · Have been noted low blood pressures at home high 90s to low 100s  Blood Pressure: 102/68 limiting further IV diuresis  Plan  · Continue holding PTA Imdur and spironolactone  · Continue Toprol XL 50 mg twice daily  · Continue IV diuresis as per Cardiology when BP permits    Hyponatremia  Assessment & Plan  · POA Na 127 corrected for glucose 133  · Likely exacerbated by volume overload in the setting of acute on chronic CHF and 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  · Trending up with fluid restriction and Lasix 40 mg x 1    Recent Labs     06/26/23  1034 06/27/23  0509   SODIUM 127* 129*     Plan  · Continue Sodium 2 g daily, fluid restriction 1500 cc  · Monitor serum sodium with daily BMP  · Follow-up lab work-up for hyponatremia    Diabetes [facility])  Assessment & Plan  Lab Results   Component Value Date    HGBA1C 6 6 (H) 06/08/2023       Recent Labs     06/26/23  1539   POCGLU 196*       Blood Sugar Average: Last 72 hrs:  · (P) 196History of impaired blood glucose  · Most recent hemoglobin A1c 6 6  · Fasting glucose noted elevated 150s to 160s  · Carbohydrate controlled diet  · Patient refused scheduled Accu-Cheks and SSI -A1c reasonably controlled given patient's age will discontinue fingerstick glucose checks, monitor daily fasting glucose with BMP, and encourage lifestyle modifications    CAD (coronary artery disease)  Assessment & Plan  · History of CAD status post remote CABG 3 vessels  · Currently on Plavix 75 mg once a day recently stopped due to expected procedure tomorrow by ENT    · Continue continue atorvastatin 10 mg once a day per formulary for Crestor 5 mg once a day  · Continue home Ranexa 500 mg twice daily    Anemia of chronic disease-resolved as of 2023  Assessment & Plan  · History iron deficiency anemia and remote history of acute blood loss secondary to PUD for which she got a Paster gastrectomy  · POA hemoglobin 11 9 baseline seems to be 13-15  · No overt bleeding, no changes in bowel movements although has not had BMs over 5 days  Currently on PPIs and H2 blockers no NSAID use  · Patient also endorses worsening fatigue  · CBC a m  Lab Results   Component Value Date    HGB 12 7 2023    HGB 11 9 (L) 2023    HGB 13 3 2023       VTE Pharmacologic Prophylaxis: VTE Score: 7 High Risk (Score >/= 5) - Pharmacological DVT Prophylaxis Ordered: enoxaparin (Lovenox)  Sequential Compression Devices Ordered  Patient Centered Rounds: I performed bedside rounds with nursing staff today  Discussions with Specialists or Other Care Team Provider: Cardiology    Education and Discussions with Family / Patient: Updated  (wife and multiple other family members) at bedside  Current Length of Stay: 2 day(s)  Current Patient Status: Inpatient   Discharge Plan: Anticipate discharge tomorrow to home  Code Status: Level 3 - DNAR and DNI    Subjective:   Patient is a 55-year-old male who presented to the ED as a result of exertional shortness of breath  Today, patient states that he would like to go to hospice and would like to go home because he is dying now  With family at bedside, we had a comprehensive conversation with the patient and family regarding his situation and his prognosis  All questions were answered to the best of her abilities  After the conversation, patient decided that he wanted a hospice inpatient consultation  There were no other questions or concerns  There were no acute events overnight      Objective:     Vitals:   Temp (24hrs), Av 2 °F (36 8 °C), Min:98 °F (36 7 °C), Max:98 4 °F (36 9 °C)    Temp: [98 °F (36 7 °C)-98 4 °F (36 9 °C)] 98 °F (36 7 °C)  HR:  [94] 94  BP: ()/(62-72) 102/68  Body mass index is 23 kg/m²  Input and Output Summary (last 24 hours): Intake/Output Summary (Last 24 hours) at 6/28/2023 1108  Last data filed at 6/28/2023 0930  Gross per 24 hour   Intake 910 ml   Output 100 ml   Net 810 ml       Physical Exam:   Physical Exam  Constitutional:       General: He is not in acute distress  Appearance: He is not toxic-appearing  HENT:      Head: Normocephalic and atraumatic  Mouth/Throat:      Mouth: Mucous membranes are moist    Eyes:      Extraocular Movements: Extraocular movements intact  Conjunctiva/sclera: Conjunctivae normal       Pupils: Pupils are equal, round, and reactive to light  Cardiovascular:      Rate and Rhythm: Normal rate and regular rhythm  Heart sounds: Normal heart sounds  No murmur heard  Pulmonary:      Effort: Pulmonary effort is normal       Breath sounds: No wheezing, rhonchi or rales  Abdominal:      General: Bowel sounds are normal       Palpations: Abdomen is soft  Tenderness: There is no abdominal tenderness  Musculoskeletal:         General: Normal range of motion  Right lower leg: No edema  Left lower leg: No edema  Skin:     General: Skin is warm and dry  Findings: No lesion or rash  Neurological:      General: No focal deficit present  Mental Status: He is alert and oriented to person, place, and time  Psychiatric:         Mood and Affect: Mood normal          Behavior: Behavior normal          Thought Content:  Thought content normal          Judgment: Judgment normal        Additional Data:     Labs:  Results from last 7 days   Lab Units 06/27/23  0509 06/26/23  1034   WBC Thousand/uL 5 55 6 97   HEMOGLOBIN g/dL 12 7 11 9*   HEMATOCRIT % 38 9 35 5*   PLATELETS Thousands/uL 236 247   NEUTROS PCT %  --  83*   LYMPHS PCT %  --  7*   MONOS PCT %  --  8   EOS PCT %  --  1     Results from last 7 days   Lab Units 06/27/23  0509 06/26/23  1034   SODIUM mmol/L 129* 127*   POTASSIUM mmol/L 3 7 4 7   CHLORIDE mmol/L 90* 87*   CO2 mmol/L 28 30   BUN mg/dL 23 27*   CREATININE mg/dL 1 07 1 21   ANION GAP mmol/L 11 10   CALCIUM mg/dL 8 5 8 7   ALBUMIN g/dL  --  3 3*   TOTAL BILIRUBIN mg/dL  --  0 76   ALK PHOS U/L  --  68   ALT U/L  --  13   AST U/L  --  13   GLUCOSE RANDOM mg/dL 162* 275*         Results from last 7 days   Lab Units 06/26/23  1539   POC GLUCOSE mg/dl 196*               Lines/Drains:  Invasive Devices     Peripheral Intravenous Line  Duration           Peripheral IV 06/26/23 Left Antecubital 2 days                Imaging: Reviewed radiology reports from this admission including: chest xray    Recent Cultures (last 7 days):         Last 24 Hours Medication List:   Current Facility-Administered Medications   Medication Dose Route Frequency Provider Last Rate   • ALPRAZolam  0 5 mg Oral HS PRN Laura Goldstein MD     • atorvastatin  10 mg Oral Daily With Molly De La Garza MD     • bumetanide  2 mg Intravenous BID Jonas Sarah MD     • calcium carbonate-vitamin D  1 tablet Oral Daily With Alec Torres MD     • cholecalciferol  400 Units Oral After Alec Torres MD     • clopidogrel  75 mg Oral Daily Laura Goldstein MD     • enoxaparin  40 mg Subcutaneous Daily Laura Goldstein MD     • famotidine  20 mg Oral HS Laura Goldstein MD     • finasteride  5 mg Oral Daily Laura Goldstein MD     • metoprolol succinate  50 mg Oral Q12H Laura Goldstein MD     • multivitamin-minerals  1 tablet Oral Daily Laura Goldstein MD     • pantoprazole  20 mg Oral BID AC Laura Goldstein MD     • polyethylene glycol  17 g Oral Daily PRN Laura Goldstein MD     • ranolazine  500 mg Oral BID Laura Goldstein MD     • senna-docusate sodium  2 tablet Oral BID Loren Deshpande Aniya Santana MD     • tamsulosin  0 4 mg Oral Daily With [address], MD     • zinc sulfate  220 mg Oral Daily With Kodi Babcock MD          Today, Patient Was Seen By: Indigo Su DO    **Please Note: This note may have been constructed using a voice recognition system  **

## 2023-06-28 NOTE — NURSING NOTE
PCA went into room to collect AM labs, patient stated that he did not want bloodwork done as he has decided to discuss pursuing hospice with the Primary Care team     SLIM notified

## 2023-06-28 NOTE — PLAN OF CARE
Problem: MOBILITY - ADULT  Goal: Maintain or return to baseline ADL function  Description: INTERVENTIONS:  -  Assess patient's ability to carry out ADLs; assess patient's baseline for ADL function and identify physical deficits which impact ability to perform ADLs (bathing, care of mouth/teeth, toileting, grooming, dressing, etc )  - Assess/evaluate cause of self-care deficits   - Assess range of motion  - Assess patient's mobility; develop plan if impaired  - Assess patient's need for assistive devices and provide as appropriate  - Encourage maximum independence but intervene and supervise when necessary  - Involve family in performance of ADLs  - Assess for home care needs following discharge   - Consider OT consult to assist with ADL evaluation and planning for discharge  - Provide patient education as appropriate  Outcome: Progressing     Problem: Potential for Falls  Goal: Patient will remain free of falls  Description: INTERVENTIONS:  - Educate patient/family on patient safety including physical limitations  - Instruct patient to call for assistance with activity   - Consult OT/PT to assist with strengthening/mobility   - Keep Call bell within reach  - Keep bed low and locked with side rails adjusted as appropriate  - Keep care items and personal belongings within reach  - Initiate and maintain comfort rounds  - Make Fall Risk Sign visible to staff  - Offer Toileting every 2 Hours, in advance of need  - Apply yellow socks and bracelet for high fall risk patients  - Consider moving patient to room near nurses station  Outcome: Progressing     Problem: Prexisting or High Potential for Compromised Skin Integrity  Goal: Skin integrity is maintained or improved  Description: INTERVENTIONS:  - Identify patients at risk for skin breakdown  - Assess and monitor skin integrity  - Assess and monitor nutrition and hydration status  - Monitor labs   - Assess for incontinence   - Turn and reposition patient  - Assist with mobility/ambulation  - Relieve pressure over bony prominences  - Avoid friction and shearing  - Provide appropriate hygiene as needed including keeping skin clean and dry  - Evaluate need for skin moisturizer/barrier cream  - Collaborate with interdisciplinary team   - Patient/family teaching  - Consider wound care consult   Outcome: Progressing     Problem: PAIN - ADULT  Goal: Verbalizes/displays adequate comfort level or baseline comfort level  Description: Interventions:  - Encourage patient to monitor pain and request assistance  - Assess pain using appropriate pain scale  - Administer analgesics based on type and severity of pain and evaluate response  - Implement non-pharmacological measures as appropriate and evaluate response  - Consider cultural and social influences on pain and pain management  - Notify physician/advanced practitioner if interventions unsuccessful or patient reports new pain  Outcome: Progressing     Problem: INFECTION - ADULT  Goal: Absence or prevention of progression during hospitalization  Description: INTERVENTIONS:  - Assess and monitor for signs and symptoms of infection  - Monitor lab/diagnostic results  - Monitor all insertion sites, i e  indwelling lines, tubes, and drains  - Monitor endotracheal if appropriate and nasal secretions for changes in amount and color  - [place] appropriate cooling/warming therapies per order  - Administer medications as ordered  - Instruct and encourage patient and family to use good hand hygiene technique  - Identify and instruct in appropriate isolation precautions for identified infection/condition  Outcome: Progressing     Problem: DISCHARGE PLANNING  Goal: Discharge to home or other facility with appropriate resources  Description: INTERVENTIONS:  - Identify barriers to discharge w/patient and caregiver  - Arrange for needed discharge resources and transportation as appropriate  - Identify discharge learning needs (meds, wound care, etc )  - Arrange for interpretive services to assist at discharge as needed  - Refer to Case Management Department for coordinating discharge planning if the patient needs post-hospital services based on physician/advanced practitioner order or complex needs related to functional status, cognitive ability, or social support system  Outcome: Progressing     Problem: Knowledge Deficit  Goal: Patient/family/caregiver demonstrates understanding of disease process, treatment plan, medications, and discharge instructions  Description: Complete learning assessment and assess knowledge base    Interventions:  - Provide teaching at level of understanding  - Provide teaching via preferred learning methods  Outcome: Progressing

## 2023-06-28 NOTE — CASE MANAGEMENT
Case Management Assessment & Discharge Planning Note    Patient name Karthikeyan Mortensen  Location S /S -83 MRN [id number]  : 1930 Date 2023       Current Admission Date: 2023  Current Admission Diagnosis:Acute on chronic HFrEF (heart failure with reduced ejection fraction) [facility])   Patient Active Problem List    Diagnosis Date Noted   • Hyponatremia 2023   • Stage 3a chronic kidney disease ([facility] [address] ) 2023   • Diabetes ([facility] [address] ) 2023   • Status post gastric surgery 2022   • Platelets decreased ([facility] [address] ) 2022   • 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 2022   • Dysphagia 2021   • Hx of CABG 2021   • CAD (coronary artery disease) 2021   • 3-vessel CAD 2018   • Dry eye 2018   • Depression 2018   • Bladder mass 2018   • Anxiety 2018   • History of DVT (deep vein thrombosis) 2017   • PUD (peptic ulcer disease) 2017   • Hypertension 2017   • Hyperlipidemia 2017   • Acute on chronic HFrEF (heart failure with reduced ejection fraction) ([facility] [address] ) 2017   • Benign prostatic hyperplasia 10/22/2015      LOS (days): 2  Geometric Mean LOS (GMLOS) (days): 3 90  Days to GMLOS:1 8     OBJECTIVE:  PATIENT READMITTED TO HOSPITAL  Risk of Unplanned Readmission Score: 20 2         Current admission status: Inpatient  Referral Reason: Hospice    Preferred Pharmacy:   [facility] [id number] [facility], [address] [place] [address] [address]  [address] [facility]  [address] [facility],  [address] [id number]  Phone: [phone] Fax: [phone]    Primary Care Provider: Kayy Whitaker DO    Primary Insurance: MEDICARE  Secondary Insurance: AARP    ASSESSMENT:  Omar Rogers Proxies    There are no active Health Care Proxies on file                   Readmission Root Cause  30 Day Readmission: Yes  Who directed you to return to the hospital?: Specialist (cardiologist)  Did you understand whom to contact if you had questions or problems?: Yes  Did you get your prescriptions before you left the hospital?: No  Reason[de-identified] Preference for own pharmacy  Were you able to get your prescriptions filled when you left the hospital?: Yes  Did you take your medications as prescribed?: Yes  Were you able to get to your follow-up appointments?: Yes  During previous admission, was a post-acute recommendation made?: No  Patient was readmitted due to: CHF  Action Plan: goals of care being discussed; likely transition to hospice    Patient Information  Admitted from[de-identified] Home  Mental Status: Alert  During Assessment patient was accompanied by: Spouse  Assessment information provided by[de-identified] Patient, Spouse, Daughter (daughter, Alicja Sanchez, by phone)  Primary Caregiver: Self  Support Systems: Self, Spouse/significant other, Family members  [place] of Residence: [address] [place] [address] do you live in?: [address] [place] [address] Road entry access options   Select all that apply : Stairs  Number of steps to enter home : 4  Type of Current Residence: Ranch  In the last 12 months, was there a time when you were not able to pay the mortgage or rent on time?: No  In the last 12 months, how many places have you lived?: 1  In the last 12 months, was there a time when you did not have a steady place to sleep or slept in a shelter (including now)?: No  Homeless/housing insecurity resource given?: N/A  Living Arrangements: Lives w/ Spouse/significant other    Activities of Daily Living Prior to Admission  Functional Status: Independent  Completes ADLs independently?: Yes  Ambulates independently?: Yes  Does patient use assisted devices?: No  Does patient currently have [facility]?: No         Patient Information Continued  Within the past 12 months, you worried that your food would run out before you got the money to buy more : Never true  Within the past 12 months, the food you bought just didn't last and you didn't have money to get more : Never true  Food insecurity resource given?: N/A  Does patient receive dialysis treatments?: No  Does patient have a history of substance abuse?: No  Does patient have a history of Mental Health Diagnosis?: No         Means of Transportation  Means of Transport to Appts[de-identified] Drives Self  In the past 12 months, has lack of transportation kept you from medical appointments or from getting medications?: No  In the past 12 months, has lack of transportation kept you from meetings, work, or from getting things needed for daily living?: No  Was application for public transport provided?: N/A        DISCHARGE DETAILS:    Discharge planning discussed with[de-identified] patient and spouse at bedside; daughterSussy, by phone in room  Freedom of Choice: Yes (re: hospice care)  Comments - Freedom of Choice: relayed preference for hospice providers as follows: 1) [facility], 2) [facility]  CM contacted family/caregiver?: Yes (spouse at bedside; daughterSussy, by phone)  Were Treatment Team discharge recommendations reviewed with patient/caregiver?: Yes  Did patient/caregiver verbalize understanding of patient care needs?: Yes  Were patient/caregiver advised of the risks associated with not following Treatment Team discharge recommendations?: Yes    Contacts  Patient Contacts: Houston Schwatrz, spouse  Relationship to Patient[de-identified] Family  Contact Method: In Person  Reason/Outcome: Emergency Contact, Discharge Planning, Continuity of Care, Referral    Requested [address]         Is the patient interested in [facility] at discharge?: No    DME Referral Provided  Referral made for DME?: No    Other Referral/Resources/Interventions Provided:  Interventions: Hospice  Referral Comments: Patient admitted due to acute on chronic CHF; known to this writer from recent hospitalization  CM consult received to discuss hospice care  Met with patient and spouse, Houston Schwartz, at bedside to review same  Patient came in from home where he lives with his spouse  House is a ranch home with about 4 steps to enter   Patient had been independent prior to hospitalization, and has history of home care services from many years ago; no current services in place at this time  Reviewed consult for hospice care, and both patient and spouse confirm wish to discuss same  Spouse relayed that daughter, Madeline Gil, has preferred agencies they would like referrals sent to, so spouse contacted daughter by phone  Per Madeline Gil, preferred agencies are 1) [facility], 2) [facility]; aware that referral will be sent to [facility] and will follow-up after they review; if unable to accept, will send referral to [facility]  Reviewed hospice benefit through Medicare with patient and spouse at bedside and goals of life-prolonging, aggressive interventions being discontinued in leiu of comfort-based approach with focus on symptom management  Reviewed medication and DME coverage under hospice services and relayed that routine medications are not just discontinued when hospice care is pursued  Patient/spouse requesting to speak with hospice liaison prior to confirming decision, so call made to Eliza Evans ([phone]) and then Cheryl Benson ([phone]) at [address] [place] [address] to arranged same  Cheryl Benson to come to hospital around 2:30pm to meet with patient and family - patient/spouse aware of same  Copy of IMM reviewed and provided for their records  Will continue to follow for anticipated hospice arrangements at d/c      Would you like to participate in our [facility] service program?  : No - Declined    Treatment Team Recommendation: Hospice  Discharge Destination Plan[de-identified] Hospice ([facility])  Transport at Discharge : Family                             IMM Given (Date):: 06/28/23  IMM Given to[de-identified] Patient (and spouse, Jasper Kimberly, at bedside)

## 2023-06-28 NOTE — CASE MANAGEMENT
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) ([facility] ) 2023   • Hyponatremia 2023   • Stage 3a chronic kidney disease ([facility] ) 2023   • Diabetes ([facility] ) 2023   • Status post gastric surgery 2022   • Platelets decreased ([facility] ) 2022   • 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 2022   • Dysphagia 2021   • Hx of CABG 2021   • CAD (coronary artery disease) 2021   • 3-vessel CAD 2018   • Dry eye 2018   • Depression 2018   • Bladder mass 2018   • Anxiety 2018   • History of DVT (deep vein thrombosis) 2017   • PUD (peptic ulcer disease) 2017   • Hypertension 2017   • Hyperlipidemia 2017   • Acute on chronic HFrEF (heart failure with reduced ejection fraction) ([facility] ) 2017   • Benign prostatic hyperplasia 10/22/2015      LOS (days): 2  Geometric Mean LOS (GMLOS) (days): 3 90  Days to GMLOS:1 7     OBJECTIVE:  Risk of Unplanned Readmission Score: 20 05         Current admission status: Inpatient   Preferred Pharmacy:   [facility] [id number] [facility], [address] [place] [address]  Phone: [phone] Fax: [phone]    Primary Care Provider: Nando Liang DO    Primary Insurance: MEDICARE  Secondary Insurance: AARP    DISCHARGE DETAILS:    Discharge planning discussed with[de-identified] patient, spouse, daughter and son-in-law at bedside  Carnation of Choice: Yes (re: hospice care)  Comments - Freedom of Choice: [facility]  CM contacted family/caregiver?: Yes  Were Treatment Team discharge recommendations reviewed with patient/caregiver?: Yes  Did patient/caregiver verbalize understanding of patient care needs?: Yes  Were patient/caregiver advised of the risks associated with not following Treatment Team discharge recommendations?: Yes    Contacts  Patient Contacts: Blake Tukr, spouse  Relationship to Patient[de-identified] Family  Contact Method: In Person  Reason/Outcome: Emergency Contact, Discharge Planning, Continuity of Care, Referral    [facility]         Is the patient interested in [facility] at discharge?: No    DME Referral Provided  Referral made for DME?: No    Other Referral/Resources/Interventions Provided:  Interventions: Hospice  Referral Comments: Spoke with Remy Caba at [address] [place] [address] - confirmed that she spoke with patient/family and plan at this time is for hospice to order DME (hospital bed, o2, shower seat and bedside table) for delivery for tomorrow, 6/29, and relayed that family requesting hospital d/c for Friday, 6/30  Requesting orders for DME, so TT sent to MD to request scripts for equipment and relay patient/family request for Friday discharge  Orders for hospice and DME all forwarded to [facility] in [address] [place] [address] and call made to Remy Caba to relay information added  Met then with patient, spouse, daughter and son-in-law, at bedside to review plan above  Patient and family aware that DME delivery to be scheduled for tomorrow and plan then for discharge home with hospice care on Friday  Family confirmed that they will be able to transport patient home on Friday afternoon  Aware that hospice RN to come out to see patient then on Friday evening  Will continue to follow for hospice care at discharge      Would you like to participate in our [address] service program?  : No - Declined    Treatment Team Recommendation: Hospice  Discharge Destination Plan[de-identified] Hospice ([facility])  Transport at Discharge : Family                             IMM Given (Date):: 06/28/23

## 2023-06-28 NOTE — PROGRESS NOTES
Progress Note - Cardiology   Emi Valle 80 y o  male MRN: [id number]  Unit/Bed#: S -01 Encounter: [id number]    Assessment/Plan:  #  Acute on chronic HFmrEF: Previously mid range EF  Echo this admission with EF 20-25%  #  Aortic stenosis   #  Severe MR  #  Hyponatremia  #  Elevated troponin: Non MI, secondary to CHF   #  CAD s/p CABG: [place] stress from 3/2023 with infarct of apex and partially reversible inferior defect thought to be artifact due to diaphragm or loop of bowel  80 M who presents with acute on chronic HFmrEF  Admitted in 5/7 and 6/18 for CHF exacerbations  Discharged on furosemide 40 mg daily and furosemide 40 mg BID + spironolactone 25 mg, respectively  Presented again with progressive heart failure symptoms  He didn't response well with IV furosemide and was transitioned to IV bumex with improved response  Repeat echocardiogram demonstrates severely reduced LVEF 20-25% with moderate to severe AS  Mitral regurgitation has progressed to severe  As per my note yesterday, with Mr Mai Pitts permission I reviewed this findings with family and conveyed that his heart is very weak and he has two major valvular problems  Please see my addendum from progress note dated 6/27/23  This morning while reassessing Mr Jarad Luna, he feels quite differently  He feels that the above discussion was handled poorly because I told him and his family that he was dying  I did not say he was dying however, I did attempt to clarify his goals if IV diuretics did not improve his clinical condition  In fact, during my encounter I had conveyed some optimism that we could improve symptoms and that I was quite pleased with how he had responded to the dose of IV bumex  Today, he is requesting home hospice  I was later informed by the primary team this morning that Mr Jarad Luna has asked to not see me again      Recommendations:  -His volume status has slightly improved but he remains hemodynamically stable and currently on "room air  -Continue all home cardiac medications: antiplatelet therapy, statin, ranolazine, beta blockers  -Continue bumex 2 mg IV BID and switch home diuretic for discharge to oral bumex 2 mg BID  -Primary team will be consulting hospice   -I have sent a message to arrange a follow up in the office with his primary cardiologist, Dr Elias Cristina   -Should any clinical question or concern regarding his cardiac conditions arise while he remains hospitalized, please reach out to me if he is agreeable to have me continue to be involved in his care or if he wishes to see another cardiologist   -Above discussed with primary team at length      Subjective/Objective   Chief Complaint: 80 M with a past medical history pertinent for hypertension, moderate to severe aortic stenosis, chronic heart failure with midrange ejection fraction who presents with shortness of breath  Briefly, he was admitted to the hospital twice in the past couple of months for heart failure exacerbations  Most recently he was hospitalized from 6/18/2023 - 6/21/2023  He was treated with IV diuretics and his home furosemide was increased from 40 mg daily to twice daily plus spironolactone 25 mg daily  He presents now with complaints of exertional shortness of breath, orthopnea, abdominal distention  CT and CXR imaging with bilateral pleural effusions and pulmonary edema  ECG without significant changes from prior  BNP > than prior  Interval history: No acute events overnight       Objective:   Vitals: /68   Pulse 94   Temp 98 °F (36 7 °C)   Resp 20   Ht 5' 4\" (1 626 m)   Wt 60 8 kg (134 lb)   SpO2 94%   BMI 23 00 kg/m²   Vitals:    06/27/23 0555 06/27/23 1350   Weight: 61 1 kg (134 lb 11 2 oz) 60 8 kg (134 lb)     Orthostatic Blood Pressures    Flowsheet Row Most Recent Value   Blood Pressure 102/68 filed at 06/28/2023 0596   Patient Position - Orthostatic VS Lying filed at 06/26/2023 2112            Intake/Output Summary (Last 24 hours) " at 6/28/2023 1108  Last data filed at 6/28/2023 0930  Gross per 24 hour   Intake 910 ml   Output 100 ml   Net 810 ml       Invasive Devices     Peripheral Intravenous Line  Duration           Peripheral IV 06/26/23 Left Antecubital 2 days                Review of Systems: All systems reviewed and negative except that noted above    Physical Exam: General appearance: alert and oriented, in no acute distress  Neck: supple, symmetrical, trachea midline and +HJR  Lungs: Diminished breath sounds bilaterally, no wheezing, no rhonchi, minimal rales  Heart: RRR, +systolic murmur  Abdomen: +BS, soft, nontender, nondistended  Extremities: Trace edema  Skin: Warm and dry    Lab Results: I have personally reviewed pertinent lab results  Imaging: I have personally reviewed pertinent reports      EKG: Personally reviewed

## 2023-06-28 NOTE — ACP (ADVANCE CARE PLANNING)
Advanced Care Planning Progress Note    Serious Illness Conversation    1  What is your understanding now of where you are with your illness? Prognostic Understanding: appropriate understanding of prognosis     2  How much information about what is likely to be ahead with your illness would you like to have? Information: patient wants to be fully informed     3  What did you (clinician) communicate to the patient? 4  If your health situation worsens, what are your most important goals? Goals: be at home, be physically comfortable     5  What are the biggest fears and worries about the future and your health? 6  What abilities are so critical to your life that you cannot imagine living without them? 7  What gives you strength as you think about the future with your illness? 8  If you become sicker, how much are you willing to go through for the possibility of gaining more time? 9  How much does your proxy and family know about your priorities and wishes? Discussion Discussion: extensive discussion with family about goals and wishes     Chele Anderson heard you say that continue to be treated for heart failure and be evaluated by hospice is really important to you  Keeping that in mind, and what we know about your illness, I recommend that we consult hospice  This will help us make sure that your treatment plans reflect what’s important to you  How does this plan sound to you? I will do everything I can to help you through this    Patient verbalized understanding of the plan     I have spent 15 minutes speaking with my patient on advanced care planning today or during this visit     Advanced directives         Shelly Rodriguez MD

## 2023-06-29 LAB
ANION GAP SERPL CALCULATED.3IONS-SCNC: 11 MMOL/L
BUN SERPL-MCNC: 20 MG/DL (ref 5–25)
CALCIUM SERPL-MCNC: 8.7 MG/DL (ref 8.4–10.2)
CHLORIDE SERPL-SCNC: 91 MMOL/L (ref 96–108)
CO2 SERPL-SCNC: 28 MMOL/L (ref 21–32)
CREAT SERPL-MCNC: 1.02 MG/DL (ref 0.6–1.3)
GFR SERPL CREATININE-BSD FRML MDRD: 63 ML/MIN/1.73SQ M
GLUCOSE SERPL-MCNC: 165 MG/DL (ref 65–140)
POTASSIUM SERPL-SCNC: 3.5 MMOL/L (ref 3.5–5.3)
SODIUM SERPL-SCNC: 130 MMOL/L (ref 135–147)

## 2023-06-29 PROCEDURE — 80048 BASIC METABOLIC PNL TOTAL CA: CPT

## 2023-06-29 PROCEDURE — 99232 SBSQ HOSP IP/OBS MODERATE 35: CPT | Performed by: INTERNAL MEDICINE

## 2023-06-29 RX ORDER — BUMETANIDE 1 MG/1
2 TABLET ORAL ONCE
Status: DISCONTINUED | OUTPATIENT
Start: 2023-06-29 | End: 2023-06-30 | Stop reason: HOSPADM

## 2023-06-29 RX ORDER — BUMETANIDE 1 MG/1
2 TABLET ORAL 2 TIMES DAILY
Status: DISCONTINUED | OUTPATIENT
Start: 2023-06-30 | End: 2023-06-30 | Stop reason: HOSPADM

## 2023-06-29 RX ORDER — POTASSIUM CHLORIDE 20 MEQ/1
40 TABLET, EXTENDED RELEASE ORAL 2 TIMES DAILY
Status: COMPLETED | OUTPATIENT
Start: 2023-06-29 | End: 2023-06-29

## 2023-06-29 RX ADMIN — PANTOPRAZOLE SODIUM 20 MG: 20 TABLET, DELAYED RELEASE ORAL at 08:42

## 2023-06-29 RX ADMIN — PANTOPRAZOLE SODIUM 20 MG: 20 TABLET, DELAYED RELEASE ORAL at 05:36

## 2023-06-29 RX ADMIN — ATORVASTATIN CALCIUM 10 MG: 10 TABLET, FILM COATED ORAL at 17:01

## 2023-06-29 RX ADMIN — PANTOPRAZOLE SODIUM 20 MG: 20 TABLET, DELAYED RELEASE ORAL at 17:01

## 2023-06-29 RX ADMIN — METOPROLOL SUCCINATE 50 MG: 50 TABLET, EXTENDED RELEASE ORAL at 05:36

## 2023-06-29 RX ADMIN — ALPRAZOLAM 0.5 MG: 0.5 TABLET ORAL at 22:07

## 2023-06-29 RX ADMIN — POTASSIUM CHLORIDE 40 MEQ: 1500 TABLET, EXTENDED RELEASE ORAL at 17:02

## 2023-06-29 RX ADMIN — Medication 1 TABLET: at 12:09

## 2023-06-29 RX ADMIN — POTASSIUM CHLORIDE 40 MEQ: 1500 TABLET, EXTENDED RELEASE ORAL at 09:41

## 2023-06-29 RX ADMIN — RANOLAZINE 500 MG: 500 TABLET, FILM COATED, EXTENDED RELEASE ORAL at 17:01

## 2023-06-29 RX ADMIN — RANOLAZINE 500 MG: 500 TABLET, FILM COATED, EXTENDED RELEASE ORAL at 08:41

## 2023-06-29 RX ADMIN — ZINC SULFATE 220 MG (50 MG) CAPSULE 220 MG: CAPSULE at 12:09

## 2023-06-29 RX ADMIN — SENNOSIDES AND DOCUSATE SODIUM 1 TABLET: 50; 8.6 TABLET ORAL at 08:41

## 2023-06-29 RX ADMIN — FAMOTIDINE 20 MG: 20 TABLET ORAL at 22:07

## 2023-06-29 RX ADMIN — BUMETANIDE 2 MG: 0.25 INJECTION INTRAMUSCULAR; INTRAVENOUS at 09:14

## 2023-06-29 RX ADMIN — TAMSULOSIN HYDROCHLORIDE 0.4 MG: 0.4 CAPSULE ORAL at 17:02

## 2023-06-29 RX ADMIN — FINASTERIDE 5 MG: 5 TABLET, FILM COATED ORAL at 08:41

## 2023-06-29 RX ADMIN — CLOPIDOGREL BISULFATE 75 MG: 75 TABLET ORAL at 08:41

## 2023-06-29 RX ADMIN — SENNOSIDES AND DOCUSATE SODIUM 1 TABLET: 50; 8.6 TABLET ORAL at 17:02

## 2023-06-29 RX ADMIN — MULTIPLE VITAMINS W/ MINERALS TAB 1 TABLET: TAB ORAL at 08:40

## 2023-06-29 RX ADMIN — ENOXAPARIN SODIUM 40 MG: 40 INJECTION SUBCUTANEOUS at 08:41

## 2023-06-29 NOTE — ASSESSMENT & PLAN NOTE
Hospice consulted and will set up everything for patient today at home  Patient to be discharged tomorrow 6/30/2023

## 2023-06-29 NOTE — ASSESSMENT & PLAN NOTE
Wt Readings from Last 3 Encounters:   06/29/23 59 8 kg (131 lb 13 4 oz)   06/21/23 61 6 kg (135 lb 12 9 oz)   06/13/23 65 1 kg (143 lb 9 6 oz)   · Last March 2023, ECHO LVEF 45% G2 DD, moderate to severe aortic stenosis   · Recently discharge due to the same admission from (06/18/2023-06/21/2023)  · Discharged on Lasix 40 mg twice daily, spironolactone 25 mg once a day  Endorse compliant with medication  · Discharge weight recorded as a dry weight 135 pounds  · POA: Weight 139lbs BNP 1246  · Chest x-ray pending final read, pulmonary evaluation increased pulmonary vascular congestion did not appreciate significant effusion compared to the one on 06/18  · At baseline dry weight today although did not seem to respond well to Lasix 40 mg x 1 received yesterday with only about 500mL UOP overnight  BP running on the lower and limiting further diuresis in the a [facility] · Suspect moderate to severe AS contributing to presenting symptoms  ECHO 6/27/23: The left ventricular ejection fraction is 25%  Systolic function is severely reduced  There is severe global hypokinesis  Plan:  · Monitor strict I/O and daily weight with standing scale  · Monitor volume status  · PTA Imdur and spironolactone on hold   · Continue PTA BB  · Patient transitioned to Bumex 2 mg BID  · Patient not a candidate for TAVR due to low EF of ECHO from 6/27  · Patient to go home with Hospice on 6/30 maintaining Level 3 - DNR and DNI

## 2023-06-29 NOTE — ASSESSMENT & PLAN NOTE
Wt Readings from Last 3 Encounters:   06/29/23 59 8 kg (131 lb 13 4 oz)   06/21/23 61 6 kg (135 lb 12 9 oz)   06/13/23 65 1 kg (143 lb 9 6 oz)

## 2023-06-29 NOTE — ASSESSMENT & PLAN NOTE
· History of hypertension  · Home meds Imdur 60 mg once a day, Toprol-XL 50 mg twice daily, spironolactone 25 mg once a day  · Have been noted low blood pressures at home high 90s to low 100s  Blood Pressure: 120/75    Plan  · Continue holding PTA Imdur and spironolactone  · Continue Toprol XL 50 mg twice daily  · Transitioned to oral Bumex

## 2023-06-29 NOTE — PROGRESS NOTES
[facility]  Progress Note  Name: Casa Pi  MRN: [id number]  Unit/Bed#: S -47 I Date of Admission: 6/26/2023   Date of Service: 6/29/2023 I Hospital Day: 3    Assessment/Plan   * Acute on chronic HFrEF (heart failure with reduced ejection fraction) [facility])  Assessment & Plan  Wt Readings from Last 3 Encounters:   06/29/23 59 8 kg (131 lb 13 4 oz)   06/21/23 61 6 kg (135 lb 12 9 oz)   06/13/23 65 1 kg (143 lb 9 6 oz)   · Last March 2023, ECHO LVEF 45% G2 DD, moderate to severe aortic stenosis   · Recently discharge due to the same admission from (06/18/2023-06/21/2023)  · Discharged on Lasix 40 mg twice daily, spironolactone 25 mg once a day  Endorse compliant with medication  · Discharge weight recorded as a dry weight 135 pounds  · POA: Weight 139lbs BNP 1246  · Chest x-ray pending final read, pulmonary evaluation increased pulmonary vascular congestion did not appreciate significant effusion compared to the one on 06/18  · At baseline dry weight today although did not seem to respond well to Lasix 40 mg x 1 received yesterday with only about 500mL UOP overnight  BP running on the lower and limiting further diuresis in the a m  [place] · Suspect moderate to severe AS contributing to presenting symptoms  ECHO 6/27/23: The left ventricular ejection fraction is 25%  Systolic function is severely reduced  There is severe global hypokinesis  Plan:  · Monitor strict I/O and daily weight with standing scale  · Monitor volume status  · PTA Imdur and spironolactone on hold   · Continue PTA BB  · Patient transitioned to Bumex 2 mg BID  · Patient not a candidate for TAVR due to low EF of ECHO from 6/27  · Patient to go home with Hospice on 6/30 maintaining Level 3 - DNR and DNI  Acute respiratory failure with hypoxia (HCC)-resolved as of 6/28/2023  Assessment & Plan  POA: Requiring 2 L O2 via nasal cannula with SPO2 in low 90s   Likely in the setting of mild vascular congestion and small pleural effusions as seen on CXR  Not on O2 at home  Plan:  · Transitioned to oral Bumex  · Patient on room air at this time  · Monitor O2 saturations    Hypertension  Assessment & Plan  · History of hypertension  · Home meds Imdur 60 mg once a day, Toprol-XL 50 mg twice daily, spironolactone 25 mg once a day  · Have been noted low blood pressures at home high 90s to low 100s  Blood Pressure: 120/75    Plan  · Continue holding PTA Imdur and spironolactone  · Continue Toprol XL 50 mg twice daily  · Transitioned to oral Bumex    Hyponatremia  Assessment & Plan  · POA Na 127 corrected for glucose 133  · Likely exacerbated by volume overload in the setting of acute on chronic CHF and 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  · Trending up with fluid restriction and Lasix 40 mg x 1    Recent Labs     06/27/23  0509 06/29/23  0553   SODIUM 129* 130*     Plan  · Continue Sodium 2 g daily, fluid restriction 1500 cc  · Monitor serum sodium with daily BMP  · Follow-up lab work-up for hyponatremia    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)  Assessment & Plan  Wt Readings from Last 3 Encounters:   06/29/23 59 8 kg (131 lb 13 4 oz)   06/21/23 61 6 kg (135 lb 12 9 oz)   06/13/23 65 1 kg (143 lb 9 6 oz)             Hospice care  Assessment & Plan  Hospice consulted and will set up everything for patient today at home  Patient to be discharged tomorrow 6/30/2023      Diabetes [facility])  Assessment & Plan  Lab Results   Component Value Date    HGBA1C 6 6 (H) 06/08/2023       Recent Labs     06/26/23  1539   POCGLU 196*       Blood Sugar Average: Last 72 hrs:  · (P) 196History of impaired blood glucose  · Most recent hemoglobin A1c 6 6  · Fasting glucose noted elevated 150s to 160s    Plan:  · Carbohydrate controlled diet  · SSI  · Hypoglycemia protocol    CAD (coronary artery disease)  Assessment & Plan  · History of CAD status post remote CABG 3 vessels  · Currently on Plavix 75 mg once a day recently stopped due to expected procedure tomorrow by ENT  · Continue continue atorvastatin 10 mg once a day per formulary for Crestor 5 mg once a day  · Continue home Ranexa 500 mg twice daily    Anemia of chronic disease-resolved as of 2023  Assessment & Plan  · History iron deficiency anemia and remote history of acute blood loss secondary to PUD for which she got a Paster gastrectomy  · POA hemoglobin 11 9 baseline seems to be 13-15  · No overt bleeding, no changes in bowel movements although has not had BMs over 5 days  Currently on PPIs and H2 blockers no NSAID use  · Patient also endorses worsening fatigue  · CBC a m  Lab Results   Component Value Date    HGB 12 7 2023    HGB 11 9 (L) 2023    HGB 13 3 2023       VTE Pharmacologic Prophylaxis: VTE Score: 7 High Risk (Score >/= 5) - Pharmacological DVT Prophylaxis Ordered: enoxaparin (Lovenox)  Sequential Compression Devices Ordered  Patient Centered Rounds: I performed bedside rounds with nursing staff today  Discussions with Specialists or Other Care Team Provider: Case Management    Education and Discussions with Family / Patient: Updated  (wife) via phone  Current Length of Stay: 3 day(s)  Current Patient Status: Inpatient   Discharge Plan: Anticipate discharge tomorrow to home  Code Status: Level 3 - DNAR and DNI    Subjective:   Patient is a 27-year-old male who presented to the ED as a result of shortness of breath  Today, patient states that he is doing better and put on some oxygen in the night because it made him feel little better  Patient had no other questions or complaints  There were no acute events overnight  Objective:     Vitals:   Temp (24hrs), Av 8 °F (37 1 °C), Min:98 7 °F (37 1 °C), Max:98 8 °F (37 1 °C)    Temp:  [98 7 °F (37 1 °C)-98 8 °F (37 1 °C)] 98 7 °F (37 1 °C)  HR:  [102-109] 102  Resp:  [18] 18  BP: (107-120)/(63-75) 120/75  Body mass index is 22 63 kg/m²       Input and Output Summary (last 24 hours): Intake/Output Summary (Last 24 hours) at 6/29/2023 1229  Last data filed at 6/29/2023 0900  Gross per 24 hour   Intake 180 ml   Output 925 ml   Net -745 ml       Physical Exam:   Physical Exam  Constitutional:       General: He is not in acute distress  Appearance: He is not toxic-appearing  HENT:      Head: Normocephalic and atraumatic  Mouth/Throat:      Mouth: Mucous membranes are moist    Eyes:      Extraocular Movements: Extraocular movements intact  Conjunctiva/sclera: Conjunctivae normal       Pupils: Pupils are equal, round, and reactive to light  Cardiovascular:      Rate and Rhythm: Normal rate and regular rhythm  Heart sounds: Normal heart sounds  No murmur heard  Pulmonary:      Effort: Pulmonary effort is normal       Breath sounds: No wheezing, rhonchi or rales  Abdominal:      General: Bowel sounds are normal       Palpations: Abdomen is soft  Tenderness: There is no abdominal tenderness  Musculoskeletal:         General: Normal range of motion  Right lower leg: No edema  Left lower leg: No edema  Skin:     General: Skin is warm and dry  Findings: No lesion or rash  Neurological:      General: No focal deficit present  Mental Status: He is alert and oriented to person, place, and time  Psychiatric:         Behavior: Behavior normal          Thought Content:  Thought content normal          Judgment: Judgment normal       Comments: Patient very emotional         Additional Data:     Labs:  Results from last 7 days   Lab Units 06/27/23  0509 06/26/23  1034   WBC Thousand/uL 5 55 6 97   HEMOGLOBIN g/dL 12 7 11 9*   HEMATOCRIT % 38 9 35 5*   PLATELETS Thousands/uL 236 247   NEUTROS PCT %  --  83*   LYMPHS PCT %  --  7*   MONOS PCT %  --  8   EOS PCT %  --  1     Results from last 7 days   Lab Units 06/29/23  0553 06/27/23  0509 06/26/23  1034   SODIUM mmol/L 130*   < > 127*   POTASSIUM mmol/L 3 5   < > 4 7   CHLORIDE mmol/L 91*   < > 87*   CO2 mmol/L 28   < > 30   BUN mg/dL 20   < > 27*   CREATININE mg/dL 1 02   < > 1 21   ANION GAP mmol/L 11   < > 10   CALCIUM mg/dL 8 7   < > 8 7   ALBUMIN g/dL  --   --  3 3*   TOTAL BILIRUBIN mg/dL  --   --  0 76   ALK PHOS U/L  --   --  68   ALT U/L  --   --  13   AST U/L  --   --  13   GLUCOSE RANDOM mg/dL 165*   < > 275*    < > = values in this interval not displayed  Results from last 7 days   Lab Units 06/26/23  1539   POC GLUCOSE mg/dl 196*               Lines/Drains:  Invasive Devices     Peripheral Intravenous Line  Duration           Peripheral IV 06/26/23 Left Antecubital 3 days                Imaging: No pertinent imaging reviewed  All imaging reviewed prior      Recent Cultures (last 7 days):         Last 24 Hours Medication List:   Current Facility-Administered Medications   Medication Dose Route Frequency Provider Last Rate   • ALPRAZolam  0 5 mg Oral HS PRN Eliseo Carmona MD     • atorvastatin  10 mg Oral Daily With [address], MD     • [START ON 6/30/2023] bumetanide  2 mg Oral BID Samson Jacome, DO     • bumetanide  2 mg Oral Once Samson Jacome DO     • calcium carbonate-vitamin D  1 tablet Oral Daily With Alec Torres MD     • cholecalciferol  400 Units Oral After Alec Torres MD     • clopidogrel  75 mg Oral Daily Eliseo Carmona MD     • enoxaparin  40 mg Subcutaneous Daily Eliseo Carmona MD     • famotidine  20 mg Oral HS Eliseo Carmona MD     • finasteride  5 mg Oral Daily Eliseo Carmona MD     • metoprolol succinate  50 mg Oral Q12H Eliseo Carmona MD     • multivitamin-minerals  1 tablet Oral Daily Eliseo Carmona MD     • pantoprazole  20 mg Oral BID AC Eliseo Carmona MD     • polyethylene glycol  17 g Oral Daily PRN Eliseo Carmona MD     • potassium chloride  40 mEq Oral BID Samson Jacome, DO     • ranolazine 500 mg Oral BID Mio Barrios MD     • senna-docusate sodium  2 tablet Oral BID Mio Barrios MD     • tamsulosin  0 4 mg Oral Daily With [address], MD     • zinc sulfate  220 mg Oral Daily With Kassi Rubin MD          Today, Patient Was Seen By: Garrick Greer DO    **Please Note: This note may have been constructed using a voice recognition system  **

## 2023-06-29 NOTE — ASSESSMENT & PLAN NOTE
POA: Requiring 2 L O2 via nasal cannula with SPO2 in low 90s  Likely in the setting of mild vascular congestion and small pleural effusions as seen on CXR  Not on O2 at home      Plan:  · Transitioned to oral Bumex  · Patient on room air at this time  · Monitor O2 saturations

## 2023-06-29 NOTE — ASSESSMENT & PLAN NOTE
Lab Results   Component Value Date    HGBA1C 6 6 (H) 06/08/2023       Recent Labs     06/26/23  1539   POCGLU 196*       Blood Sugar Average: Last 72 hrs:  · (P) 196History of impaired blood glucose  · Most recent hemoglobin A1c 6 6  · Fasting glucose noted elevated 150s to 160s    Plan:  · Carbohydrate controlled diet  · SSI  · Hypoglycemia protocol

## 2023-06-29 NOTE — ASSESSMENT & PLAN NOTE
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

## 2023-06-30 VITALS
HEART RATE: 66 BPM | DIASTOLIC BLOOD PRESSURE: 66 MMHG | OXYGEN SATURATION: 95 % | TEMPERATURE: 98.2 F | BODY MASS INDEX: 22.43 KG/M2 | RESPIRATION RATE: 16 BRPM | SYSTOLIC BLOOD PRESSURE: 107 MMHG | WEIGHT: 131.39 LBS | HEIGHT: 64 IN

## 2023-06-30 PROCEDURE — 99239 HOSP IP/OBS DSCHRG MGMT >30: CPT | Performed by: INTERNAL MEDICINE

## 2023-06-30 RX ORDER — NITROGLYCERIN 0.1MG/HR
0.1 PATCH, TRANSDERMAL 24 HOURS TRANSDERMAL DAILY
Status: DISCONTINUED | OUTPATIENT
Start: 2023-06-30 | End: 2023-06-30 | Stop reason: HOSPADM

## 2023-06-30 RX ORDER — ISOSORBIDE MONONITRATE 30 MG/1
30 TABLET, EXTENDED RELEASE ORAL EVERY MORNING
Qty: 30 TABLET | Refills: 11 | Status: SHIPPED | OUTPATIENT
Start: 2023-06-30 | End: 2023-07-04 | Stop reason: CLARIF

## 2023-06-30 RX ORDER — POTASSIUM CHLORIDE 20 MEQ/1
20 TABLET, EXTENDED RELEASE ORAL 2 TIMES DAILY
Qty: 60 TABLET | Refills: 0 | Status: SHIPPED | OUTPATIENT
Start: 2023-06-30 | End: 2023-07-04 | Stop reason: CLARIF

## 2023-06-30 RX ORDER — BUMETANIDE 2 MG/1
2 TABLET ORAL 2 TIMES DAILY
Qty: 60 TABLET | Refills: 0 | Status: SHIPPED | OUTPATIENT
Start: 2023-06-30 | End: 2023-07-04 | Stop reason: CLARIF

## 2023-06-30 RX ADMIN — BUMETANIDE 2 MG: 1 TABLET ORAL at 08:27

## 2023-06-30 RX ADMIN — FINASTERIDE 5 MG: 5 TABLET, FILM COATED ORAL at 08:27

## 2023-06-30 RX ADMIN — DICLOFENAC SODIUM 2 G: 10 GEL TOPICAL at 11:02

## 2023-06-30 RX ADMIN — PANTOPRAZOLE SODIUM 20 MG: 20 TABLET, DELAYED RELEASE ORAL at 06:21

## 2023-06-30 RX ADMIN — NITROGLYCERIN 0.1 MG: 0.1 PATCH TRANSDERMAL at 08:28

## 2023-06-30 RX ADMIN — SENNOSIDES AND DOCUSATE SODIUM 2 TABLET: 50; 8.6 TABLET ORAL at 08:27

## 2023-06-30 RX ADMIN — ENOXAPARIN SODIUM 40 MG: 40 INJECTION SUBCUTANEOUS at 08:25

## 2023-06-30 RX ADMIN — CLOPIDOGREL BISULFATE 75 MG: 75 TABLET ORAL at 08:27

## 2023-06-30 RX ADMIN — RANOLAZINE 500 MG: 500 TABLET, FILM COATED, EXTENDED RELEASE ORAL at 08:27

## 2023-06-30 RX ADMIN — MULTIPLE VITAMINS W/ MINERALS TAB 1 TABLET: TAB ORAL at 08:26

## 2023-06-30 RX ADMIN — ZINC SULFATE 220 MG (50 MG) CAPSULE 220 MG: CAPSULE at 11:02

## 2023-06-30 RX ADMIN — DICLOFENAC SODIUM 2 G: 10 GEL TOPICAL at 08:28

## 2023-06-30 RX ADMIN — Medication 1 TABLET: at 11:02

## 2023-06-30 NOTE — ASSESSMENT & PLAN NOTE
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

## 2023-06-30 NOTE — ASSESSMENT & PLAN NOTE
· History of CAD status post remote CABG 3 vessels  · Currently on Plavix 75 mg once a day  · Continue Crestor 5 mg once a day    · Continue home Ranexa 500 mg twice daily

## 2023-06-30 NOTE — PLAN OF CARE
Problem: MOBILITY - ADULT  Goal: Maintain or return to baseline ADL function  Description: INTERVENTIONS:  -  Assess patient's ability to carry out ADLs; assess patient's baseline for ADL function and identify physical deficits which impact ability to perform ADLs (bathing, care of mouth/teeth, toileting, grooming, dressing, etc )  - Assess/evaluate cause of self-care deficits   - Assess range of motion  - Assess patient's mobility; develop plan if impaired  - Assess patient's need for assistive devices and provide as appropriate  - Encourage maximum independence but intervene and supervise when necessary  - Involve family in performance of ADLs  - Assess for home care needs following discharge   - Consider OT consult to assist with ADL evaluation and planning for discharge  - Provide patient education as appropriate  6/30/2023 1226 by Angel Mcginnis RN  Outcome: Completed  6/30/2023 0918 by Angel Mcginnis RN  Outcome: Progressing     Problem: Potential for Falls  Goal: Patient will remain free of falls  Description: INTERVENTIONS:  - Educate patient/family on patient safety including physical limitations  - Instruct patient to call for assistance with activity   - Consult OT/PT to assist with strengthening/mobility   - Keep Call bell within reach  - Keep bed low and locked with side rails adjusted as appropriate  - Keep care items and personal belongings within reach  - Initiate and maintain comfort rounds  - Make Fall Risk Sign visible to staff  - Offer Toileting every  Hours, in advance of need  - Initiate/Maintain alarm  - Obtain necessary fall risk management equipment: - Apply yellow socks and bracelet for high fall risk patients  - Consider moving patient to room near nurses station  6/30/2023 1226 by Angel Mcginnis RN  Outcome: Completed  6/30/2023 0918 by Angel Mcginnis RN  Outcome: Progressing     Problem: Prexisting or High Potential for Compromised Skin Integrity  Goal: Skin integrity is maintained or improved  Description: INTERVENTIONS:  - Identify patients at risk for skin breakdown  - Assess and monitor skin integrity  - Assess and monitor nutrition and hydration status  - Monitor labs   - Assess for incontinence   - Turn and reposition patient  - Assist with mobility/ambulation  - Relieve pressure over bony prominences  - Avoid friction and shearing  - Provide appropriate hygiene as needed including keeping skin clean and dry  - Evaluate need for skin moisturizer/barrier cream  - Collaborate with interdisciplinary team   - Patient/family teaching  - Consider wound care consult   6/30/2023 1226 by Brianna Fried RN  Outcome: Completed  6/30/2023 0918 by Brianna Fried RN  Outcome: Progressing     Problem: PAIN - ADULT  Goal: Verbalizes/displays adequate comfort level or baseline comfort level  Description: Interventions:  - Encourage patient to monitor pain and request assistance  - Assess pain using appropriate pain scale  - Administer analgesics based on type and severity of pain and evaluate response  - Implement non-pharmacological measures as appropriate and evaluate response  - Consider cultural and social influences on pain and pain management  - Notify physician/advanced practitioner if interventions unsuccessful or patient reports new pain  6/30/2023 1226 by Brianna Fried RN  Outcome: Completed  6/30/2023 0918 by Brianna Fried RN  Outcome: Progressing     Problem: INFECTION - ADULT  Goal: Absence or prevention of progression during hospitalization  Description: INTERVENTIONS:  - Assess and monitor for signs and symptoms of infection  - Monitor lab/diagnostic results  - Monitor all insertion sites, i e  indwelling lines, tubes, and drains  - Monitor endotracheal if appropriate and nasal secretions for changes in amount and color  - [place] appropriate cooling/warming therapies per order  - Administer medications as ordered  - Instruct and encourage patient and family to use good hand hygiene technique  - Identify and instruct in appropriate isolation precautions for identified infection/condition  6/30/2023 1226 by Tierra Brown RN  Outcome: Completed  6/30/2023 0918 by Tierra Brown RN  Outcome: Progressing     Problem: DISCHARGE PLANNING  Goal: Discharge to home or other facility with appropriate resources  Description: INTERVENTIONS:  - Identify barriers to discharge w/patient and caregiver  - Arrange for needed discharge resources and transportation as appropriate  - Identify discharge learning needs (meds, wound care, etc )  - Arrange for interpretive services to assist at discharge as needed  - Refer to Case Management Department for coordinating discharge planning if the patient needs post-hospital services based on physician/advanced practitioner order or complex needs related to functional status, cognitive ability, or social support system  6/30/2023 1226 by Tierra Brown RN  Outcome: Completed  6/30/2023 0918 by Tierra Brown RN  Outcome: Progressing     Problem: Knowledge Deficit  Goal: Patient/family/caregiver demonstrates understanding of disease process, treatment plan, medications, and discharge instructions  Description: Complete learning assessment and assess knowledge base  Interventions:  - Provide teaching at level of understanding  - Provide teaching via preferred learning methods  6/30/2023 1226 by Tierra Brown RN  Outcome: Completed  6/30/2023 0918 by Tierra Brown RN  Outcome: Progressing     Problem: Nutrition/Hydration-ADULT  Goal: Nutrient/Hydration intake appropriate for improving, restoring or maintaining nutritional needs  Description: Monitor and assess patient's nutrition/hydration status for malnutrition  Collaborate with interdisciplinary team and initiate plan and interventions as ordered  Monitor patient's weight and dietary intake as ordered or per policy  Utilize nutrition screening tool and intervene as necessary   Determine patient's food preferences and provide high-protein, high-caloric foods as appropriate       INTERVENTIONS:  - Monitor oral intake, urinary output, labs, and treatment plans  - Assess nutrition and hydration status and recommend course of action  - Evaluate amount of meals eaten  - Assist patient with eating if necessary   - Allow adequate time for meals  - Recommend/ encourage appropriate diets, oral nutritional supplements, and vitamin/mineral supplements  - Order, calculate, and assess calorie counts as needed  - Recommend, monitor, and adjust tube feedings and TPN/PPN based on assessed needs  - Assess need for intravenous fluids  - Provide specific nutrition/hydration education as appropriate  - Include patient/family/caregiver in decisions related to nutrition  6/30/2023 1226 by Norm Shannon RN  Outcome: Completed  6/30/2023 0918 by Norm Shannon RN  Outcome: Progressing

## 2023-06-30 NOTE — ASSESSMENT & PLAN NOTE
POA: Requiring 2 L O2 via nasal cannula with SPO2 in low 90s  Likely in the setting of mild vascular congestion and small pleural effusions as seen on CXR  Not on O2 at home      Plan:  · Transitioned to oral Bumex  · Patient on room air at this time

## 2023-06-30 NOTE — ASSESSMENT & PLAN NOTE
"Lab Results   Component Value Date    HGBA1C 6 6 (H) 06/08/2023       No results for input(s): \"POCGLU\" in the last 72 hours      Blood Sugar Average: Last 72 hrs:  · History of impaired blood glucose  · Most recent hemoglobin A1c 6 6  · Fasting glucose noted elevated 150s to 160s    Plan:  · Continue home regimen  "

## 2023-06-30 NOTE — ASSESSMENT & PLAN NOTE
Wt Readings from Last 3 Encounters:   06/30/23 59 6 kg (131 lb 6 3 oz)   06/21/23 61 6 kg (135 lb 12 9 oz)   06/13/23 65 1 kg (143 lb 9 6 oz)   · Last March 2023, ECHO LVEF 45% G2 DD, moderate to severe aortic stenosis   · Recently discharge due to the same admission from (06/18/2023-06/21/2023)  · Discharged on Lasix 40 mg twice daily, spironolactone 25 mg once a day  Endorse compliant with medication  · Discharge weight recorded as a dry weight 135 pounds  · POA: Weight 139lbs BNP 1246  · Chest x-ray pending final read, pulmonary evaluation increased pulmonary vascular congestion did not appreciate significant effusion compared to the one on 06/18  · At baseline dry weight today although did not seem to respond well to Lasix 40 mg x 1 received yesterday with only about 500mL UOP overnight  BP running on the lower and limiting further diuresis in the a [facility] · Suspect moderate to severe AS contributing to presenting symptoms  ECHO 6/27/23: The left ventricular ejection fraction is 25%  Systolic function is severely reduced  There is severe global hypokinesis  Plan:  · Dosing of Imdur changed to 30 mg daily  · Patient transitioned to Bumex 2 mg BID  · Stopped Lasix and Aldactone  · Started on Kdur 20 mg BID to maintain K >4 0  · Patient not a candidate for TAVR due to low EF of ECHO from 6/27  · Patient discharged to home with Hospice on 6/30 maintaining Level 3 - DNR and DNI

## 2023-06-30 NOTE — PLAN OF CARE
Problem: MOBILITY - ADULT  Goal: Maintain or return to baseline ADL function  Description: INTERVENTIONS:  -  Assess patient's ability to carry out ADLs; assess patient's baseline for ADL function and identify physical deficits which impact ability to perform ADLs (bathing, care of mouth/teeth, toileting, grooming, dressing, etc )  - Assess/evaluate cause of self-care deficits   - Assess range of motion  - Assess patient's mobility; develop plan if impaired  - Assess patient's need for assistive devices and provide as appropriate  - Encourage maximum independence but intervene and supervise when necessary  - Involve family in performance of ADLs  - Assess for home care needs following discharge   - Consider OT consult to assist with ADL evaluation and planning for discharge  - Provide patient education as appropriate  Outcome: Progressing     Problem: Potential for Falls  Goal: Patient will remain free of falls  Description: INTERVENTIONS:  - Educate patient/family on patient safety including physical limitations  - Instruct patient to call for assistance with activity   - Consult OT/PT to assist with strengthening/mobility   - Keep Call bell within reach  - Keep bed low and locked with side rails adjusted as appropriate  - Keep care items and personal belongings within reach  - Initiate and maintain comfort rounds  - Make Fall Risk Sign visible to staff  - Offer Toileting every  Hours, in advance of need  - Initiate/Maintain alarm  - Obtain necessary fall risk management equipment:  Apply yellow socks and bracelet for high fall risk patients  - Consider moving patient to room near nurses station  Outcome: Progressing     Problem: Prexisting or High Potential for Compromised Skin Integrity  Goal: Skin integrity is maintained or improved  Description: INTERVENTIONS:  - Identify patients at risk for skin breakdown  - Assess and monitor skin integrity  - Assess and monitor nutrition and hydration status  - Monitor labs   - Assess for incontinence   - Turn and reposition patient  - Assist with mobility/ambulation  - Relieve pressure over bony prominences  - Avoid friction and shearing  - Provide appropriate hygiene as needed including keeping skin clean and dry  - Evaluate need for skin moisturizer/barrier cream  - Collaborate with interdisciplinary team   - Patient/family teaching  - Consider wound care consult   Outcome: Progressing     Problem: PAIN - ADULT  Goal: Verbalizes/displays adequate comfort level or baseline comfort level  Description: Interventions:  - Encourage patient to monitor pain and request assistance  - Assess pain using appropriate pain scale  - Administer analgesics based on type and severity of pain and evaluate response  - Implement non-pharmacological measures as appropriate and evaluate response  - Consider cultural and social influences on pain and pain management  - Notify physician/advanced practitioner if interventions unsuccessful or patient reports new pain  Outcome: Progressing     Problem: INFECTION - ADULT  Goal: Absence or prevention of progression during hospitalization  Description: INTERVENTIONS:  - Assess and monitor for signs and symptoms of infection  - Monitor lab/diagnostic results  - Monitor all insertion sites, i e  indwelling lines, tubes, and drains  - Monitor endotracheal if appropriate and nasal secretions for changes in amount and color  - [place] appropriate cooling/warming therapies per order  - Administer medications as ordered  - Instruct and encourage patient and family to use good hand hygiene technique  - Identify and instruct in appropriate isolation precautions for identified infection/condition  Outcome: Progressing     Problem: DISCHARGE PLANNING  Goal: Discharge to home or other facility with appropriate resources  Description: INTERVENTIONS:  - Identify barriers to discharge w/patient and caregiver  - Arrange for needed discharge resources and transportation as appropriate  - Identify discharge learning needs (meds, wound care, etc )  - Arrange for interpretive services to assist at discharge as needed  - Refer to Case Management Department for coordinating discharge planning if the patient needs post-hospital services based on physician/advanced practitioner order or complex needs related to functional status, cognitive ability, or social support system  Outcome: Progressing     Problem: Knowledge Deficit  Goal: Patient/family/caregiver demonstrates understanding of disease process, treatment plan, medications, and discharge instructions  Description: Complete learning assessment and assess knowledge base  Interventions:  - Provide teaching at level of understanding  - Provide teaching via preferred learning methods  Outcome: Progressing     Problem: Nutrition/Hydration-ADULT  Goal: Nutrient/Hydration intake appropriate for improving, restoring or maintaining nutritional needs  Description: Monitor and assess patient's nutrition/hydration status for malnutrition  Collaborate with interdisciplinary team and initiate plan and interventions as ordered  Monitor patient's weight and dietary intake as ordered or per policy  Utilize nutrition screening tool and intervene as necessary  Determine patient's food preferences and provide high-protein, high-caloric foods as appropriate       INTERVENTIONS:  - Monitor oral intake, urinary output, labs, and treatment plans  - Assess nutrition and hydration status and recommend course of action  - Evaluate amount of meals eaten  - Assist patient with eating if necessary   - Allow adequate time for meals  - Recommend/ encourage appropriate diets, oral nutritional supplements, and vitamin/mineral supplements  - Order, calculate, and assess calorie counts as needed  - Recommend, monitor, and adjust tube feedings and TPN/PPN based on assessed needs  - Assess need for intravenous fluids  - Provide specific nutrition/hydration education as appropriate  - Include patient/family/caregiver in decisions related to nutrition  Outcome: Progressing

## 2023-06-30 NOTE — QUICK NOTE
Patient complains of central chest pain 5 out of 10  No SOB or palpitations  Vital stable  Patient refused cardiac work-up  Refused nitroglycerin patch  Requested pain management

## 2023-06-30 NOTE — DISCHARGE SUMMARY
[facility]  Discharge- Delvis Jacob 8/2/1930, 80 y o  male MRN: [id number]  Unit/Bed#: S -01 Encounter: [id number]  Primary Care Provider: Mina Salvador DO   Date and time admitted to hospital: 6/26/2023 10:06 AM    * Acute on chronic HFrEF (heart failure with reduced ejection fraction) [facility])  Assessment & Plan  Wt Readings from Last 3 Encounters:   06/30/23 59 6 kg (131 lb 6 3 oz)   06/21/23 61 6 kg (135 lb 12 9 oz)   06/13/23 65 1 kg (143 lb 9 6 oz)   · Last March 2023, ECHO LVEF 45% G2 DD, moderate to severe aortic stenosis   · Recently discharge due to the same admission from (06/18/2023-06/21/2023)  · Discharged on Lasix 40 mg twice daily, spironolactone 25 mg once a day  Endorse compliant with medication  · Discharge weight recorded as a dry weight 135 pounds  · POA: Weight 139lbs BNP 1246  · Chest x-ray pending final read, pulmonary evaluation increased pulmonary vascular congestion did not appreciate significant effusion compared to the one on 06/18  · At baseline dry weight today although did not seem to respond well to Lasix 40 mg x 1 received yesterday with only about 500mL UOP overnight  BP running on the lower and limiting further diuresis in the a m  Trevino Coop · Suspect moderate to severe AS contributing to presenting symptoms  ECHO 6/27/23: The left ventricular ejection fraction is 25%  Systolic function is severely reduced  There is severe global hypokinesis  Plan:  · Dosing of Imdur changed to 30 mg daily  · Patient transitioned to Bumex 2 mg BID  · Stopped Lasix and Aldactone  · Started on Kdur 20 mg BID to maintain K >4 0  · Patient not a candidate for TAVR due to low EF of ECHO from 6/27  · Patient discharged to home with Hospice on 6/30 maintaining Level 3 - DNR and DNI  Acute respiratory failure with hypoxia (HCC)-resolved as of 6/28/2023  Assessment & Plan  POA: Requiring 2 L O2 via nasal cannula with SPO2 in low 90s   Likely in the setting of mild vascular "congestion and small pleural effusions as seen on CXR  Not on O2 at home  Plan:  · Transitioned to oral Bumex  · Patient on room air at this time    Hypertension  Assessment & Plan  · History of hypertension  · Home meds Imdur 60 mg once a day, Toprol-XL 50 mg twice daily, spironolactone 25 mg once a day  · Have been noted low blood pressures at home high 90s to low 100s  Blood Pressure: 107/66    Plan  · See plan above in Heart Failure    Hyponatremia  Assessment & Plan  · POA Na 127 corrected for glucose 133  · Likely exacerbated by volume overload in the setting of acute on chronic CHF and 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  · Trending up with fluid restriction and Lasix 40 mg x 1    Recent Labs     06/29/23  0553   SODIUM 130*     Plan  · Continue Sodium 2 g daily, fluid restriction 1500 cc  · Follow with PCP outpatient    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)  Assessment & Plan  Wt Readings from Last 3 Encounters:   06/30/23 59 6 kg (131 lb 6 3 oz)   06/21/23 61 6 kg (135 lb 12 9 oz)   06/13/23 65 1 kg (143 lb 9 6 oz)             Hospice care  Assessment & Plan  Hospice consulted and will set up everything for patient at home  Patient to discharged on 6/30/2023  Diabetes [facility])  Assessment & Plan  Lab Results   Component Value Date    HGBA1C 6 6 (H) 06/08/2023       No results for input(s): \"POCGLU\" in the last 72 hours  Blood Sugar Average: Last 72 hrs:  · History of impaired blood glucose  · Most recent hemoglobin A1c 6 6  · Fasting glucose noted elevated 150s to 160s    Plan:  · Continue home regimen    CAD (coronary artery disease)  Assessment & Plan  · History of CAD status post remote CABG 3 vessels  · Currently on Plavix 75 mg once a day  · Continue Crestor 5 mg once a day    · Continue home Ranexa 500 mg twice daily    Anemia of chronic disease-resolved as of 6/27/2023  Assessment & Plan  · History iron deficiency anemia and remote history of acute blood loss secondary to PUD " for which she got a Paster gastrectomy  · POA hemoglobin 11 9 baseline seems to be 13-15  · No overt bleeding, no changes in bowel movements although has not had BMs over 5 days  Currently on PPIs and H2 blockers no NSAID use  · Patient also endorses worsening fatigue  · CBC a m  Lab Results   Component Value Date    HGB 12 7 06/27/2023    HGB 11 9 (L) 06/26/2023    HGB 13 3 06/20/2023       Medical Problems     Resolved Problems  Date Reviewed: 6/30/2023          Resolved    Acute respiratory failure with hypoxia ([address] ) 6/28/2023     Resolved by  Indigo Su DO    Anemia of chronic disease 6/27/2023     Resolved by  Jessica Galvez MD        Discharging Resident: Indigo Su DO  Discharging Attending: No att  providers found  PCP: Ga Gordon DO  Admission Date:   Admission Orders (From admission, onward)     Ordered        06/26/23 1155  INPATIENT ADMISSION  Once                      Discharge Date: 06/30/23    Consultations During Hospital Stay:  · Cardiology    Procedures Performed:   · None    Significant Findings / Test Results:   XR chest 2 views   ED Interpretation by Essie Dorsey PA-C (06/26 1104)   Vascular congestion  Small pleural effusions      Final Result by Annia Barthel, MD (06/26 1448)      Groundglass airspace opacities throughout the lungs most consistent with pulmonary vascular congestive change, similar from prior examination  Slightly improving basilar pleural effusions  Workstation performed: [id number]           · Echocardiogram - The left ventricular ejection fraction is 25%  Systolic function is severely reduced  There is severe global hypokinesis  Incidental Findings:   · None    Test Results Pending at Discharge (will require follow up):   · None     Outpatient Tests Requested:  · None    Complications:  None    Reason for Admission: Shortness of Breath    Hospital Course:   Jennifer Baez is a 80 y o  male patient who originally presented to the hospital on 6/26/2023 due to redness of breath  Patient has a past medical history of hypertension, moderate to severe aortic stenosis, chronic heart failure with midrange ejection fraction, and diabetes  Patient presented to the ED as a result of shortness of breath  She was just recently hospitalized on 6/18/2023 to 6/21/2023 for heart failure  On imaging, there were bilateral pleural effusions and pulmonary edema  His BNP was 1246  As result, patient was admitted  During this hospital stay, cardiology was consulted for helping to achieve proper diuresis  Patient was diuresed and was transitioned to Bumex 2 mg p o  upon discharge  Along with that, a limited echocardiogram was repeated for the patient and it was discovered that patient had an ejection fraction of 25%  Along with that, patient was found to be hyponatremic on presentation secondary to his limited salt intake but that was corrected to 130 on discharge  Due to patient's condition and the fact that he was not a candidate for a TAVR procedure anymore, patient decided that he would like to go home on home hospice while maintaining his level 3 DNR/DNI  Today, patient discharged with a hospice nurse visiting at 1 PM     There were some medication changes during this visit  Patient's Lasix and Aldactone stopped  Patient started on KDur 20 mg twice daily and Bumex 2 mg twice daily  Dosing of patient's Imdur changed to 30 mg daily  If chest pain reoccurs, patient advised to consider talking to cardiology and increasing his dosage back to 60 mg daily  Patient to follow-up with PCP and cardiology upon discharge  Please see above list of diagnoses and related plan for additional information  Condition at Discharge: stable    Discharge Day Visit / Exam:   Subjective: Patient is a 59-year-old male who presented to the ED as of shortness of breath  Today, patient states that he is feeling great and ready to go home    He did have some chest "pain overnight but he says there is no more chest pain  Patient had questions in regards to when hospice would come to see him  I discussed with the patient to the best of my abilities  There were no other acute events overnight  Vitals: Blood Pressure: 107/66 (06/30/23 0700)  Pulse: 66 (06/30/23 0700)  Temperature: 98 2 °F (36 8 °C) (06/30/23 0700)  Temp Source: Oral (06/30/23 0700)  Respirations: 16 (06/30/23 0700)  Height: 5' 4\" (162 6 cm) (06/27/23 1350)  Weight - Scale: 59 6 kg (131 lb 6 3 oz) (06/30/23 0600)  SpO2: 95 % (06/30/23 0700)     Exam:   Physical Exam  Constitutional:       General: He is not in acute distress  Appearance: He is not toxic-appearing  HENT:      Head: Normocephalic and atraumatic  Mouth/Throat:      Mouth: Mucous membranes are moist    Eyes:      Extraocular Movements: Extraocular movements intact  Conjunctiva/sclera: Conjunctivae normal       Pupils: Pupils are equal, round, and reactive to light  Cardiovascular:      Rate and Rhythm: Normal rate and regular rhythm  Heart sounds: Normal heart sounds  No murmur heard  Pulmonary:      Effort: Pulmonary effort is normal       Breath sounds: No wheezing, rhonchi or rales  Abdominal:      General: Bowel sounds are normal       Palpations: Abdomen is soft  Tenderness: There is no abdominal tenderness  Musculoskeletal:         General: Normal range of motion  Right lower leg: No edema  Left lower leg: No edema  Skin:     General: Skin is warm and dry  Findings: No lesion or rash  Neurological:      General: No focal deficit present  Mental Status: He is alert and oriented to person, place, and time  Psychiatric:         Behavior: Behavior normal          Thought Content: Thought content normal          Judgment: Judgment normal       Comments: Patient very emotional        Discussion with Family: Updated  (wife) via phone      Discharge instructions/Information to " patient and family:   See after visit summary for information provided to patient and family  Provisions for Follow-Up Care:  See after visit summary for information related to follow-up care and any pertinent home health orders  Disposition:   Home    Planned Readmission: None    Discharge Medications:  See after visit summary for reconciled discharge medications provided to patient and/or family        **Please Note: This note may have been constructed using a voice recognition system**

## 2023-06-30 NOTE — ASSESSMENT & PLAN NOTE
Hospice consulted and will set up everything for patient at home  Patient to discharged on 6/30/2023

## 2023-06-30 NOTE — DISCHARGE INSTR - AVS FIRST PAGE
Dear Yassine Martin,     It was our pleasure to care for you here at [facility], [employer]  It is our hope that we were always able to exceed the expected standards for your care during your stay  You were hospitalized due to acute CHF  You were cared for on the 3rd floor by Garrick Greer DO under the service of Thao Kennedy MD with the [facility] who covers for your primary care physician (PCP), Maury Dalton DO, while you were hospitalized  If you have any questions or concerns related to this hospitalization, you may contact us at [phone]  For follow up as well as any medication refills, we recommend that you follow up with your primary care physician  A registered nurse will reach out to you by phone within a few days after your discharge to answer any additional questions that you may have after going home  However, at this time we provide for you here, the most important instructions / recommendations at discharge:     Notable Medication Adjustments -   Stop taking Furosemide and Aldactone   Dosing of Imdur changed to 30 mg daily  Start taking Bumex 2 mg twice daily  Start taking K-Dur 20 mg twice daily   Testing Required after Discharge -   None  ** Please contact your PCP to request testing orders for any of the testing recommended here **  Important follow up information -   PCP  Cardiology  Hospice Nurse to visit today at 1:00 pm  Other Instructions -   If chest pain occurs on the Imdur 30 mg, then consider increasing Imdur to original dose of 60 mg  Please review this entire after visit summary as additional general instructions including medication list, appointments, activity, diet, any pertinent wound care, and other additional recommendations from your care team that may be provided for you        Sincerely,     Garrick Greer DO

## 2023-06-30 NOTE — ASSESSMENT & PLAN NOTE
· History of hypertension  · Home meds Imdur 60 mg once a day, Toprol-XL 50 mg twice daily, spironolactone 25 mg once a day  · Have been noted low blood pressures at home high 90s to low 100s  Blood Pressure: 107/66    Plan  · See plan above in Heart Failure

## 2023-06-30 NOTE — CASE MANAGEMENT
Case Management Discharge Planning Note    Patient name Karthikeyan Mortensen  Location S /S -29 MRN [id number]  : 1930 Date 2023       Current Admission Date: 2023  Current Admission Diagnosis:Acute on chronic HFrEF (heart failure with reduced ejection fraction) [facility])   Patient Active Problem List    Diagnosis Date Noted   • Hospice care 2023   • 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) ([facility] ) 2023   • Hyponatremia 2023   • Stage 3a chronic kidney disease ([facility] ) 2023   • Diabetes ([facility] ) 2023   • Status post gastric surgery 2022   • Platelets decreased ([facility] ) 2022   • 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 2022   • Dysphagia 2021   • Hx of CABG 2021   • CAD (coronary artery disease) 2021   • 3-vessel CAD 2018   • Dry eye 2018   • Depression 2018   • Bladder mass 2018   • Anxiety 2018   • History of DVT (deep vein thrombosis) 2017   • PUD (peptic ulcer disease) 2017   • Hypertension 2017   • Hyperlipidemia 2017   • Acute on chronic HFrEF (heart failure with reduced ejection fraction) ([facility] ) 2017   • Benign prostatic hyperplasia 10/22/2015      LOS (days): 4  Geometric Mean LOS (GMLOS) (days): 3 90  Days to GMLOS:0     OBJECTIVE:  Risk of Unplanned Readmission Score: 21 14         Current admission status: Inpatient   Preferred Pharmacy:   [facility] [id number] [place], [phone],  [id number]  Phone: [phone] Fax: [phone]    Primary Care Provider: Kayy Whitaker DO    Primary Insurance: MEDICARE  Secondary Insurance: AARP    DISCHARGE DETAILS:    Discharge planning discussed with[de-identified] Bryan Guillermo at [address] [place], [place] [address]; patient at bedside; spouse, Vernida Job, by phone  Freedom of Choice: Yes (re: hospice)  Comments - Freedom of Choice: Compassionate Care  CM contacted family/caregiver?: Yes (spouse, Blake Turk, by phone)  Were Treatment Team discharge recommendations reviewed with patient/caregiver?: Yes  Did patient/caregiver verbalize understanding of patient care needs?: Yes  Were patient/caregiver advised of the risks associated with not following Treatment Team discharge recommendations?: Yes    Contacts  Patient Contacts: Blake Turk, spouse  Contact Method: Phone  Phone Number: [phone]  Reason/Outcome: Continuity of Care, Emergency Contact, Referral, Discharge [address]         Is the patient interested in [address] at discharge?: No    DME Referral Provided  Referral made for DME?: No    Other Referral/Resources/Interventions Provided:  Interventions: Hospice  Referral Comments: Call made to Remy Caba at [address] [place] [address]; confirmed plan for patient's admission this afternoon and relays DME should have been delivered to patient's home yesterday  Relayed that RN will be out to patient's home after 1:00pm  Met with patient at bedside to discuss above  Patient confirmed plan for d/c today home with hospice care and relayed that family (daughter/spouse) able to pick him up as soon as he's released  Patient with questions re: medications, and aware that any new meds will be sent to his home pharmacy ([facility]) and that the hospice agency will review what medication they will/will not cover when they come to the home  TT sent to MD to confirm d/c for today and relay goal for patient's family to pick him up at 12:00pm  MD confirmed plan for d/c today and aware family will be transporting patient home at 12:00pm; to prepare d/c orders now  Call made to patient's spouse, Blake Truk, and reviewed d/c for today  Spouse confirmed that DME was delivered yesterday and they are prepared for patient to come home  Confirmed that she and her daughter will be to the hospital at 12:00pm to pick-up patient for d/c  Aware that Hospice RN will be out this afternoon to see patient  TT sent to RN to relay plan for d/c today and family picking patient up at 12:00pm     Would you like to participate in our [address] service program?  : No - Declined    Treatment Team Recommendation: Hospice  Discharge Destination Plan[de-identified] Hospice (Compassionate Care)  Transport at Discharge : Family                             IMM Given (Date):: 06/28/23  IMM reviewed with patient, patient agrees with discharge determination

## 2023-06-30 NOTE — ASSESSMENT & PLAN NOTE
Wt Readings from Last 3 Encounters:   06/30/23 59 6 kg (131 lb 6 3 oz)   06/21/23 61 6 kg (135 lb 12 9 oz)   06/13/23 65 1 kg (143 lb 9 6 oz)

## 2023-07-01 NOTE — TELEPHONE ENCOUNTER
Sukumar Lyman passed away this morning July 1, 2023 per his daughter Colette Muhammad. She was calling to let dr. Craig Apt know.

## 2023-07-03 NOTE — TELEPHONE ENCOUNTER
Dr Oc Ma,    I canceled his upcoming appt and will pati him as . I removed you as PCP.  Vineet Trejo

## 2023-07-03 NOTE — TELEPHONE ENCOUNTER
7/3/2023 9:46 AM Called Mrs. Hernandez to offer my condolences.     Message complete  Poly De Leon, DO

## 2024-05-20 NOTE — ASSESSMENT & PLAN NOTE
Patient had recent procedure.  Letter has been composed and is the \"Letters\" section header.   To staff, please make sure that the letter is MyCharted to the patient.     Staff is to PRINT out and mail physical copy to patient's address on file. No need for signature as endoscopist name is already on the letter. Patients are to have a physical copy of their results that they can reference and not just a mychart note.    Staff to update patient problem list with endoscopy date and results.    Waitlist colon 5 years    -----    Dear Julia Field:     As you recall, you recently had a colonoscopy. A polyp was removed that was a benign polyp    You will need another colonoscopy in 5 years.    Please call if you have any questions        Sincerely,     Leon Willis MD     Wt Readings from Last 3 Encounters:   06/20/23 62 6 kg (138 lb 0 1 oz)   06/13/23 65 1 kg (143 lb 9 6 oz)   05/24/23 65 kg (143 lb 3 2 oz)     Patient presents is a 80-year-old male with a past medical history significant for chronic diastolic congestive heart failure, peptic ulcer disease, hyperlipidemia, BPH, and anxiety presents with several days worsening shortness of breath with orthopnea and nonproductive cough  In the ED patient found to have elevated BNP, chest x-ray and CTA both consistent with volume overloaded status  Given 1 dose of IV Lasix 40 in the ED with mild resolution of symptoms  Last known ejection fraction of 45% on 3/16/2023, follows up outpatient with Andrea Diallo MD   Patient previously taking Lasix 40 p o  daily, spironolactone 25 mg daily added on last visit  Patient dry weight recorded to be around 64 kg in outpatient cardiology office, found to be 65 kg in hospital     6/19/2023 - patient down to 62 kg compared to 64 kg dry weight in the outpatient cardiology office, continues to have some crackles on exam but no lower extremity edema, likely still retaining fluid particularly given imaging, will continue to diurese, likely outpatient dry weight not accurate secondary to fluid restriction/sodium restriction nonadherence and/or medication nonadherence      Plan:  Lasix 40 mg IV twice daily  Cardiology following - recommending continued diuresis IV today, transition to po tomorrow   TAVR evaluation upon DC with OP CTS  Incentive spirometry  Continue spironolactone  Hold Imdur to allow for better blood pressure range while actively diuresing  BNP, BMP, CBC  Telemetry  Daily weights  Ins and outs  Cardiac/sodium restricted diet

## (undated) DEVICE — GLOVE INDICATOR PI UNDERGLOVE SZ 7 BLUE

## (undated) DEVICE — CHLORHEXIDINE 4PCT 4 OZ

## (undated) DEVICE — GLOVE SRG BIOGEL 7

## (undated) DEVICE — SCD SEQUENTIAL COMPRESSION COMFORT SLEEVE MEDIUM KNEE LENGTH: Brand: KENDALL SCD

## (undated) DEVICE — GLOVE SRG BIOGEL 7.5

## (undated) DEVICE — SUT SILK 2-0 SH 30 IN K833H

## (undated) DEVICE — SUT VICRYL 3-0 18 IN J904T

## (undated) DEVICE — ADHESIVE SKN CLSR HISTOACRYL FLEX 0.5ML LF

## (undated) DEVICE — BASIC DOUBLE BASIN 2-LF: Brand: MEDLINE INDUSTRIES, INC.

## (undated) DEVICE — SUT PROLENE 2-0 RB-1/RB-1 36 IN 8559H

## (undated) DEVICE — CATH FOLEY HEMATURIA 24FR 30ML 3 WAY LUBRICATH

## (undated) DEVICE — ASTOUND STANDARD SURGICAL GOWN, XL: Brand: CONVERTORS

## (undated) DEVICE — PLUMEPEN PRO 10FT

## (undated) DEVICE — SUT MONOCRYL 4-0 PC-5 18 IN Y823G

## (undated) DEVICE — GLOVE INDICATOR PI UNDERGLOVE SZ 8 BLUE

## (undated) DEVICE — DRAPE LAPAROTOMY W/POUCHES

## (undated) DEVICE — BAG URINE DRAINAGE 2000ML ANTI RFLX LF

## (undated) DEVICE — BASIC SINGLE BASIN 2-LF: Brand: MEDLINE INDUSTRIES, INC.

## (undated) DEVICE — SPECIMEN CONTAINER STERILE PEEL PACK

## (undated) DEVICE — GLOVE SRG BIOGEL 8

## (undated) DEVICE — UROCATCH BAG

## (undated) DEVICE — PACK TUR

## (undated) DEVICE — PACK GENERAL LF

## (undated) DEVICE — STRL PENROSE DRAIN 18" X 1/4": Brand: CARDINAL HEALTH

## (undated) DEVICE — BAG URINE DRAINAGE 4000ML CONTINUOUS IRR

## (undated) DEVICE — NEEDLE 25G X 1 1/2

## (undated) DEVICE — CHLORAPREP HI-LITE 26ML ORANGE

## (undated) DEVICE — SPONGE: SPECIALTY K-DISS XR  100/CS: Brand: MEDICAL ACTION INDUSTRIES

## (undated) DEVICE — VIOLET BRAIDED (POLYGLACTIN 910), SYNTHETIC ABSORBABLE SUTURE: Brand: COATED VICRYL

## (undated) DEVICE — STERILE SURGICAL LUBRICANT,  TUBE: Brand: SURGILUBE

## (undated) DEVICE — PREMIUM DRY TRAY LF: Brand: MEDLINE INDUSTRIES, INC.

## (undated) DEVICE — INVIEW CLEAR LEGGINGS: Brand: CONVERTORS